# Patient Record
Sex: MALE | Race: WHITE | NOT HISPANIC OR LATINO | Employment: OTHER | URBAN - METROPOLITAN AREA
[De-identification: names, ages, dates, MRNs, and addresses within clinical notes are randomized per-mention and may not be internally consistent; named-entity substitution may affect disease eponyms.]

---

## 2017-01-16 ENCOUNTER — OFFICE VISIT (OUTPATIENT)
Dept: OPHTHALMOLOGY | Facility: CLINIC | Age: 77
End: 2017-01-16
Payer: COMMERCIAL

## 2017-01-16 DIAGNOSIS — H43.812 POSTERIOR VITREOUS DETACHMENT, LEFT: ICD-10-CM

## 2017-01-16 DIAGNOSIS — H35.363 MACULAR DRUSEN, BILATERAL: ICD-10-CM

## 2017-01-16 DIAGNOSIS — H26.9 CATARACT: ICD-10-CM

## 2017-01-16 DIAGNOSIS — H52.4 PRESBYOPIA: ICD-10-CM

## 2017-01-16 DIAGNOSIS — H40.053 BORDERLINE GLAUCOMA WITH OCULAR HYPERTENSION, BILATERAL: Primary | ICD-10-CM

## 2017-01-16 PROCEDURE — 92014 COMPRE OPH EXAM EST PT 1/>: CPT | Performed by: OPHTHALMOLOGY

## 2017-01-16 PROCEDURE — 92015 DETERMINE REFRACTIVE STATE: CPT | Performed by: OPHTHALMOLOGY

## 2017-01-16 ASSESSMENT — VISUAL ACUITY
CORRECTION_TYPE: GLASSES
OS_CC: J1
METHOD: SNELLEN - LINEAR
OS_CC+: -2
OS_CC: 20/25
OD_CC: J1
OD_CC: 20/20

## 2017-01-16 ASSESSMENT — EXTERNAL EXAM - LEFT EYE: OS_EXAM: 3+ BROW PTOSIS, MILD-MOD BROW

## 2017-01-16 ASSESSMENT — REFRACTION_WEARINGRX
SPECS_TYPE: PAL
OS_ADD: +2.50
OS_CYLINDER: +2.50
OS_AXIS: 172
OD_SPHERE: +0.75
OD_ADD: +2.50
OS_SPHERE: -1.00
OD_AXIS: 169
OD_CYLINDER: +1.25

## 2017-01-16 ASSESSMENT — CUP TO DISC RATIO
OD_RATIO: 0.5
OS_RATIO: 0.5

## 2017-01-16 ASSESSMENT — REFRACTION_MANIFEST
OS_ADD: +2.50
OS_SPHERE: -1.25
OD_AXIS: 007
OS_AXIS: 180
OS_CYLINDER: +3.00
OD_CYLINDER: +1.50
OD_SPHERE: +0.75
OD_ADD: +2.50

## 2017-01-16 ASSESSMENT — TONOMETRY
IOP_METHOD: APPLANATION
OS_IOP_MMHG: 19
OD_IOP_MMHG: 18

## 2017-01-16 ASSESSMENT — EXTERNAL EXAM - RIGHT EYE: OD_EXAM: 3+ BROW PTOSIS, MILD-MOD BROW

## 2017-01-16 ASSESSMENT — SLIT LAMP EXAM - LIDS: COMMENTS: 1+ DERMATOCHALASIS - UPPER LID, 2+ MEIBOMIAN GLAND DYSFUNCTION

## 2017-01-16 ASSESSMENT — CONF VISUAL FIELD: OD_NORMAL: 1

## 2017-01-16 NOTE — PROGRESS NOTES
" Current Eye Medications:  Latanoprost every evening.   Last drops 10pm     Subjective:  COMPLETE EYE EXAM  No visual complaints. Routine Exam.     Objective:  See Ophthalmology Exam.       Assessment: Stable intraocular pressure and discs in patient with treated ocular hypertension.      ICD-10-CM    1. 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 Cataract, mild-mod, ou H26.9    3. Macular drusen, bilateral H35.363    4. Posterior vitreous detachment, left H43.812    5. Presbyopia H52.4 REFRACTIVE STATUS         Plan: Continue Latanoprost at bedtime both eyes   Glasses Rx given -optional   Take a multiple vitamin or \"eye vitamin\" daily.  Protect your eyes outdoors from ultraviolet rays with sunglasses and/or brimmed hat.  Have spinach (cooked or raw), colorful fruits, walnuts, hazelnuts, almonds in your diet.  Monitor the vision in each eye weekly - call if any sudden persistent changes.   Possible posterior vitreous detachment (sudden onset large floater and/or flashing lights) discussed. Right eye   Return visit 6 months for intraocular pressure check, Galvan Visual Field and glaucoma OCT     Avtar Mccullough M.D.             "

## 2017-01-16 NOTE — MR AVS SNAPSHOT
"              After Visit Summary   1/16/2017    Zack Demarco    MRN: [id number]           Patient Information     Date Of Birth          1940        Visit Information        Provider Department      1/16/2017 8:00 AM Avtar Mccullough MD [facility]        Today's Diagnoses     Presbyopia    -  1     Hypermetropia, right         Myopia, left         Astigmatism, bilateral         Borderline glaucoma with ocular hypertension, bilateral - treated         Posterior vitreous detachment, left         Macular drusen, bilateral         Cataract, mild-mod, ou           Care Instructions    Continue Latanoprost at bedtime both eyes   Glasses Rx given -optional   Take a multiple vitamin or \"eye vitamin\" daily.  Protect your eyes outdoors from ultraviolet rays with sunglasses and/or brimmed hat.  Have spinach (cooked or raw), colorful fruits, walnuts, hazelnuts, almonds in your diet.  Monitor the vision in each eye weekly - call if any sudden persistent changes.   Possible posterior vitreous detachment (sudden onset large floater and/or flashing lights) discussed. Right eye   Return visit 6 months for intraocular pressure check, Galvan Visual Field and glaucoma OCT     Avtar Mccullough M.D.          Follow-ups after your visit        Who to contact     If you have questions or need follow up information about today's clinic visit or your schedule please contact [facility] directly at [phone].  Normal or non-critical lab and imaging results will be communicated to you by MyChart, letter or phone within 4 business days after the clinic has received the results. If you do not hear from us within 7 days, please contact the clinic through MyChart or phone. If you have a critical or abnormal lab result, we will notify you by phone as soon as possible.  Submit refill requests through [employer] or call your pharmacy and they will forward the refill request to us. Please allow 3 business days for " your refill to be completed.          Additional Information About Your Visit        [employer] Information     "[employer] gives you secure access to your electronic health record. If you see a primary care provider, you can also send messages to your care team and make appointments. If you have questions, please call your primary care clinic.  If you do not have a primary care provider, please call [phone] and they will assist you.        Care EveryWhere ID     This is your Care EveryWhere ID. This could be used by other organizations to access your [facility] medical records  [id number]         Blood Pressure from Last 3 Encounters:   12/30/16 138/70   09/19/16 115/46   06/15/16 144/60    Weight from Last 3 Encounters:   12/30/16 100.699 kg (222 lb)   09/12/16 95.255 kg (210 lb)   06/15/16 96.616 kg (213 lb)              Today, you had the following     No orders found for display       Primary Care Provider Office Phone # Fax #    Anastacio Love [phone]       [facility] [address] [facility] [address]        Thank you!     Thank you for choosing [facility]  for your care. Our goal is always to provide you with excellent care. Hearing back from our patients is one way we can continue to improve our services. Please take a few minutes to complete the written survey that you may receive in the mail after your visit with us. Thank you!             Your Updated Medication List - Protect others around you: Learn how to safely use, store and throw away your medicines at www.disposemymeds.org.          This list is accurate as of: 1/16/17  8:40 AM.  Always use your most recent med list.                   Brand Name Dispense Instructions for use    aspirin 325 MG EC tablet      1/2 tab daily       hydrochlorothiazide 25 MG tablet    HYDRODIURIL    90 tablet    Take 1 tablet (25 mg) by mouth daily       latanoprost 0.005 % ophthalmic solution    XALATAN    3 Bottle     Place 1 drop into both eyes At Bedtime       levothyroxine 50 MCG tablet    SYNTHROID/LEVOTHROID    110 tablet    Take 1 tablet (50 mcg) by mouth daily 100mcg (2 tabs) on Wednesdays and Sundays       metoprolol 25 MG tablet    LOPRESSOR    180 tablet    Take 1 tablet (25 mg) by mouth 2 times daily       multivitamin Tabs tablet      Take 1 tablet by mouth daily       simvastatin 20 MG tablet    ZOCOR    90 tablet    Take 1 tablet (20 mg) by mouth At Bedtime       VITAMIN D3 PO      Take by mouth daily

## 2017-01-16 NOTE — PATIENT INSTRUCTIONS
"Continue Latanoprost at bedtime both eyes   Glasses Rx given -optional   Take a multiple vitamin or \"eye vitamin\" daily.  Protect your eyes outdoors from ultraviolet rays with sunglasses and/or brimmed hat.  Have spinach (cooked or raw), colorful fruits, walnuts, hazelnuts, almonds in your diet.  Monitor the vision in each eye weekly - call if any sudden persistent changes.   Possible posterior vitreous detachment (sudden onset large floater and/or flashing lights) discussed. Right eye   Return visit 6 months for intraocular pressure check, Galvan Visual Field and glaucoma OCT     Avtar Mccullough M.D.    "

## 2017-01-18 PROBLEM — H35.363 MACULAR DRUSEN, BILATERAL: Status: ACTIVE | Noted: 2017-01-18

## 2017-01-31 ENCOUNTER — OFFICE VISIT (OUTPATIENT)
Dept: FAMILY MEDICINE | Facility: CLINIC | Age: 77
End: 2017-01-31
Payer: COMMERCIAL

## 2017-01-31 VITALS
SYSTOLIC BLOOD PRESSURE: 142 MMHG | OXYGEN SATURATION: 95 % | HEART RATE: 63 BPM | HEIGHT: 70 IN | WEIGHT: 224 LBS | DIASTOLIC BLOOD PRESSURE: 70 MMHG | BODY MASS INDEX: 32.07 KG/M2 | TEMPERATURE: 97.7 F

## 2017-01-31 DIAGNOSIS — I10 HYPERTENSION GOAL BP (BLOOD PRESSURE) < 150/90: Primary | ICD-10-CM

## 2017-01-31 PROCEDURE — 99213 OFFICE O/P EST LOW 20 MIN: CPT | Performed by: FAMILY MEDICINE

## 2017-01-31 NOTE — PROGRESS NOTES
HTN (goal <150/90) - not checking at home. Controlled in clinic.   Metoprolol 25 mg bid (Toprol XL was costly) - dose limited by bradycardia   HCTZ 25 mg qd without side effects.    Plan for today: continue same meds.    Last Basic Metabolic Panel:  NA      141   6/9/2016   POTASSIUM      4.5   6/9/2016  CHLORIDE      106   6/9/2016  ELVIRA      9.3   6/9/2016  CO2       30   6/9/2016  BUN       28   6/9/2016  CR     0.98   6/9/2016  GLC       99   6/9/2016  CBC RESULTS:   Recent Labs   Lab Test  08/12/15   0804   WBC  6.8   RBC  4.90   HGB  15.6   HCT  46.5   MCV  95   MCH  31.8   MCHC  33.5   RDW  12.4   PLT  198       Dyslipidemia - no h/o MI, CAD, CVA, PAD. On Zocor 20 mg qd. No side effects. Fair control but could be better.   Plan for today[facility] I gave him paper rx for Crestor 20 mg qd.      Recent Labs   Lab Test  06/09/16   0742  01/18/16   0708  01/22/15   0816  01/14/14   0747   CHOL  191  183  154  154   HDL  33*  33*  43  33*   LDL  127*  121*  92  103   TRIG  155*  143  93  87   CHOLHDLRATIO   --    --   3.6  4.7     Hypothyroidism - fairly controlled on Synthroid 100 mcg on Wed and Sun and 50 micrograms other days.   Plan for today: Continue same dose.    TSH   Date Value Ref Range Status   06/09/2016 4.67* 0.40 - 4.00 mU/L Final     T4 FREE   Date Value Ref Range Status   06/09/2016 0.88 0.76 - 1.46 ng/dL Final     Obesity - diet and exercise discussed.     Wt Readings from Last 5 Encounters:   12/30/16 222 lb (100.699 kg)   09/12/16 210 lb (95.255 kg)   06/15/16 213 lb (96.616 kg)   01/22/16 217 lb (98.431 kg)   08/12/15 217 lb (98.431 kg)     Sore tongue - he has seen ENT and dentist. The problem is resolved.     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 Asymptomatic. Advised wearing compression stockings.    Previous surgeries - left rotator cuff surgery. Had anterior tibialis repair (for foot drop) on 8/18/15 - good results.    Hearing loss - he wants  "to hold off on hearing aides referral.     Preventive:    Immunization History   Administered Date(s) Administered     Influenza (High Dose) 3 valent vaccine 10/15/2015, 10/20/2016     Influenza (IIV3) 10/25/2012, 10/01/2013, 11/24/2014     Pneumococcal (PCV 13) 01/22/2016     Pneumococcal 23 valent 11/30/2006     TD (ADULT, 7+) 08/25/2010     TDAP (BOOSTRIX AGES 10-64) 01/21/2013     Zoster vaccine, live 07/15/2008     Colonoscopy: 9/19/16 - advised to redo in 5 years.     Prostate CA screen: discussed controversy. Prostate mildly enlarged on 7/15/14.     PSA   Date Value Ref Range Status   06/09/2016 2.76 0 - 4 ug/L Final       AAA: 7/18/14 negative    Advanced Directive: referred previously    Vit D Geriatrics Society Guidelines: Older adults should consume 4000 IU of Vit D daily from all sources. This will achieve serum level of 30. No need to test unless obese, malabsorption etc. You get only 100 units per glass of milk. 2000 units advised.    ROS:    Const: No fevers, weight changes or night sweats recently.  ENT: No runny nose, sore throat or ear pain.  Resp: No cough or shortness of breath.  CV: No chest pain, dizziness or cardiac palpitations.  GI: No nausea, vomiting, diarrhea or constipation. Denies blood in stools or black stools.  : No dysuria, frequency or hematuria.    SH:    Marital status: , lives by himself in [place].  Kids: 2  Employment: Works at a machine shop.  Exercise: Walks a lot at work. Has been running 4 miles per day 5 times per week.  Tobacco: Quit smoking around 1980. Has 14 pack year history of smoking.  Etoh: rarely  Recreational drugs: denies  Caffeine: 2 per day    Exam:    /70 mmHg  Pulse 63  Temp(Src) 97.7  F (36.5  C) (Oral)  Ht 5' 9.5\" (1.765 m)  Wt 224 lb (101.606 kg)  BMI 32.62 kg/m2  SpO2 95%    Gen: Healthy appearing male in no acute distress  Neck: No enlarged lymph nodes, thyromegally or other masses.  Lungs: Good air movement and otherwise " clear.  CV: Heart RRR with no murmurs. No JVD, carotid bruits or leg edema.      Assessment and Plan - Decision Making    1. Hypertension goal BP (blood pressure) < 150/90    Fairly controlled. Continue same treatment.      Written instructions given as follows:    Patient Instructions   Let me see you back in 6 months for a physical with pre-visit labs.

## 2017-01-31 NOTE — NURSING NOTE
"Chief Complaint   Patient presents with     Hypertension     Lipids       Initial /72 mmHg  Pulse 63  Temp(Src) 97.7  F (36.5  C) (Oral)  Ht 5' 9.5\" (1.765 m)  Wt 224 lb (101.606 kg)  BMI 32.62 kg/m2  SpO2 95% Estimated body mass index is 32.62 kg/(m^2) as calculated from the following:    Height as of this encounter: 5' 9.5\" (1.765 m).    Weight as of this encounter: 224 lb (101.606 kg)..  BP completed using cuff size: large    Vibha Smyth LPN    "

## 2017-01-31 NOTE — MR AVS SNAPSHOT
After Visit Summary   1/31/2017    Zack Demarco    MRN: [id number]           Patient Information     Date Of Birth          1940        Visit Information        Provider Department      1/31/2017 12:50 PM Anastacio Love MD [facility]        Care Instructions    Let me see you back in 6 months for a physical with pre-visit labs.        Follow-ups after your visit        Your next 10 appointments already scheduled     Jul 13, 2017  8:15 AM   Return Visit with Avtar Mccullough MD   [facility] ([address] [address] [phone]              Who to contact     If you have questions or need follow up information about today's clinic visit or your schedule please contact [facility] directly at [phone].  Normal or non-critical lab and imaging results will be communicated to you by MyChart, letter or phone within 4 business days after the clinic has received the results. If you do not hear from us within 7 days, please contact the clinic through MyChart or phone. If you have a critical or abnormal lab result, we will notify you by phone as soon as possible.  Submit refill requests through [employer] or call your pharmacy and they will forward the refill request to us. Please allow 3 business days for your refill to be completed.          Additional Information About Your Visit        MyChart Information     [employer] gives you secure access to your electronic health record. If you see a primary care provider, you can also send messages to your care team and make appointments. If you have questions, please call your primary care clinic.  If you do not have a primary care provider, please call [phone] and they will assist you.        Care EveryWhere ID     This is your Care EveryWhere ID. This could be used by other organizations to access your [facility] medical records  [id number]        Your Vitals Were  "    Pulse Temperature Height BMI (Body Mass Index) Pulse Oximetry       63 97.7  F (36.5  C) (Oral) 5' 9.5\" (1.765 m) 32.62 kg/m2 95%        Blood Pressure from Last 3 Encounters:   01/31/17 142/70   12/30/16 138/70   09/19/16 115/46    Weight from Last 3 Encounters:   01/31/17 224 lb (101.606 kg)   12/30/16 222 lb (100.699 kg)   09/12/16 210 lb (95.255 kg)              Today, you had the following     No orders found for display       Primary Care Provider Office Phone # Fax #    Anastacio Love [phone]       [facility] [address] [facility] [address]        Thank you!     Thank you for choosing [facility]  for your care. Our goal is always to provide you with excellent care. Hearing back from our patients is one way we can continue to improve our services. Please take a few minutes to complete the written survey that you may receive in the mail after your visit with us. Thank you!             Your Updated Medication List - Protect others around you: Learn how to safely use, store and throw away your medicines at www.disposemymeds.org.          This list is accurate as of: 1/31/17  1:11 PM.  Always use your most recent med list.                   Brand Name Dispense Instructions for use    aspirin 325 MG EC tablet      1/2 tab daily       hydrochlorothiazide 25 MG tablet    HYDRODIURIL    90 tablet    Take 1 tablet (25 mg) by mouth daily       latanoprost 0.005 % ophthalmic solution    XALATAN    3 Bottle    Place 1 drop into both eyes At Bedtime       levothyroxine 50 MCG tablet    SYNTHROID/LEVOTHROID    110 tablet    Take 1 tablet (50 mcg) by mouth daily 100mcg (2 tabs) on Wednesdays and Sundays       metoprolol 25 MG tablet    LOPRESSOR    180 tablet    Take 1 tablet (25 mg) by mouth 2 times daily       multivitamin Tabs tablet      Take 1 tablet by mouth daily       simvastatin 20 MG tablet    ZOCOR    90 tablet    Take 1 tablet (20 mg) by mouth At Bedtime "       VITAMIN D3 PO      Take by mouth daily

## 2017-04-20 DIAGNOSIS — I10 HYPERTENSION GOAL BP (BLOOD PRESSURE) < 150/90: ICD-10-CM

## 2017-04-20 RX ORDER — HYDROCHLOROTHIAZIDE 25 MG/1
25 TABLET ORAL DAILY
Qty: 90 TABLET | Refills: 0 | Status: SHIPPED | OUTPATIENT
Start: 2017-04-20 | End: 2017-07-12

## 2017-04-20 RX ORDER — METOPROLOL TARTRATE 25 MG/1
25 TABLET, FILM COATED ORAL 2 TIMES DAILY
Qty: 180 TABLET | Refills: 0 | Status: SHIPPED | OUTPATIENT
Start: 2017-04-20 | End: 2017-07-12

## 2017-07-13 ENCOUNTER — OFFICE VISIT (OUTPATIENT)
Dept: OPHTHALMOLOGY | Facility: CLINIC | Age: 77
End: 2017-07-13
Payer: COMMERCIAL

## 2017-07-13 DIAGNOSIS — H40.053 BORDERLINE GLAUCOMA WITH OCULAR HYPERTENSION, BILATERAL: Primary | ICD-10-CM

## 2017-07-13 PROCEDURE — 92083 EXTENDED VISUAL FIELD XM: CPT | Performed by: OPHTHALMOLOGY

## 2017-07-13 PROCEDURE — 92133 CPTRZD OPH DX IMG PST SGM ON: CPT | Performed by: OPHTHALMOLOGY

## 2017-07-13 PROCEDURE — 92014 COMPRE OPH EXAM EST PT 1/>: CPT | Performed by: OPHTHALMOLOGY

## 2017-07-13 ASSESSMENT — TONOMETRY
OD_IOP_MMHG: 19
IOP_METHOD: APPLANATION
OS_IOP_MMHG: 19

## 2017-07-13 ASSESSMENT — VISUAL ACUITY
OD_CC: 20/30
METHOD: SNELLEN - LINEAR
OS_CC: 20/40
OS_CC+: +2
CORRECTION_TYPE: GLASSES

## 2017-07-13 ASSESSMENT — REFRACTION_WEARINGRX
OS_ADD: +2.50
OD_AXIS: 169
OD_SPHERE: +0.75
OS_CYLINDER: +2.50
OD_ADD: +2.50
OS_SPHERE: -1.00
OS_AXIS: 172
SPECS_TYPE: PAL
OD_CYLINDER: +1.25

## 2017-07-13 NOTE — PATIENT INSTRUCTIONS
Continue same medication.  Return visit 6 months for complete exam.  Consider cataract surgery then.  Avtar Mccullough M.D.

## 2017-07-13 NOTE — MR AVS SNAPSHOT
After Visit Summary   7/13/2017    Zack Demarco    MRN: [id number]           Patient Information     Date Of Birth          1940        Visit Information        Provider Department      7/13/2017 8:15 AM Avtar Mccullough MD [facility]        Care Instructions    Continue same medication.  Return visit 6 months for complete exam .  Avtar Mccullough M.D.            Follow-ups after your visit        Your next 10 appointments already scheduled     Jul 20, 2017  7:45 AM CDT   [facility] with [facility]   [facility]    [address] [facility] [address] [phone]           Patient must bring picture ID.  Patient should be prepared to give a urine specimen  Please do not eat 10-12 hours before your appointment if you are coming in fasting for labs on lipids, cholesterol, or glucose (sugar).  Pregnant women should follow their Care Team instructions. Water with medications is okay. Do not drink coffee or other fluids.   If you have concerns about taking  your medications, please ask at office or if scheduling via [employer], send a message by clicking on Secure Messaging, Message Your Care Team.            Jul 27, 2017  9:50 AM CDT   Office Visit with Anastacio Love MD   [facility]    [address] [facility] [address] [phone]           Bring a current list of meds and any records pertaining to this visit.  For Physicals, please bring immunization records and any forms needing to be filled out.  Please arrive 10 minutes early to complete paperwork.              Who to contact     If you have questions or need follow up information about today's clinic visit or your schedule please contact [facility] directly at [phone].  Normal or non-critical lab and imaging results will be communicated to you by MyChart, letter or phone within 4 business days after the clinic has  received the results. If you do not hear from us within 7 days, please contact the clinic through [employer] or phone. If you have a critical or abnormal lab result, we will notify you by phone as soon as possible.  Submit refill requests through [employer] or call your pharmacy and they will forward the refill request to us. Please allow 3 business days for your refill to be completed.          Additional Information About Your Visit        [employer] Information     [employer] gives you secure access to your electronic health record. If you see a primary care provider, you can also send messages to your care team and make appointments. If you have questions, please call your primary care clinic.  If you do not have a primary care provider, please call [phone] and they will assist you.        Care EveryWhere ID     This is your Care EveryWhere ID. This could be used by other organizations to access your [facility] medical records  [id number]         Blood Pressure from Last 3 Encounters:   01/31/17 142/70   12/30/16 138/70   09/19/16 115/46    Weight from Last 3 Encounters:   01/31/17 101.6 kg (224 lb)   12/30/16 100.7 kg (222 lb)   09/12/16 95.3 kg (210 lb)              Today, you had the following     No orders found for display       Primary Care Provider Office Phone # Fax #    Anastacio Love [phone]       [facility] [address] [facility] [address]        Equal Access to Services     HALEY BURR : Hadii aad ku hadasho Soomaali, waaxda luqadaha, qaybta kaalmada adeegyada, marilynn reynolds. So [facility] [phone].    ATENCIÓN: Si habla español, tiene a warner disposición servicios gratuitos de asistencia lingüística. Llame al [phone].    We comply with applicable federal civil rights laws and [place] laws. We do not discriminate on the basis of race, color, national origin, age, disability sex, sexual orientation or gender identity.            Thank  you!     Thank you for choosing [facility]  for your care. Our goal is always to provide you with excellent care. Hearing back from our patients is one way we can continue to improve our services. Please take a few minutes to complete the written survey that you may receive in the mail after your visit with us. Thank you!             Your Updated Medication List - Protect others around you: Learn how to safely use, store and throw away your medicines at www.disposemymeds.org.          This list is accurate as of: 7/13/17  9:03 AM.  Always use your most recent med list.                   Brand Name Dispense Instructions for use Diagnosis    aspirin 325 MG EC tablet      1/2 tab daily        hydrochlorothiazide 25 MG tablet    HYDRODIURIL    90 tablet    Take 1 tablet (25 mg) by mouth daily    Hypertension goal BP (blood pressure) < 150/90       latanoprost 0.005 % ophthalmic solution    XALATAN    3 Bottle    Place 1 drop into both eyes At Bedtime    Borderline glaucoma with ocular hypertension, unspecified laterality       levothyroxine 50 MCG tablet    SYNTHROID/LEVOTHROID    110 tablet    Take 1 tablet (50 mcg) by mouth daily 100mcg (2 tabs) on Wednesdays and Sundays    Hypothyroidism, unspecified hypothyroidism type       metoprolol 25 MG tablet    LOPRESSOR    180 tablet    Take 1 tablet (25 mg) by mouth 2 times daily    Hypertension goal BP (blood pressure) < 150/90       multivitamin Tabs tablet      Take 1 tablet by mouth daily        simvastatin 20 MG tablet    ZOCOR    90 tablet    Take 1 tablet (20 mg) by mouth At Bedtime    Hyperlipidemia LDL goal <130       VITAMIN D3 PO      Take by mouth daily

## 2017-07-13 NOTE — PROGRESS NOTES
Current Eye Medications:  Latanoprost qhs both eyes.     Subjective: 6 month recheck for IOP, OCT, and VF. Vision is unchanged BE, still poor LE(can't see golf ball). Zero eye pain or discomfort Both eyes.     Objective:  See Ophthalmology Exam.       Assessment:  Stable intraocular pressure, glaucoma OCT, and Galvan Visual Field both eyes in patient who is a treated glaucoma suspect.  Cataracts approaching visual significance.      Plan: Continue same medication.  Return visit 6 months for complete exam.  Consider cataract surgery then.  Avtar Mccullough M.D.

## 2017-07-15 ENCOUNTER — DOCUMENTATION ONLY (OUTPATIENT)
Dept: LAB | Facility: CLINIC | Age: 77
End: 2017-07-15

## 2017-07-15 DIAGNOSIS — I10 HYPERTENSION GOAL BP (BLOOD PRESSURE) < 150/90: ICD-10-CM

## 2017-07-15 DIAGNOSIS — E03.9 HYPOTHYROIDISM, UNSPECIFIED TYPE: ICD-10-CM

## 2017-07-15 DIAGNOSIS — E78.5 HYPERLIPIDEMIA LDL GOAL <130: Primary | ICD-10-CM

## 2017-07-15 ASSESSMENT — SLIT LAMP EXAM - LIDS: COMMENTS: 1+ DERMATOCHALASIS - UPPER LID, 2+ MEIBOMIAN GLAND DYSFUNCTION

## 2017-07-15 ASSESSMENT — EXTERNAL EXAM - LEFT EYE: OS_EXAM: 3+ BROW PTOSIS, MILD-MOD BROW

## 2017-07-15 ASSESSMENT — PACHYMETRY
OD_CT(UM): 552
OS_CT(UM): 548

## 2017-07-15 ASSESSMENT — EXTERNAL EXAM - RIGHT EYE: OD_EXAM: 3+ BROW PTOSIS, MILD-MOD BROW

## 2017-07-15 NOTE — PROGRESS NOTES
..Please place or confirm orders for upcoming lab appointment on 07/19/17    Thanks  Kinsey Valadez

## 2017-07-17 NOTE — PROGRESS NOTES
Please review lab orders sign and close encounter. Radha Lees MA/INOCENCIO    Med check-BP appt 7/27/17

## 2017-07-20 ENCOUNTER — TRANSFERRED RECORDS (OUTPATIENT)
Dept: HEALTH INFORMATION MANAGEMENT | Facility: CLINIC | Age: 77
End: 2017-07-20

## 2017-07-20 DIAGNOSIS — E03.9 HYPOTHYROIDISM, UNSPECIFIED TYPE: ICD-10-CM

## 2017-07-20 DIAGNOSIS — E78.5 HYPERLIPIDEMIA LDL GOAL <130: ICD-10-CM

## 2017-07-20 DIAGNOSIS — I10 HYPERTENSION GOAL BP (BLOOD PRESSURE) < 150/90: ICD-10-CM

## 2017-07-20 LAB
ANION GAP SERPL CALCULATED.3IONS-SCNC: 5 MMOL/L (ref 3–14)
BUN SERPL-MCNC: 29 MG/DL (ref 7–30)
CALCIUM SERPL-MCNC: 9.1 MG/DL (ref 8.5–10.1)
CHLORIDE SERPL-SCNC: 108 MMOL/L (ref 94–109)
CHOLEST SERPL-MCNC: 148 MG/DL
CO2 SERPL-SCNC: 28 MMOL/L (ref 20–32)
CREAT SERPL-MCNC: 1.1 MG/DL (ref 0.66–1.25)
GFR SERPL CREATININE-BSD FRML MDRD: 65 ML/MIN/1.7M2
GLUCOSE SERPL-MCNC: 110 MG/DL (ref 70–99)
HDLC SERPL-MCNC: 32 MG/DL
LDLC SERPL CALC-MCNC: 76 MG/DL
NONHDLC SERPL-MCNC: 116 MG/DL
POTASSIUM SERPL-SCNC: 4.2 MMOL/L (ref 3.4–5.3)
SODIUM SERPL-SCNC: 141 MMOL/L (ref 133–144)
T4 FREE SERPL-MCNC: 0.84 NG/DL (ref 0.76–1.46)
TRIGL SERPL-MCNC: 201 MG/DL
TSH SERPL DL<=0.005 MIU/L-ACNC: 7.72 MU/L (ref 0.4–4)

## 2017-07-20 PROCEDURE — 80061 LIPID PANEL: CPT | Performed by: FAMILY MEDICINE

## 2017-07-20 PROCEDURE — 36415 COLL VENOUS BLD VENIPUNCTURE: CPT | Performed by: FAMILY MEDICINE

## 2017-07-20 PROCEDURE — 80048 BASIC METABOLIC PNL TOTAL CA: CPT | Performed by: FAMILY MEDICINE

## 2017-07-20 PROCEDURE — 84439 ASSAY OF FREE THYROXINE: CPT | Performed by: FAMILY MEDICINE

## 2017-07-20 PROCEDURE — 84443 ASSAY THYROID STIM HORMONE: CPT | Performed by: FAMILY MEDICINE

## 2017-07-26 NOTE — PROGRESS NOTES
HPI:    Zack is a 76 year old male here for follow-up:    HTN (goal <150/90) - checking at home sometimes. He brought results today - 140'4s to 150 systolic. Heart rate 40's and 50's. No symptoms.   Metoprolol 25 mg bid (Toprol XL was costly) - dose limited by bradycardia - we will stop that due to bradycardia.   Switch HCTZ to Losartan/HCTZ 50/12.5 qd.      Last Basic Metabolic Panel:  Lab Results   Component Value Date     07/20/2017      Lab Results   Component Value Date    POTASSIUM 4.2 07/20/2017     Lab Results   Component Value Date    CHLORIDE 108 07/20/2017     Lab Results   Component Value Date    ELVIRA 9.1 07/20/2017     Lab Results   Component Value Date    CO2 28 07/20/2017     Lab Results   Component Value Date    BUN 29 07/20/2017     Lab Results   Component Value Date    CR 1.10 07/20/2017     Lab Results   Component Value Date     07/20/2017       CBC RESULTS:   Recent Labs   Lab Test  08/12/15   0804   WBC  6.8   RBC  4.90   HGB  15.6   HCT  46.5   MCV  95   MCH  31.8   MCHC  33.5   RDW  12.4   PLT  198       Dyslipidemia - No history of CAD, CVA, PAD or diabetes. Per ATP4, moderate intensity statin recommended.  Treatment:   Crestor stopped due to cost   Simvastatin 20 mg qd - no side effects.  The 10-year ASCVD risk score (Philpot LEONARDO Jr, et al., 2013) is: 35.9%    Values used to calculate the score:      Age: 76 years      Sex: Male      Is Non- : No      Diabetic: No      Tobacco smoker: No      Systolic Blood Pressure: 146 mmHg      Is BP treated: Yes      HDL Cholesterol: 32 mg/dL      Total Cholesterol: 148 mg/dL      Recent Labs   Lab Test  07/20/17   0725  06/09/16   0742   01/22/15   0816  01/14/14   0747   CHOL  148  191   < >  154  154   HDL  32*  33*   < >  43  33*   LDL  76  127*   < >  92  103   TRIG  201*  155*   < >  93  87   CHOLHDLRATIO   --    --    --   3.6  4.7    < > = values in this interval not displayed.     Hypothyroidism - No thyroid  symptoms.  Treatment:   Synthroid 100 mcg on Wed and Sun and 50 micrograms other days.   We will switch this to 75 mcg qd and recheck at a later date.      TSH   Date Value Ref Range Status   07/20/2017 7.72 (H) 0.40 - 4.00 mU/L Final     T4 Free   Date Value Ref Range Status   07/20/2017 0.84 0.76 - 1.46 ng/dL Final     Obesity - diet and exercise discussed.     Wt Readings from Last 5 Encounters:   07/27/17 223 lb (101.2 kg)   01/31/17 224 lb (101.6 kg)   12/30/16 222 lb (100.7 kg)   09/12/16 210 lb (95.3 kg)   06/15/16 213 lb (96.6 kg)     Sore tongue - he has seen ENT and dentist. The problem is resolved.     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 Asymptomatic. Advised wearing compression stockings.    Previous surgeries - left rotator cuff surgery. Had anterior tibialis repair (for foot drop) on 8/18/15 - good results.    Hearing loss - he wants to hold off on hearing aides referral.     Preventive:    Immunization History   Administered Date(s) Administered     Influenza (High Dose) 3 valent vaccine 10/15/2015, 10/20/2016     Influenza (IIV3) 10/25/2012, 10/01/2013, 11/24/2014     Pneumococcal (PCV 13) 01/22/2016     Pneumococcal 23 valent 11/30/2006     TD (ADULT, 7+) 08/25/2010     TDAP Vaccine (Boostrix) 01/21/2013     Zoster vaccine, live 07/15/2008     Colonoscopy: 9/19/16 - advised to redo in 5 years.     Prostate CA screen: discussed controversy. Prostate mildly enlarged on 7/15/14.     PSA   Date Value Ref Range Status   06/09/2016 2.76 0 - 4 ug/L Final       AAA: 7/18/14 negative    Advanced Directive: referred previously    Vit D Geriatrics Society Guidelines: Older adults should consume 4000 IU of Vit D daily from all sources. This will achieve serum level of 30. No need to test unless obese, malabsorption etc. You get only 100 units per glass of milk. 2000 units advised.    ROS:    Const: No fevers, weight changes or night sweats recently.  ENT: No runny nose, sore throat or ear pain.  Resp:  No cough or shortness of breath.  CV: No chest pain, dizziness or cardiac palpitations.  GI: No nausea, vomiting, diarrhea or constipation. Denies blood in stools or black stools.  : No dysuria, frequency or hematuria.    SH:    Marital status: , lives by himself in [place].  Kids: 2  Employment: Works at a machine shop.  Exercise: Walks a lot at work. Has been running 4 miles per day 5 times per week.  Tobacco: Quit smoking around 1980. Has 14 pack year history of smoking.  Etoh: rarely  Recreational drugs: denies  Caffeine: 2 per day    Exam:    /72  Pulse 63  Temp 98.4  F (36.9  C) (Oral)  Wt 223 lb (101.2 kg)  SpO2 97%  BMI 32.46 kg/m2    Gen: Healthy appearing male in no acute distress  Neck: No enlarged lymph nodes, thyromegally or other masses.  Lungs: Good air movement and otherwise clear.  CV: Heart RRR with no murmurs. No JVD, carotid bruits or leg edema. Varicose veins on thighs and legs.      Assessment and Plan - Decision Making    1. Hypertension goal BP (blood pressure) < 150/90  Per HPI  - losartan-hydrochlorothiazide (HYZAAR) 50-12.5 MG per tablet; Take 1 tablet by mouth daily  Dispense: 90 tablet; Refill: 3  - Creatinine; Future  - Potassium; Future    2. Hypothyroidism, unspecified type  Per HPI  - levothyroxine (SYNTHROID/LEVOTHROID) 75 MCG tablet; Take 1 tablet (75 mcg) by mouth daily  Dispense: 90 tablet; Refill: 3    3. Hyperlipidemia LDL goal <130  Per HPI  - simvastatin (ZOCOR) 20 MG tablet; Take 1 tablet (20 mg) by mouth At Bedtime  Dispense: 90 tablet; Refill: 3      Written instructions given as follows:    Patient Instructions   1. Let's change the Synthroid to 75 micrograms daily.    2. Stop the Metoprolol due to low heart rate.    3. Stop the HCTZ and change to Losartan/HCTZ 50/12.5 once daily.    4. Stop by our pharmacy in 3 weeks without an appointment to check the blood pressure. Have the pharmacy forward the result to me.     5. Also make a lab only appointment  in 3 weeks to check the potassium and kidney function.

## 2017-07-27 ENCOUNTER — OFFICE VISIT (OUTPATIENT)
Dept: FAMILY MEDICINE | Facility: CLINIC | Age: 77
End: 2017-07-27
Payer: COMMERCIAL

## 2017-07-27 VITALS
OXYGEN SATURATION: 97 % | HEART RATE: 63 BPM | SYSTOLIC BLOOD PRESSURE: 146 MMHG | TEMPERATURE: 98.4 F | WEIGHT: 223 LBS | DIASTOLIC BLOOD PRESSURE: 72 MMHG | BODY MASS INDEX: 32.46 KG/M2

## 2017-07-27 DIAGNOSIS — E03.9 HYPOTHYROIDISM, UNSPECIFIED TYPE: ICD-10-CM

## 2017-07-27 DIAGNOSIS — I10 HYPERTENSION GOAL BP (BLOOD PRESSURE) < 150/90: Primary | ICD-10-CM

## 2017-07-27 DIAGNOSIS — E78.5 HYPERLIPIDEMIA LDL GOAL <130: ICD-10-CM

## 2017-07-27 PROCEDURE — 99214 OFFICE O/P EST MOD 30 MIN: CPT | Performed by: FAMILY MEDICINE

## 2017-07-27 RX ORDER — LOSARTAN POTASSIUM AND HYDROCHLOROTHIAZIDE 12.5; 5 MG/1; MG/1
1 TABLET ORAL DAILY
Qty: 90 TABLET | Refills: 3 | Status: ON HOLD | OUTPATIENT
Start: 2017-07-27 | End: 2017-11-27

## 2017-07-27 RX ORDER — LEVOTHYROXINE SODIUM 75 UG/1
75 TABLET ORAL DAILY
Qty: 90 TABLET | Refills: 3 | Status: SHIPPED | OUTPATIENT
Start: 2017-07-27 | End: 2019-05-24 | Stop reason: DRUGHIGH

## 2017-07-27 RX ORDER — SIMVASTATIN 20 MG
20 TABLET ORAL AT BEDTIME
Qty: 90 TABLET | Refills: 3 | Status: SHIPPED | OUTPATIENT
Start: 2017-07-27 | End: 2017-11-09 | Stop reason: ALTCHOICE

## 2017-07-27 NOTE — PATIENT INSTRUCTIONS
1. Let's change the Synthroid to 75 micrograms daily.    2. Stop the Metoprolol due to low heart rate.    3. Stop the HCTZ and change to Losartan/HCTZ 50/12.5 once daily.    4. Stop by our pharmacy in 3 weeks without an appointment to check the blood pressure. Have the pharmacy forward the result to me.     5. Also make a lab only appointment in 3 weeks to check the potassium and kidney function.

## 2017-07-27 NOTE — MR AVS SNAPSHOT
After Visit Summary   7/27/2017    Zack Demarco    MRN: [id number]           Patient Information     Date Of Birth          1940        Visit Information        Provider Department      7/27/2017 9:50 AM Anastacio Love MD [facility]        Today's Diagnoses     Hypertension goal BP (blood pressure) < 150/90    -  1    Hypothyroidism, unspecified type        Hyperlipidemia LDL goal <130          Care Instructions    1. Let's change the Synthroid to 75 micrograms daily.    2. Stop the Metoprolol due to low heart rate.    3. Stop the HCTZ and change to Losartan/HCTZ 50/12.5 once daily.    4. Stop by our pharmacy in 3 weeks without an appointment to check the blood pressure. Have the pharmacy forward the result to me.     5. Also make a lab only appointment in 3 weeks to check the potassium and kidney function.          Follow-ups after your visit        Your next 10 appointments already scheduled     Jan 17, 2018  8:00 AM CST   New Visit with Avtar Mccullough MD   [facility] ([address] [id number] [phone]              Future tests that were ordered for you today     Open Future Orders        Priority Expected Expires Ordered    Creatinine Routine  12/27/2017 7/27/2017    Potassium Routine  12/27/2017 7/27/2017            Who to contact     If you have questions or need follow up information about today's clinic visit or your schedule please contact [facility] directly at [phone].  Normal or non-critical lab and imaging results will be communicated to you by MyChart, letter or phone within 4 business days after the clinic has received the results. If you do not hear from us within 7 days, please contact the clinic through MyChart or phone. If you have a critical or abnormal lab result, we will notify you by phone as soon as possible.  Submit refill requests through [employer] or call your pharmacy  and they will forward the refill request to us. Please allow 3 business days for your refill to be completed.          Additional Information About Your Visit        [employer] Information     [employer] gives you secure access to your electronic health record. If you see a primary care provider, you can also send messages to your care team and make appointments. If you have questions, please call your primary care clinic.  If you do not have a primary care provider, please call [phone] and they will assist you.        Care EveryWhere ID     This is your Care EveryWhere ID. This could be used by other organizations to access your [place] medical records  [id number]        Your Vitals Were     Pulse Temperature Pulse Oximetry BMI (Body Mass Index)          63 98.4  F (36.9  C) (Oral) 97% 32.46 kg/m2         Blood Pressure from Last 3 Encounters:   07/27/17 146/72   01/31/17 142/70   12/30/16 138/70    Weight from Last 3 Encounters:   07/27/17 223 lb (101.2 kg)   01/31/17 224 lb (101.6 kg)   12/30/16 222 lb (100.7 kg)                 Today's Medication Changes          These changes are accurate as of: 7/27/17 10:21 AM.  If you have any questions, ask your nurse or doctor.               Start taking these medicines.        Dose/Directions    losartan-hydrochlorothiazide 50-12.5 MG per tablet   Commonly known as:  HYZAAR   Used for:  Hypertension goal BP (blood pressure) < 150/90   Started by:  Anastacio Love MD        Dose:  1 tablet   Take 1 tablet by mouth daily   Quantity:  90 tablet   Refills:  3         These medicines have changed or have updated prescriptions.        Dose/Directions    levothyroxine 75 MCG tablet   Commonly known as:  SYNTHROID/LEVOTHROID   This may have changed:    - medication strength  - how much to take  - additional instructions   Used for:  Hypothyroidism, unspecified type   Changed by:  Anastacio Love MD        Dose:  75 mcg   Take 1 tablet (75 mcg) by mouth daily   Quantity:  90  tablet   Refills:  3         Stop taking these medicines if you haven't already. Please contact your care team if you have questions.     hydrochlorothiazide 25 MG tablet   Commonly known as:  HYDRODIURIL   Stopped by:  Anastacio Love MD           metoprolol 25 MG tablet   Commonly known as:  LOPRESSOR   Stopped by:  Anastacio Love MD                Where to get your medicines      These medications were sent to [id number] - [place], [place] - [address] [address] [facility]  [address] [address], [facility] [address]    Hours:  test fax successful 4/5/04 kr Phone:  [phone]     levothyroxine 75 MCG tablet    losartan-hydrochlorothiazide 50-12.5 MG per tablet    simvastatin 20 MG tablet                Primary Care Provider Office Phone # Fax #    Anastacio Love [phone]       [facility] [address] [facility] [address]        Equal Access to Services     SHEYLA BURR AH: Hadii stefany ku hadasho Soomaali, waaxda luqadaha, qaybta kaalmada adeegyada, waxay idiin hayjose rn trini dasilva . So [facility] [phone].    ATENCIÓN: Si habla español, tiene a warner disposición servicios gratuitos de asistencia lingüística. Llame al [phone].    We comply with applicable federal civil rights laws and [place] laws. We do not discriminate on the basis of race, color, national origin, age, disability sex, sexual orientation or gender identity.            Thank you!     Thank you for choosing [facility]  for your care. Our goal is always to provide you with excellent care. Hearing back from our patients is one way we can continue to improve our services. Please take a few minutes to complete the written survey that you may receive in the mail after your visit with us. Thank you!             Your Updated Medication List - Protect others around you: Learn how to safely use, store and throw away your medicines at www.disposemymeds.org.          This list is accurate as of:  7/27/17 10:21 AM.  Always use your most recent med list.                   Brand Name Dispense Instructions for use Diagnosis    aspirin 325 MG EC tablet      1/2 tab daily        latanoprost 0.005 % ophthalmic solution    XALATAN    3 Bottle    Place 1 drop into both eyes At Bedtime    Borderline glaucoma with ocular hypertension, unspecified laterality       levothyroxine 75 MCG tablet    SYNTHROID/LEVOTHROID    90 tablet    Take 1 tablet (75 mcg) by mouth daily    Hypothyroidism, unspecified type       losartan-hydrochlorothiazide 50-12.5 MG per tablet    HYZAAR    90 tablet    Take 1 tablet by mouth daily    Hypertension goal BP (blood pressure) < 150/90       multivitamin Tabs tablet      Take 1 tablet by mouth daily        simvastatin 20 MG tablet    ZOCOR    90 tablet    Take 1 tablet (20 mg) by mouth At Bedtime    Hyperlipidemia LDL goal <130       VITAMIN D3 PO      Take by mouth daily

## 2017-07-27 NOTE — NURSING NOTE
"Chief Complaint   Patient presents with     Hypertension       Initial /78 (Cuff Size: Adult Regular)  Pulse 63  Temp 98.4  F (36.9  C) (Oral)  Wt 223 lb (101.2 kg)  SpO2 97%  BMI 32.46 kg/m2 Estimated body mass index is 32.46 kg/(m^2) as calculated from the following:    Height as of 1/31/17: 5' 9.5\" (1.765 m).    Weight as of this encounter: 223 lb (101.2 kg).  Medication Reconciliation: complete    Vibha Smyth LPN    "

## 2017-08-18 ENCOUNTER — ALLIED HEALTH/NURSE VISIT (OUTPATIENT)
Dept: FAMILY MEDICINE | Facility: CLINIC | Age: 77
End: 2017-08-18
Payer: COMMERCIAL

## 2017-08-18 VITALS — DIASTOLIC BLOOD PRESSURE: 68 MMHG | HEART RATE: 56 BPM | SYSTOLIC BLOOD PRESSURE: 144 MMHG

## 2017-08-18 DIAGNOSIS — I10 HYPERTENSION GOAL BP (BLOOD PRESSURE) < 150/90: ICD-10-CM

## 2017-08-18 DIAGNOSIS — I10 HYPERTENSION GOAL BP (BLOOD PRESSURE) < 150/90: Primary | ICD-10-CM

## 2017-08-18 LAB
CREAT SERPL-MCNC: 0.96 MG/DL (ref 0.66–1.25)
GFR SERPL CREATININE-BSD FRML MDRD: 76 ML/MIN/1.7M2
POTASSIUM SERPL-SCNC: 4 MMOL/L (ref 3.4–5.3)

## 2017-08-18 PROCEDURE — 84132 ASSAY OF SERUM POTASSIUM: CPT | Performed by: FAMILY MEDICINE

## 2017-08-18 PROCEDURE — 99207 ZZC NO CHARGE NURSE ONLY: CPT | Performed by: FAMILY MEDICINE

## 2017-08-18 PROCEDURE — 82565 ASSAY OF CREATININE: CPT | Performed by: FAMILY MEDICINE

## 2017-08-18 PROCEDURE — 36415 COLL VENOUS BLD VENIPUNCTURE: CPT | Performed by: FAMILY MEDICINE

## 2017-08-18 NOTE — MR AVS SNAPSHOT
After Visit Summary   8/18/2017    Zack Demarco    MRN: [id number]           Patient Information     Date Of Birth          1940        Visit Information        Provider Department      8/18/2017 8:18 AM Anastacio Love MD [facility]        Today's Diagnoses     Hypertension goal BP (blood pressure) < 150/90    -  1       Follow-ups after your visit        Your next 10 appointments already scheduled     Aug 18, 2017  8:45 AM CDT   Lab visit with AN LAB   [facility] ([facility]    [address] [facility] [address] [phone]           Please do not eat 10-12 hours before your appointment if you are coming in fasting for labs on lipids, cholesterol, or glucose (sugar). Does not apply to pregnant women.  Water with medications is okay. Do not drink coffee or other fluids.  If you have concerns about taking your medications, please send a message by clicking on Secure Messaging, Message Your Care Team.            Jan 17, 2018  8:00 AM CST   New Visit with Avtar Mccullough MD   [facility]    [address] [place] [address] [phone]              Who to contact     If you have questions or need follow up information about today's clinic visit or your schedule please contact [facility] directly at [phone].  Normal or non-critical lab and imaging results will be communicated to you by MyChart, letter or phone within 4 business days after the clinic has received the results. If you do not hear from us within 7 days, please contact the clinic through MyChart or phone. If you have a critical or abnormal lab result, we will notify you by phone as soon as possible.  Submit refill requests through [employer] or call your pharmacy and they will forward the refill request to us. Please allow 3 business days for your refill to be completed.          Additional Information About Your  Visit        MyChart Information     [employer] gives you secure access to your electronic health record. If you see a primary care provider, you can also send messages to your care team and make appointments. If you have questions, please call your primary care clinic.  If you do not have a primary care provider, please call [phone] and they will assist you.        Care EveryWhere ID     This is your Care EveryWhere ID. This could be used by other organizations to access your [place] medical records  [id number]        Your Vitals Were     Pulse                   56            Blood Pressure from Last 3 Encounters:   08/18/17 144/68   07/27/17 146/72   01/31/17 142/70    Weight from Last 3 Encounters:   07/27/17 223 lb (101.2 kg)   01/31/17 224 lb (101.6 kg)   12/30/16 222 lb (100.7 kg)              Today, you had the following     No orders found for display       Primary Care Provider Office Phone # Fax #    Anastacio Love [phone] [address] [facility] [address]        Equal Access to Services     [facility]: Hadii aad ku hadasho Soomaali, waaxda luqadaha, qaybta kaalmada adeegyada, marilynn dasilva . So [facility] [phone].    ATENCIÓN: Si habla español, tiene a warner disposición servicios gratshivamos de asistencia lingüística. LlUniversity [facility]als St[facility] [phone].    We comply with applicable federal civil rights laws and [place] laws. We do not discriminate on the basis of race, color, national origin, age, disability sex, sexual orientation or gender identity.            Thank you!     Thank you for choosing [facility]  for your care. Our goal is always to provide you with excellent care. Hearing back from our patients is one way we can continue to improve our services. Please take a few minutes to complete the written survey that you may receive in the mail after your visit with us. Thank you!             Your Updated Medication List - Protect  others around you: Learn how to safely use, store and throw away your medicines at www.disposemymeds.org.          This list is accurate as of: 8/18/17  8:19 AM.  Always use your most recent med list.                   Brand Name Dispense Instructions for use Diagnosis    aspirin 325 MG EC tablet      1/2 tab daily        latanoprost 0.005 % ophthalmic solution    XALATAN    3 Bottle    Place 1 drop into both eyes At Bedtime    Borderline glaucoma with ocular hypertension, unspecified laterality       levothyroxine 75 MCG tablet    SYNTHROID/LEVOTHROID    90 tablet    Take 1 tablet (75 mcg) by mouth daily    Hypothyroidism, unspecified type       losartan-hydrochlorothiazide 50-12.5 MG per tablet    HYZAAR    90 tablet    Take 1 tablet by mouth daily    Hypertension goal BP (blood pressure) < 150/90       multivitamin Tabs tablet      Take 1 tablet by mouth daily        simvastatin 20 MG tablet    ZOCOR    90 tablet    Take 1 tablet (20 mg) by mouth At Bedtime    Hyperlipidemia LDL goal <130       VITAMIN D3 PO      Take by mouth daily

## 2017-08-18 NOTE — PROGRESS NOTES
Zack Demarco is enrolled/participating in the retail pharmacy Blood Pressure Goals Achievement Program (BPGAP).  Zack Demarco was evaluated at [facility] on August 18, 2017 at which time his blood pressure was:    BP Readings from Last 3 Encounters:   08/18/17 144/68   07/27/17 146/72   01/31/17 142/70     Reviewed lifestyle modifications for blood pressure control and reduction: including making healthy food choices, managing weight, getting regular exercise, smoking cessation, reducing alcohol consumption, monitoring blood pressure regularly.     Zack Demarco is not experiencing symptoms.    Follow-Up: BP is at goal of < 150/90 mmHg (patient 60+ years of age without diabetes).  Recommended follow-up at pharmacy in 6 months.     Recommendation to Provider: None at this time    Zack Demarco was evaluated for enrollment into the PGEN study today.    Patient eligible for enrollment:  No  Patient interested in enrollment:  No    Completed by: Nupur Matta, PharmD  [facility]

## 2017-11-06 ENCOUNTER — TELEPHONE (OUTPATIENT)
Dept: FAMILY MEDICINE | Facility: CLINIC | Age: 77
End: 2017-11-06

## 2017-11-06 DIAGNOSIS — R07.9 EXERTIONAL CHEST PAIN: Primary | ICD-10-CM

## 2017-11-06 RX ORDER — NITROGLYCERIN 0.4 MG/1
TABLET SUBLINGUAL
Qty: 25 TABLET | Refills: 3 | Status: SHIPPED | OUTPATIENT
Start: 2017-11-06 | End: 2019-02-20

## 2017-11-06 NOTE — TELEPHONE ENCOUNTER
This sounds typical of angina. Please tell him:     1. Set up stress test - [phone].     2. Let me see you tomorrow - help him with this.     3. Start Metoprolol 25 mg daily. Hold the day before and the day of the stress test.     4. Continue Aspirin and all other medications.     5. NTG prn chest pain.     6. If chest pain at rest call 911.     7. In the meantime, no activity other than essential walking only - no hunting, no exercise of any sort.    Anastacio Love M.D.

## 2017-11-06 NOTE — TELEPHONE ENCOUNTER
Patient states you told him you would order some cardiac tests if he ever got chest pains from exertion again and he had some while deer hunting.  Please call to schedule.    Thank you.

## 2017-11-06 NOTE — TELEPHONE ENCOUNTER
Pt notified of provider message as written.  Pt verbalized good understanding.  Information sent through [employer] also.  Queenie Garcia RN

## 2017-11-06 NOTE — TELEPHONE ENCOUNTER
Pt states he had discussed having some sob with exertion with Dr. Anastacio Love at last ov.  Pt states Dr. Anastacio Love advised to let him know if this continues and pt will need cardiac testing.  Pt states two weekends ago he was walking from car to gun show, about 2 blocks and developed chest pain.  Pt states then on Sat and Sun when he was walking to his deer stand he developed chest pains again.  They resolve when he stops moving.  Does not occur without exertion.  Pt not currently having sx.  Do you want to order tests?      Pt asking for response to be called to him but if unable to reach him then send through [employer].  Queenie Garcia RN

## 2017-11-07 ENCOUNTER — TELEPHONE (OUTPATIENT)
Dept: CARDIOLOGY | Facility: CLINIC | Age: 77
End: 2017-11-07

## 2017-11-07 ENCOUNTER — OFFICE VISIT (OUTPATIENT)
Dept: FAMILY MEDICINE | Facility: CLINIC | Age: 77
End: 2017-11-07
Payer: COMMERCIAL

## 2017-11-07 ENCOUNTER — RADIANT APPOINTMENT (OUTPATIENT)
Dept: GENERAL RADIOLOGY | Facility: CLINIC | Age: 77
End: 2017-11-07
Attending: FAMILY MEDICINE
Payer: COMMERCIAL

## 2017-11-07 VITALS
HEART RATE: 65 BPM | DIASTOLIC BLOOD PRESSURE: 68 MMHG | BODY MASS INDEX: 31.59 KG/M2 | SYSTOLIC BLOOD PRESSURE: 142 MMHG | TEMPERATURE: 97.1 F | OXYGEN SATURATION: 97 % | WEIGHT: 217 LBS

## 2017-11-07 DIAGNOSIS — I20.9 ANGINA PECTORIS (H): Primary | ICD-10-CM

## 2017-11-07 PROCEDURE — 99214 OFFICE O/P EST MOD 30 MIN: CPT | Performed by: FAMILY MEDICINE

## 2017-11-07 PROCEDURE — 93000 ELECTROCARDIOGRAM COMPLETE: CPT | Performed by: FAMILY MEDICINE

## 2017-11-07 PROCEDURE — 71020 XR CHEST 2 VW: CPT

## 2017-11-07 NOTE — PATIENT INSTRUCTIONS
1. Let's hold off on the Metoprolol.    2. Nitroglycerine as needed for chest pain.    3. If you have chest pain at rest, please call 911.    4. I will contact you with results of the stress test.

## 2017-11-07 NOTE — TELEPHONE ENCOUNTER
Called patient prior to stress echo scheduled for Nov 8 to discuss necessary preparation for test and assess for questions/concerns.  Reminded patient to remain NPO except water for at least 3 hours prior to test, take all medications as usual (pt no longer taking metoprolol), to avoid caffeine and nicotine for 12 hr (including chocolate, decaf, Excedrin) and to wear comfortable clothing and shoes. Also informed patient that procedure is on a recumbent bike rather than treadmill. All questions answered, patient verbalized understanding.

## 2017-11-07 NOTE — MR AVS SNAPSHOT
After Visit Summary   11/7/2017    Zack Demarco    MRN: [id number]           Patient Information     Date Of Birth          1940        Visit Information        Provider Department      11/7/2017 5:20 PM Anastacio Love MD [facility]        Today's Diagnoses     Angina pectoris (H)    -  1      Care Instructions    1. Let's hold off on the Metoprolol.    2. Nitroglycerine as needed for chest pain.    3. If you have chest pain at rest, please call 911.    4. I will contact you with results of the stress test.          Follow-ups after your visit        Your next 10 appointments already scheduled     Nov 08, 2017  2:30 PM CST   Ech Stress Test With Definity with MGECHR1, MG PC CARD, MG STRESS RM, MG ECHO TECH, MG CV TECH   [facility] ([facility])    [id number] [place] [address] [phone]           1.  Please bring or wear a comfortable two-piece outfit and walking shoes. 2.  Stop eating 3 hours before the test. You may drink water or juice. 3.  Stop all caffeine 12 hours before the test. This includes coffee, tea, soda pop, chocolate and certain medicines (such as Anacin and Excederin). Also avoid decaf coffee and tea, as these contain small amounts of caffeine. 4.  No alcohol, smoking or use of other tobacco products for 12 hours before the test. 5.  Refer to your provider instructions to see if you need to stop any medications (such as beta-blockers or nitrates) for this test. 6.  For patients with diabetes: -   If you take insulin, call your diabetes care team. Ask if you should take a   dose the morning of your test. -   If you take diabetes medicine by mouth, don't take it on the morning of your test. Bring it with you to take after the test.  (If you have questions, call your diabetes care team) 7.  When you arrive, please tell us if: -   You have diabetes. -   You have taken Viagra, Cialis or Levitra in the past 48  hours. 8.  For any questions that cannot be answered, please contact the ordering physician            Jan 17, 2018  8:00 AM CST   New Visit with Avtar Mccullough MD   [facility] [place] ([place] [id number] [facility]  [place] [id number] [phone]              Future tests that were ordered for you today     Open Future Orders        Priority Expected Expires Ordered    Echo Stress Test With Definity Routine  11/6/2018 11/6/2017            Who to contact     If you have questions or need follow up information about today's clinic visit or your schedule please contact [facility] directly at [phone].  Normal or non-critical lab and imaging results will be communicated to you by MyChart, letter or phone within 4 business days after the clinic has received the results. If you do not hear from us within 7 days, please contact the clinic through MadeiraMadeirahart or phone. If you have a critical or abnormal lab result, we will notify you by phone as soon as possible.  Submit refill requests through Fligoo or call your pharmacy and they will forward the refill request to us. Please allow 3 business days for your refill to be completed.          Additional Information About Your Visit        MyChart Information     Fligoo gives you secure access to your electronic health record. If you see a primary care provider, you can also send messages to your care team and make appointments. If you have questions, please call your primary care clinic.  If you do not have a primary care provider, please call [phone] and they will assist you.        Care EveryWhere ID     This is your Care EveryWhere ID. This could be used by other organizations to access your [facility] medical records  [id number]        Your Vitals Were     Pulse Temperature Pulse Oximetry BMI (Body Mass Index)          65 97.1  F (36.2  C) (Oral) 97% 31.59 kg/m2         Blood Pressure from Last 3 Encounters:    11/07/17 142/68   08/18/17 144/68   07/27/17 146/72    Weight from Last 3 Encounters:   11/07/17 217 lb (98.4 kg)   07/27/17 223 lb (101.2 kg)   01/31/17 224 lb (101.6 kg)              We Performed the Following     EKG 12-lead complete w/read - Clinics        Primary Care Provider Office Phone # Fax #    Anastacio Love [phone] [address] [facility] [address]        Equal Access to Services     [facility]: Hadii aad ku hadasho Soomaali, waaxda luqadaha, qaybta kaalmada adeegyada, marilynn dasilva . So [facility] [phone].    ATENCIÓN: Si habla español, tiene a warner disposición servicios gratuitos de asistencia lingüística. [facility] [phone].    We comply with applicable federal civil rights laws and [place] laws. We do not discriminate on the basis of race, color, national origin, age, disability, sex, sexual orientation, or gender identity.            Thank you!     Thank you for choosing [facility]  for your care. Our goal is always to provide you with excellent care. Hearing back from our patients is one way we can continue to improve our services. Please take a few minutes to complete the written survey that you may receive in the mail after your visit with us. Thank you!             Your Updated Medication List - Protect others around you: Learn how to safely use, store and throw away your medicines at www.disposemymeds.org.          This list is accurate as of: 11/7/17  5:27 PM.  Always use your most recent med list.                   Brand Name Dispense Instructions for use Diagnosis    aspirin 325 MG EC tablet      1/2 tab daily        latanoprost 0.005 % ophthalmic solution    XALATAN    3 Bottle    Place 1 drop into both eyes At Bedtime    Borderline glaucoma with ocular hypertension, unspecified laterality       levothyroxine 75 MCG tablet    SYNTHROID/LEVOTHROID    90 tablet    Take 1 tablet (75 mcg) by mouth daily     Hypothyroidism, unspecified type       losartan-hydrochlorothiazide 50-12.5 MG per tablet    HYZAAR    90 tablet    Take 1 tablet by mouth daily    Hypertension goal BP (blood pressure) < 150/90       multivitamin Tabs tablet      Take 1 tablet by mouth daily        nitroGLYcerin 0.4 MG sublingual tablet    NITROSTAT    25 tablet    For chest pain place 1 tablet under the tongue every 5 minutes for 3 doses. If symptoms persist 5 minutes after 1st dose call 911.    Exertional chest pain       simvastatin 20 MG tablet    ZOCOR    90 tablet    Take 1 tablet (20 mg) by mouth At Bedtime    Hyperlipidemia LDL goal <130       VITAMIN D3 PO      Take by mouth daily

## 2017-11-07 NOTE — NURSING NOTE
"Chief Complaint   Patient presents with     RECHECK       Initial /81 (Cuff Size: Adult Large)  Pulse 65  Temp 97.1  F (36.2  C) (Oral)  Wt 217 lb (98.4 kg)  SpO2 97%  BMI 31.59 kg/m2 Estimated body mass index is 31.59 kg/(m^2) as calculated from the following:    Height as of 1/31/17: 5' 9.5\" (1.765 m).    Weight as of this encounter: 217 lb (98.4 kg).  Medication Reconciliation: complete    Vibha Smyth LPN    "

## 2017-11-07 NOTE — PROGRESS NOTES
HPI:    Zack is a 76 year old male here to discuss chest pain:    Stable angina - present since mid Oct 2017. The pain is located mid chest area. Described as an ache, it occurred 3 times while he was dear hunting. The pain does not occur at rest and only occurs with some exertion and not others. It does not radiate anywhere. Severity is mild. There may be some shortness of breath with the pain but not associated with nausea, dizziness, palpitations. He denies anxiety/stress. Denies symptoms of GERD. Denies recreational drug use. Denies chest wall pain. Denies risk factors for PE like long car/plane ride, personal or FH of blood clots. Denies h/o asthma.    Cardiac risk factors:     Previously known CAD: denies   Chol:   Recent Labs   Lab Test  07/20/17   0725  06/09/16   0742   01/22/15   0816  01/14/14   0747   CHOL  148  191   < >  154  154   HDL  32*  33*   < >  43  33*   LDL  76  127*   < >  92  103   TRIG  201*  155*   < >  93  87   CHOLHDLRATIO   --    --    --   3.6  4.7    < > = values in this interval not displayed.      HTN: yes   FH: no history of premature heart disease   Smoker: no   Diabetes: no   Activity: active   BMI: Body mass index is 31.59 kg/(m^2).   Age: 77 year old    Evaluation and treatment:    EKG today sinus nathanael at 53, RBBB with left axis - bifascicular block - unchanged from previous    CXR negative today   This is suspicious for stable angina - he is set up for stress echo.   He is advised to continue ASA and instructed on use of NTG   He is instructed on when to call 911   Regarding beta blockers - see below    HTN (goal <150/90) - checking at home sometimes. He brought results today - 140'4s to 150 systolic. Heart rate 40's and 50's.   Evaluation and treatment:    Metoprolol 25 mg bid (Toprol XL was costly) - we previously stopped that due to bradycardia.    Losartan/HCTZ 50/12.5 qd.   Continue same tx.      Last Basic Metabolic Panel:  Lab Results   Component Value Date      07/20/2017      Lab Results   Component Value Date    POTASSIUM 4.2 07/20/2017     Lab Results   Component Value Date    CHLORIDE 108 07/20/2017     Lab Results   Component Value Date    ELVIRA 9.1 07/20/2017     Lab Results   Component Value Date    CO2 28 07/20/2017     Lab Results   Component Value Date    BUN 29 07/20/2017     Lab Results   Component Value Date    CR 1.10 07/20/2017     Lab Results   Component Value Date     07/20/2017       CBC RESULTS:   Recent Labs   Lab Test  08/12/15   0804   WBC  6.8   RBC  4.90   HGB  15.6   HCT  46.5   MCV  95   MCH  31.8   MCHC  33.5   RDW  12.4   PLT  198       Dyslipidemia - No history of CAD, CVA, PAD or diabetes. Per ATP4, moderate intensity statin recommended.  Treatment:   Crestor stopped due to cost   Simvastatin 20 mg qd - no side effects.  The 10-year ASCVD risk score (Jeff PATTERSON Jr, et al., 2013) is: 36.5%    Values used to calculate the score:      Age: 77 years      Sex: Male      Is Non- : No      Diabetic: No      Tobacco smoker: No      Systolic Blood Pressure: 142 mmHg      Is BP treated: Yes      HDL Cholesterol: 32 mg/dL      Total Cholesterol: 148 mg/dL      Recent Labs   Lab Test  07/20/17   0725  06/09/16   0742   01/22/15   0816  01/14/14   0747   CHOL  148  191   < >  154  154   HDL  32*  33*   < >  43  33*   LDL  76  127*   < >  92  103   TRIG  201*  155*   < >  93  87   CHOLHDLRATIO   --    --    --   3.6  4.7    < > = values in this interval not displayed.     Hypothyroidism - No thyroid symptoms.  Treatment:   Synthroid 100 mcg on Wed and Sun and 50 micrograms other days.   We will switch this to 75 mcg qd and recheck at a later date.      TSH   Date Value Ref Range Status   07/20/2017 7.72 (H) 0.40 - 4.00 mU/L Final     T4 Free   Date Value Ref Range Status   07/20/2017 0.84 0.76 - 1.46 ng/dL Final     Obesity - diet and exercise discussed.     Wt Readings from Last 5 Encounters:   11/07/17 217 lb (98.4 kg)   07/27/17  223 lb (101.2 kg)   01/31/17 224 lb (101.6 kg)   12/30/16 222 lb (100.7 kg)   09/12/16 210 lb (95.3 kg)     Sore tongue - he has seen ENT and dentist. The problem is resolved.     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 Asymptomatic. Advised wearing compression stockings.    Previous surgeries - left rotator cuff surgery. Had anterior tibialis repair (for foot drop) on 8/18/15 - good results.    Hearing loss - he wants to hold off on hearing aides referral.     Preventive:    Immunization History   Administered Date(s) Administered     Influenza (High Dose) 3 valent vaccine 10/15/2015, 10/20/2016, 09/25/2017     Influenza (IIV3) 10/25/2012, 10/01/2013, 11/24/2014     Pneumococcal (PCV 13) 01/22/2016     Pneumococcal 23 valent 11/30/2006     TD (ADULT, 7+) 08/25/2010     TDAP Vaccine (Boostrix) 01/21/2013     Zoster vaccine, live 07/15/2008     Colonoscopy: 9/19/16 - advised to redo in 5 years.     Prostate CA screen: discussed controversy. Prostate mildly enlarged on 7/15/14.     PSA   Date Value Ref Range Status   06/09/2016 2.76 0 - 4 ug/L Final       AAA: 7/18/14 negative    Advanced Directive: referred previously    Vit D Geriatrics Society Guidelines: Older adults should consume 4000 IU of Vit D daily from all sources. This will achieve serum level of 30. No need to test unless obese, malabsorption etc. You get only 100 units per glass of milk. 2000 units advised.    ROS:    Const: No fevers, weight changes or night sweats recently.  ENT: No runny nose, sore throat or ear pain.  Resp: No cough.  CV: No dizziness or cardiac palpitations.  GI: No nausea, vomiting, diarrhea or constipation. Denies blood in stools or black stools.  : No dysuria, frequency or hematuria.    SH:    Marital status: , lives by himself in [place].  Kids: 2  Employment: Works at a machine shop.  Exercise: Walks a lot at work. Had been running 4 miles per day 5 times per week but not lately.  Tobacco: Quit smoking around  1980. Has 14 pack year history of smoking.  Etoh: rarely  Recreational drugs: denies  Caffeine: 2 per day    Exam:    /68  Pulse 65  Temp 97.1  F (36.2  C) (Oral)  Wt 217 lb (98.4 kg)  SpO2 97%  BMI 31.59 kg/m2    Gen: Healthy appearing male in no acute distress. Here with daughter for part of today's visit.  Neck: No enlarged lymph nodes, thyromegally or other masses.  Lungs: Good air movement and otherwise clear.  CV: Heart RRR with no murmurs. No JVD, carotid bruits or leg edema. Varicose veins on thighs and legs.  MS: palpation on chest wall does not reproduce his pain.    Assessment and Plan - Decision Making    1. Angina pectoris (H)  Per HPI  - EKG 12-lead complete w/read - Clinics  - XR Chest 2 Views      Written instructions given as follows:    Patient Instructions   1. Let's hold off on the Metoprolol.    2. Nitroglycerine as needed for chest pain.    3. If you have chest pain at rest, please call 911.    4. I will contact you with results of the stress test.

## 2017-11-08 ENCOUNTER — RADIANT APPOINTMENT (OUTPATIENT)
Dept: CARDIOLOGY | Facility: CLINIC | Age: 77
End: 2017-11-08
Attending: FAMILY MEDICINE
Payer: COMMERCIAL

## 2017-11-08 VITALS — DIASTOLIC BLOOD PRESSURE: 67 MMHG | HEART RATE: 49 BPM | SYSTOLIC BLOOD PRESSURE: 148 MMHG

## 2017-11-08 DIAGNOSIS — R07.9 EXERTIONAL CHEST PAIN: ICD-10-CM

## 2017-11-08 PROCEDURE — 93016 CV STRESS TEST SUPVJ ONLY: CPT

## 2017-11-08 PROCEDURE — 93350 STRESS TTE ONLY: CPT | Mod: 26 | Performed by: INTERNAL MEDICINE

## 2017-11-08 PROCEDURE — 93321 DOPPLER ECHO F-UP/LMTD STD: CPT | Mod: 26 | Performed by: INTERNAL MEDICINE

## 2017-11-08 PROCEDURE — 93352 ADMIN ECG CONTRAST AGENT: CPT

## 2017-11-08 PROCEDURE — 93018 CV STRESS TEST I&R ONLY: CPT | Performed by: INTERNAL MEDICINE

## 2017-11-08 PROCEDURE — 93325 DOPPLER ECHO COLOR FLOW MAPG: CPT | Mod: 26 | Performed by: INTERNAL MEDICINE

## 2017-11-08 PROCEDURE — 93321 DOPPLER ECHO F-UP/LMTD STD: CPT | Mod: TC

## 2017-11-08 PROCEDURE — 93017 CV STRESS TEST TRACING ONLY: CPT

## 2017-11-08 PROCEDURE — 93350 STRESS TTE ONLY: CPT | Mod: TC

## 2017-11-08 PROCEDURE — 93325 DOPPLER ECHO COLOR FLOW MAPG: CPT | Mod: TC

## 2017-11-08 RX ADMIN — Medication 10 ML: at 14:39

## 2017-11-08 NOTE — PROGRESS NOTES
Patient presents today for stress echo ordered by MD. Prior to patient visit, chart prep by CMA done including confirmation of order, medications reviewed for contraindications, reviewed previous EKG's for trends & concerns and reviewed patient's medical history.    IV started in L AC with a 22G Jeclo Catheter.     Echo technician completed resting portion of echo.    Stress portion of echo completed utilizing bike and pictures taken at peak.  Blood pressure taken every 2 minutes and documented in Muse system.    Difinity medication used 5cc, wasted 5cc Definity NDC # 30184-528-77 (1.5ml Definity mixed with 8.5ml Saline )  Atropine medication given  NONE Atropine NDC# 39334-679-28     Patient offered complaints of: NONE    After completion of stress echo, recovery period with blood pressure monitoring occurs at 1, 3 and 5 minutes and documented in Muse.  IV removed and water provided to patient.    Patient education provided about cardiology interpretation and primary provider will be notified of results.    Dr. Sood provided supervision of the tests performed today.    Hoda Dasilva, CCT, Cardiac CMA

## 2017-11-09 ENCOUNTER — OFFICE VISIT (OUTPATIENT)
Dept: CARDIOLOGY | Facility: CLINIC | Age: 77
End: 2017-11-09
Payer: COMMERCIAL

## 2017-11-09 VITALS
BODY MASS INDEX: 31.82 KG/M2 | WEIGHT: 218.6 LBS | HEART RATE: 54 BPM | DIASTOLIC BLOOD PRESSURE: 66 MMHG | OXYGEN SATURATION: 95 % | SYSTOLIC BLOOD PRESSURE: 152 MMHG

## 2017-11-09 DIAGNOSIS — R94.39 POSITIVE CARDIAC STRESS TEST: ICD-10-CM

## 2017-11-09 DIAGNOSIS — E78.5 HYPERLIPIDEMIA LDL GOAL <100: Primary | ICD-10-CM

## 2017-11-09 PROCEDURE — 99214 OFFICE O/P EST MOD 30 MIN: CPT | Performed by: INTERNAL MEDICINE

## 2017-11-09 RX ORDER — ATORVASTATIN CALCIUM 10 MG/1
40 TABLET, FILM COATED ORAL DAILY
Qty: 60 TABLET | Refills: 11 | Status: SHIPPED | OUTPATIENT
Start: 2017-11-09 | End: 2017-11-09

## 2017-11-09 RX ORDER — LIDOCAINE 40 MG/G
CREAM TOPICAL
Status: CANCELLED | OUTPATIENT
Start: 2017-11-09

## 2017-11-09 RX ORDER — SODIUM CHLORIDE 9 MG/ML
INJECTION, SOLUTION INTRAVENOUS CONTINUOUS
Status: CANCELLED | OUTPATIENT
Start: 2017-11-09

## 2017-11-09 RX ORDER — ATORVASTATIN CALCIUM 40 MG/1
40 TABLET, FILM COATED ORAL DAILY
Qty: 60 TABLET | Refills: 11 | Status: SHIPPED | OUTPATIENT
Start: 2017-11-09 | End: 2020-11-19

## 2017-11-09 ASSESSMENT — PAIN SCALES - GENERAL: PAINLEVEL: NO PAIN (0)

## 2017-11-09 NOTE — NURSING NOTE
"Zack Demarco's goals for this visit include:   Chief Complaint   Patient presents with     Consult     Abnormal stress test       He requests these members of his care team be copied on today's visit information: PCP    PCP: Anastacio Love    Referring Provider:  No referring provider defined for this encounter.    Chief Complaint   Patient presents with     Consult     Abnormal stress test       Initial /66 (BP Location: Left arm, Patient Position: Chair, Cuff Size: Adult Large)  Pulse 54  Wt 99.2 kg (218 lb 9.6 oz)  SpO2 95%  BMI 31.82 kg/m2 Estimated body mass index is 31.82 kg/(m^2) as calculated from the following:    Height as of 1/31/17: 1.765 m (5' 9.5\").    Weight as of this encounter: 99.2 kg (218 lb 9.6 oz).  Medication Reconciliation: complete         Medication Refills: none        Tawny Minor CMA        "

## 2017-11-09 NOTE — MR AVS SNAPSHOT
After Visit Summary   11/9/2017    Zack Demarco    MRN: [id number]           Patient Information     Date Of Birth          1940        Visit Information        Provider Department      11/9/2017 9:00 AM Torsten Reyes MD [facility]        Today's Diagnoses     Hyperlipidemia LDL goal <100    -  1    Positive cardiac stress test          Care Instructions      The following is a summary of your office visit:    Medications started today:none    Medications stopped today:none    Medication dose change: Increase Lipitor to 40 mg daily    Nurse contact information: Lilly Bran RN  Cardiology Care Coordinator  [phone] Phone  [phone] Fax    Appointments made today: Coronary Angiogram Monday 11/13. Arrive at 7 AM to the [facility]. You are scheduled with Dr. Elilott.     Patient instructions: See Coronary Angiogram Packet      If you have had any blood work, imaging or other testing completed we will be in touch within 1-2 weeks regarding the results. If you have any questions, concerns or need to schedule a follow up, please contact us at [phone]. If you are needing refills please contact your pharmacy. For urgent after hour care please call the [facility] at [phone] or the [facility] at [phone] and ask to speak to the cardiologist on call.    It was a pleasure meeting with you today. Please let us know if there is anything else we can do for you so that we can be sure you are leaving completely satisfied with your care experience.     Your Cardiology Team at [facility]  RN Care Coordinator: Lilly Hector                    Follow-ups after your visit        Your next 10 appointments already scheduled     Nov 13, 2017  7:00 AM CST   Procedure 1.5 hr with U2A ROOM 17   [address] [facility] ([facility] [place])    [address] [place] [address]    [facility] [place] [address]               Nov 13, 2017  8:30 AM CST   Cath 90 Minute with UUHCVR3   [facility] [place],  Heart Cath Lab ([facility] [place])    [address]   [facility] [place] [address]   [phone]            Jan 17, 2018  8:00 AM CST   New Visit with Avtar Mcclulough MD   [facility] ([facility])    [address] [facility] [address] [phone]              Future tests that were ordered for you today     Open Future Orders        Priority Expected Expires Ordered    Outpatient Coronary Angiography Adult Order Routine  1/28/2018 11/9/2017            Who to contact     If you have questions or need follow up information about today's clinic visit or your schedule please contact [facility] directly at [phone].  Normal or non-critical lab and imaging results will be communicated to you by PMW Technologieshart, letter or phone within 4 business days after the clinic has received the results. If you do not hear from us within 7 days, please contact the clinic through MOgenet or phone. If you have a critical or abnormal lab result, we will notify you by phone as soon as possible.  Submit refill requests through ExecMobile or call your pharmacy and they will forward the refill request to us. Please allow 3 business days for your refill to be completed.          Additional Information About Your Visit        PMW Technologieshart Information     ExecMobile gives you secure access to your electronic health record. If you see a primary care provider, you can also send messages to your care team and make appointments. If you have questions, please call your primary care clinic.  If you do not have a primary care provider, please call [phone] and they will assist you.      ExecMobile is an electronic gateway that provides easy, online access to your medical records. With ExecMobile, you can request a clinic appointment, read your test results, renew a  prescription or communicate with your care team.     To access your existing account, please contact your [facility] or call [phone] for assistance.        Care EveryWhere ID     This is your Care EveryWhere ID. This could be used by other organizations to access your [facility] medical records  [id number]        Your Vitals Were     Pulse Pulse Oximetry BMI (Body Mass Index)             54 95% 31.82 kg/m2          Blood Pressure from Last 3 Encounters:   11/09/17 152/66   11/08/17 148/67   11/07/17 142/68    Weight from Last 3 Encounters:   11/09/17 99.2 kg (218 lb 9.6 oz)   11/07/17 98.4 kg (217 lb)   07/27/17 101.2 kg (223 lb)                 Today's Medication Changes          These changes are accurate as of: 11/9/17 10:13 AM.  If you have any questions, ask your nurse or doctor.               Start taking these medicines.        Dose/Directions    atorvastatin 40 MG tablet   Commonly known as:  LIPITOR   Used for:  Hyperlipidemia LDL goal <100   Started by:  Torsten Reyes MD        Dose:  40 mg   Take 1 tablet (40 mg) by mouth daily   Quantity:  60 tablet   Refills:  11         Stop taking these medicines if you haven't already. Please contact your care team if you have questions.     simvastatin 20 MG tablet   Commonly known as:  ZOCOR   Stopped by:  Torsten Reyes MD                Where to get your medicines      These medications were sent to [id number] - [place], [place] - [address] [address]  [address] [facility] [address]    Hours:  test fax successful 4/5/04  Phone:  [phone]     atorvastatin 40 MG tablet                Primary Care Provider Office Phone # Fax #    Anastacio Love -[phone] [address] [facility] [address]        Equal Access to Services     HALEY BURR : Vernell allisono Sorob, waaxda luqadaha, qaybta kaalmada adeegyada, marilynn reynolds. So [facility]  [phone].    ATENCIÓN: Si devon muhamamd, tiene a warner disposición servicios gratuitos de asistencia lingüística. Richard hodges [phone].    We comply with applicable federal civil rights laws and [place] laws. We do not discriminate on the basis of race, color, national origin, age, disability, sex, sexual orientation, or gender identity.            Thank you!     Thank you for choosing [facility]  for your care. Our goal is always to provide you with excellent care. Hearing back from our patients is one way we can continue to improve our services. Please take a few minutes to complete the written survey that you may receive in the mail after your visit with us. Thank you!             Your Updated Medication List - Protect others around you: Learn how to safely use, store and throw away your medicines at www.disposemymeds.org.          This list is accurate as of: 11/9/17 10:13 AM.  Always use your most recent med list.                   Brand Name Dispense Instructions for use Diagnosis    aspirin 325 MG EC tablet      1/2 tab daily        atorvastatin 40 MG tablet    LIPITOR    60 tablet    Take 1 tablet (40 mg) by mouth daily    Hyperlipidemia LDL goal <100       latanoprost 0.005 % ophthalmic solution    XALATAN    3 Bottle    Place 1 drop into both eyes At Bedtime    Borderline glaucoma with ocular hypertension, unspecified laterality       levothyroxine 75 MCG tablet    SYNTHROID/LEVOTHROID    90 tablet    Take 1 tablet (75 mcg) by mouth daily    Hypothyroidism, unspecified type       losartan-hydrochlorothiazide 50-12.5 MG per tablet    HYZAAR    90 tablet    Take 1 tablet by mouth daily    Hypertension goal BP (blood pressure) < 150/90       multivitamin Tabs tablet      Take 1 tablet by mouth daily        nitroGLYcerin 0.4 MG sublingual tablet    NITROSTAT    25 tablet    For chest pain place 1 tablet under the tongue every 5 minutes for 3 doses. If symptoms persist 5 minutes after 1st  dose call 911.    Exertional chest pain       VITAMIN D3 PO      Take by mouth daily

## 2017-11-09 NOTE — PROGRESS NOTES
2017           Anastacio Love MD   [facility]   [address] [place]  [address]       RE: Zack Demarco         MR #[id number]         :  1940       Dear Dr. Love:      It was a pleasure participating in the care of your patient, Mr. Zack Demarco.  As you know, he is a 77-year-old gentleman whom I see today for an abnormal stress test.      His past medical history is significant for the followin.  Hypertension.   2.  Hyperlipidemia   3.  Hypothyroidism.   4.  Borderline glaucoma.   5.  Right knee problems.   6.  Lipoma.   7.  Patellar tendinitis.      He denies having a significant history of cardiac disease.      In terms of his present symptom complex, about a year ago he was working out at the [facility] on the exercise bike and treadmill and noticed some chest tightness with shortness of breath.  He did not think of anything of it, and usually it got better with either rest or slowing down.  However, a week ago , he was walking 2 blocks to a gun show and felt some chest pain and tightness in the middle of his chest with shortness of breath.  He rested and it got better.  He was able to complete the gun show and walk around for a while without other symptoms.  He denies associated radiation, nausea, vomiting or diaphoresis.      Then, just this past weekend, he was deer hunting, walking out to his deer stand, and he had chest pain again with shortness of breath.  He stopped.  The pain went away.  He walked another 40 yards and the pain came back.      He says that if he had to mow his lawn, he likely would get the same type symptoms.  He has not had rest pain as of yet.  He does not feel it is accelerating or worsening, but something has certainly changed over the past couple of weeks.      Previously this summer, he was able to golf and walk 9 holes without symptoms.  Again, he has not wakened up from sleep with it, and he has not had rest pain as of yet.       He otherwise denies gross PND, orthopnea, edema, palpitations, syncope or near-syncope.      In terms of his cardiac risk factors, he has no history of diabetes.  He does have a history of hypertension.  He stopped smoking 40 years ago.  He smoked a pack a day for about 13 years.  He rarely drinks alcohol.  He denies family history of heart disease.  He does have hyperlipidemia.      He used to be a .  His daughter works in sales.      Current medications include:     1.  Simvastatin 20 mg a day.   2.  Losartan/hydrochlorothiazide 50/12.5 a day.   3.  Levothyroxine.   4.  Aspirin 325 mg a day.      On physical exam, his blood pressure is 150/60 here.  At home, it runs in the 120 range.  His pulse is 54.  His weight is 218 pounds.  His neck exam reveals no obvious jugular venous distention.  His lungs are clear to auscultation.  Respiratory effort is normal.  Cardiac exam reveals a regular rate and rhythm.  No obvious murmur or gallop is appreciated.  Belly is soft and nontender.  Extremities are without gross edema.      Labs on 08/18/2017 showed GFR was normal.  TSH was mildly elevated, but T4 was normal.  Triglycerides were mildly elevated at 201.      Echo stress echo on 11/08/2017 showed normal resting LV systolic function and ejection fraction 55% -60% without regional wall motion abnormalities.  However, his ejection fraction decreases and his LV size increases with stress exercise, and the anteroapical wall becomes akinetic.        IMPRESSION:      Zack is a 77-year-old gentleman without a prior documented history of cardiac disease who presents with chest discomfort and an abnormal stress test.  His clinical history is certainly suspicious for underlying coronary disease as a week ago Sunday while walking 2 blocks to a gun show, he began experiencing centrally located chest tightness with shortness of breath that got better with rest.  This happened again this past weekend while deer hunting in  "which he was walking out to his deer stand and felt chest discomfort and shortness of breath.  He stopped and the pain resolved, but recurred when he had to walk another 40 yards.  He has not had rest pain as of yet.  However, these types of symptoms are new within the past couple of weeks.      Additionally, from a more objective standpoint, he had a stress echo performed yesterday that revealed normal resting LV systolic function.  However, with stress exercise, his entire anterior territory becomes akinetic, his ejection fraction drops and his LV size increases.  Further more definitive and invasive evaluation would be indicated.        PLAN:     1.  Coronary angiogram.      He understands that there is a small risk of actually inducing a heart attack, stroke or deadly complication, and he still wishes to proceed.  If multivessel disease was discovered, then he would represent an appropriate surgical candidate.     2.  We will change from low-dose Simvastatin to Lipitor 40 mg a day.     3.  Continue aspirin. He already has a prescription for sublingual nitro.     4.  He will try to \"lay low\" until he has his angiogram performed and will seek immediate medical attention should he have any change in his overall clinical status prior to his coronary angiogram.      Further updates will follow.      Once again, it was a pleasure participating in the care of your patient, Mr. Zack Trivedi.  Please feel free to contact me at any time if there are any questions regarding his care in the future.         Sincerely,      TRUNG BENITO MD             D: 2017 10:00   T: 2017 11:43   MT: [id number]      Name:     ZACK TRIVEDI   MRN:      [id number]        Account:      [id number]   :      1940      Document: [id number]       cc: Anastacio Love MD     "

## 2017-11-09 NOTE — PATIENT INSTRUCTIONS
The following is a summary of your office visit:    Medications started today:none    Medications stopped today:none    Medication dose change: Increase Lipitor to 40 mg daily    Nurse contact information: Lilly Bran RN  Cardiology Care Coordinator  [phone] Phone  [phone] Fax    Appointments made today: Coronary Angiogram Monday 11/13. Arrive at 7 AM to the [facility]. You are scheduled with Dr. Elliott.     Patient instructions: See Coronary Angiogram Packet      If you have had any blood work, imaging or other testing completed we will be in touch within 1-2 weeks regarding the results. If you have any questions, concerns or need to schedule a follow up, please contact us at [phone]. If you are needing refills please contact your pharmacy. For urgent after hour care please call the [facility] at [phone] or the [facility] at [phone] and ask to speak to the cardiologist on call.    It was a pleasure meeting with you today. Please let us know if there is anything else we can do for you so that we can be sure you are leaving completely satisfied with your care experience.     Your Cardiology Team at [facility]  RN Care Coordinator: Lilly FRANCOIS: Tawny

## 2017-11-10 NOTE — PROGRESS NOTES
Procedure reminder sent to pt via [employer] re: cor angio scheduled for Monday, November 13th. Included instructions and callback number should pt have questions.

## 2017-11-13 ENCOUNTER — APPOINTMENT (OUTPATIENT)
Dept: CARDIOLOGY | Facility: CLINIC | Age: 77
DRG: 287 | End: 2017-11-13
Attending: HOSPITALIST
Payer: MEDICARE

## 2017-11-13 ENCOUNTER — HOSPITAL ENCOUNTER (INPATIENT)
Facility: CLINIC | Age: 77
LOS: 2 days | Discharge: HOME | DRG: 287 | End: 2017-11-15
Attending: INTERNAL MEDICINE | Admitting: THORACIC SURGERY (CARDIOTHORACIC VASCULAR SURGERY)
Payer: MEDICARE

## 2017-11-13 ENCOUNTER — APPOINTMENT (OUTPATIENT)
Dept: CT IMAGING | Facility: CLINIC | Age: 77
DRG: 287 | End: 2017-11-13
Attending: INTERNAL MEDICINE
Payer: MEDICARE

## 2017-11-13 ENCOUNTER — ANESTHESIA EVENT (OUTPATIENT)
Dept: SURGERY | Facility: CLINIC | Age: 77
DRG: 236 | End: 2017-11-13
Payer: MEDICARE

## 2017-11-13 ENCOUNTER — APPOINTMENT (OUTPATIENT)
Dept: CARDIOLOGY | Facility: CLINIC | Age: 77
DRG: 287 | End: 2017-11-13
Attending: INTERNAL MEDICINE
Payer: MEDICARE

## 2017-11-13 ENCOUNTER — APPOINTMENT (OUTPATIENT)
Dept: MEDSURG UNIT | Facility: CLINIC | Age: 77
DRG: 287 | End: 2017-11-13
Attending: INTERNAL MEDICINE
Payer: MEDICARE

## 2017-11-13 DIAGNOSIS — R94.39 POSITIVE CARDIAC STRESS TEST: ICD-10-CM

## 2017-11-13 DIAGNOSIS — I20.0 UNSTABLE ANGINA (H): Primary | ICD-10-CM

## 2017-11-13 LAB
ANION GAP SERPL CALCULATED.3IONS-SCNC: 6 MMOL/L (ref 3–14)
BUN SERPL-MCNC: 26 MG/DL (ref 7–30)
CALCIUM SERPL-MCNC: 8.8 MG/DL (ref 8.5–10.1)
CHLORIDE SERPL-SCNC: 108 MMOL/L (ref 94–109)
CO2 SERPL-SCNC: 28 MMOL/L (ref 20–32)
CREAT SERPL-MCNC: 0.91 MG/DL (ref 0.66–1.25)
ERYTHROCYTE [DISTWIDTH] IN BLOOD BY AUTOMATED COUNT: 12.8 % (ref 10–15)
ERYTHROCYTE [DISTWIDTH] IN BLOOD BY AUTOMATED COUNT: 12.9 % (ref 10–15)
GFR SERPL CREATININE-BSD FRML MDRD: 81 ML/MIN/1.7M2
GLUCOSE SERPL-MCNC: 112 MG/DL (ref 70–99)
HCT VFR BLD AUTO: 41.6 % (ref 40–53)
HCT VFR BLD AUTO: 42.7 % (ref 40–53)
HGB BLD-MCNC: 13.7 G/DL (ref 13.3–17.7)
HGB BLD-MCNC: 14 G/DL (ref 13.3–17.7)
LMWH PPP CHRO-ACNC: 0.26 IU/ML
MCH RBC QN AUTO: 31.1 PG (ref 26.5–33)
MCH RBC QN AUTO: 31.5 PG (ref 26.5–33)
MCHC RBC AUTO-ENTMCNC: 32.8 G/DL (ref 31.5–36.5)
MCHC RBC AUTO-ENTMCNC: 32.9 G/DL (ref 31.5–36.5)
MCV RBC AUTO: 95 FL (ref 78–100)
MCV RBC AUTO: 96 FL (ref 78–100)
PLATELET # BLD AUTO: 201 10E9/L (ref 150–450)
PLATELET # BLD AUTO: 212 10E9/L (ref 150–450)
POTASSIUM SERPL-SCNC: 4 MMOL/L (ref 3.4–5.3)
RADIOLOGIST FLAGS: NORMAL
RBC # BLD AUTO: 4.4 10E12/L (ref 4.4–5.9)
RBC # BLD AUTO: 4.45 10E12/L (ref 4.4–5.9)
SODIUM SERPL-SCNC: 142 MMOL/L (ref 133–144)
WBC # BLD AUTO: 6.9 10E9/L (ref 4–11)
WBC # BLD AUTO: 7.2 10E9/L (ref 4–11)

## 2017-11-13 PROCEDURE — 25000128 H RX IP 250 OP 636: Performed by: HOSPITALIST

## 2017-11-13 PROCEDURE — B2111ZZ FLUOROSCOPY OF MULTIPLE CORONARY ARTERIES USING LOW OSMOLAR CONTRAST: ICD-10-PCS | Performed by: INTERNAL MEDICINE

## 2017-11-13 PROCEDURE — 40000264 ECHO COMPLETE WITH LUMASON

## 2017-11-13 PROCEDURE — 80048 BASIC METABOLIC PNL TOTAL CA: CPT | Performed by: INTERNAL MEDICINE

## 2017-11-13 PROCEDURE — 93306 TTE W/DOPPLER COMPLETE: CPT | Mod: 26 | Performed by: INTERNAL MEDICINE

## 2017-11-13 PROCEDURE — 21400006 ZZH R&B CCU INTERMEDIATE UMMC

## 2017-11-13 PROCEDURE — 99222 1ST HOSP IP/OBS MODERATE 55: CPT | Mod: 25 | Performed by: INTERNAL MEDICINE

## 2017-11-13 PROCEDURE — 27210843 ZZH DEVICE COMPRESSION CR12

## 2017-11-13 PROCEDURE — C1894 INTRO/SHEATH, NON-LASER: HCPCS

## 2017-11-13 PROCEDURE — 25000125 ZZHC RX 250: Performed by: HOSPITALIST

## 2017-11-13 PROCEDURE — 25500064 ZZH RX 255 OP 636: Performed by: INTERNAL MEDICINE

## 2017-11-13 PROCEDURE — 93005 ELECTROCARDIOGRAM TRACING: CPT

## 2017-11-13 PROCEDURE — 99152 MOD SED SAME PHYS/QHP 5/>YRS: CPT

## 2017-11-13 PROCEDURE — 27210762 ZZH DEVICE SUTURELESS SECUREMENT EA CR2

## 2017-11-13 PROCEDURE — 86850 RBC ANTIBODY SCREEN: CPT | Performed by: PHYSICIAN ASSISTANT

## 2017-11-13 PROCEDURE — 86923 COMPATIBILITY TEST ELECTRIC: CPT | Performed by: PHYSICIAN ASSISTANT

## 2017-11-13 PROCEDURE — 85027 COMPLETE CBC AUTOMATED: CPT | Performed by: INTERNAL MEDICINE

## 2017-11-13 PROCEDURE — 36415 COLL VENOUS BLD VENIPUNCTURE: CPT | Performed by: INTERNAL MEDICINE

## 2017-11-13 PROCEDURE — 40000172 ZZH STATISTIC PROCEDURE PREP ONLY

## 2017-11-13 PROCEDURE — 27211089 ZZH KIT ACIST INJECTOR CR3

## 2017-11-13 PROCEDURE — 86901 BLOOD TYPING SEROLOGIC RH(D): CPT | Performed by: PHYSICIAN ASSISTANT

## 2017-11-13 PROCEDURE — 93454 CORONARY ARTERY ANGIO S&I: CPT

## 2017-11-13 PROCEDURE — 85520 HEPARIN ASSAY: CPT | Performed by: INTERNAL MEDICINE

## 2017-11-13 PROCEDURE — A9270 NON-COVERED ITEM OR SERVICE: HCPCS | Mod: GY | Performed by: HOSPITALIST

## 2017-11-13 PROCEDURE — 27210893 ZZH CATH CR5

## 2017-11-13 PROCEDURE — 27210946 ZZH KIT HC TOTES DISP CR8

## 2017-11-13 PROCEDURE — 36415 COLL VENOUS BLD VENIPUNCTURE: CPT | Performed by: PHYSICIAN ASSISTANT

## 2017-11-13 PROCEDURE — 25000132 ZZH RX MED GY IP 250 OP 250 PS 637: Mod: GY | Performed by: INTERNAL MEDICINE

## 2017-11-13 PROCEDURE — 25000132 ZZH RX MED GY IP 250 OP 250 PS 637: Mod: GY | Performed by: HOSPITALIST

## 2017-11-13 PROCEDURE — 25000128 H RX IP 250 OP 636: Performed by: INTERNAL MEDICINE

## 2017-11-13 PROCEDURE — 27210787 ZZH MANIFOLD CR2

## 2017-11-13 PROCEDURE — 93454 CORONARY ARTERY ANGIO S&I: CPT | Mod: 26 | Performed by: INTERNAL MEDICINE

## 2017-11-13 PROCEDURE — 93010 ELECTROCARDIOGRAM REPORT: CPT | Performed by: INTERNAL MEDICINE

## 2017-11-13 PROCEDURE — 71250 CT THORAX DX C-: CPT

## 2017-11-13 PROCEDURE — C1769 GUIDE WIRE: HCPCS

## 2017-11-13 PROCEDURE — 99153 MOD SED SAME PHYS/QHP EA: CPT

## 2017-11-13 PROCEDURE — 86900 BLOOD TYPING SEROLOGIC ABO: CPT | Performed by: PHYSICIAN ASSISTANT

## 2017-11-13 RX ORDER — ARGATROBAN 1 MG/ML
150 INJECTION, SOLUTION INTRAVENOUS
Status: DISCONTINUED | OUTPATIENT
Start: 2017-11-13 | End: 2017-11-13 | Stop reason: HOSPADM

## 2017-11-13 RX ORDER — NITROGLYCERIN 5 MG/ML
100-200 VIAL (ML) INTRAVENOUS
Status: DISCONTINUED | OUTPATIENT
Start: 2017-11-13 | End: 2017-11-13 | Stop reason: HOSPADM

## 2017-11-13 RX ORDER — DOBUTAMINE HYDROCHLORIDE 200 MG/100ML
2-20 INJECTION INTRAVENOUS CONTINUOUS PRN
Status: DISCONTINUED | OUTPATIENT
Start: 2017-11-13 | End: 2017-11-13 | Stop reason: HOSPADM

## 2017-11-13 RX ORDER — ATROPINE SULFATE 0.1 MG/ML
0.5 INJECTION INTRAVENOUS EVERY 5 MIN PRN
Status: ACTIVE | OUTPATIENT
Start: 2017-11-13 | End: 2017-11-13

## 2017-11-13 RX ORDER — EPINEPHRINE 1 MG/ML
0.3 INJECTION, SOLUTION, CONCENTRATE INTRAVENOUS
Status: DISCONTINUED | OUTPATIENT
Start: 2017-11-13 | End: 2017-11-13 | Stop reason: HOSPADM

## 2017-11-13 RX ORDER — PHENYLEPHRINE HCL IN 0.9% NACL 1 MG/10 ML
20-100 SYRINGE (ML) INTRAVENOUS
Status: DISCONTINUED | OUTPATIENT
Start: 2017-11-13 | End: 2017-11-13 | Stop reason: HOSPADM

## 2017-11-13 RX ORDER — PRASUGREL 10 MG/1
10-60 TABLET, FILM COATED ORAL
Status: DISCONTINUED | OUTPATIENT
Start: 2017-11-13 | End: 2017-11-13 | Stop reason: HOSPADM

## 2017-11-13 RX ORDER — CLOPIDOGREL BISULFATE 75 MG/1
300-600 TABLET ORAL
Status: DISCONTINUED | OUTPATIENT
Start: 2017-11-13 | End: 2017-11-13 | Stop reason: HOSPADM

## 2017-11-13 RX ORDER — EPTIFIBATIDE 2 MG/ML
180 INJECTION, SOLUTION INTRAVENOUS EVERY 10 MIN PRN
Status: DISCONTINUED | OUTPATIENT
Start: 2017-11-13 | End: 2017-11-13 | Stop reason: HOSPADM

## 2017-11-13 RX ORDER — PROTAMINE SULFATE 10 MG/ML
25-100 INJECTION, SOLUTION INTRAVENOUS EVERY 5 MIN PRN
Status: DISCONTINUED | OUTPATIENT
Start: 2017-11-13 | End: 2017-11-13 | Stop reason: HOSPADM

## 2017-11-13 RX ORDER — LOSARTAN POTASSIUM AND HYDROCHLOROTHIAZIDE 12.5; 5 MG/1; MG/1
1 TABLET ORAL DAILY
Status: DISCONTINUED | OUTPATIENT
Start: 2017-11-14 | End: 2017-11-15 | Stop reason: HOSPADM

## 2017-11-13 RX ORDER — ASPIRIN 325 MG
325 TABLET ORAL
Status: DISCONTINUED | OUTPATIENT
Start: 2017-11-13 | End: 2017-11-13 | Stop reason: HOSPADM

## 2017-11-13 RX ORDER — LIDOCAINE 40 MG/G
CREAM TOPICAL
Status: DISCONTINUED | OUTPATIENT
Start: 2017-11-13 | End: 2017-11-13 | Stop reason: HOSPADM

## 2017-11-13 RX ORDER — HEPARIN SODIUM 1000 [USP'U]/ML
1000-10000 INJECTION, SOLUTION INTRAVENOUS; SUBCUTANEOUS EVERY 5 MIN PRN
Status: DISCONTINUED | OUTPATIENT
Start: 2017-11-13 | End: 2017-11-13 | Stop reason: HOSPADM

## 2017-11-13 RX ORDER — CLOPIDOGREL BISULFATE 75 MG/1
75 TABLET ORAL
Status: DISCONTINUED | OUTPATIENT
Start: 2017-11-13 | End: 2017-11-13 | Stop reason: HOSPADM

## 2017-11-13 RX ORDER — ASPIRIN 81 MG/1
81 TABLET, CHEWABLE ORAL DAILY
Status: DISCONTINUED | OUTPATIENT
Start: 2017-11-13 | End: 2017-11-13

## 2017-11-13 RX ORDER — NIFEDIPINE 10 MG/1
10 CAPSULE ORAL
Status: DISCONTINUED | OUTPATIENT
Start: 2017-11-13 | End: 2017-11-13 | Stop reason: HOSPADM

## 2017-11-13 RX ORDER — ADENOSINE 3 MG/ML
12-12000 INJECTION, SOLUTION INTRAVENOUS
Status: DISCONTINUED | OUTPATIENT
Start: 2017-11-13 | End: 2017-11-13 | Stop reason: HOSPADM

## 2017-11-13 RX ORDER — DEXTROSE MONOHYDRATE 25 G/50ML
12.5-5 INJECTION, SOLUTION INTRAVENOUS EVERY 30 MIN PRN
Status: DISCONTINUED | OUTPATIENT
Start: 2017-11-13 | End: 2017-11-13 | Stop reason: HOSPADM

## 2017-11-13 RX ORDER — LEVOTHYROXINE SODIUM 75 UG/1
75 TABLET ORAL DAILY
Status: DISCONTINUED | OUTPATIENT
Start: 2017-11-14 | End: 2017-11-15 | Stop reason: HOSPADM

## 2017-11-13 RX ORDER — SODIUM NITROPRUSSIDE 25 MG/ML
100-200 INJECTION INTRAVENOUS
Status: DISCONTINUED | OUTPATIENT
Start: 2017-11-13 | End: 2017-11-13 | Stop reason: HOSPADM

## 2017-11-13 RX ORDER — NITROGLYCERIN 0.4 MG/1
0.4 TABLET SUBLINGUAL EVERY 5 MIN PRN
Status: DISCONTINUED | OUTPATIENT
Start: 2017-11-13 | End: 2017-11-15 | Stop reason: HOSPADM

## 2017-11-13 RX ORDER — FUROSEMIDE 10 MG/ML
20-100 INJECTION INTRAMUSCULAR; INTRAVENOUS
Status: DISCONTINUED | OUTPATIENT
Start: 2017-11-13 | End: 2017-11-13 | Stop reason: HOSPADM

## 2017-11-13 RX ORDER — LIDOCAINE 40 MG/G
CREAM TOPICAL
Status: DISCONTINUED | OUTPATIENT
Start: 2017-11-13 | End: 2017-11-15 | Stop reason: HOSPADM

## 2017-11-13 RX ORDER — NICARDIPINE HYDROCHLORIDE 2.5 MG/ML
100 INJECTION INTRAVENOUS
Status: DISCONTINUED | OUTPATIENT
Start: 2017-11-13 | End: 2017-11-13 | Stop reason: HOSPADM

## 2017-11-13 RX ORDER — VERAPAMIL HYDROCHLORIDE 2.5 MG/ML
1-5 INJECTION, SOLUTION INTRAVENOUS
Status: DISCONTINUED | OUTPATIENT
Start: 2017-11-13 | End: 2017-11-13 | Stop reason: HOSPADM

## 2017-11-13 RX ORDER — HYDRALAZINE HYDROCHLORIDE 20 MG/ML
10-20 INJECTION INTRAMUSCULAR; INTRAVENOUS
Status: DISCONTINUED | OUTPATIENT
Start: 2017-11-13 | End: 2017-11-13 | Stop reason: HOSPADM

## 2017-11-13 RX ORDER — NALOXONE HYDROCHLORIDE 0.4 MG/ML
.1-.4 INJECTION, SOLUTION INTRAMUSCULAR; INTRAVENOUS; SUBCUTANEOUS
Status: DISCONTINUED | OUTPATIENT
Start: 2017-11-13 | End: 2017-11-15 | Stop reason: HOSPADM

## 2017-11-13 RX ORDER — LATANOPROST 50 UG/ML
1 SOLUTION/ DROPS OPHTHALMIC AT BEDTIME
Status: DISCONTINUED | OUTPATIENT
Start: 2017-11-13 | End: 2017-11-15 | Stop reason: HOSPADM

## 2017-11-13 RX ORDER — ATROPINE SULFATE 0.1 MG/ML
.5-1 INJECTION INTRAVENOUS
Status: DISCONTINUED | OUTPATIENT
Start: 2017-11-13 | End: 2017-11-13 | Stop reason: HOSPADM

## 2017-11-13 RX ORDER — POTASSIUM CHLORIDE 7.45 MG/ML
10 INJECTION INTRAVENOUS
Status: DISCONTINUED | OUTPATIENT
Start: 2017-11-13 | End: 2017-11-13 | Stop reason: HOSPADM

## 2017-11-13 RX ORDER — SODIUM CHLORIDE 9 MG/ML
INJECTION, SOLUTION INTRAVENOUS CONTINUOUS
Status: DISCONTINUED | OUTPATIENT
Start: 2017-11-13 | End: 2017-11-14

## 2017-11-13 RX ORDER — ASPIRIN 81 MG/1
81-324 TABLET, CHEWABLE ORAL
Status: DISCONTINUED | OUTPATIENT
Start: 2017-11-13 | End: 2017-11-13 | Stop reason: HOSPADM

## 2017-11-13 RX ORDER — PROMETHAZINE HYDROCHLORIDE 25 MG/ML
6.25-25 INJECTION, SOLUTION INTRAMUSCULAR; INTRAVENOUS EVERY 4 HOURS PRN
Status: DISCONTINUED | OUTPATIENT
Start: 2017-11-13 | End: 2017-11-13 | Stop reason: HOSPADM

## 2017-11-13 RX ORDER — NALOXONE HYDROCHLORIDE 0.4 MG/ML
.2-.4 INJECTION, SOLUTION INTRAMUSCULAR; INTRAVENOUS; SUBCUTANEOUS
Status: ACTIVE | OUTPATIENT
Start: 2017-11-13 | End: 2017-11-13

## 2017-11-13 RX ORDER — ACETAMINOPHEN 325 MG/1
325-650 TABLET ORAL EVERY 4 HOURS PRN
Status: DISCONTINUED | OUTPATIENT
Start: 2017-11-13 | End: 2017-11-15 | Stop reason: HOSPADM

## 2017-11-13 RX ORDER — EPTIFIBATIDE 2 MG/ML
2 INJECTION, SOLUTION INTRAVENOUS CONTINUOUS PRN
Status: DISCONTINUED | OUTPATIENT
Start: 2017-11-13 | End: 2017-11-13 | Stop reason: HOSPADM

## 2017-11-13 RX ORDER — PROTAMINE SULFATE 10 MG/ML
1-5 INJECTION, SOLUTION INTRAVENOUS
Status: DISCONTINUED | OUTPATIENT
Start: 2017-11-13 | End: 2017-11-13 | Stop reason: HOSPADM

## 2017-11-13 RX ORDER — ENALAPRILAT 1.25 MG/ML
1.25-2.5 INJECTION INTRAVENOUS
Status: DISCONTINUED | OUTPATIENT
Start: 2017-11-13 | End: 2017-11-13 | Stop reason: HOSPADM

## 2017-11-13 RX ORDER — ASPIRIN 81 MG/1
162 TABLET, CHEWABLE ORAL ONCE
Status: DISCONTINUED | OUTPATIENT
Start: 2017-11-13 | End: 2017-11-15 | Stop reason: HOSPADM

## 2017-11-13 RX ORDER — ATORVASTATIN CALCIUM 40 MG/1
40 TABLET, FILM COATED ORAL
Status: DISCONTINUED | OUTPATIENT
Start: 2017-11-13 | End: 2017-11-15 | Stop reason: HOSPADM

## 2017-11-13 RX ORDER — DIPHENHYDRAMINE HYDROCHLORIDE 50 MG/ML
25-50 INJECTION INTRAMUSCULAR; INTRAVENOUS
Status: COMPLETED | OUTPATIENT
Start: 2017-11-13 | End: 2017-11-13

## 2017-11-13 RX ORDER — ARGATROBAN 1 MG/ML
350 INJECTION, SOLUTION INTRAVENOUS
Status: DISCONTINUED | OUTPATIENT
Start: 2017-11-13 | End: 2017-11-13 | Stop reason: HOSPADM

## 2017-11-13 RX ORDER — NITROGLYCERIN 0.4 MG/1
0.4 TABLET SUBLINGUAL EVERY 5 MIN PRN
Status: DISCONTINUED | OUTPATIENT
Start: 2017-11-13 | End: 2017-11-13 | Stop reason: HOSPADM

## 2017-11-13 RX ORDER — NITROGLYCERIN 5 MG/ML
100-500 VIAL (ML) INTRAVENOUS
Status: COMPLETED | OUTPATIENT
Start: 2017-11-13 | End: 2017-11-13

## 2017-11-13 RX ORDER — SODIUM CHLORIDE 9 MG/ML
INJECTION, SOLUTION INTRAVENOUS CONTINUOUS
Status: DISCONTINUED | OUTPATIENT
Start: 2017-11-13 | End: 2017-11-13 | Stop reason: HOSPADM

## 2017-11-13 RX ORDER — ASPIRIN 81 MG/1
81 TABLET ORAL DAILY
Status: DISCONTINUED | OUTPATIENT
Start: 2017-11-14 | End: 2017-11-15 | Stop reason: HOSPADM

## 2017-11-13 RX ORDER — ONDANSETRON 2 MG/ML
4 INJECTION INTRAMUSCULAR; INTRAVENOUS EVERY 4 HOURS PRN
Status: DISCONTINUED | OUTPATIENT
Start: 2017-11-13 | End: 2017-11-13 | Stop reason: HOSPADM

## 2017-11-13 RX ORDER — HEPARIN SODIUM 10000 [USP'U]/100ML
0-3500 INJECTION, SOLUTION INTRAVENOUS CONTINUOUS
Status: DISPENSED | OUTPATIENT
Start: 2017-11-13 | End: 2017-11-14

## 2017-11-13 RX ORDER — IOPAMIDOL 755 MG/ML
50 INJECTION, SOLUTION INTRAVASCULAR ONCE
Status: COMPLETED | OUTPATIENT
Start: 2017-11-13 | End: 2017-11-13

## 2017-11-13 RX ORDER — NITROGLYCERIN 20 MG/100ML
.07-2 INJECTION INTRAVENOUS CONTINUOUS PRN
Status: DISCONTINUED | OUTPATIENT
Start: 2017-11-13 | End: 2017-11-13 | Stop reason: HOSPADM

## 2017-11-13 RX ORDER — FENTANYL CITRATE 50 UG/ML
25-50 INJECTION, SOLUTION INTRAMUSCULAR; INTRAVENOUS
Status: DISCONTINUED | OUTPATIENT
Start: 2017-11-13 | End: 2017-11-13 | Stop reason: HOSPADM

## 2017-11-13 RX ORDER — METOPROLOL TARTRATE 1 MG/ML
5 INJECTION, SOLUTION INTRAVENOUS EVERY 5 MIN PRN
Status: DISCONTINUED | OUTPATIENT
Start: 2017-11-13 | End: 2017-11-13 | Stop reason: HOSPADM

## 2017-11-13 RX ORDER — NALOXONE HYDROCHLORIDE 0.4 MG/ML
0.4 INJECTION, SOLUTION INTRAMUSCULAR; INTRAVENOUS; SUBCUTANEOUS EVERY 5 MIN PRN
Status: DISCONTINUED | OUTPATIENT
Start: 2017-11-13 | End: 2017-11-13 | Stop reason: HOSPADM

## 2017-11-13 RX ORDER — LORAZEPAM 2 MG/ML
.5-2 INJECTION INTRAMUSCULAR EVERY 4 HOURS PRN
Status: DISCONTINUED | OUTPATIENT
Start: 2017-11-13 | End: 2017-11-13 | Stop reason: HOSPADM

## 2017-11-13 RX ORDER — DOPAMINE HYDROCHLORIDE 160 MG/100ML
2-20 INJECTION, SOLUTION INTRAVENOUS CONTINUOUS PRN
Status: DISCONTINUED | OUTPATIENT
Start: 2017-11-13 | End: 2017-11-13 | Stop reason: HOSPADM

## 2017-11-13 RX ORDER — MAGNESIUM HYDROXIDE 1200 MG/15ML
1000 LIQUID ORAL CONTINUOUS PRN
Status: DISCONTINUED | OUTPATIENT
Start: 2017-11-13 | End: 2017-11-13 | Stop reason: HOSPADM

## 2017-11-13 RX ORDER — FLUMAZENIL 0.1 MG/ML
0.2 INJECTION, SOLUTION INTRAVENOUS
Status: ACTIVE | OUTPATIENT
Start: 2017-11-13 | End: 2017-11-13

## 2017-11-13 RX ORDER — LIDOCAINE HYDROCHLORIDE 10 MG/ML
30 INJECTION, SOLUTION EPIDURAL; INFILTRATION; INTRACAUDAL; PERINEURAL
Status: DISCONTINUED | OUTPATIENT
Start: 2017-11-13 | End: 2017-11-13 | Stop reason: HOSPADM

## 2017-11-13 RX ORDER — FLUMAZENIL 0.1 MG/ML
0.2 INJECTION, SOLUTION INTRAVENOUS
Status: DISCONTINUED | OUTPATIENT
Start: 2017-11-13 | End: 2017-11-13 | Stop reason: HOSPADM

## 2017-11-13 RX ORDER — POTASSIUM CHLORIDE 29.8 MG/ML
20 INJECTION INTRAVENOUS
Status: DISCONTINUED | OUTPATIENT
Start: 2017-11-13 | End: 2017-11-13 | Stop reason: HOSPADM

## 2017-11-13 RX ORDER — METHYLPREDNISOLONE SODIUM SUCCINATE 125 MG/2ML
125 INJECTION, POWDER, LYOPHILIZED, FOR SOLUTION INTRAMUSCULAR; INTRAVENOUS
Status: DISCONTINUED | OUTPATIENT
Start: 2017-11-13 | End: 2017-11-13 | Stop reason: HOSPADM

## 2017-11-13 RX ADMIN — METOPROLOL TARTRATE 6.25 MG: 25 TABLET, FILM COATED ORAL at 19:38

## 2017-11-13 RX ADMIN — NITROGLYCERIN 200 MCG: 5 INJECTION, SOLUTION INTRAVENOUS at 08:51

## 2017-11-13 RX ADMIN — HEPARIN SODIUM 5000 UNITS: 1000 INJECTION, SOLUTION INTRAVENOUS; SUBCUTANEOUS at 08:44

## 2017-11-13 RX ADMIN — FENTANYL CITRATE 50 MCG: 50 INJECTION, SOLUTION INTRAMUSCULAR; INTRAVENOUS at 08:43

## 2017-11-13 RX ADMIN — ATROPINE SULFATE 1 MG: 0.1 INJECTION, SOLUTION ENDOTRACHEAL; INTRAMUSCULAR; INTRAVENOUS; SUBCUTANEOUS at 09:01

## 2017-11-13 RX ADMIN — HEPARIN SODIUM 1200 UNITS/HR: 10000 INJECTION, SOLUTION INTRAVENOUS at 14:02

## 2017-11-13 RX ADMIN — LATANOPROST 1 DROP: 50 SOLUTION/ DROPS OPHTHALMIC at 21:49

## 2017-11-13 RX ADMIN — SODIUM CHLORIDE 500 ML: 9 INJECTION, SOLUTION INTRAVENOUS at 09:02

## 2017-11-13 RX ADMIN — NICARDIPINE HYDROCHLORIDE 200 MCG: 2.5 INJECTION INTRAVENOUS at 08:51

## 2017-11-13 RX ADMIN — ATORVASTATIN CALCIUM 40 MG: 40 TABLET, FILM COATED ORAL at 19:38

## 2017-11-13 RX ADMIN — SULFUR HEXAFLUORIDE 5 ML: KIT at 13:03

## 2017-11-13 RX ADMIN — IOPAMIDOL 50 ML: 755 INJECTION, SOLUTION INTRAVASCULAR at 09:30

## 2017-11-13 RX ADMIN — DIPHENHYDRAMINE HYDROCHLORIDE 25 MG: 50 INJECTION, SOLUTION INTRAMUSCULAR; INTRAVENOUS at 08:43

## 2017-11-13 NOTE — ANESTHESIA PREPROCEDURE EVALUATION
Anesthesia Evaluation     . Pt has had prior anesthetic. Type: General and MAC           ROS/MED HX    ENT/Pulmonary:     (+)BRITTNEY risk factors hypertension, obese, tobacco use, Past use 1 PPD for 20 years, quit 1979 packs/day  , . .    Neurologic:  - neg neurologic ROS     Cardiovascular:     (+) Dyslipidemia, hypertension--CAD, --. Taking blood thinners : Instructions Given to patient: hold asa DOS. . . :. . Previous cardiac testing Echodate:11/13/17results:     Stress Testdate:11/8/2017 results:ECG reviewed date:11/13/17 results:Sinus nathanael, RBBB, Left axis deviation, LVH.Cath date: 11/13/17 results:2 vessel disease, RCA (mid RCA at 70%) and LAD proximal 70-80% and mid 80-90%).           METS/Exercise Tolerance:  4 - Raking leaves, gardening   Hematologic:  - neg hematologic  ROS       Musculoskeletal:   (+) , , other musculoskeletal- DJD of hips and lower back pain.       GI/Hepatic:  - neg GI/hepatic ROS       Renal/Genitourinary:  - ROS Renal section negative       Endo:     (+) thyroid problem hypothyroidism, Obesity, .      Psychiatric:  - neg psychiatric ROS       Infectious Disease:  - neg infectious disease ROS       Malignancy:      - no malignancy   Other:    (+) No chance of pregnancy C-spine cleared: N/A, no H/O Chronic Pain,no other significant disability                    Physical Exam  Normal systems: cardiovascular and pulmonary    Airway   Mallampati: II  TM distance: >3 FB  Neck ROM: full    Dental     Cardiovascular   Rhythm and rate: regular      Pulmonary    breath sounds clear to auscultation    Other findings:   Stress test 11/8/2017  Interpretation Summary     Grossly abnormal stress echocardiogram.  Normal resting images. Resting EF 55 to 60%. No resting regional wallmotion  abnormalities.     With stress there was LV dilatation,drop in EF and large LAD territory  akinesis.     The patient did not exhibit any symptoms during exercise.  Normal blood pressure response with stress.  Normal  heart rate response to exercise.  _____________________________________________________________________________  __     Stress  Definity (NDC #25845-837-40) given intravenously.  Patient was given 5ml mixture of 1.5ml Definity and 8.5ml saline.  5 ml wasted.  IV start location LAC .  This was a normal stress EKG with no evidence of stress-induced ischemia.  Maximum workload 100 bryant.  Target Heart Rate was achieved.  Exercise was stopped due to fatigue.  Normal blood pressure response with stress.  The patient did not exhibit any symptoms during exercise.  Normal heart rate response to exercise     CORONARY ANGIOGRAM 11/13/2017:     1. Both coronary arteries arise from their respective cusps.    2. Dominance: Right    3. The LM is without angiographic evidence of disease.     4. LAD: Type 3 (LAD supplies the entire apex).. The LAD gives rise to septal perforators, D1 and D2.  The pLAD has a 70-80% stenosis at the ostium.  The mid LAD has an 80-90% stenosis before and after the diagonal branch with 80-90% of the first diagonal.  Distally there is diffuse disease at less than 25%.    5. LCX gives rise to OM1/2 branches of medium caliber with diffuse luminal disease throughout without angiographic stenosis.    6. RCA gives rise to PL branches and supplies PDA. The proximal RCA has luminal disease, mid RCA has a 70% stenosis, distally there is less than 25% stenosis.  The ostial RPL and PDA each have disease of approximately 30-40% and 50% stenosis respectively.    Echocardiogram 11/13/2017  Interpretation Summary  No significant valvular abnormalities were noted. Global and regional left  ventricular function is normal with an EF of 60-65%.  Right ventricular function, chamber size, wall motion, and thickness are  normal.  _____________________________________________________________________________  __        Left Ventricle  Global and regional left ventricular function is normal with an EF of 60-65%.  Left  ventricular wall thickness is normal. Left ventricular size is normal.  Normal left ventricular filling for age. No regional wall motion abnormalities  are seen.     Right Ventricle  Right ventricular function, chamber size, wall motion, and thickness are  normal.     Atria  Both atria appear normal. The atrial septum is intact as assessed by color  Doppler .     Mitral Valve  Mild mitral annular calcification is present.        Aortic Valve  Trace aortic insufficiency is present.     Tricuspid Valve  The tricuspid valve is normal. Pulmonary artery systolic pressure cannot be  assessed.     Pulmonic Valve  The pulmonic valve is normal. Trace pulmonic insufficiency is present.     Vessels  The aorta root is normal. The pulmonary artery is normal. The inferior vena  cava is normal.     Pericardium  No pericardial effusion is present.     ______________________________________________________________________    Carotid US 11/14/2017  Impression:  1.  Right: The max velocity in the ICA measures 132/35, corresponding  to less than 50%% stenosis.  2.  Left: The max velocity in the ICA measures 103/24, corresponding  to less than 50% stenosis.  3.  Normal antegrade direction flow in both vertebral arteries.           11/13/2017 13:20  WBC: 6.9  Hemoglobin: 13.7  Hematocrit: 41.6  Platelet Count: 212  RBC Count: 4.40  MCV: 95  MCH: 31.1  MCHC: 32.9  RDW: 12.8      Results for FÉLIX TRIVEDI (MRN [id number]) as of 11/20/2017 16:52    11/14/2017 06:59  Sodium: 141  Potassium: 4.0  Chloride: 108  Carbon Dioxide: 28  Urea Nitrogen: 21  Creatinine: 0.93  GFR Estimate: 79  GFR Estimate If Black: >90  Calcium: 8.7  Anion Gap: 6  TSH: 6.20 (H)  Glucose: 107 (H)        11/14/2017 12:50  METANEPHRINES PLASMA FREE: Rpt    Normetanephrine 0.39  0.00 - 0.89 nmol/L Final 11/14/2017 12:50 PM 51   Metanephrine <0.10  0.00 - 0.49 nmol/L Final 11/14/2017 12:50 PM 51            11/20/2017 13:47  Color Urine: Yellow  Appearance Urine:  Clear  Glucose Urine: Negative  Bilirubin Urine: Negative  Ketones Urine: Negative  Specific Gravity Urine: 1.019  pH Urine: 5.0  Protein Albumin Urine: Negative  Urobilinogen mg/dL: 0.0  Nitrite Urine: Negative  Blood Urine: Negative  Leukocyte Esterase Urine: Negative  Source: Midstream Urine  WBC Urine: 0  RBC Urine: <1  Mucous Urine: Present (A)    11/20/2017 13:49  INR: 1.03  PTT: 29    ABO: A  RH(D): Pos  Antibody Screen: Neg  Test Valid Only At: [facility]  Specimen Expires: 11/23/2017           PAC Discussion and Assessment    ASA Classification: 3  Case is suitable for: Eugene  Anesthetic techniques and relevant risks discussed: GA  Invasive monitoring and risk discussed: Yes  Types:   Possibility and Risk of blood transfusion discussed: Yes  NPO instructions given:   Additional anesthetic preparation and risks discussed:   Needs early admission to pre-op area:   Other:     PAC Resident/NP Anesthesia Assessment:  Zack Dav scheduled for Coronary Artery Bypass Graft on 11/22/2017 by Dr. Toro. PAC referral by Dr. Toro for assessment and optimization of anesthesia. Previous anesthesia without complications.    1) Cardiac: Angio reveals 2V disease-Mid RCA 70% stenosis, Proximal LAD 70-80%, Mid LAD 80-90% .Normal resting EF but decreased with stress, with associated anteroapical wall akinesis. Currently not using nitro and is not experiencing angina.   2) Pulmonary: Former smoker, smoked 1 PPD for 20 years, quit 1979  3) GI: Umbilical hernia, asymptomatic at this time  4) Abdominal mass seen on CT, surgery was cancelled last week and endocrinology consulted to evaluate for secretory tumor. 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     Feasible airway     Pt also evaluated by Dr. Sainz. Please see recommendations below. Labs drawn today.  I spent 20 minutes face to face with pt, assessing, examining, and educating        Reviewed and Signed  by PAC Mid-Level Provider/Resident  Mid-Level Provider/Resident: HOLLY Dougherty  Date: 11/20/2017  Time: 1530    Attending Anesthesiologist Anesthesia Assessment:  I saw the patient and discussed with the GRACE as above.  Patient currently medically optimized for the proposed procedure.   Final anesthetic plan and recommendations to be made by the attending anesthesiologist on the day of surgery.     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 Endocrinology was consulted and Metanephrine levels were checked and were negative.       Reviewed and Signed by PAC Anesthesiologist  Anesthesiologist: BOOM  Date: 11/20/17  Time:   Pass/Fail: Pass  Disposition:     PAC Pharmacist Assessment:        Pharmacist:   Date:   Time:      Anesthesia Plan      History & Physical Review  History and physical reviewed and following examination; no interval change.    ASA Status:  3 .    NPO Status:  > 8 hours    Plan for General and ETT with Intravenous induction. Maintenance will be Balanced.      Additional equipment: 2nd IV, Arterial Line, Central Line, CVP, PA Catheter and VIKA      Postoperative Care  Postoperative pain management:  IV analgesics.      Consents  Anesthetic plan, risks, benefits and alternatives discussed with:  Patient.  Use of blood products discussed: Yes.   Use of blood products discussed with Patient.  Consented to blood products.  .        Procedure:  Procedure(s):  Median Sternotomy, Coronary Artery Bypass Graft x3, On Cardiopulmonary Bypass, Transesophageal Echocardiogram - Wound Class: I-Clean    Patient Active Problem List   Diagnosis     Generalized anxiety disorder     Lipoma of skin and subcutaneous tissue     Hernia umbilical     Patellar tendonitis     Cataract, mild-mod, ou     Arthritis of knee, right     Advanced directives, counseling/discussion     Hyperlipidemia LDL goal <130     Hypertension goal BP (blood  pressure) < 150/90     Borderline glaucoma with ocular hypertension, bilateral - treated     Posterior vitreous detachment, left     Hypothyroidism, unspecified type     Macular drusen, bilateral     Unstable angina (H)     Health Care Home     Retroperitoneal mass       Past Medical History:   Diagnosis Date     DJD (degenerative joint disease) of hip     right     Glaucoma      Hernia umbilical      Hypercholesterolemia      Hypertension      Hypothyroidism      Lipoma      Low back pain      Nonsenile cataract      Obesity      Unstable angina (H) 11/13/2017       Past Surgical History:   Procedure Laterality Date     BIOPSY  1980's    fatty tissue     [facility]       COLONOSCOPY  2011     COLONOSCOPY WITH CO2 INSUFFLATION N/A 9/19/2016    Procedure: COLONOSCOPY WITH CO2 INSUFFLATION;  Surgeon: Jeffery Flores MD;  Location: [facility] OR     ROTATOR CUFF REPAIR RT/LT       SOFT TISSUE SURGERY  Sept. 2014    [facility] Tendon transfer (Left Ankle)         No current facility-administered medications on file prior to encounter.   Current Outpatient Prescriptions on File Prior to Encounter:  metoprolol (LOPRESSOR) 25 MG tablet Take 0.25 tablets (6.25 mg) by mouth 2 times daily   atorvastatin (LIPITOR) 40 MG tablet Take 1 tablet (40 mg) by mouth daily   losartan-hydrochlorothiazide (HYZAAR) 50-12.5 MG per tablet Take 1 tablet by mouth daily   levothyroxine (SYNTHROID/LEVOTHROID) 75 MCG tablet Take 1 tablet (75 mcg) by mouth daily   Cholecalciferol (VITAMIN D3 PO) Take by mouth daily   multivitamin (OCUVITE) TABS Take 1 tablet by mouth daily   latanoprost (XALATAN) 0.005 % ophthalmic solution Place 1 drop into both eyes At Bedtime   aspirin 325 MG EC tablet 1/2 tab daily   nitroGLYcerin (NITROSTAT) 0.4 MG sublingual tablet For chest pain place 1 tablet under the tongue every 5 minutes for 3 doses. If symptoms persist 5 minutes after 1st dose call 911.       Allergies   Allergen Reactions     Nkda [No Known  Drug Allergies]        Recent Labs   Lab Test  11/13/17   1320   HGB  13.7     Recent Labs   Lab Test  11/22/17   1302   POTASSIUM  4.0     Recent Labs   Lab Test  11/13/17   1320   PLT  212     Recent Labs   Lab Test  11/20/17   1349   INR  1.03       No results found for this or any previous visit (from the past 4320 hour(s)).      Attending Anesthesiologist    Alfredo Lorenzana MD    *[phone]

## 2017-11-13 NOTE — PROGRESS NOTES
CVTS:     Aware of consult for CAB, Dr Vila will review Angio later today.       Taurus Ying PA-C  Cardiothoracic Surgery  Pager [phone]    10:42 AM   November 13, 2017

## 2017-11-13 NOTE — IP AVS SNAPSHOT
MRN:[id number]                      After Visit Summary   11/13/2017    Zack Demarco    MRN: [id number]           Thank you!     Thank you for choosing [facility] for your care. Our goal is always to provide you with excellent care. Hearing back from our patients is one way we can continue to improve our services. Please take a few minutes to complete the written survey that you may receive in the mail after you visit with us. Thank you!        Patient Information     Date Of Birth          1940        Designated Caregiver       Most Recent Value    Caregiver    Will someone help with your care after discharge? yes    Name of designated caregiver keila ferrera    Phone number of caregiver [phone]    Caregiver address Doesnt know      About your hospital stay     You were admitted on:  November 13, 2017 You last received care in the:  [address] [facility]    You were discharged on:  November 15, 2017        Reason for your hospital stay       You had chest pain for which you underwent coronary angiogram and were found to have narrowings and blockages that would be best served with surgery. Cardiothoracic surgery will be following up with you next week to arrange surgery date. Your concerns for the mass found on CT scan will be followed up and biopsied after you have your open heart surgery.                  Who to Call     For medical emergencies, please call 911.  For non-urgent questions about your medical care, please call your primary care provider or clinic, [phone]  For questions related to your surgery, please call your surgery clinic        Attending Provider     Provider Specialty    Torsten Reyes MD Cardiology    Avtar Ruffin MD Internal Medicine - Interventional Cardiology       Primary Care Provider Office Phone # Fax #    Anastacio Love [phone]      After Care Instructions     Activity       Your activity upon discharge: activity as  tolerated            Diet       Follow this diet upon discharge: Orders Placed This Encounter      Regular Diet Adult                  Follow-up Appointments     Adult [facility] Follow-up and recommended labs and tests       Follow up with primary care provider, AALIYAH GARZA, within 7 days for hospital follow- up.     Follow up with Cardiothoracic surgery next week, as arranged.    Appointments on [facility] and/or [facility] (with [facility] or [facility] provider or service). Call [phone] if you haven't heard regarding these appointments within 7 days of discharge.                  Your next 10 appointments already scheduled     Jan 17, 2018  8:00 AM CST   [facility]    [address] [facility] [address] [phone]              Further instructions from your care team       Going Home after Coronary Angiogram       Name: Zack Demarco  Medical Record Number:  [id number]  Today's Date: November 15, 2017        For 24 hours:         Have an adult stay with you for 24 hours.         Relax and take it easy.         Drink plenty of fluids.         You may eat your normal diet, unless your doctor tells you otherwise.         Do NOT make any important or legal decisions.         Do NOT drive or operate machines at home or at work.         Do NOT drink alcohol.      Do NOT smoke.     Medicines:         If you have begun Plavix (clopidogrel), Effient (prasugrel), or Brilinta (ticagrelor), do not stop taking it until you talk to your heart doctor (cardiologist).         If you are on metformin (Glucophage), do not restart it until you have blood tests (within 2 to 3 days after discharge). When your doctor tells you it is safe, you may restart the metformin.         If you have stopped any other medicines, check with your nurse or provider about when to restart them.    Care of wrist or arm site:         It is normal to have soreness at  the puncture site and mild tingling in your hand for up to 3 days.           Remove the Band-Aid after 24 hours. If there is minor oozing, apply another Band-aid and remove it after 12 hours.          Do NOT take a bath, or use a hot tub or pool for the next 48 hours. You may shower.          It is normal to have a small bruise.  There should not be a lump at the site.         Do not scrub the site.         Do not use lotion or powder near the puncture site for 3 days.         For 2 days, do not use your hand or arm to support your weight (such as rising from a chair) or bend your wrist (such as lifting a garage door).         For 2 days, do not lift more than 5 pounds or exercise your arm (tennis, golf or bowling).    If you start bleeding from the site in your arm: Sit down and press firmly on the site with your fingers for 10 minutes. Call your doctor as soon as you can.      Call 911 right away if you have bleeding that is heavy or does not stop.     Call your doctor if:         You have a large or growing hard lump around the site.         The site is red, swollen, hot or tender.         Blood or fluid is draining from the site.         You have chills or a fever greater than 101 F (38 C).         Your leg or arm turns bluish, feels numb or cool.         You have hives, a rash or unusual itching.     ADDITIONAL INSTRUCTIONS: You will be receiving a phone call from Cardiothoracic surgery team next week to further arrange your heart surgery. Please follow up with your PCP in one week. Feel free to contact us with any questions in the mean time. Please return to the ED if you have any reoccurrence of chest pain.    Kristen Leonardo, interventional Cardiology NP- [phone]    [facility] [place]:   [phone] (7 days a week)      Cardiology Fellow on call (24 hours per day) at [facility]:   [phone] (ask for Cardiology Fellow on call)      Number where we can reach you:   ____________    Pending Results     Date and Time Order Name Status Description    11/14/2017 1220 Metanephrines Plasma Free In process             Statement of Approval     Ordered          11/15/17 1116  I have reviewed and agree with all the recommendations and orders detailed in this document.  EFFECTIVE NOW     Approved and electronically signed by:  Teena Leonardo APRN CNP             Admission Information     Date & Time Provider Department Dept. Phone    11/13/2017 Avtar Ruffin MD [address] [facility] [phone]      Your Vitals Were     Blood Pressure Pulse Temperature Respirations Weight Pulse Oximetry    133/76 (BP Location: Right arm) 57 98  F (36.7  C) (Oral) 16 96.2 kg (212 lb) 96%    BMI (Body Mass Index)                   30.86 kg/m2           MyChart Information     [employer] gives you secure access to your electronic health record. If you see a primary care provider, you can also send messages to your care team and make appointments. If you have questions, please call your primary care clinic.  If you do not have a primary care provider, please call [phone] and they will assist you.        Care EveryWhere ID     This is your Care EveryWhere ID. This could be used by other organizations to access your [facility] medical records  [id number]        Equal Access to Services     HALEY BURR : Vernell allisono Sorob, waaxda luqadaha, qaybta kaalmada adeegyada, marilynn reynolds. So [facility] [phone].    ATENCIÓN: Si habla español, tiene a warner disposición servicios gratuitos de asistencia lingüística. Llame al [phone].    We comply with applicable federal civil rights laws and [place] laws. We do not discriminate on the basis of race, color, national origin, age, disability, sex, sexual orientation, or gender identity.               Review of your medicines      START taking        Dose / Directions    metoprolol 25 MG tablet   Commonly known as:   LOPRESSOR        Dose:  6.25 mg   Take 0.25 tablets (6.25 mg) by mouth 2 times daily   Quantity:  60 tablet   Refills:  0         CONTINUE these medicines which have NOT CHANGED        Dose / Directions    aspirin 325 MG EC tablet        1/2 tab daily   Refills:  0       atorvastatin 40 MG tablet   Commonly known as:  LIPITOR   Used for:  Hyperlipidemia LDL goal <100        Dose:  40 mg   Take 1 tablet (40 mg) by mouth daily   Quantity:  60 tablet   Refills:  11       latanoprost 0.005 % ophthalmic solution   Commonly known as:  XALATAN   Used for:  Borderline glaucoma with ocular hypertension, unspecified laterality        Dose:  1 drop   Place 1 drop into both eyes At Bedtime   Quantity:  3 Bottle   Refills:  4       levothyroxine 75 MCG tablet   Commonly known as:  SYNTHROID/LEVOTHROID   Used for:  Hypothyroidism, unspecified type        Dose:  75 mcg   Take 1 tablet (75 mcg) by mouth daily   Quantity:  90 tablet   Refills:  3       losartan-hydrochlorothiazide 50-12.5 MG per tablet   Commonly known as:  HYZAAR   Used for:  Hypertension goal BP (blood pressure) < 150/90        Dose:  1 tablet   Take 1 tablet by mouth daily   Quantity:  90 tablet   Refills:  3       multivitamin Tabs tablet        Dose:  1 tablet   Take 1 tablet by mouth daily   Refills:  0       nitroGLYcerin 0.4 MG sublingual tablet   Commonly known as:  NITROSTAT   Used for:  Exertional chest pain        For chest pain place 1 tablet under the tongue every 5 minutes for 3 doses. If symptoms persist 5 minutes after 1st dose call 911.   Quantity:  25 tablet   Refills:  3       VITAMIN D3 PO        Take by mouth daily   Refills:  0            Where to get your medicines      These medications were sent to [id number] - [place] - [address]  [address] [place] [address]    Hours:  test fax successful 4/5/04  Phone:  [phone]     metoprolol 25 MG tablet                Protect others around you: Learn  how to safely use, store and throw away your medicines at www.disposemymeds.org.             Medication List: This is a list of all your medications and when to take them. Check marks below indicate your daily home schedule. Keep this list as a reference.      Medications           Morning Afternoon Evening Bedtime As Needed    aspirin 325 MG EC tablet   1/2 tab daily   Last time this was given:  81 mg on 11/15/2017  7:59 AM   Next Dose Due:  Due tomorrow morning 11/16  8 AM            Due 11/16  8 AM                       atorvastatin 40 MG tablet   Commonly known as:  LIPITOR   Take 1 tablet (40 mg) by mouth daily   Last time this was given:  40 mg on 11/14/2017  7:48 PM   Next Dose Due:  Due  This evening  11/5   10 PM                        Due 11/15  10 PM           latanoprost 0.005 % ophthalmic solution   Commonly known as:  XALATAN   Place 1 drop into both eyes At Bedtime   Last time this was given:  1 drop on 11/14/2017 10:14 PM   Next Dose Due:  Due this evening 11/16  10 PM                        Due 11/ 15   10 PM           levothyroxine 75 MCG tablet   Commonly known as:  SYNTHROID/LEVOTHROID   Take 1 tablet (75 mcg) by mouth daily   Last time this was given:  75 mcg on 11/15/2017  7:58 AM   Next Dose Due:  Due tomorrow morning 11/16  8 AM            Due 11/16   8 AM                       losartan-hydrochlorothiazide 50-12.5 MG per tablet   Commonly known as:  HYZAAR   Take 1 tablet by mouth daily   Last time this was given:  1 tablet on 11/15/2017  7:59 AM   Next Dose Due:  Due tomorrow morning 11/16   8 AM            Due 11/16  8 AM                       metoprolol 25 MG tablet   Commonly known as:  LOPRESSOR   Take 0.25 tablets (6.25 mg) by mouth 2 times daily   Last time this was given:  6.25 mg on 11/15/2017  7:58 AM   Next Dose Due:  Due this evening 11/5  8 PM            8 AM        Due 11/15   8 PM                  multivitamin Tabs tablet   Take 1 tablet by mouth daily   Next Dose Due:  Due  tomorrow morning  11/6  8 AM            Due 11/6   8  AM                       nitroGLYcerin 0.4 MG sublingual tablet   Commonly known as:  NITROSTAT   For chest pain place 1 tablet under the tongue every 5 minutes for 3 doses. If symptoms persist 5 minutes after 1st dose call 911.                                   VITAMIN D3 PO   Take by mouth daily   Last time this was given:  1,000 Units on 11/15/2017  7:58 AM   Next Dose Due:  Due tomorrow  Morning 11/16  8 AM            Due 11/16   8 AM

## 2017-11-13 NOTE — PROGRESS NOTES
Pt arrived to 2A for coronary angiogram. VSS, pt denies pain. IV inserted, labs sent. Groin prepped, EKG done. Awaiting consent. Continue to monitor

## 2017-11-13 NOTE — H&P
History and Physical     Zack Demarco   1940   MRN: [id number]           Cardiothoracic Consult Note     Reason for Consult: Coronary artery disease  Procedure:  Coronary artery bypass    HPI:  History obtained from electronic records and per Patient.     Zack Demarco is a 77 year old male with a history of HTN, Hyperlipidemia, and hypothyroidism who was admitted today to CSI service after coronary angiogram showing multivessel coronary artery disease. He had no previous Hx of cardiac disease.     Patient says CP started in Oct 2017, happens with exertion but not at rest. He was seen by a PCP on 11/7 who provided patient with NTG and ordered Stress Test. Cardiologist Torsten Reyes reviewed stress test 11/9 as abnormal (LV EF 55-60% at rest without motion abnormalities, with stress LVEF decreased and had anteroapical wall akinesis).      Angio performed today via Right wrist showed multivessel disease and CVTS consult was requested for possible CAB for reperfusion of coronary arteries.      Home Meds:  Prescriptions Prior to Admission   Medication Sig Dispense Refill Last Dose     atorvastatin (LIPITOR) 40 MG tablet Take 1 tablet (40 mg) by mouth daily 60 tablet 11 11/12/2017 at Unknown time     losartan-hydrochlorothiazide (HYZAAR) 50-12.5 MG per tablet Take 1 tablet by mouth daily 90 tablet 3 11/13/2017 at 0600     levothyroxine (SYNTHROID/LEVOTHROID) 75 MCG tablet Take 1 tablet (75 mcg) by mouth daily 90 tablet 3 11/13/2017 at 0600     Cholecalciferol (VITAMIN D3 PO) Take by mouth daily   11/13/2017 at 0600     multivitamin (OCUVITE) TABS Take 1 tablet by mouth daily   11/12/2017 at Unknown time     latanoprost (XALATAN) 0.005 % ophthalmic solution Place 1 drop into both eyes At Bedtime 3 Bottle 4 11/12/2017 at Unknown time     aspirin 325 MG EC tablet 1/2 tab daily   11/13/2017 at 0600     nitroGLYcerin (NITROSTAT) 0.4 MG sublingual tablet For chest pain place 1 tablet under the tongue every 5 minutes for 3  doses. If symptoms persist 5 minutes after 1st dose call 911. (Patient not taking: Reported on 11/9/2017) 25 tablet 3 Not Taking       Allergies:  Allergies   Allergen Reactions     Nkda [No Known Drug Allergies]        PMH:  Past Medical History:   Diagnosis Date     DJD (degenerative joint disease) of hip     right     DJD (degenerative joint disease), multiple sites      Glaucoma      Hernia umbilical      Hypercholesterolemia      Hypertension      Hypothyroidism      Lipoma      Low back pain      Nonsenile cataract      Obesity      S/P coronary angiogram     nl. panic attack       PSH:  Past Surgical History:   Procedure Laterality Date     BIOPSY  1980's    fatty tissue     CL AFF SURGICAL PATHOLOGY       COLONOSCOPY  2011     COLONOSCOPY WITH CO2 INSUFFLATION N/A 9/19/2016    Procedure: COLONOSCOPY WITH CO2 INSUFFLATION;  Surgeon: Jeffery Flores MD;  Location: [facility] OR     ROTATOR CUFF REPAIR RT/LT       SOFT TISSUE SURGERY  Sept. 2014    [facility] Tendon transfer (Left Ankle)       FH:  Family History   Problem Relation Age of Onset     CANCER Mother      pancreatic     CANCER Father      lung     Respiratory Brother      COPD     Eye Disorder Brother      CANCER Sister      liver     CANCER Sister      Musculoskeletal Disorder Sister      back     Macular Degeneration Sister 60     Macular Degeneration Brother 60       SH:  Social History     Social History     Marital status:      Spouse name: N/A     Number of children: N/A     Years of education: N/A     Social History Main Topics     Smoking status: Former Smoker     Packs/day: 1.00     Types: Cigarettes     Start date: 7/28/1959     Quit date: 7/28/1979     Smokeless tobacco: Former User     Alcohol use 0.0 oz/week      Comment: rarely     Drug use: No     Sexual activity: No     Other Topics Concern     Parent/Sibling W/ Cabg, Mi Or Angioplasty Before 65f 55m? No     Social History Narrative       ROS: No fevers, chills, or night sweats. No  recent weight changes.  No new visual or hearing complaints. No sore throat or nasal congestion. No SOB, cough, or wheezing.  No CP, palpitations, syncopal episodes, or dependent edema.  No new muscle or joint pain.  No weakness, numbness, or tingling of extremities. No new headaches. No changes in memory, mood, or affect.  No new rashes or bruises.     Physical Exam:  Temp:  [97.8  F (36.6  C)-97.9  F (36.6  C)] 97.9  F (36.6  C)  Pulse:  [57] 57  Heart Rate:  [56] 56  Resp:  [14-15] 15  BP: (129-130)/(54-58) 130/58  SpO2:  [97 %] 97 %  Gen: NAD, resting comfortably in bed, conversational  Skin: no rashes or bruises, warm, dry  HEENT: normocephalic, atraumatic cranium, EOMI, sclerae anicteric. Oral mucosa pink and moist, no tonsillar edema or erythema, midline trachea, nonpalpable thyroid  Lungs: CTA in all fields, no wheezing or rhonchi  CV: RRR, S1S2 normal, no murmur. Radial pulses and DP pulses symmetric. No dependent edema.   Abd: positive normal pitched bowel sounds, overall soft and non distended, nontender, no hepatosplenomegaly, no masses/guarding/rebound tenderness.   Musculoskeletal: grossly intact, strength 5/5 upper and lower extremities  Neuro: AOx3, CN II-VII grossly intact, sensation/motor intact in upper and lower extremities  Mental: normal mood and affect, regular rate of speech    Labs:  BMP    Recent Labs  Lab 11/13/17  0735      POTASSIUM 4.0   CHLORIDE 108   ELVIRA 8.8   CO2 28   BUN 26   CR 0.91   *     CBC    Recent Labs  Lab 11/13/17  0735   WBC 7.2   RBC 4.45   HGB 14.0   HCT 42.7   MCV 96   MCH 31.5   MCHC 32.8   RDW 12.9        INR  No lab results found in last 7 days.   Hepatic Panel   Lab Results   Component Value Date    AST 30 01/18/2013     Lab Results   Component Value Date    ALT 41 01/18/2013     Lab Results   Component Value Date    ALBUMIN 4.0 01/18/2013       Imaging:    A.  CORONARY ANGIOGRAM:     1. Both coronary arteries arise from their respective  cusps.    2. Dominance: Right    3. The LM is without angiographic evidence of disease.     4. LAD: Type 3 [LAD supplies the entire apex].. The LAD gives rise to septal perforators, D1 and D2.  The pLAD has a 70-80% stenosis at the ostium.  The mid LAD has an 80-90% stenosis before and after the diagonal branch with 80-90% of the first diagonal.  Distally there is diffuse disease at less than 25%.    5. LCX gives rise to OM1/2 branches of medium caliber with diffuse luminal disease throughout without angiographic stenosis.    6. RCA gives rise to PL branches and supplies PDA. The proximal RCA has luminal disease, mid RCA has a 70% stenosis, distally there is less than 25% stenosis.  The ostial RPL and PDA each have disease of approximately 30-40% and 50% stenosis respectively.      Patient Active Problem List   Diagnosis     Generalized anxiety disorder     Lipoma of skin and subcutaneous tissue     Hernia umbilical     Patellar tendonitis     Cataract, mild-mod, ou     Arthritis of knee, right     Advanced directives, counseling/discussion     Hyperlipidemia LDL goal <130     Hypertension goal BP (blood pressure) < 150/90     Borderline glaucoma with ocular hypertension, bilateral - treated     Posterior vitreous detachment, left     Hypothyroidism, unspecified type     Macular drusen, bilateral       A/P: Zack Demarco is a 77 year old male with a history of HTN, Hyperlipidemia, and hypothyroidism who was admitted today to CSI service after coronary angiogram showing multivessel coronary artery disease with significant LAD disease, history of recent stable angina.     Could consider Robotic LIMA to LAD and have R sided stent placed later.   Discussed both open and robotic assisted procedures.   Cardiology has ordered Hep gtt for thrombus prophylaxis.     -Surgeon: Dr Vila is to review Angio later today.   -Surgical Plan:  TBD  -Date: to be determined  -Please refer to the problem list above for a list of medical  conditions.   -Primary care provider: nAastacio Love         Thank you for the opportunity to participate in the care of Zack Demarco.    Patient and plan discussed with attending.      Taurus Ying PA-C  Cardiothoracic Surgery  Pager [phone]    12:49 PM   November 13, 2017    CVTS Attending Staff Note;    Patient is seen and examined by me.  History reviewed and image studies reviewed by me.  Recommend CABG x 3-4  Benefits and risks discussed with the patient.  Preop CT of chest showed a retroperitoneal mass    Plan: endocrine and surgical oncology consult before CABG to rule out endocrine tumor.    Total time spent: 60 minutes

## 2017-11-13 NOTE — PROGRESS NOTES
D: CAD CABG work up    I: Monitored vitals and assessed pt status.   Changed: Removed TR band (Right)  Running: Heparin @ 1200  PRN:-    A: A0x4. VSS. Afebrile. Calm cooperative. CMS intact on right limb. Baseline numbness and tingling positional presumably due to pinched nerve in shoulder.    P: Continue to monitor Pt status and report changes to treatment team.

## 2017-11-13 NOTE — H&P
Cardiology History and Physical  Zack Demarco MRN: [id number]  Age: 77 year old, : 1940  Primary care provider: Anastacio Love           Assessment and Plan:     Mr. Zack Demarco is a 78yo male patient with a PMH notable for hypothyroid, dyslipidemia, HTN, who presented for routine coronary angiogram due to chest pain, found to have severe 2 vessel disease (RCA and LAD) and is undergoing workup for CABG.     #CAD   Mid RCA 70% stenosis  Proximal LAD 70-80%, Mid LAD 80-90%   Exertional CP since October. Normal resting EF but decreased with stress, with associated anteroapical wall akinesis. Angio revealing severe 2 vessel disease not amenable to stenting. Currently patient without chest pain, is hemodynamically stable. Admitted for CABG workup.   RF: HTN, dyslipidemia; former smoker   -CVTS has been consulted - appreciate recs  - heparin gtt  - Noncontrast CT Chest; abdominal ultrasound; vein mapping all ordered  - ASA: 81mg QD   - BB: will start very low dose beta blocker; has baseline low heart rate so will monitor closely and uptitrate as his HR allows              *had been on metoprolol as an outpatient but it was stopped in July due to bradycardia; though                  notably he was on much higher dose at 25mg BID  - ACEI/ARB: continue home losartan/hctz (50/12.5)mg QD  - Statin: atorvastatin 40mg QD  - PRN nitro; low threshold for gtt     #HTN - continue losartan/hctz  #Dyslipidemia - starting high intensity statin as above. Lipids last done 2017 , , HDL 32, LDL 76.   #Hypothyroidism -  Last TSH was elevated in July, but provider increased synthroid at that time. No symptoms of hypothroid at present. Continue PTA levothyroxine.       FEN:  - cardiac; NPO at [facility] for abdominal US  - replete lytes PRN  - no mIVF   PPX: heparin gtt  Code Status: Full Code; daughter has advanced directive with her - will make social work aware     Patient discussed with staff attending,   Laly.  Please feel free to page with questions.    Manisha Carpio MD   Internal Medicine PGY2  Cardiology Service  Pager: [phone]          Chief Complaint:     Chest pain, s/p coronary angiogram           History of Present Illness:     History is obtained from the patient.    Mr. Zack Demarco is a 78yo male patient with a PMH notable for hypothyroid, dyslipidemia, HTN, who presented for routine coronary angiogram due to chest pain, found to have severe 2 vessel disease and is undergoing workup for CABG.     Per EMR, patient had presented to his PCP on 11/7 complaining of chest pain since October. Centrally located, ache, never occurred at rest, occurring with exertion but no reliably. Questionable shortness of breath. Stress echo was done on 11/8 which showed normal resting LV function, EF of 55-60% without regional WMA. But with exercise, decrease in EF, increase in PAUL size with anteroapical wall akinesis. Was seen by Dr. Reyes in clinic who ordered coronary angiogram which patient underwent today.     Patient endorses history above. At present denies chest pain, SOB, dizziness, nausea. Prior to October, had never had chest pain before. No significant coronary artery disease in family, only one uncle who had an MI. Patient quit smoking several years ago (max 1PPD), occasional drinker. Takes all his medications as prescribed.     ROS otherwise negative          Past Medical History:     Past Medical History:   Diagnosis Date     DJD (degenerative joint disease) of hip     right     DJD (degenerative joint disease), multiple sites      Glaucoma      Hernia umbilical      Hypercholesterolemia      Hypertension      Hypothyroidism      Lipoma      Low back pain      Nonsenile cataract      Obesity      S/P coronary angiogram     nl. panic attack     Unstable angina (H) 11/13/2017              Past Surgical History:      Past Surgical History:   Procedure Laterality Date     BIOPSY  1980's    fatty tissue     [facility] SURGICAL  PATHOLOGY       COLONOSCOPY  2011     COLONOSCOPY WITH CO2 INSUFFLATION N/A 9/19/2016    Procedure: COLONOSCOPY WITH CO2 INSUFFLATION;  Surgeon: Jeffery Flores MD;  Location: [facility] OR     ROTATOR CUFF REPAIR RT/LT       SOFT TISSUE SURGERY  Sept. 2014    [facility] Tendon transfer (Left Ankle)              Social History:     Social History   Substance Use Topics     Smoking status: Former Smoker     Packs/day: 1.00     Types: Cigarettes     Start date: 7/28/1959     Quit date: 7/28/1979     Smokeless tobacco: Former User     Alcohol use 0.0 oz/week      Comment: rarely              Family History:     Family History   Problem Relation Age of Onset     CANCER Mother      pancreatic     CANCER Father      lung     Respiratory Brother      COPD     Eye Disorder Brother      CANCER Sister      liver     CANCER Sister      Musculoskeletal Disorder Sister      back     Macular Degeneration Sister 60     Macular Degeneration Brother 60     Family history reviewed           Allergies:     Allergies   Allergen Reactions     Nkda [No Known Drug Allergies]               Medications:       No current facility-administered medications on file prior to encounter.   Current Outpatient Prescriptions on File Prior to Encounter:  atorvastatin (LIPITOR) 40 MG tablet Take 1 tablet (40 mg) by mouth daily   losartan-hydrochlorothiazide (HYZAAR) 50-12.5 MG per tablet Take 1 tablet by mouth daily   levothyroxine (SYNTHROID/LEVOTHROID) 75 MCG tablet Take 1 tablet (75 mcg) by mouth daily   Cholecalciferol (VITAMIN D3 PO) Take by mouth daily   multivitamin (OCUVITE) TABS Take 1 tablet by mouth daily   latanoprost (XALATAN) 0.005 % ophthalmic solution Place 1 drop into both eyes At Bedtime   aspirin 325 MG EC tablet 1/2 tab daily   nitroGLYcerin (NITROSTAT) 0.4 MG sublingual tablet For chest pain place 1 tablet under the tongue every 5 minutes for 3 doses. If symptoms persist 5 minutes after 1st dose call 911. (Patient not taking: Reported on  2017)                       Physical Exam:     Temp:  [97.8  F (36.6  C)-97.9  F (36.6  C)] 97.9  F (36.6  C)  Pulse:  [57] 57  Heart Rate:  [56] 56  Resp:  [14-15] 15  BP: (129-130)/(54-58) 130/58  SpO2:  [97 %] 97 %    Gen: Resting comfortably in bed, NAD   HEENT:AT/ NC, PERRL b/l, EOM grossly intact, MMM   PULM/THORAX: Normal effort on RA. Clear to auscultation bilaterally, no rales/rhonchi/wheezes  CV:RRR, S1 and S2 appreciated, no extra heart sounds, murmurs or rub auscultated. Right carotid bruit +  ABD: soft, nontender, nondistended. Normoactive bowel sounds.   EXT: Warm, well perfused. No LE edema noted.  NEURO: AOx4. Speech fluent and articulate. No focal deficits appreciated.             Data:     Na 142    Cl 108    BUN 26  K 4.0        Co2 28   Cr 0.91    WBC 6.9 // Hgb 13.7 // Plt 212            Most Recent Imagin2017 Echocardiogram  Interpretation Summary  No significant valvular abnormalities were noted. Global and regional left  ventricular function is normal with an EF of 60-65%.  Right ventricular function, chamber size, wall motion, and thickness are  Normal.    2017 Coronary Angiogram                    ATTENDING NOTE:  Patient has been seen and evaluated by me on 2017. I have reviewed the H&P.  Please refer to it for additional details.  I have reviewed today's vital signs, medications, labs, and imaging results.  I have reviewed and edited, as necessary, the history, review of systems, physical examination, and assessment and plan.  I have discussed my assessment and plan with the resident.  Zack Demarco is a 77 year old male with risk factor profile (+) HTN, (-) DM, (+) hypercholesterolemia, (+) former tobacco use, (-) fam Hx premature CAD, referred to [facility] for elective coronary angiography after presenting to Dr. Mulugeta Reyes with exertional chest discomfort.  The patient had a stress test on 17 showing LVEF 55-60%, with stress induced anteroapical wall  akinesis.  The patient had elective coronary angiogram today showing normal LMCA and 2V CAD (70% mid RCA, 70% ostial LAD, 80% mid LAD / 80% D1).  The patient was referred for elective CABG by Dr. Marcell Vila.  He will be treated with ASA, beta blocker, and ARB.  IF recurrent chest pain, start IV UFH (low threshold to initiate).  Treat with statin.  Check NICS and abdominal US and vein mapping as well as chest CT noncontrast (+right carotid bruit noted on exam).       Avtar Ruffin MD     Cardiovascular Division

## 2017-11-13 NOTE — PROGRESS NOTES
Admission          11/13/2017  10:30  -----------------------------------------------------------  Diagnosis: CAD    Admitted from: Cath Lab  Via: stretcher  Accompanied by: family  Belongings: Placed in closet; valuables sent home with family  Admission Profile: complete  Teaching: orientation to unit, call don't fall, use of console, meal times, visiting hours,  when to call for the RN (angina/sob/dizzyness, etc.), and enforced importance of safety   Access: PIV  Telemetry:Placed on pt  Ht./Wt.: complete    Temp:  [97.8  F (36.6  C)-97.9  F (36.6  C)] 97.9  F (36.6  C)  Pulse:  [57] 57  Heart Rate:  [56] 56  Resp:  [14-15] 15  BP: (129-130)/(54-58) 130/58  SpO2:  [97 %] 97 %

## 2017-11-13 NOTE — PROCEDURES
PRELIMINARY CARDIAC CATH REPORT:     PROCEDURES PERFORMED:   Coronary Angiography    PHYSICIANS:  Attending Physician: Julian Elliott MD  Interventional Cardiology Fellow: Norberto Morrison MD  Cardiology Fellow: Sushil Caban MD    INDICATION:  Zack Demarco is a 77 year old male with risk factor profile (+) HTN, (-) DM, (+) hypercholesterolemia, (-) tobacco use, (-) fam Hx premature CAD, who presents for several weeks of unstable angina with a positive stress echocardiogram.    DESCRIPTION:  1. Consent obtained with discussion of risks.  All questions were answered.  2. Sterile prep and procedure.  3. Location with Sheaths:   RT Radial Arterial  6 Fr  10 cm [short]  4. Access: Local anesthetic with lidocaine.  A micropuncture 21 guage needle with ultrasound guidance was used to establish vascular access using a modified Seldinger technique.  5. Diagnostic Catheters:   5 Fr David  6. Guiding Catheters:  None  6. Estimated blood loss: < 5 ml    MEDICATIONS:  The procedure was performed under conscious sedation for 32 minutes.  The patient was assessed immediately before the first sedation medication was administered.  50 mcg of fentanyl, 100 mcg of neosynephrine, 25 mg of benadryl and 1 mg of atropine were given.  (stan and atropine given for bradycardia into 40-50s during case).    Heart rate, BP, respiration, oxygen saturation and patient responses were monitored throughout the procedure with the assistance of the RN under my supervision.    >> IA Nicardipine and IA NTG were administered to prophylax against radial artery spasm.     >> Antiplatelet Therapy:  mg    Procedures:    CORONARY ANGIOGRAM:   1. Both coronary arteries arise from their respective cusps.  2. Dominance: Right  3. The LM is without angiographic evidence of disease.   4. LAD: Type 3 [LAD supplies the entire apex].. The LAD gives rise to septal perforators, D1 and D2.  The pLAD has a 70-80% stenosis at the ostium.  The mid LAD has an 80-90%  stenosis before and after the diagonal branch with 80-90% of the first diagonal.  Distally there is diffuse disease at less than 25%.  5. LCX gives rise to OM1/2 branches of medium caliber with diffuse luminal disease throughout without angiographic stenosis.  6. RCA gives rise to PL branches and supplies PDA. The proximal RCA has luminal disease, mid RCA has a 70% stenosis, distally there is less than 25% stenosis.  The ostial RPL and PDA each have disease of approximately 30-40% and 50% stenosis respectively.    Sheath Removal:  The right radial artery sheath was manually removed in the cardiac catheterization laboratory.    Contrast: Isovue, 50 ml     Fluoroscopy Time: 7.3 min    COMPLICATIONS:  1. None    SUMMARY:   Three vessel CAD involving the RCA, proximal and mid LAD and diagonal branch.    PLAN:   - Admit to inpatient CSI  - CVTS consultation for CABG  - Risk factor modification  - Management of TR band per floor protocol    The attending interventional cardiologist was present and supervised all critical aspects the procedure.    Findings discussed with patient, family, surgery and admitting CSI team.    See CVIS report for final draft.    Sushil Caban MD  Cardiology Fellow    MD Julian Clifton MD  Cardiology Staff

## 2017-11-13 NOTE — IP AVS SNAPSHOT
[address] [address]    Phone:  [phone]                                       After Visit Summary   11/13/2017    Zack Demarco    MRN: [id number]           After Visit Summary Signature Page     I have received my discharge instructions, and my questions have been answered. I have discussed any challenges I see with this plan with the nurse or doctor.    ..........................................................................................................................................  Patient/Patient Representative Signature      ..........................................................................................................................................  Patient Representative Print Name and Relationship to Patient    ..................................................               ................................................  Date                                            Time    ..........................................................................................................................................  Reviewed by Signature/Title    ...................................................              ..............................................  Date                                                            Time

## 2017-11-13 NOTE — LETTER
Transition Communication Hand-off for Care Transitions to Next Level of Care Provider    Name: Zack Demarco  MRN #: [id number]  Primary Care Provider: AALIYAH GARZA     Primary Clinic: [address] [place], [place] [address] [address]     Reason for Hospitalization:  CAD, multiple vessel [I25.10]  Admit Date/Time: 11/13/2017  6:54 AM  Discharge Date: 11/15/17  Payor Source: Payor: MEDICA / Plan: MEDICA PRIME SOLUTION / Product Type: Indemnity   Readmission Assessment Measure (SRI) Risk Score/category: average    Reason for Communication Hand-off Referral: Multiple providers/specialties  Discharge Plan:   Concern for non-adherence with plan of care: no  Discharge Needs Assessment:  Follow-up specialty is recommended: Yes    Follow-up plan:  Future Appointments  Date Time Provider Department Center   1/17/2018 8:00 AM Avtar Mccullough MD [facility]                Key Recommendations:  Post hospitalization follow up.      CHANTAL GALLARDO RN CC

## 2017-11-14 ENCOUNTER — APPOINTMENT (OUTPATIENT)
Dept: ULTRASOUND IMAGING | Facility: CLINIC | Age: 77
DRG: 287 | End: 2017-11-14
Attending: INTERNAL MEDICINE
Payer: MEDICARE

## 2017-11-14 ENCOUNTER — APPOINTMENT (OUTPATIENT)
Dept: ULTRASOUND IMAGING | Facility: CLINIC | Age: 77
DRG: 287 | End: 2017-11-14
Attending: PHYSICIAN ASSISTANT
Payer: MEDICARE

## 2017-11-14 ENCOUNTER — APPOINTMENT (OUTPATIENT)
Dept: CT IMAGING | Facility: CLINIC | Age: 77
DRG: 287 | End: 2017-11-14
Attending: NURSE PRACTITIONER
Payer: MEDICARE

## 2017-11-14 ENCOUNTER — ANESTHESIA (OUTPATIENT)
Dept: SURGERY | Facility: CLINIC | Age: 77
DRG: 236 | End: 2017-11-14
Payer: MEDICARE

## 2017-11-14 LAB
ABO + RH BLD: NORMAL
ABO + RH BLD: NORMAL
ANION GAP SERPL CALCULATED.3IONS-SCNC: 6 MMOL/L (ref 3–14)
BLD GP AB SCN SERPL QL: NORMAL
BLD PROD TYP BPU: NORMAL
BLOOD BANK CMNT PATIENT-IMP: NORMAL
BUN SERPL-MCNC: 21 MG/DL (ref 7–30)
CALCIUM SERPL-MCNC: 8.7 MG/DL (ref 8.5–10.1)
CHLORIDE SERPL-SCNC: 108 MMOL/L (ref 94–109)
CO2 SERPL-SCNC: 28 MMOL/L (ref 20–32)
CREAT SERPL-MCNC: 0.93 MG/DL (ref 0.66–1.25)
GFR SERPL CREATININE-BSD FRML MDRD: 79 ML/MIN/1.7M2
GLUCOSE SERPL-MCNC: 107 MG/DL (ref 70–99)
INTERPRETATION ECG - MUSE: NORMAL
LMWH PPP CHRO-ACNC: 0.31 IU/ML
NUM BPU REQUESTED: 4
POTASSIUM SERPL-SCNC: 4 MMOL/L (ref 3.4–5.3)
SODIUM SERPL-SCNC: 141 MMOL/L (ref 133–144)
SPECIMEN EXP DATE BLD: NORMAL
TSH SERPL DL<=0.005 MIU/L-ACNC: 6.2 MU/L (ref 0.4–4)

## 2017-11-14 PROCEDURE — 76700 US EXAM ABDOM COMPLETE: CPT

## 2017-11-14 PROCEDURE — 83835 ASSAY OF METANEPHRINES: CPT | Performed by: PHYSICIAN ASSISTANT

## 2017-11-14 PROCEDURE — 93970 EXTREMITY STUDY: CPT

## 2017-11-14 PROCEDURE — 71260 CT THORAX DX C+: CPT

## 2017-11-14 PROCEDURE — 25000128 H RX IP 250 OP 636: Performed by: STUDENT IN AN ORGANIZED HEALTH CARE EDUCATION/TRAINING PROGRAM

## 2017-11-14 PROCEDURE — 99232 SBSQ HOSP IP/OBS MODERATE 35: CPT | Performed by: PHYSICIAN ASSISTANT

## 2017-11-14 PROCEDURE — 74177 CT ABD & PELVIS W/CONTRAST: CPT

## 2017-11-14 PROCEDURE — 99232 SBSQ HOSP IP/OBS MODERATE 35: CPT | Performed by: NURSE PRACTITIONER

## 2017-11-14 PROCEDURE — 25000128 H RX IP 250 OP 636: Performed by: PHYSICIAN ASSISTANT

## 2017-11-14 PROCEDURE — 25000132 ZZH RX MED GY IP 250 OP 250 PS 637: Mod: GY | Performed by: INTERNAL MEDICINE

## 2017-11-14 PROCEDURE — 21400006 ZZH R&B CCU INTERMEDIATE UMMC

## 2017-11-14 PROCEDURE — 84443 ASSAY THYROID STIM HORMONE: CPT | Performed by: INTERNAL MEDICINE

## 2017-11-14 PROCEDURE — A9270 NON-COVERED ITEM OR SERVICE: HCPCS | Mod: GY | Performed by: HOSPITALIST

## 2017-11-14 PROCEDURE — A9270 NON-COVERED ITEM OR SERVICE: HCPCS | Mod: GY | Performed by: INTERNAL MEDICINE

## 2017-11-14 PROCEDURE — 80048 BASIC METABOLIC PNL TOTAL CA: CPT | Performed by: INTERNAL MEDICINE

## 2017-11-14 PROCEDURE — 36415 COLL VENOUS BLD VENIPUNCTURE: CPT | Performed by: PHYSICIAN ASSISTANT

## 2017-11-14 PROCEDURE — 25000125 ZZHC RX 250: Performed by: STUDENT IN AN ORGANIZED HEALTH CARE EDUCATION/TRAINING PROGRAM

## 2017-11-14 PROCEDURE — 25000132 ZZH RX MED GY IP 250 OP 250 PS 637: Mod: GY | Performed by: HOSPITALIST

## 2017-11-14 PROCEDURE — 85520 HEPARIN ASSAY: CPT | Performed by: INTERNAL MEDICINE

## 2017-11-14 PROCEDURE — 36415 COLL VENOUS BLD VENIPUNCTURE: CPT | Performed by: INTERNAL MEDICINE

## 2017-11-14 PROCEDURE — 93880 EXTRACRANIAL BILAT STUDY: CPT

## 2017-11-14 RX ORDER — HEPARIN SODIUM 10000 [USP'U]/100ML
0-3500 INJECTION, SOLUTION INTRAVENOUS CONTINUOUS
Status: DISCONTINUED | OUTPATIENT
Start: 2017-11-14 | End: 2017-11-14

## 2017-11-14 RX ORDER — MUPIROCIN 20 MG/G
1 OINTMENT TOPICAL 2 TIMES DAILY
Status: DISCONTINUED | OUTPATIENT
Start: 2017-11-14 | End: 2017-11-14

## 2017-11-14 RX ORDER — CEFAZOLIN SODIUM 2 G/100ML
2 INJECTION, SOLUTION INTRAVENOUS
Status: DISCONTINUED | OUTPATIENT
Start: 2017-11-14 | End: 2017-11-14

## 2017-11-14 RX ORDER — LANOLIN ALCOHOL/MO/W.PET/CERES
3 CREAM (GRAM) TOPICAL
Status: DISCONTINUED | OUTPATIENT
Start: 2017-11-14 | End: 2017-11-15 | Stop reason: HOSPADM

## 2017-11-14 RX ORDER — IOPAMIDOL 755 MG/ML
130 INJECTION, SOLUTION INTRAVASCULAR ONCE
Status: COMPLETED | OUTPATIENT
Start: 2017-11-14 | End: 2017-11-14

## 2017-11-14 RX ORDER — CEFAZOLIN SODIUM 1 G/3ML
1 INJECTION, POWDER, FOR SOLUTION INTRAMUSCULAR; INTRAVENOUS SEE ADMIN INSTRUCTIONS
Status: DISCONTINUED | OUTPATIENT
Start: 2017-11-14 | End: 2017-11-14

## 2017-11-14 RX ADMIN — MELATONIN TAB 3 MG 3 MG: 3 TAB at 22:14

## 2017-11-14 RX ADMIN — LOSARTAN POTASSIUM AND HYDROCHLOROTHIAZIDE 1 TABLET: 50; 12.5 TABLET, FILM COATED ORAL at 10:15

## 2017-11-14 RX ADMIN — HEPARIN SODIUM 1200 UNITS/HR: 10000 INJECTION, SOLUTION INTRAVENOUS at 10:19

## 2017-11-14 RX ADMIN — VITAMIN D, TAB 1000IU (100/BT) 1000 UNITS: 25 TAB at 07:48

## 2017-11-14 RX ADMIN — ATORVASTATIN CALCIUM 40 MG: 40 TABLET, FILM COATED ORAL at 19:48

## 2017-11-14 RX ADMIN — SODIUM CHLORIDE, PRESERVATIVE FREE 82 ML: 5 INJECTION INTRAVENOUS at 14:27

## 2017-11-14 RX ADMIN — IOPAMIDOL 130 ML: 755 INJECTION, SOLUTION INTRAVENOUS at 14:26

## 2017-11-14 RX ADMIN — LATANOPROST 1 DROP: 50 SOLUTION/ DROPS OPHTHALMIC at 22:14

## 2017-11-14 RX ADMIN — LEVOTHYROXINE SODIUM 75 MCG: 75 TABLET ORAL at 07:48

## 2017-11-14 RX ADMIN — ASPIRIN 81 MG CHEWABLE TABLET 81 MG: 81 TABLET CHEWABLE at 07:47

## 2017-11-14 NOTE — PLAN OF CARE
D:  Pt admitted today s/p angio, needs 3v CAB.  CVTS consulted.  Chest CT for CAB workup showing new mass.   I/A:  VSS on room air.  SB/SR w/ BBB.  Pt up independently.  No complaints of increased SOB, lightheadedness or CP.  R radial site WNL/CMS +.  Pt to have abdominal, carotid and BLE vein mapping US done tonight/tomorrow AM.  CAB scheduled for tomorrow afternoon.  Heparin gtt infusing at 1200 units/hr, 10a to be checked at 2000.  Pt care order for heparin to be turned off at 0800 tomorrow morning (11/14).  Supportive daughters at pt bedside.   P:  NPO at midnight for CAB tomorrow.  Continue with plan of care and notify team with changes/concerns.       Hours of care [phone]

## 2017-11-14 NOTE — PROGRESS NOTES
Surgical Oncology Consultation    Zack Demarco MRN# [id number]   Age: 77 year old YOB: 1940     Date of Admission:  11/13/2017    Reason for consultation: new retroperitoneal mass on work up for CABG.             History of Present Illness:   Zack Demarco is a 77 year old male with history of hypertension, hyperlipidemia, and hypothyroidism. No previous cardiac disease. He presented for routine coronary angiogram due to chest pain and was found to have multiple vessel coronary artery disease with significant LAD disease. He was scheduled for a CABG this morning. Work for the CABG with a non-contrast chest CT revealed a 5 cm partially visualized retroperitoneal soft tissue mass to the left of the aorta at the level of the kidney. He hasn't had any previous abdominal imaging or abdominal surgery. He denies any fever, night sweats, weight loss, abdominal pain, flushing, dizziness, sweating, palpitations, or headaches.           Past Medical History:     Past Medical History:   Diagnosis Date     DJD (degenerative joint disease) of hip     right     DJD (degenerative joint disease), multiple sites      Glaucoma      Hernia umbilical      Hypercholesterolemia      Hypertension      Hypothyroidism      Lipoma      Low back pain      Nonsenile cataract      Obesity      S/P coronary angiogram     nl. panic attack     Unstable angina (H) 11/13/2017          Past Surgical History:     Past Surgical History:   Procedure Laterality Date     BIOPSY  1980's    fatty tissue     [facility] AFF SURGICAL PATHOLOGY       COLONOSCOPY  2011     COLONOSCOPY WITH CO2 INSUFFLATION N/A 9/19/2016    Procedure: COLONOSCOPY WITH CO2 INSUFFLATION;  Surgeon: Jeffery Flores MD;  Location: [facility] OR     ROTATOR CUFF REPAIR RT/LT       SOFT TISSUE SURGERY  Sept. 2014    [facility] Tendon transfer (Left Ankle)           Medications:     No current facility-administered medications on file prior to encounter.   Current Outpatient Prescriptions on  File Prior to Encounter:  atorvastatin (LIPITOR) 40 MG tablet Take 1 tablet (40 mg) by mouth daily   losartan-hydrochlorothiazide (HYZAAR) 50-12.5 MG per tablet Take 1 tablet by mouth daily   levothyroxine (SYNTHROID/LEVOTHROID) 75 MCG tablet Take 1 tablet (75 mcg) by mouth daily   Cholecalciferol (VITAMIN D3 PO) Take by mouth daily   multivitamin (OCUVITE) TABS Take 1 tablet by mouth daily   latanoprost (XALATAN) 0.005 % ophthalmic solution Place 1 drop into both eyes At Bedtime   aspirin 325 MG EC tablet 1/2 tab daily   nitroGLYcerin (NITROSTAT) 0.4 MG sublingual tablet For chest pain place 1 tablet under the tongue every 5 minutes for 3 doses. If symptoms persist 5 minutes after 1st dose call 911. (Patient not taking: Reported on 11/9/2017)         Allergies:      Allergies   Allergen Reactions     Nkda [No Known Drug Allergies]             Social History:     Social History     Social History     Marital status:      Spouse name: N/A     Number of children: N/A     Years of education: N/A     Occupational History     Not on file.     Social History Main Topics     Smoking status: Former Smoker     Packs/day: 1.00     Types: Cigarettes     Start date: 7/28/1959     Quit date: 7/28/1979     Smokeless tobacco: Former User     Alcohol use 0.0 oz/week      Comment: rarely     Drug use: No     Sexual activity: No     Other Topics Concern     Parent/Sibling W/ Cabg, Mi Or Angioplasty Before 65f 55m? No     Social History Narrative             Family History:     Family History   Problem Relation Age of Onset     CANCER Mother      pancreatic     CANCER Father      lung     Respiratory Brother      COPD     Eye Disorder Brother      CANCER Sister      liver     CANCER Sister      Musculoskeletal Disorder Sister      back     Macular Degeneration Sister 60     Macular Degeneration Brother 60             Review of Systems:     Ten point Review of Systems is negative other than noted in the HPI          Physical  Exam:     Gen:  This is a well developed, well nourished in no apparent distress.  Blood pressure 115/53, pulse 57, temperature 97.9  F (36.6  C), temperature source Oral, resp. rate 18, weight 96.2 kg (212 lb 1.6 oz), SpO2 96 %.  HEENT - Normocephalic, atraumatic, mucous membranes moist.  No scleral icterus.  Neck - supple without masses  Skin  Without rashes or jaundice.      Extremities - without cyanosis, clubbing, edema or deformities.   Lungs - breathing non labored  Skin - no rashes  Psych - affect appropriate  Neurologic - grossly intect.   Abdomen: Abdomen is soft, non-tender/non-distended.  No rebound/guarding/rigidity.          Data:     All laboratory and imaging data in the past 24 hours reviewed    Recent Labs   Lab Test  11/13/17   1320  11/13/17   0735  08/12/15   0804   WBC  6.9  7.2  6.8   HGB  13.7  14.0  15.6   PLT  212  201  198      Recent Labs   Lab Test  11/14/17   0659  11/13/17   0735  08/18/17   0845  07/20/17   0725   NA  141  142   --   141   POTASSIUM  4.0  4.0  4.0  4.2   CHLORIDE  108  108   --   108   CO2  28  28   --   28   BUN  21  26   --   29   CR  0.93  0.91  0.96  1.10   ANIONGAP  6  6   --   5   ELVIRA  8.7  8.8   --   9.1   GLC  107*  112*   --   110*     Recent Labs   Lab Test  01/18/13   0706   PROTTOTAL  7.6   ALBUMIN  4.0   BILITOTAL  0.5   ALKPHOS  50   AST  30   ALT  41       ASSESSMENT/PLAN:    Zack Demarco is a 77 year old male with history of hypertension, hyperlipidemia, and hypothyroidism admitted after coronary angiogram showed multivessel coronary artery disease with significant LAD disease. 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 High on the differential diagnosis of this retroperitoneal mass is lymphoma and sarcoma. Agree with getting a CT of the abdomen with contrast today as ordered. Less likely on the differential  diagnosis is paraganglioma but will order a serum metanephrine. Can also consider a 24 urine metanephrine to rule out paraganglioma. He will eventually need an IR guided biopsy. There are no plans for urgent surgical intervention. Agree to proceed with CABG as first priority before further workup or surgical resection. Discussed with primary team.     - serum metanephrines ordered  - CT abdomen with contrast ordered  - Agree to proceed with CABG pending results of serum metaneprines  - no plans for urgent surgical intervention from Surg Onc  - will continue to follow    Patient was seen with Dr. Carbajal.      Kerrie Miranda PA-C  Surgical Oncology

## 2017-11-14 NOTE — CONSULTS
Inpatient Endocrinology Consult   Patient: Zack Demarco   MRN: [id number]  Date of Service: 11/14/2017    Assessment:    Abdominal Mass - suspicious for lymphoma based on today's imaging however, sarcoma, potentially paraganglioma are considerations as well. Paragangliomas can be secretory and non-secretory. He is asymptomatic and does not have any signs of elevated catecholamines. Plasma metanephrine's tend to be more sensitive and thus a good test in this case. If positive, would add dopamine, and chromogranin A. If the metanephrine level is normal, no need for any special treatments during anesthesia induction for CABG. Also, if the metanephrines are negative, biopsy can be considered, if this is going to influence management.    Hypothyroidism - on 75 mcg daily, TSH slightly elevated. Given his CAD and plan for CABG, will maintain current dose for now. The patient was instructed on the correct administration of levothyroxine and the main meals, food products and medications which affect the levothyroxine absorption.  He agreed to try to take the levothyroxine on an empty stomach, with water, prior to breakfast.    Recommendations:   1) f/u plasma metanephrines (takes 2-4 days to return)  2) continue levothyroxine 75 mcg daily  3) will follow with you  4) if he discharges tomorrow, we will plan to follow-up on the labs and have him seen in clinic if labs are positive    Subjective:  Zack Demarco is a 77 year old male with history of hypertension, hyperlipidemia, and hypothyroidism admitted after coronary angiogram showed multivessel coronary artery disease with significant LAD disease. 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 We were asked to evaluate prior to anesthesia induction for CABG surgery. Plasma metanephrines are in process. Plan is to proceed with IR guided  biopsy, however cardiology would like patient to proceed with CABG first. CT chest/abd/pelvis have been ordered to further work-up the mass.     Always diagnosed with hypertension in his 50s.  Over the years, his blood pressure has been fairly well controlled.  The maximum systolic blood pressure he remembers having was 159. Currently, his blood pressure is well controlled with losartan/hydrochlorothiazide.  Overall, he feels ok. No fevers, chills, abdominal pain, bowel or bladder issues. He has been experiencing rare headaches, once a week or once a month.  Denies vision changes, palpitations. No weight loss or night sweats. Anxious and wants the CABG as soon as possible. Has a history of low thyroid hormone identified a few years back when he was having palpitations. He is on levothyroxine 75 mcg daily, and the dose was fairly recently increased by his primary care provider.  The patient reports taking the levothyroxine in the morning, together with all of his other medications, just prior to having breakfast.     There is no personal or family history of thyroid cancer, hypercalcemia, kidney cancer, hypertension or cardiovascular disease diagnosed at a young age. 2 of his sisters were diagnosed with liver cancer.  His mother had pancreatic cancer and his father had lung cancer.  His daughter was diagnosed with kidney stones. One of his sisters had a hip fracture.      Current Problem List:   Patient Active Problem List   Diagnosis     Generalized anxiety disorder     Lipoma of skin and subcutaneous tissue     Hernia umbilical     Patellar tendonitis     Cataract, mild-mod, ou     Arthritis of knee, right     Advanced directives, counseling/discussion     Hyperlipidemia LDL goal <130     Hypertension goal BP (blood pressure) < 150/90     Borderline glaucoma with ocular hypertension, bilateral - treated     Posterior vitreous detachment, left     Hypothyroidism, unspecified type     Macular drusen, bilateral      Unstable angina (H)       Past Medical and Past Surgical History:  Past Medical History:   Diagnosis Date     DJD (degenerative joint disease) of hip     right     DJD (degenerative joint disease), multiple sites      Glaucoma      Hernia umbilical      Hypercholesterolemia      Hypertension      Hypothyroidism      Lipoma      Low back pain      Nonsenile cataract      Obesity      S/P coronary angiogram     nl. panic attack     Unstable angina (H) 11/13/2017       Past Surgical History:   Procedure Laterality Date     BIOPSY  1980's    fatty tissue     [facility]       COLONOSCOPY  2011     COLONOSCOPY WITH CO2 INSUFFLATION N/A 9/19/2016    Procedure: COLONOSCOPY WITH CO2 INSUFFLATION;  Surgeon: Jeffery Flores MD;  Location: [facility] OR     ROTATOR CUFF REPAIR RT/LT       SOFT TISSUE SURGERY  Sept. 2014    [facility] Tendon transfer (Left Ankle)       Medications:   No current outpatient prescriptions on file.       Allergies:   Allergies   Allergen Reactions     Nkda [No Known Drug Allergies]        Social History:  Social History   Substance Use Topics     Smoking status: Former Smoker     Packs/day: 1.00     Types: Cigarettes     Start date: 7/28/1959     Quit date: 7/28/1979     Smokeless tobacco: Former User     Alcohol use 0.0 oz/week      Comment: rarely       Family History:  Family History   Problem Relation Age of Onset     CANCER Mother      pancreatic     CANCER Father      lung     Respiratory Brother      COPD     Eye Disorder Brother      CANCER Sister      liver     CANCER Sister      Musculoskeletal Disorder Sister      back     Macular Degeneration Sister 60     Macular Degeneration Brother 60       Physical Examination:  Blood pressure 115/53, pulse 57, temperature 97.9  F (36.6  C), temperature source Oral, resp. rate 18, weight 96.2 kg (212 lb 1.6 oz), SpO2 96 %.  GEN: Awake, alert, no distress. Obesity, mainly central.  Normal male hair,  HEENT: EOMI.  NECK: Thyroid non-palpable,  non-tender. No cervical or clavicular LAD.   CV: RRR with no murmur.   RESP: CTA bilaterally.   ABD: Soft, non-tender, and non-distended, with normal BS.   EXT: No peripheral edema.   SKIN: Normal skin temperature and turgor with no lesions.   NEURO: Normoactive reflexes. No tremor.     Labs:   We reviewed prior lab results documented in Epic and the abdominal CT images.  Component      Latest Ref Rng & Units 11/13/2017   Sodium      133 - 144 mmol/L 142   Potassium      3.4 - 5.3 mmol/L 4.0   Chloride      94 - 109 mmol/L 108   Carbon Dioxide      20 - 32 mmol/L 28   Anion Gap      3 - 14 mmol/L 6   Glucose      70 - 99 mg/dL 112 (H)   Urea Nitrogen      7 - 30 mg/dL 26   Creatinine      0.66 - 1.25 mg/dL 0.91   GFR Estimate      >60 mL/min/1.7m2 81   GFR Estimate If Black      >60 mL/min/1.7m2 >90   Calcium      8.5 - 10.1 mg/dL 8.8     Component      Latest Ref Rng & Units 11/13/2017   WBC      4.0 - 11.0 10e9/L 7.2   RBC Count      4.4 - 5.9 10e12/L 4.45   Hemoglobin      13.3 - 17.7 g/dL 14.0   Hematocrit      40.0 - 53.0 % 42.7   MCV      78 - 100 fl 96   MCH      26.5 - 33.0 pg 31.5   MCHC      31.5 - 36.5 g/dL 32.8   RDW      10.0 - 15.0 % 12.9   Platelet Count      150 - 450 10e9/L 201     Component      Latest Ref Rng & Units 11/14/2017   TSH      0.40 - 4.00 mU/L 6.20 (H)     Plasma metanephrine in process    Imaging:     CT Chest Without Contrast (11/13/17)  1. Partial visualization of soft tissue attenuating lesion in the  periaortic retroperitoneum with associated mesenteric fat stranding  and numerous small lymph nodes. Suspicious for malignancy. Patient  should be further evaluated with abdominal CT or MRI with contrast.  2. Upper lobe predominant tiny centrilobular groundglass nodules  consistent with respiratory bronchiolitis related to smoking.  3. Calcifications of the transverse and descending aortic arch,  without visualized involvement of the ascending arch.    US Abdomen (11/13/17)  1.   Lobulated, hypoechoic soft tissue lesion lateral to the aorta that  measures up to 6.2 cm in greatest dimension. This correlates to soft  tissue mass noted on recent chest CT.  2.  Remainder of the abdomen is unremarkable.   3.  When correlated with the most recent limited CT of this region  this soft tissue mass should be considered malignant until proven  otherwise. As previously recommended CT of the abdomen and pelvis is  recommended for follow-up and further staging. In this age group,  lymphoma is a strong possibility.       Ad Holliday MD (PGY-4)  Diabetes and Endocrinology Fellow  [facility]  Pager: [phone]     I have personally examined the patient, reviewed and edited the fellow's note and agree with the plan of care.    Doretha Lunsford MD.

## 2017-11-14 NOTE — PLAN OF CARE
Problem: Patient Care Overview  Goal: Plan of Care/Patient Progress Review  D: Pt admitted 11/13 s/p angio, needs 3v CAB. Retroperitoneal mass found on workup.   I/A/P: VSS on RA. SB/SR. Denies chest pain. 1x episode nathanael, HR 30s briefly, accompanied with nausea and diaphoresis when ambulating to wheelchair. SBA. CT chest/abdomen/pelvis performed. Heparin gtt stopped at 1500. Endocrine and surgical oncology consulted. Possible dc tomorrow if w/out chest pain and CABG scheduled outpatient. Will continue POC, CSI primary.

## 2017-11-14 NOTE — PROGRESS NOTES
Surgical Oncology consult note. Please see resident/PA note for details.    Zack Demarco is a 77 year old man scheduled for CABG when found to have a retroperitoneal soft tissue mass on non-contrast CT.  He denies any fever/chills, night sweats, unintentional weight loss, abdominal pain, flushing, dizziness, palpitations, etc.  CT reviewed - 5 cm soft tissue mass abutting aorta and left renal vessels (involvement difficulty to determine given non-contrast status of CT).  DDx includes lymphoma, soft tissue sarcoma, and much less likely, metastatic testicular cancer or paraganglioma (typically in a younger population, and patient is asymptomatic (no episodic symptoms, BP relatively controlled on small number of anti-hypertensives)).    ASSESSMENT & PLAN:  1. Agree with CT chest/abdomen/pelvis with contrast to further delineate relationship of mass to surrounding vasculature.    2. Can screen for paraganglioma with plasma metanephrine or 24-hr urine metanephrine to ensure safety for general anesthesia.  Priority is to get patient to his CABG.    3.  Will need an image-guided biopsy after CABG to determine etiology of the mass; can be performed in the postoperative period or as outpatient once patient has recovered from CABG. Reviewed that lymphoma is typically treated non-surgically and sarcomas are managed surgically, thus, the rationale for needle biopsy.    All of the above reviewed with patient and his family. All questions answered.    Laquita Carbajal MD MSc [facility] FACS    Division of Surgical Oncology  [facility]

## 2017-11-14 NOTE — PLAN OF CARE
Problem: Patient Care Overview  Goal: Plan of Care/Patient Progress Review  While being transported down for CT, patient became diaphoretic and nauseated. Telemetry showed HR dropped briefly to 30s. CSI notified.

## 2017-11-14 NOTE — PROGRESS NOTES
Interventional Cardiology Progress Note  Subjective:  No acute events overnight. Patient remains on Heparin, chest pain free. Denies associated complaints, such as dyspnea, palpitations,     Interval:   Patient came in with chest pain and found to have 2V disease, for which CV surgery consulted for potential CAB. However, incidental finding of mass on chest CT requires further evaluation. Per Dr. 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    Objective:  Most recent vital signs:  /53 (BP Location: Right arm)  Pulse 57  Temp 97.9  F (36.6  C) (Oral)  Resp 18  Wt 96.2 kg (212 lb 1.6 oz)  SpO2 96%  BMI 30.87 kg/m2  Temp:  [97.9  F (36.6  C)-98.2  F (36.8  C)] 97.9  F (36.6  C)  Heart Rate:  [53-69] 61  Resp:  [15-18] 18  BP: (111-147)/(53-65) 115/53  SpO2:  [93 %-96 %] 96 %  Wt Readings from Last 2 Encounters:   11/14/17 96.2 kg (212 lb 1.6 oz)   11/09/17 99.2 kg (218 lb 9.6 oz)       Intake/Output Summary (Last 24 hours) at 11/14/17 0851  Last data filed at 11/14/17 0659   Gross per 24 hour   Intake            323.4 ml   Output             1650 ml   Net          -1326.6 ml     Physical exam:  General: In bed, in NAD  HEENT: EOMI, PERRLA, no scleral icterus or injection  CARDIAC: RRR, no m/r/g appreciated. Peripheral pulses 2+, Carotid bruit bilaterally.  RESP: CTAB, no wheezes, rhonchi or crackles appreciated.  GI: NABS, NT/ND, no guarding or rebound  EXTREMITIES: NO LE edema, pulses 2+. Access site w/o bleeding, dressing c/d/i. No bruits appreciated.   SKIN: No acute lesions appreciated  NEURO: Alert and oriented X3, CN II-XII grossly intact, no focal neurological deficits noted      Labs (Past three days):  CBC  Recent Labs  Lab 11/13/17  1320 11/13/17  0735   WBC 6.9 7.2   RBC 4.40 4.45   HGB 13.7 14.0   HCT 41.6 42.7   MCV 95 96   MCH 31.1 31.5   MCHC 32.9 32.8   RDW 12.8 12.9    201     BMP  Recent Labs  Lab  11/14/17  0659 11/13/17  0735    142   POTASSIUM 4.0 4.0   CHLORIDE 108 108   CO2 28 28   ANIONGAP 6 6   * 112*   BUN 21 26   CR 0.93 0.91   GFRESTIMATED 79 81   GFRESTBLACK >90 >90   ELVIRA 8.7 8.8     Assessment & Plan:  Mr. Zack Demarco is a 76 yo male patient with a PMH notable for hypothyroid, dyslipidemia, HTN, and tobacco use who presented for routine coronary angiogram due to exertional chest pain, and was found to have severe 2 vessel disease (RCA and LAD) for which CV surgery consulted for CAB eval.     #CAD: Cor angio reveals 2V disease-Mid RCA 70% stenosis, Proximal LAD 70-80%, Mid LAD 80-90% .Normal resting EF but decreased with stress, with associated anteroapical wall akinesis. Currently patient without chest pain, hemodynamically stable on Heparin gtt. Admitted for CABG workup. Noncontrast CT Chest; abdominal ultrasound; carotid us, and vein mapping completed. However, incidental finding of mass on chest CT requires further evaluation. Per Dr. 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   -- Chest/Abd/Pelvis CT with con to further characterize mass on chest CT. BMP daily to assess kidney function after dye loads.  -- CVTS consulted and following with plans pending workup.  -- Endocrine consulted d/t possible paraganglioma and induction risks with anaesthesia.  -- Surg Onc consult, per Dr. Vila, to further evaluate possible malignancy prior to CAB.  -- Continue Heparin gtt, ASA- 81mg QD, Metop, small dose as baseline bradycardia, PTA losartan/hctz (50/12.5)mg QD, Atorvastatin 40mg QD and  PRN nitro with low threshold for gtt      #HTN - Continue PTA losartan/hctz, added BB.  #Dyslipidemia - Starting high intensity statin as above. Lipids last done 7/20/2017 , , HDL 32, LDL 76.   #Hypothyroidism -  Last TSH was elevated in July, but provider increased synthroid at that time. No symptoms of hypothroid at  present. Continue PTA levothyroxine.       - Cardiac diet  PPX: heparin gtt    HOLLY Sinclair, CNP  [facility]  Interventional Cardiology  [phone]

## 2017-11-14 NOTE — PLAN OF CARE
Problem: Patient Care Overview  Goal: Plan of Care/Patient Progress Review  Outcome: No Change     D: Pt admitted 11/13, s/p angiogram. Scheduled for 3V CABG 11/14. .     I/A: A&Ox4. Vital signs stable on RA. Monitor shows SR/SB with BBB rates 50's-60's. Denied any pain overnight. Heparin infusing via L PIV at 1200 units/hr. Xa: 0.21, therapeutic, no rate change necessary. Per MD, heparin to be turned off at 0800. R radial site C/D/I. CMS intact. Pre-op shower/scrub completed. Pre-op checklist completed. Left for abdominal US and vein mapping at 0330, returned at 0500. Plan for OR at 1100. NPO since 2100. Up independently in room.      P: Plan for CAB this morning.

## 2017-11-15 ENCOUNTER — CARE COORDINATION (OUTPATIENT)
Dept: CARE COORDINATION | Facility: CLINIC | Age: 77
End: 2017-11-15

## 2017-11-15 ENCOUNTER — TELEPHONE (OUTPATIENT)
Dept: CARDIOLOGY | Facility: CLINIC | Age: 77
End: 2017-11-15

## 2017-11-15 VITALS
SYSTOLIC BLOOD PRESSURE: 133 MMHG | HEART RATE: 57 BPM | OXYGEN SATURATION: 96 % | TEMPERATURE: 98 F | WEIGHT: 212 LBS | DIASTOLIC BLOOD PRESSURE: 76 MMHG | BODY MASS INDEX: 30.86 KG/M2 | RESPIRATION RATE: 16 BRPM

## 2017-11-15 LAB — LMWH PPP CHRO-ACNC: <0.1 IU/ML

## 2017-11-15 PROCEDURE — 36415 COLL VENOUS BLD VENIPUNCTURE: CPT | Performed by: INTERNAL MEDICINE

## 2017-11-15 PROCEDURE — 25000132 ZZH RX MED GY IP 250 OP 250 PS 637: Mod: GY | Performed by: NURSE PRACTITIONER

## 2017-11-15 PROCEDURE — 25000132 ZZH RX MED GY IP 250 OP 250 PS 637: Mod: GY | Performed by: HOSPITALIST

## 2017-11-15 PROCEDURE — 99238 HOSP IP/OBS DSCHRG MGMT 30/<: CPT | Performed by: NURSE PRACTITIONER

## 2017-11-15 PROCEDURE — 85520 HEPARIN ASSAY: CPT | Performed by: INTERNAL MEDICINE

## 2017-11-15 PROCEDURE — A9270 NON-COVERED ITEM OR SERVICE: HCPCS | Mod: GY | Performed by: HOSPITALIST

## 2017-11-15 RX ORDER — METOPROLOL TARTRATE 25 MG/1
6.25 TABLET, FILM COATED ORAL 2 TIMES DAILY
Qty: 60 TABLET | Refills: 0 | Status: ON HOLD | OUTPATIENT
Start: 2017-11-15 | End: 2017-11-27

## 2017-11-15 RX ADMIN — LOSARTAN POTASSIUM AND HYDROCHLOROTHIAZIDE 1 TABLET: 50; 12.5 TABLET, FILM COATED ORAL at 07:59

## 2017-11-15 RX ADMIN — ASPIRIN 81 MG CHEWABLE TABLET 81 MG: 81 TABLET CHEWABLE at 07:59

## 2017-11-15 RX ADMIN — LEVOTHYROXINE SODIUM 75 MCG: 75 TABLET ORAL at 07:58

## 2017-11-15 RX ADMIN — VITAMIN D, TAB 1000IU (100/BT) 1000 UNITS: 25 TAB at 07:58

## 2017-11-15 RX ADMIN — METOPROLOL TARTRATE 6.25 MG: 25 TABLET, FILM COATED ORAL at 07:58

## 2017-11-15 NOTE — TELEPHONE ENCOUNTER
Patient daughter Kayleigh called and is asking that her father be put on the surgery schedule so they will know a date for the surgery.  She is concerned if her father is sent home he will drop dead of a heart attack.  Patient is complicated due to a turmor that needs to be identified before patient can undergo anesthesia.  Instructed patient's daughter to get her father to sign a consent for this writer to discuss his medical information with her and this writer would speak to the surgeons about a date for bypass asap.

## 2017-11-15 NOTE — PROGRESS NOTES
Endocrine Consult Follow Up   Patient: Zack Demarco   MRN: [id number]  Date of Service: 11/15/2017    Assessment:  Abdominal Mass - suspicious for lymphoma based on today's imaging however, sarcoma, potentially paraganglioma are considerations as well. Paragangliomas can be secretory and non-secretory. He is asymptomatic and does not have any signs of elevated catecholamines. Plasma metanephrine's tend to be more sensitive and thus a good test in this case. If positive, would add dopamine, and chromogranin A. If the metanephrine level is normal, no need for any special treatments during anesthesia induction for CABG. Also, if the metanephrines are negative, biopsy can be considered, if this is going to influence management.     Hypothyroidism - on 75 mcg daily, TSH slightly elevated. Given his CAD and plan for CABG, will maintain current dose for now. The patient was instructed on the correct administration of levothyroxine and the main meals, food products and medications which affect the levothyroxine absorption.  He agreed to try to take the levothyroxine on an empty stomach, with water, prior to breakfast.     Recommendations:   1) f/u plasma metanephrines (takes 2-4 days to return)  2) continue levothyroxine 75 mcg daily  3) we will sign off (will follow plasma metanaphrines labs when complete)     Subjective:  No complaints this morning. Good appetite. Planning to d/c today.     Physical Examination:  Blood pressure 133/76, pulse 57, temperature 98  F (36.7  C), temperature source Oral, resp. rate 16, weight 96.2 kg (212 lb), SpO2 96 %.  GEN: Awake, alert, no distress.  HEENT: EOMI.  NECK: Thyroid not palpable, non-tender. No cervical or clavicular LAD.   CV: RRR with no murmur.   RESP: CTA bilaterally.   ABD: Soft, non-tender, and non-distended, with normal BS.   EXT: No peripheral edema.   SKIN: Normal skin temperature and turgor with no lesions.   NEURO: Normoactive reflexes. No tremor.     Labs:   Await  plasma metanephrines    Ad Holliday MD (PGY-4)  Diabetes and Endocrinology Fellow  [facility]  Pager: [phone]     I have personally reviewed and edited the fellow's note and agree with the plan of care. Pt not seen today by me.     Doretha Lunsford MD.

## 2017-11-15 NOTE — TELEPHONE ENCOUNTER
Spoke to PAs and if patient is discharged home today he will see Dr Toro in clinic on Monday 11/20.  Patient's labs to determine if surgery is safe due to a 5 cm partially visualized retroperitoneal soft tissue mass are not resulted yet. It needs to be determined if mass is a paraganglioma before CABG can be performed.  If it is not, CABG can be done and follow up for the mass can be completed after surgery.   PAs will speak to patient and family.  No need to call daughter Kayleigh back at this time.

## 2017-11-15 NOTE — PLAN OF CARE
"Problem: Cardiac: ACS (Acute Coronary Syndrome) (Adult)  Goal: Signs and Symptoms of Listed Potential Problems Will be Absent, Minimized or Managed (Cardiac: ACS)  Signs and symptoms of listed potential problems will be absent, minimized or managed by discharge/transition of care (reference Cardiac: ACS (Acute Coronary Syndrome) (Adult) CPG).   Outcome: No Change  D:Regular diet.  Ate 100% of meal.   Up ad chuck independently in room.   Denies chest pain.   Lungs sound clear bilaterally.   Denies shortness of breath.   Has no edema.   Right radial site dry and intact with no hematoma or bruising.   Instructed not to push off using hands when getting out of bed or up out of chair.   Denies numbness or tingling of upper or lower extremities.  Rhythm is SB  hR 50-55,  Bp 137/76    O2 saqt 96% on room air.  Daughters here and has questions about \"when open heart surgery will be and who to call for secheduling/   Kristen morgan on CSI was up to see patient and his daughters.  Questions and concerns regarding surgery were addressed.   Information of CVTS coordinator per Kristen will be added to discharge orderes.    A:Is free of pain.  Is tolerating diet and activity well.   Rhythm is unchanged.   Afebrile and VSS.  No signs or symptoms of post procedure complication.  CMS and radial pulse on the right are intact.  No signs of bleeding.   Condition is adequate for discharge to home with daughters.    P:Discharge to home with daughters.      "

## 2017-11-15 NOTE — PROGRESS NOTES
Care Coordinator- Discharge Planning     Admission Date/Time:  11/13/2017  Attending MD:  Avtar Ruffin MD   Data  Chart reviewed, discussed with interdisciplinary team.   Patient was admitted for:   1. Unstable angina (H)    2. Positive cardiac stress test    Assessment  Concerns with insurance coverage for discharge needs: None.  Current Living Situation: Patient lives alone. Pt said that he is independent with his own care. Pt's daughters said that they live close by and are available to assist pt as needed.   Support System: Supportive and Involved daughters.   Services Involved: none currently   Transportation: Family or Friend will provide     Coordination of Care and Referrals: No home care needs per pt and pt's daughters.   Plan  /Anticipated Discharge Date:  11/15/17  Anticipated Discharge Plan:  Discharge to home with clinic f/u.     CTS Handoff completed:  YES  CHANTAL GALLARDO RN BSN  Care Coordinator  [phone]

## 2017-11-15 NOTE — PROGRESS NOTES
DISCHARGE   Discharged to: Home  Via: Automobile  Accompanied by: Family, daughter  Discharge Instructions: diet, activity, medications, follow up appointments, when to call the MD, and what to watchout for (i.e. s/s of infection, increasing SOB, palpitations, chest pain,)  Prescriptions: To be filled by  [facility])  pharmacy per pt's request; medication list reviewed & sent with pt  Follow Up Appointments: arranged; information given,  Patient will contact CVTS  Surgery coordinator on Monday regarding open heart surgery.  Belongings: All sent with pt  IV: out  Telemetry: off  Pt exhibits understanding of above discharge instructions; all questions answered.  Discharge Paperwork: faxed

## 2017-11-15 NOTE — DISCHARGE INSTRUCTIONS
Going Home after Coronary Angiogram       Name: Zack Demarco  Medical Record Number:  [id number]  Today's Date: November 15, 2017        For 24 hours:         Have an adult stay with you for 24 hours.         Relax and take it easy.         Drink plenty of fluids.         You may eat your normal diet, unless your doctor tells you otherwise.         Do NOT make any important or legal decisions.         Do NOT drive or operate machines at home or at work.         Do NOT drink alcohol.      Do NOT smoke.     Medicines:         If you have begun Plavix (clopidogrel), Effient (prasugrel), or Brilinta (ticagrelor), do not stop taking it until you talk to your heart doctor (cardiologist).         If you are on metformin (Glucophage), do not restart it until you have blood tests (within 2 to 3 days after discharge). When your doctor tells you it is safe, you may restart the metformin.         If you have stopped any other medicines, check with your nurse or provider about when to restart them.    Care of wrist or arm site:         It is normal to have soreness at the puncture site and mild tingling in your hand for up to 3 days.           Remove the Band-Aid after 24 hours. If there is minor oozing, apply another Band-aid and remove it after 12 hours.          Do NOT take a bath, or use a hot tub or pool for the next 48 hours. You may shower.          It is normal to have a small bruise.  There should not be a lump at the site.         Do not scrub the site.         Do not use lotion or powder near the puncture site for 3 days.         For 2 days, do not use your hand or arm to support your weight (such as rising from a chair) or bend your wrist (such as lifting a garage door).         For 2 days, do not lift more than 5 pounds or exercise your arm (tennis, golf or bowling).    If you start bleeding from the site in your arm: Sit down and press firmly on the site with your fingers for 10 minutes. Call your doctor as soon as  you can.      Call 911 right away if you have bleeding that is heavy or does not stop.     Call your doctor if:         You have a large or growing hard lump around the site.         The site is red, swollen, hot or tender.         Blood or fluid is draining from the site.         You have chills or a fever greater than 101 F (38 C).         Your leg or arm turns bluish, feels numb or cool.         You have hives, a rash or unusual itching.     ADDITIONAL INSTRUCTIONS: You will be receiving a phone call from Cardiothoracic surgery team next week to further arrange your heart surgery. Please follow up with your PCP in one week. Feel free to contact us with any questions in the mean time. Please return to the ED if you have any reoccurrence of chest pain.    Kristen Leonardo, interventional Cardiology NP- [phone]    [facility] [place]:   [phone] (7 days a week)      Cardiology Fellow on call (24 hours per day) at [facility]:   [phone] (ask for Cardiology Fellow on call)      Number where we can reach you:  ____________

## 2017-11-15 NOTE — PLAN OF CARE
Problem: Patient Care Overview  Goal: Plan of Care/Patient Progress Review  Outcome: Improving    D: Pt admitted 11/13, s/p angio. Requires 3V CABG, found to have retroperitoneal mass during workup. PMH: HTN, dyslipidemia, hypothyroidism.     I/A: No acute events overnight. A&Ox4. Vital signs stable on RA. Monitor shows sinus bradycardia with BBB, rates 50's-60's. Denied any chest pain overnight. Pt requesting PRN melatonin at bedtime. Has R PIV, SL'd. Up independently in room. Voiding in the urinal with adequate UOP. Slept well between cares.     P: Continue to monitor. Notify [facility]  with changes/concerns.

## 2017-11-16 ENCOUNTER — CARE COORDINATION (OUTPATIENT)
Dept: CARE COORDINATION | Facility: CLINIC | Age: 77
End: 2017-11-16

## 2017-11-16 DIAGNOSIS — Z76.89 HEALTH CARE HOME: ICD-10-CM

## 2017-11-16 ASSESSMENT — ENCOUNTER SYMPTOMS
HEMOPTYSIS: 0
ORTHOPNEA: 0
BACK PAIN: 0
EXERCISE INTOLERANCE: 1
JOINT SWELLING: 0
LIGHT-HEADEDNESS: 0
FEVER: 0
MYALGIAS: 1
POLYPHAGIA: 0
DYSURIA: 0
FLANK PAIN: 0
CHILLS: 0
WEIGHT LOSS: 0
POLYDIPSIA: 0
WEIGHT GAIN: 0
ALTERED TEMPERATURE REGULATION: 0
DIFFICULTY URINATING: 0
WHEEZING: 0
STIFFNESS: 1
COUGH: 0
NECK PAIN: 1
SLEEP DISTURBANCES DUE TO BREATHING: 0
COUGH DISTURBING SLEEP: 0
SHORTNESS OF BREATH: 1
SPUTUM PRODUCTION: 0
MUSCLE CRAMPS: 1
NIGHT SWEATS: 0
HEMATURIA: 0
POSTURAL DYSPNEA: 0
ARTHRALGIAS: 1
SYNCOPE: 0
PALPITATIONS: 0
DYSPNEA ON EXERTION: 1
FATIGUE: 1
INCREASED ENERGY: 0
DECREASED APPETITE: 0
MUSCLE WEAKNESS: 0
HALLUCINATIONS: 0
LEG PAIN: 1
SNORES LOUDLY: 0
HYPERTENSION: 1

## 2017-11-16 NOTE — PROGRESS NOTES
Clinic Care Coordination Contact  OUTREACH    Referral Information:  Referral Source: CTS  Reason for Contact:   Transition Communication Hand-off for Care Transitions to Next Level of Care Provider     Name: Zack Demarco  MRN #: [id number]  Primary Care Provider: AALIYAH GARZA      Primary Clinic: [address] [facility] [address]      Reason for Hospitalization:  CAD, multiple vessel [I25.10]  Admit Date/Time: 11/13/2017  6:54 AM  Discharge Date: 11/15/17  Payor Source: Payor: MEDICA / Plan: MEDICA PRIME SOLUTION / Product Type: Indemnity   Readmission Assessment Measure (SRI) Risk Score/category: average    Reason for Communication Hand-off Referral: Multiple providers/specialties  Discharge Plan:   Concern for non-adherence with plan of care: no  Discharge Needs Assessment:  Follow-up specialty is recommended: Yes      Clinical Concerns:  Current Medical Concerns: Patient is s/p angio. Will need CABG. Will discuss this next week with Dr Marshall. Arm angio site w/o pain or swelling. Picked up metoprolol and started this yesterday.Lives alone but has daughter that lives just down the street.          Psychosocial:  Current living arrangement:: I live alone        Goals:   Goal 1 Statement: I will ask if pre op clearance needed while at CV visit 11/21  Goal 1 Supportive Steps: encouraged  Goal 1 Progression Percent: 0%  Goal 1 Progression Date: 11/16/17     Frequency of Care Coordination: 1-2 weeks    Future Appointments      Provider Department Center   11/20/2017 2:30 PM (Arrive by 2:15 PM) Rolando Toro MD [facility]   1/17/2018 8:00 AM Avtar Mccullough MD [facility]     Patient aware of Dr Marshall appointment       Plan: Writer will outreach patient 11/21 for further surgical planning/needs.    Sriram Renteria RN  Clinic Care Coordinator  [facility]  [phone]

## 2017-11-16 NOTE — LETTER
[facility]  Complex Care Plan  About Me  Patient Name:  Zack Demarco    YOB: 1940  Age:   77 year old   [place] MRN: [id number] Telephone Information:     Home Phone [phone]   Mobile [phone]       Address:    [address] [place] [address] Email address:  [email or web address]      Emergency Contact(s)  Name Relationship Lgl Grd Work Phone Home Phone Mobile Phone   1. SHAILESH MCFADDEN Daughter   [phone]    2. OLLIE TRUJILLO Daughter   [phone]            Primary language:  English     needed?     [place] [employer]:  [phone] op. 1  Other communication barriers:    Preferred Method of Communication:  Letter, Phone  Current living arrangement: I live alone  Mobility Status/ Medical Equipment:    Other information to know about me:    Health Maintenance  Health Maintenance Reviewed:      My Access Plan  Medical Emergency 911   Primary Clinic Line [facility] [phone]   24 Hour Appointment Line [phone] or  [phone]) (toll-free)   24 Hour Nurse Line [phone] (toll-free)   Preferred Urgent Care     Preferred Hospital [facility]  [phone]   Preferred Pharmacy [phone] - [facility] [address] [address]     Behavioral Health Crisis Line The National Suicide Prevention Lifeline at 1-999.841.9645 or 911     My Care Team Members  Patient Care Team       Relationship Specialty Notifications Start End    Anastacio Love MD PCP - General Family Practice  1/8/14     Phone: [phone] Fax: [phone] [address] [place] [address]    Anastacio Love MD MD Family Practice  1/8/14     Phone: [phone] Fax: [phone] [address] [place] [address]    Rolando Toro MD MD Transplant  11/16/17     Comment:  Cardiovascular surgeon    Phone: [phone] Fax: [phone]         [address] [place] [address]  [facility] [address]    Sriram Renteria, RN Clinic Care Coordinator   11/16/17     Comment:  [facility]    Phone: [phone] Fax: [phone]             My Care Plans  Self Management and Treatment Plan  Goals and (Comments)  Goal #1: I will ask if pre op clearance needed while at CV visit 11/21      0% of goal reached    Goal #2:          of goal reached    Goal #3:          of goal reached    Goal #4:         of goal reached     Goal #5:          of goal reached  -  Goal #6:          of goal reached    Goal #7:          of goal reached    Goal #8:           of goal reached        Goal #9:          of goal reached    Goal #10:        of goal reached    Action Plans on File:    Advance Care Plans/Directives Type:        My Medical and Care Information  Problem List   Patient Active Problem List   Diagnosis     Generalized anxiety disorder     Lipoma of skin and subcutaneous tissue     Hernia umbilical     Patellar tendonitis     Cataract, mild-mod, ou     Arthritis of knee, right     Advanced directives, counseling/discussion     Hyperlipidemia LDL goal <130     Hypertension goal BP (blood pressure) < 150/90     Borderline glaucoma with ocular hypertension, bilateral - treated     Posterior vitreous detachment, left     Hypothyroidism, unspecified type     Macular drusen, bilateral     Unstable angina (H)     Health Care Home      Current Medications and Allergies:  See printed Medication Report.    Care Coordination Start Date: 11/16/17   Frequency of Care Coordination: 1-2 weeks   Form Last Updated: 11/16/2017

## 2017-11-16 NOTE — PROGRESS NOTES
Patient was contacted by an RN for post DC follow up so no duplicate post DC follow up call will be made                Aylin Rocha RN        Spoke to PAs and if patient is discharged home today he will see Dr Toro in clinic on Monday 11/20.  Patient's labs to determine if surgery is safe due to a 5 cm partially visualized retroperitoneal soft tissue mass are not resulted yet. It needs to be determined if mass is a paraganglioma before CABG can be performed.  If it is not, CABG can be done and follow up for the mass can be completed after surgery.   PAs will speak to patient and family.  No need to call daughter Kayleigh back at this time.       Electronically signed by Aylin Rocha RN at 11/15/2017  1:04 PM

## 2017-11-17 ENCOUNTER — CARE COORDINATION (OUTPATIENT)
Dept: CARDIOLOGY | Facility: CLINIC | Age: 77
End: 2017-11-17

## 2017-11-17 DIAGNOSIS — R07.9 CHEST PAIN: ICD-10-CM

## 2017-11-17 DIAGNOSIS — Z01.810 PRE-OPERATIVE CARDIOVASCULAR EXAMINATION: Primary | ICD-10-CM

## 2017-11-17 DIAGNOSIS — I25.119 ATHEROSCLEROSIS OF NATIVE CORONARY ARTERY OF NATIVE HEART WITH ANGINA PECTORIS (H): ICD-10-CM

## 2017-11-17 LAB
ANNOTATION COMMENT IMP: NORMAL
BLD PROD TYP BPU: NORMAL
BLD UNIT ID BPU: 0
BLOOD PRODUCT CODE: NORMAL
BPU ID: NORMAL
METANEPHS SERPL-SCNC: <0.1 NMOL/L (ref 0–0.49)
NORMETANEPHRINE SERPL-SCNC: 0.39 NMOL/L (ref 0–0.89)
TRANSFUSION STATUS PATIENT QL: NORMAL

## 2017-11-17 NOTE — TELEPHONE ENCOUNTER
APPT INFO    Date /Time: 11/20/17   Reason for Appt: Consult CAB Surgery   Ref Provider/Clinic: Dr Love, [facility] [place]   Are there internal records? If yes, list: Yes  See Above  [facility]   Patient Contact (Y/N) & Call Details: No referred   Action: Records reviewed; Records are in EPIC     OUTSIDE RECORDS CHECKLIST     CLINIC NAME COMMENTS REC (x) IMG (x)

## 2017-11-17 NOTE — PROGRESS NOTES
Called patient to verify he was aware of his appointment on Monday with Dr Toro.  Also scheduled patient for PAC and Labs.  All other pre op procedures are done.  Also verified surgery on Wednesday 11/22 at 1:40 pm.  Patient agreed to PAC and labs and will call with questions or concerns.  Left contact information with the patient.  Will discuss pre op and post op instructions during clinic visit.

## 2017-11-20 ENCOUNTER — PRE VISIT (OUTPATIENT)
Dept: CARDIOLOGY | Facility: CLINIC | Age: 77
End: 2017-11-20

## 2017-11-20 ENCOUNTER — OFFICE VISIT (OUTPATIENT)
Dept: SURGERY | Facility: CLINIC | Age: 77
End: 2017-11-20

## 2017-11-20 ENCOUNTER — ALLIED HEALTH/NURSE VISIT (OUTPATIENT)
Dept: SURGERY | Facility: CLINIC | Age: 77
End: 2017-11-20

## 2017-11-20 ENCOUNTER — OFFICE VISIT (OUTPATIENT)
Dept: CARDIOLOGY | Facility: CLINIC | Age: 77
DRG: 236 | End: 2017-11-20
Attending: SURGERY
Payer: MEDICARE

## 2017-11-20 VITALS
TEMPERATURE: 98.1 F | OXYGEN SATURATION: 99 % | WEIGHT: 217.37 LBS | HEIGHT: 70 IN | DIASTOLIC BLOOD PRESSURE: 73 MMHG | BODY MASS INDEX: 31.12 KG/M2 | SYSTOLIC BLOOD PRESSURE: 128 MMHG | RESPIRATION RATE: 18 BRPM | HEART RATE: 65 BPM

## 2017-11-20 VITALS
DIASTOLIC BLOOD PRESSURE: 73 MMHG | WEIGHT: 217.4 LBS | BODY MASS INDEX: 31.12 KG/M2 | HEIGHT: 70 IN | SYSTOLIC BLOOD PRESSURE: 128 MMHG | OXYGEN SATURATION: 95 % | HEART RATE: 65 BPM

## 2017-11-20 DIAGNOSIS — Z01.818 PREOP GENERAL PHYSICAL EXAM: Primary | ICD-10-CM

## 2017-11-20 DIAGNOSIS — Z01.810 PRE-OPERATIVE CARDIOVASCULAR EXAMINATION: ICD-10-CM

## 2017-11-20 DIAGNOSIS — I25.119 ATHEROSCLEROSIS OF NATIVE CORONARY ARTERY OF NATIVE HEART WITH ANGINA PECTORIS (H): ICD-10-CM

## 2017-11-20 DIAGNOSIS — R07.9 CHEST PAIN: ICD-10-CM

## 2017-11-20 DIAGNOSIS — R19.00 RETROPERITONEAL MASS: Primary | ICD-10-CM

## 2017-11-20 DIAGNOSIS — I25.10 CORONARY ARTERY DISEASE INVOLVING NATIVE HEART, ANGINA PRESENCE UNSPECIFIED, UNSPECIFIED VESSEL OR LESION TYPE: Primary | ICD-10-CM

## 2017-11-20 LAB
ALBUMIN UR-MCNC: NEGATIVE MG/DL
APPEARANCE UR: CLEAR
APTT PPP: 29 SEC (ref 22–37)
BILIRUB UR QL STRIP: NEGATIVE
COLOR UR AUTO: YELLOW
GLUCOSE UR STRIP-MCNC: NEGATIVE MG/DL
HGB UR QL STRIP: NEGATIVE
INR PPP: 1.03 (ref 0.86–1.14)
KETONES UR STRIP-MCNC: NEGATIVE MG/DL
LEUKOCYTE ESTERASE UR QL STRIP: NEGATIVE
MUCOUS THREADS #/AREA URNS LPF: PRESENT /LPF
NITRATE UR QL: NEGATIVE
PH UR STRIP: 5 PH (ref 5–7)
RBC #/AREA URNS AUTO: <1 /HPF (ref 0–2)
SOURCE: ABNORMAL
SP GR UR STRIP: 1.02 (ref 1–1.03)
UROBILINOGEN UR STRIP-MCNC: 0 MG/DL (ref 0–2)
WBC #/AREA URNS AUTO: 0 /HPF (ref 0–2)

## 2017-11-20 ASSESSMENT — LIFESTYLE VARIABLES: TOBACCO_USE: 1

## 2017-11-20 ASSESSMENT — PAIN SCALES - GENERAL: PAINLEVEL: NO PAIN (0)

## 2017-11-20 NOTE — PATIENT INSTRUCTIONS
Preparing for Your Surgery      Name:  Zack Demarco   MRN:  [id number]   :  1940   Today's Date:  2017     Arriving for surgery:  Surgery date:  17  Surgery time:  1:40 pm  Arrival time:  11:40 am  Please come to:       [facility] [address]  [address] [place]  [address]    -   parking is available in front of the hospital from 5:15 am to 8:00 pm    -  Stop at the Information Desk in the lobby    -   Inform the information person that you are here for surgery. An escort to 3c will be provided. If you would not like an escort, please proceed to 3C on the 3rd floor. [phone]     What can I eat or drink?  -  You may have solid food or milk products until 8 hours prior to your surgery. No food/milk after midnight.  -  You may have water or 7up/Sprite until 2 hours prior to your surgery-until 11:40 am    Which medicines can I take? Last dose of Aspirin before surgery is . No vitamins, supplements or anti-inflammatories before surgery.    -  Do NOT take these medications the day of surgery: Losartan-Hydrochlorathiazide, Aspirin    -  It is OKAY to take these medications: Metoprolol, Levothyroxine, Atorvastatin, Nitroglycerin if needed.      How do I prepare myself?  -  Take two showers: one the night before surgery; and one the morning of surgery.         Use Scrubcare or Hibiclens to wash from neck down.  You may use your own shampoo and conditioner. No other hair products like gel/spray  -No shaving for 24 hours before surgery.   -  Do NOT use lotion, powder, deodorant, or antiperspirant the day of your surgery.  -  Do NOT wear  jewelry.  -  Begin using Incentive Spirometer 1 week prior to surgery.  Use 4 times per day, up to 5-10 breaths each time.  Bring Incentive Spirometer to hospital.  -Do not bring your own medications to the hospital..  -  Bring your ID and insurance card.    Questions or Concerns:  If you have questions or concerns,  please call the  Preoperative Assessment Center, Monday-Friday 7AM-7PM:  [phone]      Using an Incentive Spirometer    An incentive spirometer is a device that helps you do deep breathing exercises. These exercises expand your lungs, aid in circulation, and help prevent pneumonia. Deep breathing exercises also help you breathe better and improve the function of your lungs by:    Keeping your lungs clear    Strengthening your breathing muscles    Helping prevent respiratory complications or problems  The incentive spirometer gives you a way to take an active part in recover. A nurse or therapist will teach you breathing exercises. To do these exercises, you will breathe in through your mouth and not your nose. The incentive spirometer only works correctly if you breathe in through your mouth.  Steps to clear lungs  Step 1. Exhale normally. Then, inhale normally.    Relax and breathe out.  Step 2. Place your lips tightly around the mouthpiece.    Make sure the device is upright and not tilted.  Step 3. Inhale as much air as you can through the mouthpiece (don't breath through your nose).    Inhale slowly and deeply.    Hold your breath long enough to keep the balls or disk raised for at least 3 to 5 seconds, or as instructed by your healthcare provider.    Some spirometers have an indicator to let you know that you are breathing in too fast. If the indicator goes off, breathe in more slowly.  Step 4. Repeat the exercise regularly.    Do this exercise every hour while you're awake, or as instructed by your healthcare provider.    If you were taught deep breathing and coughing exercises, do them regularly as instructed by your healthcare provider.   Follow-up care  Make a follow up appointment, or as directed. Also, follow up with your healthcare provider as advised or if your symptoms do not improve or continue to get worse.  When to call your healthcare provider  Call your healthcare provider right away if you  have any of the following:    Fever 100.4  (38 C) or higher, or as directed by your healthcare provider    Brownish, bloody, or smelly sputum  Call 911  Call 911 if any of these occur:     Shortness of breath that doesn't get better after taking your medicine    Cool, moist, pale or blue skin    Trouble breathing or swallowing, wheezing    Fainting or loss of consciousness    Feeling of fizziness or weakness or a sudden drop in blood pressure    Feeling of doom or lightheaded    Chest pain or rapid heart rate  Date Last Reviewed: 1/1/2017 2000-2017 [employer]. [address] [place] [address]. All rights reserved. This information is not intended as a substitute for professional medical care. Always follow your healthcare professional's instructions.    AFTER YOUR SURGERY  Breathing exercises   Breathing exercises help you recover faster. Take deep breaths and let the air out slowly. This will:     Help you wake up after surgery.    Help prevent complications like pneumonia.  Preventing complications will help you go home sooner.   We may give you a breathing device (incentive spirometer) to encourage you to breathe deeply.   Nausea and vomiting   You may feel sick to your stomach after surgery; if so, let your nurse know.    Pain control:  After surgery, you may have pain. Our goal is to help you manage your pain. Pain medicine will help you feel comfortable enough to do activities that will help you heal.  These activities may include breathing exercises, walking and physical therapy.   To help your health care team treat your pain we will ask: 1) If you have pain  2) where it is located 3) describe your pain in your words  Methods of pain control include medications given by mouth, vein or by nerve block for some surgeries.  We may give you a pain control pump that will:  1) Deliver the medicine through a tube placed in your vein  2) Control the amount of medicine you receive  3) Allow  you to push a button to deliver a dose of pain medicine  Sequential Compression Device (SCD) or Pneumo Boots:  You may need to wear SCD S on your legs or feet. These are wraps connected to a machine that pumps in air and releases it. The repeated pumping helps prevent blood clots from forming.

## 2017-11-20 NOTE — NURSING NOTE
Chief Complaint   Patient presents with     New Patient     Consult for coronary artery bypass     Vitals were taken and medications were reconciled.  DIANE Menezes  2:27 PM

## 2017-11-20 NOTE — H&P
Pre-Operative H & P     CC:  Preoperative exam to assess for increased cardiopulmonary risk while undergoing surgery and anesthesia.    Date of Encounter: 11/20/2017  Primary Care Physician:  Anastacio Love  Zack Demarco is a 77 year old male who presents for pre-operative H & P in preparation for Coronary Artery Bypass Graft  with Dr. Toro on 11/22/2017 at [facility].     History is obtained from the patient.   Pt had a grossly abnormal stress test, surgery was cancelled last week and endocrinology consulted to evaluate for secretory tumor. 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      Past Medical History  Past Medical History:   Diagnosis Date     DJD (degenerative joint disease) of hip     right     Glaucoma      Hernia umbilical      Hypercholesterolemia      Hypertension      Hypothyroidism      Lipoma      Low back pain      Nonsenile cataract      Obesity      Unstable angina (H) 11/13/2017       Past Surgical History  Past Surgical History:   Procedure Laterality Date     BIOPSY  1980's    fatty tissue     [facility] AFF SURGICAL PATHOLOGY       COLONOSCOPY  2011     COLONOSCOPY WITH CO2 INSUFFLATION N/A 9/19/2016    Procedure: COLONOSCOPY WITH CO2 INSUFFLATION;  Surgeon: Jeffery Flores MD;  Location: [facility] OR     ROTATOR CUFF REPAIR RT/LT       SOFT TISSUE SURGERY  Sept. 2014    [facility] Tendon transfer (Left Ankle)       Hx of Blood transfusions/reactions: none    Hx of abnormal bleeding or anti-platelet use: asa, hold DOS    Menstrual history: No LMP for male patient.:     Steroid use in the last year: none    Personal or FH with difficulty with Anesthesia:  None          Prior to Admission Medications  Current Outpatient Prescriptions   Medication Sig Dispense Refill     metoprolol (LOPRESSOR) 25 MG tablet Take 0.25 tablets (6.25 mg) by mouth 2 times daily 60 tablet 0     atorvastatin (LIPITOR) 40  MG tablet Take 1 tablet (40 mg) by mouth daily 60 tablet 11     nitroGLYcerin (NITROSTAT) 0.4 MG sublingual tablet For chest pain place 1 tablet under the tongue every 5 minutes for 3 doses. If symptoms persist 5 minutes after 1st dose call 911. 25 tablet 3     losartan-hydrochlorothiazide (HYZAAR) 50-12.5 MG per tablet Take 1 tablet by mouth daily 90 tablet 3     levothyroxine (SYNTHROID/LEVOTHROID) 75 MCG tablet Take 1 tablet (75 mcg) by mouth daily 90 tablet 3     Cholecalciferol (VITAMIN D3 PO) Take by mouth daily       multivitamin (OCUVITE) TABS Take 1 tablet by mouth daily       latanoprost (XALATAN) 0.005 % ophthalmic solution Place 1 drop into both eyes At Bedtime 3 Bottle 4     aspirin 325 MG EC tablet 1/2 tab daily         Allergies  Allergies   Allergen Reactions     Nkda [No Known Drug Allergies]        Social History  Social History     Social History     Marital status:      Spouse name: N/A     Number of children: N/A     Years of education: N/A     Occupational History     Not on file.     Social History Main Topics     Smoking status: Former Smoker     Packs/day: 1.00     Years: 20.00     Types: Cigarettes     Start date: 7/28/1959     Quit date: 7/28/1979     Smokeless tobacco: Former User     Alcohol use 0.0 oz/week      Comment: rarely     Drug use: No     Sexual activity: No     Other Topics Concern     Parent/Sibling W/ Cabg, Mi Or Angioplasty Before 65f 55m? No     Social History Narrative       Family History  Family History   Problem Relation Age of Onset     CANCER Mother      pancreatic     CANCER Father      lung     Respiratory Brother      COPD     Eye Disorder Brother      CANCER Sister      liver     CANCER Sister      Musculoskeletal Disorder Sister      back     Macular Degeneration Sister 60     Macular Degeneration Brother 60         Anesthesia Evaluation     . Pt has had prior anesthetic. Type: General and MAC           ROS/MED HX    ENT/Pulmonary:     (+)BRITTNEY risk  "factors hypertension, obese, tobacco use, Past use 1 PPD for 20 years, quit 1979 packs/day  , . .    Neurologic:  - neg neurologic ROS     Cardiovascular:     (+) Dyslipidemia, hypertension--CAD, --. Taking blood thinners : Instructions Given to patient: hold asa DOS. . . :. . Previous cardiac testing     METS/Exercise Tolerance:  4 - Raking leaves, gardening   Hematologic:  - neg hematologic  ROS       Musculoskeletal:   (+) , , other musculoskeletal- DJD of hips and lower back pain.       GI/Hepatic:  - neg GI/hepatic ROS       Renal/Genitourinary:  - ROS Renal section negative       Endo:     (+) thyroid problem hypothyroidism, Obesity, .      Psychiatric:  - neg psychiatric ROS       Infectious Disease:  - neg infectious disease ROS       Malignancy:      - no malignancy   Other:    (+) No chance of pregnancy C-spine cleared: N/A, no H/O Chronic Pain,no other significant disability            Physical Exam  Normal systems: cardiovascular and pulmonary    Airway   Mallampati: II  TM distance: >3 FB  Neck ROM: full    Dental   Normal      Cardiovascular   Rhythm and rate: regular      Pulmonary    breath sounds clear to auscultation          The complete review of systems is negative other than noted in the HPI or here.   Temp: 98.1  F (36.7  C) Temp src: Oral BP: 128/73 Pulse: 65   Resp: 18 SpO2: 99 %         217 lbs 5.98 oz  5' 10\"   Body mass index is 31.19 kg/(m^2).           Physical Exam  Constitutional: Awake, alert, cooperative, no apparent distress, and appears stated age.  Eyes: Pupils equal, round and reactive to light, extra ocular muscles intact, sclera clear, conjunctiva normal.  HENT: Normocephalic, oral pharynx with moist mucus membranes, good dentition. No goiter appreciated.   Respiratory: Clear to auscultation bilaterally, no crackles or wheezing.  Cardiovascular: Regular rate and rhythm, normal S1 and S2, and no murmur noted.  Carotids +2, no bruits. No edema. Palpable pulses to radial  DP and " PT arteries.   GI: Normal bowel sounds, soft, non-distended, non-tender, no masses palpated, no hepatosplenomegaly.    Lymph/Hematologic: No cervical lymphadenopathy and no supraclavicular lymphadenopathy.  Genitourinary:  Deferred  Skin: Warm and dry.  No rashes at anticipated surgical site.   Musculoskeletal: Full ROM of neck. There is no redness, warmth, or swelling of the joints. Gross motor strength is normal.    Neurologic: Awake, alert, oriented to name, place and time. Cranial nerves II-XII are grossly intact. Gait is normal.   Neuropsychiatric: Calm, cooperative. Normal affect.     Labs: (personally reviewed)  11/13/2017 13:20  WBC: 6.9  Hemoglobin: 13.7  Hematocrit: 41.6  Platelet Count: 212  RBC Count: 4.40  MCV: 95  MCH: 31.1  MCHC: 32.9  RDW: 12.8      11/14/2017 06:59  Sodium: 141  Potassium: 4.0  Chloride: 108  Carbon Dioxide: 28  Urea Nitrogen: 21  Creatinine: 0.93  GFR Estimate: 79  GFR Estimate If Black: >90  Calcium: 8.7  Anion Gap: 6  TSH: 6.20 (H)  Glucose: 107 (H)        11/14/2017 12:50  METANEPHRINES PLASMA FREE: Rpt    Normetanephrine 0.39  0.00 - 0.89 nmol/L Final 11/14/2017 12:50 PM 51   Metanephrine <0.10  0.00 - 0.49 nmol/L Final 11/14/2017 12:50 PM 51        11/20/2017 13:47  Color Urine: Yellow  Appearance Urine: Clear  Glucose Urine: Negative  Bilirubin Urine: Negative  Ketones Urine: Negative  Specific Gravity Urine: 1.019  pH Urine: 5.0  Protein Albumin Urine: Negative  Urobilinogen mg/dL: 0.0  Nitrite Urine: Negative  Blood Urine: Negative  Leukocyte Esterase Urine: Negative  Source: Midstream Urine  WBC Urine: 0  RBC Urine: <1  Mucous Urine: Present (A)    11/20/2017 13:49  INR: 1.03  PTT: 29    ABO: A  RH(D): Pos  Antibody Screen: Neg  Test Valid Only At: [facility].  Specimen Expires: 11/23/2017      EKG: Personally reviewed but formal cardiology read pending: sinus nathanael at 57        Stress test 11/8/2017  Interpretation Summary     Grossly abnormal stress  echocardiogram.  Normal resting images. Resting EF 55 to 60%. No resting regional wallmotion  abnormalities.     With stress there was LV dilatation,drop in EF and large LAD territory  akinesis.     The patient did not exhibit any symptoms during exercise.  Normal blood pressure response with stress.  Normal heart rate response to exercise.  _____________________________________________________________________________  __     Stress  Definity (NDC #26537-784-52) given intravenously.  Patient was given 5ml mixture of 1.5ml Definity and 8.5ml saline.  5 ml wasted.  IV start location LAC .  This was a normal stress EKG with no evidence of stress-induced ischemia.  Maximum workload 100 bryant.  Target Heart Rate was achieved.  Exercise was stopped due to fatigue.  Normal blood pressure response with stress.  The patient did not exhibit any symptoms during exercise.  Normal heart rate response to exercise     CORONARY ANGIOGRAM 11/13/2017:     1. Both coronary arteries arise from their respective cusps.    2. Dominance: Right    3. The LM is without angiographic evidence of disease.     4. LAD: Type 3 [LAD supplies the entire apex].. The LAD gives rise to septal perforators, D1 and D2.  The pLAD has a 70-80% stenosis at the ostium.  The mid LAD has an 80-90% stenosis before and after the diagonal branch with 80-90% of the first diagonal.  Distally there is diffuse disease at less than 25%.    5. LCX gives rise to OM1/2 branches of medium caliber with diffuse luminal disease throughout without angiographic stenosis.    6. RCA gives rise to PL branches and supplies PDA. The proximal RCA has luminal disease, mid RCA has a 70% stenosis, distally there is less than 25% stenosis.  The ostial RPL and PDA each have disease of approximately 30-40% and 50% stenosis respectively.    Echocardiogram 11/13/2017  Interpretation Summary  No significant valvular abnormalities were noted. Global and regional left  ventricular function is  normal with an EF of 60-65%.  Right ventricular function, chamber size, wall motion, and thickness are  normal.  _____________________________________________________________________________  __        Left Ventricle  Global and regional left ventricular function is normal with an EF of 60-65%.  Left ventricular wall thickness is normal. Left ventricular size is normal.  Normal left ventricular filling for age. No regional wall motion abnormalities  are seen.     Right Ventricle  Right ventricular function, chamber size, wall motion, and thickness are  normal.     Atria  Both atria appear normal. The atrial septum is intact as assessed by color  Doppler .     Mitral Valve  Mild mitral annular calcification is present.        Aortic Valve  Trace aortic insufficiency is present.     Tricuspid Valve  The tricuspid valve is normal. Pulmonary artery systolic pressure cannot be  assessed.     Pulmonic Valve  The pulmonic valve is normal. Trace pulmonic insufficiency is present.     Vessels  The aorta root is normal. The pulmonary artery is normal. The inferior vena  cava is normal.     Pericardium  No pericardial effusion is present.     ______________________________________________________________________    Carotid US 11/14/2017  Impression:  1.  Right: The max velocity in the ICA measures 132/35, corresponding  to less than 50%% stenosis.  2.  Left: The max velocity in the ICA measures 103/24, corresponding  to less than 50% stenosis.  3.  Normal antegrade direction flow in both vertebral arteries.       ASSESSMENT and PLAN  Zack Demarco is a 77 year old male scheduled to undergo Coronary Artery Bypass Graft. He has the following specific operative considerations:   - RCRI : High risk surgery (>5% cardiac complication risk).     - Anesthesia considerations:  Refer to PAC assessment in anesthesia records  - VTE risk: low risk  - BRITTNEY # of risks 2/8 = low risk  - Post-op delirium risk: high risk due to age  - Risk of  PONV score = 2.  If > 2, anti-emetic intervention recommended.      Previous anesthesia without complications.  1) Cardiac: Angio reveals 2V disease-Mid RCA 70% stenosis, Proximal LAD 70-80%, Mid LAD 80-90% .Normal resting EF but decreased with stress, with associated anteroapical wall akinesis. Currently not using nitro and is not experiencing angina.   2) Pulmonary: Former smoker, smoked 1 PPD for 20 years, quit 1979  3) GI: Umbilical hernia, asymptomatic at this time  4) Abdominal mass seen on CT, surgery was cancelled last week and endocrinology consulted to evaluate for secretory tumor. 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     Feasible airway    Pt optimized for surgery. AVS with information on surgery time/arrival time, meds and NPO status given by nursing staff      Patient was discussed with Dr Sainz.    HOLLY Bowden CNS  Preoperative Assessment Center  [facility]  Clinic and [facility] Center  Phone: [phone]  Fax: [phone]

## 2017-11-20 NOTE — MR AVS SNAPSHOT
After Visit Summary   11/20/2017    Zack Demarco    MRN: [id number]           Patient Information     Date Of Birth          1940        Visit Information        Provider Department      11/20/2017 2:30 PM Rolando Toro MD [facility]        Today's Diagnoses     Coronary artery disease involving native heart, angina presence unspecified, unspecified vessel or lesion type    -  1       Follow-ups after your visit        Your next 10 appointments already scheduled     Nov 20, 2017  4:30 PM CST   (Arrive by 4:15 PM)   PAC Anesthesia Consult with  Pac Anesthesiologist   [facility] Preoperative [facility] ([facility])    [address] [place] [id number]   [phone]            Nov 20, 2017  5:00 PM CST   (Arrive by 4:45 PM)   PAC RN ASSESSMENT with  Pac Rn   [facility] Preoperative [facility] ([facility])    [address] [place] [id number]   [phone]            Nov 20, 2017  5:30 PM CST   LAB with  LAB   [facility] Lab ([facility])    [address] [place] [id number] [phone]           Please do not eat 10-12 hours before your appointment if you are coming in fasting for labs on lipids, cholesterol, or glucose (sugar). This does not apply to pregnant women. Water, hot tea and black coffee (with nothing added) are okay. Do not drink other fluids, diet soda or chew gum.            Nov 22, 2017   Procedure with Rolando Toro MD   [facility] [place] Same Day Surgery (--)    [address] [facility] [address]   [phone]            Jan 17, 2018  8:00 AM CST   [facility] Avtar [facility]ccullough [facility]   [facility]    [address]  [place] [address] [phone]              Future tests that were ordered for you today     Open Future Orders        Priority Expected Expires  "Ordered    IR Consultation for IR Exam Routine  11/20/2018 11/20/2017            Who to contact     If you have questions or need follow up information about today's clinic visit or your schedule please contact [facility] directly at [phone].  Normal or non-critical lab and imaging results will be communicated to you by Balayahart, letter or phone within 4 business days after the clinic has received the results. If you do not hear from us within 7 days, please contact the clinic through GottaParkt or phone. If you have a critical or abnormal lab result, we will notify you by phone as soon as possible.  Submit refill requests through [employer] or call your pharmacy and they will forward the refill request to us. Please allow 3 business days for your refill to be completed.          Additional Information About Your Visit        [employer] Information     [employer] gives you secure access to your electronic health record. If you see a primary care provider, you can also send messages to your care team and make appointments. If you have questions, please call your primary care clinic.  If you do not have a primary care provider, please call [phone] and they will assist you.        Care EveryWhere ID     This is your Care EveryWhere ID. This could be used by other organizations to access your [facility] records  [id number]        Your Vitals Were     Pulse Height Pulse Oximetry BMI (Body Mass Index)          65 1.778 m (5' 10\") 95% 31.19 kg/m2         Blood Pressure from Last 3 Encounters:   11/20/17 128/73   11/20/17 128/73   11/15/17 133/76    Weight from Last 3 Encounters:   11/20/17 98.6 kg (217 lb 6 oz)   11/20/17 98.6 kg (217 lb 6.4 oz)   11/15/17 96.2 kg (212 lb)              Today, you had the following     No orders found for display       Primary Care Provider Office Phone # Fax #    Anastacio Love [phone] [address] [facility] [address]        Equal Access to " Services     [facility]: Hadii aad ku hadwilliamradha Sophierob, wanathanielda luqadaha, qaybta kaandrewyaw gross, marilynn dasilva . So [facility] [phone].    ATENCIÓN: Si devon muhammad, tiene a warner disposición servicios gratuitos de asistencia lingüística. Llame al [phone].    We comply with applicable federal civil rights laws and [place] laws. We do not discriminate on the basis of race, color, national origin, age, disability, sex, sexual orientation, or gender identity.            Thank you!     Thank you for choosing [facility]  for your care. Our goal is always to provide you with excellent care. Hearing back from our patients is one way we can continue to improve our services. Please take a few minutes to complete the written survey that you may receive in the mail after your visit with us. Thank you!             Your Updated Medication List - Protect others around you: Learn how to safely use, store and throw away your medicines at www.disposemymeds.org.          This list is accurate as of: 11/20/17  4:02 PM.  Always use your most recent med list.                   Brand Name Dispense Instructions for use Diagnosis    aspirin 325 MG EC tablet      1/2 tab daily        atorvastatin 40 MG tablet    LIPITOR    60 tablet    Take 1 tablet (40 mg) by mouth daily    Hyperlipidemia LDL goal <100       latanoprost 0.005 % ophthalmic solution    XALATAN    3 Bottle    Place 1 drop into both eyes At Bedtime    Borderline glaucoma with ocular hypertension, unspecified laterality       levothyroxine 75 MCG tablet    SYNTHROID/LEVOTHROID    90 tablet    Take 1 tablet (75 mcg) by mouth daily    Hypothyroidism, unspecified type       losartan-hydrochlorothiazide 50-12.5 MG per tablet    HYZAAR    90 tablet    Take 1 tablet by mouth daily    Hypertension goal BP (blood pressure) < 150/90       metoprolol 25 MG tablet    LOPRESSOR    60 tablet    Take 0.25 tablets (6.25 mg) by mouth 2 times daily     Unstable angina (H)       multivitamin Tabs tablet      Take 1 tablet by mouth daily        nitroGLYcerin 0.4 MG sublingual tablet    NITROSTAT    25 tablet    For chest pain place 1 tablet under the tongue every 5 minutes for 3 doses. If symptoms persist 5 minutes after 1st dose call 911.    Exertional chest pain       VITAMIN D3 PO      Take by mouth daily

## 2017-11-20 NOTE — NURSING NOTE
Patient seen today for consultation for 3 vessel CA bypass.     Surgery procedure explained to patient and daughter Kayleigh and all questions and concerns were answered and addressed.     Risks and benefits of surgery were discussed with patient (and all present) including risk of death, stroke, bleeding, cardiac ischemia, wound infection, renal failure, arrhythmias and possible pacemaker implantation. Patient accepts these risks and is willing to proceed with surgery.     Reviewed pre surgery tests and procedures will be completed by this afternoon.      Pre surgery preparation folder with instructions for surgery preparation and recovery was reviewed with the patient.      Reviewed after hospital care and follow up.  Patient will decide after surgery if he would like a TCU or stay with his daughter on discharge.    Patient and dtr verbalized understanding of all instructions and will call with any questions or concerns.

## 2017-11-20 NOTE — OR NURSING
MRAPT Score   Home      At first, pt said he was not sure if he needed to go to rehab or not. Then he said his daughters can work from home. He declined a visit from the  in PAC. Pt states he plans to go home after surgery.

## 2017-11-20 NOTE — LETTER
11/20/2017      RE: Zack Demarco  [address] [facility] [address]       Dear Colleague,    Thank you for the opportunity to participate in the care of your patient, Zack Demarco, at the [facility] at [facility]. Please see a copy of my visit note below.        HPI:  History obtained from electronic records and per Patient.      Zack Demarco is a 77 year old male with a history of HTN, Hyperlipidemia, and hypothyroidism who was admitted today to CSI service after coronary angiogram showing multivessel coronary artery disease. He had no previous Hx of cardiac disease.      Patient says CP started in Oct 2017, happens with exertion but not at rest. He was seen by a PCP on 11/7 who provided patient with NTG and ordered Stress Test. Cardiologist Torsten Reyes reviewed stress test 11/9 as abnormal (LV EF 55-60% at rest without motion abnormalities, with stress LVEF decreased and had anteroapical wall akinesis).       Angio performed  via Right wrist showed multivessel disease and CVTS consult was requested for possible CAB for reperfusion of coronary arteries.       Home Meds:   Prescriptions Prior to Admission            Prescriptions Prior to Admission   Medication Sig Dispense Refill Last Dose     atorvastatin (LIPITOR) 40 MG tablet Take 1 tablet (40 mg) by mouth daily 60 tablet 11 11/12/2017 at Unknown time     losartan-hydrochlorothiazide (HYZAAR) 50-12.5 MG per tablet Take 1 tablet by mouth daily 90 tablet 3 11/13/2017 at 0600     levothyroxine (SYNTHROID/LEVOTHROID) 75 MCG tablet Take 1 tablet (75 mcg) by mouth daily 90 tablet 3 11/13/2017 at 0600     Cholecalciferol (VITAMIN D3 PO) Take by mouth daily     11/13/2017 at 0600     multivitamin (OCUVITE) TABS Take 1 tablet by mouth daily     11/12/2017 at Unknown time     latanoprost (XALATAN) 0.005 % ophthalmic solution Place 1 drop into both eyes At Bedtime 3 Bottle 4 11/12/2017 at Unknown time     aspirin 325 MG  EC tablet 1/2 tab daily     11/13/2017 at 0600     nitroGLYcerin (NITROSTAT) 0.4 MG sublingual tablet For chest pain place 1 tablet under the tongue every 5 minutes for 3 doses. If symptoms persist 5 minutes after 1st dose call 911. (Patient not taking: Reported on 11/9/2017) 25 tablet 3 Not Taking            Allergies:       Allergies   Allergen Reactions     Nkda [No Known Drug Allergies]           PMH:   Past Medical History         Past Medical History:   Diagnosis Date     DJD (degenerative joint disease) of hip       right     DJD (degenerative joint disease), multiple sites       Glaucoma       Hernia umbilical       Hypercholesterolemia       Hypertension       Hypothyroidism       Lipoma       Low back pain       Nonsenile cataract       Obesity       S/P coronary angiogram       nl. panic attack            PSH:   Past Surgical History          Past Surgical History:   Procedure Laterality Date     BIOPSY   1980's     fatty tissue     CL AFF SURGICAL PATHOLOGY         COLONOSCOPY   2011     COLONOSCOPY WITH CO2 INSUFFLATION N/A 9/19/2016     Procedure: COLONOSCOPY WITH CO2 INSUFFLATION;  Surgeon: Jeffery Flores MD;  Location: [facility] OR     ROTATOR CUFF REPAIR RT/LT         SOFT TISSUE SURGERY   Sept. 2014     [facility] Tendon transfer (Left Ankle)            FH:   Family History           Family History   Problem Relation Age of Onset     CANCER Mother         pancreatic     CANCER Father         lung     Respiratory Brother         COPD     Eye Disorder Brother       CANCER Sister         liver     CANCER Sister       Musculoskeletal Disorder Sister         back     Macular Degeneration Sister 60     Macular Degeneration Brother 60            SH:  Social History            Social History     Marital status:        Spouse name: N/A     Number of children: N/A     Years of education: N/A              Social History Main Topics     Smoking status: Former Smoker       Packs/day: 1.00       Types:  Cigarettes       Start date: 7/28/1959       Quit date: 7/28/1979     Smokeless tobacco: Former User     Alcohol use 0.0 oz/week          Comment: rarely     Drug use: No     Sexual activity: No           Other Topics Concern     Parent/Sibling W/ Cabg, Mi Or Angioplasty Before 65f 55m? No      Social History Narrative         ROS: No fevers, chills, or night sweats. No recent weight changes.  No new visual or hearing complaints. No sore throat or nasal congestion. No SOB, cough, or wheezing.  No CP, palpitations, syncopal episodes, or dependent edema.  No new muscle or joint pain.  No weakness, numbness, or tingling of extremities. No new headaches. No changes in memory, mood, or affect.  No new rashes or bruises.      Physical Exam:  Temp:  [97.8  F (36.6  C)-97.9  F (36.6  C)] 97.9  F (36.6  C)  Pulse:  [57] 57  Heart Rate:  [56] 56  Resp:  [14-15] 15  BP: (129-130)/(54-58) 130/58  SpO2:  [97 %] 97 %  Gen: NAD, resting comfortably in bed, conversational  Skin: no rashes or bruises, warm, dry  HEENT: normocephalic, atraumatic cranium, EOMI, sclerae anicteric. Oral mucosa pink and moist, no tonsillar edema or erythema, midline trachea, nonpalpable thyroid  Lungs: CTA in all fields, no wheezing or rhonchi  CV: RRR, S1S2 normal, no murmur. Radial pulses and DP pulses symmetric. No dependent edema.   Abd: positive normal pitched bowel sounds, overall soft and non distended, nontender, no hepatosplenomegaly, no masses/guarding/rebound tenderness.   Musculoskeletal: grossly intact, strength 5/5 upper and lower extremities  Neuro: AOx3, CN II-VII grossly intact, sensation/motor intact in upper and lower extremities  Mental: normal mood and affect, regular rate of speech     Labs:  BMP     Recent Labs  Lab 11/13/17  0735      POTASSIUM 4.0   CHLORIDE 108   ELVIRA 8.8   CO2 28   BUN 26   CR 0.91   *      CBC     Recent Labs  Lab 11/13/17  0735   WBC 7.2   RBC 4.45   HGB 14.0   HCT 42.7   MCV 96   MCH 31.5   MCHC  32.8   RDW 12.9         INR  No lab results found in last 7 days.   Hepatic Panel         Lab Results   Component Value Date     AST 30 01/18/2013            Lab Results   Component Value Date     ALT 41 01/18/2013            Lab Results   Component Value Date     ALBUMIN 4.0 01/18/2013        Echo:  Interpretation Summary  No significant valvular abnormalities were noted. Global and regional left  ventricular function is normal with an EF of 60-65%.  Right ventricular function, chamber size, wall motion, and thickness are  normal.  _____________________________________________________________________________  __        Left Ventricle  Global and regional left ventricular function is normal with an EF of 60-65%.  Left ventricular wall thickness is normal. Left ventricular size is normal.  Normal left ventricular filling for age. No regional wall motion abnormalities  are seen.     Right Ventricle  Right ventricular function, chamber size, wall motion, and thickness are  normal.     Atria  Both atria appear normal. The atrial septum is intact as assessed by color  Doppler .     Mitral Valve  Mild mitral annular calcification is present.        Aortic Valve  Trace aortic insufficiency is present.     Tricuspid Valve  The tricuspid valve is normal. Pulmonary artery systolic pressure cannot be  assessed.     Pulmonic Valve  The pulmonic valve is normal. Trace pulmonic insufficiency is present.     Vessels  The aorta root is normal. The pulmonary artery is normal. The inferior vena  cava is normal.     Pericardium  No pericardial effusion is present.     _____________________________________________________________________________  __  MMode/2D Measurements & Calculations  IVSd: 0.78 cm  LVIDd: 5.3 cm  LVIDs: 3.7 cm  LVPWd: 0.75 cm  FS: 30.7 %  EDV(Teich): 134.2 ml  ESV(Teich): 56.6 ml  LV mass(C)d: 141.0 grams  LV mass(C)dI: 65.8 grams/m2     Ao root diam: 3.6 cm  asc Aorta Diam: 3.3 cm  LA Volume (BP): 68.6  ml  LA Volume Index (BP): 32.1 ml/m2  RWT: 0.28        Doppler Measurements & Calculations  MV E max boogie: 64.0 cm/sec  MV A max boogie: 67.9 cm/sec  MV E/A: 0.94  MV dec slope: 429.0 cm/sec2  PA acc time: 0.11 sec     E/E' av.9  Lateral E/e': 6.3  Medial E/e': 9.5        Imaging:     A.  CORONARY ANGIOGRAM:     1. Both coronary arteries arise from their respective cusps.    2. Dominance: Right    3. The LM is without angiographic evidence of disease.     4. LAD: Type 3 [LAD supplies the entire apex].. The LAD gives rise to septal perforators, D1 and D2.  The pLAD has a 70-80% stenosis at the ostium.  The mid LAD has an 80-90% stenosis before and after the diagonal branch with 80-90% of the first diagonal.  Distally there is diffuse disease at less than 25%.    5. LCX gives rise to OM1/2 branches of medium caliber with diffuse luminal disease throughout without angiographic stenosis.    6. RCA gives rise to PL branches and supplies PDA. The proximal RCA has luminal disease, mid RCA has a 70% stenosis, distally there is less than 25% stenosis.  The ostial RPL and PDA each have disease of approximately 30-40% and 50% stenosis respectively.            Patient Active Problem List   Diagnosis     Generalized anxiety disorder     Lipoma of skin and subcutaneous tissue     Hernia umbilical     Patellar tendonitis     Cataract, mild-mod, ou     Arthritis of knee, right     Advanced directives, counseling/discussion     Hyperlipidemia LDL goal <130     Hypertension goal BP (blood pressure) < 150/90     Borderline glaucoma with ocular hypertension, bilateral - treated     Posterior vitreous detachment, left     Hypothyroidism, unspecified type     Macular drusen, bilateral         A/P: Zack Demarco is a 77 year old male with a history of HTN, Hyperlipidemia, and hypothyroidism who was admitted today to CSI service after coronary angiogram showing multivessel coronary artery disease with significant LAD disease, history of  recent stable angina. 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 He has since been seen by Surgical oncology as well as by Endocrinology. They have recommended that we proceed with CABG and consider evaluation of the mass later when he recovers when CABG. I discussed the risks and benefits of CABG with the patient and the family including the risks of death, bleeding, stroke, infection, renal failure. They understand and are willing to proceed with surgery this week.    Rolando Toro MD

## 2017-11-20 NOTE — MR AVS SNAPSHOT
After Visit Summary   2017    Zack Demarco    MRN: [id number]           Patient Information     Date Of Birth          1940        Visit Information        Provider Department      2017 3:00 PM [facility]        Care Instructions    Preparing for Your Surgery      Name:  Zack Demarco   MRN:  [id number]   :  1940   Today's Date:  2017     Arriving for surgery:  Surgery date:  17  Surgery time:  1:40 pm  Arrival time:  11:40 am  Please come to:       [facility] [address]  [address] [place]  [address]    -   parking is available in front of the hospital from 5:15 am to 8:00 pm    -  Stop at the Information Desk in the lobby    -   Inform the information person that you are here for surgery. An escort to 3c will be provided. If you would not like an escort, please proceed to 3C on the 3rd floor. [phone]     What can I eat or drink?  -  You may have solid food or milk products until 8 hours prior to your surgery. No food/milk after midnight.  -  You may have water or 7up/Sprite until 2 hours prior to your surgery-until 11:40 am    Which medicines can I take? Last dose of Aspirin before surgery is . No vitamins, supplements or anti-inflammatories before surgery.    -  Do NOT take these medications the day of surgery: Losartan-Hydrochlorathiazide, Aspirin    -  It is OKAY to take these medications: Metoprolol, Levothyroxine, Atorvastatin, Nitroglycerin if needed.      How do I prepare myself?  -  Take two showers: one the night before surgery; and one the morning of surgery.         Use Scrubcare or Hibiclens to wash from neck down.  You may use your own shampoo and conditioner. No other hair products like gel/spray  -No shaving for 24 hours before surgery.   -  Do NOT use lotion, powder, deodorant, or antiperspirant the day of your surgery.  -  Do NOT wear  jewelry.  -   Begin using Incentive Spirometer 1 week prior to surgery.  Use 4 times per day, up to 5-10 breaths each time.  Bring Incentive Spirometer to hospital.  -Do not bring your own medications to the hospital..  -  Bring your ID and insurance card.    Questions or Concerns:  If you have questions or concerns, please call the  Preoperative Assessment Center, Monday-Friday 7AM-7PM:  [phone]      Using an Incentive Spirometer    An incentive spirometer is a device that helps you do deep breathing exercises. These exercises expand your lungs, aid in circulation, and help prevent pneumonia. Deep breathing exercises also help you breathe better and improve the function of your lungs by:    Keeping your lungs clear    Strengthening your breathing muscles    Helping prevent respiratory complications or problems  The incentive spirometer gives you a way to take an active part in recover. A nurse or therapist will teach you breathing exercises. To do these exercises, you will breathe in through your mouth and not your nose. The incentive spirometer only works correctly if you breathe in through your mouth.  Steps to clear lungs  Step 1. Exhale normally. Then, inhale normally.    Relax and breathe out.  Step 2. Place your lips tightly around the mouthpiece.    Make sure the device is upright and not tilted.  Step 3. Inhale as much air as you can through the mouthpiece (don't breath through your nose).    Inhale slowly and deeply.    Hold your breath long enough to keep the balls or disk raised for at least 3 to 5 seconds, or as instructed by your healthcare provider.    Some spirometers have an indicator to let you know that you are breathing in too fast. If the indicator goes off, breathe in more slowly.  Step 4. Repeat the exercise regularly.    Do this exercise every hour while you're awake, or as instructed by your healthcare provider.    If you were taught deep breathing and coughing exercises, do them regularly as  instructed by your healthcare provider.   Follow-up care  Make a follow up appointment, or as directed. Also, follow up with your healthcare provider as advised or if your symptoms do not improve or continue to get worse.  When to call your healthcare provider  Call your healthcare provider right away if you have any of the following:    Fever 100.4  (38 C) or higher, or as directed by your healthcare provider    Brownish, bloody, or smelly sputum  Call 911  Call 911 if any of these occur:     Shortness of breath that doesn't get better after taking your medicine    Cool, moist, pale or blue skin    Trouble breathing or swallowing, wheezing    Fainting or loss of consciousness    Feeling of fizziness or weakness or a sudden drop in blood pressure    Feeling of doom or lightheaded    Chest pain or rapid heart rate  Date Last Reviewed: 1/1/2017 2000-2017 [employer]. [address] [place] [address]. All rights reserved. This information is not intended as a substitute for professional medical care. Always follow your healthcare professional's instructions.    AFTER YOUR SURGERY  Breathing exercises   Breathing exercises help you recover faster. Take deep breaths and let the air out slowly. This will:     Help you wake up after surgery.    Help prevent complications like pneumonia.  Preventing complications will help you go home sooner.   We may give you a breathing device (incentive spirometer) to encourage you to breathe deeply.   Nausea and vomiting   You may feel sick to your stomach after surgery; if so, let your nurse know.    Pain control:  After surgery, you may have pain. Our goal is to help you manage your pain. Pain medicine will help you feel comfortable enough to do activities that will help you heal.  These activities may include breathing exercises, walking and physical therapy.   To help your health care team treat your pain we will ask: 1) If you have pain  2) where it is  located 3) describe your pain in your words  Methods of pain control include medications given by mouth, vein or by nerve block for some surgeries.  We may give you a pain control pump that will:  1) Deliver the medicine through a tube placed in your vein  2) Control the amount of medicine you receive  3) Allow you to push a button to deliver a dose of pain medicine  Sequential Compression Device (SCD) or Pneumo Boots:  You may need to wear SCD S on your legs or feet. These are wraps connected to a machine that pumps in air and releases it. The repeated pumping helps prevent blood clots from forming.                       Follow-ups after your visit        Your next 10 appointments already scheduled     Nov 20, 2017  4:30 PM CST   (Arrive by 4:15 PM)   PAC Anesthesia Consult with Madhavi Pac Anesthesiologist   [facility] ([facility])    [address]  [address] [facility] [address] [phone]            Nov 20, 2017  5:00 PM CST   (Arrive by 4:45 PM)   PAC RN ASSESSMENT with Madhavi Pac Rn   [facility] ([facility])    [address] [place] [address]  [address] [facility] [address] [phone]            Nov 22, 2017   Procedure with Rolando Toro MD   [facility] [place], Same Day Surgery (--)    [address] [facility] [address]   [phone]            Jan 17, 2018  8:00 AM CST   New Visit with Avtar Mccullough MD   [facility]    [address]  [place] [address] [phone]              Future tests that were ordered for you today     Open Future Orders        Priority Expected Expires Ordered    IR Consultation for IR Exam Routine  11/20/2018 11/20/2017            Who to contact     Please call your clinic at [phone] to:    Ask questions about your health    Make or cancel appointments    Discuss your medicines    Learn about your test  results    Speak to your doctor   If you have compliments or concerns about an experience at your clinic, or if you wish to file a complaint, please contact [facility] Patient Relations at [phone] or email us at [email or web address]         Additional Information About Your Visit        [employer] Information     [employer] gives you secure access to your electronic health record. If you see a primary care provider, you can also send messages to your care team and make appointments. If you have questions, please call your primary care clinic.  If you do not have a primary care provider, please call [phone] and they will assist you.      [employer] is an electronic gateway that provides easy, online access to your medical records. With [employer], you can request a clinic appointment, read your test results, renew a prescription or communicate with your care team.     To access your existing account, please contact your [facility] Clinic or call [phone] for assistance.        Care EveryWhere ID     This is your Care EveryWhere ID. This could be used by other organizations to access your [facility] medical records  [id number]         Blood Pressure from Last 3 Encounters:   11/20/17 128/73   11/15/17 133/76   11/09/17 152/66    Weight from Last 3 Encounters:   11/20/17 98.6 kg (217 lb 6.4 oz)   11/15/17 96.2 kg (212 lb)   11/09/17 99.2 kg (218 lb 9.6 oz)              Today, you had the following     No orders found for display       Primary Care Provider Office Phone # Fax #    Anastacio Love [phone] [address] [facility] [address]        Equal Access to Services     SHEYLA BURR : Hadii stefany Farr, jozef tinoco, marilynn caballero. So [facility] [phone].    ATENCIÓN: Si habla español, tiene a warner disposición servicios gratuitos de asistencia lingüística.  Richard hodges [phone].    We comply with applicable federal civil rights laws and [place] laws. We do not discriminate on the basis of race, color, national origin, age, disability, sex, sexual orientation, or gender identity.            Thank you!     Thank you for choosing [facility]  for your care. Our goal is always to provide you with excellent care. Hearing back from our patients is one way we can continue to improve our services. Please take a few minutes to complete the written survey that you may receive in the mail after your visit with us. Thank you!             Your Updated Medication List - Protect others around you: Learn how to safely use, store and throw away your medicines at www.disposemymeds.org.          This list is accurate as of: 11/20/17  3:30 PM.  Always use your most recent med list.                   Brand Name Dispense Instructions for use Diagnosis    aspirin 325 MG EC tablet      1/2 tab daily        atorvastatin 40 MG tablet    LIPITOR    60 tablet    Take 1 tablet (40 mg) by mouth daily    Hyperlipidemia LDL goal <100       latanoprost 0.005 % ophthalmic solution    XALATAN    3 Bottle    Place 1 drop into both eyes At Bedtime    Borderline glaucoma with ocular hypertension, unspecified laterality       levothyroxine 75 MCG tablet    SYNTHROID/LEVOTHROID    90 tablet    Take 1 tablet (75 mcg) by mouth daily    Hypothyroidism, unspecified type       losartan-hydrochlorothiazide 50-12.5 MG per tablet    HYZAAR    90 tablet    Take 1 tablet by mouth daily    Hypertension goal BP (blood pressure) < 150/90       metoprolol 25 MG tablet    LOPRESSOR    60 tablet    Take 0.25 tablets (6.25 mg) by mouth 2 times daily    Unstable angina (H)       multivitamin Tabs tablet      Take 1 tablet by mouth daily        nitroGLYcerin 0.4 MG sublingual tablet    NITROSTAT    25 tablet    For chest pain place 1 tablet under the tongue every 5 minutes for 3 doses. If  symptoms persist 5 minutes after 1st dose call 911.    Exertional chest pain       VITAMIN D3 PO      Take by mouth daily

## 2017-11-20 NOTE — PATIENT INSTRUCTIONS
You were seen today in the [facility]                     Cardiothoracic Surgery Clinic    Your surgeon was Dr Rolando Toro recommends:    1. Follow up with PAC this afternoon as scheduled.    2. Surgery to be at 11:40 am on 12/22.      Please feel free to call me with any questions or concerns.    Aylin Rocha, RN Care Coordinator  Cardiothoracic Surgery Division  [facility]   [phone]

## 2017-11-20 NOTE — PROGRESS NOTES
HPI:  History obtained from electronic records and per Patient.      Zack Demarco is a 77 year old male with a history of HTN, Hyperlipidemia, and hypothyroidism who was admitted today to [facility] service after coronary angiogram showing multivessel coronary artery disease. He had no previous Hx of cardiac disease.      Patient says CP started in Oct 2017, happens with exertion but not at rest. He was seen by a PCP on 11/7 who provided patient with NTG and ordered Stress Test. Cardiologist Torsten Reyes reviewed stress test 11/9 as abnormal (LV EF 55-60% at rest without motion abnormalities, with stress LVEF decreased and had anteroapical wall akinesis).       Angio performed  via Right wrist showed multivessel disease and CVTS consult was requested for possible CAB for reperfusion of coronary arteries.       Home Meds:   Prescriptions Prior to Admission            Prescriptions Prior to Admission   Medication Sig Dispense Refill Last Dose     atorvastatin (LIPITOR) 40 MG tablet Take 1 tablet (40 mg) by mouth daily 60 tablet 11 11/12/2017 at Unknown time     losartan-hydrochlorothiazide (HYZAAR) 50-12.5 MG per tablet Take 1 tablet by mouth daily 90 tablet 3 11/13/2017 at 0600     levothyroxine (SYNTHROID/LEVOTHROID) 75 MCG tablet Take 1 tablet (75 mcg) by mouth daily 90 tablet 3 11/13/2017 at 0600     Cholecalciferol (VITAMIN D3 PO) Take by mouth daily     11/13/2017 at 0600     multivitamin (OCUVITE) TABS Take 1 tablet by mouth daily     11/12/2017 at Unknown time     latanoprost (XALATAN) 0.005 % ophthalmic solution Place 1 drop into both eyes At Bedtime 3 Bottle 4 11/12/2017 at Unknown time     aspirin 325 MG EC tablet 1/2 tab daily     11/13/2017 at 0600     nitroGLYcerin (NITROSTAT) 0.4 MG sublingual tablet For chest pain place 1 tablet under the tongue every 5 minutes for 3 doses. If symptoms persist 5 minutes after 1st dose call 911. (Patient not taking: Reported on 11/9/2017) 25 tablet 3 Not Taking             Allergies:       Allergies   Allergen Reactions     Nkda [No Known Drug Allergies]           PMH:   Past Medical History    Past Medical History:   Diagnosis Date     DJD (degenerative joint disease) of hip       right     DJD (degenerative joint disease), multiple sites       Glaucoma       Hernia umbilical       Hypercholesterolemia       Hypertension       Hypothyroidism       Lipoma       Low back pain       Nonsenile cataract       Obesity       S/P coronary angiogram       nl. panic attack            PSH:   Past Surgical History          Past Surgical History:   Procedure Laterality Date     BIOPSY   1980's     fatty tissue     [facility] PATHOLOG[facility]         COLONOSCOPY   2011     COLONOSCOPY WITH CO2 INSUFFLATION N/A 9/19/2016     Procedure: COLONOSCOPY WITH CO2 INSUFFLATION;  Surgeon: Jeffery Flores MD;  Location: [facility] OR     ROTATOR CUFF REPAIR RT/LT         SOFT TISSUE SURGERY   Sept. 2014     [facility] Tendon transfer (Left Ankle)            FH:   Family History           Family History   Problem Relation Age of Onset     CANCER Mother         pancreatic     CANCER Father         lung     Respiratory Brother         COPD     Eye Disorder Brother       CANCER Sister         liver     CANCER Sister       Musculoskeletal Disorder Sister         back     Macular Degeneration Sister 60     Macular Degeneration Brother 60            SH:  Social History            Social History     Marital status:        Spouse name: N/A     Number of children: N/A     Years of education: N/A              Social History Main Topics     Smoking status: Former Smoker       Packs/day: 1.00       Types: Cigarettes       Start date: 7/28/1959       Quit date: 7/28/1979     Smokeless tobacco: Former User     Alcohol use 0.0 oz/week          Comment: rarely     Drug use: No     Sexual activity: No           Other Topics Concern     Parent/Sibling W/ Cabg, Mi Or Angioplasty Before 65f 55m? No      Social History  Narrative         ROS: No fevers, chills, or night sweats. No recent weight changes.  No new visual or hearing complaints. No sore throat or nasal congestion. No SOB, cough, or wheezing.  No CP, palpitations, syncopal episodes, or dependent edema.  No new muscle or joint pain.  No weakness, numbness, or tingling of extremities. No new headaches. No changes in memory, mood, or affect.  No new rashes or bruises.      Physical Exam:  Temp:  [97.8  F (36.6  C)-97.9  F (36.6  C)] 97.9  F (36.6  C)  Pulse:  [57] 57  Heart Rate:  [56] 56  Resp:  [14-15] 15  BP: (129-130)/(54-58) 130/58  SpO2:  [97 %] 97 %  Gen: NAD, resting comfortably in bed, conversational  Skin: no rashes or bruises, warm, dry  HEENT: normocephalic, atraumatic cranium, EOMI, sclerae anicteric. Oral mucosa pink and moist, no tonsillar edema or erythema, midline trachea, nonpalpable thyroid  Lungs: CTA in all fields, no wheezing or rhonchi  CV: RRR, S1S2 normal, no murmur. Radial pulses and DP pulses symmetric. No dependent edema.   Abd: positive normal pitched bowel sounds, overall soft and non distended, nontender, no hepatosplenomegaly, no masses/guarding/rebound tenderness.   Musculoskeletal: grossly intact, strength 5/5 upper and lower extremities  Neuro: AOx3, CN II-VII grossly intact, sensation/motor intact in upper and lower extremities  Mental: normal mood and affect, regular rate of speech     Labs:  BMP     Recent Labs  Lab 11/13/17  0735      POTASSIUM 4.0   CHLORIDE 108   ELVIRA 8.8   CO2 28   BUN 26   CR 0.91   *      CBC     Recent Labs  Lab 11/13/17  0735   WBC 7.2   RBC 4.45   HGB 14.0   HCT 42.7   MCV 96   MCH 31.5   MCHC 32.8   RDW 12.9         INR  No lab results found in last 7 days.   Hepatic Panel         Lab Results   Component Value Date     AST 30 01/18/2013            Lab Results   Component Value Date     ALT 41 01/18/2013            Lab Results   Component Value Date     ALBUMIN 4.0 01/18/2013         Echo:  Interpretation Summary  No significant valvular abnormalities were noted. Global and regional left  ventricular function is normal with an EF of 60-65%.  Right ventricular function, chamber size, wall motion, and thickness are  normal.  _____________________________________________________________________________  __        Left Ventricle  Global and regional left ventricular function is normal with an EF of 60-65%.  Left ventricular wall thickness is normal. Left ventricular size is normal.  Normal left ventricular filling for age. No regional wall motion abnormalities  are seen.     Right Ventricle  Right ventricular function, chamber size, wall motion, and thickness are  normal.     Atria  Both atria appear normal. The atrial septum is intact as assessed by color  Doppler .     Mitral Valve  Mild mitral annular calcification is present.        Aortic Valve  Trace aortic insufficiency is present.     Tricuspid Valve  The tricuspid valve is normal. Pulmonary artery systolic pressure cannot be  assessed.     Pulmonic Valve  The pulmonic valve is normal. Trace pulmonic insufficiency is present.     Vessels  The aorta root is normal. The pulmonary artery is normal. The inferior vena  cava is normal.     Pericardium  No pericardial effusion is present.     _____________________________________________________________________________  __  MMode/2D Measurements & Calculations  IVSd: 0.78 cm  LVIDd: 5.3 cm  LVIDs: 3.7 cm  LVPWd: 0.75 cm  FS: 30.7 %  EDV(Teich): 134.2 ml  ESV(Teich): 56.6 ml  LV mass(C)d: 141.0 grams  LV mass(C)dI: 65.8 grams/m2     Ao root diam: 3.6 cm  asc Aorta Diam: 3.3 cm  LA Volume (BP): 68.6 ml  LA Volume Index (BP): 32.1 ml/m2  RWT: 0.28        Doppler Measurements & Calculations  MV E max boogie: 64.0 cm/sec  MV A max boogie: 67.9 cm/sec  MV E/A: 0.94  MV dec slope: 429.0 cm/sec2  PA acc time: 0.11 sec     E/E' av.9  Lateral E/e': 6.3  Medial E/e': 9.5        Imaging:     A.  CORONARY  ANGIOGRAM:     1. Both coronary arteries arise from their respective cusps.    2. Dominance: Right    3. The LM is without angiographic evidence of disease.     4. LAD: Type 3 [LAD supplies the entire apex].. The LAD gives rise to septal perforators, D1 and D2.  The pLAD has a 70-80% stenosis at the ostium.  The mid LAD has an 80-90% stenosis before and after the diagonal branch with 80-90% of the first diagonal.  Distally there is diffuse disease at less than 25%.    5. LCX gives rise to OM1/2 branches of medium caliber with diffuse luminal disease throughout without angiographic stenosis.    6. RCA gives rise to PL branches and supplies PDA. The proximal RCA has luminal disease, mid RCA has a 70% stenosis, distally there is less than 25% stenosis.  The ostial RPL and PDA each have disease of approximately 30-40% and 50% stenosis respectively.            Patient Active Problem List   Diagnosis     Generalized anxiety disorder     Lipoma of skin and subcutaneous tissue     Hernia umbilical     Patellar tendonitis     Cataract, mild-mod, ou     Arthritis of knee, right     Advanced directives, counseling/discussion     Hyperlipidemia LDL goal <130     Hypertension goal BP (blood pressure) < 150/90     Borderline glaucoma with ocular hypertension, bilateral - treated     Posterior vitreous detachment, left     Hypothyroidism, unspecified type     Macular drusen, bilateral         A/P: Zack Demarco is a 77 year old male with a history of HTN, Hyperlipidemia, and hypothyroidism who was admitted today to [facility] service after coronary angiogram showing multivessel coronary artery disease with significant LAD disease, history of recent stable angina. 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 He has since been seen by Surgical oncology as well as by Endocrinology. They have recommended that we proceed with CABG and consider  evaluation of the mass later when he recovers when CABG. I discussed the risks and benefits of CABG with the patient and the family including the risks of death, bleeding, stroke, infection, renal failure. They understand and are willing to proceed with surgery this week.

## 2017-11-21 DIAGNOSIS — I25.110 CORONARY ARTERY DISEASE INVOLVING NATIVE CORONARY ARTERY OF NATIVE HEART WITH UNSTABLE ANGINA PECTORIS (H): Primary | ICD-10-CM

## 2017-11-22 ENCOUNTER — HOSPITAL ENCOUNTER (INPATIENT)
Facility: CLINIC | Age: 77
LOS: 5 days | Discharge: HOME | DRG: 236 | End: 2017-11-27
Attending: SURGERY | Admitting: ANESTHESIOLOGY
Payer: MEDICARE

## 2017-11-22 ENCOUNTER — APPOINTMENT (OUTPATIENT)
Dept: GENERAL RADIOLOGY | Facility: CLINIC | Age: 77
DRG: 236 | End: 2017-11-22
Attending: THORACIC SURGERY (CARDIOTHORACIC VASCULAR SURGERY)
Payer: MEDICARE

## 2017-11-22 DIAGNOSIS — G89.18 ACUTE POST-OPERATIVE PAIN: ICD-10-CM

## 2017-11-22 DIAGNOSIS — Z95.1 S/P CABG (CORONARY ARTERY BYPASS GRAFT): Primary | ICD-10-CM

## 2017-11-22 DIAGNOSIS — I25.110 CORONARY ARTERY DISEASE INVOLVING NATIVE CORONARY ARTERY OF NATIVE HEART WITH UNSTABLE ANGINA PECTORIS (H): ICD-10-CM

## 2017-11-22 PROBLEM — I25.10 CORONARY ARTERY DISEASE: Status: ACTIVE | Noted: 2017-11-22

## 2017-11-22 LAB
ABO + RH BLD: NORMAL
ABO + RH BLD: NORMAL
ANION GAP SERPL CALCULATED.3IONS-SCNC: 10 MMOL/L (ref 3–14)
APTT PPP: 29 SEC (ref 22–37)
APTT PPP: 48 SEC (ref 22–37)
BASE DEFICIT BLDA-SCNC: 1.1 MMOL/L
BASE DEFICIT BLDA-SCNC: 10.5 MMOL/L
BASE DEFICIT BLDA-SCNC: 2.6 MMOL/L
BASE DEFICIT BLDA-SCNC: 3.4 MMOL/L
BASE DEFICIT BLDV-SCNC: 3.1 MMOL/L
BASE EXCESS BLDA CALC-SCNC: 0.1 MMOL/L
BASE EXCESS BLDA CALC-SCNC: 0.8 MMOL/L
BASE EXCESS BLDA CALC-SCNC: 1.7 MMOL/L
BASE EXCESS BLDV CALC-SCNC: 0.7 MMOL/L
BLD GP AB SCN SERPL QL: NORMAL
BLD PROD TYP BPU: NORMAL
BLD UNIT ID BPU: 0
BLOOD BANK CMNT PATIENT-IMP: NORMAL
BLOOD PRODUCT CODE: NORMAL
BPU ID: NORMAL
BUN SERPL-MCNC: 23 MG/DL (ref 7–30)
CA-I BLD-MCNC: 4.1 MG/DL (ref 4.4–5.2)
CA-I BLD-MCNC: 4.2 MG/DL (ref 4.4–5.2)
CA-I BLD-MCNC: 4.3 MG/DL (ref 4.4–5.2)
CA-I BLD-MCNC: 4.3 MG/DL (ref 4.4–5.2)
CA-I BLD-MCNC: 4.6 MG/DL (ref 4.4–5.2)
CA-I BLD-MCNC: 4.8 MG/DL (ref 4.4–5.2)
CA-I BLD-MCNC: 5 MG/DL (ref 4.4–5.2)
CALCIUM SERPL-MCNC: 8.3 MG/DL (ref 8.5–10.1)
CHLORIDE SERPL-SCNC: 111 MMOL/L (ref 94–109)
CO2 SERPL-SCNC: 22 MMOL/L (ref 20–32)
CREAT SERPL-MCNC: 1.1 MG/DL (ref 0.66–1.25)
ERYTHROCYTE [DISTWIDTH] IN BLOOD BY AUTOMATED COUNT: 12.6 % (ref 10–15)
FIBRINOGEN PPP-MCNC: 120 MG/DL (ref 200–420)
GFR SERPL CREATININE-BSD FRML MDRD: 65 ML/MIN/1.7M2
GLUCOSE BLD-MCNC: 129 MG/DL (ref 70–99)
GLUCOSE BLD-MCNC: 130 MG/DL (ref 70–99)
GLUCOSE BLD-MCNC: 161 MG/DL (ref 70–99)
GLUCOSE BLD-MCNC: 167 MG/DL (ref 70–99)
GLUCOSE BLD-MCNC: 174 MG/DL (ref 70–99)
GLUCOSE BLD-MCNC: 93 MG/DL (ref 70–99)
GLUCOSE BLDC GLUCOMTR-MCNC: 151 MG/DL (ref 70–99)
GLUCOSE BLDC GLUCOMTR-MCNC: 157 MG/DL (ref 70–99)
GLUCOSE BLDC GLUCOMTR-MCNC: 166 MG/DL (ref 70–99)
GLUCOSE SERPL-MCNC: 165 MG/DL (ref 70–99)
HCO3 BLD-SCNC: 16 MMOL/L (ref 21–28)
HCO3 BLD-SCNC: 22 MMOL/L (ref 21–28)
HCO3 BLD-SCNC: 24 MMOL/L (ref 21–28)
HCO3 BLD-SCNC: 25 MMOL/L (ref 21–28)
HCO3 BLD-SCNC: 26 MMOL/L (ref 21–28)
HCO3 BLD-SCNC: 27 MMOL/L (ref 21–28)
HCO3 BLD-SCNC: 28 MMOL/L (ref 21–28)
HCO3 BLDV-SCNC: 24 MMOL/L (ref 21–28)
HCO3 BLDV-SCNC: 28 MMOL/L (ref 21–28)
HCT VFR BLD AUTO: 37.6 % (ref 40–53)
HGB BLD-MCNC: 10.7 G/DL (ref 13.3–17.7)
HGB BLD-MCNC: 10.7 G/DL (ref 13.3–17.7)
HGB BLD-MCNC: 10.8 G/DL (ref 13.3–17.7)
HGB BLD-MCNC: 11.1 G/DL (ref 13.3–17.7)
HGB BLD-MCNC: 11.2 G/DL (ref 13.3–17.7)
HGB BLD-MCNC: 12.4 G/DL (ref 13.3–17.7)
HGB BLD-MCNC: 12.8 G/DL (ref 13.3–17.7)
INR PPP: 1.25 (ref 0.86–1.14)
INR PPP: 2.71 (ref 0.86–1.14)
LACTATE BLD-SCNC: 0.6 MMOL/L (ref 0.7–2)
LACTATE BLD-SCNC: 1.1 MMOL/L (ref 0.7–2)
LACTATE BLD-SCNC: 1.1 MMOL/L (ref 0.7–2)
LACTATE BLD-SCNC: 1.3 MMOL/L (ref 0.7–2)
LACTATE BLD-SCNC: 1.7 MMOL/L (ref 0.7–2)
LACTATE BLD-SCNC: 2.4 MMOL/L (ref 0.7–2)
LACTATE BLD-SCNC: 3.7 MMOL/L (ref 0.7–2)
LACTATE BLD-SCNC: 6.2 MMOL/L (ref 0.7–2)
MAGNESIUM SERPL-MCNC: 2.6 MG/DL (ref 1.6–2.3)
MCH RBC QN AUTO: 31.2 PG (ref 26.5–33)
MCHC RBC AUTO-ENTMCNC: 33 G/DL (ref 31.5–36.5)
MCV RBC AUTO: 95 FL (ref 78–100)
MRSA DNA SPEC QL NAA+PROBE: NEGATIVE
NUM BPU REQUESTED: 2
NUM BPU REQUESTED: 2
O2/TOTAL GAS SETTING VFR VENT: 100 %
O2/TOTAL GAS SETTING VFR VENT: 100 %
O2/TOTAL GAS SETTING VFR VENT: 50 %
O2/TOTAL GAS SETTING VFR VENT: 70 %
O2/TOTAL GAS SETTING VFR VENT: 80 %
OXYHGB MFR BLD: 94 % (ref 92–100)
OXYHGB MFR BLD: 96 % (ref 92–100)
OXYHGB MFR BLDV: 66 %
PCO2 BLD: 37 MM HG (ref 35–45)
PCO2 BLD: 37 MM HG (ref 35–45)
PCO2 BLD: 41 MM HG (ref 35–45)
PCO2 BLD: 50 MM HG (ref 35–45)
PCO2 BLD: 51 MM HG (ref 35–45)
PCO2 BLD: 51 MM HG (ref 35–45)
PCO2 BLD: 52 MM HG (ref 35–45)
PCO2 BLDV: 52 MM HG (ref 40–50)
PCO2 BLDV: 56 MM HG (ref 40–50)
PH BLD: 7.24 PH (ref 7.35–7.45)
PH BLD: 7.3 PH (ref 7.35–7.45)
PH BLD: 7.3 PH (ref 7.35–7.45)
PH BLD: 7.33 PH (ref 7.35–7.45)
PH BLD: 7.34 PH (ref 7.35–7.45)
PH BLD: 7.35 PH (ref 7.35–7.45)
PH BLD: 7.43 PH (ref 7.35–7.45)
PH BLDV: 7.28 PH (ref 7.32–7.43)
PH BLDV: 7.31 PH (ref 7.32–7.43)
PHOSPHATE SERPL-MCNC: 3.2 MG/DL (ref 2.5–4.5)
PLATELET # BLD AUTO: 182 10E9/L (ref 150–450)
PLATELET # BLD AUTO: 83 10E9/L (ref 150–450)
PO2 BLD: 114 MM HG (ref 80–105)
PO2 BLD: 212 MM HG (ref 80–105)
PO2 BLD: 266 MM HG (ref 80–105)
PO2 BLD: 293 MM HG (ref 80–105)
PO2 BLD: 323 MM HG (ref 80–105)
PO2 BLD: 461 MM HG (ref 80–105)
PO2 BLD: 91 MM HG (ref 80–105)
PO2 BLDV: 39 MM HG (ref 25–47)
PO2 BLDV: 50 MM HG (ref 25–47)
POTASSIUM BLD-SCNC: 3.6 MMOL/L (ref 3.4–5.3)
POTASSIUM BLD-SCNC: 4.1 MMOL/L (ref 3.4–5.3)
POTASSIUM BLD-SCNC: 4.2 MMOL/L (ref 3.4–5.3)
POTASSIUM BLD-SCNC: 4.3 MMOL/L (ref 3.4–5.3)
POTASSIUM BLD-SCNC: 4.7 MMOL/L (ref 3.4–5.3)
POTASSIUM BLD-SCNC: 5 MMOL/L (ref 3.4–5.3)
POTASSIUM SERPL-SCNC: 4 MMOL/L (ref 3.4–5.3)
POTASSIUM SERPL-SCNC: 4.4 MMOL/L (ref 3.4–5.3)
RBC # BLD AUTO: 3.98 10E12/L (ref 4.4–5.9)
SODIUM BLD-SCNC: 141 MMOL/L (ref 133–144)
SODIUM BLD-SCNC: 142 MMOL/L (ref 133–144)
SODIUM SERPL-SCNC: 143 MMOL/L (ref 133–144)
SPECIMEN EXP DATE BLD: NORMAL
SPECIMEN SOURCE: NORMAL
TRANSFUSION STATUS PATIENT QL: NORMAL
WBC # BLD AUTO: 21.8 10E9/L (ref 4–11)

## 2017-11-22 PROCEDURE — 85384 FIBRINOGEN ACTIVITY: CPT | Performed by: SURGERY

## 2017-11-22 PROCEDURE — 25000125 ZZHC RX 250: Performed by: ANESTHESIOLOGY

## 2017-11-22 PROCEDURE — 25000125 ZZHC RX 250: Performed by: NURSE ANESTHETIST, CERTIFIED REGISTERED

## 2017-11-22 PROCEDURE — 83605 ASSAY OF LACTIC ACID: CPT | Performed by: THORACIC SURGERY (CARDIOTHORACIC VASCULAR SURGERY)

## 2017-11-22 PROCEDURE — 25000125 ZZHC RX 250: Performed by: THORACIC SURGERY (CARDIOTHORACIC VASCULAR SURGERY)

## 2017-11-22 PROCEDURE — 82330 ASSAY OF CALCIUM: CPT

## 2017-11-22 PROCEDURE — 82947 ASSAY GLUCOSE BLOOD QUANT: CPT

## 2017-11-22 PROCEDURE — 85049 AUTOMATED PLATELET COUNT: CPT | Performed by: SURGERY

## 2017-11-22 PROCEDURE — P9059 PLASMA, FRZ BETWEEN 8-24HOUR: HCPCS | Performed by: SURGERY

## 2017-11-22 PROCEDURE — 85384 FIBRINOGEN ACTIVITY: CPT | Performed by: THORACIC SURGERY (CARDIOTHORACIC VASCULAR SURGERY)

## 2017-11-22 PROCEDURE — 021109W BYPASS CORONARY ARTERY, TWO ARTERIES FROM AORTA WITH AUTOLOGOUS VENOUS TISSUE, OPEN APPROACH: ICD-10-PCS | Performed by: SURGERY

## 2017-11-22 PROCEDURE — 83605 ASSAY OF LACTIC ACID: CPT | Performed by: ANESTHESIOLOGY

## 2017-11-22 PROCEDURE — 40000141 ZZH STATISTIC PERIPHERAL IV START W/O US GUIDANCE

## 2017-11-22 PROCEDURE — 85730 THROMBOPLASTIN TIME PARTIAL: CPT | Performed by: SURGERY

## 2017-11-22 PROCEDURE — 5A1221Z PERFORMANCE OF CARDIAC OUTPUT, CONTINUOUS: ICD-10-PCS | Performed by: SURGERY

## 2017-11-22 PROCEDURE — 25000128 H RX IP 250 OP 636: Performed by: NURSE ANESTHETIST, CERTIFIED REGISTERED

## 2017-11-22 PROCEDURE — 85730 THROMBOPLASTIN TIME PARTIAL: CPT | Performed by: THORACIC SURGERY (CARDIOTHORACIC VASCULAR SURGERY)

## 2017-11-22 PROCEDURE — 84100 ASSAY OF PHOSPHORUS: CPT | Performed by: THORACIC SURGERY (CARDIOTHORACIC VASCULAR SURGERY)

## 2017-11-22 PROCEDURE — 80076 HEPATIC FUNCTION PANEL: CPT | Performed by: THORACIC SURGERY (CARDIOTHORACIC VASCULAR SURGERY)

## 2017-11-22 PROCEDURE — 94002 VENT MGMT INPAT INIT DAY: CPT

## 2017-11-22 PROCEDURE — 83735 ASSAY OF MAGNESIUM: CPT | Performed by: THORACIC SURGERY (CARDIOTHORACIC VASCULAR SURGERY)

## 2017-11-22 PROCEDURE — A9270 NON-COVERED ITEM OR SERVICE: HCPCS | Mod: GY | Performed by: SURGERY

## 2017-11-22 PROCEDURE — 25000128 H RX IP 250 OP 636

## 2017-11-22 PROCEDURE — 85610 PROTHROMBIN TIME: CPT | Performed by: SURGERY

## 2017-11-22 PROCEDURE — 82947 ASSAY GLUCOSE BLOOD QUANT: CPT | Performed by: ANESTHESIOLOGY

## 2017-11-22 PROCEDURE — 84132 ASSAY OF SERUM POTASSIUM: CPT

## 2017-11-22 PROCEDURE — 27210447 ZZH PACK CELL SAVER CSP: Performed by: SURGERY

## 2017-11-22 PROCEDURE — 87640 STAPH A DNA AMP PROBE: CPT | Performed by: SURGERY

## 2017-11-22 PROCEDURE — 82805 BLOOD GASES W/O2 SATURATION: CPT | Performed by: THORACIC SURGERY (CARDIOTHORACIC VASCULAR SURGERY)

## 2017-11-22 PROCEDURE — 93005 ELECTROCARDIOGRAM TRACING: CPT

## 2017-11-22 PROCEDURE — 82330 ASSAY OF CALCIUM: CPT | Performed by: ANESTHESIOLOGY

## 2017-11-22 PROCEDURE — 27210460 ZZH PUMP APP ADULT PERFUSION: Performed by: SURGERY

## 2017-11-22 PROCEDURE — 25000125 ZZHC RX 250: Performed by: SURGERY

## 2017-11-22 PROCEDURE — 27210995 ZZH RX 272: Performed by: SURGERY

## 2017-11-22 PROCEDURE — 36415 COLL VENOUS BLD VENIPUNCTURE: CPT | Performed by: ANESTHESIOLOGY

## 2017-11-22 PROCEDURE — 80048 BASIC METABOLIC PNL TOTAL CA: CPT | Performed by: THORACIC SURGERY (CARDIOTHORACIC VASCULAR SURGERY)

## 2017-11-22 PROCEDURE — 84295 ASSAY OF SERUM SODIUM: CPT | Performed by: ANESTHESIOLOGY

## 2017-11-22 PROCEDURE — A9270 NON-COVERED ITEM OR SERVICE: HCPCS | Mod: GY | Performed by: THORACIC SURGERY (CARDIOTHORACIC VASCULAR SURGERY)

## 2017-11-22 PROCEDURE — 40000986 XR CHEST PORT 1 VW

## 2017-11-22 PROCEDURE — 25000128 H RX IP 250 OP 636: Performed by: SURGERY

## 2017-11-22 PROCEDURE — P9041 ALBUMIN (HUMAN),5%, 50ML: HCPCS

## 2017-11-22 PROCEDURE — 00000146 ZZHCL STATISTIC GLUCOSE BY METER IP

## 2017-11-22 PROCEDURE — 20000004 ZZH R&B ICU UMMC

## 2017-11-22 PROCEDURE — 83605 ASSAY OF LACTIC ACID: CPT

## 2017-11-22 PROCEDURE — 06BP4ZZ EXCISION OF RIGHT SAPHENOUS VEIN, PERCUTANEOUS ENDOSCOPIC APPROACH: ICD-10-PCS | Performed by: SURGERY

## 2017-11-22 PROCEDURE — 40000275 ZZH STATISTIC RCP TIME EA 10 MIN

## 2017-11-22 PROCEDURE — 25000128 H RX IP 250 OP 636: Performed by: ANESTHESIOLOGY

## 2017-11-22 PROCEDURE — 25000132 ZZH RX MED GY IP 250 OP 250 PS 637: Mod: GY | Performed by: THORACIC SURGERY (CARDIOTHORACIC VASCULAR SURGERY)

## 2017-11-22 PROCEDURE — 82803 BLOOD GASES ANY COMBINATION: CPT

## 2017-11-22 PROCEDURE — 82803 BLOOD GASES ANY COMBINATION: CPT | Performed by: ANESTHESIOLOGY

## 2017-11-22 PROCEDURE — 41000019 ZZH PERA-PERFUSION EACH ADDTL 15 MIN: Performed by: SURGERY

## 2017-11-22 PROCEDURE — 27211024 ZZHC OR SUPPLY OTHER OPNP: Performed by: SURGERY

## 2017-11-22 PROCEDURE — C9399 UNCLASSIFIED DRUGS OR BIOLOG: HCPCS | Performed by: NURSE ANESTHETIST, CERTIFIED REGISTERED

## 2017-11-22 PROCEDURE — 40000170 ZZH STATISTIC PRE-PROCEDURE ASSESSMENT II: Performed by: SURGERY

## 2017-11-22 PROCEDURE — 37000008 ZZH ANESTHESIA TECHNICAL FEE, 1ST 30 MIN: Performed by: SURGERY

## 2017-11-22 PROCEDURE — 27210794 ZZH OR GENERAL SUPPLY STERILE: Performed by: SURGERY

## 2017-11-22 PROCEDURE — 02100Z9 BYPASS CORONARY ARTERY, ONE ARTERY FROM LEFT INTERNAL MAMMARY, OPEN APPROACH: ICD-10-PCS | Performed by: SURGERY

## 2017-11-22 PROCEDURE — 36000076 ZZH SURGERY LEVEL 6 EA 15 ADDTL MIN - UMMC: Performed by: SURGERY

## 2017-11-22 PROCEDURE — 37000009 ZZH ANESTHESIA TECHNICAL FEE, EACH ADDTL 15 MIN: Performed by: SURGERY

## 2017-11-22 PROCEDURE — 25000125 ZZHC RX 250

## 2017-11-22 PROCEDURE — 25000565 ZZH ISOFLURANE, EA 15 MIN: Performed by: SURGERY

## 2017-11-22 PROCEDURE — 40000014 ZZH STATISTIC ARTERIAL MONITORING DAILY

## 2017-11-22 PROCEDURE — 82330 ASSAY OF CALCIUM: CPT | Performed by: THORACIC SURGERY (CARDIOTHORACIC VASCULAR SURGERY)

## 2017-11-22 PROCEDURE — 93010 ELECTROCARDIOGRAM REPORT: CPT | Performed by: INTERNAL MEDICINE

## 2017-11-22 PROCEDURE — 25800025 ZZH RX 258: Performed by: THORACIC SURGERY (CARDIOTHORACIC VASCULAR SURGERY)

## 2017-11-22 PROCEDURE — 25000132 ZZH RX MED GY IP 250 OP 250 PS 637: Mod: GY | Performed by: SURGERY

## 2017-11-22 PROCEDURE — 84132 ASSAY OF SERUM POTASSIUM: CPT | Performed by: ANESTHESIOLOGY

## 2017-11-22 PROCEDURE — 41000018 ZZH PER-PERFUSION 1ST 30 MIN: Performed by: SURGERY

## 2017-11-22 PROCEDURE — 87641 MR-STAPH DNA AMP PROBE: CPT | Performed by: SURGERY

## 2017-11-22 PROCEDURE — 40000196 ZZH STATISTIC RAPCV CVP MONITORING

## 2017-11-22 PROCEDURE — 84295 ASSAY OF SERUM SODIUM: CPT

## 2017-11-22 PROCEDURE — 36000074 ZZH SURGERY LEVEL 6 1ST 30 MIN - UMMC: Performed by: SURGERY

## 2017-11-22 PROCEDURE — 85610 PROTHROMBIN TIME: CPT | Performed by: THORACIC SURGERY (CARDIOTHORACIC VASCULAR SURGERY)

## 2017-11-22 PROCEDURE — 85027 COMPLETE CBC AUTOMATED: CPT | Performed by: THORACIC SURGERY (CARDIOTHORACIC VASCULAR SURGERY)

## 2017-11-22 RX ORDER — NALOXONE HYDROCHLORIDE 0.4 MG/ML
.1-.4 INJECTION, SOLUTION INTRAMUSCULAR; INTRAVENOUS; SUBCUTANEOUS
Status: DISCONTINUED | OUTPATIENT
Start: 2017-11-22 | End: 2017-11-22

## 2017-11-22 RX ORDER — PROPOFOL 10 MG/ML
INJECTION, EMULSION INTRAVENOUS PRN
Status: DISCONTINUED | OUTPATIENT
Start: 2017-11-22 | End: 2017-11-22

## 2017-11-22 RX ORDER — POTASSIUM CL/LIDO/0.9 % NACL 10MEQ/0.1L
10 INTRAVENOUS SOLUTION, PIGGYBACK (ML) INTRAVENOUS
Status: DISCONTINUED | OUTPATIENT
Start: 2017-11-22 | End: 2017-11-27 | Stop reason: HOSPADM

## 2017-11-22 RX ORDER — ALBUTEROL SULFATE 0.83 MG/ML
2.5 SOLUTION RESPIRATORY (INHALATION) EVERY 4 HOURS
Status: DISCONTINUED | OUTPATIENT
Start: 2017-11-22 | End: 2017-11-22

## 2017-11-22 RX ORDER — ALBUMIN, HUMAN INJ 5% 5 %
500-1000 SOLUTION INTRAVENOUS
Status: COMPLETED | OUTPATIENT
Start: 2017-11-22 | End: 2017-11-22

## 2017-11-22 RX ORDER — CEFAZOLIN SODIUM 2 G/100ML
2 INJECTION, SOLUTION INTRAVENOUS
Status: COMPLETED | OUTPATIENT
Start: 2017-11-22 | End: 2017-11-22

## 2017-11-22 RX ORDER — POTASSIUM CHLORIDE 7.45 MG/ML
10 INJECTION INTRAVENOUS
Status: DISCONTINUED | OUTPATIENT
Start: 2017-11-22 | End: 2017-11-27 | Stop reason: HOSPADM

## 2017-11-22 RX ORDER — MEPERIDINE HYDROCHLORIDE 50 MG/ML
12.5-25 INJECTION INTRAMUSCULAR; INTRAVENOUS; SUBCUTANEOUS
Status: DISCONTINUED | OUTPATIENT
Start: 2017-11-22 | End: 2017-11-24 | Stop reason: CLARIF

## 2017-11-22 RX ORDER — POTASSIUM CHLORIDE 1.5 G/1.58G
20-40 POWDER, FOR SOLUTION ORAL
Status: DISCONTINUED | OUTPATIENT
Start: 2017-11-22 | End: 2017-11-27 | Stop reason: HOSPADM

## 2017-11-22 RX ORDER — NALOXONE HYDROCHLORIDE 0.4 MG/ML
.1-.4 INJECTION, SOLUTION INTRAMUSCULAR; INTRAVENOUS; SUBCUTANEOUS
Status: DISCONTINUED | OUTPATIENT
Start: 2017-11-22 | End: 2017-11-27 | Stop reason: HOSPADM

## 2017-11-22 RX ORDER — PROPOFOL 10 MG/ML
5-75 INJECTION, EMULSION INTRAVENOUS CONTINUOUS
Status: DISCONTINUED | OUTPATIENT
Start: 2017-11-22 | End: 2017-11-24 | Stop reason: CLARIF

## 2017-11-22 RX ORDER — POTASSIUM CHLORIDE 29.8 MG/ML
20 INJECTION INTRAVENOUS
Status: DISCONTINUED | OUTPATIENT
Start: 2017-11-22 | End: 2017-11-25 | Stop reason: RX

## 2017-11-22 RX ORDER — SODIUM CHLORIDE 9 MG/ML
INJECTION, SOLUTION INTRAVENOUS CONTINUOUS PRN
Status: DISCONTINUED | OUTPATIENT
Start: 2017-11-22 | End: 2017-11-22

## 2017-11-22 RX ORDER — ALBUTEROL SULFATE 0.83 MG/ML
2.5 SOLUTION RESPIRATORY (INHALATION)
Status: DISCONTINUED | OUTPATIENT
Start: 2017-11-23 | End: 2017-11-22

## 2017-11-22 RX ORDER — ASPIRIN 81 MG/1
81 TABLET, CHEWABLE ORAL DAILY
Status: DISCONTINUED | OUTPATIENT
Start: 2017-11-23 | End: 2017-11-27

## 2017-11-22 RX ORDER — POTASSIUM CHLORIDE 750 MG/1
20-40 TABLET, EXTENDED RELEASE ORAL
Status: DISCONTINUED | OUTPATIENT
Start: 2017-11-22 | End: 2017-11-27 | Stop reason: HOSPADM

## 2017-11-22 RX ORDER — CEFAZOLIN SODIUM 1 G/3ML
1 INJECTION, POWDER, FOR SOLUTION INTRAMUSCULAR; INTRAVENOUS SEE ADMIN INSTRUCTIONS
Status: DISCONTINUED | OUTPATIENT
Start: 2017-11-22 | End: 2017-11-22 | Stop reason: HOSPADM

## 2017-11-22 RX ORDER — HYDROMORPHONE HYDROCHLORIDE 1 MG/ML
0.2 INJECTION, SOLUTION INTRAMUSCULAR; INTRAVENOUS; SUBCUTANEOUS
Status: DISCONTINUED | OUTPATIENT
Start: 2017-11-22 | End: 2017-11-27 | Stop reason: HOSPADM

## 2017-11-22 RX ORDER — ALBUMIN, HUMAN INJ 5% 5 %
SOLUTION INTRAVENOUS
Status: COMPLETED
Start: 2017-11-22 | End: 2017-11-22

## 2017-11-22 RX ORDER — NITROGLYCERIN 10 MG/100ML
INJECTION INTRAVENOUS PRN
Status: DISCONTINUED | OUTPATIENT
Start: 2017-11-22 | End: 2017-11-22

## 2017-11-22 RX ORDER — FENTANYL CITRATE 50 UG/ML
INJECTION, SOLUTION INTRAMUSCULAR; INTRAVENOUS PRN
Status: DISCONTINUED | OUTPATIENT
Start: 2017-11-22 | End: 2017-11-22

## 2017-11-22 RX ORDER — CEFAZOLIN SODIUM 2 G/100ML
2 INJECTION, SOLUTION INTRAVENOUS EVERY 8 HOURS
Status: COMPLETED | OUTPATIENT
Start: 2017-11-23 | End: 2017-11-23

## 2017-11-22 RX ORDER — SODIUM CHLORIDE, SODIUM LACTATE, POTASSIUM CHLORIDE, CALCIUM CHLORIDE 600; 310; 30; 20 MG/100ML; MG/100ML; MG/100ML; MG/100ML
INJECTION, SOLUTION INTRAVENOUS CONTINUOUS
Status: DISCONTINUED | OUTPATIENT
Start: 2017-11-22 | End: 2017-11-22 | Stop reason: HOSPADM

## 2017-11-22 RX ORDER — EPHEDRINE SULFATE 50 MG/ML
INJECTION, SOLUTION INTRAMUSCULAR; INTRAVENOUS; SUBCUTANEOUS PRN
Status: DISCONTINUED | OUTPATIENT
Start: 2017-11-22 | End: 2017-11-22

## 2017-11-22 RX ORDER — MUPIROCIN 20 MG/G
0.5 OINTMENT TOPICAL 2 TIMES DAILY
Status: DISCONTINUED | OUTPATIENT
Start: 2017-11-22 | End: 2017-11-23

## 2017-11-22 RX ORDER — NICOTINE POLACRILEX 4 MG
15-30 LOZENGE BUCCAL
Status: DISCONTINUED | OUTPATIENT
Start: 2017-11-22 | End: 2017-11-27 | Stop reason: HOSPADM

## 2017-11-22 RX ORDER — PROPOFOL 10 MG/ML
INJECTION, EMULSION INTRAVENOUS CONTINUOUS PRN
Status: DISCONTINUED | OUTPATIENT
Start: 2017-11-22 | End: 2017-11-22

## 2017-11-22 RX ORDER — ACETAMINOPHEN 325 MG/1
650 TABLET ORAL EVERY 4 HOURS PRN
Status: DISCONTINUED | OUTPATIENT
Start: 2017-11-22 | End: 2017-11-27 | Stop reason: HOSPADM

## 2017-11-22 RX ORDER — DEXTROSE MONOHYDRATE, SODIUM CHLORIDE, AND POTASSIUM CHLORIDE 50; 1.49; 4.5 G/1000ML; G/1000ML; G/1000ML
INJECTION, SOLUTION INTRAVENOUS CONTINUOUS
Status: DISCONTINUED | OUTPATIENT
Start: 2017-11-22 | End: 2017-11-25

## 2017-11-22 RX ORDER — ALBUTEROL SULFATE 0.83 MG/ML
2.5 SOLUTION RESPIRATORY (INHALATION) EVERY 6 HOURS
Status: DISCONTINUED | OUTPATIENT
Start: 2017-11-22 | End: 2017-11-23

## 2017-11-22 RX ORDER — NITROGLYCERIN 20 MG/100ML
INJECTION INTRAVENOUS CONTINUOUS PRN
Status: DISCONTINUED | OUTPATIENT
Start: 2017-11-22 | End: 2017-11-22

## 2017-11-22 RX ORDER — LIDOCAINE 40 MG/G
CREAM TOPICAL
Status: DISCONTINUED | OUTPATIENT
Start: 2017-11-22 | End: 2017-11-27 | Stop reason: HOSPADM

## 2017-11-22 RX ORDER — DEXTROSE MONOHYDRATE 25 G/50ML
25-50 INJECTION, SOLUTION INTRAVENOUS
Status: DISCONTINUED | OUTPATIENT
Start: 2017-11-22 | End: 2017-11-27 | Stop reason: HOSPADM

## 2017-11-22 RX ORDER — HEPARIN SODIUM 1000 [USP'U]/ML
INJECTION, SOLUTION INTRAVENOUS; SUBCUTANEOUS PRN
Status: DISCONTINUED | OUTPATIENT
Start: 2017-11-22 | End: 2017-11-22

## 2017-11-22 RX ORDER — ACETYLCYSTEINE 200 MG/ML
1 SOLUTION ORAL; RESPIRATORY (INHALATION) EVERY 6 HOURS
Status: DISCONTINUED | OUTPATIENT
Start: 2017-11-22 | End: 2017-11-23

## 2017-11-22 RX ORDER — ONDANSETRON 2 MG/ML
4 INJECTION INTRAMUSCULAR; INTRAVENOUS EVERY 6 HOURS PRN
Status: DISCONTINUED | OUTPATIENT
Start: 2017-11-22 | End: 2017-11-27 | Stop reason: HOSPADM

## 2017-11-22 RX ORDER — PROTAMINE SULFATE 10 MG/ML
INJECTION, SOLUTION INTRAVENOUS PRN
Status: DISCONTINUED | OUTPATIENT
Start: 2017-11-22 | End: 2017-11-22

## 2017-11-22 RX ORDER — HYDRALAZINE HYDROCHLORIDE 20 MG/ML
10 INJECTION INTRAMUSCULAR; INTRAVENOUS EVERY 30 MIN PRN
Status: DISCONTINUED | OUTPATIENT
Start: 2017-11-22 | End: 2017-11-27 | Stop reason: HOSPADM

## 2017-11-22 RX ORDER — MAGNESIUM SULFATE HEPTAHYDRATE 40 MG/ML
4 INJECTION, SOLUTION INTRAVENOUS EVERY 4 HOURS PRN
Status: DISCONTINUED | OUTPATIENT
Start: 2017-11-22 | End: 2017-11-27 | Stop reason: HOSPADM

## 2017-11-22 RX ORDER — ONDANSETRON 4 MG/1
4 TABLET, ORALLY DISINTEGRATING ORAL EVERY 6 HOURS PRN
Status: DISCONTINUED | OUTPATIENT
Start: 2017-11-22 | End: 2017-11-27 | Stop reason: HOSPADM

## 2017-11-22 RX ORDER — AMOXICILLIN 250 MG
1-2 CAPSULE ORAL 2 TIMES DAILY
Status: DISCONTINUED | OUTPATIENT
Start: 2017-11-22 | End: 2017-11-27 | Stop reason: HOSPADM

## 2017-11-22 RX ORDER — PAPAVERINE HYDROCHLORIDE 30 MG/ML
INJECTION INTRAMUSCULAR; INTRAVENOUS PRN
Status: DISCONTINUED | OUTPATIENT
Start: 2017-11-22 | End: 2017-11-22 | Stop reason: HOSPADM

## 2017-11-22 RX ADMIN — PROPOFOL 40 MCG/KG/MIN: 10 INJECTION, EMULSION INTRAVENOUS at 20:46

## 2017-11-22 RX ADMIN — ROCURONIUM BROMIDE 50 MG: 10 INJECTION INTRAVENOUS at 16:18

## 2017-11-22 RX ADMIN — SODIUM CHLORIDE, POTASSIUM CHLORIDE, SODIUM LACTATE AND CALCIUM CHLORIDE 1000 ML: 600; 310; 30; 20 INJECTION, SOLUTION INTRAVENOUS at 23:29

## 2017-11-22 RX ADMIN — HYDROMORPHONE HYDROCHLORIDE 1 MG: 1 INJECTION, SOLUTION INTRAMUSCULAR; INTRAVENOUS; SUBCUTANEOUS at 15:10

## 2017-11-22 RX ADMIN — SODIUM CHLORIDE 1 G: 9 INJECTION, SOLUTION INTRAVENOUS at 14:52

## 2017-11-22 RX ADMIN — PHENYLEPHRINE HYDROCHLORIDE 100 MCG: 10 INJECTION, SOLUTION INTRAMUSCULAR; INTRAVENOUS; SUBCUTANEOUS at 14:29

## 2017-11-22 RX ADMIN — NITROGLYCERIN 100 MCG: 10 INJECTION INTRAVENOUS at 14:55

## 2017-11-22 RX ADMIN — MIDAZOLAM HYDROCHLORIDE 3 MG: 1 INJECTION, SOLUTION INTRAMUSCULAR; INTRAVENOUS at 13:47

## 2017-11-22 RX ADMIN — FENTANYL CITRATE 100 MCG: 50 INJECTION, SOLUTION INTRAMUSCULAR; INTRAVENOUS at 13:56

## 2017-11-22 RX ADMIN — Medication 5 MG: at 14:28

## 2017-11-22 RX ADMIN — NOREPINEPHRINE BITARTRATE 0.03 MCG/KG/MIN: 1 INJECTION INTRAVENOUS at 18:42

## 2017-11-22 RX ADMIN — HEPARIN SODIUM 40000 UNITS: 1000 INJECTION, SOLUTION INTRAVENOUS; SUBCUTANEOUS at 17:03

## 2017-11-22 RX ADMIN — ACETAMINOPHEN 650 MG: 325 SOLUTION ORAL at 23:09

## 2017-11-22 RX ADMIN — Medication 5 MG: at 14:37

## 2017-11-22 RX ADMIN — NITROGLYCERIN 100 MCG: 10 INJECTION INTRAVENOUS at 19:51

## 2017-11-22 RX ADMIN — CEFAZOLIN SODIUM 1 G: 2 INJECTION, SOLUTION INTRAVENOUS at 18:18

## 2017-11-22 RX ADMIN — MIDAZOLAM HYDROCHLORIDE 1 MG: 1 INJECTION, SOLUTION INTRAMUSCULAR; INTRAVENOUS at 19:16

## 2017-11-22 RX ADMIN — CEFAZOLIN 2 G: 1 INJECTION, POWDER, FOR SOLUTION INTRAMUSCULAR; INTRAVENOUS at 14:19

## 2017-11-22 RX ADMIN — FENTANYL CITRATE 100 MCG: 50 INJECTION, SOLUTION INTRAMUSCULAR; INTRAVENOUS at 16:46

## 2017-11-22 RX ADMIN — NITROGLYCERIN 100 MCG: 10 INJECTION INTRAVENOUS at 19:20

## 2017-11-22 RX ADMIN — FENTANYL CITRATE 100 MCG: 50 INJECTION, SOLUTION INTRAMUSCULAR; INTRAVENOUS at 14:51

## 2017-11-22 RX ADMIN — ROCURONIUM BROMIDE 50 MG: 10 INJECTION INTRAVENOUS at 14:30

## 2017-11-22 RX ADMIN — MUPIROCIN CALCIUM 1 G: 20 OINTMENT TOPICAL at 13:22

## 2017-11-22 RX ADMIN — FENTANYL CITRATE 500 MCG: 50 INJECTION, SOLUTION INTRAMUSCULAR; INTRAVENOUS at 19:19

## 2017-11-22 RX ADMIN — NITROGLYCERIN 100 MCG: 10 INJECTION INTRAVENOUS at 14:50

## 2017-11-22 RX ADMIN — MIDAZOLAM HYDROCHLORIDE 1 MG: 1 INJECTION, SOLUTION INTRAMUSCULAR; INTRAVENOUS at 13:28

## 2017-11-22 RX ADMIN — ALBUMIN HUMAN 1000 ML: 0.05 INJECTION, SOLUTION INTRAVENOUS at 21:25

## 2017-11-22 RX ADMIN — FENTANYL CITRATE 200 MCG: 50 INJECTION, SOLUTION INTRAMUSCULAR; INTRAVENOUS at 14:43

## 2017-11-22 RX ADMIN — SODIUM CHLORIDE: 9 INJECTION, SOLUTION INTRAVENOUS at 14:19

## 2017-11-22 RX ADMIN — EPINEPHRINE 0.02 MCG/KG/MIN: 1 INJECTION INTRAMUSCULAR; INTRAVENOUS; SUBCUTANEOUS at 18:42

## 2017-11-22 RX ADMIN — CEFAZOLIN SODIUM 1 G: 2 INJECTION, SOLUTION INTRAVENOUS at 16:18

## 2017-11-22 RX ADMIN — PROTAMINE SULFATE 250 MG: 10 INJECTION, SOLUTION INTRAVENOUS at 19:19

## 2017-11-22 RX ADMIN — Medication 100 MEQ: at 23:30

## 2017-11-22 RX ADMIN — ROCURONIUM BROMIDE 100 MG: 10 INJECTION INTRAVENOUS at 13:47

## 2017-11-22 RX ADMIN — SUGAMMADEX 200 MG: 100 INJECTION, SOLUTION INTRAVENOUS at 21:06

## 2017-11-22 RX ADMIN — FENTANYL CITRATE 50 MCG: 50 INJECTION, SOLUTION INTRAMUSCULAR; INTRAVENOUS at 17:04

## 2017-11-22 RX ADMIN — ROCURONIUM BROMIDE 10 MG: 10 INJECTION INTRAVENOUS at 19:55

## 2017-11-22 RX ADMIN — SODIUM CHLORIDE: 9 INJECTION, SOLUTION INTRAVENOUS at 13:28

## 2017-11-22 RX ADMIN — MUPIROCIN 0.5 G: 20 OINTMENT TOPICAL at 22:39

## 2017-11-22 RX ADMIN — PHENYLEPHRINE HYDROCHLORIDE 100 MCG: 10 INJECTION, SOLUTION INTRAMUSCULAR; INTRAVENOUS; SUBCUTANEOUS at 14:24

## 2017-11-22 RX ADMIN — Medication 5 MG: at 16:21

## 2017-11-22 RX ADMIN — SENNOSIDES AND DOCUSATE SODIUM 2 TABLET: 8.6; 5 TABLET ORAL at 23:09

## 2017-11-22 RX ADMIN — NITROGLYCERIN 0.2 MCG/KG/MIN: 20 INJECTION INTRAVENOUS at 14:55

## 2017-11-22 RX ADMIN — ALBUMIN HUMAN 1000 ML: 50 SOLUTION INTRAVENOUS at 21:25

## 2017-11-22 RX ADMIN — POTASSIUM CHLORIDE, DEXTROSE MONOHYDRATE AND SODIUM CHLORIDE: 150; 5; 450 INJECTION, SOLUTION INTRAVENOUS at 21:47

## 2017-11-22 RX ADMIN — PHENYLEPHRINE HYDROCHLORIDE 100 MCG: 10 INJECTION, SOLUTION INTRAMUSCULAR; INTRAVENOUS; SUBCUTANEOUS at 17:16

## 2017-11-22 RX ADMIN — Medication 5 MG: at 16:22

## 2017-11-22 RX ADMIN — CEFAZOLIN SODIUM 2 G: 2 INJECTION, SOLUTION INTRAVENOUS at 14:19

## 2017-11-22 RX ADMIN — SODIUM CHLORIDE: 9 INJECTION, SOLUTION INTRAVENOUS at 14:15

## 2017-11-22 RX ADMIN — CEFAZOLIN SODIUM 1 G: 2 INJECTION, SOLUTION INTRAVENOUS at 20:20

## 2017-11-22 RX ADMIN — ROCURONIUM BROMIDE 50 MG: 10 INJECTION INTRAVENOUS at 18:17

## 2017-11-22 RX ADMIN — PROPOFOL 60 MG: 10 INJECTION, EMULSION INTRAVENOUS at 13:47

## 2017-11-22 RX ADMIN — NITROGLYCERIN 100 MCG: 10 INJECTION INTRAVENOUS at 14:48

## 2017-11-22 RX ADMIN — SODIUM CHLORIDE 125 MG/HR: 9 INJECTION, SOLUTION INTRAVENOUS at 15:10

## 2017-11-22 RX ADMIN — SODIUM BICARBONATE 100 MEQ: 84 INJECTION INTRAVENOUS at 23:30

## 2017-11-22 RX ADMIN — FENTANYL CITRATE 200 MCG: 50 INJECTION, SOLUTION INTRAMUSCULAR; INTRAVENOUS at 13:47

## 2017-11-22 RX ADMIN — HUMAN INSULIN 1 UNITS/HR: 100 INJECTION, SOLUTION SUBCUTANEOUS at 18:39

## 2017-11-22 RX ADMIN — NITROGLYCERIN 100 MCG: 10 INJECTION INTRAVENOUS at 19:57

## 2017-11-22 NOTE — IP AVS SNAPSHOT
MRN:[id number]                      After Visit Summary   11/22/2017    Zack Demarco    MRN: [id number]           Thank you!     Thank you for choosing [facility] for your care. Our goal is always to provide you with excellent care. Hearing back from our patients is one way we can continue to improve our services. Please take a few minutes to complete the written survey that you may receive in the mail after you visit with us. Thank you!        Patient Information     Date Of Birth          1940        Designated Caregiver       Most Recent Value    Caregiver    Will someone help with your care after discharge? yes    Name of designated caregiver Kayleigh Au    Phone number of caregiver     Caregiver address unsure      About your hospital stay     You were admitted on:  November 22, 2017 You last received care in the:  [address] [facility]    You were discharged on:  November 27, 2017        Reason for your hospital stay       You had a coronary artery bypass grafting with Dr. Toro                  Who to Call     For medical emergencies, please call 911.  For non-urgent questions about your medical care, please call your primary care provider or clinic, [phone]  For questions related to your surgery, please call your surgery clinic        Attending Provider     Provider Specialty    Rolando Toro MD Transplant       Primary Care Provider Office Phone # Fax #    Anastacio Love [phone]       When to contact your care team       Call your primary doctor or surgery team if you have any of the following: temperature greater than 101 F,  increased shortness of breath, increased drainage, increased swelling or increased pain. Please call if you have any weight gain more than 3 pounds overnight or 5 pounds in a week.                  After Care Instructions     Activity       Activity as tolerated with sternal precautions. No lifting more than 10 pounds for  first 6 weeks, then no lifting more than 20 pounds for an additional 6 weeks. Avoid lifting arms above shoulders. After these 12 weeks, activity as tolerated with pain. Avoid strenuous activities such as bowling, vacuuming, raking, shoveling, golf or tennis for 12 weeks after your surgery.    No driving for 4 weeks. If you continue to take narcotics after 4 weeks, no driving until you are off narcotics.            Diet       Follow this diet upon discharge: Orders Placed This Encounter      Low Saturated Fat Na <2400 mg            Monitor and record       blood pressure daily  pulse daily  weight every day  apnea episodes            Wound care and dressings       You have dissolvable sutures under your skin that do not need to be removed.  Pat wound dressing dry after showers.  Skin glue will eventually fall off in 1-2 weeks.     Keep wound clean and dry, showers are okay after discharge, but don't let spray hit directly on incision. No baths or swimming for 1 month.  Clean wounds twice a day for 2 weeks with microklenz spray if available. Cover chest tube sites with gauze until they stop draining, then leave open.                  Follow-up Appointments     Adult [facility] Follow-up and recommended labs and tests       Follow up with your primary care provider, Anastacio Love, within 3-5 days of discharge to evaluate after surgery and for hospital follow- up. The following labs/tests are recommended: CBC, BMP.    Follow up with cardiothoracic surgery within 1-2 weeks of discharge. This follow-up appointment will be listed on your after visit summary paperwork when you leave the hospital. If you have questions, call [phone].  Follow up with your primary cardiologist in 4-6 weeks from surgery to evaluate medication change, to evaluate treatment change, to evaluate after surgery and for hospital follow- up. The following labs/tests are recommended: echo.    Appointments on [place] and/or [facility] (with  [facility] or [facility] provider or service). Call [phone] if you haven't heard regarding these appointments within 7 days of discharge.                  Your next 10 appointments already scheduled     Dec 08, 2017  2:00 PM CST   (Arrive by 1:45 PM)   Return Visit with SONJA ESCOBAR   [facility] ([facility])    [address]  3rd Floor  [facility] [id number] [phone]            Dec 26, 2017  3:00 PM CST   (Arrive by 2:45 PM)   Return Visit with Laquita Carbajal MD   [facility] ([facility])    [address]  2nd Floor  [place] [facility] [id number] [phone]            Jan 17, 2018  8:00 AM CST   New Visit with Avtar Mccullough MD   [facility]    [address] [place] [id number] [phone]              Additional Services     CARDIAC REHAB REFERRAL       Your provider has referred you to:     [address]   [facility] [address]  Phone [phone]  Fax [phone]            Cardiac Rehab Referral       Your provider has referred you to: at cardiac rehab facility [facility]      [address] [place] [address]                  Further instructions from your care team       [facility]      AFTER YOU GO HOME FROM YOUR HEART SURGERY    Avoid lifting anything greater than ten pounds for 6 weeks after surgery and then less than 20 pounds for an additional 6 weeks.    No driving for 4 weeks after surgery or while on pain medication. If you had a minimally invasive procedure, you may drive after three weeks if not taking pain medication.      Avoid strenuous activities such as bowling, vacuuming, raking, shoveling, golf or tennis for 12 weeks after your surgery. It is okay to resume sex if you feel comfortable in doing so. You may have to try different positions with your partner.     Splint your chest incision  by hugging a pillow or bringing your arms across your chest when coughing or sneezing. Avoid pushing off with your arms when getting up for the first month if you have had your sternum opened.    Shower or wash your incisions daily with soap and water (or as instructed), pat dry. Keep wound clean and dry, showers are okay after discharge, but don't let spray hit directly on incision. No baths or swimming for 1 month.  Clean wounds twice a day for 2 weeks with microklenz spray if available. Cover chest tube sites with gauze until they stop draining, then leave open. It is not abnormal for chest tube sites to drain yellowish/clear fluid for up to 2-3 weeks after surgery.   Watch for signs of infection: increased redness, tenderness, warmth or any drainage that appears infected (pus like) or is persistent.  Also a temperature > 100.5 F or chills. Call your surgeon or primary care provider's office immediately. Remove any skin glue left on incisions after 10 days. This will not affect your incision and can speed up healing.    Exercise is very important in your recovery. Please follow the guidelines set up for you in your cardiac rehab classes at the hospital. If outpatient cardiac rehab was ordered for you, we highly recommend you participate. If you have problems arranging your cardiac rehab, please call [phone].     Avoid sitting for prolonged periods of time, try to walk every hour during the day. If you have a leg incision, elevate your leg often when you are not walking.    Check your weight when you get home from the hospital and continue to check it daily through your recovery for at least a month. If you notice a weight gain of 2-3 pounds in a week, notify your primary care physician, cardiologist or surgeon.    Bowel activity may be slow after surgery. If necessary, you may take an over the counter laxative such as Milk of Magnesia or Miralax. You may have stool softeners prescribed (docusate sodium,  Senokot). We recommend using stool softeners while using narcotics for pain (oxycodone/percocet, hydrocodone/vicodin).      DENTAL VISITS AFTER SURGERY  If you have had your heart valve repaired or replaced, we do not recommend having any dental work done for 6 months and you will need to take an antibiotic prior to dental visits from now on.  Please notify your dentist before any procedure for the proper treatment needed. The antibiotic is taken by mouth one hour prior to visit. This includes routine cleanings.    DO NOT SMOKE.  IF YOU NEED HELP QUITTING, PLEASE TALK WITH YOUR CARDIOLOGIST OR PRIMARY DOCTOR.    If you are on a blood thinner, follow the instructions you were given in the hospital and DO NOT SKIP this medication. Try and take it the same time everyday. Your primary care physician or coumadin clinic will manage the dosing.     REGARDING PRESCRIPTION REFILLS.  If you need a refill on your pain medication contact us.  All other medications will be adjusted, discontinued and re-filled by your primary care physician and/or your cardiologist as they were prior to your surgery. We have given you enough for one to three month with possibly one refill.    POST-OPERATIVE CLINIC VISITS  You have a follow up visit with [facility] Surgery Advance Care Practitioners on Friday 12/8/2017 at 2:00pm at the [facility].  You will then return to the care of your primary physician and your cardiologist.   It is important to call for an appointment to see your cardiologist in 4-6 weeks after surgery and your primary care physician in 2-4 weeks after surgery. If there is a need to return to see CT Surgery please call our  at [phone].    SURGICAL QUESTIONS  Please call Aylin Rocha with any surgical questions, her phone number is listed below.  She can assist you with your needs and contact other surgery care team members as indicated.    For general questions or concerns, please call the  "Cardiothoracic Surgery Department at [phone] 8-4:30 M-F.   On weekends or after hours, please call [phone] and ask the  to   page the Cardiothoracic Surgery fellow on call.      Thank you,    Your Cardiothoracic Surgery Team  Aylin Rocha RN Care Coordinator-  [phone]   Doretha Lopez PA-C                        Pending Results     Date and Time Order Name Status Description    11/22/2017 1224 Platelets prepare order unit In process             Statement of Approval     Ordered          11/27/17 1400  I have reviewed and agree with all the recommendations and orders detailed in this document.  EFFECTIVE NOW     Approved and electronically signed by:  Kong Lopez PA-C             Admission Information     Date & Time Provider Department Dept. Phone    11/22/2017 Rolando Toro MD [address] [facility] [phone]      Your Vitals Were     Blood Pressure Pulse Temperature Respirations Height Weight    133/64 85 98.1  F (36.7  C) (Oral) 18 1.778 m (5' 10\") 96.3 kg (212 lb 6.4 oz)    Pulse Oximetry BMI (Body Mass Index)                93% 30.48 kg/m2          MyChart Information     [facility] gives you secure access to your electronic health record. If you see a primary care provider, you can also send messages to your care team and make appointments. If you have questions, please call your primary care clinic.  If you do not have a primary care provider, please call [phone] and they will assist you.        Care EveryWhere ID     This is your Care EveryWhere ID. This could be used by other organizations to access your [facility] medical records  [id number]        Equal Access to Services     HALEY BURR : Hadjozef Mahmood, marilynn caballero. So [facility] [phone].    ATENCIÓN: Si habla español, tiene a warner disposición servicios gratuitos de asistencia " lingüísticlissethTina Ramos al [phone].    We comply with applicable federal civil rights laws and [place] laws. We do not discriminate on the basis of race, color, national origin, age, disability, sex, sexual orientation, or gender identity.               Review of your medicines      START taking        Dose / Directions    acetaminophen 325 MG tablet   Commonly known as:  TYLENOL   Used for:  S/P CABG (coronary artery bypass graft), Acute post-operative pain        Dose:  650 mg   Take 2 tablets (650 mg) by mouth every 4 hours as needed for mild pain   Quantity:  100 tablet   Refills:  0       benzocaine-menthol 15-3.6 MG lozenge   Commonly known as:  CEPACOL   Used for:  S/P CABG (coronary artery bypass graft)        Dose:  1 lozenge   Place 1 lozenge inside cheek every hour as needed for sore throat   Quantity:  84 lozenge   Refills:  0       oxyCODONE IR 5 MG tablet   Commonly known as:  ROXICODONE   Used for:  S/P CABG (coronary artery bypass graft), Acute post-operative pain        Dose:  5-10 mg   Take 1-2 tablets (5-10 mg) by mouth every 4 hours as needed for moderate to severe pain   Quantity:  80 tablet   Refills:  0       pantoprazole 40 MG EC tablet   Commonly known as:  PROTONIX   Used for:  S/P CABG (coronary artery bypass graft)        Dose:  40 mg   Start taking on:  11/28/2017   Take 1 tablet (40 mg) by mouth every morning   Quantity:  30 tablet   Refills:  0       senna-docusate 8.6-50 MG per tablet   Commonly known as:  SENOKOT-S;PERICOLACE   Used for:  S/P CABG (coronary artery bypass graft)        Dose:  1-2 tablet   Take 1-2 tablets by mouth 2 times daily   Quantity:  100 tablet   Refills:  0         CONTINUE these medicines which may have CHANGED, or have new prescriptions. If we are uncertain of the size of tablets/capsules you have at home, strength may be listed as something that might have changed.        Dose / Directions    metoprolol 25 MG tablet   Commonly known as:  LOPRESSOR   This  may have changed:  how much to take   Used for:  S/P CABG (coronary artery bypass graft), Acute post-operative pain        Dose:  12.5 mg   Take 0.5 tablets (12.5 mg) by mouth 2 times daily   Quantity:  60 tablet   Refills:  0         CONTINUE these medicines which have NOT CHANGED        Dose / Directions    aspirin 325 MG EC tablet        1/2 tab daily   Refills:  0       atorvastatin 40 MG tablet   Commonly known as:  LIPITOR   Used for:  Hyperlipidemia LDL goal <100        Dose:  40 mg   Take 1 tablet (40 mg) by mouth daily   Quantity:  60 tablet   Refills:  11       latanoprost 0.005 % ophthalmic solution   Commonly known as:  XALATAN   Used for:  Borderline glaucoma with ocular hypertension, unspecified laterality        Dose:  1 drop   Place 1 drop into both eyes At Bedtime   Quantity:  3 Bottle   Refills:  4       levothyroxine 75 MCG tablet   Commonly known as:  SYNTHROID/LEVOTHROID   Used for:  Hypothyroidism, unspecified type        Dose:  75 mcg   Take 1 tablet (75 mcg) by mouth daily   Quantity:  90 tablet   Refills:  3       multivitamin Tabs tablet        Dose:  1 tablet   Take 1 tablet by mouth daily   Refills:  0       nitroGLYcerin 0.4 MG sublingual tablet   Commonly known as:  NITROSTAT   Used for:  Exertional chest pain        For chest pain place 1 tablet under the tongue every 5 minutes for 3 doses. If symptoms persist 5 minutes after 1st dose call 911.   Quantity:  25 tablet   Refills:  3       VITAMIN D3 PO        Take by mouth daily   Refills:  0         STOP taking     losartan-hydrochlorothiazide 50-12.5 MG per tablet   Commonly known as:  HYZAAR                Where to get your medicines      These medications were sent to [facility] [place], [place] - [address]  [address] [address]     Phone:  [phone]     acetaminophen 325 MG tablet    benzocaine-menthol 15-3.6 MG lozenge    metoprolol 25 MG tablet    pantoprazole 40 MG EC tablet     senna-docusate 8.6-50 MG per tablet         Some of these will need a paper prescription and others can be bought over the counter. Ask your nurse if you have questions.     Bring a paper prescription for each of these medications     oxyCODONE IR 5 MG tablet                Protect others around you: Learn how to safely use, store and throw away your medicines at www.disposemymeds.org.             Medication List: This is a list of all your medications and when to take them. Check marks below indicate your daily home schedule. Keep this list as a reference.      Medications           Morning Afternoon Evening Bedtime As Needed    acetaminophen 325 MG tablet   Commonly known as:  TYLENOL   Take 2 tablets (650 mg) by mouth every 4 hours as needed for mild pain   Last time this was given:  650 mg on 11/27/2017  1:08 PM                                aspirin 325 MG EC tablet   1/2 tab daily                                atorvastatin 40 MG tablet   Commonly known as:  LIPITOR   Take 1 tablet (40 mg) by mouth daily   Last time this was given:  40 mg on 11/27/2017  7:53 AM                                benzocaine-menthol 15-3.6 MG lozenge   Commonly known as:  CEPACOL   Place 1 lozenge inside cheek every hour as needed for sore throat   Last time this was given:  1 lozenge on 11/27/2017  1:08 PM                                latanoprost 0.005 % ophthalmic solution   Commonly known as:  XALATAN   Place 1 drop into both eyes At Bedtime                                levothyroxine 75 MCG tablet   Commonly known as:  SYNTHROID/LEVOTHROID   Take 1 tablet (75 mcg) by mouth daily   Last time this was given:  75 mcg on 11/27/2017  7:53 AM                                metoprolol 25 MG tablet   Commonly known as:  LOPRESSOR   Take 0.5 tablets (12.5 mg) by mouth 2 times daily   Last time this was given:  6.25 mg on 11/27/2017  7:53 AM                                multivitamin Tabs tablet   Take 1 tablet by mouth daily                                 nitroGLYcerin 0.4 MG sublingual tablet   Commonly known as:  NITROSTAT   For chest pain place 1 tablet under the tongue every 5 minutes for 3 doses. If symptoms persist 5 minutes after 1st dose call 911.                                oxyCODONE IR 5 MG tablet   Commonly known as:  ROXICODONE   Take 1-2 tablets (5-10 mg) by mouth every 4 hours as needed for moderate to severe pain   Last time this was given:  10 mg on 11/23/2017  9:33 PM                                pantoprazole 40 MG EC tablet   Commonly known as:  PROTONIX   Take 1 tablet (40 mg) by mouth every morning   Start taking on:  11/28/2017   Last time this was given:  40 mg on 11/27/2017  7:53 AM                                senna-docusate 8.6-50 MG per tablet   Commonly known as:  SENOKOT-S;PERICOLACE   Take 1-2 tablets by mouth 2 times daily   Last time this was given:  2 tablets on 11/26/2017  8:28 PM                                VITAMIN D3 PO   Take by mouth daily

## 2017-11-22 NOTE — DISCHARGE SUMMARY
[address] [address]  p: [phone]    Discharge Summary: Cardiology Service    Zack Demarco MRN# [id number]   YOB: 1940 Age: 77 year old       Admission Date: 11/13/2017  Discharge Date: 11/15/2017      Discharge Diagnoses:    #CAD  #HTN  #Dyslipidemia  #Hypothyroidism    Pertinent Procedures:  1. Coronary angiogram    Consults:  CVTS    Imaging with results:    Coronary Angiogram   CORONARY ANGIOGRAM:   1. Both coronary arteries arise from their respective cusps.  2. Dominance: Right  3. The LM is without angiographic evidence of disease.   4. LAD: Type 3 [LAD supplies the entire apex].. The LAD gives rise to septal perforators, D1 and D2.  The pLAD has a 70-80% stenosis at the ostium.  The mid LAD has an 80-90% stenosis before and after the diagonal branch with 80-90% of the first diagonal.  Distally there is diffuse disease at less than 25%.  5. LCX gives rise to OM1/2 branches of medium caliber with diffuse luminal disease throughout without angiographic stenosis.  6. RCA gives rise to PL branches and supplies PDA. The proximal RCA has luminal disease, mid RCA has a 70% stenosis, distally there is less than 25% stenosis.  The ostial RPL and PDA each have disease of approximately 30-40% and 50% stenosis respectively.     Sheath Removal:  The right radial artery sheath was manually removed in the cardiac catheterization laboratory.     Contrast: Isovue, 50 ml      Fluoroscopy Time: 7.3 min     COMPLICATIONS:  1. None     SUMMARY:   Three vessel CAD involving the RCA, proximal and mid LAD and diagonal branch.     PLAN:   - Admit to inpatient CSI  - CVTS consultation for CABG  - Risk factor modification  - Management of TR band per floor protocol    Brief HPI:  Assessment & Plan:  Mr. Zack Demarco is a 78 yo male patient with a PMH notable for hypothyroid, dyslipidemia, HTN, and tobacco use who presented for routine coronary  angiogram due to exertional chest pain, and was found to have severe 2 vessel disease (RCA and LAD) for which CV surgery consulted for CAB eval.      Hospital Course by Diagnosis:    #CAD: Cor angio reveals 2V disease-Mid RCA 70% stenosis, Proximal LAD 70-80%, Mid LAD 80-90% .Normal resting EF but decreased with stress, with associated anteroapical wall akinesis. Currently patient without chest pain, hemodynamically stable on Heparin gtt. Admitted for CABG workup. Noncontrast CT Chest; abdominal ultrasound; carotid us, and vein mapping completed. However, incidental finding of mass on chest CT requires further evaluation. Per Dr. 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 Additional consults per CV surgery completed. Patient and family informed and in agreement with below plan. Patient and family given contact information and advised to return to the ED if chest pain returns prior to CV surgery evaluation next week. Chest/Abd/Pelvis CT performed  to characterize mass on chest CT.   -- CVTS consulted and following , with plans to undergo CAB next week  -- Endocrine consulted d/t possible paraganglioma and induction risks with anaesthesia, though they find no additional risks at this time.  -- Surg Onc consulted, per Dr. Vila, to further evaluate possible malignancy prior to CAB, who would like to biopsy chest tumor after CAB completed.  -- Stop Heparin gtt, ASA- 81mg QD, Metop, small dose as baseline bradycardia, PTA losartan/hctz (50/12.5)mg QD, Atorvastatin 40mg QD and  PRN nitro with low threshold for gtt       #HTN - Continue PTA losartan/hctz, added BB.  #Dyslipidemia - Starting high intensity statin as above. Lipids last done 7/20/2017 , , HDL 32, LDL 76.   #Hypothyroidism -  Last TSH was elevated in July, but provider increased synthroid at that time. No symptoms of hypothroid at present. Continue PTA levothyroxine.        Condition on discharge     General: Alert, interactive, NAD  Eyes: sclera anicteric, EOMI  Neck: carotid 2+ bilaterally  Cardiovascular: regular rate and rhythm, normal S1 and S2, no murmurs, gallops, or rubs  Resp: clear to auscultation bilaterally, no rales, wheezes, or rhonchi  GI: Soft, nontender, nondistended. +BS.  No HSM or masses, no rebound or guarding.  Extremities: No edema, no cyanosis or clubbing, dorsalis pedis and posterior tibialis pulses 2+ bilaterally  Skin: Warm and dry, no jaundice or rash  Neuro: CN 2-12 intact, moves all extremities equally  Psych: Alert & oriented x 3      Medication Changes:  Begin BB    Discharge medications:   Discharge Medication List as of 11/15/2017 12:34 PM      START taking these medications    Details   metoprolol (LOPRESSOR) 25 MG tablet Take 0.25 tablets (6.25 mg) by mouth 2 times daily, Disp-60 tablet, R-0, E-Prescribe         CONTINUE these medications which have NOT CHANGED    Details   atorvastatin (LIPITOR) 40 MG tablet Take 1 tablet (40 mg) by mouth daily, Disp-60 tablet, R-11, E-Prescribe      losartan-hydrochlorothiazide (HYZAAR) 50-12.5 MG per tablet Take 1 tablet by mouth daily, Disp-90 tablet, R-3, E-Prescribe      levothyroxine (SYNTHROID/LEVOTHROID) 75 MCG tablet Take 1 tablet (75 mcg) by mouth daily, Disp-90 tablet, R-3, E-Prescribe      Cholecalciferol (VITAMIN D3 PO) Take by mouth daily, Historical      multivitamin (OCUVITE) TABS Take 1 tablet by mouth daily, Historical      latanoprost (XALATAN) 0.005 % ophthalmic solution Place 1 drop into both eyes At Bedtime, Disp-3 Bottle, R-4, Local Print      aspirin 325 MG EC tablet 1/2 tab daily, Historical      nitroGLYcerin (NITROSTAT) 0.4 MG sublingual tablet For chest pain place 1 tablet under the tongue every 5 minutes for 3 doses. If symptoms persist 5 minutes after 1st dose call 911., Disp-25 tablet, R-3, E-Prescribe             Labs or imaging requiring follow-up after discharge:  Per CVTS and  PCP      Follow-up:  CVTS next week  Cardiology as needed  ED if chest pain occurs  PCP within a week    Code status:  Full    HOLLY Sinclair, CNP  [facility]  Interventional Cardiology-CSI Service  Pager [phone]       Patient Care Team:  Anastacio Love MD as PCP - General (Family Practice)  Anastacio Love MD as MD (Family Practice)  Rolando Toro MD as MD (Transplant)  Sriram Renteria RN as Clinic Care Coordinator

## 2017-11-22 NOTE — ANESTHESIA PROCEDURE NOTES
VIKA Probe Insertion Note  Probe Inserted by:  ISABEL ZAVALA in the presence of a teaching physician  JOHN CARTAGENA   Insertion method: VIKA probe easily inserted   Bite block used:   Yes      Probe type:  Adult 2D   Insertion complications: No complications.      Billing for VIKA report is being done by Anesthesiology

## 2017-11-22 NOTE — IP AVS SNAPSHOT
[address] [address]    Phone:  [phone]                                       After Visit Summary   11/22/2017    Zack Demarco    MRN: [id number]           After Visit Summary Signature Page     I have received my discharge instructions, and my questions have been answered. I have discussed any challenges I see with this plan with the nurse or doctor.    ..........................................................................................................................................  Patient/Patient Representative Signature      ..........................................................................................................................................  Patient Representative Print Name and Relationship to Patient    ..................................................               ................................................  Date                                            Time    ..........................................................................................................................................  Reviewed by Signature/Title    ...................................................              ..............................................  Date                                                            Time

## 2017-11-22 NOTE — ANESTHESIA PROCEDURE NOTES
Arterial Line Procedure Note  Staff:     Anesthesiologist:  JOHN CARTAGENA    Resident/CRNA:  SHIVA BABIN    Arterial line performed by resident/CRNA in presence of a teaching physician    Location: In OR Before Induction  Procedure Start/Stop Times:     patient identified, IV checked, site marked, risks and benefits discussed, informed consent, monitors and equipment checked, pre-op evaluation and at physician/surgeon's request      Correct Patient: Yes      Correct Position: Yes      Correct Site: Yes      Correct Procedure: Yes      Correct Laterality:  Yes    Site Marked:  Yes  Line Placement:     Procedure:  Arterial Line    Insertion Site:  Radial    Insertion laterality:  Right    Skin Prep: Chloraprep      Patient Prep: mask, sterile gloves, hat and hand hygiene      Local skin infiltration:  1% lidocaine    amount (mL):  2    Ultrasound Guided?: Yes      Artery evaluated via ultrasound confirming patency.   Using realtime imaging, the artery was punctured and the needle was observed entering the artery.      A permanent image is entered into patient's chart.      Catheter size:  20 gauge, Quick cath    Dressing:  Tegaderm    Complications:  None obvious    Arterial waveform: Yes      IBP within 10% of NIBP: Yes

## 2017-11-22 NOTE — OR NURSING
Pt took metoprolol at home this morning. Writer was instructed by Dr. Bay to hold ordered metoprolol since pt already took dose this morning.

## 2017-11-22 NOTE — ANESTHESIA PROCEDURE NOTES
Perioperative VIKA Report  Anesthesia Information  VIKA probe placed and report generated by:   SHIVA BABIN in the presence of a teaching physician :  JOHN CARTAGENA    I personally reviewed the images and the fellow/resident interpretation.  I agree with the fellow/resident interpretation as amended by myself. JOHN CARTAGENA  Images for this study have been archived.     Echocardiogram Comments: Preoperative VIKA shows normal LV structure and function (EF 60%). There are no rWMAs. The aortic mitral and tricuspid valves are competent and without stenosis. The RV function is normal. There is diffuse atherosclerotic disease of the aortic arch and descending aorta (some plaques are >3mm, but none appear mobile).     Preanesthesia Checklist:  Patient identified, IV assessed, risks and benefits discussed, monitors and equipment assessed, procedure being performed at surgeon's request and anesthesia consent obtained.  General Procedure Information  Modalities:  2D, CW Doppler and PW Doppler    CABG  Echocardiographic and Doppler Measurements  Right Ventricle:  Cavity size normal.   Global function normal.   Ejection Fraction 60%.    Left Ventricle:  Cavity size normal.   Global Function normal.         Valves  Aortic Valve: Annulus normal.  Stenosis not present.  Regurgitation absent.  Leaflets normal.  Leaflet motions normal.    Mitral Valve: Annulus normal.  Stenosis not present.  Regurgitation +1.  Leaflet motions normal.    Tricuspid Valve: Annulus normal.  Regurgitation +1.  Leaflets normal.  Leaflet motions normal.    Pulmonic Valve: Annulus normal.  Regurgitation +1.      Aorta: Ascending Aorta: Size normal.    Aortic Arch: Size normal.   Plaque thickness greater than 3 mm.     Descending Aorta: Size normal.   Plaque thickness less than 3 mm.         Right Atrium:  Size normal.    Left Atrium: Size normal.  Left atrial appendage normal.     Atrial Septum: Intra-atrial septal morphology normal.      Ventricular Septum: Intra-ventricular septum morphology normal.       Other Findings:   Pericardium:  normal.  Pleural Effusion:  none. Pulmonary Arteries:  normal.  .  .  .  Post Intervention Findings  Procedure(s) performed:  CABG. Global function:  Improved.   Regional wall motion:  Improved   Surgeon(s) notified of all postintervention findings:  Yes  .  .  .   .  .  .  .  .  .  .        Echocardiogram Comments  Preoperative VIKA shows normal LV structure and function (EF 60%). There are no rWMAs. The aortic mitral and tricuspid valves are competent and without stenosis. The RV function is normal. There is diffuse atherosclerotic disease of the aortic arch and descending aorta (some plaques are >3mm, but none appear mobile).

## 2017-11-22 NOTE — ANESTHESIA PROCEDURE NOTES
Central Line Procedure Note  Staff:     Anesthesiologist:  JOHN CARTAGENA    Resident/CRNA:  ELMER JONES    Central line placed by Resident/CRNA in the presence of a teaching physician    Location: In OR after induction  Procedure Start/Stop Times:     patient identified, IV checked, site marked, risks and benefits discussed, informed consent, monitors and equipment checked, pre-op evaluation and at physician/surgeon's request      Correct Patient: Yes      Correct Position: Yes      Correct Site: Yes      Correct Procedure: Yes      Correct Laterality:  Yes    Site Marked:  Yes  Line Placement:     Procedure:  Central Line    Insertion laterality:  Right    Insertion site:  Internal Jugular    Position:  Trendelenburg      Maximal Sterile Barriers: All elements of maximal sterile barrier technique followed      (Maximal sterile barriers include:   Sterile gown, Sterile Gloves, Mask, Cap, Whole body draped, hand hygiene and acceptable skin prep).Skin Prep: Chloraprep         Injection Technique:  Ultrasound guided    Sterile Ultrasound Technique:  Sterile probe cover and Sterile gel    Vein evaluated via U/S for patency/adequacy of catheter insertion and is adequate.  Using realtime U/S imaging the vein was punctured, and needle was observed entering vein on U/S      Permanent Image entered into patient's record      Local skin infiltration:  None    Catheter size:  9 Fr, 2 lumen 11.5 cm (MAC)    Catheter length at skin (cm):  15    Cath secured with: suture      Dressing:  Tegaderm and Biopatch    Complications:  None obvious    Blood aspirated all lumens: Yes      All Lumens Flushed: Yes      Verification method:  Placement to be verified post-op

## 2017-11-23 ENCOUNTER — APPOINTMENT (OUTPATIENT)
Dept: GENERAL RADIOLOGY | Facility: CLINIC | Age: 77
DRG: 236 | End: 2017-11-23
Attending: THORACIC SURGERY (CARDIOTHORACIC VASCULAR SURGERY)
Payer: MEDICARE

## 2017-11-23 LAB
ABO + RH BLD: NORMAL
ABO + RH BLD: NORMAL
ALBUMIN SERPL-MCNC: 3.3 G/DL (ref 3.4–5)
ALP SERPL-CCNC: 46 U/L (ref 40–150)
ALT SERPL W P-5'-P-CCNC: 23 U/L (ref 0–70)
ANION GAP SERPL CALCULATED.3IONS-SCNC: 15 MMOL/L (ref 3–14)
ANION GAP SERPL CALCULATED.3IONS-SCNC: 6 MMOL/L (ref 3–14)
ANION GAP SERPL CALCULATED.3IONS-SCNC: 6 MMOL/L (ref 3–14)
APTT PPP: 51 SEC (ref 22–37)
AST SERPL W P-5'-P-CCNC: 28 U/L (ref 0–45)
BASE DEFICIT BLDA-SCNC: 2.4 MMOL/L
BASE DEFICIT BLDA-SCNC: 7.2 MMOL/L
BASE DEFICIT BLDV-SCNC: 6.9 MMOL/L
BASE EXCESS BLDA CALC-SCNC: 1.8 MMOL/L
BASE EXCESS BLDA CALC-SCNC: 5 MMOL/L
BASE EXCESS BLDA CALC-SCNC: 5.4 MMOL/L
BASE EXCESS BLDA CALC-SCNC: 6 MMOL/L
BASE EXCESS BLDA CALC-SCNC: 7 MMOL/L
BASE EXCESS BLDA CALC-SCNC: 7.1 MMOL/L
BASE EXCESS BLDA CALC-SCNC: 7.4 MMOL/L
BASE EXCESS BLDV CALC-SCNC: 1.3 MMOL/L
BILIRUB DIRECT SERPL-MCNC: 0.1 MG/DL (ref 0–0.2)
BILIRUB SERPL-MCNC: 0.6 MG/DL (ref 0.2–1.3)
BLD GP AB SCN SERPL QL: NORMAL
BLOOD BANK CMNT PATIENT-IMP: NORMAL
BUN SERPL-MCNC: 17 MG/DL (ref 7–30)
BUN SERPL-MCNC: 18 MG/DL (ref 7–30)
BUN SERPL-MCNC: 20 MG/DL (ref 7–30)
CA-I BLD-MCNC: 4.4 MG/DL (ref 4.4–5.2)
CA-I BLD-MCNC: 4.4 MG/DL (ref 4.4–5.2)
CA-I BLD-MCNC: 4.6 MG/DL (ref 4.4–5.2)
CA-I BLD-MCNC: 4.7 MG/DL (ref 4.4–5.2)
CALCIUM SERPL-MCNC: 7.3 MG/DL (ref 8.5–10.1)
CALCIUM SERPL-MCNC: 7.8 MG/DL (ref 8.5–10.1)
CALCIUM SERPL-MCNC: 8.3 MG/DL (ref 8.5–10.1)
CHLORIDE SERPL-SCNC: 110 MMOL/L (ref 94–109)
CHLORIDE SERPL-SCNC: 110 MMOL/L (ref 94–109)
CHLORIDE SERPL-SCNC: 113 MMOL/L (ref 94–109)
CO2 SERPL-SCNC: 26 MMOL/L (ref 20–32)
CO2 SERPL-SCNC: 29 MMOL/L (ref 20–32)
CO2 SERPL-SCNC: 30 MMOL/L (ref 20–32)
CREAT SERPL-MCNC: 0.96 MG/DL (ref 0.66–1.25)
CREAT SERPL-MCNC: 0.99 MG/DL (ref 0.66–1.25)
CREAT SERPL-MCNC: 1.09 MG/DL (ref 0.66–1.25)
ERYTHROCYTE [DISTWIDTH] IN BLOOD BY AUTOMATED COUNT: 12.7 % (ref 10–15)
ERYTHROCYTE [DISTWIDTH] IN BLOOD BY AUTOMATED COUNT: 12.8 % (ref 10–15)
ERYTHROCYTE [DISTWIDTH] IN BLOOD BY AUTOMATED COUNT: 12.9 % (ref 10–15)
ERYTHROCYTE [DISTWIDTH] IN BLOOD BY AUTOMATED COUNT: 13.1 % (ref 10–15)
FIBRINOGEN PPP-MCNC: 230 MG/DL (ref 200–420)
FIBRINOGEN PPP-MCNC: 301 MG/DL (ref 200–420)
GFR SERPL CREATININE-BSD FRML MDRD: 66 ML/MIN/1.7M2
GFR SERPL CREATININE-BSD FRML MDRD: 73 ML/MIN/1.7M2
GFR SERPL CREATININE-BSD FRML MDRD: 76 ML/MIN/1.7M2
GLUCOSE BLDC GLUCOMTR-MCNC: 111 MG/DL (ref 70–99)
GLUCOSE BLDC GLUCOMTR-MCNC: 120 MG/DL (ref 70–99)
GLUCOSE BLDC GLUCOMTR-MCNC: 127 MG/DL (ref 70–99)
GLUCOSE BLDC GLUCOMTR-MCNC: 139 MG/DL (ref 70–99)
GLUCOSE BLDC GLUCOMTR-MCNC: 142 MG/DL (ref 70–99)
GLUCOSE BLDC GLUCOMTR-MCNC: 144 MG/DL (ref 70–99)
GLUCOSE BLDC GLUCOMTR-MCNC: 144 MG/DL (ref 70–99)
GLUCOSE BLDC GLUCOMTR-MCNC: 164 MG/DL (ref 70–99)
GLUCOSE BLDC GLUCOMTR-MCNC: 167 MG/DL (ref 70–99)
GLUCOSE BLDC GLUCOMTR-MCNC: 189 MG/DL (ref 70–99)
GLUCOSE BLDC GLUCOMTR-MCNC: 190 MG/DL (ref 70–99)
GLUCOSE BLDC GLUCOMTR-MCNC: 199 MG/DL (ref 70–99)
GLUCOSE BLDC GLUCOMTR-MCNC: 199 MG/DL (ref 70–99)
GLUCOSE BLDC GLUCOMTR-MCNC: 203 MG/DL (ref 70–99)
GLUCOSE BLDC GLUCOMTR-MCNC: 99 MG/DL (ref 70–99)
GLUCOSE SERPL-MCNC: 131 MG/DL (ref 70–99)
GLUCOSE SERPL-MCNC: 206 MG/DL (ref 70–99)
GLUCOSE SERPL-MCNC: 98 MG/DL (ref 70–99)
HBA1C MFR BLD: 5.8 % (ref 4.3–6)
HCO3 BLD-SCNC: 19 MMOL/L (ref 21–28)
HCO3 BLD-SCNC: 23 MMOL/L (ref 21–28)
HCO3 BLD-SCNC: 26 MMOL/L (ref 21–28)
HCO3 BLD-SCNC: 29 MMOL/L (ref 21–28)
HCO3 BLD-SCNC: 30 MMOL/L (ref 21–28)
HCO3 BLD-SCNC: 32 MMOL/L (ref 21–28)
HCO3 BLD-SCNC: 32 MMOL/L (ref 21–28)
HCO3 BLDV-SCNC: 20 MMOL/L (ref 21–28)
HCO3 BLDV-SCNC: 27 MMOL/L (ref 21–28)
HCT VFR BLD AUTO: 28 % (ref 40–53)
HCT VFR BLD AUTO: 28.4 % (ref 40–53)
HCT VFR BLD AUTO: 28.5 % (ref 40–53)
HCT VFR BLD AUTO: 30.7 % (ref 40–53)
HGB BLD-MCNC: 8.9 G/DL (ref 13.3–17.7)
HGB BLD-MCNC: 9.1 G/DL (ref 13.3–17.7)
HGB BLD-MCNC: 9.2 G/DL (ref 13.3–17.7)
HGB BLD-MCNC: 9.8 G/DL (ref 13.3–17.7)
INR PPP: 1.43 (ref 0.86–1.14)
LACTATE BLD-SCNC: 1 MMOL/L (ref 0.7–2)
LACTATE BLD-SCNC: 1.2 MMOL/L (ref 0.7–2)
LACTATE BLD-SCNC: 10.7 MMOL/L (ref 0.7–2)
LACTATE BLD-SCNC: 10.9 MMOL/L (ref 0.7–2)
LACTATE BLD-SCNC: 12 MMOL/L (ref 0.7–2)
LACTATE BLD-SCNC: 12.8 MMOL/L (ref 0.7–2)
LACTATE BLD-SCNC: 3.2 MMOL/L (ref 0.7–2)
LACTATE BLD-SCNC: 5.9 MMOL/L (ref 0.7–2)
MAGNESIUM SERPL-MCNC: 1.8 MG/DL (ref 1.6–2.3)
MAGNESIUM SERPL-MCNC: 2.3 MG/DL (ref 1.6–2.3)
MCH RBC QN AUTO: 30.6 PG (ref 26.5–33)
MCH RBC QN AUTO: 30.8 PG (ref 26.5–33)
MCH RBC QN AUTO: 30.9 PG (ref 26.5–33)
MCH RBC QN AUTO: 31 PG (ref 26.5–33)
MCHC RBC AUTO-ENTMCNC: 31.8 G/DL (ref 31.5–36.5)
MCHC RBC AUTO-ENTMCNC: 31.9 G/DL (ref 31.5–36.5)
MCHC RBC AUTO-ENTMCNC: 32 G/DL (ref 31.5–36.5)
MCHC RBC AUTO-ENTMCNC: 32.3 G/DL (ref 31.5–36.5)
MCV RBC AUTO: 96 FL (ref 78–100)
MCV RBC AUTO: 96 FL (ref 78–100)
MCV RBC AUTO: 97 FL (ref 78–100)
MCV RBC AUTO: 98 FL (ref 78–100)
O2/TOTAL GAS SETTING VFR VENT: 40 %
O2/TOTAL GAS SETTING VFR VENT: 50 %
O2/TOTAL GAS SETTING VFR VENT: ABNORMAL %
O2/TOTAL GAS SETTING VFR VENT: ABNORMAL %
OXYHGB MFR BLD: 94 % (ref 92–100)
OXYHGB MFR BLD: 95 % (ref 92–100)
OXYHGB MFR BLD: 96 % (ref 92–100)
OXYHGB MFR BLD: 97 % (ref 92–100)
OXYHGB MFR BLDV: 80 %
OXYHGB MFR BLDV: 81 %
PCO2 BLD: 33 MM HG (ref 35–45)
PCO2 BLD: 34 MM HG (ref 35–45)
PCO2 BLD: 41 MM HG (ref 35–45)
PCO2 BLD: 42 MM HG (ref 35–45)
PCO2 BLD: 43 MM HG (ref 35–45)
PCO2 BLD: 44 MM HG (ref 35–45)
PCO2 BLD: 45 MM HG (ref 35–45)
PCO2 BLD: 46 MM HG (ref 35–45)
PCO2 BLD: 48 MM HG (ref 35–45)
PCO2 BLDV: 45 MM HG (ref 40–50)
PCO2 BLDV: 47 MM HG (ref 40–50)
PH BLD: 7.27 PH (ref 7.35–7.45)
PH BLD: 7.34 PH (ref 7.35–7.45)
PH BLD: 7.42 PH (ref 7.35–7.45)
PH BLD: 7.44 PH (ref 7.35–7.45)
PH BLD: 7.46 PH (ref 7.35–7.45)
PH BLD: 7.53 PH (ref 7.35–7.45)
PH BLD: 7.56 PH (ref 7.35–7.45)
PH BLDV: 7.25 PH (ref 7.32–7.43)
PH BLDV: 7.38 PH (ref 7.32–7.43)
PHOSPHATE SERPL-MCNC: 0.4 MG/DL (ref 2.5–4.5)
PHOSPHATE SERPL-MCNC: 2.7 MG/DL (ref 2.5–4.5)
PHOSPHATE SERPL-MCNC: 3.6 MG/DL (ref 2.5–4.5)
PLATELET # BLD AUTO: 114 10E9/L (ref 150–450)
PLATELET # BLD AUTO: 137 10E9/L (ref 150–450)
PLATELET # BLD AUTO: 152 10E9/L (ref 150–450)
PLATELET # BLD AUTO: 159 10E9/L (ref 150–450)
PO2 BLD: 110 MM HG (ref 80–105)
PO2 BLD: 113 MM HG (ref 80–105)
PO2 BLD: 125 MM HG (ref 80–105)
PO2 BLD: 127 MM HG (ref 80–105)
PO2 BLD: 141 MM HG (ref 80–105)
PO2 BLD: 142 MM HG (ref 80–105)
PO2 BLD: 157 MM HG (ref 80–105)
PO2 BLD: 76 MM HG (ref 80–105)
PO2 BLD: 84 MM HG (ref 80–105)
PO2 BLDV: 45 MM HG (ref 25–47)
PO2 BLDV: 53 MM HG (ref 25–47)
POTASSIUM BLD-SCNC: 2.8 MMOL/L (ref 3.4–5.3)
POTASSIUM BLD-SCNC: 2.9 MMOL/L (ref 3.4–5.3)
POTASSIUM BLD-SCNC: 3.5 MMOL/L (ref 3.4–5.3)
POTASSIUM SERPL-SCNC: 3.5 MMOL/L (ref 3.4–5.3)
POTASSIUM SERPL-SCNC: 3.8 MMOL/L (ref 3.4–5.3)
POTASSIUM SERPL-SCNC: 4.1 MMOL/L (ref 3.4–5.3)
PROT SERPL-MCNC: 6.1 G/DL (ref 6.8–8.8)
RBC # BLD AUTO: 2.87 10E12/L (ref 4.4–5.9)
RBC # BLD AUTO: 2.97 10E12/L (ref 4.4–5.9)
RBC # BLD AUTO: 2.98 10E12/L (ref 4.4–5.9)
RBC # BLD AUTO: 3.18 10E12/L (ref 4.4–5.9)
SODIUM SERPL-SCNC: 146 MMOL/L (ref 133–144)
SODIUM SERPL-SCNC: 148 MMOL/L (ref 133–144)
SODIUM SERPL-SCNC: 151 MMOL/L (ref 133–144)
SPECIMEN EXP DATE BLD: NORMAL
WBC # BLD AUTO: 11 10E9/L (ref 4–11)
WBC # BLD AUTO: 14.9 10E9/L (ref 4–11)
WBC # BLD AUTO: 15.4 10E9/L (ref 4–11)
WBC # BLD AUTO: 19.8 10E9/L (ref 4–11)

## 2017-11-23 PROCEDURE — 20000004 ZZH R&B ICU UMMC

## 2017-11-23 PROCEDURE — 86900 BLOOD TYPING SEROLOGIC ABO: CPT | Performed by: SURGERY

## 2017-11-23 PROCEDURE — 84100 ASSAY OF PHOSPHORUS: CPT | Performed by: SURGERY

## 2017-11-23 PROCEDURE — 82330 ASSAY OF CALCIUM: CPT | Performed by: SURGERY

## 2017-11-23 PROCEDURE — 80048 BASIC METABOLIC PNL TOTAL CA: CPT | Performed by: STUDENT IN AN ORGANIZED HEALTH CARE EDUCATION/TRAINING PROGRAM

## 2017-11-23 PROCEDURE — 25800025 ZZH RX 258: Performed by: THORACIC SURGERY (CARDIOTHORACIC VASCULAR SURGERY)

## 2017-11-23 PROCEDURE — 25000128 H RX IP 250 OP 636: Performed by: SURGERY

## 2017-11-23 PROCEDURE — 40000275 ZZH STATISTIC RCP TIME EA 10 MIN

## 2017-11-23 PROCEDURE — 83605 ASSAY OF LACTIC ACID: CPT | Performed by: THORACIC SURGERY (CARDIOTHORACIC VASCULAR SURGERY)

## 2017-11-23 PROCEDURE — 85610 PROTHROMBIN TIME: CPT | Performed by: SURGERY

## 2017-11-23 PROCEDURE — 84100 ASSAY OF PHOSPHORUS: CPT | Performed by: THORACIC SURGERY (CARDIOTHORACIC VASCULAR SURGERY)

## 2017-11-23 PROCEDURE — 93010 ELECTROCARDIOGRAM REPORT: CPT | Performed by: INTERNAL MEDICINE

## 2017-11-23 PROCEDURE — 71010 XR CHEST PORT 1 VW: CPT

## 2017-11-23 PROCEDURE — A9270 NON-COVERED ITEM OR SERVICE: HCPCS | Mod: GY | Performed by: SURGERY

## 2017-11-23 PROCEDURE — 25000128 H RX IP 250 OP 636: Performed by: THORACIC SURGERY (CARDIOTHORACIC VASCULAR SURGERY)

## 2017-11-23 PROCEDURE — 80048 BASIC METABOLIC PNL TOTAL CA: CPT | Performed by: THORACIC SURGERY (CARDIOTHORACIC VASCULAR SURGERY)

## 2017-11-23 PROCEDURE — 25000125 ZZHC RX 250: Performed by: SURGERY

## 2017-11-23 PROCEDURE — 40000014 ZZH STATISTIC ARTERIAL MONITORING DAILY

## 2017-11-23 PROCEDURE — 25000132 ZZH RX MED GY IP 250 OP 250 PS 637: Mod: GY | Performed by: THORACIC SURGERY (CARDIOTHORACIC VASCULAR SURGERY)

## 2017-11-23 PROCEDURE — 99291 CRITICAL CARE FIRST HOUR: CPT | Mod: GC | Performed by: ANESTHESIOLOGY

## 2017-11-23 PROCEDURE — 93005 ELECTROCARDIOGRAM TRACING: CPT

## 2017-11-23 PROCEDURE — 85027 COMPLETE CBC AUTOMATED: CPT | Performed by: STUDENT IN AN ORGANIZED HEALTH CARE EDUCATION/TRAINING PROGRAM

## 2017-11-23 PROCEDURE — 25000125 ZZHC RX 250

## 2017-11-23 PROCEDURE — 84132 ASSAY OF SERUM POTASSIUM: CPT | Performed by: SURGERY

## 2017-11-23 PROCEDURE — 25000125 ZZHC RX 250: Performed by: THORACIC SURGERY (CARDIOTHORACIC VASCULAR SURGERY)

## 2017-11-23 PROCEDURE — 86850 RBC ANTIBODY SCREEN: CPT | Performed by: SURGERY

## 2017-11-23 PROCEDURE — A9270 NON-COVERED ITEM OR SERVICE: HCPCS | Mod: GY | Performed by: STUDENT IN AN ORGANIZED HEALTH CARE EDUCATION/TRAINING PROGRAM

## 2017-11-23 PROCEDURE — 85730 THROMBOPLASTIN TIME PARTIAL: CPT | Performed by: SURGERY

## 2017-11-23 PROCEDURE — 00000146 ZZHCL STATISTIC GLUCOSE BY METER IP

## 2017-11-23 PROCEDURE — 82805 BLOOD GASES W/O2 SATURATION: CPT | Performed by: SURGERY

## 2017-11-23 PROCEDURE — 83735 ASSAY OF MAGNESIUM: CPT | Performed by: THORACIC SURGERY (CARDIOTHORACIC VASCULAR SURGERY)

## 2017-11-23 PROCEDURE — 83605 ASSAY OF LACTIC ACID: CPT | Performed by: SURGERY

## 2017-11-23 PROCEDURE — 82330 ASSAY OF CALCIUM: CPT | Performed by: THORACIC SURGERY (CARDIOTHORACIC VASCULAR SURGERY)

## 2017-11-23 PROCEDURE — 25000128 H RX IP 250 OP 636

## 2017-11-23 PROCEDURE — 83036 HEMOGLOBIN GLYCOSYLATED A1C: CPT | Performed by: THORACIC SURGERY (CARDIOTHORACIC VASCULAR SURGERY)

## 2017-11-23 PROCEDURE — 83735 ASSAY OF MAGNESIUM: CPT | Performed by: SURGERY

## 2017-11-23 PROCEDURE — 84100 ASSAY OF PHOSPHORUS: CPT | Performed by: STUDENT IN AN ORGANIZED HEALTH CARE EDUCATION/TRAINING PROGRAM

## 2017-11-23 PROCEDURE — A9270 NON-COVERED ITEM OR SERVICE: HCPCS | Mod: GY | Performed by: THORACIC SURGERY (CARDIOTHORACIC VASCULAR SURGERY)

## 2017-11-23 PROCEDURE — 25000132 ZZH RX MED GY IP 250 OP 250 PS 637: Mod: GY | Performed by: STUDENT IN AN ORGANIZED HEALTH CARE EDUCATION/TRAINING PROGRAM

## 2017-11-23 PROCEDURE — 40000196 ZZH STATISTIC RAPCV CVP MONITORING

## 2017-11-23 PROCEDURE — 85027 COMPLETE CBC AUTOMATED: CPT | Performed by: SURGERY

## 2017-11-23 PROCEDURE — 80048 BASIC METABOLIC PNL TOTAL CA: CPT | Performed by: SURGERY

## 2017-11-23 PROCEDURE — 94003 VENT MGMT INPAT SUBQ DAY: CPT

## 2017-11-23 PROCEDURE — 85027 COMPLETE CBC AUTOMATED: CPT | Performed by: THORACIC SURGERY (CARDIOTHORACIC VASCULAR SURGERY)

## 2017-11-23 PROCEDURE — 25000132 ZZH RX MED GY IP 250 OP 250 PS 637: Mod: GY | Performed by: SURGERY

## 2017-11-23 PROCEDURE — 86901 BLOOD TYPING SEROLOGIC RH(D): CPT | Performed by: SURGERY

## 2017-11-23 PROCEDURE — 94640 AIRWAY INHALATION TREATMENT: CPT | Mod: 76

## 2017-11-23 PROCEDURE — 85384 FIBRINOGEN ACTIVITY: CPT | Performed by: SURGERY

## 2017-11-23 PROCEDURE — 82805 BLOOD GASES W/O2 SATURATION: CPT | Performed by: THORACIC SURGERY (CARDIOTHORACIC VASCULAR SURGERY)

## 2017-11-23 PROCEDURE — P9041 ALBUMIN (HUMAN),5%, 50ML: HCPCS

## 2017-11-23 RX ORDER — OXYCODONE HCL 5 MG/5 ML
5-10 SOLUTION, ORAL ORAL EVERY 4 HOURS PRN
Status: DISPENSED | OUTPATIENT
Start: 2017-11-23 | End: 2017-11-23

## 2017-11-23 RX ORDER — ACETYLCYSTEINE 200 MG/ML
1 SOLUTION ORAL; RESPIRATORY (INHALATION) 3 TIMES DAILY
Status: DISCONTINUED | OUTPATIENT
Start: 2017-11-23 | End: 2017-11-24

## 2017-11-23 RX ORDER — IPRATROPIUM BROMIDE AND ALBUTEROL SULFATE 2.5; .5 MG/3ML; MG/3ML
3 SOLUTION RESPIRATORY (INHALATION) 3 TIMES DAILY
Status: DISCONTINUED | OUTPATIENT
Start: 2017-11-23 | End: 2017-11-24

## 2017-11-23 RX ORDER — ALBUMIN, HUMAN INJ 5% 5 %
25 SOLUTION INTRAVENOUS ONCE
Status: COMPLETED | OUTPATIENT
Start: 2017-11-23 | End: 2017-11-23

## 2017-11-23 RX ORDER — ALBUMIN, HUMAN INJ 5% 5 %
SOLUTION INTRAVENOUS
Status: COMPLETED
Start: 2017-11-23 | End: 2017-11-23

## 2017-11-23 RX ORDER — ALBUTEROL SULFATE 0.83 MG/ML
2.5 SOLUTION RESPIRATORY (INHALATION) EVERY 4 HOURS PRN
Status: DISCONTINUED | OUTPATIENT
Start: 2017-11-23 | End: 2017-11-27 | Stop reason: HOSPADM

## 2017-11-23 RX ORDER — HEPARIN SODIUM 5000 [USP'U]/.5ML
5000 INJECTION, SOLUTION INTRAVENOUS; SUBCUTANEOUS EVERY 8 HOURS
Status: DISCONTINUED | OUTPATIENT
Start: 2017-11-24 | End: 2017-11-27 | Stop reason: HOSPADM

## 2017-11-23 RX ORDER — ATORVASTATIN CALCIUM 40 MG/1
40 TABLET, FILM COATED ORAL DAILY
Status: DISCONTINUED | OUTPATIENT
Start: 2017-11-23 | End: 2017-11-27 | Stop reason: HOSPADM

## 2017-11-23 RX ORDER — ALBUTEROL SULFATE 0.83 MG/ML
2.5 SOLUTION RESPIRATORY (INHALATION)
Status: DISCONTINUED | OUTPATIENT
Start: 2017-11-23 | End: 2017-11-23

## 2017-11-23 RX ORDER — ACETYLCYSTEINE 200 MG/ML
1 SOLUTION ORAL; RESPIRATORY (INHALATION)
Status: DISCONTINUED | OUTPATIENT
Start: 2017-11-23 | End: 2017-11-23

## 2017-11-23 RX ORDER — OXYCODONE HYDROCHLORIDE 5 MG/1
5-10 TABLET ORAL EVERY 4 HOURS PRN
Status: DISCONTINUED | OUTPATIENT
Start: 2017-11-23 | End: 2017-11-27 | Stop reason: HOSPADM

## 2017-11-23 RX ORDER — LEVOTHYROXINE SODIUM 75 UG/1
75 TABLET ORAL DAILY
Status: DISCONTINUED | OUTPATIENT
Start: 2017-11-23 | End: 2017-11-27 | Stop reason: HOSPADM

## 2017-11-23 RX ADMIN — Medication 100 MEQ: at 02:24

## 2017-11-23 RX ADMIN — HYDROMORPHONE HYDROCHLORIDE 0.2 MG: 1 INJECTION, SOLUTION INTRAMUSCULAR; INTRAVENOUS; SUBCUTANEOUS at 01:36

## 2017-11-23 RX ADMIN — CALCIUM GLUCONATE 1 G: 94 INJECTION, SOLUTION INTRAVENOUS at 02:42

## 2017-11-23 RX ADMIN — POTASSIUM CHLORIDE 40 MEQ: 1.5 POWDER, FOR SOLUTION ORAL at 02:59

## 2017-11-23 RX ADMIN — ALBUMIN HUMAN 25 G: 50 SOLUTION INTRAVENOUS at 01:20

## 2017-11-23 RX ADMIN — POTASSIUM CHLORIDE 20 MEQ: 29.8 INJECTION, SOLUTION INTRAVENOUS at 05:14

## 2017-11-23 RX ADMIN — ALBUMIN HUMAN 25 G: 0.05 INJECTION, SOLUTION INTRAVENOUS at 02:27

## 2017-11-23 RX ADMIN — Medication 100 MEQ: at 00:51

## 2017-11-23 RX ADMIN — POTASSIUM CHLORIDE 40 MEQ: 1.5 POWDER, FOR SOLUTION ORAL at 00:51

## 2017-11-23 RX ADMIN — POTASSIUM CHLORIDE, DEXTROSE MONOHYDRATE AND SODIUM CHLORIDE: 150; 5; 450 INJECTION, SOLUTION INTRAVENOUS at 10:42

## 2017-11-23 RX ADMIN — SENNOSIDES AND DOCUSATE SODIUM 2 TABLET: 8.6; 5 TABLET ORAL at 20:11

## 2017-11-23 RX ADMIN — POTASSIUM CHLORIDE, DEXTROSE MONOHYDRATE AND SODIUM CHLORIDE: 150; 5; 450 INJECTION, SOLUTION INTRAVENOUS at 22:15

## 2017-11-23 RX ADMIN — OXYCODONE HYDROCHLORIDE 10 MG: 5 TABLET ORAL at 17:42

## 2017-11-23 RX ADMIN — ACETAMINOPHEN 650 MG: 325 SOLUTION ORAL at 06:21

## 2017-11-23 RX ADMIN — POTASSIUM CHLORIDE 40 MEQ: 1.5 POWDER, FOR SOLUTION ORAL at 02:07

## 2017-11-23 RX ADMIN — PANTOPRAZOLE SODIUM 40 MG: 40 INJECTION, POWDER, FOR SOLUTION INTRAVENOUS at 07:39

## 2017-11-23 RX ADMIN — IPRATROPIUM BROMIDE 0.5 MG: 0.5 SOLUTION RESPIRATORY (INHALATION) at 14:30

## 2017-11-23 RX ADMIN — CEFAZOLIN SODIUM 2 G: 2 INJECTION, SOLUTION INTRAVENOUS at 04:08

## 2017-11-23 RX ADMIN — SODIUM BICARBONATE 100 MEQ: 84 INJECTION INTRAVENOUS at 02:24

## 2017-11-23 RX ADMIN — SODIUM BICARBONATE 100 MEQ: 84 INJECTION INTRAVENOUS at 01:27

## 2017-11-23 RX ADMIN — OXYCODONE HYDROCHLORIDE 10 MG: 5 TABLET ORAL at 21:33

## 2017-11-23 RX ADMIN — HYDROMORPHONE HYDROCHLORIDE 0.2 MG: 1 INJECTION, SOLUTION INTRAMUSCULAR; INTRAVENOUS; SUBCUTANEOUS at 10:46

## 2017-11-23 RX ADMIN — CEFAZOLIN SODIUM 2 G: 2 INJECTION, SOLUTION INTRAVENOUS at 11:14

## 2017-11-23 RX ADMIN — POTASSIUM CHLORIDE 20 MEQ: 29.8 INJECTION, SOLUTION INTRAVENOUS at 02:48

## 2017-11-23 RX ADMIN — ATORVASTATIN CALCIUM 40 MG: 40 TABLET, FILM COATED ORAL at 20:10

## 2017-11-23 RX ADMIN — ALBUTEROL SULFATE 2.5 MG: 2.5 SOLUTION RESPIRATORY (INHALATION) at 14:29

## 2017-11-23 RX ADMIN — MUPIROCIN 0.5 G: 20 OINTMENT TOPICAL at 07:39

## 2017-11-23 RX ADMIN — PROPOFOL 40 MCG/KG/MIN: 10 INJECTION, EMULSION INTRAVENOUS at 04:32

## 2017-11-23 RX ADMIN — POTASSIUM CHLORIDE 40 MEQ: 1.5 POWDER, FOR SOLUTION ORAL at 04:51

## 2017-11-23 RX ADMIN — VASOPRESSIN 2.4 UNITS/HR: 20 INJECTION, SOLUTION INTRAMUSCULAR; SUBCUTANEOUS at 01:37

## 2017-11-23 RX ADMIN — CEFAZOLIN SODIUM 2 G: 2 INJECTION, SOLUTION INTRAVENOUS at 20:07

## 2017-11-23 RX ADMIN — POTASSIUM CHLORIDE 10 MEQ: 10 INJECTION, SOLUTION INTRAVENOUS at 02:07

## 2017-11-23 RX ADMIN — LEVOTHYROXINE SODIUM 75 MCG: 75 TABLET ORAL at 20:10

## 2017-11-23 RX ADMIN — ALBUMIN HUMAN 25 G: 50 SOLUTION INTRAVENOUS at 02:27

## 2017-11-23 RX ADMIN — POTASSIUM CHLORIDE 20 MEQ: 750 TABLET, EXTENDED RELEASE ORAL at 22:20

## 2017-11-23 RX ADMIN — ASPIRIN 81 MG CHEWABLE TABLET 81 MG: 81 TABLET CHEWABLE at 07:39

## 2017-11-23 RX ADMIN — ACETYLCYSTEINE 1 ML: 200 SOLUTION ORAL; RESPIRATORY (INHALATION) at 02:04

## 2017-11-23 RX ADMIN — ALBUMIN HUMAN 25 G: 0.05 INJECTION, SOLUTION INTRAVENOUS at 01:20

## 2017-11-23 RX ADMIN — POTASSIUM PHOSPHATE, MONOBASIC AND POTASSIUM PHOSPHATE, DIBASIC 10 MMOL: 224; 236 INJECTION, SOLUTION INTRAVENOUS at 23:43

## 2017-11-23 RX ADMIN — ACETAMINOPHEN 650 MG: 325 TABLET, FILM COATED ORAL at 20:23

## 2017-11-23 RX ADMIN — ACETYLCYSTEINE 1 ML: 200 INHALANT RESPIRATORY (INHALATION) at 20:38

## 2017-11-23 RX ADMIN — SENNOSIDES AND DOCUSATE SODIUM 2 TABLET: 8.6; 5 TABLET ORAL at 07:39

## 2017-11-23 RX ADMIN — HUMAN INSULIN 15 UNITS/HR: 100 INJECTION, SOLUTION SUBCUTANEOUS at 02:14

## 2017-11-23 RX ADMIN — SODIUM BICARBONATE 100 MEQ: 84 INJECTION INTRAVENOUS at 00:51

## 2017-11-23 RX ADMIN — HUMAN INSULIN 3 UNITS/HR: 100 INJECTION, SOLUTION SUBCUTANEOUS at 09:38

## 2017-11-23 RX ADMIN — IPRATROPIUM BROMIDE AND ALBUTEROL SULFATE 3 ML: .5; 3 SOLUTION RESPIRATORY (INHALATION) at 20:38

## 2017-11-23 RX ADMIN — ALBUTEROL SULFATE 2.5 MG: 2.5 SOLUTION RESPIRATORY (INHALATION) at 02:04

## 2017-11-23 RX ADMIN — HUMAN INSULIN 25 UNITS/HR: 100 INJECTION, SOLUTION SUBCUTANEOUS at 05:45

## 2017-11-23 RX ADMIN — HUMAN INSULIN 20 UNITS/HR: 100 INJECTION, SOLUTION SUBCUTANEOUS at 04:19

## 2017-11-23 RX ADMIN — OXYCODONE HYDROCHLORIDE 10 MG: 5 SOLUTION ORAL at 12:27

## 2017-11-23 RX ADMIN — Medication 2 G: at 22:19

## 2017-11-23 RX ADMIN — PHENYLEPHRINE HYDROCHLORIDE 2 MCG/KG/MIN: 10 INJECTION, SOLUTION INTRAMUSCULAR; INTRAVENOUS; SUBCUTANEOUS at 02:46

## 2017-11-23 ASSESSMENT — PAIN DESCRIPTION - DESCRIPTORS
DESCRIPTORS: SORE

## 2017-11-23 NOTE — PROGRESS NOTES
Pt extubated to nasal cannula without incident.  Vital signs WNL, Breath sounds clear bilaterally.  Pt has good cough with no production.  Will continue to follow.

## 2017-11-23 NOTE — PROGRESS NOTES
Pt arrived from OR intubated with an 8.0 ETT secured 23 @ lip and was placed on initial ventilator settings of cmv 18 vt 550 50% +5    Breath sounds clear

## 2017-11-23 NOTE — H&P
CV ICU H&PNote    POD: 1 Day Post-Op  LOS: 1    Admission History: Zack Demarco is a 77 year old male admitted to the ICU for s/p 3v CABG.  He has known 2 v disease involving the RCA and LAD and has had chest pain since October. Post-op he had lactic acidosis to 12 due to hypervolemia and was given 2 amps of bicarb, 1 L LR and 2L albumin.    Review Of Systems: unable to obtain    Past Medical History:   Diagnosis Date     Coronary artery disease 11/22/2017     DJD (degenerative joint disease) of hip     right     Glaucoma      Hernia umbilical      Hypercholesterolemia      Hypertension      Hypothyroidism      Lipoma      Low back pain      Nonsenile cataract      Obesity      Unstable angina (H) 11/13/2017     Past Surgical History:   Procedure Laterality Date     BIOPSY  1980's    fatty tissue     CL AFF SURGICAL PATHOLOGY       COLONOSCOPY  2011     COLONOSCOPY WITH CO2 INSUFFLATION N/A 9/19/2016    Procedure: COLONOSCOPY WITH CO2 INSUFFLATION;  Surgeon: Jeffery Flores MD;  Location: [facility] OR     ROTATOR CUFF REPAIR RT/LT       SOFT TISSUE SURGERY  Sept. 2014    [facility] Tendon transfer (Left Ankle)     Family History   Problem Relation Age of Onset     CANCER Mother      pancreatic     CANCER Father      lung     Respiratory Brother      COPD     Eye Disorder Brother      CANCER Sister      liver     CANCER Sister      Musculoskeletal Disorder Sister      back     Macular Degeneration Sister 60     Macular Degeneration Brother 60     Social History     Social History     Marital status:      Spouse name: N/A     Number of children: N/A     Years of education: N/A     Occupational History     Not on file.     Social History Main Topics     Smoking status: Former Smoker     Packs/day: 1.00     Years: 20.00     Types: Cigarettes     Start date: 7/28/1959     Quit date: 7/28/1979     Smokeless tobacco: Former User     Alcohol use 0.0 oz/week      Comment: rarely     Drug use: No     Sexual activity: No      Other Topics Concern     Parent/Sibling W/ Cabg, Mi Or Angioplasty Before 65f 55m? No     Social History Narrative         Temp: 99.3  F (37.4  C) Temp  Min: 97  F (36.1  C)  Max: 100  F (37.8  C)  Resp: 19 Resp  Min: 16  Max: 20  SpO2: 99 % SpO2  Min: 99 %  Max: 100 %  Pulse: 61 Pulse  Min: 61  Max: 61  Heart Rate: 77 Heart Rate  Min: 71  Max: 112  BP: 149/75 Systolic (24hrs), Av , Min:149 , Max:149   Diastolic (24hrs), Av, Min:75, Max:75    Ventilation Mode: CMV/AC  FiO2 (%): 40 %  Rate Set (breaths/minute): 18 breaths/min  Tidal Volume Set (mL): 550 mL  PEEP (cm H2O): 5 cmH2O  Oxygen Concentration (%): 50 %  Resp: 19  Date 17 0700 - 17 0659   Shift 2275-8266 2824-3956 5198-5257 24 Hour Total   I  N  T  A  K  E   I.V. 1148.47   1148.47    NG/   100    Shift Total  (mL/kg) 1248.47  (12.96)   1248.47  (12.96)   O  U  T  P  U  T   Urine 460   460    Chest Tube  (mL/kg) 100  (1.04)   100  (1.04)    Shift Total  (mL/kg) 560  (5.82)   560  (5.82)   Weight (kg) 96.3 96.3 96.3 96.3         Past Medical History:   Diagnosis Date     Coronary artery disease 2017     DJD (degenerative joint disease) of hip     right     Glaucoma      Hernia umbilical      Hypercholesterolemia      Hypertension      Hypothyroidism      Lipoma      Low back pain      Nonsenile cataract      Obesity      Unstable angina (H) 2017       Past Surgical History:   Procedure Laterality Date     BIOPSY      fatty tissue     CL AFF SURGICAL PATHOLOGY       COLONOSCOPY       COLONOSCOPY WITH CO2 INSUFFLATION N/A 2016    Procedure: COLONOSCOPY WITH CO2 INSUFFLATION;  Surgeon: Jeffery Flores MD;  Location: [facility] OR     ROTATOR CUFF REPAIR RT/LT       SOFT TISSUE SURGERY  2014    E[facility] Tendon transfer (Left Ankle)       Physical Exam    General: Intubated, lightly sedated, in no distress   Neck: Supple, no tracheal deviation  Cardiovascular: RRR  Pumonary: Non labored breathing on MV.   CTAB   Abdomen: Soft, non distended, non tender.   Ext: W/o edema, wwp  Neuro: Non focal     Lab Results   Component Value Date    WBC 15.4 (H) 11/23/2017    HGB 9.2 (L) 11/23/2017    HCT 28.5 (L) 11/23/2017     11/23/2017     (H) 11/23/2017    POTASSIUM 3.5 11/23/2017    CHLORIDE 110 (H) 11/23/2017    CO2 26 11/23/2017    BUN 20 11/23/2017    CR 1.09 11/23/2017     (H) 11/23/2017    AST 28 11/22/2017    ALT 23 11/22/2017    ALKPHOS 46 11/22/2017    BILITOTAL 0.6 11/22/2017    INR 1.43 (H) 11/23/2017         vasopressin (PITRESSIN) infusion ADULT (40 mL) Stopped (11/23/17 0310)     phenylephrine IV infusion ADULT 0.5 mcg/kg/min (11/23/17 1000)     IV fluid REPLACEMENT ONLY       insulin (regular) 4 Units/hr (11/23/17 1000)     Reason beta blocker order not selected       dextrose 5% and 0.45% NaCl + KCl 20 mEq/L 100 mL/hr at 11/23/17 1000     fentaNYL Stopped (11/22/17 2129)     EPINEPHrine IV infusion ADULT Stopped (11/23/17 0924)     propofol (DIPRIVAN) infusion Stopped (11/23/17 1007)     norepinephrine Stopped (11/23/17 0256)         Assessment and Plan: Zack Demarco is a 77 year old male admitted to the ICU for    Neuro:     Sedated on propofol gtt    Tylenol, oxy, fentanyl for pain    Wean sedation to extubate  Resp:     Intubated and mechanically ventilated.    Wean to extubate today  CV:     Hemodynamically stable    Remain on Epi and Lucas- wean as tolerated  GI/FEN:     NPO    Bedside swallow today, consider clears  Renal:     Monitor electrolytes, Cr, and urine output     Lytes goals K>4 and Mag>2    Strict I/O's  Endo:    Continue insulin gtt  ID:     Perioperative antibiotics    Monitor WBC daily  Heme:     Hgb 9.2, stable  Prophylaxis: SCDs  Lines:   PIV x2     CVC  1 Day Post-Op    Joseph 1 Day Post-Op     PA catheter, 1 day postop      Dispo: Continue ICU care  Code Status: Full Code    Jose Roblero   Surgery PGY3  [phone]

## 2017-11-23 NOTE — PLAN OF CARE
Problem: Patient Care Overview  Goal: Plan of Care/Patient Progress Review  Outcome: No Change  Assessment:   Cardiac: SR 80-90s RBBB and occasional PVC/PAC/PJCs.  TPM VVI @ 60, CVP 10-16. Ficks charted done off VBG off CVP port. Pericardial rub present. Epinephrine and Phenylephrine titrated to keep MAP > 65. Levophed and Vasopressin gtt weaned off once Phenylephrine gtt started. Tmax: 100, prn Tylenol given.  Resp: CMV 40%/550/18/5.  Neuro: Moves all extremities and follows commands when sedation held. Propofol providing adequate sedation.  : Joseph patent, -175 cc/hr.  GI: Hypoactive bowel sounds. OG to LIS.  Skin: Midline incision c/d/i.  Mediastinal CT x 2 and  Pleural CT x 1 to -20 cc, no air leak present. CT Y to 1 atrium, 40-50 cc/hr dark red.   Endocrine: Insulin gtt infusing.  Pain: prn dilaudid providing pain relief.     Interventions: See previous note in regards to issues overnight. Total of 10 amps of Bicarb, 2 L albumen, and 1 LR given.     Plan: Will continue to monitor/assess and update MD as needed. Will attempt PS/extubation once phosphorus replaced.

## 2017-11-23 NOTE — PLAN OF CARE
Problem: Patient Care Overview  Goal: Plan of Care/Patient Progress Review  PT/4e: PT consult received. Pt s/p CABG x3, remains intubated this AM. Pt's RN states plan to pressure support prior to extubation. Not appropriate for skilled PT evaluation at this time. Will reassess appropriateness for skilled PT evaluation tomorrow.

## 2017-11-23 NOTE — PROGRESS NOTES
CVTS Daily Note  11/23/2017  Attending: Rolando Toro MD    S:   lactic acidosis to 12 due to hypervolemia and was given 2 amps of bicarb, 1 L LR and 2L albumin. Lactic acidosis normalized. Extubated this AM without issue  O:   Vitals:    11/23/17 1530 11/23/17 1600 11/23/17 1615 11/23/17 1630   BP:       Pulse:       Resp:  14     Temp:  98.5  F (36.9  C)     TempSrc:  Axillary     SpO2: 98% 98% 99% 97%   Weight:       Height:         Vitals:    11/22/17 1202   Weight: 96.3 kg (212 lb 4.9 oz)         Intake/Output Summary (Last 24 hours) at 11/23/17 1652  Last data filed at 11/23/17 1600   Gross per 24 hour   Intake          8901.28 ml   Output             3315 ml   Net          5586.28 ml       MAPs:   Gen: AAO x 3, pleasant, NAD  CV: RRR, S1S2 normal, no murmurs, rubs, or gallops. no JVD  Pulm: CTA, no rhonchi or wheezes  Abd: soft, non-tender, no guarding  Ext: mild peripheral edema, + pitting  Incision: clean, dry, intact, no erythema  Chest Tube sites: dressings clean and dry, serosanguinous, no air leak      Labs:  BMP  Recent Labs  Lab 11/23/17  1131 11/23/17  0557 11/23/17  0358 11/23/17  0251  11/22/17  2108 11/22/17  1927   *  --  151*  --   --  143 141   POTASSIUM 4.1 3.5 3.5 2.8*  < > 4.4 4.1   CHLORIDE 113*  --  110*  --   --  111*  --    CO2 30  --  26  --   --  22  --    BUN 18  --  20  --   --  23  --    CR 0.99  --  1.09  --   --  1.10  --    GLC 98  --  206*  --   --  165* 174*   MAG  --   --  2.3  --   --  2.6*  --    PHOS 3.6  --  0.4*  --   --  3.2  --    < > = values in this interval not displayed.  CBC  Recent Labs  Lab 11/23/17  1131 11/23/17  0358 11/23/17  0038 11/22/17  2108   WBC 11.0 15.4* 19.8* 21.8*   HGB 9.1* 9.2* 9.8* 12.4*   * 159 152 182     INR/PTT  Recent Labs  Lab 11/23/17  0150 11/22/17  2108 11/22/17  1930 11/20/17  1349   INR 1.43* 1.25* 2.71* 1.03   PTT 51* 29 48* 29     ABG  Recent Labs  Lab 11/23/17  1131 11/23/17  0951 11/23/17  0759 11/23/17  0557   PH 7.44  7.46* 7.56* 7.53*   PCO2 48* 46* 33* 34*   PO2 84 127* 142* 141*   HCO3 32* 32* 30* 29*     LFT  Recent Labs  Lab 11/22/17 2108   AST 28   ALT 23   ALKPHOS 46   BILITOTAL 0.6   ALBUMIN 3.3*       GLUCOSE:     Recent Labs  Lab 11/23/17  1136 11/23/17  1131 11/23/17  0949 11/23/17  0905 11/23/17  0802 11/23/17  0702 11/23/17  0557  11/23/17  0358  11/22/17  2108 11/22/17  1927 11/22/17  1841 11/22/17  1813   GLC  --  98  --   --   --   --   --   --  206*  --  165* 174* 167* 161*   BGM 99  --  142* 127* 144* 139* 144*  < >  --   < >  --   --   --   --    < > = values in this interval not displayed.    A/P:   Zack Demarco is a 77 year old male who is status post 3 v CABG on 11/22 with Dr. Toro    Neuro:   - Tylenol, oxy fentanyl    CV:   - On epi and Lucas, wean as tolerated  - Hold BB today given pressors  - Restart home statin  - ASA    Pulm:   - Pulmonary toilet IS    ID:   - Afebrile, monitor WBC  - Periop abx    GI / FEN:  - Clear liquids today     Renal / :   - Monitor I+O  - Cr 1.1, stable     Heme:   - No issues, monitor Hgb     Endo:   - Home synthroid  - Insulin gtt    PPX:   - Protonix    Anticoagulation:   - subq Heparin to start tomorrow    Dispo:   - CVICU      Discussed with CVTS Fellow   Staff surgeons have been informed of changes through both  verbal and written communication.        Jose Roblero   Surgery PGY3  [phone]

## 2017-11-23 NOTE — ANESTHESIA CARE TRANSFER NOTE
Patient: Zack Demarco    Procedure(s):  Median Sternotomy, Coronary Artery Bypass Graft x3, Using Left Internal Mammary and  Endoscopic  [place] of Right Greater Saphenous Vein, On Cardiopulmonary Bypass, Transesophageal Echocardiogram - Wound Class: I-Clean    Diagnosis: Coronary Artery Disease   Diagnosis Additional Information: No value filed.    Anesthesia Type:   General, ETT     Note:  Airway :ETT  Patient transferred to:ICU  Comments: To UU4E, transported with monitors and drips, RN, and CVTS at bedside, VSS, airway patent with ambu- RT at bedside.ICU Handoff: Call for PAUSE to initiate/utilize ICU HANDOFF, Identified Patient, Identified Responsible Provider, Reviewed the Pertinent Medical History, Discussed Surgical Course, Reviewed Intra-OP Anesthesia Management and Issues during Anesthesia, Set Expectations for Post Procedure Period and Allowed Opportunity for Questions and Acknowledgement of Understanding      Vitals: (Last set prior to Anesthesia Care Transfer)    CRNA VITALS  11/22/2017 2024 - 11/22/2017 2114 11/22/2017             Pulse: 80    ART BP: 117/67    SpO2: 100 %    EKG: Sinus rhythm                Electronically Signed By: HOLLY Narvaez CRNA  November 22, 2017  9:14 PM

## 2017-11-23 NOTE — PROGRESS NOTES
CLINICAL NUTRITION SERVICES - BRIEF NOTE    Received provider consult for nutrition education with comments post op cardiovascular surgery (automatic consult on post-op order set).  Nutrition education to be completed as able/appropriate (as pt s/p CABG and/or initial VAD).    RD will follow per LOS protocol or if re-consulted.     The above brief was completed by Caro Hurst RD, LD

## 2017-11-23 NOTE — PLAN OF CARE
Problem: Restraint Interventions  Intervention: Implement Fall Risk Prevention Measures    Sedation weaned with goal of doing pressure support trial. Pt wakes up and pulling lines. Pt reoriented. Bilateral soft wrist restraints applied.

## 2017-11-23 NOTE — PROVIDER NOTIFICATION
2100 Patient arrived from OR    2120 Dr. Mena notified lactate 3.7 and ABG results. Plan to give 1 L of albumen.     2230 Dr. Mena notified lactate 6.2 and ABG results. Plan to give 1 L bolus of LR and 2 amps of Bicarb.     2340 Dr. Mena notified ongoing low Maps and increasing amount of epinephrine gtt. CT 40 cc/hr and UO > 65 cc/hr.  Plan to add levophed gtt and give 2 amps of bicarb.    0045 Dr. Mena notified lactate 10.7, ABG results, increasing tachycardia with ectopy, and continued low Maps. CT and UO unchanged. Dr. Lindsey called. Plan to give 4 amps of bicarb, obtain 12 lead, add vasopressin gtt, and give 500 of albumen.    0215 Dr. Mena notified lactate 12.8 and continued low Maps. Dr. Toro called. Plan to start phenylephrine gtt, give 2 amps of Bicarb, 500 cc of albumen, and 1 g Ca gluconate.     0400 Dr. Mena notified lactate 10.9, only on phenylephrine and epinephrine gtt. Plan to recheck ABG and lactate at 0600.    0500 Dr. Mena notified critical phos of 0.4. Plan to give 40 mmol of potassium phosphate.     Total of 10 amps of Bicarb, 2 L albumen, and 1 LR given.

## 2017-11-23 NOTE — PLAN OF CARE
Problem: Patient Care Overview  Goal: Plan of Care/Patient Progress Review  OT:  Pt intubated upon initial attempt, had just transferred to chair upon second attempt.  Will attempt to see pt tomorrow for evaluation as appropriate.

## 2017-11-23 NOTE — ANESTHESIA POSTPROCEDURE EVALUATION
Patient: Zack Demarco    Procedure(s):  Median Sternotomy, Coronary Artery Bypass Graft x3, Using Left Internal Mammary and  Endoscopic  [place] of Right Greater Saphenous Vein, On Cardiopulmonary Bypass, Transesophageal Echocardiogram - Wound Class: I-Clean    Diagnosis:Coronary Artery Disease   Diagnosis Additional Information: No value filed.    Anesthesia Type:  General, ETT    Note:  Anesthesia Post Evaluation    Patient location during evaluation: ICU  Patient participation: Unable to participate in evaluation secondary to underlying medical condition  Level of consciousness: obtunded/minimal responses  Pain management: unable to assess  Airway patency: patent  Cardiovascular status: acceptable  Respiratory status: acceptable and ETT  Hydration status: acceptable  PONV: unable to assess     Anesthetic complications: None          Last vitals:  Vitals:    11/22/17 1202 11/22/17 2114   BP: 149/75    Pulse: 61    Resp: 16    Temp: 36.7  C (98.1  F)    SpO2: 100% 100%         Electronically Signed By: Alfredo Banegas MD  November 22, 2017  9:21 PM

## 2017-11-24 ENCOUNTER — APPOINTMENT (OUTPATIENT)
Dept: GENERAL RADIOLOGY | Facility: CLINIC | Age: 77
DRG: 236 | End: 2017-11-24
Attending: SURGERY
Payer: MEDICARE

## 2017-11-24 ENCOUNTER — APPOINTMENT (OUTPATIENT)
Dept: OCCUPATIONAL THERAPY | Facility: CLINIC | Age: 77
DRG: 236 | End: 2017-11-24
Attending: THORACIC SURGERY (CARDIOTHORACIC VASCULAR SURGERY)
Payer: MEDICARE

## 2017-11-24 ENCOUNTER — APPOINTMENT (OUTPATIENT)
Dept: PHYSICAL THERAPY | Facility: CLINIC | Age: 77
DRG: 236 | End: 2017-11-24
Attending: THORACIC SURGERY (CARDIOTHORACIC VASCULAR SURGERY)
Payer: MEDICARE

## 2017-11-24 LAB
ALBUMIN SERPL-MCNC: 3 G/DL (ref 3.4–5)
ALP SERPL-CCNC: 34 U/L (ref 40–150)
ALT SERPL W P-5'-P-CCNC: 14 U/L (ref 0–70)
ANION GAP SERPL CALCULATED.3IONS-SCNC: 8 MMOL/L (ref 3–14)
AST SERPL W P-5'-P-CCNC: 23 U/L (ref 0–45)
BASE EXCESS BLDA CALC-SCNC: 4.6 MMOL/L
BILIRUB DIRECT SERPL-MCNC: 0.1 MG/DL (ref 0–0.2)
BILIRUB SERPL-MCNC: 0.6 MG/DL (ref 0.2–1.3)
BUN SERPL-MCNC: 16 MG/DL (ref 7–30)
CALCIUM SERPL-MCNC: 8 MG/DL (ref 8.5–10.1)
CHLORIDE SERPL-SCNC: 109 MMOL/L (ref 94–109)
CO2 SERPL-SCNC: 28 MMOL/L (ref 20–32)
CREAT SERPL-MCNC: 0.97 MG/DL (ref 0.66–1.25)
ERYTHROCYTE [DISTWIDTH] IN BLOOD BY AUTOMATED COUNT: 13.3 % (ref 10–15)
GFR SERPL CREATININE-BSD FRML MDRD: 75 ML/MIN/1.7M2
GLUCOSE BLDC GLUCOMTR-MCNC: 111 MG/DL (ref 70–99)
GLUCOSE BLDC GLUCOMTR-MCNC: 114 MG/DL (ref 70–99)
GLUCOSE BLDC GLUCOMTR-MCNC: 122 MG/DL (ref 70–99)
GLUCOSE BLDC GLUCOMTR-MCNC: 232 MG/DL (ref 70–99)
GLUCOSE SERPL-MCNC: 121 MG/DL (ref 70–99)
HCO3 BLD-SCNC: 30 MMOL/L (ref 21–28)
HCT VFR BLD AUTO: 26.9 % (ref 40–53)
HGB BLD-MCNC: 8.6 G/DL (ref 13.3–17.7)
INR PPP: 1.43 (ref 0.86–1.14)
INTERPRETATION ECG - MUSE: NORMAL
LACTATE BLD-SCNC: 1.1 MMOL/L (ref 0.7–2)
MAGNESIUM SERPL-MCNC: 2.5 MG/DL (ref 1.6–2.3)
MCH RBC QN AUTO: 31.3 PG (ref 26.5–33)
MCHC RBC AUTO-ENTMCNC: 32 G/DL (ref 31.5–36.5)
MCV RBC AUTO: 98 FL (ref 78–100)
O2/TOTAL GAS SETTING VFR VENT: ABNORMAL %
OXYHGB MFR BLD: 94 % (ref 92–100)
PCO2 BLD: 46 MM HG (ref 35–45)
PH BLD: 7.42 PH (ref 7.35–7.45)
PHOSPHATE SERPL-MCNC: 3.3 MG/DL (ref 2.5–4.5)
PLATELET # BLD AUTO: 125 10E9/L (ref 150–450)
PO2 BLD: 75 MM HG (ref 80–105)
POTASSIUM SERPL-SCNC: 4.5 MMOL/L (ref 3.4–5.3)
PROT SERPL-MCNC: 5.2 G/DL (ref 6.8–8.8)
RADIOLOGIST FLAGS: ABNORMAL
RBC # BLD AUTO: 2.75 10E12/L (ref 4.4–5.9)
SODIUM SERPL-SCNC: 144 MMOL/L (ref 133–144)
WBC # BLD AUTO: 15.1 10E9/L (ref 4–11)

## 2017-11-24 PROCEDURE — 97165 OT EVAL LOW COMPLEX 30 MIN: CPT | Mod: GO

## 2017-11-24 PROCEDURE — 00000146 ZZHCL STATISTIC GLUCOSE BY METER IP

## 2017-11-24 PROCEDURE — 25000132 ZZH RX MED GY IP 250 OP 250 PS 637: Mod: GY | Performed by: STUDENT IN AN ORGANIZED HEALTH CARE EDUCATION/TRAINING PROGRAM

## 2017-11-24 PROCEDURE — 97530 THERAPEUTIC ACTIVITIES: CPT | Mod: GP | Performed by: PHYSICAL THERAPIST

## 2017-11-24 PROCEDURE — 40000133 ZZH STATISTIC OT WARD VISIT

## 2017-11-24 PROCEDURE — 97535 SELF CARE MNGMENT TRAINING: CPT | Mod: GO

## 2017-11-24 PROCEDURE — 25000128 H RX IP 250 OP 636: Performed by: STUDENT IN AN ORGANIZED HEALTH CARE EDUCATION/TRAINING PROGRAM

## 2017-11-24 PROCEDURE — 82805 BLOOD GASES W/O2 SATURATION: CPT | Performed by: THORACIC SURGERY (CARDIOTHORACIC VASCULAR SURGERY)

## 2017-11-24 PROCEDURE — 94640 AIRWAY INHALATION TREATMENT: CPT

## 2017-11-24 PROCEDURE — 25000128 H RX IP 250 OP 636: Performed by: ANESTHESIOLOGY

## 2017-11-24 PROCEDURE — 40000196 ZZH STATISTIC RAPCV CVP MONITORING

## 2017-11-24 PROCEDURE — 40000014 ZZH STATISTIC ARTERIAL MONITORING DAILY

## 2017-11-24 PROCEDURE — 85610 PROTHROMBIN TIME: CPT | Performed by: SURGERY

## 2017-11-24 PROCEDURE — 97110 THERAPEUTIC EXERCISES: CPT | Mod: GP | Performed by: PHYSICAL THERAPIST

## 2017-11-24 PROCEDURE — 84100 ASSAY OF PHOSPHORUS: CPT | Performed by: SURGERY

## 2017-11-24 PROCEDURE — 40000275 ZZH STATISTIC RCP TIME EA 10 MIN

## 2017-11-24 PROCEDURE — 85027 COMPLETE CBC AUTOMATED: CPT | Performed by: SURGERY

## 2017-11-24 PROCEDURE — 80048 BASIC METABOLIC PNL TOTAL CA: CPT | Performed by: SURGERY

## 2017-11-24 PROCEDURE — 97161 PT EVAL LOW COMPLEX 20 MIN: CPT | Mod: GP | Performed by: PHYSICAL THERAPIST

## 2017-11-24 PROCEDURE — 25000125 ZZHC RX 250: Performed by: THORACIC SURGERY (CARDIOTHORACIC VASCULAR SURGERY)

## 2017-11-24 PROCEDURE — 83605 ASSAY OF LACTIC ACID: CPT | Performed by: THORACIC SURGERY (CARDIOTHORACIC VASCULAR SURGERY)

## 2017-11-24 PROCEDURE — 25000132 ZZH RX MED GY IP 250 OP 250 PS 637: Mod: GY | Performed by: THORACIC SURGERY (CARDIOTHORACIC VASCULAR SURGERY)

## 2017-11-24 PROCEDURE — 40000193 ZZH STATISTIC PT WARD VISIT: Performed by: PHYSICAL THERAPIST

## 2017-11-24 PROCEDURE — 80076 HEPATIC FUNCTION PANEL: CPT | Performed by: SURGERY

## 2017-11-24 PROCEDURE — A9270 NON-COVERED ITEM OR SERVICE: HCPCS | Mod: GY | Performed by: STUDENT IN AN ORGANIZED HEALTH CARE EDUCATION/TRAINING PROGRAM

## 2017-11-24 PROCEDURE — 25000128 H RX IP 250 OP 636: Performed by: SURGERY

## 2017-11-24 PROCEDURE — 94640 AIRWAY INHALATION TREATMENT: CPT | Mod: 76

## 2017-11-24 PROCEDURE — 71010 XR CHEST PORT 1 VW: CPT

## 2017-11-24 PROCEDURE — 21400006 ZZH R&B CCU INTERMEDIATE UMMC

## 2017-11-24 PROCEDURE — A9270 NON-COVERED ITEM OR SERVICE: HCPCS | Mod: GY | Performed by: THORACIC SURGERY (CARDIOTHORACIC VASCULAR SURGERY)

## 2017-11-24 PROCEDURE — 25000125 ZZHC RX 250

## 2017-11-24 PROCEDURE — 83735 ASSAY OF MAGNESIUM: CPT | Performed by: SURGERY

## 2017-11-24 RX ORDER — ACETYLCYSTEINE 200 MG/ML
1 SOLUTION ORAL; RESPIRATORY (INHALATION) EVERY 4 HOURS PRN
Status: DISCONTINUED | OUTPATIENT
Start: 2017-11-24 | End: 2017-11-27 | Stop reason: HOSPADM

## 2017-11-24 RX ORDER — IPRATROPIUM BROMIDE AND ALBUTEROL SULFATE 2.5; .5 MG/3ML; MG/3ML
3 SOLUTION RESPIRATORY (INHALATION) EVERY 4 HOURS PRN
Status: DISCONTINUED | OUTPATIENT
Start: 2017-11-24 | End: 2017-11-27 | Stop reason: HOSPADM

## 2017-11-24 RX ORDER — FUROSEMIDE 10 MG/ML
20 INJECTION INTRAMUSCULAR; INTRAVENOUS ONCE
Status: COMPLETED | OUTPATIENT
Start: 2017-11-24 | End: 2017-11-24

## 2017-11-24 RX ORDER — PANTOPRAZOLE SODIUM 40 MG/1
40 TABLET, DELAYED RELEASE ORAL EVERY MORNING
Status: DISCONTINUED | OUTPATIENT
Start: 2017-11-25 | End: 2017-11-27 | Stop reason: HOSPADM

## 2017-11-24 RX ADMIN — ACETAMINOPHEN 650 MG: 325 TABLET, FILM COATED ORAL at 19:47

## 2017-11-24 RX ADMIN — FUROSEMIDE 20 MG: 10 INJECTION, SOLUTION INTRAVENOUS at 14:13

## 2017-11-24 RX ADMIN — ATORVASTATIN CALCIUM 40 MG: 40 TABLET, FILM COATED ORAL at 07:53

## 2017-11-24 RX ADMIN — SENNOSIDES AND DOCUSATE SODIUM 1 TABLET: 8.6; 5 TABLET ORAL at 07:53

## 2017-11-24 RX ADMIN — LEVOTHYROXINE SODIUM 75 MCG: 75 TABLET ORAL at 07:53

## 2017-11-24 RX ADMIN — HEPARIN SODIUM 5000 UNITS: 5000 INJECTION, SOLUTION INTRAVENOUS; SUBCUTANEOUS at 07:53

## 2017-11-24 RX ADMIN — ACETYLCYSTEINE 1 ML: 200 INHALANT RESPIRATORY (INHALATION) at 13:10

## 2017-11-24 RX ADMIN — ACETYLCYSTEINE 1 ML: 200 INHALANT RESPIRATORY (INHALATION) at 08:43

## 2017-11-24 RX ADMIN — ACETAMINOPHEN 650 MG: 325 TABLET, FILM COATED ORAL at 06:47

## 2017-11-24 RX ADMIN — HEPARIN SODIUM 5000 UNITS: 5000 INJECTION, SOLUTION INTRAVENOUS; SUBCUTANEOUS at 15:41

## 2017-11-24 RX ADMIN — IPRATROPIUM BROMIDE AND ALBUTEROL SULFATE 3 ML: .5; 3 SOLUTION RESPIRATORY (INHALATION) at 13:10

## 2017-11-24 RX ADMIN — SENNOSIDES AND DOCUSATE SODIUM 2 TABLET: 8.6; 5 TABLET ORAL at 19:47

## 2017-11-24 RX ADMIN — ASPIRIN 81 MG CHEWABLE TABLET 81 MG: 81 TABLET CHEWABLE at 07:53

## 2017-11-24 RX ADMIN — IPRATROPIUM BROMIDE AND ALBUTEROL SULFATE 3 ML: .5; 3 SOLUTION RESPIRATORY (INHALATION) at 08:43

## 2017-11-24 RX ADMIN — HEPARIN SODIUM 5000 UNITS: 5000 INJECTION, SOLUTION INTRAVENOUS; SUBCUTANEOUS at 23:05

## 2017-11-24 RX ADMIN — PANTOPRAZOLE SODIUM 40 MG: 40 INJECTION, POWDER, FOR SOLUTION INTRAVENOUS at 07:53

## 2017-11-24 RX ADMIN — PHENYLEPHRINE HYDROCHLORIDE 0.5 MCG/KG/MIN: 10 INJECTION, SOLUTION INTRAMUSCULAR; INTRAVENOUS; SUBCUTANEOUS at 06:48

## 2017-11-24 ASSESSMENT — ACTIVITIES OF DAILY LIVING (ADL): PREVIOUS_RESPONSIBILITIES: MEAL PREP;HOUSEKEEPING;LAUNDRY;SHOPPING;YARDWORK;MEDICATION MANAGEMENT;FINANCES;DRIVING;WORK

## 2017-11-24 ASSESSMENT — PAIN DESCRIPTION - DESCRIPTORS
DESCRIPTORS: ACHING;DISCOMFORT
DESCRIPTORS: TENDER

## 2017-11-24 NOTE — PLAN OF CARE
Problem: Patient Care Overview  Goal: Plan of Care/Patient Progress Review  OT/4E - Discharge Planner OT   Patient plan for discharge: to daughter's home  Current status: Pt reports transferring to bedside recliner with Ax2 just prior to therapy session. Educated on post surgical sternal precautions in relation to ADL/IADL. Facilitated seated ADLs with Min A for oral cares and facial grooming. HR 68 bpm, /52 (MAP 70), spO2 92%.  Barriers to return to prior living situation: pt lives alone, post surgical sternal precautions, strength, activity tolerance  Recommendations for discharge: anticipate with continued inpatient therapy, pt will be able to discharge to daughter's home with assist and OP CR follow up  Rationale for recommendations: pt reports daughter and son-in-law able to assist PRN; OP CR to safely increase activity tolerance following CABG       Entered by: Shu Salazar 11/24/2017 8:54 AM

## 2017-11-24 NOTE — PLAN OF CARE
Problem: Patient Care Overview  Goal: Individualization & Mutuality  Outcome: Improving  POD 2 CABG x3. Remains on 0.5m/k/m Lucas. Phos replaced. Pain well controled with oxycodone and APAP as needed. Temp back to WDL. Sinus rhythm. 3Lpm O2 NC. Lungs clear/dim. 60-70mL/hr UOP. BS+. CT with ~25mL/hr.     Toan Odom RN 11/24/17 5:35 AM    Hours of cares [phone]

## 2017-11-24 NOTE — PROGRESS NOTES
CT Surgery ICU Progress Note    POD: 2 Days Post-Op  LOS: 2    Admission History: Zack Demarco is a 77 year old male admitted to the ICU for s/p 3v CABG.  He has known 2 v disease involving the RCA and LAD and has had chest pain since October. Post-op he had lactic acidosis to 12 due to hypervolemia and was given 2 amps of bicarb, 1 L LR and 2L albumin.    Review Of Systems: unable to obtain    Past Medical History:   Diagnosis Date     Coronary artery disease 11/22/2017     DJD (degenerative joint disease) of hip     right     Glaucoma      Hernia umbilical      Hypercholesterolemia      Hypertension      Hypothyroidism      Lipoma      Low back pain      Nonsenile cataract      Obesity      Unstable angina (H) 11/13/2017     Past Surgical History:   Procedure Laterality Date     BIOPSY  1980's    fatty tissue     BYPASS GRAFT ARTERY CORONARY N/A 11/22/2017    Procedure: BYPASS GRAFT ARTERY CORONARY;  Median Sternotomy, Coronary Artery Bypass Graft x3, Using Left Internal Mammary and  Endoscopic  [place] of Right Greater Saphenous Vein, On Cardiopulmonary Bypass, Transesophageal Echocardiogram;  Surgeon: Rolando Toro MD;  Location: [facility] OR     CL AFF SURGICAL PATHOLOGY       COLONOSCOPY  2011     COLONOSCOPY WITH CO2 INSUFFLATION N/A 9/19/2016    Procedure: COLONOSCOPY WITH CO2 INSUFFLATION;  Surgeon: Jeffery Flores MD;  Location: [facility] OR     ROTATOR CUFF REPAIR RT/LT       SOFT TISSUE SURGERY  Sept. 2014    E[facility] Tendon transfer (Left Ankle)     Family History   Problem Relation Age of Onset     CANCER Mother      pancreatic     CANCER Father      lung     Respiratory Brother      COPD     Eye Disorder Brother      CANCER Sister      liver     CANCER Sister      Musculoskeletal Disorder Sister      back     Macular Degeneration Sister 60     Macular Degeneration Brother 60     Social History     Social History     Marital status:      Spouse name: N/A     Number of children: N/A     Years of education:  N/A     Occupational History     Not on file.     Social History Main Topics     Smoking status: Former Smoker     Packs/day: 1.00     Years: 20.00     Types: Cigarettes     Start date: 7/28/1959     Quit date: 7/28/1979     Smokeless tobacco: Former User     Alcohol use 0.0 oz/week      Comment: rarely     Drug use: No     Sexual activity: No     Other Topics Concern     Parent/Sibling W/ Cabg, Mi Or Angioplasty Before 65f 55m? No     Social History Narrative         Temp: 99.1  F (37.3  C) Temp  Min: 98.5  F (36.9  C)  Max: 101  F (38.3  C)  Resp: 16 Resp  Min: 14  Max: 18  SpO2: 98 % SpO2  Min: 89 %  Max: 100 %    No Data Recorded  Heart Rate: 65 Heart Rate  Min: 64  Max: 108    No data recorded.  No data recorded.    Ventilation Mode: CPAP/PS  FiO2 (%): 40 %  Rate Set (breaths/minute): 18 breaths/min  Tidal Volume Set (mL): 550 mL  PEEP (cm H2O): 5 cmH2O  Pressure Support (cm H2O): 7 cmH2O  Oxygen Concentration (%): 40 %  Resp: 16        Past Medical History:   Diagnosis Date     Coronary artery disease 11/22/2017     DJD (degenerative joint disease) of hip     right     Glaucoma      Hernia umbilical      Hypercholesterolemia      Hypertension      Hypothyroidism      Lipoma      Low back pain      Nonsenile cataract      Obesity      Unstable angina (H) 11/13/2017       Past Surgical History:   Procedure Laterality Date     BIOPSY  1980's    fatty tissue     BYPASS GRAFT ARTERY CORONARY N/A 11/22/2017    Procedure: BYPASS GRAFT ARTERY CORONARY;  Median Sternotomy, Coronary Artery Bypass Graft x3, Using Left Internal Mammary and  Endoscopic  [place] of Right Greater Saphenous Vein, On Cardiopulmonary Bypass, Transesophageal Echocardiogram;  Surgeon: Rolando Toro MD;  Location: [facility] OR     CL AFF SURGICAL PATHOLOGY       COLONOSCOPY  2011     COLONOSCOPY WITH CO2 INSUFFLATION N/A 9/19/2016    Procedure: COLONOSCOPY WITH CO2 INSUFFLATION;  Surgeon: Jeffery Flores MD;  Location:  OR     ROTATOR CUFF  REPAIR RT/LT       SOFT TISSUE SURGERY  Sept. 2014    EHL Tendon transfer (Left Ankle)       Physical Exam    General: Sitting in a chair. On room air.   Neck: Supple, no tracheal deviation  Cardiovascular: RRR  Pumonary: Non labored breathing on MV.  CTAB   Abdomen: Soft, non distended, non tender.   Ext: W/o edema, wwp  Neuro: Non focal     Lab Results   Component Value Date    WBC 15.1 (H) 11/24/2017    HGB 8.6 (L) 11/24/2017    HCT 26.9 (L) 11/24/2017     (L) 11/24/2017     11/24/2017    POTASSIUM 4.5 11/24/2017    CHLORIDE 109 11/24/2017    CO2 28 11/24/2017    BUN 16 11/24/2017    CR 0.97 11/24/2017     (H) 11/24/2017    AST 23 11/24/2017    ALT 14 11/24/2017    ALKPHOS 34 (L) 11/24/2017    BILITOTAL 0.6 11/24/2017    INR 1.43 (H) 11/24/2017         IV fluid REPLACEMENT ONLY       insulin (regular) Stopped (11/23/17 1138)     Reason beta blocker order not selected       dextrose 5% and 0.45% NaCl + KCl 20 mEq/L 10 mL/hr at 11/24/17 0000         Assessment and Plan: Zack Demarco is a 77 year old male admitted to the ICU for    Neuro:     Tylenol, oxy, fentanyl for pain    Alert, able to move all extremities.   Resp:     Currently on 2 L NC.     Pulmonary toilet.  CV:     Patient was on .5 of phenylephrine this AM but has since been weaned off. MAPs continue to be greater than 65. Will start diuresis when off pressors  GI/FEN:     Advance diet as tolerated.  Renal:     Monitor electrolytes, Cr, and urine output     Lytes goals K>4 and Mag>2    Strict I/O's  Endo:    SQ insulin with meals.     Home levothyroxine  ID:     Perioperative antibiotics    Monitor WBC daily  Heme:     Hgb8.6 from 9.2  Prophylaxis: SCDs  Lines:   PIV x2 . Patient will be delined today.     CVC  2 Days Post-Op    Joseph 2 Days Post-Op     PA catheter, 1 day postop      Dispo: If patient is stable then transfer to the floor when off pressors.   Code Status: Full Code    John Lindsey MD  Cardiothoracic Surgery  Fellow  Pager: [phone]

## 2017-11-24 NOTE — PLAN OF CARE
Problem: Patient Care Overview  Goal: Plan of Care/Patient Progress Review  PT 4E: PT evaluation completed, treatment initiated. Pt educated on sternal precautions, activity modifications and use of logroll/cardiac pillow with transfers. Supine>sitting with mod A, sit>stand with CGA. EOB>chair with CGA. O2 decreased to 88% on 3L O2 via NC. Pt endorses L sided chest pain with deep breathing with activity, painful to palpation over L lateral rib cage.     Discharge Planner PT   Patient plan for discharge: unknown  Current status: Ax1 for mobility   Barriers to return to prior living situation: lives alone-pt planning on discharging to pt's daughters house, will need to complete 1 flight of stairs  Recommendations for discharge: anticipate home with assist from family, OP CR  Rationale for recommendations: pt previously independent with mobility, anticipate improved independence with mobility and activity tolerance over the next few days prior to discharge. Pt would benefit from OP phase 2 CR to progress exercise program and functional endurance.        Entered by: Annamarie Craven 11/24/2017 5:36 PM

## 2017-11-24 NOTE — PROGRESS NOTES
CV ICU Progress Note    POD: 2 Days Post-Op  LOS: 2    Admission History: Zack Demarco is a 77 year old male admitted to the ICU for s/p 3v CABG.  He has known 2 v disease involving the RCA and LAD and has had chest pain since October. Post-op he had lactic acidosis to 12 due to hypervolemia and was given 2 amps of bicarb, 1 L LR and 2L albumin.    Review Of Systems: unable to obtain    Past Medical History:   Diagnosis Date     Coronary artery disease 11/22/2017     DJD (degenerative joint disease) of hip     right     Glaucoma      Hernia umbilical      Hypercholesterolemia      Hypertension      Hypothyroidism      Lipoma      Low back pain      Nonsenile cataract      Obesity      Unstable angina (H) 11/13/2017     Past Surgical History:   Procedure Laterality Date     BIOPSY  1980's    fatty tissue     BYPASS GRAFT ARTERY CORONARY N/A 11/22/2017    Procedure: BYPASS GRAFT ARTERY CORONARY;  Median Sternotomy, Coronary Artery Bypass Graft x3, Using Left Internal Mammary and  Endoscopic  [place] of Right Greater Saphenous Vein, On Cardiopulmonary Bypass, Transesophageal Echocardiogram;  Surgeon: Rolando Toro MD;  Location: [facility] OR     CL AFF SURGICAL PATHOLOGY       COLONOSCOPY  2011     COLONOSCOPY WITH CO2 INSUFFLATION N/A 9/19/2016    Procedure: COLONOSCOPY WITH CO2 INSUFFLATION;  Surgeon: Jeffery Flores MD;  Location: [facility] OR     ROTATOR CUFF REPAIR RT/LT       SOFT TISSUE SURGERY  Sept. 2014    EHL Tendon transfer (Left Ankle)     Family History   Problem Relation Age of Onset     CANCER Mother      pancreatic     CANCER Father      lung     Respiratory Brother      COPD     Eye Disorder Brother      CANCER Sister      liver     CANCER Sister      Musculoskeletal Disorder Sister      back     Macular Degeneration Sister 60     Macular Degeneration Brother 60     Social History     Social History     Marital status:      Spouse name: N/A     Number of children: N/A     Years of education: N/A      Occupational History     Not on file.     Social History Main Topics     Smoking status: Former Smoker     Packs/day: 1.00     Years: 20.00     Types: Cigarettes     Start date: 7/28/1959     Quit date: 7/28/1979     Smokeless tobacco: Former User     Alcohol use 0.0 oz/week      Comment: rarely     Drug use: No     Sexual activity: No     Other Topics Concern     Parent/Sibling W/ Cabg, Mi Or Angioplasty Before 65f 55m? No     Social History Narrative         Temp: 99.1  F (37.3  C) Temp  Min: 98.5  F (36.9  C)  Max: 101  F (38.3  C)  Resp: 16 Resp  Min: 14  Max: 18  SpO2: 98 % SpO2  Min: 89 %  Max: 100 %    No Data Recorded  Heart Rate: 65 Heart Rate  Min: 64  Max: 108    No data recorded.  No data recorded.    Ventilation Mode: CPAP/PS  FiO2 (%): 40 %  Rate Set (breaths/minute): 18 breaths/min  Tidal Volume Set (mL): 550 mL  PEEP (cm H2O): 5 cmH2O  Pressure Support (cm H2O): 7 cmH2O  Oxygen Concentration (%): 40 %  Resp: 16        Past Medical History:   Diagnosis Date     Coronary artery disease 11/22/2017     DJD (degenerative joint disease) of hip     right     Glaucoma      Hernia umbilical      Hypercholesterolemia      Hypertension      Hypothyroidism      Lipoma      Low back pain      Nonsenile cataract      Obesity      Unstable angina (H) 11/13/2017       Past Surgical History:   Procedure Laterality Date     BIOPSY  1980's    fatty tissue     BYPASS GRAFT ARTERY CORONARY N/A 11/22/2017    Procedure: BYPASS GRAFT ARTERY CORONARY;  Median Sternotomy, Coronary Artery Bypass Graft x3, Using Left Internal Mammary and  Endoscopic  [place] of Right Greater Saphenous Vein, On Cardiopulmonary Bypass, Transesophageal Echocardiogram;  Surgeon: Rolando Toro MD;  Location: [facility] OR      AFF SURGICAL PATHOLOGY       COLONOSCOPY  2011     COLONOSCOPY WITH CO2 INSUFFLATION N/A 9/19/2016    Procedure: COLONOSCOPY WITH CO2 INSUFFLATION;  Surgeon: Jeffery Flores MD;  Location: [facility]     ROTATOR CUFF REPAIR  RT/LT       SOFT TISSUE SURGERY  Sept. 2014    EHL Tendon transfer (Left Ankle)       Physical Exam    General: Sitting in a chair. On room air.   Neck: Supple, no tracheal deviation  Cardiovascular: RRR  Pumonary: Non labored breathing on MV.  CTAB   Abdomen: Soft, non distended, non tender.   Ext: W/o edema, wwp  Neuro: Non focal     Lab Results   Component Value Date    WBC 15.1 (H) 11/24/2017    HGB 8.6 (L) 11/24/2017    HCT 26.9 (L) 11/24/2017     (L) 11/24/2017     11/24/2017    POTASSIUM 4.5 11/24/2017    CHLORIDE 109 11/24/2017    CO2 28 11/24/2017    BUN 16 11/24/2017    CR 0.97 11/24/2017     (H) 11/24/2017    AST 23 11/24/2017    ALT 14 11/24/2017    ALKPHOS 34 (L) 11/24/2017    BILITOTAL 0.6 11/24/2017    INR 1.43 (H) 11/24/2017         IV fluid REPLACEMENT ONLY       insulin (regular) Stopped (11/23/17 1138)     Reason beta blocker order not selected       dextrose 5% and 0.45% NaCl + KCl 20 mEq/L 10 mL/hr at 11/24/17 0000         Assessment and Plan: Zack Demarco is a 77 year old male admitted to the ICU for    Neuro:     Tylenol, oxy, fentanyl for pain    Alert, able to move all extremities.   Resp:     Currently on 2 L NC.     Pulmonary toilet.  CV:     Patient was on .5 of phenylephrine this AM but has since been weaned off. MAPs continue to be greater than 65.   GI/FEN:     Advance diet as tolerated.  Renal:     Monitor electrolytes, Cr, and urine output     Lytes goals K>4 and Mag>2    Strict I/O's  Endo:    SQ insulin with meals.     Home levothyroxine  ID:     Perioperative antibiotics    Monitor WBC daily  Heme:     Hgb8.6 from 9.2  Prophylaxis: SCDs  Lines:   PIV x2 . Patient will be delined today.     CVC  2 Days Post-Op    Joseph 2 Days Post-Op     PA catheter, 1 day postop      Dispo: If patient is stable then transfer to the floor.   Code Status: Full Code      Harley Gleason PGY-4  [phone]

## 2017-11-24 NOTE — PLAN OF CARE
Problem: Cardiac Surgery (Adult)  Goal: Signs and Symptoms of Listed Potential Problems Will be Absent, Minimized or Managed (Cardiac Surgery)  Signs and symptoms of listed potential problems will be absent, minimized or managed by discharge/transition of care (reference Cardiac Surgery (Adult) CPG).   Outcome: No Change  Assessment:   Cardiac: SR TPM capped, CVP 18. Phenylephrine titrated to keep MAP > 65. Tmax 101, prn tylenol given  Resp: Tolerating 2 L NC. IS and coughing/deep breathing encouraged.  Neuro: Alert and orientated.   : Joseph patent.  GI: Tolerating clear liquids.  Skin: Mediastinal CT x2  Pleural CT to x 1 -20 cc, no air leak present.   Endocrine: Blood sugar WNL.  Pain: prn oxycodone given.      Plan: Will continue to monitor/assess and update MD as needed

## 2017-11-24 NOTE — PROGRESS NOTES
11/24/17 0830   Quick Adds   Type of Visit Initial Occupational Therapy Evaluation   Living Environment   Lives With alone   Living Arrangements house   Home Accessibility stairs within home;tub/shower is not walk in   Number of Stairs to Enter Home 0   Number of Stairs Within Home 12   Stair Railings at Home inside, present on right side   Living Environment Comment Pt reports living alone in a house. Pt plans to discharge to his daughter's house for a while during recovery. Will have to do 1 flight of stairs at daughter's house.   Self-Care   Dominant Hand right   Usual Activity Tolerance excellent   Current Activity Tolerance fair   Regular Exercise yes   Activity/Exercise Type biking   Exercise Amount/Frequency 3-5 times/wk   Equipment Currently Used at Home shower chair   Activity/Exercise/Self-Care Comment Pt reports cycling 5x/week ~6 months ago.   Functional Level Prior   Ambulation 0-->independent   Transferring 0-->independent   Toileting 0-->independent   Bathing 0-->independent   Dressing 0-->independent   Eating 0-->independent   Communication 0-->understands/communicates without difficulty   Swallowing 0-->swallows foods/liquids without difficulty   Cognition 0 - no cognition issues reported   Fall history within last six months no   Which of the above functional risks had a recent onset or change? ambulation;transferring;toileting;bathing;dressing   Prior Functional Level Comment Pt was IND with ADL at baseline.   General Information   Onset of Illness/Injury or Date of Surgery - Date 11/22/17   Referring Physician Alberto Mclain MD   Patient/Family Goals Statement Return to PLOF   Precautions/Limitations sternal precautions;fall precautions   General Observations Pt sitting in bedside recliner upon arrival, agreeable to therapy.   General Info Comments Activity up with assist   Cognitive Status Examination   Orientation orientation to person, place and time   Level of Consciousness alert   Able to  Follow Commands WNL/WFL   Cognitive Comment Pt denies confusion at time of eval.   Visual Perception   Visual Perception Wears glasses   Sensory Examination   Sensory Quick Adds No deficits were identified   Pain Assessment   Patient Currently in Pain Yes, see Vital Sign flowsheet   Integumentary/Edema   Integumentary/Edema no deficits were identifed   Strength   Strength Comments Generalized weakness post op   Transfer Skill: Sit to Stand   Level of [place]: Sit/Stand other (see comments)   Physical Assist/Nonphysical Assist: Sit/Stand (Pt declined - just transferred to chair with Ax2 prior)   Instrumental Activities of Daily Living (IADL)   Previous Responsibilities meal prep;housekeeping;laundry;shopping;yardwork;medication management;finances;driving;work   IADL Comments Pt is retired. Performs all IADLs IND.   Activities of Daily Living Analysis   Impairments Contributing to Impaired Activities of Daily Living balance impaired;pain;post surgical precautions;strength decreased   General Therapy Interventions   Planned Therapy Interventions ADL retraining;IADL retraining;bed mobility training;transfer training;home program guidelines;progressive activity/exercise;risk factor education;strengthening   Clinical Impression   Criteria for Skilled Therapeutic Interventions Met yes, treatment indicated   OT Diagnosis OT order for post op cardiovascular surgery   Influenced by the following impairments psot surgical sternal precautions, activity tolerance, strength, pain   Assessment of Occupational Performance 1-3 Performance Deficits   Identified Performance Deficits bathing, home mgmt, toileting   Clinical Decision Making (Complexity) Low complexity   Therapy Frequency daily   Predicted Duration of Therapy Intervention (days/wks) 1 week   Anticipated Equipment Needs at Discharge (TBD with further therapy)   Anticipated Discharge Disposition Home with Assist;Transitional Care Facility   Risks and Benefits of  "Treatment have been explained. Yes   Patient, Family & other staff in agreement with plan of care Yes   [facility] TM \"6 Clicks\"   2016, Trustees of [facility], under license to [employer].  All rights reserved.   6 Clicks Short Forms Daily Activity Inpatient Short Form   [facility]  \"6 Clicks\" Daily Activity Inpatient Short Form   1. Putting on and taking off regular lower body clothing? 2 - A Lot   2. Bathing (including washing, rinsing, drying)? 2 - A Lot   3. Toileting, which includes using toilet, bedpan or urinal? 2 - A Lot   4. Putting on and taking off regular upper body clothing? 2 - A Lot   5. Taking care of personal grooming such as brushing teeth? 3 - A Little   6. Eating meals? 4 - None   Daily Activity Raw Score (Score out of 24.Lower scores equate to lower levels of function) 15   Total Evaluation Time   Total Evaluation Time (Minutes) 8     "

## 2017-11-24 NOTE — PROGRESS NOTES
11/24/17 1538   Quick Adds   Type of Visit Initial PT Evaluation   Living Environment   Lives With alone   Living Arrangements house   Home Accessibility stairs within home;tub/shower is not walk in   Number of Stairs to Enter Home 0   Number of Stairs Within Home 12   Stair Railings at Home inside, present on right side   Living Environment Comment Pt reports living alone in a house. Pt plans to discharge to his daughter's house for a while during recovery. Will have to do 1 flight of stairs at daughter's house   Self-Care   Dominant Hand right   Usual Activity Tolerance excellent   Current Activity Tolerance fair   Regular Exercise yes   Activity/Exercise Type biking   Exercise Amount/Frequency 3-5 times/wk   Equipment Currently Used at Home shower chair   Activity/Exercise/Self-Care Comment Pt reports cycling 5x/week ~6 months ago. Pt has been exercising ledd 2/2 R knee pain (pt reports anticipating TKA soon). Also reports increase in CP with activity over the last 3 weeks.    Functional Level Prior   Ambulation 0-->independent   Transferring 0-->independent   Toileting 0-->independent   Bathing 0-->independent   Dressing 0-->independent   Eating 0-->independent   Communication 0-->understands/communicates without difficulty   Swallowing 0-->swallows foods/liquids without difficulty   Cognition 0 - no cognition issues reported   Fall history within last six months no   Which of the above functional risks had a recent onset or change? ambulation;transferring;bathing;dressing   General Information   Onset of Illness/Injury or Date of Surgery - Date 11/22/17   Referring Physician Alberto Mclain MD   Patient/Family Goals Statement none stated   Pertinent History of Current Problem (include personal factors and/or comorbidities that impact the POC) Pt is a 77 year old male admitted to the ICU for s/p 3v CABG.  He has known 2 v disease involving the RCA and LAD and has had chest pain since October.    Precautions/Limitations oxygen therapy device and L/min;sternal precautions  (3L O2 via NC)   General Observations Pt supine in bed upon entry, agreeable to PT session, RN ok'd session. Pt's daughter present. Pt on 3L O2 via NC, has multiple PIVs, 2 CTs.    General Info Comments Activity: Ambulate with assist   Cognitive Status Examination   Orientation orientation to person, place and time   Level of Consciousness alert   Follows Commands and Answers Questions 100% of the time   Personal Safety and Judgment intact   Memory intact   Pain Assessment   Patient Currently in Pain Yes, see Vital Sign flowsheet  (L sided chest pain, increased pain with breathing deeply)   Integumentary/Edema   Integumentary/Edema Comments RLE wrapped with ace bandage 2/2 saphenous vein graft   Posture    Posture Protracted shoulders   Range of Motion (ROM)   ROM Comment BLE ROM WFL-decreased ankle DF on LLE 2/2 history of foot from/tendon rupture and surgery.    Strength   Strength Comments >3/5 strength in BLEs    Bed Mobility   Bed Mobility Comments Supine>sitting EOB with mod A, HOB at 45 degrees, pt holding cardiac pillow.    Transfer Skills   Transfer Comments Sit>stand with CGA, WBOS, holding cardiac pillow.    Gait   Gait Comments Pt able to take 4 steps to turn to chair with SBA   Sensory Examination   Sensory Perception no deficits were identified   General Therapy Interventions   Planned Therapy Interventions balance training;bed mobility training;progressive activity/exercise;home program guidelines;strengthening   Clinical Impression   Criteria for Skilled Therapeutic Intervention yes, treatment indicated   PT Diagnosis impaired funcitonal mobility   Influenced by the following impairments decreased activity tolerance, sternal precautions   Functional limitations due to impairments difficulty with bed mobility, transfers, ambulation, stairs   Clinical Presentation Stable/Uncomplicated   Clinical Presentation Rationale  "previously independent with mobility, PMH, medical status   Clinical Decision Making (Complexity) Low complexity   Therapy Frequency` daily   Predicted Duration of Therapy Intervention (days/wks) 1 week   Anticipated Discharge Disposition Home with Outpatient Therapy   Risk & Benefits of therapy have been explained Yes   Patient, Family & other staff in agreement with plan of care Yes   [facility] TM \"6 Clicks\"   2016, Trustees of [facility], under license to [employer].  All rights reserved.   6 Clicks Short Forms Daily Activity Inpatient Short Form;Basic Mobility Inpatient Short Form   [facility]  \"6 Clicks\" V.2 Basic Mobility Inpatient Short Form   1. Turning from your back to your side while in a flat bed without using bedrails? 3 - A Little   2. Moving from lying on your back to sitting on the side of a flat bed without using bedrails? 2 - A Lot   3. Moving to and from a bed to a chair (including a wheelchair)? 3 - A Little   4. Standing up from a chair using your arms (e.g., wheelchair, or bedside chair)? 3 - A Little   5. To walk in hospital room? 3 - A Little   6. Climbing 3-5 steps with a railing? 3 - A Little   Basic Mobility Raw Score (Score out of 24.Lower scores equate to lower levels of function) 17   Total Evaluation Time   Total Evaluation Time (Minutes) 8     "

## 2017-11-25 ENCOUNTER — APPOINTMENT (OUTPATIENT)
Dept: PHYSICAL THERAPY | Facility: CLINIC | Age: 77
DRG: 236 | End: 2017-11-25
Attending: SURGERY
Payer: MEDICARE

## 2017-11-25 LAB
ANION GAP SERPL CALCULATED.3IONS-SCNC: 6 MMOL/L (ref 3–14)
BUN SERPL-MCNC: 19 MG/DL (ref 7–30)
CALCIUM SERPL-MCNC: 8.4 MG/DL (ref 8.5–10.1)
CHLORIDE SERPL-SCNC: 108 MMOL/L (ref 94–109)
CO2 SERPL-SCNC: 29 MMOL/L (ref 20–32)
CREAT SERPL-MCNC: 0.95 MG/DL (ref 0.66–1.25)
ERYTHROCYTE [DISTWIDTH] IN BLOOD BY AUTOMATED COUNT: 12.9 % (ref 10–15)
GFR SERPL CREATININE-BSD FRML MDRD: 77 ML/MIN/1.7M2
GLUCOSE BLDC GLUCOMTR-MCNC: 103 MG/DL (ref 70–99)
GLUCOSE BLDC GLUCOMTR-MCNC: 105 MG/DL (ref 70–99)
GLUCOSE BLDC GLUCOMTR-MCNC: 143 MG/DL (ref 70–99)
GLUCOSE BLDC GLUCOMTR-MCNC: 82 MG/DL (ref 70–99)
GLUCOSE SERPL-MCNC: 161 MG/DL (ref 70–99)
HCT VFR BLD AUTO: 28.7 % (ref 40–53)
HGB BLD-MCNC: 9.3 G/DL (ref 13.3–17.7)
INTERPRETATION ECG - MUSE: NORMAL
MCH RBC QN AUTO: 31.7 PG (ref 26.5–33)
MCHC RBC AUTO-ENTMCNC: 32.4 G/DL (ref 31.5–36.5)
MCV RBC AUTO: 98 FL (ref 78–100)
PLATELET # BLD AUTO: 124 10E9/L (ref 150–450)
POTASSIUM SERPL-SCNC: 3.9 MMOL/L (ref 3.4–5.3)
RBC # BLD AUTO: 2.93 10E12/L (ref 4.4–5.9)
SODIUM SERPL-SCNC: 143 MMOL/L (ref 133–144)
WBC # BLD AUTO: 11 10E9/L (ref 4–11)

## 2017-11-25 PROCEDURE — 25000128 H RX IP 250 OP 636: Performed by: STUDENT IN AN ORGANIZED HEALTH CARE EDUCATION/TRAINING PROGRAM

## 2017-11-25 PROCEDURE — A9270 NON-COVERED ITEM OR SERVICE: HCPCS | Mod: GY | Performed by: STUDENT IN AN ORGANIZED HEALTH CARE EDUCATION/TRAINING PROGRAM

## 2017-11-25 PROCEDURE — 25000132 ZZH RX MED GY IP 250 OP 250 PS 637: Mod: GY | Performed by: THORACIC SURGERY (CARDIOTHORACIC VASCULAR SURGERY)

## 2017-11-25 PROCEDURE — 97530 THERAPEUTIC ACTIVITIES: CPT | Mod: GP

## 2017-11-25 PROCEDURE — 25000132 ZZH RX MED GY IP 250 OP 250 PS 637: Mod: GY | Performed by: STUDENT IN AN ORGANIZED HEALTH CARE EDUCATION/TRAINING PROGRAM

## 2017-11-25 PROCEDURE — 36415 COLL VENOUS BLD VENIPUNCTURE: CPT | Performed by: STUDENT IN AN ORGANIZED HEALTH CARE EDUCATION/TRAINING PROGRAM

## 2017-11-25 PROCEDURE — 25000131 ZZH RX MED GY IP 250 OP 636 PS 637: Mod: GY | Performed by: THORACIC SURGERY (CARDIOTHORACIC VASCULAR SURGERY)

## 2017-11-25 PROCEDURE — 25000132 ZZH RX MED GY IP 250 OP 250 PS 637: Mod: GY | Performed by: SURGERY

## 2017-11-25 PROCEDURE — 80048 BASIC METABOLIC PNL TOTAL CA: CPT | Performed by: STUDENT IN AN ORGANIZED HEALTH CARE EDUCATION/TRAINING PROGRAM

## 2017-11-25 PROCEDURE — 85027 COMPLETE CBC AUTOMATED: CPT | Performed by: STUDENT IN AN ORGANIZED HEALTH CARE EDUCATION/TRAINING PROGRAM

## 2017-11-25 PROCEDURE — 40000193 ZZH STATISTIC PT WARD VISIT

## 2017-11-25 PROCEDURE — 97116 GAIT TRAINING THERAPY: CPT | Mod: GP

## 2017-11-25 PROCEDURE — A9270 NON-COVERED ITEM OR SERVICE: HCPCS | Mod: GY | Performed by: SURGERY

## 2017-11-25 PROCEDURE — 21400006 ZZH R&B CCU INTERMEDIATE UMMC

## 2017-11-25 PROCEDURE — A9270 NON-COVERED ITEM OR SERVICE: HCPCS | Mod: GY | Performed by: THORACIC SURGERY (CARDIOTHORACIC VASCULAR SURGERY)

## 2017-11-25 PROCEDURE — 00000146 ZZHCL STATISTIC GLUCOSE BY METER IP

## 2017-11-25 RX ORDER — FUROSEMIDE 10 MG/ML
20 INJECTION INTRAMUSCULAR; INTRAVENOUS ONCE
Status: COMPLETED | OUTPATIENT
Start: 2017-11-25 | End: 2017-11-25

## 2017-11-25 RX ADMIN — HEPARIN SODIUM 5000 UNITS: 5000 INJECTION, SOLUTION INTRAVENOUS; SUBCUTANEOUS at 16:20

## 2017-11-25 RX ADMIN — INSULIN ASPART 3 UNITS: 100 INJECTION, SOLUTION INTRAVENOUS; SUBCUTANEOUS at 17:59

## 2017-11-25 RX ADMIN — FUROSEMIDE 20 MG: 10 INJECTION, SOLUTION INTRAVENOUS at 12:16

## 2017-11-25 RX ADMIN — HEPARIN SODIUM 5000 UNITS: 5000 INJECTION, SOLUTION INTRAVENOUS; SUBCUTANEOUS at 09:16

## 2017-11-25 RX ADMIN — ACETAMINOPHEN 650 MG: 325 TABLET, FILM COATED ORAL at 06:48

## 2017-11-25 RX ADMIN — SENNOSIDES AND DOCUSATE SODIUM 1 TABLET: 8.6; 5 TABLET ORAL at 09:19

## 2017-11-25 RX ADMIN — ACETAMINOPHEN 650 MG: 325 TABLET, FILM COATED ORAL at 23:17

## 2017-11-25 RX ADMIN — PANTOPRAZOLE SODIUM 40 MG: 40 TABLET, DELAYED RELEASE ORAL at 09:23

## 2017-11-25 RX ADMIN — ATORVASTATIN CALCIUM 40 MG: 40 TABLET, FILM COATED ORAL at 09:16

## 2017-11-25 RX ADMIN — METOPROLOL TARTRATE 6.25 MG: 25 TABLET, FILM COATED ORAL at 20:26

## 2017-11-25 RX ADMIN — ACETAMINOPHEN 650 MG: 325 TABLET, FILM COATED ORAL at 12:15

## 2017-11-25 RX ADMIN — ASPIRIN 81 MG CHEWABLE TABLET 81 MG: 81 TABLET CHEWABLE at 09:16

## 2017-11-25 RX ADMIN — ACETAMINOPHEN 650 MG: 325 TABLET, FILM COATED ORAL at 20:26

## 2017-11-25 RX ADMIN — HEPARIN SODIUM 5000 UNITS: 5000 INJECTION, SOLUTION INTRAVENOUS; SUBCUTANEOUS at 23:17

## 2017-11-25 RX ADMIN — METOPROLOL TARTRATE 6.25 MG: 25 TABLET, FILM COATED ORAL at 09:32

## 2017-11-25 RX ADMIN — LEVOTHYROXINE SODIUM 75 MCG: 75 TABLET ORAL at 09:16

## 2017-11-25 RX ADMIN — POTASSIUM CHLORIDE 20 MEQ: 750 TABLET, EXTENDED RELEASE ORAL at 16:20

## 2017-11-25 RX ADMIN — INSULIN ASPART 2 UNITS: 100 INJECTION, SOLUTION INTRAVENOUS; SUBCUTANEOUS at 13:02

## 2017-11-25 ASSESSMENT — PAIN DESCRIPTION - DESCRIPTORS: DESCRIPTORS: DISCOMFORT

## 2017-11-25 NOTE — OP NOTE
PREOPERATIVE DIAGNOSIS:  Coronary artery disease.      POSTOPERATIVE DIAGNOSIS:  Coronary artery disease.      PROCEDURES:     1.  Coronary bypass grafting x3 (left internal mammary to left anterior descending artery, saphenous vein graft to posterior descending artery, saphenous vein graft to first diagonal artery).   2.  Endoscopic vein harvest.      SURGEON:  Rolando Toro MD      ASSISTANT:  Surinder Chamberlain MD; Tiffanie Funes PA-C; Kong Lopez PA-C      INDICATIONS FOR PROCEDURE:  Zack Demarco is a 77-year-old gentleman with severe 3-vessel coronary artery disease.  Decision was made to proceed with coronary bypass grafting.  I discussed the surgery with the patient and family including risk of death, stroke, bleeding, wound infection, renal failure and arrhythmias.      OPERATIVE FINDINGS:  The patient's sternum was of adequate quality.  The left internal mammary artery was adequate with good flow.  There was a trifurcation of his greater saphenous vein in the right lower extremity in the right thigh but they were adequate for bypass grafting.  The aorta was grossly free of plaques.  Vessels bypassed with a left anterior descending artery was a 2 mm proximal stenosis.  The first diagonal artery was 1.7 mm with proximal stenosis and the posterior descending artery, which was 2 mm vessel with proximal stenosis.      DESCRIPTION OF PROCEDURE:  The patient was brought to the operating room in stable condition with general anesthesia.  Patient's abdomen and lower extremities were prepped in the usual manner.  A long segment of saphenous vein was harvested from the right lower extremity from the right greater saphenous vein using endoscopic vein harvest techniques.  Simultaneously, a median sternotomy was performed.  The left internal mammary artery was harvested from its bed and painted with topical papaverine.  Preparation of cardiopulmonary bypass included ACT-guided heparinization, admission of [place].  Aorta was  cannulated with a 22-Romansh arterial cannula via 3-0 Tevdek pursestring pledgeted suture x2.  Dual stage venous cannula was inserted in the right atrium.  Full cardiopulmonary bypass initiated.  Antegrade and retrograde cardioplegic cannula were placed.  Full cardiopulmonary bypass was initiated.  Aortic pressure was temporarily reduced and the aorta was cross-clamped.  One liter of cold blood cardioplegia administered antegrade and retrograde routes.  Subsequent dose of cardioplegia given no greater than 20 minute intervals via the retrograde route as well as via the completed vein grafts.  Reverse segment of saphenous vein was anastomosed end-to-side to an arteriotomy in the mid portion of the posterior descending artery using 7-0 Prolene. A second reveresed saphenous vein was anastomosed end-to-side to an arteriotomy in the midportion of first diagonal artery using 7-0 Prolene.  Distal end of the left internal mammary artery was anastomosed end-to-side to an arteriotomy in the mid portion of the left anterior descending using 7-0 Prolene.  Proximally, the 2 vein grafts anastomosed with two 4 mm aortotomies with 6-0 Prolene.  Warm dose of retrograde hotshot was given and high suction on the aortic root vent, the cross-clamp was released.  Organized rhythm resumed without need for any defibrillations.  Rewarming and reperfusion allowed.  Patient gradually weaned off cardiopulmonary bypass.  Following termination of cardiopulmonary bypass, LV function within normal limits.  Protamine was given and all cannulas removed.  Two right ventricular pacing wires were placed.  Two anterior mediastinal and one left pleural chest tube placed.  Sternum was closed with surgical steel wires.  Fascia, subcutaneous tissue, skin of chest was closed in layers.  Patient transferred to ICU in stable critical condition.         MARVIN MCCARTHY MD             D: 11/24/2017 14:59   T: 11/24/2017 20:20   MT: TD      Name:     FÉLIX TRIVEDI    MRN:      [id number]        Account:        [id number]   :      1940           Procedure Date: 2017      Document: [id number]       cc: [facility] Surgery Billing

## 2017-11-25 NOTE — PROGRESS NOTES
CVTS Daily Note  11/25/2017  Attending: Rolando Toro MD    S:   No overnight events.  Pt seen at bedside resting comfortably.    Pain controlled.  [place] diet    O:   Vitals:    11/24/17 2300 11/25/17 0700 11/25/17 0838 11/25/17 1243   BP: 115/59  131/59 125/64   BP Location: Left arm  Left arm Left arm   Pulse:       Resp: 16 16 16   Temp: 99  F (37.2  C)  98.1  F (36.7  C) 98.2  F (36.8  C)   TempSrc: Oral  Oral Oral   SpO2: 92%  95% 93%   Weight:  99.4 kg (219 lb 3.2 oz)     Height:         Vitals:    11/24/17 0600 11/24/17 1847 11/25/17 0700   Weight: 98.3 kg (216 lb 12.8 oz) 101.2 kg (223 lb 3.2 oz) 99.4 kg (219 lb 3.2 oz)     + 3 kg since admit      Intake/Output Summary (Last 24 hours) at 11/25/17 1508  Last data filed at 11/25/17 1500   Gross per 24 hour   Intake             1260 ml   Output             3640 ml   Net            -2380 ml       MAPs:   Gen: AAO x 3, pleasant, NAD  CV: RRR, S1S2 normal, no murmurs, rubs, or gallops. no JVD  Pulm: CTA, no rhonchi or wheezes  Abd: soft, non-tender, no guarding  Ext: mild peripheral edema, + pitting  Incision: clean, dry, intact, no erythema  Chest Tube sites: dressings clean and dry, serosanguinous, no air leak, output 630 cc     Labs:  BMP  Recent Labs  Lab 11/25/17  1231 11/24/17  0411 11/23/17  2127 11/23/17  1131  11/23/17  0358  11/22/17  2108    144 146* 148*  --  151*  --  143   POTASSIUM 3.9 4.5 3.8 4.1  < > 3.5  < > 4.4   CHLORIDE 108 109 110* 113*  --  110*  --  111*   CO2 29 28 29 30  --  26  --  22   BUN 19 16 17 18  --  20  --  23   CR 0.95 0.97 0.96 0.99  --  1.09  --  1.10   * 121* 131* 98  --  206*  --  165*   MAG  --  2.5* 1.8  --   --  2.3  --  2.6*   PHOS  --  3.3 2.7 3.6  --  0.4*  --  3.2   < > = values in this interval not displayed.  CBC  Recent Labs  Lab 11/25/17  1231 11/24/17  0411 11/23/17  2127 11/23/17  1131   WBC 11.0 15.1* 14.9* 11.0   HGB 9.3* 8.6* 8.9* 9.1*   * 125* 114* 137*     INR/PTT  Recent Labs  Lab  11/24/17  0411 11/23/17  0150 11/22/17  2108 11/22/17  1930 11/20/17  1349   INR 1.43* 1.43* 1.25* 2.71* 1.03   PTT  --  51* 29 48* 29     ABG  Recent Labs  Lab 11/24/17  0411 11/23/17 2127 11/23/17  1131 11/23/17  0951   PH 7.42 7.44 7.44 7.46*   PCO2 46* 45 48* 46*   PO2 75* 76* 84 127*   HCO3 30* 30* 32* 32*     LFT  Recent Labs  Lab 11/24/17  0411 11/22/17  2108   AST 23 28   ALT 14 23   ALKPHOS 34* 46   BILITOTAL 0.6 0.6   ALBUMIN 3.0* 3.3*       GLUCOSE:     Recent Labs  Lab 11/25/17  1231 11/25/17  1211 11/25/17  0844 11/24/17  2201 11/24/17  1532 11/24/17  1159 11/24/17  0756 11/24/17  0411 11/23/17 2127  11/23/17  1131  11/23/17  0358  11/22/17  2108   *  --   --   --   --   --   --  121* 131*  --  98  --  206*  --  165*   BGM  --  143* 103* 111* 114* 122* 232*  --   --   < >  --   < >  --   < >  --    < > = values in this interval not displayed.        A/P:     Zack Demarco is a 77 year old male who is status post 3 v CABG on 11/22 with Dr. Toro    Neuro:   - Tylenol, oxy fentanyl     CV:   - Home betablocker  - ASA, statin       Pulm:   - Pulmonary toilet IS     ID:   - Afebrile, monitor WBC  - Periop abx     GI / FEN:  - Cardiac diet      Renal / :   - Monitor I+O  - Cr 1, stable   - Lasix 20 mg IV x1 today     Heme:   - No issues, monitor Hgb      Endo:   - Home synthroid  - SSI      PPX:   - Protonix     Anticoagulation:   - subq Heparin     Dispo:   - CVICU       Discussed with CVTS Fellow   Staff surgeons have been informed of changes through both  verbal and written communication.        Jose Roblero   Surgery PGY3  [phone]

## 2017-11-25 NOTE — PLAN OF CARE
Problem: Cardiac Surgery (Adult)  Goal: Signs and Symptoms of Listed Potential Problems Will be Absent, Minimized or Managed (Cardiac Surgery)  Signs and symptoms of listed potential problems will be absent, minimized or managed by discharge/transition of care (reference Cardiac Surgery (Adult) CPG).   Outcome: Improving  D: Admitted 11/22 CABG x3  (POD 2 -->3)    No acute events overnight:  1) No BM yet.    I: Monitored vitals and assessed pt status.   Changed: CT + sternal dressings.  PRN:Tylenol 650mg (x2)    A: A0x4. VSS, on 1L NC. NSR+BBB. Afebrile. Incisional pain controlled with PRN medications. No BM yet. 1350cc of UO.  CT, -20, no air leak, with 250cc of output this shift. Blood sugar 111 @ HS, not further checks needed overnight. Patient slept between cares, making needs known.    Temp:  [98.6  F (37  C)-99.7  F (37.6  C)] 99  F (37.2  C)  Heart Rate:  [65-84] 82  Resp:  [14-18] 16  BP: (115-131)/(50-59) 115/59  MAP:  [62 mmHg-79 mmHg] 67 mmHg  Arterial Line BP: (103-121)/(41-57) 104/44  SpO2:  [90 %-97 %] 92 %      P: Continue to monitor Pt status and report changes to treatment team.

## 2017-11-25 NOTE — PLAN OF CARE
Problem: Patient Care Overview  Goal: Plan of Care/Patient Progress Review  Transfer from  via wheelchair    Reason for transfer:  Pt no longer requiring ICU care; care to continue on 6C prior discharge.  Belongings: 3 bags of belongings in bedside closet.  Tasks completed: weight, vital signs, hooked up to hardwire tele, chest tube container hooked up to -20 cm H20 sxn  Orientation to unit/education: Call lights, fall prevention, plan of care reviewed with patient; pt verbalized understanding  Chart:  Sent with pt    Plan: Continue with plan of care for s/p CABG x3 from 11/22. Contact CVTS for any concerns and/or questions.    Angi Perera, JACK  Cardiology

## 2017-11-25 NOTE — PLAN OF CARE
Pt transferred to , no longer requiring ICU care.  Transferred via w/c. Accompanied by ICU RN with pt on monitor. Pt stable, on 3L O2 via NC. HR R BBB/ SR 70s-90s. Pt c/o L lung pain, chest xray obtained, pneumothorax confirmed on xray, CVTS MD aware. No requiring additional O2, denies SOB at rest, denies need for pain medication. 3 CT draining 30-50 mL/hr of serosanguinous output. Joseph removed. Lasix given, responded immediately with large voids in urinal. Pt family accompanied pt with transfer. All belongings labeled/sent with pt.

## 2017-11-25 NOTE — PLAN OF CARE
Problem: Patient Care Overview  Goal: Plan of Care/Patient Progress Review  PT 6C  Discharge Planner PT   Patient plan for discharge: home to daughters [place] with 24 hr A   Current status: Ambulates with CGAx1 and 2 LOB requiring mod Ax1 to correct.  May be indicated for SEC/FWW trial.  Ascends and descnds 6 steps with single railing and CGAx1.  STS with CGAx1.  Able to recall precautions.  Supine<>sit with mod Ax1 to sit up.    Barriers to return to prior living situation: Fatigue, level of assist and deconditioning.   Recommendations for discharge: anticipate home to [facility] with 24hr A  Rationale for recommendations: Pt is near baseline but requires assistance due to sternal precaution and mobility deficits.        Entered by: Toi Colbert 11/25/2017 5:44 PM

## 2017-11-26 ENCOUNTER — APPOINTMENT (OUTPATIENT)
Dept: OCCUPATIONAL THERAPY | Facility: CLINIC | Age: 77
DRG: 236 | End: 2017-11-26
Attending: SURGERY
Payer: MEDICARE

## 2017-11-26 ENCOUNTER — APPOINTMENT (OUTPATIENT)
Dept: GENERAL RADIOLOGY | Facility: CLINIC | Age: 77
DRG: 236 | End: 2017-11-26
Attending: SURGERY
Payer: MEDICARE

## 2017-11-26 LAB
ANION GAP SERPL CALCULATED.3IONS-SCNC: 8 MMOL/L (ref 3–14)
BUN SERPL-MCNC: 24 MG/DL (ref 7–30)
CALCIUM SERPL-MCNC: 8.9 MG/DL (ref 8.5–10.1)
CHLORIDE SERPL-SCNC: 108 MMOL/L (ref 94–109)
CO2 SERPL-SCNC: 26 MMOL/L (ref 20–32)
CREAT SERPL-MCNC: 0.98 MG/DL (ref 0.66–1.25)
ERYTHROCYTE [DISTWIDTH] IN BLOOD BY AUTOMATED COUNT: 13 % (ref 10–15)
GFR SERPL CREATININE-BSD FRML MDRD: 74 ML/MIN/1.7M2
GLUCOSE BLDC GLUCOMTR-MCNC: 101 MG/DL (ref 70–99)
GLUCOSE BLDC GLUCOMTR-MCNC: 102 MG/DL (ref 70–99)
GLUCOSE BLDC GLUCOMTR-MCNC: 105 MG/DL (ref 70–99)
GLUCOSE BLDC GLUCOMTR-MCNC: 106 MG/DL (ref 70–99)
GLUCOSE SERPL-MCNC: 104 MG/DL (ref 70–99)
HCT VFR BLD AUTO: 30.5 % (ref 40–53)
HGB BLD-MCNC: 9.6 G/DL (ref 13.3–17.7)
MAGNESIUM SERPL-MCNC: 2.3 MG/DL (ref 1.6–2.3)
MCH RBC QN AUTO: 30.9 PG (ref 26.5–33)
MCHC RBC AUTO-ENTMCNC: 31.5 G/DL (ref 31.5–36.5)
MCV RBC AUTO: 98 FL (ref 78–100)
PLATELET # BLD AUTO: 142 10E9/L (ref 150–450)
POTASSIUM SERPL-SCNC: 4 MMOL/L (ref 3.4–5.3)
RBC # BLD AUTO: 3.11 10E12/L (ref 4.4–5.9)
SODIUM SERPL-SCNC: 141 MMOL/L (ref 133–144)
WBC # BLD AUTO: 9.2 10E9/L (ref 4–11)

## 2017-11-26 PROCEDURE — 36415 COLL VENOUS BLD VENIPUNCTURE: CPT | Performed by: STUDENT IN AN ORGANIZED HEALTH CARE EDUCATION/TRAINING PROGRAM

## 2017-11-26 PROCEDURE — 25000132 ZZH RX MED GY IP 250 OP 250 PS 637: Mod: GY | Performed by: STUDENT IN AN ORGANIZED HEALTH CARE EDUCATION/TRAINING PROGRAM

## 2017-11-26 PROCEDURE — A9270 NON-COVERED ITEM OR SERVICE: HCPCS | Mod: GY | Performed by: STUDENT IN AN ORGANIZED HEALTH CARE EDUCATION/TRAINING PROGRAM

## 2017-11-26 PROCEDURE — 71020 XR CHEST 2 VW: CPT

## 2017-11-26 PROCEDURE — 25000132 ZZH RX MED GY IP 250 OP 250 PS 637: Mod: GY | Performed by: THORACIC SURGERY (CARDIOTHORACIC VASCULAR SURGERY)

## 2017-11-26 PROCEDURE — 25000132 ZZH RX MED GY IP 250 OP 250 PS 637: Mod: GY | Performed by: SURGERY

## 2017-11-26 PROCEDURE — 80048 BASIC METABOLIC PNL TOTAL CA: CPT | Performed by: STUDENT IN AN ORGANIZED HEALTH CARE EDUCATION/TRAINING PROGRAM

## 2017-11-26 PROCEDURE — 83735 ASSAY OF MAGNESIUM: CPT | Performed by: STUDENT IN AN ORGANIZED HEALTH CARE EDUCATION/TRAINING PROGRAM

## 2017-11-26 PROCEDURE — 97535 SELF CARE MNGMENT TRAINING: CPT | Mod: GO

## 2017-11-26 PROCEDURE — 85027 COMPLETE CBC AUTOMATED: CPT | Performed by: STUDENT IN AN ORGANIZED HEALTH CARE EDUCATION/TRAINING PROGRAM

## 2017-11-26 PROCEDURE — 40000133 ZZH STATISTIC OT WARD VISIT

## 2017-11-26 PROCEDURE — 97530 THERAPEUTIC ACTIVITIES: CPT | Mod: GO

## 2017-11-26 PROCEDURE — 00000146 ZZHCL STATISTIC GLUCOSE BY METER IP

## 2017-11-26 PROCEDURE — 97110 THERAPEUTIC EXERCISES: CPT | Mod: GO

## 2017-11-26 PROCEDURE — A9270 NON-COVERED ITEM OR SERVICE: HCPCS | Mod: GY | Performed by: SURGERY

## 2017-11-26 PROCEDURE — 21400006 ZZH R&B CCU INTERMEDIATE UMMC

## 2017-11-26 PROCEDURE — A9270 NON-COVERED ITEM OR SERVICE: HCPCS | Mod: GY | Performed by: THORACIC SURGERY (CARDIOTHORACIC VASCULAR SURGERY)

## 2017-11-26 PROCEDURE — 25000128 H RX IP 250 OP 636: Performed by: STUDENT IN AN ORGANIZED HEALTH CARE EDUCATION/TRAINING PROGRAM

## 2017-11-26 RX ORDER — BISACODYL 10 MG
10 SUPPOSITORY, RECTAL RECTAL DAILY PRN
Status: DISCONTINUED | OUTPATIENT
Start: 2017-11-26 | End: 2017-11-27 | Stop reason: HOSPADM

## 2017-11-26 RX ORDER — FUROSEMIDE 10 MG/ML
20 INJECTION INTRAMUSCULAR; INTRAVENOUS ONCE
Status: COMPLETED | OUTPATIENT
Start: 2017-11-26 | End: 2017-11-26

## 2017-11-26 RX ADMIN — ASPIRIN 81 MG CHEWABLE TABLET 81 MG: 81 TABLET CHEWABLE at 08:51

## 2017-11-26 RX ADMIN — SENNOSIDES AND DOCUSATE SODIUM 1 TABLET: 8.6; 5 TABLET ORAL at 08:51

## 2017-11-26 RX ADMIN — INSULIN ASPART 3 UNITS: 100 INJECTION, SOLUTION INTRAVENOUS; SUBCUTANEOUS at 18:46

## 2017-11-26 RX ADMIN — HEPARIN SODIUM 5000 UNITS: 5000 INJECTION, SOLUTION INTRAVENOUS; SUBCUTANEOUS at 08:52

## 2017-11-26 RX ADMIN — ACETAMINOPHEN 650 MG: 325 TABLET, FILM COATED ORAL at 17:59

## 2017-11-26 RX ADMIN — METOPROLOL TARTRATE 6.25 MG: 25 TABLET, FILM COATED ORAL at 20:28

## 2017-11-26 RX ADMIN — INSULIN ASPART 3 UNITS: 100 INJECTION, SOLUTION INTRAVENOUS; SUBCUTANEOUS at 10:18

## 2017-11-26 RX ADMIN — ATORVASTATIN CALCIUM 40 MG: 40 TABLET, FILM COATED ORAL at 08:51

## 2017-11-26 RX ADMIN — METOPROLOL TARTRATE 6.25 MG: 25 TABLET, FILM COATED ORAL at 08:52

## 2017-11-26 RX ADMIN — LEVOTHYROXINE SODIUM 75 MCG: 75 TABLET ORAL at 08:51

## 2017-11-26 RX ADMIN — ACETAMINOPHEN 650 MG: 325 TABLET, FILM COATED ORAL at 06:34

## 2017-11-26 RX ADMIN — PANTOPRAZOLE SODIUM 40 MG: 40 TABLET, DELAYED RELEASE ORAL at 08:51

## 2017-11-26 RX ADMIN — HEPARIN SODIUM 5000 UNITS: 5000 INJECTION, SOLUTION INTRAVENOUS; SUBCUTANEOUS at 17:53

## 2017-11-26 RX ADMIN — FUROSEMIDE 20 MG: 10 INJECTION, SOLUTION INTRAVENOUS at 08:50

## 2017-11-26 RX ADMIN — SENNOSIDES AND DOCUSATE SODIUM 2 TABLET: 8.6; 5 TABLET ORAL at 20:28

## 2017-11-26 ASSESSMENT — PAIN DESCRIPTION - DESCRIPTORS
DESCRIPTORS: ACHING
DESCRIPTORS: ACHING;DISCOMFORT

## 2017-11-26 NOTE — PLAN OF CARE
Problem: Patient Care Overview  Goal: Plan of Care/Patient Progress Review      D/I/A: Patient able to be weaned off oxygen for daytime hours, explained to pt possible reasons for needing 1L of oxygen via NC overnight (potential sleep apnea, post-op, etc). Pain adequately relieved with tylenol. Chest tubes x3 removed at 1300 today, CXR completed afterwards. Blood sugars controlled adequately via carb coverage; pt not requiring additional insulin based on sliding scale. K+ 4, replaced per protocol. Weight down ~5 lbs from yesterday, however, remains about 2 lbs volume up since admission; lasix 20 mg IV given in am with adequate urine output.     P: Continue post-op plan of care. Contact CVTS for any questions and/or concerns.    Angi Perera RN  Cardiology

## 2017-11-26 NOTE — PLAN OF CARE
Problem: Patient Care Overview  Goal: Plan of Care/Patient Progress Review    D/I/A: Pt is POD #3, s/p CABG x3 from 11/22. Pt was weaned off oxygen today with sats 94% on RA, denies shortness of breath and has only slight MEDEL. Pt does report left sided pain with breathing remains, however, much less than yesterday. Pt's weight down 4 lbs from yesterday, however, he still remains volume up from admission weight; lasix 20 mg IV given with adequate urine output. Chest tubes x3 remain connected to one atrium, 100 mL output over 12 hours. CVTS removed pt's pacer wires today. K+ 3.9, replaced per protocol. Rhythm: SR with BBB, occasional PAC's. Pt had BM x1, first since surgery. Bandage removed from right leg, right lateral knee incision clean/dry/intact and open to air. Pain adequately controlled with tylenol.     P: Discharge possible, per CVTS, as early as Monday or Tuesday pending chest tube output. Contact CVTS for questions and/or concerns.    Angi Perera, RN  Cardiology

## 2017-11-26 NOTE — PLAN OF CARE
Problem: Patient Care Overview  Goal: Plan of Care/Patient Progress Review  Discharge Planner OT   Patient plan for discharge: DC to daughter's house in [place] with assist PRN and OP CR   Current status: CGA for bed mobility after education on log roll technique.  SBA for toilet transfer.  Facilitated functional mobility ~220' X2 with CGA/SBA and no AD.  Engages in 12 minutes of aerobic exercise on NuStep.  VSS on RA, tolerates well.    Barriers to return to prior living situation: Post surgical precautions, endurance, fatigue, strength  Recommendations for discharge: DC to daughter's house in [place] with assist PRN and OP CR   Rationale for recommendations: To improve strength and cardiovascular endurance necessary to return to previous occupations at [employer].       Entered by: Marek Clarke 11/26/2017 1:10 PM

## 2017-11-26 NOTE — PROGRESS NOTES
CVTS Daily Note  11/26/2017  Attending: Rolando Toro MD    S:   No overnight events.    O:   Vitals:    11/26/17 0600 11/26/17 0745 11/26/17 0800 11/26/17 1130   BP:  120/65  96/63   BP Location:  Left arm  Right arm   Pulse:       Resp:  16  16   Temp:  98  F (36.7  C)  97.8  F (36.6  C)   TempSrc:  Oral  Oral   SpO2:  95% 94% 96%   Weight: 97.3 kg (214 lb 8 oz)      Height:         Vitals:    11/24/17 1847 11/25/17 0700 11/26/17 0600   Weight: 101.2 kg (223 lb 3.2 oz) 99.4 kg (219 lb 3.2 oz) 97.3 kg (214 lb 8 oz)     + 1 kg since admit      Intake/Output Summary (Last 24 hours) at 11/26/17 1516  Last data filed at 11/26/17 1321   Gross per 24 hour   Intake             1080 ml   Output             1035 ml   Net               45 ml       Gen: AAO x 3, pleasant, NAD  CV: RRR, S1S2 normal, no murmurs, rubs, or gallops. no JVD  Pulm: CTA, no rhonchi or wheezes  Abd: soft, non-tender, no guarding  Ext: mild peripheral edema, + pitting  Incision: clean, dry, intact, no erythema  Chest Tube sites: dressings clean and dry,      Labs:  BMP  Recent Labs  Lab 11/26/17  0743 11/25/17  1231 11/24/17  0411 11/23/17  2127 11/23/17  1131  11/23/17  0358    143 144 146* 148*  --  151*   POTASSIUM 4.0 3.9 4.5 3.8 4.1  < > 3.5   CHLORIDE 108 108 109 110* 113*  --  110*   CO2 26 29 28 29 30  --  26   BUN 24 19 16 17 18  --  20   CR 0.98 0.95 0.97 0.96 0.99  --  1.09   * 161* 121* 131* 98  --  206*   MAG 2.3  --  2.5* 1.8  --   --  2.3   PHOS  --   --  3.3 2.7 3.6  --  0.4*   < > = values in this interval not displayed.  CBC  Recent Labs  Lab 11/26/17  0743 11/25/17  1231 11/24/17 0411 11/23/17 2127   WBC 9.2 11.0 15.1* 14.9*   HGB 9.6* 9.3* 8.6* 8.9*   * 124* 125* 114*     INR/PTT  Recent Labs  Lab 11/24/17  0411 11/23/17  0150 11/22/17 2108 11/22/17  1930 11/20/17  1349   INR 1.43* 1.43* 1.25* 2.71* 1.03   PTT  --  51* 29 48* 29     ABG  Recent Labs  Lab 11/24/17  0411 11/23/17 2127 11/23/17  1131  11/23/17  0951   PH 7.42 7.44 7.44 7.46*   PCO2 46* 45 48* 46*   PO2 75* 76* 84 127*   HCO3 30* 30* 32* 32*     LFT  Recent Labs  Lab 11/24/17  0411 11/22/17  2108   AST 23 28   ALT 14 23   ALKPHOS 34* 46   BILITOTAL 0.6 0.6   ALBUMIN 3.0* 3.3*       GLUCOSE:     Recent Labs  Lab 11/26/17  1318 11/26/17  0804 11/26/17  0743 11/25/17  2132 11/25/17  1634 11/25/17  1231 11/25/17  1211 11/25/17  0844  11/24/17  0411 11/23/17  2127  11/23/17  1131  11/23/17  0358   GLC  --   --  104*  --   --  161*  --   --   --  121* 131*  --  98  --  206*   * 102*  --  105* 82  --  143* 103*  < >  --   --   < >  --   < >  --    < > = values in this interval not displayed.      A/P:  Zack Demarco is a 77 year old male who is status post 3 v CABG on 11/22 with Dr. Toro     Neuro:   - Tylenol, oxy fentanyl      CV:   - Home betablocker  - ASA, statin         Pulm:   - Pulmonary toilet IS  - Chest tubes removed today      ID:   - Afebrile, monitor WBC  - Periop abx      GI / FEN:  - Cardiac diet      Renal / :   - Monitor I+O  - Cr 1, stable   - Lasix 20 mg IV x1 today      Heme:   - No issues, monitor Hgb       Endo:   - Home synthroid  - SSI       PPX:   - Protonix      Anticoagulation:   - subq Heparin      Dispo:   - CVICU    Discussed with CVTS Fellow   Staff surgeons have been informed of changes through both  verbal and written communication.        Jose Roblero   Surgery PGY3  [phone]

## 2017-11-26 NOTE — PLAN OF CARE
Problem: Patient Care Overview  Goal: Plan of Care/Patient Progress Review  Outcome: Improving  D: Admitted 11/22 CABG x3  (POD 3 -->4)     No acute events overnight:  1) BRITTNEY noted most likely. Patient desaturating at one point to 83% on RA. 1L NC applied. Informing primary of findings.   2) Weight down 5lbs.     I: Monitored vitals and assessed pt status.   PRN:Tylenol 650mg (x2)     A: A0x4. VSS, on  RA/ 1L NC. NSR+BBB. Afebrile. Incisional pain controlled with PRN medications. 550cc of UO.  CT, -20, no air leak, with 70cc of output this shift. Blood sugar 105 @ HS, not further checks needed overnight. Patient slept between cares, making needs known.    Temp:  [97.9  F (36.6  C)-98.6  F (37  C)] 98.6  F (37  C)  Pulse:  [84] 84  Heart Rate:  [82-86] 84  Resp:  [16] 16  BP: (101-131)/(59-66) 101/61  SpO2:  [83 %-95 %] 94 %      P: Continue to monitor Pt status and report changes to treatment team.

## 2017-11-27 ENCOUNTER — APPOINTMENT (OUTPATIENT)
Dept: OCCUPATIONAL THERAPY | Facility: CLINIC | Age: 77
DRG: 236 | End: 2017-11-27
Attending: SURGERY
Payer: MEDICARE

## 2017-11-27 ENCOUNTER — APPOINTMENT (OUTPATIENT)
Dept: GENERAL RADIOLOGY | Facility: CLINIC | Age: 77
DRG: 236 | End: 2017-11-27
Attending: PHYSICIAN ASSISTANT
Payer: MEDICARE

## 2017-11-27 ENCOUNTER — APPOINTMENT (OUTPATIENT)
Dept: PHYSICAL THERAPY | Facility: CLINIC | Age: 77
DRG: 236 | End: 2017-11-27
Attending: SURGERY
Payer: MEDICARE

## 2017-11-27 VITALS
DIASTOLIC BLOOD PRESSURE: 64 MMHG | TEMPERATURE: 98.1 F | HEART RATE: 85 BPM | OXYGEN SATURATION: 93 % | HEIGHT: 70 IN | SYSTOLIC BLOOD PRESSURE: 133 MMHG | WEIGHT: 212.4 LBS | RESPIRATION RATE: 18 BRPM | BODY MASS INDEX: 30.41 KG/M2

## 2017-11-27 LAB
ANION GAP SERPL CALCULATED.3IONS-SCNC: 10 MMOL/L (ref 3–14)
BUN SERPL-MCNC: 26 MG/DL (ref 7–30)
CALCIUM SERPL-MCNC: 8.7 MG/DL (ref 8.5–10.1)
CHLORIDE SERPL-SCNC: 107 MMOL/L (ref 94–109)
CO2 SERPL-SCNC: 24 MMOL/L (ref 20–32)
CREAT SERPL-MCNC: 0.91 MG/DL (ref 0.66–1.25)
ERYTHROCYTE [DISTWIDTH] IN BLOOD BY AUTOMATED COUNT: 12.8 % (ref 10–15)
GFR SERPL CREATININE-BSD FRML MDRD: 81 ML/MIN/1.7M2
GLUCOSE BLDC GLUCOMTR-MCNC: 101 MG/DL (ref 70–99)
GLUCOSE SERPL-MCNC: 112 MG/DL (ref 70–99)
HCT VFR BLD AUTO: 30.4 % (ref 40–53)
HGB BLD-MCNC: 9.6 G/DL (ref 13.3–17.7)
MAGNESIUM SERPL-MCNC: 2.2 MG/DL (ref 1.6–2.3)
MCH RBC QN AUTO: 30.9 PG (ref 26.5–33)
MCHC RBC AUTO-ENTMCNC: 31.6 G/DL (ref 31.5–36.5)
MCV RBC AUTO: 98 FL (ref 78–100)
PLATELET # BLD AUTO: 192 10E9/L (ref 150–450)
POTASSIUM SERPL-SCNC: 3.5 MMOL/L (ref 3.4–5.3)
RBC # BLD AUTO: 3.11 10E12/L (ref 4.4–5.9)
SODIUM SERPL-SCNC: 142 MMOL/L (ref 133–144)
WBC # BLD AUTO: 7.4 10E9/L (ref 4–11)

## 2017-11-27 PROCEDURE — 97530 THERAPEUTIC ACTIVITIES: CPT | Mod: GP | Performed by: REHABILITATION PRACTITIONER

## 2017-11-27 PROCEDURE — 97110 THERAPEUTIC EXERCISES: CPT | Mod: GO

## 2017-11-27 PROCEDURE — 25000132 ZZH RX MED GY IP 250 OP 250 PS 637: Mod: GY | Performed by: STUDENT IN AN ORGANIZED HEALTH CARE EDUCATION/TRAINING PROGRAM

## 2017-11-27 PROCEDURE — 80048 BASIC METABOLIC PNL TOTAL CA: CPT | Performed by: STUDENT IN AN ORGANIZED HEALTH CARE EDUCATION/TRAINING PROGRAM

## 2017-11-27 PROCEDURE — 25000132 ZZH RX MED GY IP 250 OP 250 PS 637: Mod: GY | Performed by: SURGERY

## 2017-11-27 PROCEDURE — A9270 NON-COVERED ITEM OR SERVICE: HCPCS | Mod: GY | Performed by: SURGERY

## 2017-11-27 PROCEDURE — 40000133 ZZH STATISTIC OT WARD VISIT

## 2017-11-27 PROCEDURE — 97535 SELF CARE MNGMENT TRAINING: CPT | Mod: GO

## 2017-11-27 PROCEDURE — 36415 COLL VENOUS BLD VENIPUNCTURE: CPT | Performed by: STUDENT IN AN ORGANIZED HEALTH CARE EDUCATION/TRAINING PROGRAM

## 2017-11-27 PROCEDURE — 40000193 ZZH STATISTIC PT WARD VISIT: Performed by: REHABILITATION PRACTITIONER

## 2017-11-27 PROCEDURE — 85027 COMPLETE CBC AUTOMATED: CPT | Performed by: STUDENT IN AN ORGANIZED HEALTH CARE EDUCATION/TRAINING PROGRAM

## 2017-11-27 PROCEDURE — 97530 THERAPEUTIC ACTIVITIES: CPT | Mod: GO

## 2017-11-27 PROCEDURE — A9270 NON-COVERED ITEM OR SERVICE: HCPCS | Mod: GY | Performed by: THORACIC SURGERY (CARDIOTHORACIC VASCULAR SURGERY)

## 2017-11-27 PROCEDURE — 83735 ASSAY OF MAGNESIUM: CPT | Performed by: STUDENT IN AN ORGANIZED HEALTH CARE EDUCATION/TRAINING PROGRAM

## 2017-11-27 PROCEDURE — 97116 GAIT TRAINING THERAPY: CPT | Mod: GP | Performed by: REHABILITATION PRACTITIONER

## 2017-11-27 PROCEDURE — A9270 NON-COVERED ITEM OR SERVICE: HCPCS | Mod: GY | Performed by: STUDENT IN AN ORGANIZED HEALTH CARE EDUCATION/TRAINING PROGRAM

## 2017-11-27 PROCEDURE — 25000132 ZZH RX MED GY IP 250 OP 250 PS 637: Mod: GY | Performed by: THORACIC SURGERY (CARDIOTHORACIC VASCULAR SURGERY)

## 2017-11-27 PROCEDURE — 71010 XR CHEST PORT 1 VW: CPT

## 2017-11-27 PROCEDURE — 00000146 ZZHCL STATISTIC GLUCOSE BY METER IP

## 2017-11-27 PROCEDURE — 25000128 H RX IP 250 OP 636: Performed by: STUDENT IN AN ORGANIZED HEALTH CARE EDUCATION/TRAINING PROGRAM

## 2017-11-27 RX ORDER — PANTOPRAZOLE SODIUM 40 MG/1
40 TABLET, DELAYED RELEASE ORAL EVERY MORNING
Qty: 30 TABLET | Refills: 0 | Status: SHIPPED | OUTPATIENT
Start: 2017-11-28 | End: 2018-01-04

## 2017-11-27 RX ORDER — ACETAMINOPHEN 325 MG/1
650 TABLET ORAL EVERY 4 HOURS PRN
Qty: 100 TABLET | Refills: 0 | Status: SHIPPED | OUTPATIENT
Start: 2017-11-27 | End: 2018-01-04

## 2017-11-27 RX ORDER — ASPIRIN 325 MG
325 TABLET ORAL DAILY
Status: DISCONTINUED | OUTPATIENT
Start: 2017-11-28 | End: 2017-11-27 | Stop reason: HOSPADM

## 2017-11-27 RX ORDER — OXYCODONE HYDROCHLORIDE 5 MG/1
5-10 TABLET ORAL EVERY 4 HOURS PRN
Qty: 80 TABLET | Refills: 0 | Status: SHIPPED | OUTPATIENT
Start: 2017-11-27 | End: 2017-12-06

## 2017-11-27 RX ORDER — AMOXICILLIN 250 MG
1-2 CAPSULE ORAL 2 TIMES DAILY
Qty: 100 TABLET | Refills: 0 | Status: SHIPPED | OUTPATIENT
Start: 2017-11-27 | End: 2017-12-06

## 2017-11-27 RX ORDER — METOPROLOL TARTRATE 25 MG/1
12.5 TABLET, FILM COATED ORAL 2 TIMES DAILY
Qty: 60 TABLET | Refills: 0 | Status: SHIPPED | OUTPATIENT
Start: 2017-11-27 | End: 2018-01-04

## 2017-11-27 RX ADMIN — HEPARIN SODIUM 5000 UNITS: 5000 INJECTION, SOLUTION INTRAVENOUS; SUBCUTANEOUS at 00:13

## 2017-11-27 RX ADMIN — ASPIRIN 81 MG CHEWABLE TABLET 81 MG: 81 TABLET CHEWABLE at 07:53

## 2017-11-27 RX ADMIN — HEPARIN SODIUM 5000 UNITS: 5000 INJECTION, SOLUTION INTRAVENOUS; SUBCUTANEOUS at 07:53

## 2017-11-27 RX ADMIN — POTASSIUM CHLORIDE 20 MEQ: 750 TABLET, EXTENDED RELEASE ORAL at 09:42

## 2017-11-27 RX ADMIN — METOPROLOL TARTRATE 6.25 MG: 25 TABLET, FILM COATED ORAL at 07:53

## 2017-11-27 RX ADMIN — LEVOTHYROXINE SODIUM 75 MCG: 75 TABLET ORAL at 07:53

## 2017-11-27 RX ADMIN — ACETAMINOPHEN 650 MG: 325 TABLET, FILM COATED ORAL at 00:13

## 2017-11-27 RX ADMIN — BENZOCAINE AND MENTHOL 1 LOZENGE: 15; 3.6 LOZENGE ORAL at 13:08

## 2017-11-27 RX ADMIN — BENZOCAINE AND MENTHOL 1 LOZENGE: 15; 3.6 LOZENGE ORAL at 06:32

## 2017-11-27 RX ADMIN — ATORVASTATIN CALCIUM 40 MG: 40 TABLET, FILM COATED ORAL at 07:53

## 2017-11-27 RX ADMIN — PANTOPRAZOLE SODIUM 40 MG: 40 TABLET, DELAYED RELEASE ORAL at 07:53

## 2017-11-27 RX ADMIN — BENZOCAINE AND MENTHOL 1 LOZENGE: 15; 3.6 LOZENGE ORAL at 00:13

## 2017-11-27 RX ADMIN — ACETAMINOPHEN 650 MG: 325 TABLET, FILM COATED ORAL at 13:08

## 2017-11-27 RX ADMIN — ACETAMINOPHEN 650 MG: 325 TABLET, FILM COATED ORAL at 06:32

## 2017-11-27 ASSESSMENT — PAIN DESCRIPTION - DESCRIPTORS: DESCRIPTORS: ACHING;DISCOMFORT

## 2017-11-27 NOTE — DISCHARGE INSTRUCTIONS
[facility]      AFTER YOU GO HOME FROM YOUR HEART SURGERY    Avoid lifting anything greater than ten pounds for 6 weeks after surgery and then less than 20 pounds for an additional 6 weeks.    No driving for 4 weeks after surgery or while on pain medication. If you had a minimally invasive procedure, you may drive after three weeks if not taking pain medication.      Avoid strenuous activities such as bowling, vacuuming, raking, shoveling, golf or tennis for 12 weeks after your surgery. It is okay to resume sex if you feel comfortable in doing so. You may have to try different positions with your partner.     Splint your chest incision by hugging a pillow or bringing your arms across your chest when coughing or sneezing. Avoid pushing off with your arms when getting up for the first month if you have had your sternum opened.    Shower or wash your incisions daily with soap and water (or as instructed), pat dry. Keep wound clean and dry, showers are okay after discharge, but don't let spray hit directly on incision. No baths or swimming for 1 month.  Clean wounds twice a day for 2 weeks with microklenz spray if available. Cover chest tube sites with gauze until they stop draining, then leave open. It is not abnormal for chest tube sites to drain yellowish/clear fluid for up to 2-3 weeks after surgery.   Watch for signs of infection: increased redness, tenderness, warmth or any drainage that appears infected (pus like) or is persistent.  Also a temperature > 100.5 F or chills. Call your surgeon or primary care provider's office immediately. Remove any skin glue left on incisions after 10 days. This will not affect your incision and can speed up healing.    Exercise is very important in your recovery. Please follow the guidelines set up for you in your cardiac rehab classes at the hospital. If outpatient cardiac rehab was ordered for you, we highly recommend you participate. If you have  problems arranging your cardiac rehab, please call [phone].     Avoid sitting for prolonged periods of time, try to walk every hour during the day. If you have a leg incision, elevate your leg often when you are not walking.    Check your weight when you get home from the hospital and continue to check it daily through your recovery for at least a month. If you notice a weight gain of 2-3 pounds in a week, notify your primary care physician, cardiologist or surgeon.    Bowel activity may be slow after surgery. If necessary, you may take an over the counter laxative such as Milk of Magnesia or Miralax. You may have stool softeners prescribed (docusate sodium, Senokot). We recommend using stool softeners while using narcotics for pain (oxycodone/percocet, hydrocodone/vicodin).      DENTAL VISITS AFTER SURGERY  If you have had your heart valve repaired or replaced, we do not recommend having any dental work done for 6 months and you will need to take an antibiotic prior to dental visits from now on.  Please notify your dentist before any procedure for the proper treatment needed. The antibiotic is taken by mouth one hour prior to visit. This includes routine cleanings.    DO NOT SMOKE.  IF YOU NEED HELP QUITTING, PLEASE TALK WITH YOUR CARDIOLOGIST OR PRIMARY DOCTOR.    If you are on a blood thinner, follow the instructions you were given in the hospital and DO NOT SKIP this medication. Try and take it the same time everyday. Your primary care physician or coumadin clinic will manage the dosing.     REGARDING PRESCRIPTION REFILLS.  If you need a refill on your pain medication contact us.  All other medications will be adjusted, discontinued and re-filled by your primary care physician and/or your cardiologist as they were prior to your surgery. We have given you enough for one to three month with possibly one refill.    POST-OPERATIVE CLINIC VISITS  You have a follow up visit with CVTS Surgery Advance Care  Practitioners on Friday 12/8/2017 at 2:00pm at the [facility].  You will then return to the care of your primary physician and your cardiologist.   It is important to call for an appointment to see your cardiologist in 4-6 weeks after surgery and your primary care physician in 2-4 weeks after surgery. If there is a need to return to see CT Surgery please call our  at [phone].    SURGICAL QUESTIONS  Please call Aylin Rocha with any surgical questions, her phone number is listed below.  She can assist you with your needs and contact other surgery care team members as indicated.    For general questions or concerns, please call the Cardiothoracic Surgery Department at [phone] 8-4:30 M-F.   On weekends or after hours, please call [phone] and ask the  to   page the Cardiothoracic Surgery fellow on call.      Thank you,    Your Cardiothoracic Surgery Team  Aylin Rocha RN Care Coordinator-  [phone]   Doretha Lopez PA-C

## 2017-11-27 NOTE — PLAN OF CARE
Problem: Patient Care Overview  Goal: Individualization & Mutuality  Occupational Therapy Discharge Summary    Reason for therapy discharge:    Discharged to home with outpatient therapy.    Progress towards therapy goal(s). See goals on Care Plan in [facility] electronic health record for goal details.  Goals partially met.  Barriers to achieving goals:   discharge from facility.    Therapy recommendation(s):    Continued therapy is recommended.  Rationale/Recommendations:  OP CR to improve strength and cardiovascular endurance necessary to return to previous occupations at [employer].

## 2017-11-27 NOTE — PLAN OF CARE
Problem: Patient Care Overview  Goal: Plan of Care/Patient Progress Review  OT/6C:  Attempted to see pt this AM, but pt visiting with friends.  Requesting that TH return later this date.  Will check back as schedule permits.     Discharge Planner OT   Patient plan for discharge: DC to daughter's home in [place] with assist PRN and OP CR   Current status: IND with donning pants.  Edu pt on compensatory technique for UB dressing - performs with min A.  Facilitated functional mobility ~300' X2 and engages in 13 min on AE on NuStep, VSS throughout on RA.  Barriers to return to prior living situation: Post-surgical precautions, endurance, strength  Recommendations for discharge: DC to daughter's home in [place] with assist available PRN and OP CR  Rationale for recommendations: To improve strength and cardiovascular endurance necessary to return to previous occupations at [employer].       Entered by: Marek Clarke 11/27/2017 3:02 PM

## 2017-11-27 NOTE — PLAN OF CARE
Problem: Patient Care Overview  Goal: Plan of Care/Patient Progress Review  PT - per plan established by the Physical Therapist, according to functional mobility the  discharge recommendation is to Drt home. Pt is progressing well, pt may D/c to Drt home. Pt is SBA to IDN for all bed mob and sit to stand following all precautions. Pt amb up to 400'x 1 no AD no LOB or instability. Pt demo up and down 3 steps x 4 once with only HHA due to no rail at home on outside step. Pt DRT ED on assist tech. Pt ED and car transfer both pt and DRt feel comfortable on tech.   Discharge Planner PT   Patient plan for discharge: DRt home. Out pt CR   Current status: see above.   Barriers to return to prior living situation: weakness fatigue.   Recommendations for discharge: Drt home OP CR  Rationale for recommendations: pt is progressing well, good support system at home.     At this time pt has met 4/4 goals  Pt to D/c to Drt home with assist as needed.   OP CR to follow.   No PT equipment needs at D/c        Entered by: Norberto Aguirre 11/27/2017 9:14 AM

## 2017-11-27 NOTE — CONSULTS
INTERVENTIONAL RADIOLOGY CONSULT NOTE    Date of Admission: 11/22/2017  Reason for Admission: Triple bypass graft  Date of Consult: 11/27/2017   Requesting Physician: Rolando Toro MD    Assessment: Zack Demarco is a 77 year old male who was incidentally found to have a retroperitoneal lesion on pre-operative CT scan prior to coronary artery bypass. He is post CABG x 3 and now his team would like to work-up lesion.    Recommendations: Schedule for outpatient CT-guided left retroperitoneal biopsy with sedation. He will need to hold aspirin for 5 days. Come to appointment for biopsy NPO x 6 hours and with .    Patient plan of care discussed with IR Attending, Dr. Johnson Ramírez.    Chapo Mars PA-C  Interventional Radiology  [phone]  IR On-Call Pager: [phone]  __________________________________________________________________    CHIEF COMPLAINT:  Chest pain    HISTORY OF PRESENT ILLNESS:  Zack Demarco is a 77 year old male with a past medical history of coronary artery disease, hypertension, new retroperitoneal mass who presented with new chest pain. Coronary angiogram showed severe 2 vessel disease including proximal LAD. Patient underwent pre-operative CT scan which incidentally found a large retroperitoneal mass. Surgery was delayed while work-up for active tumor was ruled out. Lab tests were negative and patient underwent elective coronary artery bypass grafting x 3.    PAST MEDICAL HISTORY:   Reviewed and edited as appropriate.  Past Medical History:   Diagnosis Date     Coronary artery disease 11/22/2017     DJD (degenerative joint disease) of hip     right     Glaucoma      Hernia umbilical      Hypercholesterolemia      Hypertension      Hypothyroidism      Lipoma      Low back pain      Nonsenile cataract      Obesity      Unstable angina (H) 11/13/2017       PAST SURGICAL HISTORY:    Reviewed and edited as appropriate.  Past Surgical History:   Procedure Laterality Date     BIOPSY  1980's     fatty tissue     BYPASS GRAFT ARTERY CORONARY N/A 11/22/2017    Procedure: BYPASS GRAFT ARTERY CORONARY;  Median Sternotomy, Coronary Artery Bypass Graft x3, Using Left Internal Mammary and  Endoscopic  Cross Anchor of Right Greater Saphenous Vein, On Cardiopulmonary Bypass, Transesophageal Echocardiogram;  Surgeon: Rolando Toro MD;  Location: [facility] OR     CL AFF SURGICAL PATHOLOGY       COLONOSCOPY  2011     COLONOSCOPY WITH CO2 INSUFFLATION N/A 9/19/2016    Procedure: COLONOSCOPY WITH CO2 INSUFFLATION;  Surgeon: Jeffery Flores MD;  Location: [facility] OR     ROTATOR CUFF REPAIR RT/LT       SOFT TISSUE SURGERY  Sept. 2014    [facility] Tendon transfer (Left Ankle)       FAMILY HISTORY:   Reviewed and edited as appropriate.  Family History   Problem Relation Age of Onset     CANCER Mother      pancreatic     CANCER Father      lung     Respiratory Brother      COPD     Eye Disorder Brother      CANCER Sister      liver     CANCER Sister      Musculoskeletal Disorder Sister      back     Macular Degeneration Sister 60     Macular Degeneration Brother 60       SOCIAL HISTORY:   Reviewed and edited as appropriate.  Social History   Substance Use Topics     Smoking status: Former Smoker     Packs/day: 1.00     Years: 20.00     Types: Cigarettes     Start date: 7/28/1959     Quit date: 7/28/1979     Smokeless tobacco: Former User     Alcohol use 0.0 oz/week      Comment: rarely       ALLERGIES:  Nkda [no known drug allergies]    MEDICATIONS:  Reviewed and edited as appropriate.  Prescription Medications as of 11/27/2017             oxyCODONE IR (ROXICODONE) 5 MG tablet Take 1-2 tablets (5-10 mg) by mouth every 4 hours as needed for moderate to severe pain    acetaminophen (TYLENOL) 325 MG tablet Take 2 tablets (650 mg) by mouth every 4 hours as needed for mild pain    metoprolol (LOPRESSOR) 25 MG tablet Take 0.5 tablets (12.5 mg) by mouth 2 times daily    senna-docusate (SENOKOT-S;PERICOLACE) 8.6-50 MG per tablet Take 1-2 tablets by  "mouth 2 times daily    benzocaine-menthol (CEPACOL) 15-3.6 MG lozenge Place 1 lozenge inside cheek every hour as needed for sore throat    pantoprazole (PROTONIX) 40 MG EC tablet Starting on 11/28/2017. Take 1 tablet (40 mg) by mouth every morning    atorvastatin (LIPITOR) 40 MG tablet Take 1 tablet (40 mg) by mouth daily    nitroGLYcerin (NITROSTAT) 0.4 MG sublingual tablet For chest pain place 1 tablet under the tongue every 5 minutes for 3 doses. If symptoms persist 5 minutes after 1st dose call 911.    levothyroxine (SYNTHROID/LEVOTHROID) 75 MCG tablet Take 1 tablet (75 mcg) by mouth daily    Cholecalciferol (VITAMIN D3 PO) Take by mouth daily    multivitamin (OCUVITE) TABS Take 1 tablet by mouth daily    latanoprost (XALATAN) 0.005 % ophthalmic solution Place 1 drop into both eyes At Bedtime    aspirin 325 MG EC tablet 1/2 tab daily          PHYSICAL EXAM:   /64  Pulse 85  Temp 98.1  F (36.7  C) (Oral)  Resp 18  Ht 1.778 m (5' 10\")  Wt 96.3 kg (212 lb 6.4 oz)  SpO2 93%  BMI 30.48 kg/m2  General: Sitting up in bed in street clothes ready to go    LABS:  BMP RESULTS:  Lab Results   Component Value Date     11/27/2017    POTASSIUM 3.5 11/27/2017    CHLORIDE 107 11/27/2017    CO2 24 11/27/2017    ANIONGAP 10 11/27/2017     (H) 11/27/2017    BUN 26 11/27/2017    CR 0.91 11/27/2017    GFRESTIMATED 81 11/27/2017    GFRESTBLACK >90 11/27/2017    ELVIRA 8.7 11/27/2017        CBC RESULTS:  Lab Results   Component Value Date    WBC 7.4 11/27/2017    RBC 3.11 (L) 11/27/2017    HGB 9.6 (L) 11/27/2017    HCT 30.4 (L) 11/27/2017    MCV 98 11/27/2017    MCH 30.9 11/27/2017    MCHC 31.6 11/27/2017    RDW 12.8 11/27/2017     11/27/2017       INR/PTT:  Lab Results   Component Value Date    INR 1.43 (H) 11/24/2017    PTT 51 (H) 11/23/2017       DIAGNOSTIC STUDIES:   I personally reviewed the abdominal CT image(s) showing left paraaortic lesion with Dr. Segovia and Dr. Ramírez.    "

## 2017-11-27 NOTE — PROGRESS NOTES
CLINICAL NUTRITION SERVICES    Reason for Assessment:  Heart-healthy nutrition education, pt sp CABG earlier this admission    Diet History:  Pt reports no history of receiving heart-healthy nutrition education in the past. Pt is aware of many of the heart-healthy diet recommendations. States he eats lean meats. Does report liking ice cream.  Agreeable to nutrition education review with his family.    Nutrition Diagnosis:  Food- and nutrition-related knowledge deficit r/t no previous knowledge of heart-healthy diet AEB pt report of no previous formal heart-healthy nutrition education.    Nutrition Prescription/Recs:  Continue heart-healthy diet.      Interventions:  Nutrition Education (pt and family):    1.  Provided verbal instruction on a heart-healthy diet.  2.  Provided handouts: How to Read Nutrition Labels. Nutrition care manual handout on heart-healthy eating nutrition therapy.      Goals:    1.  Pt will verbalize at least four foods high in saturated or trans-fats and five foods high in sodium.    2.  Pt will list at least two interventions to make current meal plan more heart-healthy.     Follow-up:   Patient to ask any further nutrition-related questions before discharge.  In addition, pt may request outpatient RD appointment.    Katy Rico, MS, RD, LD, [facility]   6C Pgr:  [phone]

## 2017-11-27 NOTE — DISCHARGE SUMMARY
[facility], [place]   Cardiothoracic Surgery Hospital Discharge Summary       Zack Demarco MRN# [id number]   YOB: 1940 Age: 77 year old     Date of Admission:  11/22/2017  Date of Discharge::  11/27/2017  Admitting Physician:  Rolando Toro MD  Discharge Physician:  Rolando Toro MD  Primary Care Physician:        Anastacio Love          Admission Diagnoses:   Coronary artery disease  Hypertension  Retroperitoneal mass  Hypothyroidism  Dyslipidemia          Discharge Diagnosis:   Coronary artery disease - s/p CABG x 3  Hypertension  Retroperitoneal mass  Hypothyroidism  Dyslipidemia         Procedures:   11/22/2017: Dr. Rolando Toro  1.  Coronary bypass grafting x3 (left internal mammary to left anterior descending artery, saphenous vein graft to posterior descending artery, saphenous vein graft to first diagonal artery).   2.  Endoscopic vein harvest.         Non-operative procedures:   None performed          Consultations:   PHARMACY IP CONSULT  NUTRITION SERVICES ADULT IP CONSULT  CARDIAC REHAB IP CONSULT  PHYSICAL THERAPY ADULT IP CONSULT  OCCUPATIONAL THERAPY ADULT IP CONSULT  RESPIRATORY CARE IP CONSULT          Brief History of Illness:   Zack Demarco is a 77 year old male with a past medical history of coronary artery disease, hypertension, new retroperitoneal mass who presented with new chest pain. Coronary angiogram showed severe 2 vessel disease including proximal LAD. Patient underwent pre-operative CT scan which incidentally found a large retroperitoneal mass. Surgery was delayed while work-up for active tumor was ruled out. Lab tests were negative and patient underwent elective coronary artery bypass grafting x 3.           Hospital Course:   Zack Demarco is a 77 year old male who on 11/22/2017 underwent the above-named procedures.  Patient tolerated the operation well and was admitted to the CVICU post-operatively.  Patient was extubated on POD # 1.  Pressors  were weaned off as able. Patient's ICU stay was unremarkable. Patient was transferred to the surgical floor on POD # 2. Chest tubes were removed when drainage decreased and follow-up CXR was negative for any significant pneumothorax. TPW were removed when appropriate.    Patient continued to improve post-operatively. Patient was started on ASA, atorvastatin, metoprolol 12.5 mg BID. Patient remained hemodynamically stable. Patient can be restarted on home losartan as an outpatient once BP allows. Patient was diuresed with 20 mg IV lasix prn to his dry weight and will not be discharged on any lasix given normal EF and adequate UOP.     Patient does have a new retroperitoneal mass that was incidentally found during his last hospital stay. Biopsy was delayed until after his CABG. Patient will be scheduled for IR biopsy of the mass as an outpatient to make appropriate diagnosis. Follow-up with surgical oncology was arranged before discharge.     Patient was noted to have episodes of hypoxemia at night. Patient does not have a history of sleep apnea but was observed by nursing to be snoring when oxygen sats dropped into 80s at night. Patient euvolemic and likely has obstructive sleep apnea. Will have patient follow-up with PCP to have this chronic condition worked-up as an outpatient.     Post-operative pain control included IV dilaudid, PO oxycodone and tylenol and will be discharged on oxycodone and tylenol.     Prior to discharge, patient's pain was controlled well, he was able to perform most ADLs and ambulate without difficulty, and had full return of bowel and bladder function.  On November 27, 2017, he was Discharged to home in stable condition.      Day of Discharge Exam   Vitals:  Temp:  [97.3  F (36.3  C)-98.1  F (36.7  C)] 97.9  F (36.6  C)  Pulse:  [85] 85  Heart Rate:  [78-93] 80  Resp:  [16-18] 16  BP: ()/(60-63) 118/60  SpO2:  [93 %-99 %] 93 %  Wt: 212 lbs 6.4 oz, 96 kg   Physical Exam:   Gen: NAD,  conversational  CV: RRR, S1S2 normal, no murmurs, rubs, or gallops  Pulm: Lungs CTA, no rhonchi or wheezes  Abd: +BS, Soft, nondistended, NTTP  Ext: No lower extremity edema  Neuro: CN II-XII intact, no focal deficits  Incision: Clean, dry, intact, no erythema  Chest Tube sites: Dressings clean and dry    Labs:   BMP  Recent Labs  Lab 11/27/17  0714 11/26/17  0743 11/25/17  1231 11/24/17  0411 11/23/17  2127 11/23/17  1131  11/23/17  0358    141 143 144 146* 148*  --  151*   POTASSIUM 3.5 4.0 3.9 4.5 3.8 4.1  < > 3.5   CHLORIDE 107 108 108 109 110* 113*  --  110*   CO2 24 26 29 28 29 30  --  26   BUN 26 24 19 16 17 18  --  20   CR 0.91 0.98 0.95 0.97 0.96 0.99  --  1.09   * 104* 161* 121* 131* 98  --  206*   MAG 2.2 2.3  --  2.5* 1.8  --   --  2.3   PHOS  --   --   --  3.3 2.7 3.6  --  0.4*   < > = values in this interval not displayed.  CBC  Recent Labs  Lab 11/27/17  0714 11/26/17  0743 11/25/17  1231 11/24/17 0411   WBC 7.4 9.2 11.0 15.1*   RBC 3.11* 3.11* 2.93* 2.75*   HGB 9.6* 9.6* 9.3* 8.6*   HCT 30.4* 30.5* 28.7* 26.9*   MCV 98 98 98 98   MCH 30.9 30.9 31.7 31.3   MCHC 31.6 31.5 32.4 32.0   RDW 12.8 13.0 12.9 13.3    142* 124* 125*     INR  Recent Labs  Lab 11/24/17  0411 11/23/17  0150 11/22/17  2108 11/22/17  1930   INR 1.43* 1.43* 1.25* 2.71*      Hepatic Panel   Recent Labs  Lab 11/24/17  0411 11/22/17  2108   AST 23 28   ALT 14 23   ALKPHOS 34* 46   BILITOTAL 0.6 0.6   ALBUMIN 3.0* 3.3*            Imaging Studies:   CXR 11/27:  1. Small unchanged bilateral pleural effusions.  2. Stable left basilar opacities, likely representative of atelectasis  versus infection    CTA C/A/P 11/14:  1. Large retroperitoneal left para-aortic hypodense mass with numerous  prominent adjacent lymph nodes. The mass encases the scratches left  renal artery and retroaortic left renal vein vein which remain patent  however with mild narrowing of the renal vein; finding is highly  worrisome for malignancy.  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  2. A few scattered pulmonary nodules, for example 3 mm posterior right  lower lobe pulmonary nodule, recommend attention on follow-up to  ensure stability/resolution.  3. Small amount of fluid and fat stranding in the central mesentery  with adjacent prominent lymph nodes, likely reactive.       Final Pathology/Culture Result:   No pathology submitted      Drains/tubes Present at Discharge   None         Medications Prior to Admission:     Prescriptions Prior to Admission   Medication Sig Dispense Refill Last Dose     atorvastatin (LIPITOR) 40 MG tablet Take 1 tablet (40 mg) by mouth daily 60 tablet 11 11/21/2017 at 2100     levothyroxine (SYNTHROID/LEVOTHROID) 75 MCG tablet Take 1 tablet (75 mcg) by mouth daily 90 tablet 3 11/22/2017 at 0500     Cholecalciferol (VITAMIN D3 PO) Take by mouth daily   11/21/2017 at 0700     multivitamin (OCUVITE) TABS Take 1 tablet by mouth daily   11/21/2017 at 0700     latanoprost (XALATAN) 0.005 % ophthalmic solution Place 1 drop into both eyes At Bedtime 3 Bottle 4 11/21/2017 at 2100     aspirin 325 MG EC tablet 1/2 tab daily   11/21/2017 at 0700     [DISCONTINUED] metoprolol (LOPRESSOR) 25 MG tablet Take 0.25 tablets (6.25 mg) by mouth 2 times daily 60 tablet 0 11/22/2017 at 0700     nitroGLYcerin (NITROSTAT) 0.4 MG sublingual tablet For chest pain place 1 tablet under the tongue every 5 minutes for 3 doses. If symptoms persist 5 minutes after 1st dose call 911. 25 tablet 3 More than a month at Unknown time     [DISCONTINUED] losartan-hydrochlorothiazide (HYZAAR) 50-12.5 MG per tablet Take 1 tablet by mouth daily 90 tablet 3 11/21/2017 at 0700          Discharge Medications:        Review of your medicines      START taking       Dose / Directions    acetaminophen 325 MG tablet   Commonly known as:  TYLENOL   Used for:  S/P CABG (coronary artery bypass graft), Acute post-operative pain        Dose:  650 mg   Take 2 tablets (650 mg) by mouth every 4 hours as  needed for mild pain   Quantity:  100 tablet   Refills:  0       benzocaine-menthol 15-3.6 MG lozenge   Commonly known as:  CEPACOL   Used for:  S/P CABG (coronary artery bypass graft)        Dose:  1 lozenge   Place 1 lozenge inside cheek every hour as needed for sore throat   Quantity:  84 lozenge   Refills:  0       oxyCODONE IR 5 MG tablet   Commonly known as:  ROXICODONE   Used for:  S/P CABG (coronary artery bypass graft), Acute post-operative pain        Dose:  5-10 mg   Take 1-2 tablets (5-10 mg) by mouth every 4 hours as needed for moderate to severe pain   Quantity:  80 tablet   Refills:  0       pantoprazole 40 MG EC tablet   Commonly known as:  PROTONIX   Used for:  S/P CABG (coronary artery bypass graft)        Dose:  40 mg   Start taking on:  11/28/2017   Take 1 tablet (40 mg) by mouth every morning   Quantity:  30 tablet   Refills:  0       senna-docusate 8.6-50 MG per tablet   Commonly known as:  SENOKOT-S;PERICOLACE   Used for:  S/P CABG (coronary artery bypass graft)        Dose:  1-2 tablet   Take 1-2 tablets by mouth 2 times daily   Quantity:  100 tablet   Refills:  0         CONTINUE these medicines which may have CHANGED, or have new prescriptions. If we are uncertain of the size of tablets/capsules you have at home, strength may be listed as something that might have changed.       Dose / Directions    metoprolol 25 MG tablet   Commonly known as:  LOPRESSOR   This may have changed:  how much to take   Used for:  S/P CABG (coronary artery bypass graft), Acute post-operative pain        Dose:  12.5 mg   Take 0.5 tablets (12.5 mg) by mouth 2 times daily   Quantity:  60 tablet   Refills:  0         CONTINUE these medicines which have NOT CHANGED       Dose / Directions    aspirin 325 MG EC tablet        1/2 tab daily   Refills:  0       atorvastatin 40 MG tablet   Commonly known as:  LIPITOR   Used for:  Hyperlipidemia LDL goal <100        Dose:  40 mg   Take 1 tablet (40 mg) by mouth daily    Quantity:  60 tablet   Refills:  11       latanoprost 0.005 % ophthalmic solution   Commonly known as:  XALATAN   Used for:  Borderline glaucoma with ocular hypertension, unspecified laterality        Dose:  1 drop   Place 1 drop into both eyes At Bedtime   Quantity:  3 Bottle   Refills:  4       levothyroxine 75 MCG tablet   Commonly known as:  SYNTHROID/LEVOTHROID   Used for:  Hypothyroidism, unspecified type        Dose:  75 mcg   Take 1 tablet (75 mcg) by mouth daily   Quantity:  90 tablet   Refills:  3       multivitamin Tabs tablet        Dose:  1 tablet   Take 1 tablet by mouth daily   Refills:  0       nitroGLYcerin 0.4 MG sublingual tablet   Commonly known as:  NITROSTAT   Used for:  Exertional chest pain        For chest pain place 1 tablet under the tongue every 5 minutes for 3 doses. If symptoms persist 5 minutes after 1st dose call 911.   Quantity:  25 tablet   Refills:  3       VITAMIN D3 PO        Take by mouth daily   Refills:  0         STOP taking          losartan-hydrochlorothiazide 50-12.5 MG per tablet   Commonly known as:  HYZAAR                Where to get your medicines      These medications were sent to [facility] - [place], [place] - [address] [address]     Phone:  [phone]      acetaminophen 325 MG tablet     benzocaine-menthol 15-3.6 MG lozenge     metoprolol 25 MG tablet     pantoprazole 40 MG EC tablet     senna-docusate 8.6-50 MG per tablet         Some of these will need a paper prescription and others can be bought over the counter. Ask your nurse if you have questions.     Bring a paper prescription for each of these medications      oxyCODONE IR 5 MG tablet                  Discharge Instructions and Follow-Up:   Avoid lifting anything greater than ten pounds for 6 weeks after surgery and then less than 20 pounds for an additional 6 weeks.    No driving for 4 weeks after surgery or while on pain medication. If you  had a minimally invasive procedure, you may drive after three weeks if not taking pain medication.      Avoid strenuous activities such as bowling, vacuuming, raking, shoveling, golf or tennis for 12 weeks after your surgery. It is okay to resume sex if you feel comfortable in doing so. You may have to try different positions with your partner.     Splint your chest incision by hugging a pillow or bringing your arms across your chest when coughing or sneezing. Avoid pushing off with your arms when getting up for the first month if you have had your sternum opened.     Shower or wash your incisions daily with soap and water (or as instructed), pat dry. Watch for signs of infection: increased redness, tenderness, warmth or any drainage that appears infected (pus like) or is persistent.  Also a temperature > 100.5 F or chills. Call your surgeon or primary care provider's office immediately. Remove any skin glue left on incisions after 10 days. This will not affect your incision and can speed up healing.    Exercise is very important in your recovery. Please follow the guidelines set up for you in your cardiac rehab classes at the hospital. If outpatient cardiac rehab was ordered for you, we highly recommend you participate. If you have problems arranging your cardiac rehab, please call [phone].     Avoid sitting for prolonged periods of time, try to walk every hour during the day. If you have a leg incision, elevate your leg often when you are not walking.    Check your weight when you get home from the hospital and continue to check it daily through your recovery for at least a month. If you notice a weight gain of 2-3 pounds in a week, notify your primary care physician, cardiologist or surgeon.    Bowel activity may be slow after surgery. If necessary, you may take an over the counter laxative such as Milk of Magnesia or Miralax. You may have stool softeners prescribed (docusate sodium, Senokot). We recommend  using stool softeners while using narcotics for pain (oxycodone/percocet, hydrocodone/vicodin).      DENTAL VISITS AFTER SURGERY  If you have had your heart valve repaired or replaced, we do not recommend having any dental work done for 6 months and you will need to take an antibiotic prior to dental visits from now on.  Please notify your dentist before any procedure for the proper treatment needed. The antibiotic is taken by mouth one hour prior to visit. This includes routine cleanings.    DO NOT SMOKE.  IF YOU NEED HELP QUITTING, PLEASE TALK WITH YOUR CARDIOLOGIST OR PRIMARY DOCTOR.    If you are on a blood thinner, follow the instructions you were given in the hospital and DO NOT SKIP this medication. Try and take it the same time everyday. Your primary care physician or coumadin clinic will manage the dosing.     REGARDING PRESCRIPTION REFILLS.  If you need a refill on your pain medication contact us.  All other medications will be adjusted, discontinued and re-filled by your primary care physician and/or your cardiologist as they were prior to your surgery. We have given you enough for one to three month with possibly one refill.    POST-OPERATIVE CLINIC VISITS  You have a follow up visit with [facility] Surgery Advance Care Practitioners in 2 weeks at the [facility].  You will then return to the care of your primary physician and your cardiologist.   It is important to call for an appointment to see your cardiologist in 4-6 weeks after surgery and your primary care physician in 2-4 weeks after surgery. If there is a need to return to see CT Surgery please call our  at [phone].     SURGICAL QUESTIONS  Please call Aylin Rocha with any surgical questions, her phone number is listed below.  She can assist you with your needs and contact other surgery care team members as indicated.    For general questions or concerns, please call the Cardiothoracic Surgery Department at  [phone] 8-4:30 M-F.   On weekends or after hours, please call [phone] and ask the  to   page the Cardiothoracic Surgery fellow on call.        Home Health Care:   Not needed           Discharge Disposition:   Discharged to home      Condition at discharge: Stable    Kong Lopez PA-C            CC:Anastacio Dean

## 2017-11-28 ENCOUNTER — TELEPHONE (OUTPATIENT)
Dept: FAMILY MEDICINE | Facility: CLINIC | Age: 77
End: 2017-11-28

## 2017-11-28 ENCOUNTER — CARE COORDINATION (OUTPATIENT)
Dept: CARE COORDINATION | Facility: CLINIC | Age: 77
End: 2017-11-28

## 2017-11-28 NOTE — PROGRESS NOTES
D: Admit 11/22 for CABG x3.   I/A: VSS; on RA during daytime. Potassium replaced per protocol. SR BBB. Old CT dressings changed; draining moderate amounts; patient education given on incision care at home. Low sat fat Na. Tylenol given x1. Up with SBA. Voiding good amounts.     DISCHARGE   Discharged to: Home  Via: Automobile  Accompanied by: Family  Discharge Instructions: diet, activity, medications, follow up appointments, when to call the MD, and what to watchout for (i.e. s/s of infection, increasing SOB, palpitations, chest pain,)  Prescriptions: To be filled by discharge pharmacy per pt's request; medication list reviewed & sent with pt  Follow Up Appointments: arranged; information given  Belongings: All sent with pt  IV: out  Telemetry: off  Pt exhibits understanding of above discharge instructions; all questions answered.  Discharge Paperwork to cardiac rehabilitation facility faxed.   Patient discharged and left floor at 1450.   Faustina Hughes RN

## 2017-11-29 ENCOUNTER — CARE COORDINATION (OUTPATIENT)
Dept: CARE COORDINATION | Facility: CLINIC | Age: 77
End: 2017-11-29

## 2017-11-29 NOTE — PROGRESS NOTES
Patient was called three times and no answer so post 24 hr DC follow up calls will be closed out, message was left with contact number for department seen by or following up with

## 2017-11-29 NOTE — PROGRESS NOTES
Clinic Care Coordination Contact  [facility]/Voicemail    Referral Source: [facility]TS         Procedures:   11/22/2017: Dr. Rolando Toro  1.  Coronary bypass grafting x3 (left internal mammary to left anterior descending artery, saphenous vein graft to posterior descending artery, saphenous vein graft to first diagonal artery).   2.  Endoscopic vein harvest.   Discharge 11/27  Clinical Data: Care Coordinator Outreach  Outreach attempted x 1.  Left message on voicemail with call back information and requested return call.  Plan:  Care Coordinator will try to reach patient again in 1-2 business days.    Sriram Renteria RN  Clinic Care Coordinator  [facility]  [phone]

## 2017-11-30 ENCOUNTER — CARE COORDINATION (OUTPATIENT)
Dept: CARE COORDINATION | Facility: CLINIC | Age: 77
End: 2017-11-30

## 2017-11-30 NOTE — PROGRESS NOTES
Clinic Care Coordination Contact  [facility]/Voicemail    Referral Source: [facility]  Clinical Data: Care Coordinator Outreach  Outreach attempted x 1.  Left message on voicemail with call back information and requested return call.  Plan: Care Coordinator will mail out care coordination introduction letter with care coordinator contact information and explanation of care coordination services. Care Coordinator will await reply to phone message or letter. If no reply will outreach in 3-4 weeks.    Future Appointments      Provider Department Center   12/6/2017 8:30 AM Anastacio Love MD [facility] CLIN   12/8/2017 2:00 PM (Arrive by 1:45 PM) [facility]   12/12/2017 11:00 AM U2A ROOM 6 [facility]   12/12/2017 12:30 PM [facility]2 [facility] O   12/26/2017 3:00 PM (Arrive by 2:45 PM) Laquita Carbajal MD [facility]   1/17/2018 8:00 AM [facility]       Sriram Renteria RN  Clinic Care Coordinator  [facility]  [phone]

## 2017-12-01 ENCOUNTER — CARE COORDINATION (OUTPATIENT)
Dept: CARDIOLOGY | Facility: CLINIC | Age: 77
End: 2017-12-01

## 2017-12-01 NOTE — PROGRESS NOTES
Tell me how you have been doing since you were discharged from the hospital? Doing great, up walking around the house and out on the deck.  Patient is staying at his [facility] home.     ACTIVITY  How is your activity tolerance? Gets a little shortness of breath when climbing the stares, there are 16 steps in his [facility] home.     POST OP MONITORING  How is your pain on a 0-10 scale, how are you managing your pain? No pain in chest or incision, mostly back pain and left side pain, patient is using tylenol only and the pain subsides when he lies down.    Are you still doing sternal precautions? Yes    Do you hear any clicking when you are moving or taking a deep breath?  No    Are you weighing yourself daily?  Was until the scale broke this am.  Patient weight is ~ 214.5, his base line is 212.5, patient denies shortness of breath and swelling.           SIGNS AND SYMPTOMS OF INFECTION  1. INCREASE IN PAIN no  2. FEVER no  3. DRAINAGE no    If Yes, color:                 5. REDNESS no    6. SWELLING no    ASSISTANCE  Do you have someone at home to assist you with your daily activities?  Patient's daughter.       MEDICATIONS  Is someone helping you to set up your medications?  Pt is independent.   Do you have any questions about your medications?  No.      Are you on a blood thinner?  No   Who is managing your INRs?  n/a     FOLLOW UP  Are you scheduled for cardiac rehab? Begins on 12/12 at [facility]      You are scheduled to see our surgery advanced practice provider for post operative follow up on 12/08   You are scheduled to see your cardiologist on.  Patient to call and schedule an appointment.   You are scheduled to see your primary care physician  on 12/06.       CONTACT INFORMATION  Please feel free to call us with any other questions or symptoms that are concerning for you at [phone], if it is after 4:30 in the afternoon, or a weekend please call [phone] and ask for the on call specialist.  We want to do  everything we can to help prevent you needing to return to the ED, so please do not hesitate to call us.

## 2017-12-03 ASSESSMENT — ENCOUNTER SYMPTOMS
COUGH DISTURBING SLEEP: 0
INCREASED ENERGY: 0
ALTERED TEMPERATURE REGULATION: 0
EXERCISE INTOLERANCE: 1
FEVER: 0
WHEEZING: 0
SPUTUM PRODUCTION: 0
LEG PAIN: 0
HYPOTENSION: 0
FATIGUE: 1
ORTHOPNEA: 0
POSTURAL DYSPNEA: 0
DYSPNEA ON EXERTION: 0
DECREASED APPETITE: 0
CHILLS: 0
SNORES LOUDLY: 0
HYPERTENSION: 0
COUGH: 0
SYNCOPE: 0
PALPITATIONS: 0
POLYDIPSIA: 0
HALLUCINATIONS: 0
WEIGHT GAIN: 0
POLYPHAGIA: 0
SHORTNESS OF BREATH: 0
WEIGHT LOSS: 0
HEMOPTYSIS: 0
NIGHT SWEATS: 0

## 2017-12-05 ENCOUNTER — MEDICAL CORRESPONDENCE (OUTPATIENT)
Dept: HEALTH INFORMATION MANAGEMENT | Facility: CLINIC | Age: 77
End: 2017-12-05

## 2017-12-06 ENCOUNTER — OFFICE VISIT (OUTPATIENT)
Dept: FAMILY MEDICINE | Facility: CLINIC | Age: 77
End: 2017-12-06
Payer: COMMERCIAL

## 2017-12-06 VITALS
SYSTOLIC BLOOD PRESSURE: 136 MMHG | DIASTOLIC BLOOD PRESSURE: 60 MMHG | TEMPERATURE: 97.8 F | WEIGHT: 210 LBS | HEART RATE: 80 BPM | BODY MASS INDEX: 30.13 KG/M2 | OXYGEN SATURATION: 97 %

## 2017-12-06 DIAGNOSIS — I10 HYPERTENSION GOAL BP (BLOOD PRESSURE) < 150/90: ICD-10-CM

## 2017-12-06 DIAGNOSIS — I25.810 CORONARY ARTERY DISEASE INVOLVING AUTOLOGOUS VEIN CORONARY BYPASS GRAFT WITHOUT ANGINA PECTORIS: Primary | ICD-10-CM

## 2017-12-06 LAB
ANION GAP SERPL CALCULATED.3IONS-SCNC: 8 MMOL/L (ref 3–14)
BASOPHILS # BLD AUTO: 0 10E9/L (ref 0–0.2)
BASOPHILS NFR BLD AUTO: 0.3 %
BUN SERPL-MCNC: 23 MG/DL (ref 7–30)
CALCIUM SERPL-MCNC: 9.5 MG/DL (ref 8.5–10.1)
CHLORIDE SERPL-SCNC: 104 MMOL/L (ref 94–109)
CO2 SERPL-SCNC: 28 MMOL/L (ref 20–32)
CREAT SERPL-MCNC: 1 MG/DL (ref 0.66–1.25)
DIFFERENTIAL METHOD BLD: ABNORMAL
EOSINOPHIL # BLD AUTO: 0.2 10E9/L (ref 0–0.7)
EOSINOPHIL NFR BLD AUTO: 2.7 %
ERYTHROCYTE [DISTWIDTH] IN BLOOD BY AUTOMATED COUNT: 13 % (ref 10–15)
GFR SERPL CREATININE-BSD FRML MDRD: 73 ML/MIN/1.7M2
GLUCOSE SERPL-MCNC: 107 MG/DL (ref 70–99)
HCT VFR BLD AUTO: 36 % (ref 40–53)
HGB BLD-MCNC: 11.4 G/DL (ref 13.3–17.7)
LYMPHOCYTES # BLD AUTO: 1.5 10E9/L (ref 0.8–5.3)
LYMPHOCYTES NFR BLD AUTO: 16.2 %
MCH RBC QN AUTO: 31.1 PG (ref 26.5–33)
MCHC RBC AUTO-ENTMCNC: 31.7 G/DL (ref 31.5–36.5)
MCV RBC AUTO: 98 FL (ref 78–100)
MONOCYTES # BLD AUTO: 0.5 10E9/L (ref 0–1.3)
MONOCYTES NFR BLD AUTO: 5.1 %
NEUTROPHILS # BLD AUTO: 6.8 10E9/L (ref 1.6–8.3)
NEUTROPHILS NFR BLD AUTO: 75.7 %
PLATELET # BLD AUTO: 534 10E9/L (ref 150–450)
POTASSIUM SERPL-SCNC: 4.8 MMOL/L (ref 3.4–5.3)
RBC # BLD AUTO: 3.67 10E12/L (ref 4.4–5.9)
SODIUM SERPL-SCNC: 140 MMOL/L (ref 133–144)
WBC # BLD AUTO: 9 10E9/L (ref 4–11)

## 2017-12-06 PROCEDURE — 99214 OFFICE O/P EST MOD 30 MIN: CPT | Performed by: FAMILY MEDICINE

## 2017-12-06 PROCEDURE — 36415 COLL VENOUS BLD VENIPUNCTURE: CPT | Performed by: FAMILY MEDICINE

## 2017-12-06 PROCEDURE — 85025 COMPLETE CBC W/AUTO DIFF WBC: CPT | Performed by: FAMILY MEDICINE

## 2017-12-06 PROCEDURE — 80048 BASIC METABOLIC PNL TOTAL CA: CPT | Performed by: FAMILY MEDICINE

## 2017-12-06 NOTE — NURSING NOTE
"Chief Complaint   Patient presents with     Hospital F/U       Initial /65 (Cuff Size: Adult Large)  Pulse 80  Temp 97.8  F (36.6  C) (Oral)  Wt 210 lb (95.3 kg)  SpO2 97%  BMI 30.13 kg/m2 Estimated body mass index is 30.13 kg/(m^2) as calculated from the following:    Height as of 11/22/17: 5' 10\" (1.778 m).    Weight as of this encounter: 210 lb (95.3 kg).  Medication Reconciliation: complete    Vibha Smyth LPN    "

## 2017-12-06 NOTE — MR AVS SNAPSHOT
After Visit Summary   12/6/2017    Zack Demarco    MRN: [id number]           Patient Information     Date Of Birth          1940        Visit Information        Provider Department      12/6/2017 8:30 AM Anastacio Love MD [facility]        Today's Diagnoses     Coronary artery disease involving autologous vein coronary bypass graft without angina pectoris    -  1    Hypertension goal BP (blood pressure) < 150/90          Care Instructions    1. Keep your upcoming appointment with cardiology and your biopsy.    2. I will contact you via My Chart about your test results.    3. If all is well, see you back in 6 months - sooner if needed.          Follow-ups after your visit        Your next 10 appointments already scheduled     Dec 08, 2017  2:00 PM CST   (Arrive by 1:45 PM)   Return Visit with [facility] ([facility])    [address]  [address] [facility] [address] [phone]            Dec 12, 2017 11:00 AM CST   Procedure 4.5 hour with U2A ROOM 6   [address] [facility] ([facility])    [address] [address]               Dec 12, 2017 12:30 PM CST   CT ABDOMEN RETROPERITONEAL BIOPSY with UUCT2   [facility] [place], [place] ([facility])    [address] [facility] [address] [phone]           Plan for an adult to drive you home and stay with you until morning.  Tell your doctor in advance:   If you have any allergies.   If you are breastfeeding or there s any chance you are pregnant.  Please bring any scans or X-rays taken at other hospitals, if they may be helpful. Also bring a list of your medicines, including vitamins, minerals and over-the-counter drugs. It is safest to leave valuables at home.  If you take blood thinners, you may need to stop taking them a few days before treatment. Talk to your doctor  before stopping these medicines. You will need a blood test the morning of your exam.   Stop taking Coumadin (warfarin) 5 days before treatment. Restart the day after treatment.   If you take aspirin, you may need to stop taking it 3 days before treatment.   If you take Plavix, Ticlid, Pletal or Persantine, you may need to stop taking them 5 days before your scan.  If you have diabetes:   If you take insulin, call your diabetes care team. Ask if you should adjust your insulin before this test.   If your kidney function is normal, continue taking your metformin (Avandamet, Glucophage, Glucovance, Metaglip) on the day of your exam.   If your kidney function is abnormal, wait 48 hours before restarting this medicine.  If you have any questions, please call the imaging department where you will have your exam.  The day before your exam: Drink extra fluids at least six 8-ounce glasses (unless your doctor tells you to restrict fluids). The day of your exam: No eating or drinking for 4 hours before your test. You may take medicine with small sips of water.            Dec 26, 2017  3:00 PM CST   (Arrive by 2:45 PM)   Return Visit with Laquita Carbajal MD   [facility] ([facility])    [address]  [address] [facility] [address] [phone]            Jan 17, 2018  8:00 AM CST   New Visit with Avtar Mccullough MD   [facility] ([facility])    [address] [place] [id number] [phone]              Who to contact     If you have questions or need follow up information about today's clinic visit or your schedule please contact [facility] directly at [phone].  Normal or non-critical lab and imaging results will be communicated to you by MyChart, letter or phone within 4 business days after the clinic has received the results. If you do not hear from us within 7 days, please contact the clinic through FIZZA  or phone. If you have a critical or abnormal lab result, we will notify you by phone as soon as possible.  Submit refill requests through [employer] or call your pharmacy and they will forward the refill request to us. Please allow 3 business days for your refill to be completed.          Additional Information About Your Visit        [employer] Information     [employer] gives you secure access to your electronic health record. If you see a primary care provider, you can also send messages to your care team and make appointments. If you have questions, please call your primary care clinic.  If you do not have a primary care provider, please call [phone] and they will assist you.        Care EveryWhere ID     This is your Care EveryWhere ID. This could be used by other organizations to access your [facility] medical records  [id number]        Your Vitals Were     Pulse Temperature Pulse Oximetry BMI (Body Mass Index)          80 97.8  F (36.6  C) (Oral) 97% 30.13 kg/m2         Blood Pressure from Last 3 Encounters:   12/06/17 136/60   11/27/17 133/64   11/20/17 128/73    Weight from Last 3 Encounters:   12/06/17 210 lb (95.3 kg)   11/27/17 212 lb 6.4 oz (96.3 kg)   11/20/17 217 lb 6 oz (98.6 kg)              We Performed the Following     Basic metabolic panel     CBC with platelets differential        Primary Care Provider Office Phone # Fax #    Anastacio Love [phone] [address] [facility] [address]        Equal Access to Services     HALEY BURR : Hadii aad ku hadasho Soomaali, waaxda luqadaha, qaybta kaalmada adeegyada, marilynn dasilva . So [facility] [phone].    ATENCIÓN: Si habla español, tiene a warner disposición servicios gratuitos de asistencia lingüística. Llame al [phone].    We comply with applicable federal civil rights laws and [place] laws. We do not discriminate on the basis of race, color, national origin, age, disability, sex, sexual  orientation, or gender identity.            Thank you!     Thank you for choosing [facility]  for your care. Our goal is always to provide you with excellent care. Hearing back from our patients is one way we can continue to improve our services. Please take a few minutes to complete the written survey that you may receive in the mail after your visit with us. Thank you!             Your Updated Medication List - Protect others around you: Learn how to safely use, store and throw away your medicines at www.disposemymeds.org.          This list is accurate as of: 12/6/17  9:12 AM.  Always use your most recent med list.                   Brand Name Dispense Instructions for use Diagnosis    acetaminophen 325 MG tablet    TYLENOL    100 tablet    Take 2 tablets (650 mg) by mouth every 4 hours as needed for mild pain    S/P CABG (coronary artery bypass graft), Acute post-operative pain       aspirin 325 MG EC tablet      1/2 tab daily        atorvastatin 40 MG tablet    LIPITOR    60 tablet    Take 1 tablet (40 mg) by mouth daily    Hyperlipidemia LDL goal <100       latanoprost 0.005 % ophthalmic solution    XALATAN    3 Bottle    Place 1 drop into both eyes At Bedtime    Borderline glaucoma with ocular hypertension, unspecified laterality       levothyroxine 75 MCG tablet    SYNTHROID/LEVOTHROID    90 tablet    Take 1 tablet (75 mcg) by mouth daily    Hypothyroidism, unspecified type       metoprolol 25 MG tablet    LOPRESSOR    60 tablet    Take 0.5 tablets (12.5 mg) by mouth 2 times daily    S/P CABG (coronary artery bypass graft), Acute post-operative pain       multivitamin Tabs tablet      Take 1 tablet by mouth daily        nitroGLYcerin 0.4 MG sublingual tablet    NITROSTAT    25 tablet    For chest pain place 1 tablet under the tongue every 5 minutes for 3 doses. If symptoms persist 5 minutes after 1st dose call 911.    Exertional chest pain       pantoprazole 40 MG EC tablet    PROTONIX    30  tablet    Take 1 tablet (40 mg) by mouth every morning    S/P CABG (coronary artery bypass graft)       VITAMIN D3 PO      Take by mouth daily

## 2017-12-06 NOTE — PATIENT INSTRUCTIONS
1. Keep your upcoming appointment with cardiology and your biopsy.    2. I will contact you via My Chart about your test results.    3. If all is well, see you back in 6 months - sooner if needed.

## 2017-12-06 NOTE — PROGRESS NOTES
HPI:    Zack is a 77 year old male here to discuss chest pain:    CAD/HTN - chest pain started around Oct 2017. Currently he has chest and rib soreness related to his surgery. Otherwise he denies angina, shortness of breath. 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 Not checking BP lately.  Evaluation and treatment:    Was Hospitalized 11/22 to 11/27/17 - received CABG x 3.   Metoprolol 12.5 mg bid - dose limited by heart rate.   ASA qd   NTG prn but not using lately.   Losartan/HCTZ 50/12.5 qd stopped in the Hospital because it is not needed.   He has not started cardiac rehab yet. I asked him to exercise at home in the meantime.   Check BMP and CBC today.   Continue same tx.   He has upcoming appointment with cardiology.       Last Basic Metabolic Panel:  Lab Results   Component Value Date     07/20/2017      Lab Results   Component Value Date    POTASSIUM 4.2 07/20/2017     Lab Results   Component Value Date    CHLORIDE 108 07/20/2017     Lab Results   Component Value Date    ELVIRA 9.1 07/20/2017     Lab Results   Component Value Date    CO2 28 07/20/2017     Lab Results   Component Value Date    BUN 29 07/20/2017     Lab Results   Component Value Date    CR 1.10 07/20/2017     Lab Results   Component Value Date     07/20/2017       CBC RESULTS:   Recent Labs   Lab Test  08/12/15   0804   WBC  6.8   RBC  4.90   HGB  15.6   HCT  46.5   MCV  95   MCH  31.8   MCHC  33.5   RDW  12.4   PLT  198       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 No symptoms.  Evaluation and treatment:    He has upcoming bx scheduled.    Dyslipidemia - has h/o CAD.  Evaluation and treatment:    Per ATP4, moderate to high intensity statin recommended.:   Crestor stopped due to cost   Simvastatin changed to Lipitor 40 mg qd in the Hospital - no side effects.   Continue same treatment.  The 10-year ASCVD risk score (Tatum LEONARDO Jr, et al., 2013) is: 34.3%    Values used to calculate the score:       Age: 77 years      Sex: Male      Is Non- : No      Diabetic: No      Tobacco smoker: No      Systolic Blood Pressure: 136 mmHg      Is BP treated: Yes      HDL Cholesterol: 32 mg/dL      Total Cholesterol: 148 mg/dL      Recent Labs   Lab Test  07/20/17   0725  06/09/16   0742   01/22/15   0816  01/14/14   0747   CHOL  148  191   < >  154  154   HDL  32*  33*   < >  43  33*   LDL  76  127*   < >  92  103   TRIG  201*  155*   < >  93  87   CHOLHDLRATIO   --    --    --   3.6  4.7    < > = values in this interval not displayed.     Hypothyroidism - No thyroid symptoms.  Evaluation and treatment:    Synthroid 75 mcg qd and recheck at a later date.      TSH   Date Value Ref Range Status   11/14/2017 6.20 (H) 0.40 - 4.00 mU/L Final     T4 Free   Date Value Ref Range Status   07/20/2017 0.84 0.76 - 1.46 ng/dL Final     Obesity - diet and exercise discussed.     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 Asymptomatic. Advised wearing compression stockings.    Previous surgeries - left rotator cuff surgery. Had anterior tibialis repair (for foot drop) on 8/18/15 - good results. CABG as above.    Hearing loss - he wants to hold off on hearing aides referral.     Preventive:    Immunization History   Administered Date(s) Administered     Influenza (High Dose) 3 valent vaccine 10/15/2015, 10/20/2016, 09/25/2017     Influenza (IIV3) PF 10/25/2012, 10/01/2013, 11/24/2014     Pneumococcal (PCV 13) 01/22/2016     Pneumococcal 23 valent 11/30/2006     TD (ADULT, 7+) 08/25/2010     TDAP Vaccine (Boostrix) 01/21/2013     Zoster vaccine, live 07/15/2008     Colonoscopy: 9/19/16 - advised to redo in 5 years.     Prostate CA screen: discussed controversy. Prostate mildly enlarged on 7/15/14.     PSA   Date Value  Ref Range Status   06/09/2016 2.76 0 - 4 ug/L Final       AAA: 7/18/14 negative    Advanced Directive: referred previously    Vit D Geriatrics Society Guidelines: Older adults should consume 4000 IU of Vit D daily from all sources. This will achieve serum level of 30. No need to test unless obese, malabsorption etc. You get only 100 units per glass of milk. 2000 units advised.    ROS:    Const: No fevers, weight changes or night sweats recently.  ENT: No runny nose, sore throat or ear pain.  Resp: No cough.  CV: No dizziness or cardiac palpitations.  GI: No nausea, vomiting, diarrhea or constipation. Denies blood in stools or black stools.  : No dysuria, frequency or hematuria.    SH:    Marital status: , lives by himself in [place].  Kids: 2  Employment: Works at a machine shop.  Exercise: Walks a lot at work. Had been running 4 miles per day 5 times per week but not lately.  Tobacco: Quit smoking around 1980. Has 14 pack year history of smoking.  Etoh: rarely  Recreational drugs: denies  Caffeine: 2 per day    Exam:    /60  Pulse 80  Temp 97.8  F (36.6  C) (Oral)  Wt 210 lb (95.3 kg)  SpO2 97%  BMI 30.13 kg/m2    Gen: Healthy appearing male in no acute distress. Here with daughter for part of today's visit.  Neck: No enlarged lymph nodes, thyromegally or other masses.  Lungs: Good air movement and otherwise clear.  CV: Heart RRR with no murmurs. No JVD, carotid bruits or leg edema. Varicose veins on thighs and legs.    Assessment and Plan - Decision Making    1. Coronary artery disease involving autologous vein coronary bypass graft without angina pectoris  Per HPI  - CBC with platelets differential  - Basic metabolic panel    2. Hypertension goal BP (blood pressure) < 150/90  Per HPI  - CBC with platelets differential  - Basic metabolic panel      Written instructions given as follows:    Patient Instructions   1. Keep your upcoming appointment with cardiology and your biopsy.    2. I will  contact you via My Chart about your test results.    3. If all is well, see you back in 6 months - sooner if needed.

## 2017-12-07 NOTE — PROGRESS NOTES
Josh Dixon,    The glucose was 107 which is in the prediabetes range but this is not worrisome since it was a non-fasting sample.    Your hemoglobin is improving compared to previous. It will normalize with time.    All other labs were fine.    Regards,    Anastacio Love M.D.

## 2017-12-08 ENCOUNTER — OFFICE VISIT (OUTPATIENT)
Dept: CARDIOLOGY | Facility: CLINIC | Age: 77
End: 2017-12-08
Attending: SURGERY
Payer: MEDICARE

## 2017-12-08 ENCOUNTER — TELEPHONE (OUTPATIENT)
Dept: INTERVENTIONAL RADIOLOGY/VASCULAR | Facility: CLINIC | Age: 77
End: 2017-12-08

## 2017-12-08 VITALS
OXYGEN SATURATION: 98 % | HEIGHT: 70 IN | BODY MASS INDEX: 30.18 KG/M2 | WEIGHT: 210.8 LBS | DIASTOLIC BLOOD PRESSURE: 74 MMHG | HEART RATE: 77 BPM | SYSTOLIC BLOOD PRESSURE: 134 MMHG

## 2017-12-08 DIAGNOSIS — Z95.1 S/P CABG (CORONARY ARTERY BYPASS GRAFT): Primary | ICD-10-CM

## 2017-12-08 PROCEDURE — 99212 OFFICE O/P EST SF 10 MIN: CPT | Mod: ZF

## 2017-12-08 ASSESSMENT — PAIN SCALES - GENERAL: PAINLEVEL: NO PAIN (0)

## 2017-12-08 NOTE — NURSING NOTE
Chief Complaint   Patient presents with     Follow Up For     S/P CAB x 3     Vitals were taken and medications were reconciled.     Blanca Delgadillo,DIANE  2:00 PM

## 2017-12-08 NOTE — PROGRESS NOTES
CARDIOTHORACIC SURGERY FOLLOW-UP VISIT     Zack Demarco   1940   [id number]      Reason for visit: Post-Op CABG x 3 with Dr. Rolando Toro on 11/22/2017    HPI: Zack Demarco is a 77 year old year old male seen in clinic for a routine follow-up appointment after surgery. Patient has past medical history of HTN, Hypothyroid and retroperitoneal mass. Hospital course was unremarkable. Discharged POD #5.   Possible BRITTNEY.      Patient has been doing well since discharge and now returns to clinic for postop visit.    Patient endorses left sided rib pain, some improvement since discharge, tylenol helps a bit. Doesn't limit activities.      Patient reports incision is healing well.  Patient denies any fever, chills, chest pain, palpitations, edema, SOB, lightheadedness and nausea.    Normal bowel movements.  Voiding without problems.    Has not been attending cardiac rehabilitation and that is going to start 12/13.    Patient is not on Coumadin.  Weight stable.  Lost a little weight since surgery (4 lbs).       PAST MEDICAL HISTORY:  Past Medical History:   Diagnosis Date     Coronary artery disease 11/22/2017     DJD (degenerative joint disease) of hip     right     Glaucoma      Hernia umbilical      Hypercholesterolemia      Hypertension      Hypothyroidism      Lipoma      Low back pain      Nonsenile cataract      Obesity      Unstable angina (H) 11/13/2017       PAST SURGICAL HISTORY:  Past Surgical History:   Procedure Laterality Date     BIOPSY  1980's    fatty tissue     BYPASS GRAFT ARTERY CORONARY N/A 11/22/2017    Procedure: BYPASS GRAFT ARTERY CORONARY;  Median Sternotomy, Coronary Artery Bypass Graft x3, Using Left Internal Mammary and  Endoscopic  [place] of Right Greater Saphenous Vein, On Cardiopulmonary Bypass, Transesophageal Echocardiogram;  Surgeon: Rolando Toro MD;  Location:  OR     [facility] SURGICAL PATHOLOGY       COLONOSCOPY  2011     COLONOSCOPY WITH CO2 INSUFFLATION N/A 9/19/2016    Procedure:  COLONOSCOPY WITH CO2 INSUFFLATION;  Surgeon: Jeffery Flores MD;  Location: [facility] OR     ROTATOR CUFF REPAIR RT/LT       SOFT TISSUE SURGERY  Sept. 2014    [facility] Tendon transfer (Left Ankle)       CURRENT MEDICATIONS:   Current Outpatient Prescriptions   Medication     acetaminophen (TYLENOL) 325 MG tablet     metoprolol (LOPRESSOR) 25 MG tablet     pantoprazole (PROTONIX) 40 MG EC tablet     atorvastatin (LIPITOR) 40 MG tablet     nitroGLYcerin (NITROSTAT) 0.4 MG sublingual tablet     levothyroxine (SYNTHROID/LEVOTHROID) 75 MCG tablet     Cholecalciferol (VITAMIN D3 PO)     multivitamin (OCUVITE) TABS     latanoprost (XALATAN) 0.005 % ophthalmic solution     aspirin 325 MG EC tablet     No current facility-administered medications for this visit.        ALLERGIES:      Allergies   Allergen Reactions     Nkda [No Known Drug Allergies]          ROS:  Gen: denies frequent headaches, double/blurry vision, persistent insomnia, fatigue, unexplained weight loss/gain   CV: denies chest pain, SOB, palpitations, peripheral edema  Pulm: denies SOB, asthma or wheezing  GI/: denies liver or kidney problems, blood in stool or BRBPR, voiding without problems  Endo: denies thyroid problems or Diabetes  Heme/Onc: denies bleeding  MS: no weakness, tremors or gait instability  Neuro: denies depression, memory problems    LABS:  Last Basic Metabolic Panel:  Lab Results   Component Value Date     12/06/2017      Lab Results   Component Value Date    POTASSIUM 4.8 12/06/2017     Lab Results   Component Value Date    CHLORIDE 104 12/06/2017     Lab Results   Component Value Date    ELVIRA 9.5 12/06/2017     Lab Results   Component Value Date    CO2 28 12/06/2017     Lab Results   Component Value Date    BUN 23 12/06/2017     Lab Results   Component Value Date    CR 1.00 12/06/2017     Lab Results   Component Value Date     12/06/2017       Last CBC:   Lab Results   Component Value Date    WBC 9.0 12/06/2017     Lab Results  "  Component Value Date    RBC 3.67 12/06/2017     Lab Results   Component Value Date    HGB 11.4 12/06/2017     Lab Results   Component Value Date    HCT 36.0 12/06/2017     No components found for: MCT  Lab Results   Component Value Date    MCV 98 12/06/2017     Lab Results   Component Value Date    MCH 31.1 12/06/2017     Lab Results   Component Value Date    MCHC 31.7 12/06/2017     Lab Results   Component Value Date    RDW 13.0 12/06/2017     Lab Results   Component Value Date     12/06/2017       INR:  Lab Results   Component Value Date    INR 1.43 11/24/2017    INR 1.43 11/23/2017    INR 1.25 11/22/2017    INR 2.71 11/22/2017    INR 1.03 11/20/2017         IMAGING:  None    PHYSICAL EXAM:   /74 (BP Location: Left arm, Patient Position: Chair, Cuff Size: Adult Regular)  Pulse 77  Ht 1.778 m (5' 10\")  Wt 95.6 kg (210 lb 12.8 oz)  SpO2 98%  BMI 30.25 kg/m2  General: alert and oriented x 3, pleasant, no acute distress, normal mood and affect  CV: S1 S2, no murmurs, rubs or gallops, regular rate and rhythm, no peripheral edema  Pulm: bilateral breath sounds, clear to auscultation, easy work of breathing  GI: (+) bowel sounds, soft non-tender and non-distended  Incision: incisions mostly clean dry and intact without erythema, swelling or drainage  Neuro: nonfocal    PROCEDURES: None       ASSESSMENT/PLAN:  Zack Demarco is a 77 year old year old male status post CABG who returns to clinic for postop visit.     1. Surgically doing very well.  Incisions are healing well with no signs of infection.   2. Hemodynamics are stable. No medication changes were needed today.  3. Follow up with your cardiologist as scheduled. Dr Reyes in 2-3 weeks.    4. Continue Cardiac Rehab until completed.   5. Continue sternal precautions for 12 weeks from surgery date.   6. IR to do biopsy of retroperitoneal mass 12/12/17.    7. Dr Carbajal on 12/26 for follow up after biopsy.           The total time spent with the patient was " 25 minutes, > 50% of which was spent in counseling.    CC  AALIYAH GARZA

## 2017-12-08 NOTE — LETTER
12/8/2017      RE: Zack Demarco  [address] [facility] [address]       Dear Colleague,    Thank you for the opportunity to participate in the care of your patient, Zack Demarco, at the [facility] at [facility]. Please see a copy of my visit note below.    CARDIOTHORACIC SURGERY FOLLOW-UP VISIT     Zack Demarco   1940   [id number]      Reason for visit: Post-Op CABG x 3 with Dr. Rolando Toro on 11/22/2017    HPI: Zack Demarco is a 77 year old year old male seen in clinic for a routine follow-up appointment after surgery. Patient has past medical history of HTN, Hypothyroid and retroperitoneal mass. Hospital course was unremarkable. Discharged POD #5.   Possible BRITTNEY.      Patient has been doing well since discharge and now returns to clinic for postop visit.    Patient endorses left sided rib pain, some improvement since discharge, tylenol helps a bit. Doesn't limit activities.      Patient reports incision is healing well.  Patient denies any fever, chills, chest pain, palpitations, edema, SOB, lightheadedness and nausea.    Normal bowel movements.  Voiding without problems.    Has not been attending cardiac rehabilitation and that is going to start 12/13.    Patient is not on Coumadin.  Weight stable.  Lost a little weight since surgery (4 lbs).       PAST MEDICAL HISTORY:  Past Medical History:   Diagnosis Date     Coronary artery disease 11/22/2017     DJD (degenerative joint disease) of hip     right     Glaucoma      Hernia umbilical      Hypercholesterolemia      Hypertension      Hypothyroidism      Lipoma      Low back pain      Nonsenile cataract      Obesity      Unstable angina (H) 11/13/2017       PAST SURGICAL HISTORY:  Past Surgical History:   Procedure Laterality Date     BIOPSY  1980's    fatty tissue     BYPASS GRAFT ARTERY CORONARY N/A 11/22/2017    Procedure: BYPASS GRAFT ARTERY CORONARY;  Median Sternotomy, Coronary Artery Bypass Graft x3,  Using Left Internal Mammary and  Endoscopic  [place] of Right Greater Saphenous Vein, On Cardiopulmonary Bypass, Transesophageal Echocardiogram;  Surgeon: Rolando Toro MD;  Location: [facility] OR     [facility]FF SURGICAL PATHOLOGY       COLONOSCOPY  2011     COLONOSCOPY WITH CO2 INSUFFLATION N/A 9/19/2016    Procedure: COLONOSCOPY WITH CO2 INSUFFLATION;  Surgeon: Jeffery Flores MD;  Location: [facility] OR     ROTATOR CUFF REPAIR RT/LT       SOFT TISSUE SURGERY  Sept. 2014    [facility] Tendon transfer (Left Ankle)       CURRENT MEDICATIONS:   Current Outpatient Prescriptions   Medication     acetaminophen (TYLENOL) 325 MG tablet     metoprolol (LOPRESSOR) 25 MG tablet     pantoprazole (PROTONIX) 40 MG EC tablet     atorvastatin (LIPITOR) 40 MG tablet     nitroGLYcerin (NITROSTAT) 0.4 MG sublingual tablet     levothyroxine (SYNTHROID/LEVOTHROID) 75 MCG tablet     Cholecalciferol (VITAMIN D3 PO)     multivitamin (OCUVITE) TABS     latanoprost (XALATAN) 0.005 % ophthalmic solution     aspirin 325 MG EC tablet     No current facility-administered medications for this visit.        ALLERGIES:      Allergies   Allergen Reactions     Nkda [No Known Drug Allergies]          ROS:  Gen: denies frequent headaches, double/blurry vision, persistent insomnia, fatigue, unexplained weight loss/gain   CV: denies chest pain, SOB, palpitations, peripheral edema  Pulm: denies SOB, asthma or wheezing  GI/: denies liver or kidney problems, blood in stool or BRBPR, voiding without problems  Endo: denies thyroid problems or Diabetes  Heme/Onc: denies bleeding  MS: no weakness, tremors or gait instability  Neuro: denies depression, memory problems    LABS:  Last Basic Metabolic Panel:  Lab Results   Component Value Date     12/06/2017      Lab Results   Component Value Date    POTASSIUM 4.8 12/06/2017     Lab Results   Component Value Date    CHLORIDE 104 12/06/2017     Lab Results   Component Value Date    ELVIRA 9.5 12/06/2017     Lab Results  "  Component Value Date    CO2 28 12/06/2017     Lab Results   Component Value Date    BUN 23 12/06/2017     Lab Results   Component Value Date    CR 1.00 12/06/2017     Lab Results   Component Value Date     12/06/2017       Last CBC:   Lab Results   Component Value Date    WBC 9.0 12/06/2017     Lab Results   Component Value Date    RBC 3.67 12/06/2017     Lab Results   Component Value Date    HGB 11.4 12/06/2017     Lab Results   Component Value Date    HCT 36.0 12/06/2017     No components found for: MCT  Lab Results   Component Value Date    MCV 98 12/06/2017     Lab Results   Component Value Date    MCH 31.1 12/06/2017     Lab Results   Component Value Date    MCHC 31.7 12/06/2017     Lab Results   Component Value Date    RDW 13.0 12/06/2017     Lab Results   Component Value Date     12/06/2017       INR:  Lab Results   Component Value Date    INR 1.43 11/24/2017    INR 1.43 11/23/2017    INR 1.25 11/22/2017    INR 2.71 11/22/2017    INR 1.03 11/20/2017       IMAGING:  None    PHYSICAL EXAM:   /74 (BP Location: Left arm, Patient Position: Chair, Cuff Size: Adult Regular)  Pulse 77  Ht 1.778 m (5' 10\")  Wt 95.6 kg (210 lb 12.8 oz)  SpO2 98%  BMI 30.25 kg/m2  General: alert and oriented x 3, pleasant, no acute distress, normal mood and affect  CV: S1 S2, no murmurs, rubs or gallops, regular rate and rhythm, no peripheral edema  Pulm: bilateral breath sounds, clear to auscultation, easy work of breathing  GI: (+) bowel sounds, soft non-tender and non-distended  Incision: incisions mostly clean dry and intact without erythema, swelling or drainage  Neuro: nonfocal    PROCEDURES: None       ASSESSMENT/PLAN:  Zack Demarco is a 77 year old year old male status post CABG who returns to clinic for postop visit.     1. Surgically doing very well.  Incisions are healing well with no signs of infection.   2. Hemodynamics are stable. No medication changes were needed today.  3. Follow up with your " cardiologist as scheduled. Dr Reyes in 2-3 weeks.    4. Continue Cardiac Rehab until completed.   5. Continue sternal precautions for 12 weeks from surgery date.   6. IR to do biopsy of retroperitoneal mass 12/12/17.    7. Dr Carbajal on 12/26 for follow up after biopsy.           The total time spent with the patient was 25 minutes, > 50% of which was spent in counseling.    CC  AALIYAH GARZA

## 2017-12-08 NOTE — MR AVS SNAPSHOT
After Visit Summary   12/8/2017    Zack Demarco    MRN: [id number]           Patient Information     Date Of Birth          1940        Visit Information        Provider Department      12/8/2017 2:00 PM  [facility]        Today's Diagnoses     S/P CABG (coronary artery bypass graft)    -  1       Follow-ups after your visit        Follow-up notes from your care team     Return if symptoms worsen or fail to improve.      Your next 10 appointments already scheduled     Dec 12, 2017 11:00 AM CST   Procedure 4.5 hour with U2A ROOM 6   [address] [facility] ([facility])    [address] [facility] [address]               Dec 12, 2017 12:30 PM CST   CT ABDOMEN RETROPERITONEAL BIOPSY with UUCT2   [facility] [place] ([facility])    [address] [facility] [id number] [phone]           Plan for an adult to drive you home and stay with you until morning.  Tell your doctor in advance:   If you have any allergies.   If you are breastfeeding or there s any chance you are pregnant.  Please bring any scans or X-rays taken at other hospitals, if they may be helpful. Also bring a list of your medicines, including vitamins, minerals and over-the-counter drugs. It is safest to leave valuables at home.  If you take blood thinners, you may need to stop taking them a few days before treatment. Talk to your doctor before stopping these medicines. You will need a blood test the morning of your exam.   Stop taking Coumadin (warfarin) 5 days before treatment. Restart the day after treatment.   If you take aspirin, you may need to stop taking it 3 days before treatment.   If you take Plavix, Ticlid, Pletal or Persantine, you may need to stop taking them 5 days before your scan.  If you have diabetes:   If you take insulin, call your diabetes care team. Ask if you should adjust your insulin before  this test.   If your kidney function is normal, continue taking your metformin (Avandamet, Glucophage, Glucovance, Metaglip) on the day of your exam.   If your kidney function is abnormal, wait 48 hours before restarting this medicine.  If you have any questions, please call the imaging department where you will have your exam.  The day before your exam: Drink extra fluids at least six 8-ounce glasses (unless your doctor tells you to restrict fluids). The day of your exam: No eating or drinking for 4 hours before your test. You may take medicine with small sips of water.            Dec 26, 2017  3:00 PM CST   (Arrive by 2:45 PM)   Return Visit with Laquita Carbajal MD   [facility] ([facility])    [address] [facility] [address] [phone]            Jan 17, 2018  8:00 AM CST   New Visit with Avtar Mccullough MD   [facility] ([facility])    [address] [place] [address] [phone]              Who to contact     If you have questions or need follow up information about today's clinic visit or your schedule please contact [facility] directly at [phone].  Normal or non-critical lab and imaging results will be communicated to you by MyChart, letter or phone within 4 business days after the clinic has received the results. If you do not hear from us within 7 days, please contact the clinic through MyChart or phone. If you have a critical or abnormal lab result, we will notify you by phone as soon as possible.  Submit refill requests through Qiro or call your pharmacy and they will forward the refill request to us. Please allow 3 business days for your refill to be completed.          Additional Information About Your Visit        Inherited Healthhart Information     [employer]iro gives you secure access to your electronic health record. If you see a primary care provider, you can also send messages to your  "care team and make appointments. If you have questions, please call your primary care clinic.  If you do not have a primary care provider, please call [phone] and they will assist you.        Care EveryWhere ID     This is your Care EveryWhere ID. This could be used by other organizations to access your [facility] medical records  [id number]        Your Vitals Were     Pulse Height Pulse Oximetry BMI (Body Mass Index)          77 1.778 m (5' 10\") 98% 30.25 kg/m2         Blood Pressure from Last 3 Encounters:   12/08/17 134/74   12/06/17 136/60   11/27/17 133/64    Weight from Last 3 Encounters:   12/08/17 95.6 kg (210 lb 12.8 oz)   12/06/17 95.3 kg (210 lb)   11/27/17 96.3 kg (212 lb 6.4 oz)              Today, you had the following     No orders found for display       Primary Care Provider Office Phone # Fax #    Anastacio Love [phone] [address] [facility] [address]        Equal Access to Services     [facility]: Hadii stefany ku hadasho Sorob, waaxda luqadaha, qaybta kaalmada ginny, marilynn dasilva . So [facility] [phone].    ATENCIÓN: Si habla español, tiene a warner disposición servicios gratuitos de asistencia lingüística. [facility] [phone].    We comply with applicable federal civil rights laws and [place] laws. We do not discriminate on the basis of race, color, national origin, age, disability, sex, sexual orientation, or gender identity.            Thank you!     Thank you for choosing [facility]  for your care. Our goal is always to provide you with excellent care. Hearing back from our patients is one way we can continue to improve our services. Please take a few minutes to complete the written survey that you may receive in the mail after your visit with us. Thank you!             Your Updated Medication List - Protect others around you: Learn how to safely use, store and throw away your medicines at www.disposemymeds.org. "          This list is accurate as of: 12/8/17  2:28 PM.  Always use your most recent med list.                   Brand Name Dispense Instructions for use Diagnosis    acetaminophen 325 MG tablet    TYLENOL    100 tablet    Take 2 tablets (650 mg) by mouth every 4 hours as needed for mild pain    S/P CABG (coronary artery bypass graft), Acute post-operative pain       aspirin 325 MG EC tablet      1/2 tab daily        atorvastatin 40 MG tablet    LIPITOR    60 tablet    Take 1 tablet (40 mg) by mouth daily    Hyperlipidemia LDL goal <100       latanoprost 0.005 % ophthalmic solution    XALATAN    3 Bottle    Place 1 drop into both eyes At Bedtime    Borderline glaucoma with ocular hypertension, unspecified laterality       levothyroxine 75 MCG tablet    SYNTHROID/LEVOTHROID    90 tablet    Take 1 tablet (75 mcg) by mouth daily    Hypothyroidism, unspecified type       metoprolol 25 MG tablet    LOPRESSOR    60 tablet    Take 0.5 tablets (12.5 mg) by mouth 2 times daily    S/P CABG (coronary artery bypass graft), Acute post-operative pain       multivitamin Tabs tablet      Take 1 tablet by mouth daily        nitroGLYcerin 0.4 MG sublingual tablet    NITROSTAT    25 tablet    For chest pain place 1 tablet under the tongue every 5 minutes for 3 doses. If symptoms persist 5 minutes after 1st dose call 911.    Exertional chest pain       pantoprazole 40 MG EC tablet    PROTONIX    30 tablet    Take 1 tablet (40 mg) by mouth every morning    S/P CABG (coronary artery bypass graft)       VITAMIN D3 PO      Take by mouth daily

## 2017-12-12 ENCOUNTER — APPOINTMENT (OUTPATIENT)
Dept: MEDSURG UNIT | Facility: CLINIC | Age: 77
End: 2017-12-12
Attending: SURGERY
Payer: MEDICARE

## 2017-12-12 ENCOUNTER — APPOINTMENT (OUTPATIENT)
Dept: CT IMAGING | Facility: CLINIC | Age: 77
End: 2017-12-12
Attending: PHYSICIAN ASSISTANT
Payer: MEDICARE

## 2017-12-12 ENCOUNTER — HOSPITAL ENCOUNTER (OUTPATIENT)
Facility: CLINIC | Age: 77
Discharge: HOME | End: 2017-12-12
Attending: SURGERY | Admitting: SURGERY
Payer: MEDICARE

## 2017-12-12 VITALS
RESPIRATION RATE: 16 BRPM | SYSTOLIC BLOOD PRESSURE: 123 MMHG | TEMPERATURE: 98.3 F | OXYGEN SATURATION: 95 % | DIASTOLIC BLOOD PRESSURE: 48 MMHG | HEART RATE: 81 BPM

## 2017-12-12 LAB — INR BLD: 1.2 (ref 0.86–1.14)

## 2017-12-12 PROCEDURE — 99153 MOD SED SAME PHYS/QHP EA: CPT

## 2017-12-12 PROCEDURE — 88305 TISSUE EXAM BY PATHOLOGIST: CPT | Performed by: SURGERY

## 2017-12-12 PROCEDURE — 40001004 ZZHCL STATISTIC FLOW INT 9-15 ABY TC 88188: Performed by: SURGERY

## 2017-12-12 PROCEDURE — 49180 BIOPSY ABDOMINAL MASS: CPT

## 2017-12-12 PROCEDURE — 88342 IMHCHEM/IMCYTCHM 1ST ANTB: CPT | Performed by: SURGERY

## 2017-12-12 PROCEDURE — 27210903 ZZH KIT CR5

## 2017-12-12 PROCEDURE — 88341 IMHCHEM/IMCYTCHM EA ADD ANTB: CPT | Performed by: SURGERY

## 2017-12-12 PROCEDURE — 88184 FLOWCYTOMETRY/ TC 1 MARKER: CPT | Performed by: SURGERY

## 2017-12-12 PROCEDURE — 25000125 ZZHC RX 250: Performed by: STUDENT IN AN ORGANIZED HEALTH CARE EDUCATION/TRAINING PROGRAM

## 2017-12-12 PROCEDURE — 85610 PROTHROMBIN TIME: CPT | Mod: QW

## 2017-12-12 PROCEDURE — 27210909 ZZH NEEDLE CR5

## 2017-12-12 PROCEDURE — 88275 CYTOGENETICS 100-300: CPT | Performed by: PATHOLOGY

## 2017-12-12 PROCEDURE — 88185 FLOWCYTOMETRY/TC ADD-ON: CPT | Performed by: SURGERY

## 2017-12-12 PROCEDURE — 25000128 H RX IP 250 OP 636: Performed by: STUDENT IN AN ORGANIZED HEALTH CARE EDUCATION/TRAINING PROGRAM

## 2017-12-12 PROCEDURE — 25000128 H RX IP 250 OP 636: Performed by: PHYSICIAN ASSISTANT

## 2017-12-12 PROCEDURE — 00000160 ZZHCL STATISTIC H-SPECIAL HANDLING: Performed by: SURGERY

## 2017-12-12 PROCEDURE — 99152 MOD SED SAME PHYS/QHP 5/>YRS: CPT

## 2017-12-12 PROCEDURE — 40000166 ZZH STATISTIC PP CARE STAGE 1

## 2017-12-12 PROCEDURE — 88333 PATH CONSLTJ SURG CYTO XM 1: CPT | Performed by: SURGERY

## 2017-12-12 PROCEDURE — 88271 CYTOGENETICS DNA PROBE: CPT | Performed by: PATHOLOGY

## 2017-12-12 RX ORDER — FLUMAZENIL 0.1 MG/ML
0.2 INJECTION, SOLUTION INTRAVENOUS
Status: DISCONTINUED | OUTPATIENT
Start: 2017-12-12 | End: 2017-12-12 | Stop reason: HOSPADM

## 2017-12-12 RX ORDER — LIDOCAINE 40 MG/G
CREAM TOPICAL
Status: DISCONTINUED | OUTPATIENT
Start: 2017-12-12 | End: 2017-12-12 | Stop reason: HOSPADM

## 2017-12-12 RX ORDER — NALOXONE HYDROCHLORIDE 0.4 MG/ML
.1-.4 INJECTION, SOLUTION INTRAMUSCULAR; INTRAVENOUS; SUBCUTANEOUS
Status: DISCONTINUED | OUTPATIENT
Start: 2017-12-12 | End: 2017-12-12 | Stop reason: HOSPADM

## 2017-12-12 RX ORDER — FENTANYL CITRATE 50 UG/ML
25-50 INJECTION, SOLUTION INTRAMUSCULAR; INTRAVENOUS EVERY 5 MIN PRN
Status: DISCONTINUED | OUTPATIENT
Start: 2017-12-12 | End: 2017-12-12 | Stop reason: HOSPADM

## 2017-12-12 RX ORDER — SODIUM CHLORIDE 9 MG/ML
INJECTION, SOLUTION INTRAVENOUS CONTINUOUS
Status: DISCONTINUED | OUTPATIENT
Start: 2017-12-12 | End: 2017-12-12 | Stop reason: HOSPADM

## 2017-12-12 RX ADMIN — FENTANYL CITRATE 25 MCG: 50 INJECTION INTRAMUSCULAR; INTRAVENOUS at 14:30

## 2017-12-12 RX ADMIN — MIDAZOLAM 0.5 MG: 1 INJECTION INTRAMUSCULAR; INTRAVENOUS at 14:30

## 2017-12-12 RX ADMIN — MIDAZOLAM 1 MG: 1 INJECTION INTRAMUSCULAR; INTRAVENOUS at 14:20

## 2017-12-12 RX ADMIN — SODIUM CHLORIDE: 9 INJECTION, SOLUTION INTRAVENOUS at 11:43

## 2017-12-12 RX ADMIN — FENTANYL CITRATE 50 MCG: 50 INJECTION INTRAMUSCULAR; INTRAVENOUS at 14:20

## 2017-12-12 RX ADMIN — LIDOCAINE HYDROCHLORIDE 10 ML: 10 INJECTION, SOLUTION EPIDURAL; INFILTRATION; INTRACAUDAL; PERINEURAL at 14:20

## 2017-12-12 NOTE — BRIEF OP NOTE
Interventional Radiology Brief Post Procedure Note    Procedure: CT guided left retroperitoneal mass biopsy    Proceduralist: Es Loaiza MD    Assistant: None    Time Out: Prior to the start of the procedure and with procedural staff participation, I verbally confirmed the patient s identity using two indicators, relevant allergies, that the procedure was appropriate and matched the consent or emergent situation, and that the correct equipment/implants were available. Immediately prior to starting the procedure I conducted the Time Out with the procedural staff and re-confirmed the patient s name, procedure, and site/side. (The Joint Commission universal protocol was followed.)  Yes        Sedation: IR Nurse Monitored Care   Post Procedure Summary:  Prior to the start of the procedure and with procedural staff participation, I verbally confirmed the patient s identity using two indicators, relevant allergies, that the procedure was appropriate and matched the consent or emergent situation, and that the correct equipment/implants were available. Immediately prior to starting the procedure I conducted the Time Out with the procedural staff and re-confirmed the patient s name, procedure, and site/side. (The Joint Commission universal protocol was followed.)  Yes       Sedatives: Fentanyl and Midazolam (Versed)    Vital signs, airway and pulse oximetry were monitored and remained stable throughout the procedure and sedation was maintained until the procedure was complete.  The patient was monitored by staff until sedation discharge criteria were met.    Patient tolerance: Patient tolerated the procedure well with no immediate complications.    Time of sedation in minutes: 45 Minutes minutes from beginning to end of physician one to one monitoring.          Findings:   6 x 18 G cores, path confirmed adequacy    Estimated Blood Loss: Minimal    Fluoroscopy Time: n/a    SPECIMENS: Core needle biopsy specimens sent  for pathological analysis    Complications: 1. None     Condition: Stable    Plan:   Bedrest x 1 hour, then d/c    Comments: See dictated procedure note for full details.    Es Loaiza MD

## 2017-12-12 NOTE — PROGRESS NOTES
Interventional Radiology Intra-procedural Nursing Note    Patient Name: Zack Demarco  Medical Record Number: [id number]  Today's Date: December 12, 2017    Procedure: image guided abdominal retroperitoneal mass biopsy; left    Attending MD in room during timeout: Dr Loaiza  Proceduralists: Dr Loaiza, Dr Burns    Sedation start time: 1410  Sedation end time: 1455  Sedation medications given: versed 1.5 mg, fentanyl 75 mcg IV    Procedure Start Time: 1415  Procedure Puncture time: 1420  Procedure End Time:    Report given to: Elizabeth SANTIAGO 2A  : not needed    D: Patient arrived to IR CT 2 at 1330. Verified Patient's ID and informed consent using two identifiers.   A: Patient was positioned side lying with right side down and was monitored per IR protocol. Patient tolerated the procedure without apparent incident. The dressing over the left posterior puncture site is clean, dry and intact. Five core biopsy specimens were taken to the lab by the Pathology Fellow.  P: Patient returned to 2A for post procedure cares.     Jessica Ornelas RN  [phone]

## 2017-12-12 NOTE — DISCHARGE INSTRUCTIONS
[facility]    Interventional Radiology  Patient Instructions Following CT Guided Retroperitoneal Biopsy    AFTER YOU GO HOME  ? If you were given sedation DO NOT drive or operate machinery at home or at work for at least 24 hours  ? DO relax and take it easy for 48 hours, no strenuous activity for 24 hours  ? DO drink plenty of fluids  ? DO resume your regular diet, unless otherwise instructed by your Primary Physician  ? Keep the dressing dry and in place for 24 hours.  ? DO NOT SMOKE FOR AT LEAST 24 HOURS, if you have been given any medications that were to help you relax or sedate you during your procedure  ? DO NOT drink alcoholic beverages the day of your procedure  ? DO NOT do any strenuous exercise or lifting (> 10 lbs) for at least 7 days following your procedure  ? DO NOT take a bath or shower for at least 24 hours following your procedure  ? Remove dressing after shower the next day. Replace with Band aid for 2 days.  Never leave a wet dressing in place.  ? DO NOT make any important or legal decisions for 24 hours following your procedure  ? There should be minimum drainage from the biopsy site    CALL THE PHYSICIAN IF:  ? You start bleeding from the procedure site.  If you do start to bleed from that site, lie down flat and hold pressure on the site for a minimum of 10 minutes.  Your physician will tell you if you need to return to the hospital  ? You develop nausea or vomiting  ? You have excessive swelling, redness, or tenderness at the site  ? You have drainage that looks like it is infected.  ? You experience severe pain  ? You develop hives or a rash or unexplained itching  ? You develop a temperature of 101 degrees F or greater          [facility] INTERVENTIONAL RADIOLOGY DEPARTMENT  Procedure Physician:          Dr. Loaiza                              Date of procedure: December 12, 2017  Telephone Numbers: [phone] Monday-Friday 8:00 am to 4:30 pm  [phone] After 4:30 pm  Monday-Friday, Weekends & Holidays.   Ask for the Interventional Radiologist on call.  Someone is on call 24 hrs/day  [facility] toll free number: [phone] Monday-Friday 8:00 am to 4:30 pm  [facility] Emergency Dept: [phone]

## 2017-12-12 NOTE — PROGRESS NOTES
Interventional Radiology Pre-Procedure Sedation Assessment   Time of Assessment: 12:01 PM    Expected Level: Moderate Sedation    Indication: Sedation is required for the following type of Procedure: Biopsy    Sedation and procedural consent: Risks, benefits and alternatives were discussed with Patient    PO Intake: Appropriately NPO for procedure    ASA Class: Class 3 - SEVERE SYSTEMIC DISEASE, DEFINITE FUNCTIONAL LIMITATIONS.    Mallampati: Grade 1:  Soft palate, uvula, tonsillar pillars, and posterior pharyngeal wall visible    Lungs: Lungs Clear with good breath sounds bilaterally    Heart: Normal heart sounds and rate    Focused history and physical completed prior to procedure. I have reviewed the lab findings, diagnostic data, medications, and the plan for sedation. I have determined this patient to be an appropriate candidate for the planned sedation and procedure and have reassessed the patient IMMEDIATELY PRIOR to sedation and procedure.    Janice Burns MD

## 2017-12-12 NOTE — IP AVS SNAPSHOT
[address] [address]                                       After Visit Summary   12/12/2017    Zack Demarco    MRN: [id number]           After Visit Summary Signature Page     I have received my discharge instructions, and my questions have been answered. I have discussed any challenges I see with this plan with the nurse or doctor.    ..........................................................................................................................................  Patient/Patient Representative Signature      ..........................................................................................................................................  Patient Representative Print Name and Relationship to Patient    ..................................................               ................................................  Date                                            Time    ..........................................................................................................................................  Reviewed by Signature/Title    ...................................................              ..............................................  Date                                                            Time

## 2017-12-12 NOTE — PROGRESS NOTES
1503:  Received from IR post abdominal retroperitoneal biopsy. Left lower back dressing clean, flat., dry, and intact. Denies pain. Requesting orals. 1605:  Tolerated orals. Reviewed discharge instructions. Copy given to patient. 1615: Up to ambulate and to use restroom. Walked with a steady gait. 1630: Discharged to home.  Care transferred to patient's daughter.  Escorted patient to vehicle.

## 2017-12-12 NOTE — IP AVS SNAPSHOT
MRN:[id number]                      After Visit Summary   12/12/2017    Zack Demarco    MRN: [id number]           Visit Information        Department      12/12/2017 10:55 AM [address] [facility]          Review of your medicines      UNREVIEWED medicines. Ask your doctor about these medicines        Dose / Directions    acetaminophen 325 MG tablet   Commonly known as:  TYLENOL   Used for:  S/P CABG (coronary artery bypass graft), Acute post-operative pain        Dose:  650 mg   Take 2 tablets (650 mg) by mouth every 4 hours as needed for mild pain   Quantity:  100 tablet   Refills:  0       aspirin 325 MG EC tablet        1/2 tab daily   Refills:  0       atorvastatin 40 MG tablet   Commonly known as:  LIPITOR   Used for:  Hyperlipidemia LDL goal <100        Dose:  40 mg   Take 1 tablet (40 mg) by mouth daily   Quantity:  60 tablet   Refills:  11       latanoprost 0.005 % ophthalmic solution   Commonly known as:  XALATAN   Used for:  Borderline glaucoma with ocular hypertension, unspecified laterality        Dose:  1 drop   Place 1 drop into both eyes At Bedtime   Quantity:  3 Bottle   Refills:  4       levothyroxine 75 MCG tablet   Commonly known as:  SYNTHROID/LEVOTHROID   Used for:  Hypothyroidism, unspecified type        Dose:  75 mcg   Take 1 tablet (75 mcg) by mouth daily   Quantity:  90 tablet   Refills:  3       metoprolol 25 MG tablet   Commonly known as:  LOPRESSOR   Used for:  S/P CABG (coronary artery bypass graft), Acute post-operative pain        Dose:  12.5 mg   Take 0.5 tablets (12.5 mg) by mouth 2 times daily   Quantity:  60 tablet   Refills:  0       multivitamin Tabs tablet        Dose:  1 tablet   Take 1 tablet by mouth daily   Refills:  0       nitroGLYcerin 0.4 MG sublingual tablet   Commonly known as:  NITROSTAT   Used for:  Exertional chest pain        For chest pain place 1 tablet under the tongue every 5 minutes for 3 doses. If symptoms persist 5 minutes after 1st dose  call 911.   Quantity:  25 tablet   Refills:  3       pantoprazole 40 MG EC tablet   Commonly known as:  PROTONIX   Used for:  S/P CABG (coronary artery bypass graft)        Dose:  40 mg   Take 1 tablet (40 mg) by mouth every morning   Quantity:  30 tablet   Refills:  0       VITAMIN D3 PO        Take by mouth daily   Refills:  0                Protect others around you: Learn how to safely use, store and throw away your medicines at www.disposemymeds.org.         Follow-ups after your visit        Your next 10 appointments already scheduled     Dec 26, 2017  3:00 PM CST   (Arrive by 2:45 PM)   Return Visit with Laquita Carbajal MD   [facility] ([facility])    [address]  2nd Floor  [facility] [id number] [phone]            Jan 04, 2018 10:00 AM CST   Return Visit with Torsten Reyes MD   [facility] ([facility])    [address] [place] [id number] [phone]            Jan 17, 2018  8:00 AM CST   New Visit with Avtar Mccullough MD   [facility]    [address] [place] [id number] [phone]               Care Instructions        Further instructions from your care team       [facility]    Interventional Radiology  Patient Instructions Following CT Guided Retroperitoneal Biopsy    AFTER YOU GO HOME  ? If you were given sedation DO NOT drive or operate machinery at home or at work for at least 24 hours  ? DO relax and take it easy for 48 hours, no strenuous activity for 24 hours  ? DO drink plenty of fluids  ? DO resume your regular diet, unless otherwise instructed by your Primary Physician  ? Keep the dressing dry and in place for 24 hours.  ? DO NOT SMOKE FOR AT LEAST 24 HOURS, if you have been given any medications that were to help you relax or sedate you during your procedure  ? DO NOT drink alcoholic beverages the day of  your procedure  ? DO NOT do any strenuous exercise or lifting (> 10 lbs) for at least 7 days following your procedure  ? DO NOT take a bath or shower for at least 24 hours following your procedure  ? Remove dressing after shower the next day. Replace with Band aid for 2 days.  Never leave a wet dressing in place.  ? DO NOT make any important or legal decisions for 24 hours following your procedure  ? There should be minimum drainage from the biopsy site    CALL THE PHYSICIAN IF:  ? You start bleeding from the procedure site.  If you do start to bleed from that site, lie down flat and hold pressure on the site for a minimum of 10 minutes.  Your physician will tell you if you need to return to the hospital  ? You develop nausea or vomiting  ? You have excessive swelling, redness, or tenderness at the site  ? You have drainage that looks like it is infected.  ? You experience severe pain  ? You develop hives or a rash or unexplained itching  ? You develop a temperature of 101 degrees F or greater          [facility] INTERVENTIONAL RADIOLOGY DEPARTMENT  Procedure Physician:          Dr. Loaiza                              Date of procedure: December 12, 2017  Telephone Numbers: [phone] Monday-Friday 8:00 am to 4:30 pm  [phone] After 4:30 pm Monday-Friday, Weekends & Holidays.   Ask for the Interventional Radiologist on call.  Someone is on call 24 hrs/day  [facility] toll free number: [phone] Monday-Friday 8:00 am to 4:30 pm  [facility] Emergency Dept: [phone]           Additional Information About Your Visit        [facility] [employer] Information     [facility] [employer] gives you secure access to your electronic health record. If you see a primary care provider, you can also send messages to your care team and make appointments. If you have questions, please call your primary care clinic.  If you do not have a primary care provider, please call [phone] and they will assist you.        Care EveryWhere ID     This is your Care  EveryWhere ID. This could be used by other organizations to access your [facility] medical records  [id number]        Your Vitals Were     Blood Pressure Pulse Temperature Respirations Pulse Oximetry       94/42 (BP Location: Left arm) 81 98.3  F (36.8  C) (Oral) 16 95%        Primary Care Provider Office Phone # Fax #    Anastacio Love [phone]      Equal Access to Services     HALEY BURR : Hadii aad ku hadasho Soomaali, waaxda luqadaha, qaybta kaalmada adeegyada, marilynn schulz karln trini nereidajermaine dasilva . So [facility] [phone].    ATENCIÓN: Si habla español, tiene a warner disposición servicios gratuitos de asistencia lingüística. Llame al [phone].    We comply with applicable federal civil rights laws and [place] laws. We do not discriminate on the basis of race, color, national origin, age, disability, sex, sexual orientation, or gender identity.            Thank you!     Thank you for choosing [facility] for your care. Our goal is always to provide you with excellent care. Hearing back from our patients is one way we can continue to improve our services. Please take a few minutes to complete the written survey that you may receive in the mail after you visit with us. Thank you!             Medication List: This is a list of all your medications and when to take them. Check marks below indicate your daily home schedule. Keep this list as a reference.      Medications           Morning Afternoon Evening Bedtime As Needed    acetaminophen 325 MG tablet   Commonly known as:  TYLENOL   Take 2 tablets (650 mg) by mouth every 4 hours as needed for mild pain                                aspirin 325 MG EC tablet   1/2 tab daily                                atorvastatin 40 MG tablet   Commonly known as:  LIPITOR   Take 1 tablet (40 mg) by mouth daily                                latanoprost 0.005 % ophthalmic solution   Commonly known as:  XALATAN   Place 1 drop into both eyes At Bedtime                                 levothyroxine 75 MCG tablet   Commonly known as:  SYNTHROID/LEVOTHROID   Take 1 tablet (75 mcg) by mouth daily                                metoprolol 25 MG tablet   Commonly known as:  LOPRESSOR   Take 0.5 tablets (12.5 mg) by mouth 2 times daily                                multivitamin Tabs tablet   Take 1 tablet by mouth daily                                nitroGLYcerin 0.4 MG sublingual tablet   Commonly known as:  NITROSTAT   For chest pain place 1 tablet under the tongue every 5 minutes for 3 doses. If symptoms persist 5 minutes after 1st dose call 911.                                pantoprazole 40 MG EC tablet   Commonly known as:  PROTONIX   Take 1 tablet (40 mg) by mouth every morning                                VITAMIN D3 PO   Take by mouth daily

## 2017-12-13 ENCOUNTER — TRANSFERRED RECORDS (OUTPATIENT)
Dept: HEALTH INFORMATION MANAGEMENT | Facility: CLINIC | Age: 77
End: 2017-12-13

## 2017-12-14 LAB — COPATH REPORT: NORMAL

## 2017-12-15 ENCOUNTER — MYC MEDICAL ADVICE (OUTPATIENT)
Dept: FAMILY MEDICINE | Facility: CLINIC | Age: 77
End: 2017-12-15

## 2017-12-15 DIAGNOSIS — C85.90 LYMPHOMA, UNSPECIFIED BODY REGION, UNSPECIFIED LYMPHOMA TYPE (H): Primary | ICD-10-CM

## 2017-12-19 ENCOUNTER — TELEPHONE (OUTPATIENT)
Dept: ONCOLOGY | Facility: CLINIC | Age: 77
End: 2017-12-19

## 2017-12-19 LAB — COPATH REPORT: NORMAL

## 2017-12-19 NOTE — TELEPHONE ENCOUNTER
Attempted to call patient with preliminary needle biopsy results indicating possible follicular lymphoma.  There was no answer at any of his phone numbers; a generic message was left for him to call back.    I spoke with pathology today - the left retroperitoneal mass does not represent a carcinoma or sarcoma.  As such, surgical resection is not indicated.  I will cancel Zack Demarco's follow up appointment with me on 12/26 and refer him to Dr Gal Bain of Medical Oncology instead.    Laquita Carbajal MD MSc [facility] FACS    Division of Surgical Oncology  [facility]

## 2017-12-20 LAB — COPATH REPORT: NORMAL

## 2017-12-20 NOTE — TELEPHONE ENCOUNTER
Spoke with patient regarding biopsy results of lymphoma.  Reviewed the plan indicated in the note below.  He wished to go ahead with a consultation with Dr Gal Bain.     All questions were answered. He was appreciative of the call.    Laquita Carbajal MD MSc [facility] FACS    Division of Surgical Oncology  [facility]

## 2017-12-27 ENCOUNTER — ONCOLOGY VISIT (OUTPATIENT)
Dept: ONCOLOGY | Facility: CLINIC | Age: 77
End: 2017-12-27
Attending: INTERNAL MEDICINE
Payer: MEDICARE

## 2017-12-27 VITALS
OXYGEN SATURATION: 96 % | RESPIRATION RATE: 16 BRPM | HEIGHT: 70 IN | BODY MASS INDEX: 30.08 KG/M2 | SYSTOLIC BLOOD PRESSURE: 137 MMHG | DIASTOLIC BLOOD PRESSURE: 71 MMHG | WEIGHT: 210.1 LBS | HEART RATE: 67 BPM | TEMPERATURE: 97.7 F

## 2017-12-27 DIAGNOSIS — C82.99 FOLLICULAR LYMPHOMA OF EXTRANODAL AND SOLID ORGAN SITES (H): ICD-10-CM

## 2017-12-27 DIAGNOSIS — C85.90 LYMPHOMA, UNSPECIFIED BODY REGION, UNSPECIFIED LYMPHOMA TYPE (H): Primary | ICD-10-CM

## 2017-12-27 LAB
ALBUMIN SERPL-MCNC: 3.9 G/DL (ref 3.4–5)
ALP SERPL-CCNC: 101 U/L (ref 40–150)
ALT SERPL W P-5'-P-CCNC: 25 U/L (ref 0–70)
ANION GAP SERPL CALCULATED.3IONS-SCNC: 7 MMOL/L (ref 3–14)
AST SERPL W P-5'-P-CCNC: 19 U/L (ref 0–45)
B2 MICROGLOB SERPL-MCNC: 2.1 MG/L
BILIRUB SERPL-MCNC: 0.4 MG/DL (ref 0.2–1.3)
BUN SERPL-MCNC: 18 MG/DL (ref 7–30)
CALCIUM SERPL-MCNC: 9.3 MG/DL (ref 8.5–10.1)
CHLORIDE SERPL-SCNC: 104 MMOL/L (ref 94–109)
CO2 SERPL-SCNC: 28 MMOL/L (ref 20–32)
CREAT SERPL-MCNC: 0.87 MG/DL (ref 0.66–1.25)
GFR SERPL CREATININE-BSD FRML MDRD: 85 ML/MIN/1.7M2
GLUCOSE SERPL-MCNC: 101 MG/DL (ref 70–99)
HBV CORE AB SERPL QL IA: NONREACTIVE
HBV SURFACE AB SERPL IA-ACNC: 0.22 M[IU]/ML
HCV AB SERPL QL IA: NONREACTIVE
LDH SERPL L TO P-CCNC: 160 U/L (ref 85–227)
POTASSIUM SERPL-SCNC: 4.1 MMOL/L (ref 3.4–5.3)
PROT SERPL-MCNC: 8.2 G/DL (ref 6.8–8.8)
SODIUM SERPL-SCNC: 139 MMOL/L (ref 133–144)
URATE SERPL-MCNC: 5 MG/DL (ref 3.5–7.2)

## 2017-12-27 PROCEDURE — 86706 HEP B SURFACE ANTIBODY: CPT | Performed by: INTERNAL MEDICINE

## 2017-12-27 PROCEDURE — 86803 HEPATITIS C AB TEST: CPT | Performed by: INTERNAL MEDICINE

## 2017-12-27 PROCEDURE — 36415 COLL VENOUS BLD VENIPUNCTURE: CPT

## 2017-12-27 PROCEDURE — 80053 COMPREHEN METABOLIC PANEL: CPT | Performed by: INTERNAL MEDICINE

## 2017-12-27 PROCEDURE — 88185 FLOWCYTOMETRY/TC ADD-ON: CPT | Performed by: INTERNAL MEDICINE

## 2017-12-27 PROCEDURE — 84165 PROTEIN E-PHORESIS SERUM: CPT | Performed by: INTERNAL MEDICINE

## 2017-12-27 PROCEDURE — 82232 ASSAY OF BETA-2 PROTEIN: CPT | Performed by: INTERNAL MEDICINE

## 2017-12-27 PROCEDURE — 83615 LACTATE (LD) (LDH) ENZYME: CPT | Performed by: INTERNAL MEDICINE

## 2017-12-27 PROCEDURE — 00000402 ZZHCL STATISTIC TOTAL PROTEIN: Performed by: INTERNAL MEDICINE

## 2017-12-27 PROCEDURE — 99213 OFFICE O/P EST LOW 20 MIN: CPT | Mod: 25

## 2017-12-27 PROCEDURE — 88184 FLOWCYTOMETRY/ TC 1 MARKER: CPT | Performed by: INTERNAL MEDICINE

## 2017-12-27 PROCEDURE — 86704 HEP B CORE ANTIBODY TOTAL: CPT | Performed by: INTERNAL MEDICINE

## 2017-12-27 PROCEDURE — 99205 OFFICE O/P NEW HI 60 MIN: CPT | Mod: GC | Performed by: INTERNAL MEDICINE

## 2017-12-27 PROCEDURE — 40001004 ZZHCL STATISTIC FLOW INT 9-15 ABY TC 88188: Performed by: INTERNAL MEDICINE

## 2017-12-27 PROCEDURE — 84550 ASSAY OF BLOOD/URIC ACID: CPT | Performed by: INTERNAL MEDICINE

## 2017-12-27 ASSESSMENT — PAIN SCALES - GENERAL: PAINLEVEL: NO PAIN (0)

## 2017-12-27 NOTE — NURSING NOTE
Chief Complaint   Patient presents with     Labs Only     labs drawn via venipuncture     There were no vitals taken for this visit.    Labs collected and sent from right antecubital venipuncture. Pt tolerated well.    Jacquelyn Paul RN

## 2017-12-27 NOTE — PROGRESS NOTES
[facility]  Date: December 27, 2017  Resident: Cortez Harrison MD  Attending: Dr. Gal Bain    SUBJECTIVE:  Zack Demarco is a 77 year old male who presents as a new patient to referred by Dr. Carbajal for new diagnosis of follicular lymphoma.    Patient was incidentally diagnosed during his evaluation for cardiac bypass surgery back in 11/2017. In October, he had chest pain while walking into a store and again a few days later while deer hunting. He called his primary and had a cardiac evaluation that eventually demonstrated CAD necessitating CABG.  During his CABG evaluation, he had a CT chest on 11/14/17 that showed an incidental retroperitoneal mass and surrounding lymphadenopathy suspicious for malignancy. This was followed by a CT chest/abdomen/pelvis on the same day, which again showed a large 2.3 x 7.2 x 6.1 cm retroperitoneal soft tissue mass with adjacent lymphadenopathy that mildly narrows the left renal vein. Nodularity within the mesentery and an enlarged hilar lymph node were also found. CT guided biopsy of a retroperitoneal node on 12/12/2017 disclosed the diagnosis of lymphoma.    He underwent an uncomplicated 3-vessel CABG on 11/22/17. He has been healing well from surgery. He is doing cardiac rehab 2 days a week, and the other days of the week does bike and treadmill work at his local [facility]. No chest pain or dyspnea.    Of note, patient is considering some dental implants, possibly getting teeth pulled and getting dentures. He is wondering if treatment of lymphoma will impact when he can get his dental work done.     He denies having exposure to tuberculosis and does not recall getting tested. His travel abroad includes [place] and [place]. He does not have a history of hepatitis. No IV drug use. He has never had the shingles, however he has had the vaccination in the past. He reports rare cold sores, last one about 6 months ago.    Review Of Systems  Some mild,  "intermittent urinary symptoms. He gets up maybe once a night to urinate and sometimes has a weaker stream. He has had stable symptoms for \"years\" and has a mildly enlarged prostate on CT imaging. No cardiorespiratory symptoms with limited activity since the CABG. No abdominal or new back pain, GI symptoms or bleeding. No fevers, night sweats or weight loss.    12 point ROS otherwise negative unless noted above    Past Medical History  Past Medical History:   Diagnosis Date     Coronary artery disease 11/22/2017     DJD (degenerative joint disease) of hip     right     Glaucoma      Hernia umbilical      Hypercholesterolemia      Hypertension      Hypothyroidism      Lipoma      Low back pain      Lymphoma, unspecified body region, unspecified lymphoma type (H) 12/15/2017     Nonsenile cataract      Obesity      Unstable angina (H) 11/13/2017       Past Surgical History  Past Surgical History:   Procedure Laterality Date     BIOPSY  1980's    fatty tissue     BYPASS GRAFT ARTERY CORONARY N/A 11/22/2017    Procedure: BYPASS GRAFT ARTERY CORONARY;  Median Sternotomy, Coronary Artery Bypass Graft x3, Using Left Internal Mammary and  Endoscopic  [place] of Right Greater Saphenous Vein, On Cardiopulmonary Bypass, Transesophageal Echocardiogram;  Surgeon: Rolando Toro MD;  Location: [facility] OR     [facility] SURGICAL PATHOLOGY       COLONOSCOPY  2011     COLONOSCOPY WITH CO2 INSUFFLATION N/A 9/19/2016    Procedure: COLONOSCOPY WITH CO2 INSUFFLATION;  Surgeon: Jeffery Flores MD;  Location: [facility] OR     ROTATOR CUFF REPAIR RT/LT      left     SOFT TISSUE SURGERY  Sept. 2014    [facility] Tendon transfer (Left Ankle)        Medications  Current Outpatient Prescriptions   Medication Sig Dispense Refill     metoprolol (LOPRESSOR) 25 MG tablet Take 0.5 tablets (12.5 mg) by mouth 2 times daily 60 tablet 0     atorvastatin (LIPITOR) 40 MG tablet Take 1 tablet (40 mg) by mouth daily 60 tablet 11     levothyroxine (SYNTHROID/LEVOTHROID) 75 " "MCG tablet Take 1 tablet (75 mcg) by mouth daily 90 tablet 3     Cholecalciferol (VITAMIN D3 PO) Take by mouth daily       multivitamin (OCUVITE) TABS Take 1 tablet by mouth daily       latanoprost (XALATAN) 0.005 % ophthalmic solution Place 1 drop into both eyes At Bedtime 3 Bottle 4     aspirin 325 MG EC tablet 1/2 tab daily       acetaminophen (TYLENOL) 325 MG tablet Take 2 tablets (650 mg) by mouth every 4 hours as needed for mild pain (Patient not taking: Reported on 12/27/2017) 100 tablet 0     pantoprazole (PROTONIX) 40 MG EC tablet Take 1 tablet (40 mg) by mouth every morning (Patient not taking: Reported on 12/27/2017) 30 tablet 0     nitroGLYcerin (NITROSTAT) 0.4 MG sublingual tablet For chest pain place 1 tablet under the tongue every 5 minutes for 3 doses. If symptoms persist 5 minutes after 1st dose call 911. (Patient not taking: Reported on 12/27/2017) 25 tablet 3       Allergies  Allergies   Allergen Reactions     Nkda [No Known Drug Allergies]        Family History  Family History   Problem Relation Age of Onset     CANCER Mother      pancreatic     CANCER Father      lung     Respiratory Brother      COPD     Eye Disorder Brother      CANCER Sister      liver     CANCER Sister      Musculoskeletal Disorder Sister      back     Macular Degeneration Sister 60     Macular Degeneration Brother 60       Social History  Social History   Substance Use Topics     Smoking status: Former Smoker     Packs/day: 1.00     Years: 20.00     Types: Cigarettes     Start date: 7/28/1959     Quit date: 7/28/1979     Smokeless tobacco: Former User     Alcohol use 0.0 oz/week      Comment: rarely     Lives in [place], [place]. Retired . Lives alone. Has 2 daughters, one that lives in [place], the other in [place]    OBJECTIVE:  /71 (BP Location: Left arm, Patient Position: Sitting, Cuff Size: Adult Regular)  Pulse 67  Temp 97.7  F (36.5  C) (Oral)  Resp 16  Ht 1.776 m (5' 9.92\")  Wt 95.3 kg (210 lb 1.6 " oz)  SpO2 96%  BMI 30.21 kg/m2  Body mass index is 30.21 kg/(m^2).   Gen: alert, sitting in chair in no distress  HEENT:  Normocephalic, atraumatic, PERRL, EOMI, no scleral icterus, no oropharyngeal masses, mucosa moist without lesions.   Neck: supple, no cervical or supraclavicular LAD. Thyroid not appreciably abnormal.  Axillae: no nodes.  CV: RRR, +s1/s2, no murmurs; surgical incision healing well  Resp: CTAB  Abd: +BS, soft, NT/ND, no hepatosplenomegaly or mass, small umbilical hernia that reduces, no inguinal/femoral nodes.  Ext: warm, no edema, no epitrochlear nodes.  Skin: warm and dry, no rashes on exposed skin, no nail changes  Neuro: alert and oriented, no gross focal deficits    LABORATORY:    Labs drawn today - pending.    Pathology: RETROPERITONEUM, CT GUIDED BIOPSY: Follicular lymphoma,     COMMENT: Flow cytometry ([id number]) showed CD10 positive lambda monotypic B cells in the background of polytypic B cells and cytogenetic FISH studies([id number]) showed an IGH-BCL2 gene fusion.  These findings in conjunction with the morphologic findings are diagnostic of follicular lymphoma.  The tissue is inadequate for grading.     ASSESSMENT/PLAN: Newly diagnosed follicular lymphoma, staging to be completed. He has involvement both below and above (enlarged hilar node) the diaphragm, so with what we know, he has at least stage III disease. The lymph node biopsy was unable to determine lymphoma grade.      Clinically he is asymptomatic, no B symptoms. His mild anemia is improving and is most likely secondary to his recent bypass surgery. His other blood counts are stable. The major focus of lymphoma in his retroperitoneum appears to be mildly narrowing his left renal vein, however the vein remains patent.     We discussed the nature of low grade lymphomas, usually disseminated at diagnosis, but often without symptoms. We described the nodes both above and below the diaphragm that make him at least stage IIIA.  There are many alternatives to management and the lymphoma tends to be very responsive, but not necessarily curable. Thus, treatment is only used when it is necessary.    At this time, he does not require immediate treatment of his lymphoma. That being said, we would like to further characterize his disease activity with PET CT imaging in about 2 weeks. The timing of the PET CT will be approximately 2 months after his last CT scan, so, in addition to allowing more time for surgical changes to subside, will also help determine any change of the lymphoma over that interval. Will defer final decision regarding bone marrow biopsy to later and pursue only if it would affect management. Staging labs ordered for post clinic visit today.    If he were to need treatment, his performance status is excellent and he has recovered well from his cardiac bypass surgery.    - Labs today: LDH, beta-2 microglobulin, SPEP, peripheral blood flow cytometry, uric acid, hepatitis panel, and CMP  - PET CT scan in ~2 weeks      Return to clinic after PET/CT scan    This patient was seen and staffed with my attending, Dr. Bain.    Cortez Harrison MD  PGY3  Pager: [phone]    Oncology Attending    Patient seen and examined with the Medicine Resident, Dr. Harrison. Agree with his findings, assessment and plan. We spent over an hour with Mr. Demarco, the majority of time in counseling.    Gal Bain MD  Professor of Medicine  Oncology  [facility]  Office: [phone]  Clinic Fax: [phone]    CC:  MD Rolando Pearson MD Yohannes Gebre, MD Ronald Sih, MD

## 2017-12-27 NOTE — MR AVS SNAPSHOT
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)           Follow-ups after your visit        Your next 10 appointments already scheduled     Jan 04, 2018 10:00 AM CST   [facility]    [address] [place] [address]   [phone]            Jan 12, 2018  9:45 AM CST   (Arrive by 9:30 AM)   PE NPET ONCOLOGY (EYES TO THIGHS) with UUPET1   [facility] [place] PET CT ([facility])    [address] [address] [phone]           Tell your doctor:   If there is any chance you may be pregnant or if you are breastfeeding.   If you have problems lying in small spaces (claustrophobia). If you do, your doctor may give you medicine to help you relax. If you have diabetes:   Have your exam early in the morning. Your blood glucose will go up as the day goes by.   Your glucose level must be 180 or less at the start of the exam. Please take any medicines you need to ensure this blood glucose level. 24 hours before your scan: Don t do any heavy exercise. (No jogging, aerobics or other workouts.) Exercise will make your pictures less accurate. 6 hours before your scan:   Stop all food and liquids (except water).   Do not chew gum or suck on mints.   If you need to take medicine with food, you may take it with a few crackers.  Please call your Imaging Department at your exam site with any questions.            Jan 17, 2018  8:00 AM CST   New Visit with Avtar Mccullough MD    [facility] ([facility]    [address]  [place] [address]   [phone]            Jan 18, 2018  8:30 AM CST   (Arrive by 8:15 AM)   Return Visit with Gal Bain MD   [facility] ([facility])    [address]  2nd Floor  [place] [facility] [address] [phone]              Future tests that were ordered for you today     Open Future Orders        Priority Expected Expires Ordered    NPET Oncology (Eyes to Thighs) Routine 1/17/2018 3/27/2018 12/27/2017            Who to contact     If you have questions or need follow up information about today's clinic visit or your schedule please contact [facility] directly at [phone].  Normal or non-critical lab and imaging results will be communicated to you by MyChart, letter or phone within 4 business days after the clinic has received the results. If you do not hear from us within 7 days, please contact the clinic through [employer] or phone. If you have a critical or abnormal lab result, we will notify you by phone as soon as possible.  Submit refill requests through [employer] or call your pharmacy and they will forward the refill request to us. Please allow 3 business days for your refill to be completed.          Additional Information About Your Visit        [employer] Information     [employer] gives you secure access to your electronic health record. If you see a primary care provider, you can also send messages to your care team and make appointments. If you have questions, please call your primary care clinic.  If you do not have a primary care provider, please call [phone] and they will assist you.        Care EveryWhere ID     This is your Care EveryWhere ID. This could be used by other organizations to access your [place] medical records  [id number]        Your Vitals Were     Pulse Temperature Respirations Height Pulse Oximetry BMI (Body  "Mass Index)    67 97.7  F (36.5  C) (Oral) 16 1.776 m (5' 9.92\") 96% 30.21 kg/m2       Blood Pressure from Last 3 Encounters:   12/27/17 137/71   12/12/17 123/48   12/08/17 134/74    Weight from Last 3 Encounters:   12/27/17 95.3 kg (210 lb 1.6 oz)   12/08/17 95.6 kg (210 lb 12.8 oz)   12/06/17 95.3 kg (210 lb)              We Performed the Following     Beta 2 microglobulin     Comprehensive metabolic panel     Hepatitis B core antibody     Hepatitis B Surface Antibody     Hepatitis C antibody     Lactate Dehydrogenase     Leukemia Lymphoma Evaluation (Flow Cytometry)     Protein electrophoresis     Uric acid        Primary Care Provider Office Phone # Fax #    Anastacio Love [phone] [address] [facility] [address]        Equal Access to Services     [facility]: Hadii stefany ham hadasho Soomaali, waaxda luqadaha, qaybta kaalmada adeegyada, marilynn schulz hayniecy dasilva . So [facility] [phone].    ATENCIÓN: Si habla español, tiene a warner disposición servicios gratuitos de asistencia lingüística. Llame al [phone].    We comply with applicable federal civil rights laws and [place] laws. We do not discriminate on the basis of race, color, national origin, age, disability, sex, sexual orientation, or gender identity.            Thank you!     Thank you for choosing [facility]  for your care. Our goal is always to provide you with excellent care. Hearing back from our patients is one way we can continue to improve our services. Please take a few minutes to complete the written survey that you may receive in the mail after your visit with us. Thank you!             Your Updated Medication List - Protect others around you: Learn how to safely use, store and throw away your medicines at www.disposemymeds.org.          This list is accurate as of: 12/27/17 10:46 AM.  Always use your most recent med list.                   Brand Name Dispense Instructions " for use Diagnosis    acetaminophen 325 MG tablet    TYLENOL    100 tablet    Take 2 tablets (650 mg) by mouth every 4 hours as needed for mild pain    S/P CABG (coronary artery bypass graft), Acute post-operative pain       aspirin 325 MG EC tablet      1/2 tab daily        atorvastatin 40 MG tablet    LIPITOR    60 tablet    Take 1 tablet (40 mg) by mouth daily    Hyperlipidemia LDL goal <100       latanoprost 0.005 % ophthalmic solution    XALATAN    3 Bottle    Place 1 drop into both eyes At Bedtime    Borderline glaucoma with ocular hypertension, unspecified laterality       levothyroxine 75 MCG tablet    SYNTHROID/LEVOTHROID    90 tablet    Take 1 tablet (75 mcg) by mouth daily    Hypothyroidism, unspecified type       metoprolol 25 MG tablet    LOPRESSOR    60 tablet    Take 0.5 tablets (12.5 mg) by mouth 2 times daily    S/P CABG (coronary artery bypass graft), Acute post-operative pain       multivitamin Tabs tablet      Take 1 tablet by mouth daily        nitroGLYcerin 0.4 MG sublingual tablet    NITROSTAT    25 tablet    For chest pain place 1 tablet under the tongue every 5 minutes for 3 doses. If symptoms persist 5 minutes after 1st dose call 911.    Exertional chest pain       pantoprazole 40 MG EC tablet    PROTONIX    30 tablet    Take 1 tablet (40 mg) by mouth every morning    S/P CABG (coronary artery bypass graft)       VITAMIN D3 PO      Take by mouth daily

## 2017-12-27 NOTE — LETTER
12/27/2017      RE: Zack Demarco  [address] [place] [address]       [facility]  Date: December 27, 2017  Resident: Cortez Harrison MD  Attending: Dr. Gal Bain    SUBJECTIVE:  Zack Demarco is a 77 year old male who presents as a new patient to referred by Dr. Carbajal for new diagnosis of follicular lymphoma.    Patient was incidentally diagnosed during his evaluation for cardiac bypass surgery back in 11/2017. In October, he had chest pain while walking into a store and again a few days later while deer hunting. He called his primary and had a cardiac evaluation that eventually demonstrated CAD necessitating CABG.  During his CABG evaluation, he had a CT chest on 11/14/17 that showed an incidental retroperitoneal mass and surrounding lymphadenopathy suspicious for malignancy. This was followed by a CT chest/abdomen/pelvis on the same day, which again showed a large 2.3 x 7.2 x 6.1 cm retroperitoneal soft tissue mass with adjacent lymphadenopathy that mildly narrows the left renal vein. Nodularity within the mesentery and an enlarged hilar lymph node were also found. CT guided biopsy of a retroperitoneal node on 12/12/2017 disclosed the diagnosis of lymphoma.    He underwent an uncomplicated 3-vessel CABG on 11/22/17. He has been healing well from surgery. He is doing cardiac rehab 2 days a week, and the other days of the week does bike and treadmill work at his local [facility]. No chest pain or dyspnea.    Of note, patient is considering some dental implants, possibly getting teeth pulled and getting dentures. He is wondering if treatment of lymphoma will impact when he can get his dental work done.     He denies having exposure to tuberculosis and does not recall getting tested. His travel abroad includes [place] and [place]. He does not have a history of hepatitis. No IV drug use. He has never had the shingles, however he has had the vaccination in the past. He  "reports rare cold sores, last one about 6 months ago.    Review Of Systems  Some mild, intermittent urinary symptoms. He gets up maybe once a night to urinate and sometimes has a weaker stream. He has had stable symptoms for \"years\" and has a mildly enlarged prostate on CT imaging. No cardiorespiratory symptoms with limited activity since the CABG. No abdominal or new back pain, GI symptoms or bleeding. No fevers, night sweats or weight loss.    12 point ROS otherwise negative unless noted above    Past Medical History  Past Medical History:   Diagnosis Date     Coronary artery disease 11/22/2017     DJD (degenerative joint disease) of hip     right     Glaucoma      Hernia umbilical      Hypercholesterolemia      Hypertension      Hypothyroidism      Lipoma      Low back pain      Lymphoma, unspecified body region, unspecified lymphoma type (H) 12/15/2017     Nonsenile cataract      Obesity      Unstable angina (H) 11/13/2017       Past Surgical History  Past Surgical History:   Procedure Laterality Date     BIOPSY  1980's    fatty tissue     BYPASS GRAFT ARTERY CORONARY N/A 11/22/2017    Procedure: BYPASS GRAFT ARTERY CORONARY;  Median Sternotomy, Coronary Artery Bypass Graft x3, Using Left Internal Mammary and  Endoscopic  [place] of Right Greater Saphenous Vein, On Cardiopulmonary Bypass, Transesophageal Echocardiogram;  Surgeon: Rolando Toro MD;  Location: [facility] OR     [facility] SURGICAL PATHOLOGY       COLONOSCOPY  2011     COLONOSCOPY WITH CO2 INSUFFLATION N/A 9/19/2016    Procedure: COLONOSCOPY WITH CO2 INSUFFLATION;  Surgeon: Jeffery Flores MD;  Location: [facility] OR     ROTATOR CUFF REPAIR RT/LT      left     SOFT TISSUE SURGERY  Sept. 2014    [facility] Tendon transfer (Left Ankle)        Medications  Current Outpatient Prescriptions   Medication Sig Dispense Refill     metoprolol (LOPRESSOR) 25 MG tablet Take 0.5 tablets (12.5 mg) by mouth 2 times daily 60 tablet 0     atorvastatin (LIPITOR) 40 MG tablet Take 1 " tablet (40 mg) by mouth daily 60 tablet 11     levothyroxine (SYNTHROID/LEVOTHROID) 75 MCG tablet Take 1 tablet (75 mcg) by mouth daily 90 tablet 3     Cholecalciferol (VITAMIN D3 PO) Take by mouth daily       multivitamin (OCUVITE) TABS Take 1 tablet by mouth daily       latanoprost (XALATAN) 0.005 % ophthalmic solution Place 1 drop into both eyes At Bedtime 3 Bottle 4     aspirin 325 MG EC tablet 1/2 tab daily       acetaminophen (TYLENOL) 325 MG tablet Take 2 tablets (650 mg) by mouth every 4 hours as needed for mild pain (Patient not taking: Reported on 12/27/2017) 100 tablet 0     pantoprazole (PROTONIX) 40 MG EC tablet Take 1 tablet (40 mg) by mouth every morning (Patient not taking: Reported on 12/27/2017) 30 tablet 0     nitroGLYcerin (NITROSTAT) 0.4 MG sublingual tablet For chest pain place 1 tablet under the tongue every 5 minutes for 3 doses. If symptoms persist 5 minutes after 1st dose call 911. (Patient not taking: Reported on 12/27/2017) 25 tablet 3       Allergies  Allergies   Allergen Reactions     Nkda [No Known Drug Allergies]        Family History  Family History   Problem Relation Age of Onset     CANCER Mother      pancreatic     CANCER Father      lung     Respiratory Brother      COPD     Eye Disorder Brother      CANCER Sister      liver     CANCER Sister      Musculoskeletal Disorder Sister      back     Macular Degeneration Sister 60     Macular Degeneration Brother 60       Social History  Social History   Substance Use Topics     Smoking status: Former Smoker     Packs/day: 1.00     Years: 20.00     Types: Cigarettes     Start date: 7/28/1959     Quit date: 7/28/1979     Smokeless tobacco: Former User     Alcohol use 0.0 oz/week      Comment: rarely     Lives in [place], [place]. Retired . Lives alone. Has 2 daughters, one that lives in [place], the other in [place]    OBJECTIVE:  /71 (BP Location: Left arm, Patient Position: Sitting, Cuff Size: Adult Regular)  Pulse 67   "Temp 97.7  F (36.5  C) (Oral)  Resp 16  Ht 1.776 m (5' 9.92\")  Wt 95.3 kg (210 lb 1.6 oz)  SpO2 96%  BMI 30.21 kg/m2  Body mass index is 30.21 kg/(m^2).   Gen: alert, sitting in chair in no distress  HEENT:  Normocephalic, atraumatic, PERRL, EOMI, no scleral icterus, no oropharyngeal masses, mucosa moist without lesions.   Neck: supple, no cervical or supraclavicular LAD. Thyroid not appreciably abnormal.  Axillae: no nodes.  CV: RRR, +s1/s2, no murmurs; surgical incision healing well  Resp: CTAB  Abd: +BS, soft, NT/ND, no hepatosplenomegaly or mass, small umbilical hernia that reduces, no inguinal/femoral nodes.  Ext: warm, no edema, no epitrochlear nodes.  Skin: warm and dry, no rashes on exposed skin, no nail changes  Neuro: alert and oriented, no gross focal deficits    LABORATORY:    Labs drawn today - pending.    Pathology: RETROPERITONEUM, CT GUIDED BIOPSY: Follicular lymphoma,     COMMENT: Flow cytometry ([id number]) showed CD10 positive lambda monotypic B cells in the background of polytypic B cells and cytogenetic FISH studies([id number]) showed an IGH-BCL2 gene fusion.  These findings in conjunction with the morphologic findings are diagnostic of follicular lymphoma.  The tissue is inadequate for grading.     ASSESSMENT/PLAN: Newly diagnosed follicular lymphoma, staging to be completed. He has involvement both below and above (enlarged hilar node) the diaphragm, so with what we know, he has at least stage III disease. The lymph node biopsy was unable to determine lymphoma grade.      Clinically he is asymptomatic, no B symptoms. His mild anemia is improving and is most likely secondary to his recent bypass surgery. His other blood counts are stable. The major focus of lymphoma in his retroperitoneum appears to be mildly narrowing his left renal vein, however the vein remains patent.     We discussed the nature of low grade lymphomas, usually disseminated at diagnosis, but often without symptoms. We " described the nodes both above and below the diaphragm that make him at least stage IIIA. There are many alternatives to management and the lymphoma tends to be very responsive, but not necessarily curable. Thus, treatment is only used when it is necessary.    At this time, he does not require immediate treatment of his lymphoma. That being said, we would like to further characterize his disease activity with PET CT imaging in about 2 weeks. The timing of the PET CT will be approximately 2 months after his last CT scan, so, in addition to allowing more time for surgical changes to subside, will also help determine any change of the lymphoma over that interval. Will defer final decision regarding bone marrow biopsy to later and pursue only if it would affect management. Staging labs ordered for post clinic visit today.    If he were to need treatment, his performance status is excellent and he has recovered well from his cardiac bypass surgery.    - Labs today: LDH, beta-2 microglobulin, SPEP, peripheral blood flow cytometry, uric acid, hepatitis panel, and CMP  - PET CT scan in ~2 weeks      Return to clinic after PET/CT scan    This patient was seen and staffed with my attending, Dr. Bain.    Cortez Harrison MD  PGY3  Pager: [phone]    Oncology Attending    Patient seen and examined with the Medicine Resident, Dr. Harrison. Agree with his findings, assessment and plan. We spent over an hour with Mr. Demarco, the majority of time in counseling.    Gal Bain MD  Professor of Medicine  Oncology  [facility]  Office: [phone]  Clinic Fax: [phone]    CC:  MD Rolando Pearson MD Yohannes Gebre, MD Ronald Sih, MD Bruce A. Peterson, MD

## 2017-12-28 LAB
ALBUMIN SERPL ELPH-MCNC: 4.3 G/DL (ref 3.7–5.1)
ALPHA1 GLOB SERPL ELPH-MCNC: 0.4 G/DL (ref 0.2–0.4)
ALPHA2 GLOB SERPL ELPH-MCNC: 0.8 G/DL (ref 0.5–0.9)
B-GLOBULIN SERPL ELPH-MCNC: 1.1 G/DL (ref 0.6–1)
COPATH REPORT: NORMAL
GAMMA GLOB SERPL ELPH-MCNC: 1.1 G/DL (ref 0.7–1.6)
M PROTEIN SERPL ELPH-MCNC: 0 G/DL
PROT PATTERN SERPL ELPH-IMP: ABNORMAL

## 2018-01-04 ENCOUNTER — OFFICE VISIT (OUTPATIENT)
Dept: CARDIOLOGY | Facility: CLINIC | Age: 78
End: 2018-01-04
Payer: COMMERCIAL

## 2018-01-04 VITALS
OXYGEN SATURATION: 98 % | DIASTOLIC BLOOD PRESSURE: 72 MMHG | WEIGHT: 211.5 LBS | TEMPERATURE: 97.9 F | HEART RATE: 64 BPM | SYSTOLIC BLOOD PRESSURE: 133 MMHG | BODY MASS INDEX: 30.42 KG/M2

## 2018-01-04 DIAGNOSIS — Z95.1 S/P CABG (CORONARY ARTERY BYPASS GRAFT): ICD-10-CM

## 2018-01-04 DIAGNOSIS — G89.18 ACUTE POST-OPERATIVE PAIN: ICD-10-CM

## 2018-01-04 PROCEDURE — 99214 OFFICE O/P EST MOD 30 MIN: CPT | Performed by: INTERNAL MEDICINE

## 2018-01-04 RX ORDER — METOPROLOL TARTRATE 25 MG/1
12.5 TABLET, FILM COATED ORAL 2 TIMES DAILY
Qty: 60 TABLET | Refills: 0 | Status: SHIPPED | OUTPATIENT
Start: 2018-01-04 | End: 2018-03-26

## 2018-01-04 NOTE — PROGRESS NOTES
2018            Anastacio Love MD    [facility]   [address] [place], [place]  [address]       RE:  Zack Demarco   MRN:  [id number]   :  1940      Dear Dr. Love:      It was a pleasure participating in the care of your patient, . Zack Demarco.  As you know, he is a 77-year-old gentleman who I see today for coronary artery disease.      His past medical history is significant for the followin.  Hypertension.   2.  Hyperlipidemia.   3.  Recently diagnosed retroperitoneal mass representing with follicular lymphoma.   4.  Hypothyroidism.   5.  Borderline glaucoma.   6.  Right knee problems.   7.  Lipoma.   8.  Patellar tendinitis.      His cardiac history is significant for multivessel coronary disease and normal LV systolic function.  I last saw him 2017 for an abnormal stress test.  We ordered a coronary angiogram which revealed the following:      Cor angio 2017:     Left main normal.   LAD 70-80% ostial stenosis.   LAD 80-90% stenosis in the mid-portion.   First diagonal 80-90% stenosis.   Circumflex mild diffuse disease.   RCA 70% stenosis in the mid-portion.      Three-vessel coronary bypass surgery was performed (LIMA to LAD, vein graft to PDA, vein graft to D1).  He presents today for continuing care.      Since his revascularization, he has felt much better.  He is progressing through cardiac rehab without incident.  He also goes to the gym daily and exercise on the exercise bike and treadmill for 30 minutes apiece.  He feels well.  He has not had recurrent angina and has essentially no other complaints.  He denies new chest pain, shortness of breath, PND, orthopnea, edema, palpitations, syncope or near-syncope.      CURRENT MEDICATIONS:     1.  Metoprolol 12.5 twice daily.   2.  Atorvastatin 40 mg a day.   3.  Levothyroxine.   4.  Aspirin 325 mg a day.      PHYSICAL EXAMINATION:     VITAL SIGNS:  His blood pressure is 133/72 with a pulse of 64.  His  weight is 211 pounds.   NECK:  Reveals no obvious jugular venous distention.   LUNGS:  Clear to auscultation.  Respiratory effort is normal.   CARDIAC:  Reveals a regular rate and rhythm, no obvious murmur or gallop appreciated.   ABDOMEN:  Belly soft, nontender.   EXTREMITIES:  Without gross edema.      Labs 2017, potassium 4.1, GFR normal.      Echocardiogram 2017, ejection fraction 60-65%.  No gross valvular pathology identified.        IMPRESSION:      Zack is a 77-year-old gentleman whose cardiac history is significant for known multivessel coronary disease and normal left ventricular systolic function.  He underwent 3-vessel coronary bypass surgery (left internal mammary artery to left anterior descending, vein graft to posterior descending artery, vein graft to first diagonal on 2017.      Since revascularization, he has not had any recurrent centrally located chest tightness with shortness of breath.  He has been progressing through cardiac rehabilitation nicely, without gross recurrent symptoms or complications.      His left ventricular systolic function is normal without gross valvular pathology as of echocardiogram 2017 and he is tolerating his medications quite well.  He has had excellent progress from a cardiac standpoint.        PLAN:     1.  Complete cardiac rehab.     2.  Continue present medications at present doses.     3.  He will continue his followup and treatment for his recently diagnosed follicular lymphoma.  He can see me in 6-9 months or earlier if needed.      Once again, it was a pleasure participating in the care of your patient, Mr. Zack Trivedi.  Please feel free to contact me anytime if you have any questions regarding his care in the future.         Sincerely,      TRUNG BENITO MD             D: 2018 10:27   T: 2018 17:41   MT: TS      Name:     ZACK TRIVEDI   MRN:      [id number]        Account:      [id number]   :      1940       Document: [id number]       cc: Anastacio Love MD

## 2018-01-04 NOTE — NURSING NOTE
"Zack Demarco's goals for this visit include: CAD  He requests these members of his care team be copied on today's visit information: yes    PCP: Anastacio Love    Referring Provider:  Anastacio Love MD  [address] [place] [address]    Chief Complaint   Patient presents with     Heart Problem       Initial /74 (BP Location: Left arm, Patient Position: Chair, Cuff Size: Adult Regular)  Pulse 64  Temp 97.9  F (36.6  C) (Oral)  Wt 95.9 kg (211 lb 8 oz)  SpO2 98%  BMI 30.42 kg/m2 Estimated body mass index is 30.42 kg/(m^2) as calculated from the following:    Height as of 12/27/17: 1.776 m (5' 9.92\").    Weight as of this encounter: 95.9 kg (211 lb 8 oz).  Medication Reconciliation: complete    Do you need any medication refills at today's visit? Yes Metoprolol     "

## 2018-01-04 NOTE — MR AVS SNAPSHOT
After Visit Summary   1/4/2018    Zack Demarco    MRN: [id number]           Patient Information     Date Of Birth          1940        Visit Information        Provider Department      1/4/2018 10:00 AM Torsten Reeys MD [facility]        Today's Diagnoses     S/P CABG (coronary artery bypass graft)        Acute post-operative pain           Follow-ups after your visit        Your next 10 appointments already scheduled     Jan 12, 2018  9:45 AM CST   (Arrive by 9:30 AM)   PE NPET ONCOLOGY (EYES TO THIGHS) with UUPET1   [facility] [place] PET CT ([facility])    [address] [address] [phone]           Tell your doctor:   If there is any chance you may be pregnant or if you are breastfeeding.   If you have problems lying in small spaces (claustrophobia). If you do, your doctor may give you medicine to help you relax. If you have diabetes:   Have your exam early in the morning. Your blood glucose will go up as the day goes by.   Your glucose level must be 180 or less at the start of the exam. Please take any medicines you need to ensure this blood glucose level. 24 hours before your scan: Don t do any heavy exercise. (No jogging, aerobics or other workouts.) Exercise will make your pictures less accurate. 6 hours before your scan:   Stop all food and liquids (except water).   Do not chew gum or suck on mints.   If you need to take medicine with food, you may take it with a few crackers.  Please call your Imaging Department at your exam site with any questions.            Jan 17, 2018  8:00 AM CST   New Visit with Avtar Mccullough [facility]   [facility]    [address] [address]   [phone]            Jan 18, 2018  8:30 AM CST   (Arrive by 8:15 AM)   Return Visit with Gal Bain MD   [facility] ([facility])    [address]  [address]  [place] [address] [phone]              Who to contact     If you have questions or need follow up information about today's clinic visit or your schedule please contact [facility] directly at [phone].  Normal or non-critical lab and imaging results will be communicated to you by MyChart, letter or phone within 4 business days after the clinic has received the results. If you do not hear from us within 7 days, please contact the clinic through [employer] or phone. If you have a critical or abnormal lab result, we will notify you by phone as soon as possible.  Submit refill requests through [employer] or call your pharmacy and they will forward the refill request to us. Please allow 3 business days for your refill to be completed.          Additional Information About Your Visit        MyChart Information     [employer] gives you secure access to your electronic health record. If you see a primary care provider, you can also send messages to your care team and make appointments. If you have questions, please call your primary care clinic.  If you do not have a primary care provider, please call [phone] and they will assist you.      [employer] is an electronic gateway that provides easy, online access to your medical records. With [employer], you can request a clinic appointment, read your test results, renew a prescription or communicate with your care team.     To access your existing account, please contact your [facility] or call [phone] for assistance.        Care EveryWhere ID     This is your Care EveryWhere ID. This could be used by other organizations to access your [facility] medical records  [id number]        Your Vitals Were     Pulse Temperature Pulse Oximetry BMI (Body Mass Index)          64 97.9  F (36.6  C) (Oral) 98% 30.42 kg/m2         Blood Pressure from Last 3 Encounters:   01/04/18  133/72   12/27/17 137/71   12/12/17 123/48    Weight from Last 3 Encounters:   01/04/18 95.9 kg (211 lb 8 oz)   12/27/17 95.3 kg (210 lb 1.6 oz)   12/08/17 95.6 kg (210 lb 12.8 oz)              Today, you had the following     No orders found for display         Where to get your medicines      These medications were sent to [id number] - [place], [place] - [address]  [address], [facility] [address]    Hours:  test fax successful 4/5/04 kr Phone:  [phone]     metoprolol 25 MG tablet          Primary Care Provider Office Phone # Fax #    Anastacio Love [phone] [address] [address] [address]        Equal Access to Services     [facility]: Hadii stefany ham hadasho Soomaali, waaxda luqadaha, qaybta kaalmada adeegyada, marilynn schulz hayniecy dasilva . So [facility] [phone].    ATENCIÓN: Si habla español, tiene a warner disposición servicios gratuitos de asistencia lingüística. Llame al [phone].    We comply with applicable federal civil rights laws and [place] laws. We do not discriminate on the basis of race, color, national origin, age, disability, sex, sexual orientation, or gender identity.            Thank you!     Thank you for choosing [facility]  for your care. Our goal is always to provide you with excellent care. Hearing back from our patients is one way we can continue to improve our services. Please take a few minutes to complete the written survey that you may receive in the mail after your visit with us. Thank you!             Your Updated Medication List - Protect others around you: Learn how to safely use, store and throw away your medicines at www.disposemymeds.org.          This list is accurate as of: 1/4/18 10:28 AM.  Always use your most recent med list.                   Brand Name Dispense Instructions for use Diagnosis    aspirin 325 MG EC tablet      1/2 tab daily        atorvastatin 40 MG tablet     LIPITOR    60 tablet    Take 1 tablet (40 mg) by mouth daily    Hyperlipidemia LDL goal <100       latanoprost 0.005 % ophthalmic solution    XALATAN    3 Bottle    Place 1 drop into both eyes At Bedtime    Borderline glaucoma with ocular hypertension, unspecified laterality       levothyroxine 75 MCG tablet    SYNTHROID/LEVOTHROID    90 tablet    Take 1 tablet (75 mcg) by mouth daily    Hypothyroidism, unspecified type       metoprolol 25 MG tablet    LOPRESSOR    60 tablet    Take 0.5 tablets (12.5 mg) by mouth 2 times daily    S/P CABG (coronary artery bypass graft), Acute post-operative pain       multivitamin Tabs tablet      Take 1 tablet by mouth daily        nitroGLYcerin 0.4 MG sublingual tablet    NITROSTAT    25 tablet    For chest pain place 1 tablet under the tongue every 5 minutes for 3 doses. If symptoms persist 5 minutes after 1st dose call 911.    Exertional chest pain       VITAMIN D3 PO      Take by mouth daily

## 2018-01-12 ENCOUNTER — HOSPITAL ENCOUNTER (OUTPATIENT)
Dept: PET IMAGING | Facility: CLINIC | Age: 78
Discharge: HOME | End: 2018-01-12
Attending: INTERNAL MEDICINE | Admitting: INTERNAL MEDICINE
Payer: MEDICARE

## 2018-01-12 DIAGNOSIS — C85.90 LYMPHOMA, UNSPECIFIED BODY REGION, UNSPECIFIED LYMPHOMA TYPE (H): ICD-10-CM

## 2018-01-12 DIAGNOSIS — C82.99 FOLLICULAR LYMPHOMA OF EXTRANODAL AND SOLID ORGAN SITES (H): ICD-10-CM

## 2018-01-12 LAB — GLUCOSE BLDC GLUCOMTR-MCNC: 106 MG/DL (ref 70–99)

## 2018-01-12 PROCEDURE — A9552 F18 FDG: HCPCS | Performed by: INTERNAL MEDICINE

## 2018-01-12 PROCEDURE — 34300033 ZZH RX 343: Performed by: INTERNAL MEDICINE

## 2018-01-12 PROCEDURE — 82962 GLUCOSE BLOOD TEST: CPT

## 2018-01-12 PROCEDURE — 25000128 H RX IP 250 OP 636: Performed by: INTERNAL MEDICINE

## 2018-01-12 PROCEDURE — 78816 PET IMAGE W/CT FULL BODY: CPT | Mod: PI

## 2018-01-12 RX ORDER — IOPAMIDOL 755 MG/ML
1-135 INJECTION, SOLUTION INTRAVASCULAR ONCE
Status: COMPLETED | OUTPATIENT
Start: 2018-01-12 | End: 2018-01-12

## 2018-01-12 RX ADMIN — IOPAMIDOL 130 ML: 755 INJECTION, SOLUTION INTRAVENOUS at 10:25

## 2018-01-12 RX ADMIN — FLUDEOXYGLUCOSE F-18 13.42 MCI.: 500 INJECTION, SOLUTION INTRAVENOUS at 09:42

## 2018-01-17 ENCOUNTER — OFFICE VISIT (OUTPATIENT)
Dept: OPHTHALMOLOGY | Facility: CLINIC | Age: 78
End: 2018-01-17
Payer: COMMERCIAL

## 2018-01-17 DIAGNOSIS — H35.363 MACULAR DRUSEN, BILATERAL: ICD-10-CM

## 2018-01-17 DIAGNOSIS — H52.4 PRESBYOPIA: ICD-10-CM

## 2018-01-17 DIAGNOSIS — H40.053 BORDERLINE GLAUCOMA WITH OCULAR HYPERTENSION, BILATERAL: ICD-10-CM

## 2018-01-17 DIAGNOSIS — H25.813 COMBINED FORMS OF AGE-RELATED CATARACT OF BOTH EYES: Primary | ICD-10-CM

## 2018-01-17 DIAGNOSIS — H43.812 POSTERIOR VITREOUS DETACHMENT, LEFT: ICD-10-CM

## 2018-01-17 PROCEDURE — 92014 COMPRE OPH EXAM EST PT 1/>: CPT | Performed by: OPHTHALMOLOGY

## 2018-01-17 PROCEDURE — 92015 DETERMINE REFRACTIVE STATE: CPT | Performed by: OPHTHALMOLOGY

## 2018-01-17 ASSESSMENT — REFRACTION_WEARINGRX
OS_CYLINDER: +2.25
OS_ADD: +2.50
OD_SPHERE: +0.75
OS_SPHERE: -1.00
OS_AXIS: 170
OD_CYLINDER: +1.00
OD_ADD: +2.50
SPECS_TYPE: PAL
OD_AXIS: 172

## 2018-01-17 ASSESSMENT — CUP TO DISC RATIO
OS_RATIO: 0.5
OD_RATIO: 0.5

## 2018-01-17 ASSESSMENT — CONF VISUAL FIELD
OD_NORMAL: 1
OS_NORMAL: 1

## 2018-01-17 ASSESSMENT — SLIT LAMP EXAM - LIDS: COMMENTS: 1+ DERMATOCHALASIS - UPPER LID, 2+ MEIBOMIAN GLAND DYSFUNCTION

## 2018-01-17 ASSESSMENT — EXTERNAL EXAM - LEFT EYE: OS_EXAM: 3+ BROW PTOSIS, MILD-MOD BROW

## 2018-01-17 ASSESSMENT — REFRACTION_MANIFEST
OS_ADD: +2.50
OD_ADD: +2.50
OS_AXIS: 180
OS_SPHERE: -1.00
OD_CYLINDER: +1.25
OS_CYLINDER: +2.75
OD_SPHERE: +0.75
OD_AXIS: 007

## 2018-01-17 ASSESSMENT — VISUAL ACUITY
OS_CC: 20/25-1
CORRECTION_TYPE: GLASSES
OD_CC: J2+
OS_CC: J2+
OD_CC: 20/25-1
METHOD: SNELLEN - LINEAR

## 2018-01-17 ASSESSMENT — TONOMETRY
OS_IOP_MMHG: 17
IOP_METHOD: APPLANATION
OD_IOP_MMHG: 17

## 2018-01-17 ASSESSMENT — EXTERNAL EXAM - RIGHT EYE: OD_EXAM: 3+ BROW PTOSIS, MILD-MOD BROW

## 2018-01-17 NOTE — PROGRESS NOTES
" Current Eye Medications:  Latanoprost ou qhs , last drops at 10 pm     Subjective:  Comprehensive eye exam.   Pt states his vision may be getting a little worse.     Objective:  See Ophthalmology Exam.       Assessment:  Cataracts probably visually significant.  Stable intraocular pressure and discs.      ICD-10-CM    1. Combined forms of age-related cataract, mod, both eyes H25.813 EYE EXAM (SIMPLE-NONBILLABLE)   2. 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 Macular drusen, bilateral H35.363    4. Posterior vitreous detachment, left H43.812    5. Presbyopia H52.4 REFRACTIVE STATUS        Plan:  Continue Latanoprost at bedtime both eyes.   Glasses Rx given - optional.  Take a multiple vitamin or \"eye vitamin\" daily. (AREDS 2)  Protect your eyes outdoors from ultraviolet rays with sunglasses and/or brimmed hat.  Have spinach (cooked or raw), colorful fruits, walnuts, hazelnuts, almonds in your diet.  Monitor the vision in each eye weekly - call if any sudden persistent changes.   Return visit 6 months for intraocular pressure check.  Offered cataract surgery left eye at anytime. Call Katy LORD @ [phone] to schedule.     Avtar Mccullough M.D.         "

## 2018-01-17 NOTE — PATIENT INSTRUCTIONS
"Continue Latanoprost at bedtime both eyes.   Glasses Rx given - optional.  Take a multiple vitamin or \"eye vitamin\" daily. (AREDS 2)  Protect your eyes outdoors from ultraviolet rays with sunglasses and/or brimmed hat.  Have spinach (cooked or raw), colorful fruits, walnuts, hazelnuts, almonds in your diet.  Monitor the vision in each eye weekly - call if any sudden persistent changes.   Return visit 6 months for intraocular pressure check.  Offered cataract surgery left eye at anytime. Call Katy LORD @ [phone] to schedule.     Avtar Mccullough M.D.      "

## 2018-01-17 NOTE — MR AVS SNAPSHOT
"              After Visit Summary   1/17/2018    Zack Demarco    MRN: [id number]           Patient Information     Date Of Birth          1940        Visit Information        Provider Department      1/17/2018 8:00 AM Avtar Mccullough MD [facility]        Today's Diagnoses     Borderline glaucoma with ocular hypertension, bilateral - treated    -  1    Macular drusen, bilateral        Posterior vitreous detachment, left        Combined forms of age-related cataract mild to mod both eyes        Presbyopia          Care Instructions    Continue Latanoprost at bedtime both eyes.   Glasses Rx given - optional.  Take a multiple vitamin or \"eye vitamin\" daily. (AREDS 2)  Protect your eyes outdoors from ultraviolet rays with sunglasses and/or brimmed hat.  Have spinach (cooked or raw), colorful fruits, walnuts, hazelnuts, almonds in your diet.  Monitor the vision in each eye weekly - call if any sudden persistent changes.   Return visit 6 months for intraocular pressure check.  Offered cataract surgery left eye at anytime. Call Katy LORD @ [phone] to schedule.     Avtar Mccullough M.D.              Follow-ups after your visit        Your next 10 appointments already scheduled     Jan 18, 2018  8:30 AM CST   (Arrive by 8:15 AM)   Return Visit with Gal Bain MD   [facility] ([facility])    [address] [place] [address]  [address] [place] [address] [phone]              Who to contact     If you have questions or need follow up information about today's clinic visit or your schedule please contact [facility] directly at [phone].  Normal or non-critical lab and imaging results will be communicated to you by MyChart, letter or phone within 4 business days after the clinic has received the results. If you do not hear from us within 7 days, please contact the clinic through MyChart or phone. If you have a critical or abnormal lab " result, we will notify you by phone as soon as possible.  Submit refill requests through [employer] or call your pharmacy and they will forward the refill request to us. Please allow 3 business days for your refill to be completed.          Additional Information About Your Visit        [employer] Information     [employer] gives you secure access to your electronic health record. If you see a primary care provider, you can also send messages to your care team and make appointments. If you have questions, please call your primary care clinic.  If you do not have a primary care provider, please call [phone] and they will assist you.        Care EveryWhere ID     This is your Care EveryWhere ID. This could be used by other organizations to access your [place] medical records  [id number]         Blood Pressure from Last 3 Encounters:   01/04/18 133/72   12/27/17 137/71   12/12/17 123/48    Weight from Last 3 Encounters:   01/04/18 95.9 kg (211 lb 8 oz)   12/27/17 95.3 kg (210 lb 1.6 oz)   12/08/17 95.6 kg (210 lb 12.8 oz)              We Performed the Following     EYE EXAM (SIMPLE-NONBILLABLE)     REFRACTIVE STATUS        Primary Care Provider Office Phone # Fax #    Anastacio Love [phone] [address] [facility] [address] [facility] [facility] [address]        Equal Access to Services     [facility] : Hadii aad ku hadasho Soomaali, waaxda luqadaha, qaybta kaalmada adeegyada, waxtammy reynolds. So [facility] [phone].    ATENCIÓN: Si habla español, tiene a warner disposición servicios gratuitos de asistencia lingüística. Llame al [phone].    We comply with applicable federal civil rights laws and [place] laws. We do not discriminate on the basis of race, color, national origin, age, disability, sex, sexual orientation, or gender identity.            Thank you!     Thank you for choosing [facility]  for your care. Our goal is always to provide you with excellent care. Hearing  back from our patients is one way we can continue to improve our services. Please take a few minutes to complete the written survey that you may receive in the mail after your visit with us. Thank you!             Your Updated Medication List - Protect others around you: Learn how to safely use, store and throw away your medicines at www.disposemymeds.org.          This list is accurate as of: 1/17/18  8:32 AM.  Always use your most recent med list.                   Brand Name Dispense Instructions for use Diagnosis    aspirin 325 MG EC tablet      1/2 tab daily        atorvastatin 40 MG tablet    LIPITOR    60 tablet    Take 1 tablet (40 mg) by mouth daily    Hyperlipidemia LDL goal <100       latanoprost 0.005 % ophthalmic solution    XALATAN    3 Bottle    Place 1 drop into both eyes At Bedtime    Borderline glaucoma with ocular hypertension, unspecified laterality       levothyroxine 75 MCG tablet    SYNTHROID/LEVOTHROID    90 tablet    Take 1 tablet (75 mcg) by mouth daily    Hypothyroidism, unspecified type       metoprolol tartrate 25 MG tablet    LOPRESSOR    60 tablet    Take 0.5 tablets (12.5 mg) by mouth 2 times daily    S/P CABG (coronary artery bypass graft), Acute post-operative pain       multivitamin Tabs tablet      Take 1 tablet by mouth daily        nitroGLYcerin 0.4 MG sublingual tablet    NITROSTAT    25 tablet    For chest pain place 1 tablet under the tongue every 5 minutes for 3 doses. If symptoms persist 5 minutes after 1st dose call 911.    Exertional chest pain       VITAMIN D3 PO      Take by mouth daily

## 2018-01-17 NOTE — LETTER
"    1/17/2018         RE: Zack Demarco  [address] [place] [address]        Dear Colleague,    Thank you for referring your patient, Zack Demarco, to the [facility]. Please see a copy of my visit note below.     Current Eye Medications:  Latanoprost ou qhs , last drops at 10 pm     Subjective:  Comprehensive eye exam.   Pt states his vision may be getting a little worse.     Objective:  See Ophthalmology Exam.       Assessment:  Cataracts probably visually significant.  Stable intraocular pressure and discs.      ICD-10-CM    1. Combined forms of age-related cataract, mod, both eyes H25.813 EYE EXAM (SIMPLE-NONBILLABLE)   2. 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 Macular drusen, bilateral H35.363    4. Posterior vitreous detachment, left H43.812    5. Presbyopia H52.4 REFRACTIVE STATUS        Plan:  Continue Latanoprost at bedtime both eyes.   Glasses Rx given - optional.  Take a multiple vitamin or \"eye vitamin\" daily. (AREDS 2)  Protect your eyes outdoors from ultraviolet rays with sunglasses and/or brimmed hat.  Have spinach (cooked or raw), colorful fruits, walnuts, hazelnuts, almonds in your diet.  Monitor the vision in each eye weekly - call if any sudden persistent changes.   Return visit 6 months for intraocular pressure check.  Offered cataract surgery left eye at anytime. Call Katy LORD @ [phone] to schedule.     Avtar Mccullough M.D.           Again, thank you for allowing me to participate in the care of your patient.        Sincerely,        Avtar Mccullough MD    "

## 2018-01-18 ENCOUNTER — CARE COORDINATION (OUTPATIENT)
Dept: ONCOLOGY | Facility: CLINIC | Age: 78
End: 2018-01-18

## 2018-01-18 ENCOUNTER — ONCOLOGY VISIT (OUTPATIENT)
Dept: ONCOLOGY | Facility: CLINIC | Age: 78
End: 2018-01-18
Attending: INTERNAL MEDICINE
Payer: COMMERCIAL

## 2018-01-18 VITALS
SYSTOLIC BLOOD PRESSURE: 138 MMHG | HEART RATE: 61 BPM | HEIGHT: 70 IN | OXYGEN SATURATION: 96 % | BODY MASS INDEX: 30.79 KG/M2 | TEMPERATURE: 96.8 F | RESPIRATION RATE: 16 BRPM | WEIGHT: 215.1 LBS | DIASTOLIC BLOOD PRESSURE: 70 MMHG

## 2018-01-18 DIAGNOSIS — C85.90 LYMPHOMA, UNSPECIFIED BODY REGION, UNSPECIFIED LYMPHOMA TYPE (H): Primary | ICD-10-CM

## 2018-01-18 PROCEDURE — G0463 HOSPITAL OUTPT CLINIC VISIT: HCPCS | Mod: ZF

## 2018-01-18 PROCEDURE — 99215 OFFICE O/P EST HI 40 MIN: CPT | Mod: ZP | Performed by: INTERNAL MEDICINE

## 2018-01-18 ASSESSMENT — PAIN SCALES - GENERAL: PAINLEVEL: NO PAIN (0)

## 2018-01-18 NOTE — NURSING NOTE
"Oncology Rooming Note    January 18, 2018 8:02 AM   Zack Demarco is a 77 year old male who presents for:    Chief Complaint   Patient presents with     Oncology Clinic Visit     Return-Follicular Lymphoma     Initial Vitals: /70 (BP Location: Right arm, Patient Position: Sitting, Cuff Size: Adult Large)  Pulse 61  Temp 96.8  F (36  C) (Oral)  Resp 16  Ht 1.776 m (5' 9.92\")  Wt 97.6 kg (215 lb 1.6 oz)  SpO2 96%  BMI 30.93 kg/m2 Estimated body mass index is 30.93 kg/(m^2) as calculated from the following:    Height as of this encounter: 1.776 m (5' 9.92\").    Weight as of this encounter: 97.6 kg (215 lb 1.6 oz). Body surface area is 2.19 meters squared.  No Pain (0) Comment: Data Unavailable   No LMP for male patient.  Allergies reviewed: Yes  Medications reviewed: Yes    Medications: Medication refills not needed today.  Pharmacy name entered into [employer]: [id number] - [place] - [address] [address] [facility]    Clinical concerns:     6 minutes for nursing intake (face to face time)     Abby Graff LPN            "

## 2018-01-18 NOTE — PROGRESS NOTES
Met with Mr. Demarco and his two daughters this morning to discuss chemotherapy and resources available at the [facility].  Provided patient with  My Cancer Guidebook  and print outs from [facility] on Rituxan.  Reviewed administration, side effects and ongoing symptom support management by APPs in clinic. Provided phone numbers for triage and after hours care. Highlighted steps to expect when getting chemotherapy (check in, labs, pre meds, infusion).  Discusses that chemo may be delayed by a day or two due to blood counts, infusion schedule or patient s need to modify. Provided patient with Authorization to Discuss paperwork and had him complete it while in clinic. Encouraged patient to set up [employer] to assist in managing appts and asking future questions of health care team. Reviewed with him that MyChart is not to be used for symptom management and reminded him that the triage number is the best place to call if he has any new symptoms. Patient and daughters verbalized understanding and were thankful for the all the information.

## 2018-01-18 NOTE — Clinical Note
Pt in room waiting to schedule. Raissa will be there for a short period of time. Would like US today, if possible, and then scheduled to begin Rituximab next week and weekly x4. Return to see me will be arranged about 6 weeks after start of rituximab.

## 2018-01-18 NOTE — LETTER
1/18/2018      RE: Zack Demarco  [address] [facility] [address]       ONCOLOGY SUMMARY:  Zack Demarco is a 77-year-old man who we first saw in consultation on 12/27/2017 for a new diagnosis of follicular lymphoma.       In brief, Mr. Demarco was diagnosed incidental to an evaluation for cardiac bypass surgery in 11/2017.  The chest CT scan on 11/14 showed an unexpected retroperitoneal mass with surrounding lymphadenopathy suspicious for malignancy.  A further CT scan done on the same day showed a 2.3 x 7.2 x 6.1 retroperitoneal soft tissue mass with adjacent lymphadenopathy that mildly narrowed the left renal vein.  There also was nodularity within the mesentery and an enlarged hilar lymph node.  CT-guided biopsy of the retroperitoneal mass on 12/12/2017 established the diagnosis. Bone marrow biopsy was not obtained.     Relative to cardiac issues, he underwent an uncomplicated 3-vessel coronary artery bypass graft on 11/22/2017.  He recovered well after the surgery and, subsequently, has been symptom-free.      No significant exposure history, including no history of hepatitis.      INTERVAL HISTORY:  The patient has been further evaluated and staged since the visit on 12/27.  He returns with his wife to review the results and discuss a plan of management.      Mr. Demarco continues to feel well.  No fevers, night sweats or weight loss.  He has no new adenopathy that he reports and no new symptoms.      REVIEW OF SYSTEMS:  A 10-point review of systems is otherwise negative.      MEDICATIONS:     1.  Metoprolol 12.5 mg b.i.d.   2.  Atorvastatin 40 mg daily.   3.  Nitroglycerin p.r.n.   4.  Levothyroxine 75 mcg.   5.  Vitamin D.   6.  Multivitamins.   7.  Aspirin 162 mg daily.    8.  Latanoprost ophthalmic solution.       ALLERGIES:  None reported.      PHYSICAL EXAMINATION:   VITAL SIGNS:  Weight 97.6 kg (stable), blood pressure 138/70, pulse 61 and regular, respirations 16, temperature 96.8.  O2 saturation 96%  on room air.      Relevant findings from 12/27/2017:    No peripheral adenopathy or abdominal findings.   Chest - clear.    Heart - regular rhythm.  No murmurs.     Well-healed surgical incision.      LABORATORY DATA:     From 12/06/2017:     Hemoglobin 11.4 grams percent with 9,000 WBCs (76% neutrophils, 16% lymphocytes) and 534,000 platelets.      From 12/27/2017:     Electrolytes, calcium, creatinine (0.87) - normal.     Uric acid 5.0.   Liver enzymes, including serum LDH - normal.     Beta-2 microglobulin 2.1 (normal <2.3).   Hepatitis B serologies - immune.  Hepatitis C - nonreactive.     Serum protein electrophoresis - albumin 4.3, gamma fraction 1.1, no monoclonal peak.      PATHOLOGY:  RETROPERITONEUM, CT GUIDED BIOPSY: Follicular lymphoma    COMMENT: Flow cytometry ([id number]) showed CD10 positive lambda monotypic B cells in the background of polytypic B cells and cytogenetic FISH studies ([id number]) showed an IGH-BCL2 gene fusion.  These findings in conjunction with the morphologic findings are diagnostic of follicular lymphoma.  The tissue is inadequate for grading.      FISH: ABNORMAL - IGH-BCL2 gene fusion (8.5%) (3.5% typical pattern; 5% consistent with doubling of stemline)     INTERPRETATION: Of the interphase cells examined, 8.5% had a signal pattern indicative of an IGH-BCL2 gene fusion as typically results from a t(14;18) (3.5% had a typical fusion pattern and 5% had a signal pattern consistent with doubling of the stemline).     The IGH-BCL2 fusion is the most common recurring abnormality in follicular lymphoma, and therefore these findings would be consistent with such a diagnosis.      PET/CT SCAN -     Neck/Chest: No pathologically enlarged nodes. Post surgical changes of median sternotomy with FDG uptake along the sternotomy tract. Multilevel posterior rib fractures on the left with associated FDG uptake.     Abdomen: There are multiple conglomerate retroperitoneal and mesenteric FDG avid  lymphadenopathy. The largest retroperitoneal mass is at the level of left renal artery and measures 7.2 x 5.4 cm with max SUV of 11. There is compression of the left renal vein left gonadal vein with resultant varicocele. Other examples of lymphomatous lesions include 4.3 x 2.2 cm lesion in the right lower quadrant with max SUV of 17 and 2.9 x 1.4 cm upper midline abdominal lesion with Max SUV of 3.7.     There are no suspicious hepatic lesions. There is no splenomegaly or evidence for splenic or pancreatic mass lesion. Bilateral fat-containing inguinal hernias.     There are no suspicious lytic or blastic osseous lesions.     IMPRESSION: In this patient with biopsy-proven follicular lymphoma;  1. Multiple mesenteric and retroperitoneal conglomerate lymphadenopathy of follicular lymphoma.  2. 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     ASSESSMENT AND PLAN:  Follicular lymphoma, with disease apparently limited to the abdomen and compression of the left renal and gonadal veins by retroperitoneal mass.  The patient's staging did not include bone marrow biopsy because it would not affect initial management. There is no significant change in the larger retroperitoneal mass on the scans obtained about 2 months apart.  However, the compression of the left renal and gonadal vein is of concern.     I had an extended discussion with Mr. Demarco and his wife about the results of the evaluation.  Generally, it is not necessary to initiate therapy immediately in patients with follicular lymphoma. These lymphomas tend to be very responsive to an array of options, but treatment is not considered curative. In his instance, despite the fact that there is no major change in the size of the retroperitoneal mass compressing the left renal vein over the course of the past 2 months, slightly increased compression  is difficult to ascertain and could be asymptomatic.  Also, there could be significant consequences if the vein did become occluded with respect to renal function/clot, etc.  Therefore, I recommended that we initiate therapy in the near future.      Although therapy with rituximab plus chemotherapy would likely more quickly result in tumor regression, it is also reasonable to start with rituximab as a single agent.  Its effect can be relatively rapid and substantial.  In addition, other than the possibility of a reaction during the initial infusion, toxicity is generally minimal.  Therefore, I favor this initially over preceding with chemotherapy plus rituximab.  A decision regarding adding chemotherapy could be made in the near future if there is insufficient improvement.  Although rituximab can reactivate hepatitis, Mr. Demarco is immune to hepatitis B and nonreactive to hepatitis C.  Thus, reactivation should not be a concern.       During the initial phases of treatment with rituximab, tumor breakdown may occur.  Thus, allopurinol for the first 2 weeks is necessary, as is monitoring the metabolic and hematologic effects on a weekly basis.  This was discussed with the patient and his wife.  Questions were answered.  He is in agreement to start therapy and we anticipate administering the first dose of rituximab on Friday, 01/26.      Also, we would like to be able to assess the impact of rituximab without relying on CT scans exclusively.  Utilizing ultrasound, if it demonstrated the mass satisfactorily, would allow us more frequent assessment.  Prior to the treatment, Mr. Demarco will undergo ultrasound of the retroperitoneal mass to see how well it can be delineated.  If reasonably good at delineating the mass, this would be helpful in initial monitoring.        Mr. Demarco will return to see me approximately 2 weeks after the 4th weekly dose of rituximab has administered.  We will decide on additional imaging in the  interim.        I spent approximately 45 minutes with the patient and his wife, the majority of time in counseling.      Gal Bain MD  Professor of Medicine  Oncology  [facility]  Office: [phone]  Clinic Fax: [phone]       cc:    MD Rolando Pearson MD Yohannes Gebre, MD Ronald Sih, MD Bruce A. Peterson, MD

## 2018-01-18 NOTE — PROGRESS NOTES
ONCOLOGY SUMMARY:  Zack Demarco is a 77-year-old man who we first saw in consultation on 12/27/2017 for a new diagnosis of follicular lymphoma.       In brief, Mr. Demarco was diagnosed incidental to an evaluation for cardiac bypass surgery in 11/2017.  The chest CT scan on 11/14 showed an unexpected retroperitoneal mass with surrounding lymphadenopathy suspicious for malignancy.  A further CT scan done on the same day showed a 2.3 x 7.2 x 6.1 retroperitoneal soft tissue mass with adjacent lymphadenopathy that mildly narrowed the left renal vein.  There also was nodularity within the mesentery and an enlarged hilar lymph node.  CT-guided biopsy of the retroperitoneal mass on 12/12/2017 established the diagnosis. Bone marrow biopsy was not obtained.     Relative to cardiac issues, he underwent an uncomplicated 3-vessel coronary artery bypass graft on 11/22/2017.  He recovered well after the surgery and, subsequently, has been symptom-free.      No significant exposure history, including no history of hepatitis.      INTERVAL HISTORY:  The patient has been further evaluated and staged since the visit on 12/27.  He returns with his wife to review the results and discuss a plan of management.      Mr. Demarco continues to feel well.  No fevers, night sweats or weight loss.  He has no new adenopathy that he reports and no new symptoms.      REVIEW OF SYSTEMS:  A 10-point review of systems is otherwise negative.      MEDICATIONS:     1.  Metoprolol 12.5 mg b.i.d.   2.  Atorvastatin 40 mg daily.   3.  Nitroglycerin p.r.n.   4.  Levothyroxine 75 mcg.   5.  Vitamin D.   6.  Multivitamins.   7.  Aspirin 162 mg daily.    8.  Latanoprost ophthalmic solution.       ALLERGIES:  None reported.      PHYSICAL EXAMINATION:   VITAL SIGNS:  Weight 97.6 kg (stable), blood pressure 138/70, pulse 61 and regular, respirations 16, temperature 96.8.  O2 saturation 96% on room air.      Relevant findings from 12/27/2017:    No peripheral adenopathy  or abdominal findings.   Chest - clear.    Heart - regular rhythm.  No murmurs.     Well-healed surgical incision.      LABORATORY DATA:     From 12/06/2017:     Hemoglobin 11.4 grams percent with 9,000 WBCs (76% neutrophils, 16% lymphocytes) and 534,000 platelets.      From 12/27/2017:     Electrolytes, calcium, creatinine (0.87) - normal.     Uric acid 5.0.   Liver enzymes, including serum LDH - normal.     Beta-2 microglobulin 2.1 (normal <2.3).   Hepatitis B serologies - immune.  Hepatitis C - nonreactive.     Serum protein electrophoresis - albumin 4.3, gamma fraction 1.1, no monoclonal peak.      PATHOLOGY:  RETROPERITONEUM, CT GUIDED BIOPSY: Follicular lymphoma    COMMENT: Flow cytometry ([id number]) showed CD10 positive lambda monotypic B cells in the background of polytypic B cells and cytogenetic FISH studies ([id number]) showed an IGH-BCL2 gene fusion.  These findings in conjunction with the morphologic findings are diagnostic of follicular lymphoma.  The tissue is inadequate for grading.      FISH: ABNORMAL - IGH-BCL2 gene fusion (8.5%) (3.5% typical pattern; 5% consistent with doubling of stemline)     INTERPRETATION: Of the interphase cells examined, 8.5% had a signal pattern indicative of an IGH-BCL2 gene fusion as typically results from a t(14;18) (3.5% had a typical fusion pattern and 5% had a signal pattern consistent with doubling of the stemline).     The IGH-BCL2 fusion is the most common recurring abnormality in follicular lymphoma, and therefore these findings would be consistent with such a diagnosis.      PET/CT SCAN -     Neck/Chest: No pathologically enlarged nodes. Post surgical changes of median sternotomy with FDG uptake along the sternotomy tract. Multilevel posterior rib fractures on the left with associated FDG uptake.     Abdomen: There are multiple conglomerate retroperitoneal and mesenteric FDG avid lymphadenopathy. The largest retroperitoneal mass is at the level of left renal  artery and measures 7.2 x 5.4 cm with max SUV of 11. There is compression of the left renal vein left gonadal vein with resultant varicocele. Other examples of lymphomatous lesions include 4.3 x 2.2 cm lesion in the right lower quadrant with max SUV of 17 and 2.9 x 1.4 cm upper midline abdominal lesion with Max SUV of 3.7.     There are no suspicious hepatic lesions. There is no splenomegaly or evidence for splenic or pancreatic mass lesion. Bilateral fat-containing inguinal hernias.     There are no suspicious lytic or blastic osseous lesions.     IMPRESSION: In this patient with biopsy-proven follicular lymphoma;  1. Multiple mesenteric and retroperitoneal conglomerate lymphadenopathy of follicular lymphoma.  2. 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     ASSESSMENT AND PLAN:  Follicular lymphoma, with disease apparently limited to the abdomen and compression of the left renal and gonadal veins by retroperitoneal mass.  The patient's staging did not include bone marrow biopsy because it would not affect initial management. There is no significant change in the larger retroperitoneal mass on the scans obtained about 2 months apart.  However, the compression of the left renal and gonadal vein is of concern.     I had an extended discussion with Mr. Demarco and his wife about the results of the evaluation.  Generally, it is not necessary to initiate therapy immediately in patients with follicular lymphoma. These lymphomas tend to be very responsive to an array of options, but treatment is not considered curative. In his instance, despite the fact that there is no major change in the size of the retroperitoneal mass compressing the left renal vein over the course of the past 2 months, slightly increased compression is difficult to ascertain and could be asymptomatic.  Also, there could be  significant consequences if the vein did become occluded with respect to renal function/clot, etc.  Therefore, I recommended that we initiate therapy in the near future.      Although therapy with rituximab plus chemotherapy would likely more quickly result in tumor regression, it is also reasonable to start with rituximab as a single agent.  Its effect can be relatively rapid and substantial.  In addition, other than the possibility of a reaction during the initial infusion, toxicity is generally minimal.  Therefore, I favor this initially over preceding with chemotherapy plus rituximab.  A decision regarding adding chemotherapy could be made in the near future if there is insufficient improvement.  Although rituximab can reactivate hepatitis, Mr. Demarco is immune to hepatitis B and nonreactive to hepatitis C.  Thus, reactivation should not be a concern.       During the initial phases of treatment with rituximab, tumor breakdown may occur.  Thus, allopurinol for the first 2 weeks is necessary, as is monitoring the metabolic and hematologic effects on a weekly basis.  This was discussed with the patient and his wife.  Questions were answered.  He is in agreement to start therapy and we anticipate administering the first dose of rituximab on Friday, 01/26.      Also, we would like to be able to assess the impact of rituximab without relying on CT scans exclusively.  Utilizing ultrasound, if it demonstrated the mass satisfactorily, would allow us more frequent assessment.  Prior to the treatment, Mr. Demarco will undergo ultrasound of the retroperitoneal mass to see how well it can be delineated.  If reasonably good at delineating the mass, this would be helpful in initial monitoring.        Mr. Demarco will return to see me approximately 2 weeks after the 4th weekly dose of rituximab has administered.  We will decide on additional imaging in the interim.        I spent approximately 45 minutes with the patient and his  wife, the majority of time in counseling.      Gal Bain MD  Professor of Medicine  Oncology  [facility]  Office: [phone]  Clinic Fax: [phone]       cc:    MD Rolando Pearson MD Yohannes Gebre, MD Ronald Sih, MD

## 2018-01-18 NOTE — PATIENT INSTRUCTIONS
January 2018 Sunday Monday Tuesday Wednesday Thursday Friday Saturday        1     2     3     4     RETURN   10:00 AM   (30 min.)   Torsten Reyes MD   [facility] 5     6       7     8     9     10     11     12     PE EYE/TH ONCOLOGY    9:30 AM   (45 min.)   [id number]   [facility] [place] PET CT 13       14     15     16     17     NEW    8:00 AM   (15 min.)   Avtar Mccullough MD   [facility] [place] 18     [facility] RETURN    8:15 AM   (30 min.)   Gal Bain MD   [facility] 19     20       21     22     US ABDOMEN LIMITED    9:45 AM   (45 min.)   [id number]   [facility]  23     24     25     26     [facility] DRAW    7:30 AM   (15 min.)   [facility] D[facility]   [facility] Draw     [facility] ONC INFUSION 360    8:00 AM   (360 min.)   [facility] ONCOLOGY INFUSION   [facility] 27       28     29     30     31 February 2018 Sunday Monday Tuesday Wednesday Thursday Friday Saturday                       1     2     [facility] DRAW    8:00 AM   (15 min.)   [facility] D[facility]   [facility] Draw     [facility] ONC INFUSION 360    8:30 AM   (360 min.)   [facility] ONCOLOGY INFUSION   [facility] 3       4     5     6     7     8     9     [facility] DRAW    6:30 AM   (15 min.)   [facility] DRAW   [facility] Draw     [facility] ONC INFUSION 360    7:00 AM   (360 min.)   [facility] ONCOLOGY INFUSION   [facility] 10       11     12     13     14     15     16     [facility] DRAW    7:30 AM   (15 min.)   [facility] DRAW   [facility] Draw     [facility] ONC INFUSION 360    8:00 AM   (360 min.)   [facility] ONCOLOGY INFUSION   [facility] 17       18     19     20     21     22     23     24       25     26     27     28 March 2018 Sunday Monday Tuesday Wednesday Thursday Friday Saturday                       1      2     3       4     5     6     7     [facility] RETURN    7:45 AM   (30 min.)   Gal Bain MD   [facility] 8     9     10       11     12     13     14     15     16     17       18     19     20     21     22     23     24       25     26     27     28     29     30     31

## 2018-01-22 ENCOUNTER — RADIANT APPOINTMENT (OUTPATIENT)
Dept: ULTRASOUND IMAGING | Facility: CLINIC | Age: 78
End: 2018-01-22
Attending: INTERNAL MEDICINE
Payer: COMMERCIAL

## 2018-01-22 DIAGNOSIS — C85.90 LYMPHOMA, UNSPECIFIED BODY REGION, UNSPECIFIED LYMPHOMA TYPE (H): ICD-10-CM

## 2018-01-23 RX ORDER — EPINEPHRINE 0.3 MG/.3ML
0.3 INJECTION SUBCUTANEOUS EVERY 5 MIN PRN
Status: CANCELLED | OUTPATIENT
Start: 2018-02-16

## 2018-01-23 RX ORDER — EPINEPHRINE 1 MG/ML
0.3 INJECTION, SOLUTION, CONCENTRATE INTRAVENOUS EVERY 5 MIN PRN
Status: CANCELLED | OUTPATIENT
Start: 2018-02-16

## 2018-01-23 RX ORDER — ALBUTEROL SULFATE 0.83 MG/ML
2.5 SOLUTION RESPIRATORY (INHALATION)
Status: CANCELLED | OUTPATIENT
Start: 2018-01-26

## 2018-01-23 RX ORDER — ACETAMINOPHEN 325 MG/1
650 TABLET ORAL ONCE
Status: CANCELLED
Start: 2018-02-09

## 2018-01-23 RX ORDER — LORAZEPAM 2 MG/ML
0.5 INJECTION INTRAMUSCULAR EVERY 4 HOURS PRN
Status: CANCELLED
Start: 2018-02-09

## 2018-01-23 RX ORDER — LORAZEPAM 2 MG/ML
0.5 INJECTION INTRAMUSCULAR EVERY 4 HOURS PRN
Status: CANCELLED
Start: 2018-01-26

## 2018-01-23 RX ORDER — ALBUTEROL SULFATE 0.83 MG/ML
2.5 SOLUTION RESPIRATORY (INHALATION)
Status: CANCELLED | OUTPATIENT
Start: 2018-02-09

## 2018-01-23 RX ORDER — DIPHENHYDRAMINE HYDROCHLORIDE 50 MG/ML
50 INJECTION INTRAMUSCULAR; INTRAVENOUS
Status: CANCELLED
Start: 2018-01-26

## 2018-01-23 RX ORDER — ALBUTEROL SULFATE 90 UG/1
1-2 AEROSOL, METERED RESPIRATORY (INHALATION)
Status: CANCELLED
Start: 2018-02-16

## 2018-01-23 RX ORDER — ACETAMINOPHEN 325 MG/1
650 TABLET ORAL ONCE
Status: CANCELLED
Start: 2018-02-02

## 2018-01-23 RX ORDER — LORAZEPAM 2 MG/ML
0.5 INJECTION INTRAMUSCULAR EVERY 4 HOURS PRN
Status: CANCELLED
Start: 2018-02-16

## 2018-01-23 RX ORDER — DIPHENHYDRAMINE HCL 25 MG
50 CAPSULE ORAL ONCE
Status: CANCELLED
Start: 2018-01-26

## 2018-01-23 RX ORDER — EPINEPHRINE 1 MG/ML
0.3 INJECTION, SOLUTION, CONCENTRATE INTRAVENOUS EVERY 5 MIN PRN
Status: CANCELLED | OUTPATIENT
Start: 2018-01-26

## 2018-01-23 RX ORDER — METHYLPREDNISOLONE SODIUM SUCCINATE 125 MG/2ML
125 INJECTION, POWDER, LYOPHILIZED, FOR SOLUTION INTRAMUSCULAR; INTRAVENOUS
Status: CANCELLED
Start: 2018-02-16

## 2018-01-23 RX ORDER — EPINEPHRINE 0.3 MG/.3ML
0.3 INJECTION SUBCUTANEOUS EVERY 5 MIN PRN
Status: CANCELLED | OUTPATIENT
Start: 2018-02-09

## 2018-01-23 RX ORDER — METHYLPREDNISOLONE SODIUM SUCCINATE 125 MG/2ML
125 INJECTION, POWDER, LYOPHILIZED, FOR SOLUTION INTRAMUSCULAR; INTRAVENOUS
Status: CANCELLED
Start: 2018-01-26

## 2018-01-23 RX ORDER — SODIUM CHLORIDE 9 MG/ML
1000 INJECTION, SOLUTION INTRAVENOUS CONTINUOUS PRN
Status: CANCELLED
Start: 2018-02-16

## 2018-01-23 RX ORDER — EPINEPHRINE 1 MG/ML
0.3 INJECTION, SOLUTION, CONCENTRATE INTRAVENOUS EVERY 5 MIN PRN
Status: CANCELLED | OUTPATIENT
Start: 2018-02-02

## 2018-01-23 RX ORDER — ALBUTEROL SULFATE 90 UG/1
1-2 AEROSOL, METERED RESPIRATORY (INHALATION)
Status: CANCELLED
Start: 2018-02-02

## 2018-01-23 RX ORDER — SODIUM CHLORIDE 9 MG/ML
1000 INJECTION, SOLUTION INTRAVENOUS CONTINUOUS PRN
Status: CANCELLED
Start: 2018-02-09

## 2018-01-23 RX ORDER — MEPERIDINE HYDROCHLORIDE 25 MG/ML
25 INJECTION INTRAMUSCULAR; INTRAVENOUS; SUBCUTANEOUS EVERY 30 MIN PRN
Status: CANCELLED | OUTPATIENT
Start: 2018-01-26

## 2018-01-23 RX ORDER — METHYLPREDNISOLONE SODIUM SUCCINATE 125 MG/2ML
125 INJECTION, POWDER, LYOPHILIZED, FOR SOLUTION INTRAMUSCULAR; INTRAVENOUS
Status: CANCELLED
Start: 2018-02-09

## 2018-01-23 RX ORDER — MEPERIDINE HYDROCHLORIDE 25 MG/ML
25 INJECTION INTRAMUSCULAR; INTRAVENOUS; SUBCUTANEOUS
Status: CANCELLED
Start: 2018-02-09

## 2018-01-23 RX ORDER — DIPHENHYDRAMINE HCL 25 MG
50 CAPSULE ORAL ONCE
Status: CANCELLED
Start: 2018-02-16

## 2018-01-23 RX ORDER — ACETAMINOPHEN 325 MG/1
650 TABLET ORAL ONCE
Status: CANCELLED
Start: 2018-02-16

## 2018-01-23 RX ORDER — ALBUTEROL SULFATE 90 UG/1
1-2 AEROSOL, METERED RESPIRATORY (INHALATION)
Status: CANCELLED
Start: 2018-02-09

## 2018-01-23 RX ORDER — DIPHENHYDRAMINE HCL 25 MG
50 CAPSULE ORAL ONCE
Status: CANCELLED
Start: 2018-02-02

## 2018-01-23 RX ORDER — DIPHENHYDRAMINE HYDROCHLORIDE 50 MG/ML
50 INJECTION INTRAMUSCULAR; INTRAVENOUS
Status: CANCELLED
Start: 2018-02-02

## 2018-01-23 RX ORDER — MEPERIDINE HYDROCHLORIDE 25 MG/ML
25 INJECTION INTRAMUSCULAR; INTRAVENOUS; SUBCUTANEOUS EVERY 30 MIN PRN
Status: CANCELLED | OUTPATIENT
Start: 2018-02-16

## 2018-01-23 RX ORDER — MEPERIDINE HYDROCHLORIDE 25 MG/ML
25 INJECTION INTRAMUSCULAR; INTRAVENOUS; SUBCUTANEOUS EVERY 30 MIN PRN
Status: CANCELLED | OUTPATIENT
Start: 2018-02-09

## 2018-01-23 RX ORDER — MEPERIDINE HYDROCHLORIDE 25 MG/ML
25 INJECTION INTRAMUSCULAR; INTRAVENOUS; SUBCUTANEOUS EVERY 30 MIN PRN
Status: CANCELLED | OUTPATIENT
Start: 2018-02-02

## 2018-01-23 RX ORDER — DIPHENHYDRAMINE HCL 25 MG
50 CAPSULE ORAL ONCE
Status: CANCELLED
Start: 2018-02-09

## 2018-01-23 RX ORDER — ACETAMINOPHEN 325 MG/1
650 TABLET ORAL ONCE
Status: CANCELLED
Start: 2018-01-26

## 2018-01-23 RX ORDER — ALBUTEROL SULFATE 0.83 MG/ML
2.5 SOLUTION RESPIRATORY (INHALATION)
Status: CANCELLED | OUTPATIENT
Start: 2018-02-02

## 2018-01-23 RX ORDER — DIPHENHYDRAMINE HYDROCHLORIDE 50 MG/ML
50 INJECTION INTRAMUSCULAR; INTRAVENOUS
Status: CANCELLED
Start: 2018-02-09

## 2018-01-23 RX ORDER — DIPHENHYDRAMINE HYDROCHLORIDE 50 MG/ML
50 INJECTION INTRAMUSCULAR; INTRAVENOUS
Status: CANCELLED
Start: 2018-02-16

## 2018-01-23 RX ORDER — MEPERIDINE HYDROCHLORIDE 25 MG/ML
25 INJECTION INTRAMUSCULAR; INTRAVENOUS; SUBCUTANEOUS
Status: CANCELLED
Start: 2018-02-16

## 2018-01-23 RX ORDER — SODIUM CHLORIDE 9 MG/ML
1000 INJECTION, SOLUTION INTRAVENOUS CONTINUOUS PRN
Status: CANCELLED
Start: 2018-02-02

## 2018-01-23 RX ORDER — SODIUM CHLORIDE 9 MG/ML
1000 INJECTION, SOLUTION INTRAVENOUS CONTINUOUS PRN
Status: CANCELLED
Start: 2018-01-26

## 2018-01-23 RX ORDER — EPINEPHRINE 0.3 MG/.3ML
0.3 INJECTION SUBCUTANEOUS EVERY 5 MIN PRN
Status: CANCELLED | OUTPATIENT
Start: 2018-02-02

## 2018-01-23 RX ORDER — ALBUTEROL SULFATE 90 UG/1
1-2 AEROSOL, METERED RESPIRATORY (INHALATION)
Status: CANCELLED
Start: 2018-01-26

## 2018-01-23 RX ORDER — MEPERIDINE HYDROCHLORIDE 25 MG/ML
25 INJECTION INTRAMUSCULAR; INTRAVENOUS; SUBCUTANEOUS
Status: CANCELLED
Start: 2018-01-26

## 2018-01-23 RX ORDER — EPINEPHRINE 1 MG/ML
0.3 INJECTION, SOLUTION, CONCENTRATE INTRAVENOUS EVERY 5 MIN PRN
Status: CANCELLED | OUTPATIENT
Start: 2018-02-09

## 2018-01-23 RX ORDER — EPINEPHRINE 0.3 MG/.3ML
0.3 INJECTION SUBCUTANEOUS EVERY 5 MIN PRN
Status: CANCELLED | OUTPATIENT
Start: 2018-01-26

## 2018-01-23 RX ORDER — LORAZEPAM 2 MG/ML
0.5 INJECTION INTRAMUSCULAR EVERY 4 HOURS PRN
Status: CANCELLED
Start: 2018-02-02

## 2018-01-23 RX ORDER — MEPERIDINE HYDROCHLORIDE 25 MG/ML
25 INJECTION INTRAMUSCULAR; INTRAVENOUS; SUBCUTANEOUS
Status: CANCELLED
Start: 2018-02-02

## 2018-01-23 RX ORDER — METHYLPREDNISOLONE SODIUM SUCCINATE 125 MG/2ML
125 INJECTION, POWDER, LYOPHILIZED, FOR SOLUTION INTRAMUSCULAR; INTRAVENOUS
Status: CANCELLED
Start: 2018-02-02

## 2018-01-23 RX ORDER — ALBUTEROL SULFATE 0.83 MG/ML
2.5 SOLUTION RESPIRATORY (INHALATION)
Status: CANCELLED | OUTPATIENT
Start: 2018-02-16

## 2018-01-24 DIAGNOSIS — C85.90 LYMPHOMA, UNSPECIFIED BODY REGION, UNSPECIFIED LYMPHOMA TYPE (H): Primary | ICD-10-CM

## 2018-01-24 DIAGNOSIS — C82.99 FOLLICULAR LYMPHOMA OF EXTRANODAL AND SOLID ORGAN SITES (H): Primary | ICD-10-CM

## 2018-01-26 ENCOUNTER — APPOINTMENT (OUTPATIENT)
Dept: LAB | Facility: CLINIC | Age: 78
End: 2018-01-26
Attending: INTERNAL MEDICINE
Payer: MEDICARE

## 2018-01-26 ENCOUNTER — INFUSION THERAPY VISIT (OUTPATIENT)
Dept: ONCOLOGY | Facility: CLINIC | Age: 78
End: 2018-01-26
Attending: INTERNAL MEDICINE
Payer: MEDICARE

## 2018-01-26 VITALS
OXYGEN SATURATION: 97 % | HEART RATE: 64 BPM | SYSTOLIC BLOOD PRESSURE: 145 MMHG | BODY MASS INDEX: 30.86 KG/M2 | DIASTOLIC BLOOD PRESSURE: 75 MMHG | TEMPERATURE: 98.3 F | RESPIRATION RATE: 16 BRPM | WEIGHT: 214.6 LBS

## 2018-01-26 DIAGNOSIS — C85.90 LYMPHOMA, UNSPECIFIED BODY REGION, UNSPECIFIED LYMPHOMA TYPE (H): ICD-10-CM

## 2018-01-26 DIAGNOSIS — C82.99 FOLLICULAR LYMPHOMA OF EXTRANODAL AND SOLID ORGAN SITES (H): Primary | ICD-10-CM

## 2018-01-26 LAB
ALBUMIN SERPL-MCNC: 3.6 G/DL (ref 3.4–5)
ALP SERPL-CCNC: 85 U/L (ref 40–150)
ALT SERPL W P-5'-P-CCNC: 25 U/L (ref 0–70)
ANION GAP SERPL CALCULATED.3IONS-SCNC: 8 MMOL/L (ref 3–14)
AST SERPL W P-5'-P-CCNC: 17 U/L (ref 0–45)
BASOPHILS # BLD AUTO: 0 10E9/L (ref 0–0.2)
BASOPHILS NFR BLD AUTO: 0.4 %
BILIRUB SERPL-MCNC: 0.2 MG/DL (ref 0.2–1.3)
BUN SERPL-MCNC: 32 MG/DL (ref 7–30)
CALCIUM SERPL-MCNC: 8.8 MG/DL (ref 8.5–10.1)
CHLORIDE SERPL-SCNC: 109 MMOL/L (ref 94–109)
CO2 SERPL-SCNC: 24 MMOL/L (ref 20–32)
CREAT SERPL-MCNC: 0.82 MG/DL (ref 0.66–1.25)
DIFFERENTIAL METHOD BLD: NORMAL
EOSINOPHIL # BLD AUTO: 0.3 10E9/L (ref 0–0.7)
EOSINOPHIL NFR BLD AUTO: 3.6 %
ERYTHROCYTE [DISTWIDTH] IN BLOOD BY AUTOMATED COUNT: 13.5 % (ref 10–15)
GFR SERPL CREATININE-BSD FRML MDRD: >90 ML/MIN/1.7M2
GLUCOSE SERPL-MCNC: 95 MG/DL (ref 70–99)
HCT VFR BLD AUTO: 42.7 % (ref 40–53)
HGB BLD-MCNC: 13.5 G/DL (ref 13.3–17.7)
IMM GRANULOCYTES # BLD: 0 10E9/L (ref 0–0.4)
IMM GRANULOCYTES NFR BLD: 0.3 %
LYMPHOCYTES # BLD AUTO: 1.5 10E9/L (ref 0.8–5.3)
LYMPHOCYTES NFR BLD AUTO: 21.2 %
MCH RBC QN AUTO: 29.3 PG (ref 26.5–33)
MCHC RBC AUTO-ENTMCNC: 31.6 G/DL (ref 31.5–36.5)
MCV RBC AUTO: 93 FL (ref 78–100)
MONOCYTES # BLD AUTO: 0.5 10E9/L (ref 0–1.3)
MONOCYTES NFR BLD AUTO: 6.6 %
NEUTROPHILS # BLD AUTO: 4.9 10E9/L (ref 1.6–8.3)
NEUTROPHILS NFR BLD AUTO: 67.9 %
NRBC # BLD AUTO: 0 10*3/UL
NRBC BLD AUTO-RTO: 0 /100
PLATELET # BLD AUTO: 233 10E9/L (ref 150–450)
POTASSIUM SERPL-SCNC: 4.1 MMOL/L (ref 3.4–5.3)
PROT SERPL-MCNC: 7.4 G/DL (ref 6.8–8.8)
RBC # BLD AUTO: 4.6 10E12/L (ref 4.4–5.9)
SODIUM SERPL-SCNC: 141 MMOL/L (ref 133–144)
URATE SERPL-MCNC: 6.2 MG/DL (ref 3.5–7.2)
WBC # BLD AUTO: 7.2 10E9/L (ref 4–11)

## 2018-01-26 PROCEDURE — 80053 COMPREHEN METABOLIC PANEL: CPT | Performed by: INTERNAL MEDICINE

## 2018-01-26 PROCEDURE — 96413 CHEMO IV INFUSION 1 HR: CPT

## 2018-01-26 PROCEDURE — 96415 CHEMO IV INFUSION ADDL HR: CPT

## 2018-01-26 PROCEDURE — 96375 TX/PRO/DX INJ NEW DRUG ADDON: CPT

## 2018-01-26 PROCEDURE — 85025 COMPLETE CBC W/AUTO DIFF WBC: CPT | Performed by: INTERNAL MEDICINE

## 2018-01-26 PROCEDURE — 25000128 H RX IP 250 OP 636: Mod: ZF | Performed by: INTERNAL MEDICINE

## 2018-01-26 PROCEDURE — 25000132 ZZH RX MED GY IP 250 OP 250 PS 637: Mod: ZF | Performed by: INTERNAL MEDICINE

## 2018-01-26 PROCEDURE — 84550 ASSAY OF BLOOD/URIC ACID: CPT | Performed by: INTERNAL MEDICINE

## 2018-01-26 PROCEDURE — A9270 NON-COVERED ITEM OR SERVICE: HCPCS | Mod: ZF | Performed by: INTERNAL MEDICINE

## 2018-01-26 RX ORDER — ALLOPURINOL 300 MG/1
300 TABLET ORAL DAILY
Qty: 14 TABLET | Refills: 0 | Status: SHIPPED | OUTPATIENT
Start: 2018-01-26 | End: 2018-02-09

## 2018-01-26 RX ORDER — ACETAMINOPHEN 325 MG/1
650 TABLET ORAL ONCE
Status: COMPLETED | OUTPATIENT
Start: 2018-01-26 | End: 2018-01-26

## 2018-01-26 RX ORDER — DIPHENHYDRAMINE HYDROCHLORIDE 50 MG/ML
50 INJECTION INTRAMUSCULAR; INTRAVENOUS ONCE
Status: COMPLETED | OUTPATIENT
Start: 2018-01-26 | End: 2018-01-26

## 2018-01-26 RX ADMIN — DIPHENHYDRAMINE HYDROCHLORIDE 50 MG: 50 INJECTION INTRAMUSCULAR; INTRAVENOUS at 08:53

## 2018-01-26 RX ADMIN — RITUXIMAB 800 MG: 10 INJECTION, SOLUTION INTRAVENOUS at 09:18

## 2018-01-26 RX ADMIN — SODIUM CHLORIDE 500 ML: 9 INJECTION, SOLUTION INTRAVENOUS at 08:55

## 2018-01-26 RX ADMIN — ACETAMINOPHEN 650 MG: 325 TABLET, FILM COATED ORAL at 08:33

## 2018-01-26 ASSESSMENT — PAIN SCALES - GENERAL: PAINLEVEL: NO PAIN (0)

## 2018-01-26 NOTE — MR AVS SNAPSHOT
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)    -  1    Lymphoma, unspecified body region, unspecified lymphoma type (H)          Care Instructions          January 2018 Sunday Monday Tuesday Wednesday Thursday Friday Saturday        1     2     3     4     RETURN   10:00 AM   (30 min.)   Torsten Reyes MD   [facility] 5     6       7     8     9     10     11     12     PE EYE/TH ONCOLOGY    9:30 AM   (45 min.)   UUPET1   [facility] PET CT 13       14     15     16     17     NEW    8:00 AM   (15 min.)   Avtar Mccullough MD   [facility] 18     UMP RETURN    8:15 AM   (30 min.)   Gal Bain MD   [facility] 19     20       21     22     US ABDOMEN LIMITED    9:45 AM   (45 min.)   UCUS2   [facility]  23     24     25     26     [facility] DRAW    7:30 AM   (15 min.)   [facility] DRAW   [facility] Draw     UMP ONC INFUSION 360    8:00 AM   (360 min.)    ONCOLOGY INFUSION   [facility] 27       28     29     30     31 February 2018 Sunday Monday Tuesday Wednesday Thursday Friday Saturday                       1     2     [facility] LAB DRAW    8:00 AM   (15 min.)    MASONIC LAB DRAW   [facility] Draw     UMP ONC INFUSION 360    8:30 AM   (360 min.)    ONCOLOGY INFUSION   [facility] 3       4     5     6     7     8     9     UMP [facility] LAB DRAW    6:30 AM   (15 min.)    MASONIC LAB DRAW   [facility] Draw     UMP ONC INFUSION 360    7:00 AM   (360 min.)    ONCOLOGY INFUSION   [facility] 10       11     12     13     14     15     16     LAB    7:30 AM   (15 min.)    LAB   [facility]     UMP ONC INFUSION 360    8:00 AM   (360 min.)    ONCOLOGY INFUSION   [facility] 17       18     19     20     21     US ABDOMEN LIMITED    9:00 AM   (60 min.)   US3   [facility]  22     23     24       25     26     27     28                                 Lab Results:  Recent Results (from the past 12 hour(s))   CBC with platelets differential    Collection Time: 01/26/18  7:21 AM   Result Value Ref Range    WBC 7.2 4.0 - 11.0 10e9/L    RBC Count 4.60 4.4 - 5.9 10e12/L    Hemoglobin 13.5 13.3 - 17.7 g/dL    Hematocrit 42.7 40.0 - 53.0 %    MCV 93 78 - 100 fl    MCH 29.3 26.5 - 33.0 pg    MCHC 31.6 31.5 - 36.5 g/dL    RDW 13.5 10.0 - 15.0 %    Platelet Count 233 150 - 450 10e9/L    Diff Method Automated Method     % Neutrophils 67.9 %    % Lymphocytes 21.2 %    % Monocytes 6.6 %    % Eosinophils 3.6 %    % Basophils 0.4 %    % Immature Granulocytes 0.3 %    Nucleated RBCs 0 0 /100    Absolute Neutrophil 4.9 1.6 - 8.3 10e9/L    Absolute Lymphocytes 1.5 0.8 - 5.3 10e9/L    Absolute Monocytes 0.5 0.0 - 1.3 10e9/L    Absolute Eosinophils 0.3 0.0 - 0.7 10e9/L    Absolute Basophils 0.0 0.0 - 0.2 10e9/L    Abs Immature Granulocytes 0.0 0 - 0.4 10e9/L    Absolute Nucleated RBC 0.0    Comprehensive metabolic panel    Collection Time: 01/26/18  7:21 AM   Result Value Ref Range    Sodium 141 133 - 144 mmol/L    Potassium 4.1 3.4 - 5.3 mmol/L    Chloride 109 94 - 109 mmol/L    Carbon Dioxide 24 20 - 32 mmol/L    Anion Gap 8 3 - 14 mmol/L    Glucose 95 70 - 99 mg/dL    Urea Nitrogen 32 (H) 7 - 30 mg/dL    Creatinine 0.82 0.66 - 1.25 mg/dL    GFR Estimate >90 >60 mL/min/1.7m2    GFR Estimate If Black >90 >60 mL/min/1.7m2    Calcium 8.8 8.5 - 10.1 mg/dL    Bilirubin Total 0.2 0.2 - 1.3 mg/dL    Albumin 3.6 3.4 - 5.0 g/dL    Protein Total 7.4 6.8 - 8.8 g/dL    Alkaline  Phosphatase 85 40 - 150 U/L    ALT 25 0 - 70 U/L    AST 17 0 - 45 U/L   Uric acid    Collection Time: 01/26/18  7:21 AM   Result Value Ref Range    Uric Acid 6.2 3.5 - 7.2 mg/dL               Follow-ups after your visit        Your next 10 appointments already scheduled     Feb 02, 2018  8:00 AM CST   [facility] Lab Draw with UC [facility] LAB DRAW   [facility] Lab Draw ([facility])    [address]  [address]  [facility] [place] [facility] [address]   [phone]            Feb 02, 2018  8:30 AM CST   Infusion 360 with UC ONCOLOGY INFUSION, UC 20 ATC   [facility] ([facility])    [address]  [address]  [place] [address]   [phone]            Feb 09, 2018  6:30 AM CST   [facility] Lab Draw with UC MASONIC LAB DRAW   [facility] Draw ([facility])    [address] [place] [address]  [address]  [facility] [address]   [phone]            Feb 09, 2018  7:00 AM CST   Infusion 360 with UC ONCOLOGY INFUSION, UC 10 ATC   [facility] ([facility])    [address]  [address]  [place] [facility] [place] [address]   [phone]            Feb 16, 2018  7:30 AM CST   LAB with UC LAB   [facility] ([facility])    [address] [place] [address]  1st Floor  [facility] [address]   [phone]           Please do not eat 10-12 hours before your appointment if you are coming in fasting for labs on lipids, cholesterol, or glucose (sugar). This does not apply to pregnant women. Water, hot tea and black coffee (with nothing added) are okay. Do not drink other fluids, diet soda or chew gum.            Feb 16, 2018  8:00 AM CST   Infusion 360 with UC ONCOLOGY INFUSION, UC 14 ATC   [facility] ([facility])    [address] [place] [address]  [address]  [facility] [address]   [phone]            Feb 21, 2018  9:00 AM CST   US  ABDOMEN LIMITED with UCUS3   [facility] US ([facility])    [address]  1st [address]  [facility] [address] [phone]           Please bring a list of your medicines (including vitamins, minerals and over-the-counter drugs). Also, tell your doctor about any allergies you may have. Wear comfortable clothes and leave your valuables at home.  Adults: No eating or drinking for 8 hours before the exam. You may take medicine with a small sip of water.  Children: - Children 6+ years: No food or drink for 6 hours before exam. - Children 1-5 years: No food or drink for 4 hours before exam. - Infants, breast-fed: may have breast milk up to 2 hours before exam. - Infants, formula: may have bottle until 4 hours before exam.  Please call the Imaging Department at your exam site with any questions.              Who to contact     If you have questions or need follow up information about today's clinic visit or your schedule please contact [facility] directly at [phone].  Normal or non-critical lab and imaging results will be communicated to you by SCHADhart, letter or phone within 4 business days after the clinic has received the results. If you do not hear from us within 7 days, please contact the clinic through [employer] or phone. If you have a critical or abnormal lab result, we will notify you by phone as soon as possible.  Submit refill requests through [employer] or call your pharmacy and they will forward the refill request to us. Please allow 3 business days for your refill to be completed.          Additional Information About Your Visit        [employer] Information     [employer] gives you secure access to your electronic health record. If you see a primary care provider, you can also send messages to your care team and make appointments. If you have questions, please call your primary care clinic.  If you do not have a primary care provider, please call  [phone] and they will assist you.        Care EveryWhere ID     This is your Care EveryWhere ID. This could be used by other organizations to access your [place] medical records  [id number]        Your Vitals Were     Pulse Temperature Respirations Pulse Oximetry BMI (Body Mass Index)       64 98.3  F (36.8  C) (Oral) 16 97% 30.86 kg/m2        Blood Pressure from Last 3 Encounters:   01/26/18 145/75   01/18/18 138/70   01/04/18 133/72    Weight from Last 3 Encounters:   01/26/18 97.3 kg (214 lb 9.6 oz)   01/18/18 97.6 kg (215 lb 1.6 oz)   01/04/18 95.9 kg (211 lb 8 oz)              We Performed the Following     CBC with platelets differential     Comprehensive metabolic panel     Uric acid          Today's Medication Changes          These changes are accurate as of 1/26/18  1:12 PM.  If you have any questions, ask your nurse or doctor.               Start taking these medicines.        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  Ask your nurse if you have questions.     Bring a paper prescription for each of these medications     allopurinol 300 MG tablet                Primary Care Provider Office Phone # Fax #    Anastacio Love [phone] [address] [facility] [address]        Equal Access to Services     [facility] Center[facility] : Hadjoaquim Farr, waaxda luqadaha, qaybta kaalmarilynn jones idialesha reynolds. So [facility] [phone].    ATENCIÓN: Si habla español, tiene a warner disposición servicios gratuitos de asistencia lingüística. Llame al [phone].    We comply with applicable federal civil rights  laws and [place] laws. We do not discriminate on the basis of race, color, national origin, age, disability, sex, sexual orientation, or gender identity.            Thank you!     Thank you for choosing [facility]  for your care. Our goal is always to provide you with excellent care. Hearing back from our patients is one way we can continue to improve our services. Please take a few minutes to complete the written survey that you may receive in the mail after your visit with us. Thank you!             Your Updated Medication List - Protect others around you: Learn how to safely use, store and throw away your medicines at www.disposemymeds.org.          This list is accurate as of 1/26/18  1:12 PM.  Always use your most recent med list.                   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)       aspirin 325 MG EC tablet      1/2 tab daily        atorvastatin 40 MG tablet    LIPITOR    60 tablet    Take 1 tablet (40 mg) by mouth daily    Hyperlipidemia LDL goal <100       latanoprost 0.005 % ophthalmic solution    XALATAN    3 Bottle    Place 1 drop into both eyes At Bedtime    Borderline glaucoma with ocular hypertension, unspecified laterality       levothyroxine 75 MCG tablet    SYNTHROID/LEVOTHROID    90 tablet    Take 1 tablet (75 mcg) by mouth daily    Hypothyroidism, unspecified type       metoprolol tartrate 25 MG tablet    LOPRESSOR    60 tablet    Take 0.5 tablets (12.5 mg) by mouth 2 times daily    S/P CABG (coronary artery bypass graft), Acute post-operative pain       multivitamin Tabs tablet      Take 1 tablet by mouth daily        nitroGLYcerin 0.4 MG sublingual tablet    NITROSTAT    25 tablet    For chest pain place 1 tablet under the tongue every 5 minutes for 3 doses. If symptoms persist 5  minutes after 1st dose call 911.    Exertional chest pain       VITAMIN D3 PO      Take by mouth daily

## 2018-01-26 NOTE — NURSING NOTE
Chief Complaint   Patient presents with     Blood Draw     Labs drawn from PIV placed by RN. Line saline locked. Vs taken and pt checked in for appt     Labs drawn from PIV placed by RN. Line flushed with saline. Vitals taken. Pt checked in for appointment(s).    Atiya Cantrell RN

## 2018-01-26 NOTE — PATIENT INSTRUCTIONS
January 2018 Sunday Monday Tuesday Wednesday Thursday Friday Saturday        1     2     3     4     RETURN   10:00 AM   (30 min.)   Torsten Reyes MD   [facility] 5     6       7     8     9     10     11     12     PE EYE/TH ONCOLOGY    9:30 AM   (45 min.)   UUPET1   [facility] PET CT 13       14     15     16     17     NEW    8:00 AM   (15 min.)   Avtar Mccullough MD   [facility] [place] 18     UMP RETURN    8:15 AM   (30 min.)   Gal Bain MD   [facility] 19     20       21     22     US ABDOMEN LIMITED    9:45 AM   (45 min.)   [facility]   [facility] 23     24     25     26     UMP [facility] LAB DRAW    7:30 AM   (15 min.)    [facility]     UMP ONC INFUSION 360    8:00 AM   (360 min.)    ONCOLOGY INFUSION   [facility] 27       28     29 30 31 February 2018 Sunday Monday Tuesday Wednesday Thursday Friday Saturday                       1     2     UMP [facility] LAB DRAW    8:00 AM   (15 min.)    MASONIC LAB DRAW   [facility] Lab Draw     UMP ONC INFUSION 360    8:30 AM   (360 min.)    ONCOLOGY INFUSION   [facility] 3       4     5     6     7     8     9     UMP [facility] LAB DRAW    6:30 AM   (15 min.)    MASONIC LAB DRAW   [facility] Mason[facility] Lab Draw     UMP ONC INFUSION 360    7:00 AM   (360 min.)    ONCOLOGY INFUSION   [facility] 10       11     12     13     14     15     16     LAB    7:30 AM   (15 min.)    LAB   [facility] Lab     UMP ONC INFUSION 360    8:00 AM   (360 min.)    ONCOLOGY INFUSION   [facility] 17       18     19     20     21     US ABDOMEN LIMITED    9:00 AM   (60 min.)   [facility]   [facility] 22     23     24       25     26     27     28                                 Lab Results:  Recent Results (from the past 12 hour(s))   CBC  with platelets differential    Collection Time: 01/26/18  7:21 AM   Result Value Ref Range    WBC 7.2 4.0 - 11.0 10e9/L    RBC Count 4.60 4.4 - 5.9 10e12/L    Hemoglobin 13.5 13.3 - 17.7 g/dL    Hematocrit 42.7 40.0 - 53.0 %    MCV 93 78 - 100 fl    MCH 29.3 26.5 - 33.0 pg    MCHC 31.6 31.5 - 36.5 g/dL    RDW 13.5 10.0 - 15.0 %    Platelet Count 233 150 - 450 10e9/L    Diff Method Automated Method     % Neutrophils 67.9 %    % Lymphocytes 21.2 %    % Monocytes 6.6 %    % Eosinophils 3.6 %    % Basophils 0.4 %    % Immature Granulocytes 0.3 %    Nucleated RBCs 0 0 /100    Absolute Neutrophil 4.9 1.6 - 8.3 10e9/L    Absolute Lymphocytes 1.5 0.8 - 5.3 10e9/L    Absolute Monocytes 0.5 0.0 - 1.3 10e9/L    Absolute Eosinophils 0.3 0.0 - 0.7 10e9/L    Absolute Basophils 0.0 0.0 - 0.2 10e9/L    Abs Immature Granulocytes 0.0 0 - 0.4 10e9/L    Absolute Nucleated RBC 0.0    Comprehensive metabolic panel    Collection Time: 01/26/18  7:21 AM   Result Value Ref Range    Sodium 141 133 - 144 mmol/L    Potassium 4.1 3.4 - 5.3 mmol/L    Chloride 109 94 - 109 mmol/L    Carbon Dioxide 24 20 - 32 mmol/L    Anion Gap 8 3 - 14 mmol/L    Glucose 95 70 - 99 mg/dL    Urea Nitrogen 32 (H) 7 - 30 mg/dL    Creatinine 0.82 0.66 - 1.25 mg/dL    GFR Estimate >90 >60 mL/min/1.7m2    GFR Estimate If Black >90 >60 mL/min/1.7m2    Calcium 8.8 8.5 - 10.1 mg/dL    Bilirubin Total 0.2 0.2 - 1.3 mg/dL    Albumin 3.6 3.4 - 5.0 g/dL    Protein Total 7.4 6.8 - 8.8 g/dL    Alkaline Phosphatase 85 40 - 150 U/L    ALT 25 0 - 70 U/L    AST 17 0 - 45 U/L   Uric acid    Collection Time: 01/26/18  7:21 AM   Result Value Ref Range    Uric Acid 6.2 3.5 - 7.2 mg/dL

## 2018-01-26 NOTE — PROGRESS NOTES
Infusion Nursing Note:  Zack Demarco presents today for C1 D1 Rituxan.    Patient seen by provider today: No   present during visit today: Not Applicable.    Note: N/A.    Intravenous Access:  Peripheral IV placed.    Treatment Conditions:  Lab Results   Component Value Date    HGB 13.5 01/26/2018     Lab Results   Component Value Date    WBC 7.2 01/26/2018      Lab Results   Component Value Date    ANEU 4.9 01/26/2018     Lab Results   Component Value Date     01/26/2018      Lab Results   Component Value Date     01/26/2018                   Lab Results   Component Value Date    POTASSIUM 4.1 01/26/2018           Lab Results   Component Value Date    MAG 2.2 11/27/2017            Lab Results   Component Value Date    CR 0.82 01/26/2018                   Lab Results   Component Value Date    ELVIRA 8.8 01/26/2018                Lab Results   Component Value Date    BILITOTAL 0.2 01/26/2018           Lab Results   Component Value Date    ALBUMIN 3.6 01/26/2018                    Lab Results   Component Value Date    ALT 25 01/26/2018           Lab Results   Component Value Date    AST 17 01/26/2018           Post Infusion Assessment:  Rituxan was started at 50cc/h and titrated per protocol to a max rate of 400 cc/h. Pt tolerated this infusion without any adverse reactions.   Patient tolerated infusion without incident.  Blood return noted pre and post infusion.  Site patent and intact, free from redness, edema or discomfort.  No evidence of extravasations.  Access discontinued per protocol.    Discharge Plan: Pt was instructed to allow extra time on 2/2 apt, given the Super Bowl activities.   Prescription refills given for Allopurinol.  Discharge instructions reviewed with: Patient.  Patient and/or family verbalized understanding of discharge instructions and all questions answered.  Copy of AVS reviewed with patient and/or family.  Patient will return 2/2/2018 for next appointment.  Patient  discharged in stable condition accompanied by: daughter.  Departure Mode: Ambulatory.    Simran Reyez RN

## 2018-01-30 ENCOUNTER — CARE COORDINATION (OUTPATIENT)
Dept: ONCOLOGY | Facility: CLINIC | Age: 78
End: 2018-01-30

## 2018-01-30 NOTE — PROGRESS NOTES
Reason for Outgoing call:    Called patient this morning to follow up with them after he received his first dose of Rituxan last Friday.  Per the infusion note, it looks like patient tolerated the infusion of Rituxan just fine.     Patients Questions/Concerns:    NA.     Nursing Action/Patient Instruction:     Patient left a detailed message and the triage number for patient to call if he had any questions, comments, or concerns.     Patient Response/Evaluation:    NA.

## 2018-02-02 ENCOUNTER — APPOINTMENT (OUTPATIENT)
Dept: LAB | Facility: CLINIC | Age: 78
End: 2018-02-02
Attending: INTERNAL MEDICINE
Payer: MEDICARE

## 2018-02-02 ENCOUNTER — INFUSION THERAPY VISIT (OUTPATIENT)
Dept: ONCOLOGY | Facility: CLINIC | Age: 78
End: 2018-02-02
Attending: INTERNAL MEDICINE
Payer: MEDICARE

## 2018-02-02 VITALS
BODY MASS INDEX: 31.12 KG/M2 | RESPIRATION RATE: 16 BRPM | TEMPERATURE: 97.8 F | OXYGEN SATURATION: 95 % | DIASTOLIC BLOOD PRESSURE: 65 MMHG | HEART RATE: 62 BPM | WEIGHT: 216.4 LBS | SYSTOLIC BLOOD PRESSURE: 149 MMHG

## 2018-02-02 DIAGNOSIS — C82.99 FOLLICULAR LYMPHOMA OF EXTRANODAL AND SOLID ORGAN SITES (H): Primary | ICD-10-CM

## 2018-02-02 LAB
BASOPHILS # BLD AUTO: 0 10E9/L (ref 0–0.2)
BASOPHILS NFR BLD AUTO: 0.3 %
DIFFERENTIAL METHOD BLD: NORMAL
EOSINOPHIL # BLD AUTO: 0.3 10E9/L (ref 0–0.7)
EOSINOPHIL NFR BLD AUTO: 3.8 %
ERYTHROCYTE [DISTWIDTH] IN BLOOD BY AUTOMATED COUNT: 13.2 % (ref 10–15)
HCT VFR BLD AUTO: 42.8 % (ref 40–53)
HGB BLD-MCNC: 13.6 G/DL (ref 13.3–17.7)
IMM GRANULOCYTES # BLD: 0 10E9/L (ref 0–0.4)
IMM GRANULOCYTES NFR BLD: 0.3 %
LYMPHOCYTES # BLD AUTO: 1.3 10E9/L (ref 0.8–5.3)
LYMPHOCYTES NFR BLD AUTO: 18.6 %
MCH RBC QN AUTO: 28.9 PG (ref 26.5–33)
MCHC RBC AUTO-ENTMCNC: 31.8 G/DL (ref 31.5–36.5)
MCV RBC AUTO: 91 FL (ref 78–100)
MONOCYTES # BLD AUTO: 0.5 10E9/L (ref 0–1.3)
MONOCYTES NFR BLD AUTO: 7.3 %
NEUTROPHILS # BLD AUTO: 4.8 10E9/L (ref 1.6–8.3)
NEUTROPHILS NFR BLD AUTO: 69.7 %
NRBC # BLD AUTO: 0 10*3/UL
NRBC BLD AUTO-RTO: 0 /100
PLATELET # BLD AUTO: 244 10E9/L (ref 150–450)
RBC # BLD AUTO: 4.7 10E12/L (ref 4.4–5.9)
WBC # BLD AUTO: 6.9 10E9/L (ref 4–11)

## 2018-02-02 PROCEDURE — 25000128 H RX IP 250 OP 636: Mod: ZF | Performed by: INTERNAL MEDICINE

## 2018-02-02 PROCEDURE — A9270 NON-COVERED ITEM OR SERVICE: HCPCS | Mod: ZF | Performed by: INTERNAL MEDICINE

## 2018-02-02 PROCEDURE — 96375 TX/PRO/DX INJ NEW DRUG ADDON: CPT

## 2018-02-02 PROCEDURE — 85025 COMPLETE CBC W/AUTO DIFF WBC: CPT | Performed by: INTERNAL MEDICINE

## 2018-02-02 PROCEDURE — 25000132 ZZH RX MED GY IP 250 OP 250 PS 637: Mod: ZF | Performed by: INTERNAL MEDICINE

## 2018-02-02 PROCEDURE — 96413 CHEMO IV INFUSION 1 HR: CPT

## 2018-02-02 RX ORDER — DIPHENHYDRAMINE HYDROCHLORIDE 50 MG/ML
50 INJECTION INTRAMUSCULAR; INTRAVENOUS ONCE
Status: COMPLETED | OUTPATIENT
Start: 2018-02-02 | End: 2018-02-02

## 2018-02-02 RX ORDER — ACETAMINOPHEN 325 MG/1
650 TABLET ORAL ONCE
Status: COMPLETED | OUTPATIENT
Start: 2018-02-02 | End: 2018-02-02

## 2018-02-02 RX ADMIN — RITUXIMAB 800 MG: 10 INJECTION, SOLUTION INTRAVENOUS at 09:15

## 2018-02-02 RX ADMIN — ACETAMINOPHEN 650 MG: 325 TABLET, FILM COATED ORAL at 08:50

## 2018-02-02 RX ADMIN — SODIUM CHLORIDE 250 ML: 9 INJECTION, SOLUTION INTRAVENOUS at 08:50

## 2018-02-02 RX ADMIN — DIPHENHYDRAMINE HYDROCHLORIDE 50 MG: 50 INJECTION INTRAMUSCULAR; INTRAVENOUS at 08:50

## 2018-02-02 ASSESSMENT — PAIN SCALES - GENERAL: PAINLEVEL: NO PAIN (0)

## 2018-02-02 NOTE — PATIENT INSTRUCTIONS
Contact Numbers  [facility]: [phone]    After Hours:  [phone]  Triage: [phone]    Please call the [facility] Triage line if you experience a temperature greater than or equal to 100.5, shaking chills, have uncontrolled nausea, vomiting and/or diarrhea, dizziness, shortness of breath, chest pain, bleeding, unexplained bruising, or if you have any other new/concerning symptoms, questions or concerns.     If it is after hours, weekends, or holidays, please call the main hospital  at  [phone] and ask to speak to the Oncology doctor on call.     If you are having any concerning symptoms or wish to speak to a provider before your next infusion visit, please call your care coordinator or triage to notify them so we can adequately serve you.     If you need a refill on a narcotic prescription or other medication, please call triage before your infusion appointment.           February 2018 Sunday Monday Tuesday Wednesday Thursday Friday Saturday                       1     2     [facility] LAB DRAW    8:00 AM   (15 min.)   UC [facility]ON[facility] LAB DRAW   [facility] Lab Draw     UMP ONC INFUSION 360    8:30 AM   (360 min.)    ONCOLOGY INFUSION   [facility] 3       4     5     6     7     8     9     [facility] LAB DRAW    6:30 AM   (15 min.)   UC [facility] LAB DRAW   [facility] Lab Draw     UMP ONC INFUSION 360    7:00 AM   (360 min.)    ONCOLOGY INFUSION   [facility] 10       11     12     13     14     15     16     LAB    7:30 AM   (15 min.)    LAB   [facility]     UMP ONC INFUSION 360    8:00 AM   (360 min.)    ONCOLOGY INFUSION   [facility] 17       18     19     20     21     US ABDOMEN LIMITED    9:00 AM   (60 min.)   US3   [facility] 22     23     24       25     26     27     28                               March 2018 Sunday Monday Tuesday Wednesday Thursday Friday Saturday                        1     2     3       4     5     6     7     UMP RETURN    7:45 AM   (30 min.)   Gal Bain MD   [facility] 8     9     10       11     12     13     14     15     16     17       18     19     20     21     22     23     24       25     26     27     28     29     30     31                  Lab Results:  Recent Results (from the past 12 hour(s))   CBC with platelets differential    Collection Time: 02/02/18  8:31 AM   Result Value Ref Range    WBC 6.9 4.0 - 11.0 10e9/L    RBC Count 4.70 4.4 - 5.9 10e12/L    Hemoglobin 13.6 13.3 - 17.7 g/dL    Hematocrit 42.8 40.0 - 53.0 %    MCV 91 78 - 100 fl    MCH 28.9 26.5 - 33.0 pg    MCHC 31.8 31.5 - 36.5 g/dL    RDW 13.2 10.0 - 15.0 %    Platelet Count 244 150 - 450 10e9/L    Diff Method Automated Method     % Neutrophils 69.7 %    % Lymphocytes 18.6 %    % Monocytes 7.3 %    % Eosinophils 3.8 %    % Basophils 0.3 %    % Immature Granulocytes 0.3 %    Nucleated RBCs 0 0 /100    Absolute Neutrophil 4.8 1.6 - 8.3 10e9/L    Absolute Lymphocytes 1.3 0.8 - 5.3 10e9/L    Absolute Monocytes 0.5 0.0 - 1.3 10e9/L    Absolute Eosinophils 0.3 0.0 - 0.7 10e9/L    Absolute Basophils 0.0 0.0 - 0.2 10e9/L    Abs Immature Granulocytes 0.0 0 - 0.4 10e9/L    Absolute Nucleated RBC 0.0

## 2018-02-02 NOTE — NURSING NOTE
Chief Complaint   Patient presents with     Blood Draw     Labs drawn from PIV placed by RN. Saline locked. Vs taken and pt checked in for appt     Labs drawn from PIV placed by RN. Line flushed with saline. Vitals taken. Pt checked in for appointment(s).    Atiya Cantrell RN

## 2018-02-02 NOTE — PROGRESS NOTES
Infusion Nursing Note:  Zack Demarco presents today for C1D8 Rituxan.    Patient seen by provider today: No   present during visit today: Not Applicable.    Note: Patient had 1 episode chills due to feeling cold, but not shaking short period less than 1-2 minutes. Denies fever, SOB and chest discomfort. States that he usual feels this cold chills in normal day. Instructed patient if experienced persistent and worsening  chills to call triage. Verbalized understanding. Otherwise well.    Intravenous Access:  Peripheral IV placed.    Treatment Conditions:  Lab Results   Component Value Date    HGB 13.6 02/02/2018     Lab Results   Component Value Date    WBC 6.9 02/02/2018      Lab Results   Component Value Date    ANEU 4.8 02/02/2018     Lab Results   Component Value Date     02/02/2018      Lab Results   Component Value Date     01/26/2018                   Lab Results   Component Value Date    POTASSIUM 4.1 01/26/2018           Lab Results   Component Value Date    MAG 2.2 11/27/2017            Lab Results   Component Value Date    CR 0.82 01/26/2018                   Lab Results   Component Value Date    ELVIRA 8.8 01/26/2018                Lab Results   Component Value Date    BILITOTAL 0.2 01/26/2018           Lab Results   Component Value Date    ALBUMIN 3.6 01/26/2018                    Lab Results   Component Value Date    ALT 25 01/26/2018           Lab Results   Component Value Date    AST 17 01/26/2018       Results reviewed, labs MET treatment parameters, ok to proceed with treatment.      Post Infusion Assessment:  Patient tolerated infusion without incident.  Blood return noted pre and post infusion.  Site patent and intact, free from redness, edema or discomfort.  No evidence of extravasations.  Access discontinued per protocol.    Discharge Plan:   Patient declined prescription refills.  Discharge instructions reviewed with: Patient and Family.  Patient and/or family verbalized  understanding of discharge instructions and all questions answered.  AVS to patient via [employer].  Patient will return 2/9/18 for next appointment.   Patient discharged in stable condition accompanied by: self.  Departure Mode: Ambulatory.    DARRYL ESCAMILLA RN

## 2018-02-02 NOTE — MR AVS SNAPSHOT
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     If it is after hours, weekends, or holidays, please call the main hospital  at  [phone] and ask to speak to the Oncology doctor on call.     If you are having any concerning symptoms or wish to speak to a provider before your next infusion visit, please call your care coordinator or triage to notify them so we can adequately serve you.     If you need a refill on a narcotic prescription or other medication, please call triage before your infusion appointment.           February 2018 Sunday Monday Tuesday Wednesday Thursday Friday Saturday                       1     2     [facility]    8:00 AM   (15 min.)    [facility] DRAW   [facility] Lab Draw     [facility] ONC INFUSION 360    8:30 AM   (360 min.)    ONCOLOGY INFUSION   [facility] 3       4     5     6     7     8     9     [facility]    6:30 AM   (15 min.)    [facility]     [facility] ONC INFUSION 360    7:00 AM   (360 min.)    ONCOLOGY INFUSION   [facility] 10       11     12     13     14     15     16     LAB    7:30 AM   (15  min.)    LAB   [facility]     [facility] ONC INFUSION 360    8:00 AM   (360 min.)   UC ONCOLOGY INFUSION   [facility] 17       18     19     20     21     US ABDOMEN LIMITED    9:00 AM   (60 min.)   UCUS3   [facility] 22     23     24       25     26     27     28 March 2018 Sunday Monday Tuesday Wednesday Thursday Friday Saturday                       1     2     3       4     5     6     7     [facility] RETURN    7:45 AM   (30 min.)   Gal Bain MD   [facility] 8     9     10       11     12     13     14     15     16     17       18     19     20     21     22     23     24       25     26     27     28     29     30     31                  Lab Results:  Recent Results (from the past 12 hour(s))   CBC with platelets differential    Collection Time: 02/02/18  8:31 AM   Result Value Ref Range    WBC 6.9 4.0 - 11.0 10e9/L    RBC Count 4.70 4.4 - 5.9 10e12/L    Hemoglobin 13.6 13.3 - 17.7 g/dL    Hematocrit 42.8 40.0 - 53.0 %    MCV 91 78 - 100 fl    MCH 28.9 26.5 - 33.0 pg    MCHC 31.8 31.5 - 36.5 g/dL    RDW 13.2 10.0 - 15.0 %    Platelet Count 244 150 - 450 10e9/L    Diff Method Automated Method     % Neutrophils 69.7 %    % Lymphocytes 18.6 %    % Monocytes 7.3 %    % Eosinophils 3.8 %    % Basophils 0.3 %    % Immature Granulocytes 0.3 %    Nucleated RBCs 0 0 /100    Absolute Neutrophil 4.8 1.6 - 8.3 10e9/L    Absolute Lymphocytes 1.3 0.8 - 5.3 10e9/L    Absolute Monocytes 0.5 0.0 - 1.3 10e9/L    Absolute Eosinophils 0.3 0.0 - 0.7 10e9/L    Absolute Basophils 0.0 0.0 - 0.2 10e9/L    Abs Immature Granulocytes 0.0 0 - 0.4 10e9/L    Absolute Nucleated RBC 0.0                Follow-ups after your visit        Your next 10 appointments already scheduled     Feb 09, 2018  6:30 AM [facility] Draw with  [facility] DRAW   [facility]    [address] [place] [address]  [address]  [facility] [address]   [phone]            Feb 09, 2018  7:00 AM CST   Infusion 360 with UC ONCOLOGY INFUSION, UC 10 ATC   [facility] ([facility])    [address]  [address]  [facility] [address]   [phone]            Feb 16, 2018  7:30 AM CST   LAB with  LAB   [facility] ([facility])    [address]  1st Floor  [facility] [address] [phone]           Please do not eat 10-12 hours before your appointment if you are coming in fasting for labs on lipids, cholesterol, or glucose (sugar). This does not apply to pregnant women. Water, hot tea and black coffee (with nothing added) are okay. Do not drink other fluids, diet soda or chew gum.            Feb 16, 2018  8:00 AM CST   Infusion 360 with UC ONCOLOGY INFUSION, UC 14 ATC   [facility] ([facility])    [address] [place] [address]  [address]  [facility] [address]   [phone]            Feb 21, 2018  9:00 AM CST   US ABDOMEN LIMITED with UCUS3   [facility] [place] [facility] ([facility])    [address]  1st Floor  [facility] [address] [phone]           Please bring a list of your medicines (including vitamins, minerals and over-the-counter drugs). Also, tell your doctor about any allergies you may have. Wear comfortable clothes and leave your valuables at home.  Adults: No eating or drinking for 8 hours before the exam. You may take medicine with a small sip of water.  Children: - Children 6+ years: No food or drink for 6 hours before exam. - Children 1-5 years: No food or drink for 4 hours before exam. - Infants, breast-fed: may have breast milk up to 2 hours before exam. - Infants, formula: may have bottle until 4 hours before exam.  Please call the Imaging Department at your exam site with any questions.            Mar 07, 2018  8:00 AM CST   (Arrive by 7:45  AM)   Return Visit with Gal Bain MD   [facility] ([facility]    [address]  [address]  [facility] [address] [phone]            Jul 18, 2018  8:15 AM CDT   Return Visit with Avtar Mccullough MD   [facility]    [address] [place] [address]  [place] [address] [phone]              Who to contact     If you have questions or need follow up information about today's clinic visit or your schedule please contact [facility] directly at [phone].  Normal or non-critical lab and imaging results will be communicated to you by MyChart, letter or phone within 4 business days after the clinic has received the results. If you do not hear from us within 7 days, please contact the clinic through [employer] or phone. If you have a critical or abnormal lab result, we will notify you by phone as soon as possible.  Submit refill requests through [employer] or call your pharmacy and they will forward the refill request to us. Please allow 3 business days for your refill to be completed.          Additional Information About Your Visit        [employer] Information     [employer] gives you secure access to your electronic health record. If you see a primary care provider, you can also send messages to your care team and make appointments. If you have questions, please call your primary care clinic.  If you do not have a primary care provider, please call [phone] and they will assist you.        Care EveryWhere ID     This is your Care EveryWhere ID. This could be used by other organizations to access your [facility] medical records  [id number]        Your Vitals Were     Pulse Temperature Respirations Pulse Oximetry BMI (Body Mass Index)       62 97.8  F (36.6  C) (Oral) 16 95% 31.12 kg/m2        Blood Pressure from Last 3 Encounters:   02/02/18 149/65   01/26/18 145/75   01/18/18 138/70    Weight from  Last 3 Encounters:   02/02/18 98.2 kg (216 lb 6.4 oz)   01/26/18 97.3 kg (214 lb 9.6 oz)   01/18/18 97.6 kg (215 lb 1.6 oz)              We Performed the Following     CBC with platelets differential        Primary Care Provider Office Phone # Fax #    Anastacio Love [phone] [address] [address] [address] [facility] [address]        Equal Access to Services     HALEY BURR : Hadii aad ku hadasho Soomaali, waaxda luqadaha, qaybta kaalmada adeegyada, waxay idiin hayaan adeeg kharash la'niecy . So [facility] [phone].    ATENCIÓN: Si habla español, tiene a warner disposición servicios gratuitos de asistencia lingüística. [facility] [phone].    We comply with applicable federal civil rights laws and [place] laws. We do not discriminate on the basis of race, color, national origin, age, disability, sex, sexual orientation, or gender identity.            Thank you!     Thank you for choosing [facility]  for your care. Our goal is always to provide you with excellent care. Hearing back from our patients is one way we can continue to improve our services. Please take a few minutes to complete the written survey that you may receive in the mail after your visit with us. Thank you!             Your Updated Medication List - Protect others around you: Learn how to safely use, store and throw away your medicines at www.disposemymeds.org.          This list is accurate as of 2/2/18  8:55 AM.  Always use your most recent med list.                   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)       aspirin 325 MG EC tablet      1/2 tab daily        atorvastatin 40 MG tablet    LIPITOR    60 tablet    Take 1 tablet (40 mg) by mouth daily    Hyperlipidemia LDL goal <100       latanoprost 0.005 % ophthalmic solution    XALATAN     3 Bottle    Place 1 drop into both eyes At Bedtime    Borderline glaucoma with ocular hypertension, unspecified laterality       levothyroxine 75 MCG tablet    SYNTHROID/LEVOTHROID    90 tablet    Take 1 tablet (75 mcg) by mouth daily    Hypothyroidism, unspecified type       metoprolol tartrate 25 MG tablet    LOPRESSOR    60 tablet    Take 0.5 tablets (12.5 mg) by mouth 2 times daily    S/P CABG (coronary artery bypass graft), Acute post-operative pain       multivitamin Tabs tablet      Take 1 tablet by mouth daily        nitroGLYcerin 0.4 MG sublingual tablet    NITROSTAT    25 tablet    For chest pain place 1 tablet under the tongue every 5 minutes for 3 doses. If symptoms persist 5 minutes after 1st dose call 911.    Exertional chest pain       VITAMIN D3 PO      Take by mouth daily

## 2018-02-08 ENCOUNTER — TRANSFERRED RECORDS (OUTPATIENT)
Dept: HEALTH INFORMATION MANAGEMENT | Facility: CLINIC | Age: 78
End: 2018-02-08

## 2018-02-09 ENCOUNTER — INFUSION THERAPY VISIT (OUTPATIENT)
Dept: ONCOLOGY | Facility: CLINIC | Age: 78
End: 2018-02-09
Attending: INTERNAL MEDICINE
Payer: MEDICARE

## 2018-02-09 ENCOUNTER — APPOINTMENT (OUTPATIENT)
Dept: LAB | Facility: CLINIC | Age: 78
End: 2018-02-09
Attending: INTERNAL MEDICINE
Payer: MEDICARE

## 2018-02-09 VITALS
OXYGEN SATURATION: 97 % | TEMPERATURE: 97.9 F | HEART RATE: 56 BPM | WEIGHT: 218.7 LBS | RESPIRATION RATE: 18 BRPM | BODY MASS INDEX: 31.45 KG/M2 | SYSTOLIC BLOOD PRESSURE: 136 MMHG | DIASTOLIC BLOOD PRESSURE: 67 MMHG

## 2018-02-09 DIAGNOSIS — C82.99 FOLLICULAR LYMPHOMA OF EXTRANODAL AND SOLID ORGAN SITES (H): Primary | ICD-10-CM

## 2018-02-09 LAB
BASOPHILS # BLD AUTO: 0 10E9/L (ref 0–0.2)
BASOPHILS NFR BLD AUTO: 0.4 %
DIFFERENTIAL METHOD BLD: NORMAL
EOSINOPHIL # BLD AUTO: 0.3 10E9/L (ref 0–0.7)
EOSINOPHIL NFR BLD AUTO: 3.5 %
ERYTHROCYTE [DISTWIDTH] IN BLOOD BY AUTOMATED COUNT: 13.4 % (ref 10–15)
HCT VFR BLD AUTO: 42.9 % (ref 40–53)
HGB BLD-MCNC: 13.6 G/DL (ref 13.3–17.7)
IMM GRANULOCYTES # BLD: 0 10E9/L (ref 0–0.4)
IMM GRANULOCYTES NFR BLD: 0.3 %
LYMPHOCYTES # BLD AUTO: 1.5 10E9/L (ref 0.8–5.3)
LYMPHOCYTES NFR BLD AUTO: 20.5 %
MCH RBC QN AUTO: 29 PG (ref 26.5–33)
MCHC RBC AUTO-ENTMCNC: 31.7 G/DL (ref 31.5–36.5)
MCV RBC AUTO: 92 FL (ref 78–100)
MONOCYTES # BLD AUTO: 0.5 10E9/L (ref 0–1.3)
MONOCYTES NFR BLD AUTO: 7.2 %
NEUTROPHILS # BLD AUTO: 5.1 10E9/L (ref 1.6–8.3)
NEUTROPHILS NFR BLD AUTO: 68.1 %
NRBC # BLD AUTO: 0 10*3/UL
NRBC BLD AUTO-RTO: 0 /100
PLATELET # BLD AUTO: 233 10E9/L (ref 150–450)
RBC # BLD AUTO: 4.69 10E12/L (ref 4.4–5.9)
WBC # BLD AUTO: 7.5 10E9/L (ref 4–11)

## 2018-02-09 PROCEDURE — 25000132 ZZH RX MED GY IP 250 OP 250 PS 637: Mod: ZF | Performed by: INTERNAL MEDICINE

## 2018-02-09 PROCEDURE — 96413 CHEMO IV INFUSION 1 HR: CPT

## 2018-02-09 PROCEDURE — 25000128 H RX IP 250 OP 636: Mod: ZF | Performed by: INTERNAL MEDICINE

## 2018-02-09 PROCEDURE — 96417 CHEMO IV INFUS EACH ADDL SEQ: CPT

## 2018-02-09 PROCEDURE — A9270 NON-COVERED ITEM OR SERVICE: HCPCS | Mod: ZF | Performed by: INTERNAL MEDICINE

## 2018-02-09 PROCEDURE — 85025 COMPLETE CBC W/AUTO DIFF WBC: CPT | Performed by: INTERNAL MEDICINE

## 2018-02-09 RX ORDER — ACETAMINOPHEN 325 MG/1
650 TABLET ORAL ONCE
Status: COMPLETED | OUTPATIENT
Start: 2018-02-09 | End: 2018-02-09

## 2018-02-09 RX ORDER — DIPHENHYDRAMINE HCL 25 MG
50 CAPSULE ORAL ONCE
Status: COMPLETED | OUTPATIENT
Start: 2018-02-09 | End: 2018-02-09

## 2018-02-09 RX ADMIN — RITUXIMAB 800 MG: 10 INJECTION, SOLUTION INTRAVENOUS at 07:59

## 2018-02-09 RX ADMIN — ACETAMINOPHEN 650 MG: 325 TABLET, FILM COATED ORAL at 07:26

## 2018-02-09 RX ADMIN — SODIUM CHLORIDE 250 ML: 9 INJECTION, SOLUTION INTRAVENOUS at 07:31

## 2018-02-09 RX ADMIN — DIPHENHYDRAMINE HYDROCHLORIDE 50 MG: 25 CAPSULE ORAL at 07:26

## 2018-02-09 ASSESSMENT — PAIN SCALES - GENERAL: PAINLEVEL: NO PAIN (0)

## 2018-02-09 NOTE — PATIENT INSTRUCTIONS
Contact Numbers  [facility]: [phone]  (Choose Option 3 for triage RN)  After Hours: [phone]    Call triage with chills and/or temperature greater than or equal to 100.5, uncontrolled nausea/vomiting, diarrhea, constipation, dizziness, shortness of breath, chest pain, bleeding, unexplained bruising, or any new/concerning symptoms, questions/concerns.     If after hours, weekends, or holidays, call the main clinic number. Calls will be forwarded to the hospital , please ask for the adult oncology doctor on call.     If you are having any concerning symptoms or wish to speak to a provider before your next infusion visit, please call your care coordinator or triage to notify them so we can adequately serve you.     If you need a refill on a narcotic prescription, please call triage or your care coordinator before your infusion appointment.             February 2018 Sunday Monday Tuesday Wednesday Thursday Friday Saturday                       1     2     [facility] LAB DRAW    8:00 AM   (15 min.)   [facility] [place] LAB DRAW   [facility] Lab Draw     [facility] ONC INFUSION 360    8:30 AM   (360 min.)    ONCOLOGY INFUSION   [facility] 3       4     5     6     7     8     9     [facility]    6:30 AM   (15 min.)   [facility] [place] LAB DRAW   [facility] Lab Draw     [facility] ONC INFUSION 360    7:00 AM   (360 min.)    ONCOLOGY INFUSION   [facility] 10       11     12     13     14     15     16     LAB    7:30 AM   (15 min.)    LAB   [facility]     [facility] ONC INFUSION 360    8:00 AM   (360 min.)    ONCOLOGY INFUSION   [facility] 17       18     19     20     21     US ABDOMEN LIMITED    9:00 AM   (60 min.)   UCUS3   [facility] 22     23     24       25     26     27     28                               March 2018 Sunday Monday Tuesday Wednesday Thursday Friday Saturday                       1     2     3        4     5     6     7     [facility] RETURN    7:45 AM   (30 min.)   Gal Bain MD   [facility] 8     9     10       11     12     13     14     15     16     17       18     19     20     21     22     23     24       25     26     27     28     29     30     31                  Lab Results:  Recent Results (from the past 12 hour(s))   CBC with platelets differential    Collection Time: 02/09/18  6:42 AM   Result Value Ref Range    WBC 7.5 4.0 - 11.0 10e9/L    RBC Count 4.69 4.4 - 5.9 10e12/L    Hemoglobin 13.6 13.3 - 17.7 g/dL    Hematocrit 42.9 40.0 - 53.0 %    MCV 92 78 - 100 fl    MCH 29.0 26.5 - 33.0 pg    MCHC 31.7 31.5 - 36.5 g/dL    RDW 13.4 10.0 - 15.0 %    Platelet Count 233 150 - 450 10e9/L    Diff Method Automated Method     % Neutrophils 68.1 %    % Lymphocytes 20.5 %    % Monocytes 7.2 %    % Eosinophils 3.5 %    % Basophils 0.4 %    % Immature Granulocytes 0.3 %    Nucleated RBCs 0 0 /100    Absolute Neutrophil 5.1 1.6 - 8.3 10e9/L    Absolute Lymphocytes 1.5 0.8 - 5.3 10e9/L    Absolute Monocytes 0.5 0.0 - 1.3 10e9/L    Absolute Eosinophils 0.3 0.0 - 0.7 10e9/L    Absolute Basophils 0.0 0.0 - 0.2 10e9/L    Abs Immature Granulocytes 0.0 0 - 0.4 10e9/L    Absolute Nucleated RBC 0.0

## 2018-02-09 NOTE — MR AVS SNAPSHOT
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     If after hours, weekends, or holidays, call the main clinic number. Calls will be forwarded to the hospital , please ask for the adult oncology doctor on call.     If you are having any concerning symptoms or wish to speak to a provider before your next infusion visit, please call your care coordinator or triage to notify them so we can adequately serve you.     If you need a refill on a narcotic prescription, please call triage or your care coordinator before your infusion appointment.             February 2018 Sunday Monday Tuesday Wednesday Thursday Friday Saturday                       1     2     [facility] LAB DRAW    8:00 AM   (15 min.)   [facility]RAW   [facility] Lab Draw     P ONC INFUSION 360    8:30 AM   (360 min.)    ONCOLOGY INFUSION   [facility] 3       4     5     6     7     8     9     P [facility] LAB DRAW    6:30 AM   (15 min.)   [facility] DRAW   [facility] Lab Draw     [facility] ONC INFUSION 360    7:00 AM   (360 min.)    ONCOLOGY INFUSION   [facility] 10       11     12     13     14     15     16     LAB    7:30 AM   (15 min.)    LAB   [facility]  Lab     [facility] ONC INFUSION 360    8:00 AM   (360 min.)   UC ONCOLOGY INFUSION   [facility] 17       18     19     20     21     US ABDOMEN LIMITED    9:00 AM   (60 min.)   UCUS3   [facility]  22     23     24       25     26     27     28 March 2018 Sunday Monday Tuesday Wednesday Thursday Friday Saturday                       1     2     3       4     5     6     7     [facility] RETURN    7:45 AM   (30 min.)   Gal Bain MD   [facility] 8     9     10       11     12     13     14     15     16     17       18     19     20     21     22     23     24       25     26     27     28     29     30     31                  Lab Results:  Recent Results (from the past 12 hour(s))   CBC with platelets differential    Collection Time: 02/09/18  6:42 AM   Result Value Ref Range    WBC 7.5 4.0 - 11.0 10e9/L    RBC Count 4.69 4.4 - 5.9 10e12/L    Hemoglobin 13.6 13.3 - 17.7 g/dL    Hematocrit 42.9 40.0 - 53.0 %    MCV 92 78 - 100 fl    MCH 29.0 26.5 - 33.0 pg    MCHC 31.7 31.5 - 36.5 g/dL    RDW 13.4 10.0 - 15.0 %    Platelet Count 233 150 - 450 10e9/L    Diff Method Automated Method     % Neutrophils 68.1 %    % Lymphocytes 20.5 %    % Monocytes 7.2 %    % Eosinophils 3.5 %    % Basophils 0.4 %    % Immature Granulocytes 0.3 %    Nucleated RBCs 0 0 /100    Absolute Neutrophil 5.1 1.6 - 8.3 10e9/L    Absolute Lymphocytes 1.5 0.8 - 5.3 10e9/L    Absolute Monocytes 0.5 0.0 - 1.3 10e9/L    Absolute Eosinophils 0.3 0.0 - 0.7 10e9/L    Absolute Basophils 0.0 0.0 - 0.2 10e9/L    Abs Immature Granulocytes 0.0 0 - 0.4 10e9/L    Absolute Nucleated RBC 0.0                Follow-ups after your visit        Your next 10 appointments already scheduled     Feb 16, 2018  7:30 AM CST   LAB with  LAB   [facility] ([facility] [place])    [address]  [address] [facility] [address] [phone]           Please do not eat  10-12 hours before your appointment if you are coming in fasting for labs on lipids, cholesterol, or glucose (sugar). This does not apply to pregnant women. Water, hot tea and black coffee (with nothing added) are okay. Do not drink other fluids, diet soda or chew gum.            Feb 16, 2018  8:00 AM CST   Infusion 360 with  ONCOLOGY INFUSION, UC 14 ATC   [facility] ([facility])    [address]  [address]  [facility] [address] [phone]            Feb 21, 2018  9:00 AM CST   US ABDOMEN LIMITED with UCUS3   [facility] [place] [facility] ([facility])    [address]  1st Floor  [facility] [id number] [phone]           Please bring a list of your medicines (including vitamins, minerals and over-the-counter drugs). Also, tell your doctor about any allergies you may have. Wear comfortable clothes and leave your valuables at home.  Adults: No eating or drinking for 8 hours before the exam. You may take medicine with a small sip of water.  Children: - Children 6+ years: No food or drink for 6 hours before exam. - Children 1-5 years: No food or drink for 4 hours before exam. - Infants, breast-fed: may have breast milk up to 2 hours before exam. - Infants, formula: may have bottle until 4 hours before exam.  Please call the Imaging Department at your exam site with any questions.            Mar 07, 2018  8:00 AM CST   (Arrive by 7:45 AM)   Return Visit with Gal Bain MD   [facility] ([facility])    [address]  [address]  [facility] [id number] [phone]            Jul 18, 2018  8:15 AM CDT   Return Visit with Avtar Mccullough MD   [facility] [place] ([facility] [place])    [address]  [place] [address] [phone]              Who to contact     If you have questions or need follow up information about today's clinic  visit or your schedule please contact [facility] directly at [phone].  Normal or non-critical lab and imaging results will be communicated to you by MyChart, letter or phone within 4 business days after the clinic has received the results. If you do not hear from us within 7 days, please contact the clinic through [employer] or phone. If you have a critical or abnormal lab result, we will notify you by phone as soon as possible.  Submit refill requests through Grabbit or call your pharmacy and they will forward the refill request to us. Please allow 3 business days for your refill to be completed.          Additional Information About Your Visit        [employer] Information     [employer] gives you secure access to your electronic health record. If you see a primary care provider, you can also send messages to your care team and make appointments. If you have questions, please call your primary care clinic.  If you do not have a primary care provider, please call [phone] and they will assist you.        Care EveryWhere ID     This is your Care EveryWhere ID. This could be used by other organizations to access your [place] medical records  [id number]        Your Vitals Were     Pulse Temperature Respirations Pulse Oximetry BMI (Body Mass Index)       56 97.9  F (36.6  C) (Oral) 18 97% 31.45 kg/m2        Blood Pressure from Last 3 Encounters:   02/09/18 136/67   02/02/18 149/65   01/26/18 145/75    Weight from Last 3 Encounters:   02/09/18 99.2 kg (218 lb 11.2 oz)   02/02/18 98.2 kg (216 lb 6.4 oz)   01/26/18 97.3 kg (214 lb 9.6 oz)              We Performed the Following     CBC with platelets differential        Primary Care Provider Office Phone # Fax #    Anastacio Love [phone] [address] [facility] [address]        Equal Access to Services     HALEY BURR : Vernell Farr, jozef tinoco, marilynn caballero  trini pelayokorinjermaine richards'aan ah. So [facility] [phone].    ATENCIÓN: Si devon muhammad, tiene a warner disposición servicios gratuitos de asistencia lingüística. Richard hodges [phone].    We comply with applicable federal civil rights laws and [place] laws. We do not discriminate on the basis of race, color, national origin, age, disability, sex, sexual orientation, or gender identity.            Thank you!     Thank you for choosing [facility]  for your care. Our goal is always to provide you with excellent care. Hearing back from our patients is one way we can continue to improve our services. Please take a few minutes to complete the written survey that you may receive in the mail after your visit with us. Thank you!             Your Updated Medication List - Protect others around you: Learn how to safely use, store and throw away your medicines at www.disposemymeds.org.          This list is accurate as of 2/9/18 10:00 AM.  Always use your most recent med list.                   Brand Name Dispense Instructions for use Diagnosis    aspirin 325 MG EC tablet      1/2 tab daily        atorvastatin 40 MG tablet    LIPITOR    60 tablet    Take 1 tablet (40 mg) by mouth daily    Hyperlipidemia LDL goal <100       latanoprost 0.005 % ophthalmic solution    XALATAN    3 Bottle    Place 1 drop into both eyes At Bedtime    Borderline glaucoma with ocular hypertension, unspecified laterality       levothyroxine 75 MCG tablet    SYNTHROID/LEVOTHROID    90 tablet    Take 1 tablet (75 mcg) by mouth daily    Hypothyroidism, unspecified type       metoprolol tartrate 25 MG tablet    LOPRESSOR    60 tablet    Take 0.5 tablets (12.5 mg) by mouth 2 times daily    S/P CABG (coronary artery bypass graft), Acute post-operative pain       multivitamin Tabs tablet      Take 1 tablet by mouth daily        nitroGLYcerin 0.4 MG sublingual tablet    NITROSTAT    25 tablet    For chest pain place 1 tablet under the tongue every 5  minutes for 3 doses. If symptoms persist 5 minutes after 1st dose call 911.    Exertional chest pain       VITAMIN D3 PO      Take by mouth daily

## 2018-02-09 NOTE — PROGRESS NOTES
Infusion Nursing Note:  Zack Demarco presents today for Day 15 Cycle 1 Rituxan.    Patient seen by provider today: No    Note: Zack reports doing well today. He offers no changes or concerns today.    Intravenous Access:  Peripheral IV placed in lab.    Treatment Conditions:  Lab Results   Component Value Date    HGB 13.6 02/09/2018     Lab Results   Component Value Date    WBC 7.5 02/09/2018      Lab Results   Component Value Date    ANEU 5.1 02/09/2018     Lab Results   Component Value Date     02/09/2018      Results reviewed, labs MET treatment parameters, ok to proceed with treatment.    Post Infusion Assessment:  Patient tolerated infusion without incident.  Blood return noted pre and post infusion.  Site patent and intact, free from redness, edema or discomfort.  No evidence of extravasations.  Access discontinued per protocol.    Discharge Plan:   Patient declined prescription refills.  AVS to patient via VR1T.  Patient will return 02/16 for next appointment.   Patient discharged in stable condition accompanied by: self.  Departure Mode: Ambulatory.    Albina Salazar RN

## 2018-02-09 NOTE — NURSING NOTE
Chief Complaint   Patient presents with     Blood Draw     Labs drawn via /PIV placed by RN. VS taken.     Belgica Munoz RN

## 2018-02-16 ENCOUNTER — INFUSION THERAPY VISIT (OUTPATIENT)
Dept: ONCOLOGY | Facility: CLINIC | Age: 78
End: 2018-02-16
Attending: INTERNAL MEDICINE
Payer: MEDICARE

## 2018-02-16 ENCOUNTER — APPOINTMENT (OUTPATIENT)
Dept: LAB | Facility: CLINIC | Age: 78
End: 2018-02-16
Payer: MEDICARE

## 2018-02-16 VITALS
OXYGEN SATURATION: 99 % | RESPIRATION RATE: 18 BRPM | BODY MASS INDEX: 31.51 KG/M2 | SYSTOLIC BLOOD PRESSURE: 140 MMHG | WEIGHT: 219.1 LBS | DIASTOLIC BLOOD PRESSURE: 75 MMHG | HEART RATE: 63 BPM | TEMPERATURE: 98 F

## 2018-02-16 DIAGNOSIS — C82.99 FOLLICULAR LYMPHOMA OF EXTRANODAL AND SOLID ORGAN SITES (H): Primary | ICD-10-CM

## 2018-02-16 LAB
BASOPHILS # BLD AUTO: 0 10E9/L (ref 0–0.2)
BASOPHILS NFR BLD AUTO: 0.4 %
DIFFERENTIAL METHOD BLD: NORMAL
EOSINOPHIL # BLD AUTO: 0.2 10E9/L (ref 0–0.7)
EOSINOPHIL NFR BLD AUTO: 3.4 %
ERYTHROCYTE [DISTWIDTH] IN BLOOD BY AUTOMATED COUNT: 13.8 % (ref 10–15)
HCT VFR BLD AUTO: 43.8 % (ref 40–53)
HGB BLD-MCNC: 13.8 G/DL (ref 13.3–17.7)
IMM GRANULOCYTES # BLD: 0 10E9/L (ref 0–0.4)
IMM GRANULOCYTES NFR BLD: 0.3 %
LYMPHOCYTES # BLD AUTO: 1.2 10E9/L (ref 0.8–5.3)
LYMPHOCYTES NFR BLD AUTO: 17.9 %
MCH RBC QN AUTO: 28.7 PG (ref 26.5–33)
MCHC RBC AUTO-ENTMCNC: 31.5 G/DL (ref 31.5–36.5)
MCV RBC AUTO: 91 FL (ref 78–100)
MONOCYTES # BLD AUTO: 0.6 10E9/L (ref 0–1.3)
MONOCYTES NFR BLD AUTO: 8.7 %
NEUTROPHILS # BLD AUTO: 4.7 10E9/L (ref 1.6–8.3)
NEUTROPHILS NFR BLD AUTO: 69.3 %
NRBC # BLD AUTO: 0 10*3/UL
NRBC BLD AUTO-RTO: 0 /100
PLATELET # BLD AUTO: 216 10E9/L (ref 150–450)
RBC # BLD AUTO: 4.81 10E12/L (ref 4.4–5.9)
WBC # BLD AUTO: 6.8 10E9/L (ref 4–11)

## 2018-02-16 PROCEDURE — 25000132 ZZH RX MED GY IP 250 OP 250 PS 637: Mod: ZF | Performed by: INTERNAL MEDICINE

## 2018-02-16 PROCEDURE — 96413 CHEMO IV INFUSION 1 HR: CPT

## 2018-02-16 PROCEDURE — A9270 NON-COVERED ITEM OR SERVICE: HCPCS | Mod: ZF | Performed by: INTERNAL MEDICINE

## 2018-02-16 PROCEDURE — 85025 COMPLETE CBC W/AUTO DIFF WBC: CPT | Performed by: INTERNAL MEDICINE

## 2018-02-16 PROCEDURE — 25000128 H RX IP 250 OP 636: Mod: ZF | Performed by: INTERNAL MEDICINE

## 2018-02-16 PROCEDURE — 96415 CHEMO IV INFUSION ADDL HR: CPT

## 2018-02-16 RX ORDER — ACETAMINOPHEN 325 MG/1
650 TABLET ORAL ONCE
Status: COMPLETED | OUTPATIENT
Start: 2018-02-16 | End: 2018-02-16

## 2018-02-16 RX ORDER — DIPHENHYDRAMINE HCL 25 MG
50 CAPSULE ORAL ONCE
Status: COMPLETED | OUTPATIENT
Start: 2018-02-16 | End: 2018-02-16

## 2018-02-16 RX ADMIN — SODIUM CHLORIDE 250 ML: 9 INJECTION, SOLUTION INTRAVENOUS at 08:03

## 2018-02-16 RX ADMIN — DIPHENHYDRAMINE HYDROCHLORIDE 50 MG: 25 CAPSULE ORAL at 08:13

## 2018-02-16 RX ADMIN — ACETAMINOPHEN 650 MG: 325 TABLET, FILM COATED ORAL at 08:12

## 2018-02-16 RX ADMIN — RITUXIMAB 800 MG: 10 INJECTION, SOLUTION INTRAVENOUS at 08:27

## 2018-02-16 ASSESSMENT — PAIN SCALES - GENERAL: PAINLEVEL: NO PAIN (0)

## 2018-02-16 NOTE — PATIENT INSTRUCTIONS
Contact Numbers  [facility] Nurse Triage: [phone]  After Hours Nurse Line:  [phone]    [facility] or after hours number if you experience a temperature greater than or equal to 100.5, shaking chills, have uncontrolled nausea, vomiting and/or diarrhea, dizziness, shortness of breath, chest pain, bleeding, unexplained bruising, or if you have any other new/concerning symptoms, questions or concerns.     If you are having any concerning symptoms or wish to speak to a provider before your next infusion visit, please call your care coordinator or triage to notify them so we can adequately serve you.     If you need a refill on a narcotic prescription or other medication, please call triage before your infusion appointment.           February 2018 Sunday Monday Tuesday Wednesday Thursday Friday Saturday                       1     2     [facility]    8:00 AM   (15 min.)    M[facility]ONIC LAB DRAW   [facility] Lab Draw     UMP ONC INFUSION 360    8:30 AM   (360 min.)    ONCOLOGY INFUSION   [facility] 3       4     5     6     7     8     9     [facility] DRAW    6:30 AM   (15 min.)    [facility]   [facility]     [facility] ONC INFUSION 360    7:00 AM   (360 min.)    ONCOLOGY INFUSION   [facility] 10       11     12     13     14     15     16     [facility] LAB DRAW    7:15 AM   (15 min.)    [facility] DRAW   [facility]     [facility] ONC INFUSION 360    8:00 AM   (360 min.)    ONCOLOGY INFUSION   [facility] 17       18     19     20     21     US ABDOMEN LIMITED    9:00 AM   (60 min.)   [address] US 22     23     24       25     26     27     28                               March 2018 Sunday Monday Tuesday Wednesday Thursday Friday Saturday                       1     2     3       4     5     6     7     [facility] RETURN    7:45 AM   (30  min.)   Gal Bain MD   [facility] 8     9     10       11     12     13     14     15     16     17       18     19     20     21     22     23     24       25     26     27     28     29     30     31                  Lab Results:  Recent Results (from the past 12 hour(s))   CBC with platelets differential    Collection Time: 02/16/18  7:21 AM   Result Value Ref Range    WBC 6.8 4.0 - 11.0 10e9/L    RBC Count 4.81 4.4 - 5.9 10e12/L    Hemoglobin 13.8 13.3 - 17.7 g/dL    Hematocrit 43.8 40.0 - 53.0 %    MCV 91 78 - 100 fl    MCH 28.7 26.5 - 33.0 pg    MCHC 31.5 31.5 - 36.5 g/dL    RDW 13.8 10.0 - 15.0 %    Platelet Count 216 150 - 450 10e9/L    Diff Method Automated Method     % Neutrophils 69.3 %    % Lymphocytes 17.9 %    % Monocytes 8.7 %    % Eosinophils 3.4 %    % Basophils 0.4 %    % Immature Granulocytes 0.3 %    Nucleated RBCs 0 0 /100    Absolute Neutrophil 4.7 1.6 - 8.3 10e9/L    Absolute Lymphocytes 1.2 0.8 - 5.3 10e9/L    Absolute Monocytes 0.6 0.0 - 1.3 10e9/L    Absolute Eosinophils 0.2 0.0 - 0.7 10e9/L    Absolute Basophils 0.0 0.0 - 0.2 10e9/L    Abs Immature Granulocytes 0.0 0 - 0.4 10e9/L    Absolute Nucleated RBC 0.0

## 2018-02-16 NOTE — PROGRESS NOTES
Infusion Nursing Note:  Zack Demarco presents today for Cycle 1 Day 22 Rituxan, rapid.    Patient seen by provider today: No   present during visit today: Not Applicable.    Note: patient denies any complaints today prior to infusion.  See assessment flowsheet.    Intravenous Access:  Peripheral IV placed in lab.    Treatment Conditions:  Lab Results   Component Value Date    HGB 13.8 02/16/2018     Lab Results   Component Value Date    WBC 6.8 02/16/2018      Lab Results   Component Value Date    ANEU 4.7 02/16/2018     Lab Results   Component Value Date     02/16/2018      Results reviewed, labs MET treatment parameters, ok to proceed with treatment.    Post Infusion Assessment:  Patient tolerated infusion without incident.  Blood return noted pre and post infusion.  Site patent and intact, free from redness, edema or discomfort.  No evidence of extravasations.  Access discontinued per protocol.    Discharge Plan:   Patient declined prescription refills.  Discharge instructions reviewed with: Patient.  Patient and/or family verbalized understanding of discharge instructions and all questions answered.  AVS to patient via HipSnipT.  Patient will return 03/07/2018 for next provider appointment.   Patient discharged in stable condition accompanied by: self.  Departure Mode: Ambulatory.    Aleida Sheppard RN

## 2018-02-16 NOTE — NURSING NOTE
done by RN with IV placement in right forearm, pt tolerated well, labs collected and sent, VS taken and pt checked in for next appt.   Flori Naik

## 2018-02-16 NOTE — MR AVS SNAPSHOT
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     If you are having any concerning symptoms or wish to speak to a provider before your next infusion visit, please call your care coordinator or triage to notify them so we can adequately serve you.     If you need a refill on a narcotic prescription or other medication, please call triage before your infusion appointment.           February 2018 Sunday Monday Tuesday Wednesday Thursday Friday Saturday                       1     2     [facility] DRAW    8:00 AM   (15 min.)    [facility]     P ONC INFUSION 360    8:30 AM   (360 min.)    ONCOLOGY INFUSION   [facility] 3       4     5     6     7     8     9     [facility]    6:30 AM   (15 min.)    [facility] LAB DRAW   [facility] Draw     [facility] ONC INFUSION 360    7:00 AM   (360 min.)    ONCOLOGY INFUSION   [facility] 10       11     12     13     14     15     16     [facility] DRAW    7:15 AM   (15 min.)    [facility] LAB DRAW   [facility] Lab Draw     [facility] ONC INFUSION 360    8:00 AM   (360 min.)     ONCOLOGY INFUSION   [facility] 17       18     19     20     21     US ABDOMEN LIMITED    9:00 AM   (60 min.)   [address] [facility] US 22     23     24       25     26     27     28                               March 2018 Sunday Monday Tuesday Wednesday Thursday Friday Saturday                       1     2     3       4     5     6     7     [facility] RETURN    7:45 AM   (30 min.)   Gal Bain MD   [facility] 8     9     10       11     12     13     14     15     16     17       18     19     20     21     22     23     24       25     26     27     28     29     30     31                  Lab Results:  Recent Results (from the past 12 hour(s))   CBC with platelets differential    Collection Time: 02/16/18  7:21 AM   Result Value Ref Range    WBC 6.8 4.0 - 11.0 10e9/L    RBC Count 4.81 4.4 - 5.9 10e12/L    Hemoglobin 13.8 13.3 - 17.7 g/dL    Hematocrit 43.8 40.0 - 53.0 %    MCV 91 78 - 100 fl    MCH 28.7 26.5 - 33.0 pg    MCHC 31.5 31.5 - 36.5 g/dL    RDW 13.8 10.0 - 15.0 %    Platelet Count 216 150 - 450 10e9/L    Diff Method Automated Method     % Neutrophils 69.3 %    % Lymphocytes 17.9 %    % Monocytes 8.7 %    % Eosinophils 3.4 %    % Basophils 0.4 %    % Immature Granulocytes 0.3 %    Nucleated RBCs 0 0 /100    Absolute Neutrophil 4.7 1.6 - 8.3 10e9/L    Absolute Lymphocytes 1.2 0.8 - 5.3 10e9/L    Absolute Monocytes 0.6 0.0 - 1.3 10e9/L    Absolute Eosinophils 0.2 0.0 - 0.7 10e9/L    Absolute Basophils 0.0 0.0 - 0.2 10e9/L    Abs Immature Granulocytes 0.0 0 - 0.4 10e9/L    Absolute Nucleated RBC 0.0                Follow-ups after your visit        Your next 10 appointments already scheduled     Feb 21, 2018  9:00 AM CST   US ABDOMEN LIMITED with [address] US ([facility])    [address] [address] [phone]           Please bring a list of your medicines (including  vitamins, minerals and over-the-counter drugs). Also, tell your doctor about any allergies you may have. Wear comfortable clothes and leave your valuables at home.  Adults: No eating or drinking for 8 hours before the exam. You may take medicine with a small sip of water.  Children: - Children 6+ years: No food or drink for 6 hours before exam. - Children 1-5 years: No food or drink for 4 hours before exam. - Infants, breast-fed: may have breast milk up to 2 hours before exam. - Infants, formula: may have bottle until 4 hours before exam.  Please call the Imaging Department at your exam site with any questions.            Mar 07, 2018  8:00 AM CST   (Arrive by 7:45 AM)   Return Visit with Gal Bain MD   [facility] ([facility])    [address] [place] [address]  [address]  [facility] [id number] [phone]            Jul 18, 2018  8:15 AM CDT   Return Visit with Avtar Mccullough MD   [facility] ([address] [id number] [phone]              Who to contact     If you have questions or need follow up information about today's clinic visit or your schedule please contact [facility] directly at [phone].  Normal or non-critical lab and imaging results will be communicated to you by Uversityhart, letter or phone within 4 business days after the clinic has received the results. If you do not hear from us within 7 days, please contact the clinic through Uversityhart or phone. If you have a critical or abnormal lab result, we will notify you by phone as soon as possible.  Submit refill requests through Voicendo or call your pharmacy and they will forward the refill request to us. Please allow 3 business days for your refill to be completed.          Additional Information About Your Visit        Voicendo Information     Voicendo gives you secure access to your electronic health record. If you see  a primary care provider, you can also send messages to your care team and make appointments. If you have questions, please call your primary care clinic.  If you do not have a primary care provider, please call [phone] and they will assist you.        Care EveryWhere ID     This is your Care EveryWhere ID. This could be used by other organizations to access your [place] medical records  [id number]        Your Vitals Were     Pulse Temperature Respirations Pulse Oximetry BMI (Body Mass Index)       63 98  F (36.7  C) 18 99% 31.51 kg/m2        Blood Pressure from Last 3 Encounters:   02/16/18 140/75   02/09/18 136/67   02/02/18 149/65    Weight from Last 3 Encounters:   02/16/18 99.4 kg (219 lb 1.6 oz)   02/09/18 99.2 kg (218 lb 11.2 oz)   02/02/18 98.2 kg (216 lb 6.4 oz)              We Performed the Following     CBC with platelets differential        Primary Care Provider Office Phone # Fax #    Anastacio Love [phone] [address] [facility] [address]        Equal Access to Services     [facility] : Hadii stefany allisono Sorob, waaxda luanishadaha, qaybta kaalmada adestacey, marilynn dasilva . So [facility] [phone].    ATENCIÓN: Si habla español, tiene a warner disposición servicios gratuitos de asistencia lingüística. Ll[facility] [phone].    We comply with applicable federal civil rights laws and [place] laws. We do not discriminate on the basis of race, color, national origin, age, disability, sex, sexual orientation, or gender identity.            Thank you!     Thank you for choosing [facility]  for your care. Our goal is always to provide you with excellent care. Hearing back from our patients is one way we can continue to improve our services. Please take a few minutes to complete the written survey that you may receive in the mail after your visit with us. Thank you!             Your Updated Medication List - Protect others  around you: Learn how to safely use, store and throw away your medicines at www.disposemymeds.org.          This list is accurate as of 2/16/18 10:10 AM.  Always use your most recent med list.                   Brand Name Dispense Instructions for use Diagnosis    aspirin 325 MG EC tablet      1/2 tab daily        atorvastatin 40 MG tablet    LIPITOR    60 tablet    Take 1 tablet (40 mg) by mouth daily    Hyperlipidemia LDL goal <100       latanoprost 0.005 % ophthalmic solution    XALATAN    3 Bottle    Place 1 drop into both eyes At Bedtime    Borderline glaucoma with ocular hypertension, unspecified laterality       levothyroxine 75 MCG tablet    SYNTHROID/LEVOTHROID    90 tablet    Take 1 tablet (75 mcg) by mouth daily    Hypothyroidism, unspecified type       metoprolol tartrate 25 MG tablet    LOPRESSOR    60 tablet    Take 0.5 tablets (12.5 mg) by mouth 2 times daily    S/P CABG (coronary artery bypass graft), Acute post-operative pain       multivitamin Tabs tablet      Take 1 tablet by mouth daily        nitroGLYcerin 0.4 MG sublingual tablet    NITROSTAT    25 tablet    For chest pain place 1 tablet under the tongue every 5 minutes for 3 doses. If symptoms persist 5 minutes after 1st dose call 911.    Exertional chest pain       VITAMIN D3 PO      Take by mouth daily

## 2018-02-27 ENCOUNTER — TRANSFERRED RECORDS (OUTPATIENT)
Dept: HEALTH INFORMATION MANAGEMENT | Facility: CLINIC | Age: 78
End: 2018-02-27

## 2018-03-05 ENCOUNTER — RADIANT APPOINTMENT (OUTPATIENT)
Dept: ULTRASOUND IMAGING | Facility: CLINIC | Age: 78
End: 2018-03-05
Attending: INTERNAL MEDICINE
Payer: COMMERCIAL

## 2018-03-05 DIAGNOSIS — C85.90 LYMPHOMA, UNSPECIFIED BODY REGION, UNSPECIFIED LYMPHOMA TYPE (H): ICD-10-CM

## 2018-03-07 ENCOUNTER — ONCOLOGY VISIT (OUTPATIENT)
Dept: ONCOLOGY | Facility: CLINIC | Age: 78
End: 2018-03-07
Attending: INTERNAL MEDICINE
Payer: COMMERCIAL

## 2018-03-07 VITALS
SYSTOLIC BLOOD PRESSURE: 154 MMHG | BODY MASS INDEX: 32.54 KG/M2 | TEMPERATURE: 98.2 F | RESPIRATION RATE: 16 BRPM | HEIGHT: 69 IN | OXYGEN SATURATION: 96 % | DIASTOLIC BLOOD PRESSURE: 74 MMHG | WEIGHT: 219.7 LBS | HEART RATE: 67 BPM

## 2018-03-07 DIAGNOSIS — I10 HYPERTENSION GOAL BP (BLOOD PRESSURE) < 150/90: ICD-10-CM

## 2018-03-07 DIAGNOSIS — I25.810 CORONARY ARTERY DISEASE INVOLVING AUTOLOGOUS VEIN CORONARY BYPASS GRAFT WITHOUT ANGINA PECTORIS: ICD-10-CM

## 2018-03-07 DIAGNOSIS — C82.99 FOLLICULAR LYMPHOMA OF EXTRANODAL AND SOLID ORGAN SITES (H): Primary | ICD-10-CM

## 2018-03-07 PROCEDURE — G0463 HOSPITAL OUTPT CLINIC VISIT: HCPCS | Mod: ZF

## 2018-03-07 PROCEDURE — 99214 OFFICE O/P EST MOD 30 MIN: CPT | Mod: ZP | Performed by: INTERNAL MEDICINE

## 2018-03-07 ASSESSMENT — PAIN SCALES - GENERAL: PAINLEVEL: NO PAIN (0)

## 2018-03-07 NOTE — PROGRESS NOTES
ONCOLOGY SUMMARY:  Zack Demarco is a 77-year-old man who we first saw in consultation on 12/27/2017 for a new diagnosis of follicular lymphoma. He was diagnosed incidental to an evaluation for cardiac bypass surgery in 11/2017.  The chest CT scan on 11/14 showed an unexpected retroperitoneal mass with surrounding lymphadenopathy suspicious for malignancy.  A further CT scan done on the same day showed a 2.3 x 7.2 x 6.1 retroperitoneal soft tissue mass with adjacent lymphadenopathy that mildly narrowed the left renal vein.  There also was nodularity within the mesentery and an enlarged hilar lymph node.  CT-guided biopsy of the retroperitoneal mass on 12/12/2017 established the diagnosis, but the sample was too small for adequate grading. There was no disease identified outside of the abdomen, but a bone marrow biopsy was not obtained as part of staging.      Relative to cardiac issues, he underwent an uncomplicated 3-vessel coronary artery bypass graft on 11/22/2017.  He recovered well after the surgery and, subsequently, has been symptom-free.       No significant exposure history, including no history of hepatitis.    With the renal and gonadal vein compression it was decided to start treatment with rituximab, alone, and if insufficient to add bendamustine. Interval evaluation planned with US, but recognizing that CT would eventually be necessary.     HISTORY OF PRESENT ILLNESS:  Mr. Demarco has now completed 4 weekly doses of Rituximab from 01/26 and 02/16/2018.  He had no difficulty with the treatment and was able to receive rapid infusion (90 minutes) for the last 3 doses.      INTERVAL HISTORY:  He has some low back pain that has been chronic and was aggravated by snow shoveling the past few days.  Otherwise, no new symptoms or concerns.  Specifically, no fevers, night sweats or additional weight loss.  No cough, shortness of breath, chest pain or palpitation.  No abdominal pain or lower extremity edema.       REVIEW OF SYSTEMS:  A 10-point review of systems is otherwise negative.      MEDICATIONS:     1.  Metoprolol.   2.  Atorvastatin.   3.  Levothyroxine 75 mcg.   4.  Vitamin D.   5.  Multivitamins.   6.  Xalatan ophthalmic solution.     7.  Aspirin 325.   8.  Nitroglycerin p.r.n. (has not used).      ALLERGIES:  None reported.      PHYSICAL EXAMINATION:     VITAL SIGNS:  Weight 99.7 kg (stable), blood pressure 154/74, pulse 67 and regular, respirations 16, temperature 98.2.  O2 saturation 96% on room air.   HEENT:  Anicteric.  No conjunctival injection.  Oropharynx with no masses.  Mucosae are moist and without lesion.   NECK:  No cervical/supraclavicular adenopathy.   CHEST:  Clear to auscultation.   AXILLAE:  No nodes.   HEART:  Regular rhythm without murmur or gallop.   ABDOMEN:  Soft and nontender.  No detectable organomegaly or mass.  Bowel sounds present.  No inguinal/femoral nodes.   EXTREMITIES:  No lower extremity edema.   SKIN:  No rashes.      LABORATORY:  None obtained.      ULTRASOUND: Mass adjacent to the left renal vein measures slightly smaller compared to the prior exam, currently measuring 5.2 x 3.2 x 4.7 cm, previously 6.2 x 3.4 x 4.1 cm. The left renal vein and left renal artery are patent.     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  Bone marrow biopsy was not obtained as part of his staging.      Mr. Demarco has now completed 4 weekly doses of rituximab.  On the initial post-treatment US there has been slight reduction in the size of the tumor with no evidence of obstruction of venous or arterial flow.  Vessels are patent.  The patient has no symptoms.  In view of these findings, we will continue to monitor the patient and see him back in approximately 4-5 weeks with an abdominal CT scan to more fully discern the effect of rituximab. Responses with  rituximab may be delayed. Will assess whether to proceed with rituximab plus bendamustine as we continue follow-up.      The patient was asked to contact us with any new symptoms.      I spent approximately 30 minutes with  Demarco, the majority of time in counseling.      Gal Bain MD  Professor of Medicine  Oncology  [facility]  Office: [phone]  Clinic Fax: [phone]       cc: MD Rolando Pearson MD Yohannes Gebre, MD Ronald Sih, MD

## 2018-03-07 NOTE — NURSING NOTE
"Oncology Rooming Note    March 7, 2018 7:48 AM   Zack Demarco is a 77 year old male who presents for:    Chief Complaint   Patient presents with     Oncology Clinic Visit     Return: Follicular lymphoma     Initial Vitals: /74  Pulse 67  Temp 98.2  F (36.8  C) (Oral)  Resp 16  Ht 1.753 m (5' 9\")  Wt 99.7 kg (219 lb 11.2 oz)  SpO2 96%  BMI 32.44 kg/m2 Estimated body mass index is 32.44 kg/(m^2) as calculated from the following:    Height as of this encounter: 1.753 m (5' 9\").    Weight as of this encounter: 99.7 kg (219 lb 11.2 oz). Body surface area is 2.2 meters squared.  No Pain (0) Comment: Data Unavailable   No LMP for male patient.  Allergies reviewed: Yes  Medications reviewed: Yes    Medications: Medication refills not needed today.  Pharmacy name entered into [employer]Spirit: [id number] [place], [place] - [address] [facility]    Clinical concerns: No New Concerns    5 minutes for nursing intake (face to face time)     DIANE Weir      "

## 2018-03-07 NOTE — LETTER
3/7/2018      RE: Zack Demarco  [address] [facility] [address]       ONCOLOGY SUMMARY:  Zack Demarco is a 77-year-old man who we first saw in consultation on 12/27/2017 for a new diagnosis of follicular lymphoma. He was diagnosed incidental to an evaluation for cardiac bypass surgery in 11/2017.  The chest CT scan on 11/14 showed an unexpected retroperitoneal mass with surrounding lymphadenopathy suspicious for malignancy.  A further CT scan done on the same day showed a 2.3 x 7.2 x 6.1 retroperitoneal soft tissue mass with adjacent lymphadenopathy that mildly narrowed the left renal vein.  There also was nodularity within the mesentery and an enlarged hilar lymph node.  CT-guided biopsy of the retroperitoneal mass on 12/12/2017 established the diagnosis, but the sample was too small for adequate grading. There was no disease identified outside of the abdomen, but a bone marrow biopsy was not obtained as part of staging.      Relative to cardiac issues, he underwent an uncomplicated 3-vessel coronary artery bypass graft on 11/22/2017.  He recovered well after the surgery and, subsequently, has been symptom-free.       No significant exposure history, including no history of hepatitis.    With the renal and gonadal vein compression it was decided to start treatment with rituximab, alone, and if insufficient to add bendamustine. Interval evaluation planned with US, but recognizing that CT would eventually be necessary.     HISTORY OF PRESENT ILLNESS:  Mr. Demarco has now completed 4 weekly doses of Rituximab from 01/26 and 02/16/2018.  He had no difficulty with the treatment and was able to receive rapid infusion (90 minutes) for the last 3 doses.      INTERVAL HISTORY:  He has some low back pain that has been chronic and was aggravated by snow shoveling the past few days.  Otherwise, no new symptoms or concerns.  Specifically, no fevers, night sweats or additional weight loss.  No cough, shortness of breath,  chest pain or palpitation.  No abdominal pain or lower extremity edema.      REVIEW OF SYSTEMS:  A 10-point review of systems is otherwise negative.      MEDICATIONS:     1.  Metoprolol.   2.  Atorvastatin.   3.  Levothyroxine 75 mcg.   4.  Vitamin D.   5.  Multivitamins.   6.  Xalatan ophthalmic solution.     7.  Aspirin 325.   8.  Nitroglycerin p.r.n. (has not used).      ALLERGIES:  None reported.      PHYSICAL EXAMINATION:     VITAL SIGNS:  Weight 99.7 kg (stable), blood pressure 154/74, pulse 67 and regular, respirations 16, temperature 98.2.  O2 saturation 96% on room air.   HEENT:  Anicteric.  No conjunctival injection.  Oropharynx with no masses.  Mucosae are moist and without lesion.   NECK:  No cervical/supraclavicular adenopathy.   CHEST:  Clear to auscultation.   AXILLAE:  No nodes.   HEART:  Regular rhythm without murmur or gallop.   ABDOMEN:  Soft and nontender.  No detectable organomegaly or mass.  Bowel sounds present.  No inguinal/femoral nodes.   EXTREMITIES:  No lower extremity edema.   SKIN:  No rashes.      LABORATORY:  None obtained.      ULTRASOUND: Mass adjacent to the left renal vein measures slightly smaller compared to the prior exam, currently measuring 5.2 x 3.2 x 4.7 cm, previously 6.2 x 3.4 x 4.1 cm. The left renal vein and left renal artery are patent.     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  Bone marrow biopsy was not obtained as part of his staging.      Mr. Demarco has now completed 4 weekly doses of rituximab.  On the initial post-treatment US there has been slight reduction in the size of the tumor with no evidence of obstruction of venous or arterial flow.  Vessels are patent.  The patient has no symptoms.  In view of these findings, we will continue to monitor the patient and see him back in approximately 4-5 weeks with an abdominal  CT scan to more fully discern the effect of rituximab. Responses with rituximab may be delayed. Will assess whether to proceed with rituximab plus bendamustine as we continue follow-up.      The patient was asked to contact us with any new symptoms.      I spent approximately 30 minutes with Mr. Demarco, the majority of time in counseling.      Gal Bain MD  Professor of Medicine  Oncology  [facility]  Office: [phone]  Clinic Fax: [phone]       cc: MD Rolando Pearson MD Yohannes Gebre, MD Ronald Sih, MD Bruce A. Peterson, MD

## 2018-03-07 NOTE — MR AVS SNAPSHOT
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      Your next 10 appointments already scheduled     Apr 09, 2018  7:40 AM CDT   (Arrive by 7:25 AM)   CT CHEST/ABDOMEN/PELVIS W CONTRAST with UCCT2   [facility] ([facility])    [address] [address] [phone]           Please bring any scans or X-rays taken at other hospitals, if similar tests were done. Also bring a list of your medicines, including vitamins, minerals and over-the-counter drugs. It is safest to leave personal items at home.  Be sure to tell your doctor:   If you have any allergies.   If there s any chance you are pregnant.   If you are breastfeeding.  You may have contrast for this exam. To prepare:   Do not eat or drink for 2 hours before your exam. If you need to take medicine, you may take it with small sips of water. (We may ask you to take liquid medicine as well.)   The day before your exam, drink extra fluids at least six 8-ounce glasses (unless your doctor tells you to restrict your fluids).   You will be given instructions on how to drink the contrast.  Patients over 70 or patients with diabetes or kidney problems:   If you haven t had a blood test (creatinine test) within the last 30 days, the Cardiologist/Radiologist may  require you to get this test prior to your exam.  If you have diabetes:   Continue to take your metformin medication on the day of your exam  Please wear loose clothing, such as a sweat suit or jogging clothes. Avoid snaps, zippers and other metal. We may ask you to undress and put on a hospital gown.  If you have any questions, please call the Imaging Department where you will have your exam.            Apr 09, 2018  8:15 AM CDT   LAB with  LAB   [facility])    [address]  1st Floor  [facility] [place] [address]   [phone]           Please do not eat 10-12 hours before your appointment if you are coming in fasting for labs on lipids, cholesterol, or glucose (sugar). This does not apply to pregnant women. Water, hot tea and black coffee (with nothing added) are okay. Do not drink other fluids, diet soda or chew gum.            Apr 11, 2018  8:30 AM CDT   (Arrive by 8:15 AM)   Return Visit with Gal Bain MD   [facility] ([facility])    [address] [place] [address]  [address] [place] [address]   [phone]            Jul 18, 2018  8:15 AM CDT   Return Visit with Avtar Mccullough MD   [facility] ([facility])    [address] [place] [address]   [phone]              Future tests that were ordered for you today     Open Future Orders        Priority Expected Expires Ordered    CT Chest/Abdomen/Pelvis w Contrast Routine 4/7/2018 3/7/2019 3/7/2018    Comprehensive metabolic panel Routine 4/7/2018 3/7/2019 3/7/2018    Lactate Dehydrogenase Routine 4/7/2018 3/7/2019 3/7/2018    Uric acid Routine 4/7/2018 3/7/2019 3/7/2018    *CBC with platelets differential Routine 4/7/2018 3/7/2019 3/7/2018            Who to contact     If you have questions or need follow up information about today's clinic visit or your schedule please contact [facility] directly at  "[phone].  Normal or non-critical lab and imaging results will be communicated to you by MyChart, letter or phone within 4 business days after the clinic has received the results. If you do not hear from us within 7 days, please contact the clinic through Superbhart or phone. If you have a critical or abnormal lab result, we will notify you by phone as soon as possible.  Submit refill requests through [employer] or call your pharmacy and they will forward the refill request to us. Please allow 3 business days for your refill to be completed.          Additional Information About Your Visit        Superbhart Information     [employer] gives you secure access to your electronic health record. If you see a primary care provider, you can also send messages to your care team and make appointments. If you have questions, please call your primary care clinic.  If you do not have a primary care provider, please call [phone] and they will assist you.        Care EveryWhere ID     This is your Care EveryWhere ID. This could be used by other organizations to access your [place] medical records  [id number]        Your Vitals Were     Pulse Temperature Respirations Height Pulse Oximetry BMI (Body Mass Index)    67 98.2  F (36.8  C) (Oral) 16 1.753 m (5' 9\") 96% 32.44 kg/m2       Blood Pressure from Last 3 Encounters:   03/07/18 154/74   02/16/18 140/75   02/09/18 136/67    Weight from Last 3 Encounters:   03/07/18 99.7 kg (219 lb 11.2 oz)   02/16/18 99.4 kg (219 lb 1.6 oz)   02/09/18 99.2 kg (218 lb 11.2 oz)               Primary Care Provider Office Phone # Fax #    Anastacio Love [phone] [address] [address]        Equal Access to Services     [facility] LENO : Vernell Farr, wanathanielda luanishadaha, qaybta kaalkirstin gross, marilynn reynolds. So [facility] [phone].    ATENCIÓN: Si habla español, tiene a warner disposición servicios gratuitos de asistencia " lingüística. Richard al [phone].    We comply with applicable federal civil rights laws and [place] laws. We do not discriminate on the basis of race, color, national origin, age, disability, sex, sexual orientation, or gender identity.            Thank you!     Thank you for choosing [facility]  for your care. Our goal is always to provide you with excellent care. Hearing back from our patients is one way we can continue to improve our services. Please take a few minutes to complete the written survey that you may receive in the mail after your visit with us. Thank you!             Your Updated Medication List - Protect others around you: Learn how to safely use, store and throw away your medicines at www.disposemymeds.org.          This list is accurate as of 3/7/18  8:54 AM.  Always use your most recent med list.                   Brand Name Dispense Instructions for use Diagnosis    aspirin 325 MG EC tablet      1/2 tab daily        atorvastatin 40 MG tablet    LIPITOR    60 tablet    Take 1 tablet (40 mg) by mouth daily    Hyperlipidemia LDL goal <100       latanoprost 0.005 % ophthalmic solution    XALATAN    3 Bottle    Place 1 drop into both eyes At Bedtime    Borderline glaucoma with ocular hypertension, unspecified laterality       levothyroxine 75 MCG tablet    SYNTHROID/LEVOTHROID    90 tablet    Take 1 tablet (75 mcg) by mouth daily    Hypothyroidism, unspecified type       metoprolol tartrate 25 MG tablet    LOPRESSOR    60 tablet    Take 0.5 tablets (12.5 mg) by mouth 2 times daily    S/P CABG (coronary artery bypass graft), Acute post-operative pain       multivitamin Tabs tablet      Take 1 tablet by mouth daily        nitroGLYcerin 0.4 MG sublingual tablet    NITROSTAT    25 tablet    For chest pain place 1 tablet under the tongue every 5 minutes for 3 doses. If symptoms persist 5 minutes after 1st dose call 911.    Exertional chest pain       VITAMIN D3 PO      Take by  mouth daily

## 2018-03-26 DIAGNOSIS — Z95.1 S/P CABG (CORONARY ARTERY BYPASS GRAFT): ICD-10-CM

## 2018-03-26 DIAGNOSIS — G89.18 ACUTE POST-OPERATIVE PAIN: ICD-10-CM

## 2018-03-27 RX ORDER — METOPROLOL TARTRATE 25 MG/1
TABLET, FILM COATED ORAL
Qty: 90 TABLET | Refills: 2 | Status: SHIPPED | OUTPATIENT
Start: 2018-03-27 | End: 2018-05-03 | Stop reason: DRUGHIGH

## 2018-03-27 NOTE — TELEPHONE ENCOUNTER
"Requested Prescriptions   Pending Prescriptions Disp Refills     metoprolol tartrate (LOPRESSOR) 25 MG tablet [Pharmacy Med Name: Metoprolol Tartrate Oral Tablet 25 MG] 60 tablet 0     Sig: TAKE 1/2 TABLET BY MOUTH TWICE DAILY    Beta-Blockers Protocol Failed    3/27/2018  9:55 AM       Failed - Blood pressure under 140/90 in past 12 months    BP Readings from Last 3 Encounters:   03/07/18 154/74   02/16/18 140/75   02/09/18 136/67                Passed - Patient is age 6 or older       Passed - Recent (12 mo) or future (30 days) visit within the authorizing provider's specialty    Patient had office visit in the last 12 months or has a visit in the next 30 days with authorizing provider or within the authorizing provider's specialty.  See \"Patient Info\" tab in inbasket, or \"Choose Columns\" in Meds & Orders section of the refill encounter.            Reviewed BP's from cardiac rehab dates 12/13/17-2/8/18. BP range was 98//50 .  Refilled for patient  Lilly Bran RN  Cardiology Care Coordinator      "

## 2018-04-09 ENCOUNTER — RADIANT APPOINTMENT (OUTPATIENT)
Dept: CT IMAGING | Facility: CLINIC | Age: 78
End: 2018-04-09
Attending: INTERNAL MEDICINE
Payer: MEDICARE

## 2018-04-09 DIAGNOSIS — I25.810 CORONARY ARTERY DISEASE INVOLVING AUTOLOGOUS VEIN CORONARY BYPASS GRAFT WITHOUT ANGINA PECTORIS: ICD-10-CM

## 2018-04-09 DIAGNOSIS — I10 HYPERTENSION GOAL BP (BLOOD PRESSURE) < 150/90: ICD-10-CM

## 2018-04-09 DIAGNOSIS — C82.99 FOLLICULAR LYMPHOMA OF EXTRANODAL AND SOLID ORGAN SITES (H): ICD-10-CM

## 2018-04-09 LAB
ALBUMIN SERPL-MCNC: 3.6 G/DL (ref 3.4–5)
ALP SERPL-CCNC: 79 U/L (ref 40–150)
ALT SERPL W P-5'-P-CCNC: 32 U/L (ref 0–70)
ANION GAP SERPL CALCULATED.3IONS-SCNC: 6 MMOL/L (ref 3–14)
AST SERPL W P-5'-P-CCNC: 18 U/L (ref 0–45)
BASOPHILS # BLD AUTO: 0 10E9/L (ref 0–0.2)
BASOPHILS NFR BLD AUTO: 0.5 %
BILIRUB SERPL-MCNC: 0.3 MG/DL (ref 0.2–1.3)
BUN SERPL-MCNC: 22 MG/DL (ref 7–30)
CALCIUM SERPL-MCNC: 8.7 MG/DL (ref 8.5–10.1)
CHLORIDE SERPL-SCNC: 108 MMOL/L (ref 94–109)
CO2 SERPL-SCNC: 27 MMOL/L (ref 20–32)
CREAT BLD-MCNC: 0.9 MG/DL (ref 0.66–1.25)
CREAT SERPL-MCNC: 0.94 MG/DL (ref 0.66–1.25)
DIFFERENTIAL METHOD BLD: NORMAL
EOSINOPHIL # BLD AUTO: 0.3 10E9/L (ref 0–0.7)
EOSINOPHIL NFR BLD AUTO: 4.1 %
ERYTHROCYTE [DISTWIDTH] IN BLOOD BY AUTOMATED COUNT: 15 % (ref 10–15)
GFR SERPL CREATININE-BSD FRML MDRD: 78 ML/MIN/1.7M2
GFR SERPL CREATININE-BSD FRML MDRD: 82 ML/MIN/1.7M2
GLUCOSE SERPL-MCNC: 116 MG/DL (ref 70–99)
HCT VFR BLD AUTO: 45.3 % (ref 40–53)
HGB BLD-MCNC: 14.6 G/DL (ref 13.3–17.7)
IMM GRANULOCYTES # BLD: 0 10E9/L (ref 0–0.4)
IMM GRANULOCYTES NFR BLD: 0.5 %
LDH SERPL L TO P-CCNC: 156 U/L (ref 85–227)
LYMPHOCYTES # BLD AUTO: 1.4 10E9/L (ref 0.8–5.3)
LYMPHOCYTES NFR BLD AUTO: 20.7 %
MCH RBC QN AUTO: 29.1 PG (ref 26.5–33)
MCHC RBC AUTO-ENTMCNC: 32.2 G/DL (ref 31.5–36.5)
MCV RBC AUTO: 90 FL (ref 78–100)
MONOCYTES # BLD AUTO: 0.5 10E9/L (ref 0–1.3)
MONOCYTES NFR BLD AUTO: 8.1 %
NEUTROPHILS # BLD AUTO: 4.3 10E9/L (ref 1.6–8.3)
NEUTROPHILS NFR BLD AUTO: 66.1 %
NRBC # BLD AUTO: 0 10*3/UL
NRBC BLD AUTO-RTO: 0 /100
PLATELET # BLD AUTO: 197 10E9/L (ref 150–450)
POTASSIUM SERPL-SCNC: 3.9 MMOL/L (ref 3.4–5.3)
PROT SERPL-MCNC: 7 G/DL (ref 6.8–8.8)
RAD FLAG-ADDENDUM: NORMAL
RBC # BLD AUTO: 5.01 10E12/L (ref 4.4–5.9)
SODIUM SERPL-SCNC: 142 MMOL/L (ref 133–144)
URATE SERPL-MCNC: 4.8 MG/DL (ref 3.5–7.2)
WBC # BLD AUTO: 6.5 10E9/L (ref 4–11)

## 2018-04-09 RX ORDER — IOPAMIDOL 755 MG/ML
134 INJECTION, SOLUTION INTRAVASCULAR ONCE
Status: COMPLETED | OUTPATIENT
Start: 2018-04-09 | End: 2018-04-09

## 2018-04-09 RX ADMIN — IOPAMIDOL 134 ML: 755 INJECTION, SOLUTION INTRAVASCULAR at 07:08

## 2018-04-09 NOTE — DISCHARGE INSTRUCTIONS

## 2018-04-11 ENCOUNTER — ONCOLOGY VISIT (OUTPATIENT)
Dept: ONCOLOGY | Facility: CLINIC | Age: 78
End: 2018-04-11
Attending: INTERNAL MEDICINE
Payer: COMMERCIAL

## 2018-04-11 VITALS
HEIGHT: 69 IN | TEMPERATURE: 97 F | SYSTOLIC BLOOD PRESSURE: 152 MMHG | HEART RATE: 59 BPM | DIASTOLIC BLOOD PRESSURE: 72 MMHG | WEIGHT: 225 LBS | RESPIRATION RATE: 18 BRPM | BODY MASS INDEX: 33.33 KG/M2 | OXYGEN SATURATION: 96 %

## 2018-04-11 DIAGNOSIS — C82.99 FOLLICULAR LYMPHOMA OF EXTRANODAL AND SOLID ORGAN SITES (H): Primary | ICD-10-CM

## 2018-04-11 PROCEDURE — 99214 OFFICE O/P EST MOD 30 MIN: CPT | Mod: ZP | Performed by: INTERNAL MEDICINE

## 2018-04-11 PROCEDURE — G0463 HOSPITAL OUTPT CLINIC VISIT: HCPCS | Mod: ZF

## 2018-04-11 RX ORDER — LOSARTAN POTASSIUM AND HYDROCHLOROTHIAZIDE 12.5; 5 MG/1; MG/1
TABLET ORAL
COMMUNITY
Start: 2017-08-29 | End: 2018-08-02

## 2018-04-11 ASSESSMENT — PAIN SCALES - GENERAL: PAINLEVEL: NO PAIN (0)

## 2018-04-11 NOTE — MR AVS SNAPSHOT
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      Your next 10 appointments already scheduled     Jul 09, 2018  7:20 AM CDT   (Arrive by 7:05 AM)   CT CHEST/ABDOMEN/PELVIS W CONTRAST with UCCT2   [facility] ([facility])    [address] [address] [phone]           Please bring any scans or X-rays taken at other hospitals, if similar tests were done. Also bring a list of your medicines, including vitamins, minerals and over-the-counter drugs. It is safest to leave personal items at home.  Be sure to tell your doctor:   If you have any allergies.   If there s any chance you are pregnant.   If you are breastfeeding.  You may have contrast for this exam. To prepare:   Do not eat or drink for 2 hours before your exam. If you need to take medicine, you may take it with small sips of water. (We may ask you to take liquid medicine as well.)   The day before your exam, drink extra fluids at least six 8-ounce glasses (unless your doctor tells you to restrict your fluids).   You will be given instructions on how to drink the contrast.  Patients over 70 or patients with diabetes or kidney problems:   If you haven t had a blood test (creatinine test) within the last 30 days, the Cardiologist/Radiologist may require you to get this test prior to your exam.  If you have diabetes:   Continue to take your metformin medication on the day of your exam  Please wear  loose clothing, such as a sweat suit or jogging clothes. Avoid snaps, zippers and other metal. We may ask you to undress and put on a hospital gown.  If you have any questions, please call the Imaging Department where you will have your exam.            Jul 09, 2018  7:45 AM CDT   LAB with  LAB   [facility])    [address]  1st Floor  [facility] [id number] [phone]           Please do not eat 10-12 hours before your appointment if you are coming in fasting for labs on lipids, cholesterol, or glucose (sugar). This does not apply to pregnant women. Water, hot tea and black coffee (with nothing added) are okay. Do not drink other fluids, diet soda or chew gum.            Jul 11, 2018  8:30 AM CDT   (Arrive by 8:15 AM)   Return Visit with Gal Bain MD   [facility] ([facility])    [address]  [address]  [facility] [id number] [phone]            Jul 18, 2018  8:15 AM CDT   Return Visit with Avtar Mccullough MD   [facility] ([facility])    [address] [place] [id number] [phone]              Future tests that were ordered for you today     Open Future Orders        Priority Expected Expires Ordered    CT Chest/Abdomen/Pelvis w Contrast Routine 7/11/2018 4/11/2019 4/11/2018    Comprehensive metabolic panel Routine 7/11/2018 4/11/2019 4/11/2018    Lactate Dehydrogenase Routine 7/11/2018 4/11/2019 4/11/2018    Uric acid Routine 7/11/2018 4/11/2019 4/11/2018    *CBC with platelets differential Routine 7/11/2018 4/11/2019 4/11/2018            Who to contact     If you have questions or need follow up information about today's clinic visit or your schedule please contact [facility] directly at [phone].  Normal or non-critical lab and imaging results will be communicated to you by MyChart, letter or phone within 4 business days  "after the clinic has received the results. If you do not hear from us within 7 days, please contact the clinic through Native or phone. If you have a critical or abnormal lab result, we will notify you by phone as soon as possible.  Submit refill requests through Native or call your pharmacy and they will forward the refill request to us. Please allow 3 business days for your refill to be completed.          Additional Information About Your Visit        Native Information     Native gives you secure access to your electronic health record. If you see a primary care provider, you can also send messages to your care team and make appointments. If you have questions, please call your primary care clinic.  If you do not have a primary care provider, please call [phone] and they will assist you.        Care EveryWhere ID     This is your Care EveryWhere ID. This could be used by other organizations to access your [place] medical records  [id number]        Your Vitals Were     Pulse Temperature Respirations Height Pulse Oximetry BMI (Body Mass Index)    59 97  F (36.1  C) (Oral) 18 1.753 m (5' 9.02\") 96% 33.21 kg/m2       Blood Pressure from Last 3 Encounters:   04/11/18 152/72   03/07/18 154/74   02/16/18 140/75    Weight from Last 3 Encounters:   04/11/18 102.1 kg (225 lb)   03/07/18 99.7 kg (219 lb 11.2 oz)   02/16/18 99.4 kg (219 lb 1.6 oz)               Primary Care Provider Office Phone # Fax #    Anastacio Love [phone] [id number] [facility] [address]        Equal Access to Services     [facility] Hospital[facility] : Hadii stefany Farr, jozef tinoco, marilynn caballero. So [facility] [phone].    ATENCIÓN: Si habla español, tiene a warner disposición servicios gratuitos de asistencia lingüística. Richard al [phone].    We comply with applicable federal civil rights laws and [place] laws. We do not discriminate on the basis " of race, color, national origin, age, disability, sex, sexual orientation, or gender identity.            Thank you!     Thank you for choosing [facility]  for your care. Our goal is always to provide you with excellent care. Hearing back from our patients is one way we can continue to improve our services. Please take a few minutes to complete the written survey that you may receive in the mail after your visit with us. Thank you!             Your Updated Medication List - Protect others around you: Learn how to safely use, store and throw away your medicines at www.disposemymeds.org.          This list is accurate as of 4/11/18  9:25 AM.  Always use your most recent med list.                   Brand Name Dispense Instructions for use Diagnosis    aspirin 325 MG EC tablet      1/2 tab daily        atorvastatin 40 MG tablet    LIPITOR    60 tablet    Take 1 tablet (40 mg) by mouth daily    Hyperlipidemia LDL goal <100       latanoprost 0.005 % ophthalmic solution    XALATAN    3 Bottle    Place 1 drop into both eyes At Bedtime    Borderline glaucoma with ocular hypertension, unspecified laterality       levothyroxine 75 MCG tablet    SYNTHROID/LEVOTHROID    90 tablet    Take 1 tablet (75 mcg) by mouth daily    Hypothyroidism, unspecified type       losartan-hydrochlorothiazide 50-12.5 MG per tablet    HYZAAR     TAKE 1 TABLET BY MOUTH EVERY DAY    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 If symptoms persist 5 minutes after 1st dose call 911.    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

## 2018-04-11 NOTE — NURSING NOTE
"Oncology Rooming Note    April 11, 2018 8:10 AM   Zack Demarco is a 77 year old male who presents for:    Chief Complaint   Patient presents with     Oncology Clinic Visit     Return visit related to Follicular Lymphoma     Initial Vitals: /72 (BP Location: Left arm, Patient Position: Sitting, Cuff Size: Adult Large)  Pulse 59  Temp 97  F (36.1  C) (Oral)  Resp 18  Ht 1.753 m (5' 9.02\")  Wt 102.1 kg (225 lb)  SpO2 96%  BMI 33.21 kg/m2 Estimated body mass index is 33.21 kg/(m^2) as calculated from the following:    Height as of this encounter: 1.753 m (5' 9.02\").    Weight as of this encounter: 102.1 kg (225 lb). Body surface area is 2.23 meters squared.  No Pain (0) Comment: Data Unavailable   No LMP for male patient.  Allergies reviewed: Yes  Medications reviewed: Yes    Medications: Medication refills not needed today.  Pharmacy name entered into [employer]: [id number] - [place], [place] - [address] [address] [facility]    Clinical concerns: No new concerns. Provider was notified.    10 minutes for nursing intake (face to face time)     Teena Rios LPN            "

## 2018-04-11 NOTE — PROGRESS NOTES
ONCOLOGY SUMMARY: Zack Demarco is a 77-year-old man first seen in consultation on 12/27/2017 for a new diagnosis of follicular lymphoma. He was diagnosed incidental to an evaluation for cardiac bypass surgery in 11/2017.  A chest CT scan on 11/14 showed an unexpected retroperitoneal mass with surrounding lymphadenopathy suspicious for malignancy.  A further CT scan done on the same day showed a 2.3 x 7.2 x 6.1 retroperitoneal soft tissue mass with adjacent lymphadenopathy that mildly narrowed the left renal vein.  There also was nodularity within the mesentery and an enlarged hilar lymph node.  CT-guided biopsy of the retroperitoneal mass on 12/12/2017 established the diagnosis, but the sample was too small for adequate grading. There was no disease identified outside of the abdomen; a bone marrow biopsy was not obtained as part of staging.      Relative to cardiac issues, he underwent an uncomplicated 3-vessel coronary artery bypass graft on 11/22/2017 and, subsequently, has been symptom-free.      With the renal and gonadal vein compression it was decided to start treatment with rituximab, alone, and if insufficient to add bendamustine.    Mr. Demarco has now completed 4 weekly doses of Rituximab from 01/26 and 02/16/2018.  He had no difficulty with the treatment and was able to receive rapid infusion (90 minutes) for the last 3 doses. Interval evaluation on 03/05 with an US, suggested improvement but we recognized that a repeat CT would eventually be necessary.      INTERVAL HISTORY:  The patient was last in my clinic on 03/07/2018.  Based on the ultrasound that showed some reduction in the left side of the retroperitoneal mass, he did not receive additional treatment and returns today with a CT scan.      Mr. Demarco has been feeling well. He has had intermittent chronic back discomfort; it is not related to the lymphoma.  Also, no urinary issues or bowel complaints, lower extremity edema.  No interval fevers, night  sweats or weight loss.      He has begun exercising on a regular basis, including cycling frequently at a local gymnasium.      REVIEW OF SYSTEMS:  A 10-point review of systems is otherwise negative.      MEDICATIONS:   1.  Metoprolol.   2.  Atorvastatin.   3.  Levothyroxine.   4.  Vitamin D.   5.  Multivitamins.   6.  Latanoprost ophthalmic solution.   7.  Aspirin 162 mg.   8.  Hyzaar.   9.  Nitroglycerin p.r.n.      ALLERGIES:  None reported.      PHYSICAL EXAMINATION:   VITAL SIGNS:  Weight 102.1 kg (compared with 99.7 kg in March), blood pressure 152/72, pulse 59 and regular, respirations 18, temperature 97.0, O2 saturation 96% on room air.   HEENT:  Pupils are equal and reactive.  EOM intact.  Anicteric.  Oropharynx - no masses.  Mucosa moist.  No plaque or ulceration.   NECK:  No cervical or supraclavicular adenopathy.   CHEST:  Clear to auscultation/percussion.  No CVA tenderness.   AXILLAE:  No nodes.   HEART:  Regular rhythm without murmur.   ABDOMEN:  Soft.  No detectable organomegaly or mass.  Nontender.  Bowel sounds active.  No inguinal/femoral nodes.   EXTREMITIES:  No significant lower extremity edema.   SKIN:  No rashes, petechiae or ecchymoses.      LABORATORY DATA:    Hemoglobin 14.6 g % with 6500 WBCs (66% neutrophils, 21% lymphocytes) and 197,000 platelets.   Electrolytes, calcium, creatinine (0.94) - normal.  Glucose 116 (non-fasting).   Liver enzymes, including serum LDH, normal.   Uric acid 4.8.      IMAGING:  Chest and abdominal CT scan    1. There is interval decrease in the size of the previously noted enlarged abdominal lymph nodes as detailed above. The left para-aortic mass/conglomerate of lymph nodes, encasing and narrowing of the left renal vasculature, also decreased in size however with no significant  change in the encasement of the left renal vessels.  2. Few scattered pulmonary nodules, for example 6 x 3 mm right lower lobe pulmonary nodule, not significantly changed as compared  to 11/13/2017 exam, recommend attention on follow-up.  3. Trace amount of fluid with mild soft tissue density/stranding posterior to the sternum, likely related to post surgical changes of sternotomy. Attention on follow-up studies.   4. The appendix is prominent, mildly dilated measuring 12 mm in diameter fluid-filled with intraluminal Hounsfield units attenuation of 8, previously measuring 9 mm in diameter and demonstrating hypermetabolic activity on PET CT 1/12/2018. Differentials include appendiceal mucocele however could also represent normal prominent/mildly dilated appendix. Attention on follow-up studies.    ASSESSMENT AND PLAN:  Follicular lymphoma, clinical stage IIA (disease limited to the abdomen with compression of the left renal and inguinal veins), but all vessels remain patent.      The CT scan shows a very good response with Rituximab alone.  Main aggregate tumor is approximately 1/3 of its original volume.      In view of the very good response to a 4-week course of rituximab, and the fact that the response may be continuing, I favor no further intervention at this time. Plan to bring Mr. Demarco back in approximately 3 months with a repeat CT scan. (This will also provide follow-up on the comments by Radiology.) At that time, a decision can be made about continued observation, maintenance rituximab if response sufficient or the introduction of rituximab plus bendamustine.  Benefits include avoiding unnecessary toxicity from bendamustine and reserving the drug for future use, should it be necessary.  Certainly, if he can have a reasonable and durable remission from rituximab alone, this would be advantageous. Also, may consider use of rituximab maintenanace.     Gal Bain MD  Professor of Medicine  Oncology  [facility]  Office: [phone]  Clinic Fax: [phone]       cc:   MD Rolando Pearson MD Yohannes Gebre, MD Ronald Sih, MD

## 2018-04-11 NOTE — LETTER
4/11/2018      RE: Zack Demarco  [address] [facility] [id number]       ONCOLOGY SUMMARY: Zack Demarco is a 77-year-old man first seen in consultation on 12/27/2017 for a new diagnosis of follicular lymphoma. He was diagnosed incidental to an evaluation for cardiac bypass surgery in 11/2017.  A chest CT scan on 11/14 showed an unexpected retroperitoneal mass with surrounding lymphadenopathy suspicious for malignancy.  A further CT scan done on the same day showed a 2.3 x 7.2 x 6.1 retroperitoneal soft tissue mass with adjacent lymphadenopathy that mildly narrowed the left renal vein.  There also was nodularity within the mesentery and an enlarged hilar lymph node.  CT-guided biopsy of the retroperitoneal mass on 12/12/2017 established the diagnosis, but the sample was too small for adequate grading. There was no disease identified outside of the abdomen; a bone marrow biopsy was not obtained as part of staging.      Relative to cardiac issues, he underwent an uncomplicated 3-vessel coronary artery bypass graft on 11/22/2017 and, subsequently, has been symptom-free.      With the renal and gonadal vein compression it was decided to start treatment with rituximab, alone, and if insufficient to add bendamustine.    Mr. Demarco has now completed 4 weekly doses of Rituximab from 01/26 and 02/16/2018.  He had no difficulty with the treatment and was able to receive rapid infusion (90 minutes) for the last 3 doses. Interval evaluation on 03/05 with an US, suggested improvement but we recognized that a repeat CT would eventually be necessary.      INTERVAL HISTORY:  The patient was last in my clinic on 03/07/2018.  Based on the ultrasound that showed some reduction in the left side of the retroperitoneal mass, he did not receive additional treatment and returns today with a CT scan.      Mr. Demarco has been feeling well. He has had intermittent chronic back discomfort; it is not related to the lymphoma.  Also, no urinary  issues or bowel complaints, lower extremity edema.  No interval fevers, night sweats or weight loss.      He has begun exercising on a regular basis, including cycling frequently at a local gymnasium.      REVIEW OF SYSTEMS:  A 10-point review of systems is otherwise negative.      MEDICATIONS:   1.  Metoprolol.   2.  Atorvastatin.   3.  Levothyroxine.   4.  Vitamin D.   5.  Multivitamins.   6.  Latanoprost ophthalmic solution.   7.  Aspirin 162 mg.   8.  Hyzaar.   9.  Nitroglycerin p.r.n.      ALLERGIES:  None reported.      PHYSICAL EXAMINATION:   VITAL SIGNS:  Weight 102.1 kg (compared with 99.7 kg in March), blood pressure 152/72, pulse 59 and regular, respirations 18, temperature 97.0, O2 saturation 96% on room air.   HEENT:  Pupils are equal and reactive.  EOM intact.  Anicteric.  Oropharynx - no masses.  Mucosa moist.  No plaque or ulceration.   NECK:  No cervical or supraclavicular adenopathy.   CHEST:  Clear to auscultation/percussion.  No CVA tenderness.   AXILLAE:  No nodes.   HEART:  Regular rhythm without murmur.   ABDOMEN:  Soft.  No detectable organomegaly or mass.  Nontender.  Bowel sounds active.  No inguinal/femoral nodes.   EXTREMITIES:  No significant lower extremity edema.   SKIN:  No rashes, petechiae or ecchymoses.      LABORATORY DATA:    Hemoglobin 14.6 g % with 6500 WBCs (66% neutrophils, 21% lymphocytes) and 197,000 platelets.   Electrolytes, calcium, creatinine (0.94) - normal.  Glucose 116 (non-fasting).   Liver enzymes, including serum LDH, normal.   Uric acid 4.8.      IMAGING:  Chest and abdominal CT scan    1. There is interval decrease in the size of the previously noted enlarged abdominal lymph nodes as detailed above. The left para-aortic mass/conglomerate of lymph nodes, encasing and narrowing of the left renal vasculature, also decreased in size however with no significant  change in the encasement of the left renal vessels.  2. Few scattered pulmonary nodules, for example 6 x 3  mm right lower lobe pulmonary nodule, not significantly changed as compared to 11/13/2017 exam, recommend attention on follow-up.  3. Trace amount of fluid with mild soft tissue density/stranding posterior to the sternum, likely related to post surgical changes of sternotomy. Attention on follow-up studies.   4. The appendix is prominent, mildly dilated measuring 12 mm in diameter fluid-filled with intraluminal Hounsfield units attenuation of 8, previously measuring 9 mm in diameter and demonstrating hypermetabolic activity on PET CT 1/12/2018. Differentials include appendiceal mucocele however could also represent normal prominent/mildly dilated appendix. Attention on follow-up studies.    ASSESSMENT AND PLAN:  Follicular lymphoma, clinical stage IIA (disease limited to the abdomen with compression of the left renal and inguinal veins), but all vessels remain patent.      The CT scan shows a very good response with Rituximab alone.  Main aggregate tumor is approximately 1/3 of its original volume.      In view of the very good response to a 4-week course of rituximab, and the fact that the response may be continuing, I favor no further intervention at this time. Plan to bring Mr. Demarco back in approximately 3 months with a repeat CT scan. (This will also provide follow-up on the comments by Radiology.) At that time, a decision can be made about continued observation, maintenance rituximab if response sufficient or the introduction of rituximab plus bendamustine.  Benefits include avoiding unnecessary toxicity from bendamustine and reserving the drug for future use, should it be necessary.  Certainly, if he can have a reasonable and durable remission from rituximab alone, this would be advantageous. Also, may consider use of rituximab maintenanace.     Gal Bain MD  Professor of Medicine  Oncology  [facility]  Office: [phone]  Clinic Fax: [phone]       cc:   Laquita Carbajal MD   [facility]  MD Anastacio Toro MD Ronald Sih, MD Bruce A. Peterson, MD

## 2018-04-30 NOTE — PROGRESS NOTES
HPI:    Zack is a 77 year old male here to discuss:    Left ear pain - he used a Q-tip to clean his left ear about one week ago. There was some pain and bleeding. That resolved. He just wants to make sure it's ok.  Evaluation and treatment:    Exam is normal today. Reassured.   Advised not to get into the ear canal with Q tips.    CAD/HTN - chest pain started around Oct 2017. Currently he has chest and rib soreness related to his surgery. Otherwise he denies angina, shortness of breath. 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 Not checking BP lately. BP fairly controlled in clinic but could be better.  Evaluation and treatment:    Was Hospitalized 11/22 to 11/27/17 - received CABG x 3.   Metoprolol 12.5 mg bid - dose limited by heart rate previously but now seems ok. Increase to 25 mg bid.   ASA qd   NTG prn but not using lately.   Losartan/HCTZ 50/12.5 qd stopped in the Hospital because it is not needed.   He follows with cardiology.    Last Basic Metabolic Panel:  Lab Results   Component Value Date     04/09/2018      Lab Results   Component Value Date    POTASSIUM 3.9 04/09/2018     Lab Results   Component Value Date    CHLORIDE 108 04/09/2018     Lab Results   Component Value Date    ELVIRA 8.7 04/09/2018     Lab Results   Component Value Date    CO2 27 04/09/2018     Lab Results   Component Value Date    BUN 22 04/09/2018     Lab Results   Component Value Date    CR 0.94 04/09/2018     Lab Results   Component Value Date     04/09/2018     CBC RESULTS:   Recent Labs   Lab Test  04/09/18   0642   WBC  6.5   RBC  5.01   HGB  14.6   HCT  45.3   MCV  90   MCH  29.1   MCHC  32.2   RDW  15.0   PLT  197        Lymphoma - found incidentally during other evaluation. No symptoms.  Evaluation and treatment:    He follows with oncology.    Dyslipidemia - has h/o CAD.  Evaluation and treatment:    Per ATP4, moderate to high intensity statin recommended.:   Crestor stopped due to cost   Simvastatin  changed to Lipitor 40 mg qd in the Hospital - no side effects.   Continue same treatment.  The 10-year ASCVD risk score (Needham DC Jr, et al., 2013) is: 38.3%    Values used to calculate the score:      Age: 77 years      Sex: Male      Is Non- : No      Diabetic: No      Tobacco smoker: No      Systolic Blood Pressure: 147 mmHg      Is BP treated: Yes      HDL Cholesterol: 32 mg/dL      Total Cholesterol: 148 mg/dL  Recent Labs   Lab Test  07/20/17   0725  06/09/16   0742   01/22/15   0816  01/14/14   0747   CHOL  148  191   < >  154  154   HDL  32*  33*   < >  43  33*   LDL  76  127*   < >  92  103   TRIG  201*  155*   < >  93  87   CHOLHDLRATIO   --    --    --   3.6  4.7    < > = values in this interval not displayed.       Hypothyroidism - No thyroid symptoms.  Evaluation and treatment:    Synthroid 75 mcg qd and recheck at a later date.      TSH   Date Value Ref Range Status   11/14/2017 6.20 (H) 0.40 - 4.00 mU/L Final     T4 Free   Date Value Ref Range Status   07/20/2017 0.84 0.76 - 1.46 ng/dL Final     Obesity - diet and exercise discussed.     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 Asymptomatic. Advised wearing compression stockings.    Previous surgeries - left rotator cuff surgery. Had anterior tibialis repair (for foot drop) on 8/18/15 - good results. CABG as above.    Hearing loss - he wants to hold off on hearing aides referral.     Preventive:    Immunization History   Administered Date(s) Administered     Influenza (High Dose) 3 valent vaccine 10/15/2015, 10/20/2016, 09/25/2017     Influenza (IIV3) PF 10/25/2012, 10/01/2013, 11/24/2014     Pneumo Conj 13-V (2010&after) 01/22/2016     Pneumococcal 23 valent 11/30/2006     TD (ADULT, 7+) 08/25/2010     TDAP Vaccine  (Boostrix) 01/21/2013     Zoster vaccine, live 07/15/2008     Colonoscopy: 9/19/16 - advised to redo in 5 years.     Prostate CA screen: discussed controversy. Prostate mildly enlarged on 7/15/14.     PSA   Date Value Ref Range Status   06/09/2016 2.76 0 - 4 ug/L Final       AAA: 7/18/14 negative    Advanced Directive: referred previously    Vit D Geriatrics Society Guidelines: Older adults should consume 4000 IU of Vit D daily from all sources. This will achieve serum level of 30. No need to test unless obese, malabsorption etc. You get only 100 units per glass of milk. 2000 units advised.    ROS:    Const: No fevers, weight changes or night sweats recently.  ENT: No runny nose, sore throat or ear pain.  Resp: No cough.  CV: No dizziness or cardiac palpitations.  GI: No nausea, vomiting, diarrhea or constipation. Denies blood in stools or black stools.  : No dysuria, frequency or hematuria.    SH:    Marital status: , lives by himself in [place].  Kids: 2  Employment: Works at a machine shop.  Exercise: Walks a lot at work. Had been running 4 miles per day 5 times per week but not lately.  Tobacco: Quit smoking around 1980. Has 14 pack year history of smoking.  Etoh: rarely  Recreational drugs: denies  Caffeine: 2 per day    Exam:    /62 (Cuff Size: Adult Large)  Pulse 83  Temp 97.3  F (36.3  C) (Oral)  Resp 18  Wt 221 lb (100.2 kg)  SpO2 97%  BMI 32.62 kg/m2    Gen: Healthy appearing male in no acute distress.   ENT: both TM's and ear canals are normal.  Neck: No enlarged lymph nodes, thyromegally or other masses.  Lungs: Good air movement and otherwise clear.  CV: Heart RRR with no murmurs. No JVD, carotid bruits or leg edema. Varicose veins on thighs and legs.    Assessment and Plan - Decision Making    1. Hypertension goal BP (blood pressure) < 140/90  Per HPI  - metoprolol tartrate (LOPRESSOR) 25 MG tablet; Take 1 tablet (25 mg) by mouth 2 times daily; Refill: 1    2. Left ear pain  Per  HPI      Written instructions given as follows:    Patient Instructions   1. Let's increase the Metoprolol to 25 mg twice per day.    2. See you in October for a physical with fasting labs.

## 2018-05-03 ENCOUNTER — OFFICE VISIT (OUTPATIENT)
Dept: FAMILY MEDICINE | Facility: CLINIC | Age: 78
End: 2018-05-03
Payer: COMMERCIAL

## 2018-05-03 VITALS
SYSTOLIC BLOOD PRESSURE: 138 MMHG | RESPIRATION RATE: 18 BRPM | WEIGHT: 221 LBS | TEMPERATURE: 97.3 F | BODY MASS INDEX: 32.62 KG/M2 | HEART RATE: 83 BPM | DIASTOLIC BLOOD PRESSURE: 62 MMHG | OXYGEN SATURATION: 97 %

## 2018-05-03 DIAGNOSIS — H92.02 LEFT EAR PAIN: ICD-10-CM

## 2018-05-03 DIAGNOSIS — I10 HYPERTENSION GOAL BP (BLOOD PRESSURE) < 140/90: Primary | ICD-10-CM

## 2018-05-03 PROCEDURE — 99214 OFFICE O/P EST MOD 30 MIN: CPT | Performed by: FAMILY MEDICINE

## 2018-05-03 RX ORDER — METOPROLOL TARTRATE 25 MG/1
25 TABLET, FILM COATED ORAL 2 TIMES DAILY
Refills: 1 | COMMUNITY
Start: 2018-05-03 | End: 2018-05-29

## 2018-05-03 NOTE — PATIENT INSTRUCTIONS
1. Let's increase the Metoprolol to 25 mg twice per day.    2. See you in October for a physical with fasting labs.

## 2018-05-03 NOTE — NURSING NOTE
"Chief Complaint   Patient presents with     Ear Problem     blood in left ear       Initial /68 (Cuff Size: Adult Large)  Pulse 83  Temp 97.3  F (36.3  C) (Oral)  Resp 18  Wt 221 lb (100.2 kg)  SpO2 97%  BMI 32.62 kg/m2 Estimated body mass index is 32.62 kg/(m^2) as calculated from the following:    Height as of 4/11/18: 5' 9.02\" (1.753 m).    Weight as of this encounter: 221 lb (100.2 kg).      Vibha Smyth LPN    "

## 2018-05-03 NOTE — MR AVS SNAPSHOT
After Visit Summary   5/3/2018    Zack Demarco    MRN: [id number]           Patient Information     Date Of Birth          1940        Visit Information        Provider Department      5/3/2018 10:30 AM Anastacio Love MD [facility]        Today's Diagnoses     Hypertension goal BP (blood pressure) < 140/90    -  1    Left ear pain          Care Instructions    1. Let's increase the Metoprolol to 25 mg twice per day.    2. See you in October for a physical with fasting labs.          Follow-ups after your visit        Your next 10 appointments already scheduled     Jul 09, 2018  7:20 AM CDT   CT CHEST/ABDOMEN/PELVIS W CONTRAST with [facility]   [facility] [place] [facility] ([facility])    [address]  1st Floor  [facility] [address] [phone]           Please bring any scans or X-rays taken at other hospitals, if similar tests were done. Also bring a list of your medicines, including vitamins, minerals and over-the-counter drugs. It is safest to leave personal items at home.  Be sure to tell your doctor:   If you have any allergies.   If there s any chance you are pregnant.   If you are breastfeeding.  How to prepare:   Do not eat or drink for 2 hours before your exam. If you need to take medicine, you may take it with small sips of water. (We may ask you to take liquid medicine as well.)   Please wear loose clothing, such as a sweat suit or jogging clothes. Avoid snaps, zippers and other metal. We may ask you to undress and put on a hospital gown.  Please arrive 30 minutes early for your CT. Once in the department you might be asked to drink water 15-20 minutes prior to your exam.  If indicated you may be asked to drink an oral contrast in advance of your CT.  If this is the case, the imaging team will let you know or be in contact with you prior to your appointment  Patients over 70 or patients with diabetes or kidney problems:   If you haven t  had a blood test (creatinine test) within the last 30 days, the Cardiologist/Radiologist may require you to get this test prior to your exam.  If you have diabetes:   Continue to take your metformin medication on the day of your exam  If you have any questions, please call the Imaging Department where you will have your exam.            Jul 09, 2018  7:45 AM CDT   LAB with  LAB   [facility] ([facility])    [address]  1st Floor  [facility] [address] [phone]           Please do not eat 10-12 hours before your appointment if you are coming in fasting for labs on lipids, cholesterol, or glucose (sugar). This does not apply to pregnant women. Water, hot tea and black coffee (with nothing added) are okay. Do not drink other fluids, diet soda or chew gum.            Jul 11, 2018  8:30 AM CDT   (Arrive by 8:15 AM)   Return Visit with Gal Bain MD   [facility] ([facility])    [address]  [address] [place] [id number] [phone]            Jul 18, 2018  8:15 AM CDT   Return Visit with Avtar Mccullough MD   [facility] ([facility])    [address] [address] [phone]              Who to contact     If you have questions or need follow up information about today's clinic visit or your schedule please contact [facility] directly at [phone].  Normal or non-critical lab and imaging results will be communicated to you by MyChart, letter or phone within 4 business days after the clinic has received the results. If you do not hear from us within 7 days, please contact the clinic through MyChart or phone. If you have a critical or abnormal lab result, we will notify you by phone as soon as possible.  Submit refill requests through [employer] or call your pharmacy and they will forward the refill request to us. Please allow 3 business days for  your refill to be completed.          Additional Information About Your Visit        [employer] Information     [employer] gives you secure access to your electronic health record. If you see a primary care provider, you can also send messages to your care team and make appointments. If you have questions, please call your primary care clinic.  If you do not have a primary care provider, please call [phone] and they will assist you.        Care EveryWhere ID     This is your Care EveryWhere ID. This could be used by other organizations to access your [place] medical records  [id number]        Your Vitals Were     Pulse Temperature Respirations Pulse Oximetry BMI (Body Mass Index)       83 97.3  F (36.3  C) (Oral) 18 97% 32.62 kg/m2        Blood Pressure from Last 3 Encounters:   05/03/18 138/62   04/11/18 152/72   03/07/18 154/74    Weight from Last 3 Encounters:   05/03/18 221 lb (100.2 kg)   04/11/18 225 lb (102.1 kg)   03/07/18 219 lb 11.2 oz (99.7 kg)              Today, you had the following     No orders found for display         Today's Medication Changes          These changes are accurate as of 5/3/18 10:46 AM.  If you have any questions, ask your nurse or doctor.               These medicines have changed or have updated prescriptions.        Dose/Directions    metoprolol tartrate 25 MG tablet   Commonly known as:  LOPRESSOR   This may have changed:  Another medication with the same name was removed. Continue taking this medication, and follow the directions you see here.   Used for:  Hypertension goal BP (blood pressure) < 140/90   Changed by:  Anastacio Love MD        Dose:  25 mg   Take 1 tablet (25 mg) by mouth 2 times daily   Refills:  1                Primary Care Provider Office Phone # Fax #    Anastacio Love [phone] [address] [facility] [address]        Equal Access to Services     HALEY BURR AH: jozef Carlos qaybta  marilynn rodriguezry dasilva ah. Sophie [facility] [phone].    ATENCIÓN: Si devon muhammad, tiene a warner disposición servicios gratuitos de asistencia lingüística. Richard al [phone].    We comply with applicable federal civil rights laws and [place] laws. We do not discriminate on the basis of race, color, national origin, age, disability, sex, sexual orientation, or gender identity.            Thank you!     [facility] you for [facility]. Our goal is always to provide you with excellent care. Hearing back from our patients is one way we can continue to improve our services. Please take a few minutes to complete the written survey that you may receive in the mail after your visit with us. Thank you!             Your Updated Medication List - Protect others around you: Learn how to safely use, store and throw away your medicines at www.disposemymeds.org.          This list is accurate as of 5/3/18 10:46 AM.  Always use your most recent med list.                   Brand Name Dispense Instructions for use Diagnosis    aspirin 325 MG EC tablet      1/2 tab daily        atorvastatin 40 MG tablet    LIPITOR    60 tablet    Take 1 tablet (40 mg) by mouth daily    Hyperlipidemia LDL goal <100       latanoprost 0.005 % ophthalmic solution    XALATAN    3 Bottle    Place 1 drop into both eyes At Bedtime    Borderline glaucoma with ocular hypertension, unspecified laterality       levothyroxine 75 MCG tablet    SYNTHROID/LEVOTHROID    90 tablet    Take 1 tablet (75 mcg) by mouth daily    Hypothyroidism, unspecified type       losartan-hydrochlorothiazide 50-12.5 MG per tablet    HYZAAR     TAKE 1 TABLET BY MOUTH EVERY DAY    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)       metoprolol tartrate 25 MG tablet    LOPRESSOR     Take 1 tablet (25 mg) by mouth 2 times daily    Hypertension goal BP (blood pressure) < 140/90       multivitamin Tabs tablet       Take 1 tablet by mouth daily        nitroGLYcerin 0.4 MG sublingual tablet    NITROSTAT    25 tablet    For chest pain place 1 tablet under the tongue every 5 minutes for 3 doses. If symptoms persist 5 minutes after 1st dose call 911.    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

## 2018-05-11 ENCOUNTER — CARE COORDINATION (OUTPATIENT)
Dept: ONCOLOGY | Facility: CLINIC | Age: 78
End: 2018-05-11

## 2018-05-11 NOTE — PROGRESS NOTES
"Reason for Outgoing call:    RNCC returning call that patient placed into triage yesterday. Message was as follows:    \"I am concerned about a red flag addendum. I see in the report:       Rad Flag-Addendum   Prominent/mildly dilated fluid-filled appendix     I don't recall discussing this at his April11th appointment.   Should I be concerned?   He is requesting a call back to phone [phone]\"      Nursing Action/Patient Instruction:    RNCC informed patient that Dr. Bain is aware of this and that this is something that should continue to monitor at this time.   RNCC reviewed the signs/symptoms of appendicitis and instructed him to be calling into the nurse triage line with any RLQ abdominal pain or fevers.     Patient Response/Evaluation:    Patient denies any symptoms at this time but notes the importance of calling in if he develops any new symptoms.  Patient was grateful for the follow up call and update.       "

## 2018-05-27 ENCOUNTER — MYC MEDICAL ADVICE (OUTPATIENT)
Dept: FAMILY MEDICINE | Facility: CLINIC | Age: 78
End: 2018-05-27

## 2018-05-27 DIAGNOSIS — I10 HYPERTENSION GOAL BP (BLOOD PRESSURE) < 140/90: ICD-10-CM

## 2018-05-29 RX ORDER — METOPROLOL TARTRATE 25 MG/1
25 TABLET, FILM COATED ORAL 2 TIMES DAILY
Qty: 180 TABLET | Refills: 3 | Status: SHIPPED | OUTPATIENT
Start: 2018-05-29 | End: 2019-05-30

## 2018-05-29 NOTE — TELEPHONE ENCOUNTER
Routing to PCP to approve refill of requested med since it is listed as historical on med list.     Lilly Madrigal, ARGENTINAN, RN

## 2018-05-30 ENCOUNTER — MYC MEDICAL ADVICE (OUTPATIENT)
Dept: FAMILY MEDICINE | Facility: CLINIC | Age: 78
End: 2018-05-30

## 2018-07-09 ENCOUNTER — RADIANT APPOINTMENT (OUTPATIENT)
Dept: CT IMAGING | Facility: CLINIC | Age: 78
End: 2018-07-09
Attending: INTERNAL MEDICINE
Payer: MEDICARE

## 2018-07-09 DIAGNOSIS — C82.99 FOLLICULAR LYMPHOMA OF EXTRANODAL AND SOLID ORGAN SITES (H): ICD-10-CM

## 2018-07-09 LAB
ALBUMIN SERPL-MCNC: 3.7 G/DL (ref 3.4–5)
ALP SERPL-CCNC: 78 U/L (ref 40–150)
ALT SERPL W P-5'-P-CCNC: 32 U/L (ref 0–70)
ANION GAP SERPL CALCULATED.3IONS-SCNC: 6 MMOL/L (ref 3–14)
AST SERPL W P-5'-P-CCNC: 19 U/L (ref 0–45)
BASOPHILS # BLD AUTO: 0 10E9/L (ref 0–0.2)
BASOPHILS NFR BLD AUTO: 0.2 %
BILIRUB SERPL-MCNC: 0.4 MG/DL (ref 0.2–1.3)
BUN SERPL-MCNC: 22 MG/DL (ref 7–30)
CALCIUM SERPL-MCNC: 9 MG/DL (ref 8.5–10.1)
CHLORIDE SERPL-SCNC: 109 MMOL/L (ref 94–109)
CO2 SERPL-SCNC: 26 MMOL/L (ref 20–32)
CREAT BLD-MCNC: 0.8 MG/DL (ref 0.66–1.25)
CREAT SERPL-MCNC: 0.86 MG/DL (ref 0.66–1.25)
DIFFERENTIAL METHOD BLD: NORMAL
EOSINOPHIL # BLD AUTO: 0.4 10E9/L (ref 0–0.7)
EOSINOPHIL NFR BLD AUTO: 4.3 %
ERYTHROCYTE [DISTWIDTH] IN BLOOD BY AUTOMATED COUNT: 12.8 % (ref 10–15)
GFR SERPL CREATININE-BSD FRML MDRD: 86 ML/MIN/1.7M2
GFR SERPL CREATININE-BSD FRML MDRD: >90 ML/MIN/1.7M2
GLUCOSE SERPL-MCNC: 112 MG/DL (ref 70–99)
HCT VFR BLD AUTO: 45.2 % (ref 40–53)
HGB BLD-MCNC: 14.8 G/DL (ref 13.3–17.7)
IMM GRANULOCYTES # BLD: 0.1 10E9/L (ref 0–0.4)
IMM GRANULOCYTES NFR BLD: 0.8 %
LDH SERPL L TO P-CCNC: 151 U/L (ref 85–227)
LYMPHOCYTES # BLD AUTO: 1 10E9/L (ref 0.8–5.3)
LYMPHOCYTES NFR BLD AUTO: 12.1 %
MCH RBC QN AUTO: 31 PG (ref 26.5–33)
MCHC RBC AUTO-ENTMCNC: 32.7 G/DL (ref 31.5–36.5)
MCV RBC AUTO: 95 FL (ref 78–100)
MONOCYTES # BLD AUTO: 0.5 10E9/L (ref 0–1.3)
MONOCYTES NFR BLD AUTO: 6.1 %
NEUTROPHILS # BLD AUTO: 6.4 10E9/L (ref 1.6–8.3)
NEUTROPHILS NFR BLD AUTO: 76.5 %
NRBC # BLD AUTO: 0 10*3/UL
NRBC BLD AUTO-RTO: 0 /100
PLATELET # BLD AUTO: 203 10E9/L (ref 150–450)
POTASSIUM SERPL-SCNC: 4 MMOL/L (ref 3.4–5.3)
PROT SERPL-MCNC: 7.2 G/DL (ref 6.8–8.8)
RBC # BLD AUTO: 4.78 10E12/L (ref 4.4–5.9)
SODIUM SERPL-SCNC: 142 MMOL/L (ref 133–144)
URATE SERPL-MCNC: 5 MG/DL (ref 3.5–7.2)
WBC # BLD AUTO: 8.4 10E9/L (ref 4–11)

## 2018-07-09 RX ORDER — IOPAMIDOL 755 MG/ML
135 INJECTION, SOLUTION INTRAVASCULAR ONCE
Status: COMPLETED | OUTPATIENT
Start: 2018-07-09 | End: 2018-07-09

## 2018-07-09 RX ADMIN — IOPAMIDOL 135 ML: 755 INJECTION, SOLUTION INTRAVASCULAR at 07:32

## 2018-07-09 NOTE — DISCHARGE INSTRUCTIONS

## 2018-07-11 ENCOUNTER — ONCOLOGY VISIT (OUTPATIENT)
Dept: ONCOLOGY | Facility: CLINIC | Age: 78
End: 2018-07-11
Attending: INTERNAL MEDICINE
Payer: COMMERCIAL

## 2018-07-11 VITALS
DIASTOLIC BLOOD PRESSURE: 71 MMHG | OXYGEN SATURATION: 95 % | RESPIRATION RATE: 18 BRPM | SYSTOLIC BLOOD PRESSURE: 153 MMHG | TEMPERATURE: 98.5 F | HEIGHT: 69 IN | HEART RATE: 58 BPM | BODY MASS INDEX: 33.33 KG/M2 | WEIGHT: 225 LBS

## 2018-07-11 DIAGNOSIS — C82.99 FOLLICULAR LYMPHOMA OF EXTRANODAL AND SOLID ORGAN SITES (H): Primary | ICD-10-CM

## 2018-07-11 PROCEDURE — 99215 OFFICE O/P EST HI 40 MIN: CPT | Mod: ZP | Performed by: INTERNAL MEDICINE

## 2018-07-11 PROCEDURE — G0463 HOSPITAL OUTPT CLINIC VISIT: HCPCS | Mod: ZF

## 2018-07-11 ASSESSMENT — PAIN SCALES - GENERAL: PAINLEVEL: NO PAIN (0)

## 2018-07-11 NOTE — LETTER
"7/11/2018      RE: Zack Demarco  [address] [facility] [address]       ONCOLOGY SUMMARY: Zack Demarco is a 77-year-old man first seen in consultation on 12/27/2017 for a new diagnosis of follicular lymphoma. He was diagnosed incidental to an evaluation for cardiac bypass surgery in 11/2017.  A chest CT scan on 11/14 showed an unexpected retroperitoneal mass with surrounding lymphadenopathy suspicious for malignancy.  A further CT scan done on the same day showed a 2.3 x 7.2 x 6.1 retroperitoneal soft tissue mass with adjacent lymphadenopathy that mildly narrowed the left renal vei  There also was nodularity within the mesentery and an enlarged hilar lymph node.  CT-guided biopsy of the retroperitoneal mass on 12/12/2017 established the diagnosis, but the sample was too small for adequate grading. There was no disease identified outside of the abdomen; a bone marrow biopsy was not obtained as part of staging.      Relative to cardiac issues, he underwent an uncomplicated 3-vessel coronary artery bypass graft on 11/22/2017 and, subsequently, has been symptom-free.       With the renal and gonadal vein compression it was decided to start treatment with rituximab, alone. Mr. Demarco completed 4 weekly doses from 01/26 and 02/16/2018.  He had no difficulty with the treatment and was able to receive rapid infusion (90 minutes) for the last 3 doses. Interval evaluation on 03/05 with an US, suggested improvement but we recognized that a repeat CT would eventually be necessary and a CT scan on 04/09 showed substantial regression.     INTERVAL HISTORY:  I last saw Mr. Demarco 04/11/2018.  In view of the CT scan findings of substantial regression it was decided to repeat the CT scan now to evaluate for any further change.  He returns today for scheduled followup with another CT scan.      Overall, Mr. Demarco is doing well.  He has had a \"chest cold\" for approximately the past 3 weeks.  No associated fever.  Nonproductive " cough.  Symptoms are improving.  Also, he is exercising less because of a chronic knee discomfort; however, he is golfing regularly. Pleased with his physical status.      No interval fevers, night sweats or weight loss.  No new aches or pains.  No gastrointestinal or urinary tract signs.      REVIEW OF SYSTEMS:  A 10-point review of systems is otherwise negative.      MEDICATIONS:   1.  Aspirin.   2.  Atorvastatin.   3.  Vitamin D.   4.  Latanoprost ophthalmic solution.   5.  Synthroid 75 mcg.     6.  Metoprolol.     7.  Multivitamin.     8.  Hyzaar.   9.  Nitroglycerin p.r.n.      ALLERGIES:  None reported.      PHYSICAL EXAMINATION:   VITAL SIGNS:  Weight 102.1 kg (stable), blood pressure 153/71, pulse 58 and regular, respirations 18, temperature 98.5, O2 saturation 95% on room air.   HEENT:  No icterus or conjunctival injection.  Oropharynx with no masses.  Mucosa moist.  No lesions.   NECK:  No cervical/supraclavicular adenopathy.  Some irregularity, but no discrete nodes in the right posterior cervical region.     CHEST:  Clear to auscultation.   AXILLAE:  No nodes.   HEART:  Regular rhythm.  No murmur or gallop.  Questionable soft systolic murmur.     ABDOMEN:  Soft and nontender.  Bowel sounds active.  No detectable organomegaly or mass.  No inguinal/femoral nodes.   EXTREMITIES:  Trace edema on the left side.   SKIN:  No rashes, petechiae or ecchymoses.      LABORATORY DATA:     Hemoglobin 14.8 grams percent with 8400 WBCs (normal differential) and 203,000 platelets.   Electrolytes, calcium, creatinine (0.86) normal.  Uric acid 5.0.   Liver enzymes, including serum LDH, normal.     Chest, abdomen and pelvic CT scan:    1. Stable mesenteric fat stranding along the root of the mesentery with associated prominent/enlarged mesenteric lymph nodes.  2. Stable left periaortic mass/conglomerate of lymph nodes encasing and narrowing the left renal vasculature, however patent. No new or enlarging abdominal or pelvic  lymphadenopathy.  3. Stable bilateral pulmonary nodules. No new or enlarging pulmonary nodules.  4. New patchy groundglass opacities in the right upper lobe, likely due to and inflammatory/infectious process. Attention on follow-up studies.  5. Stable prominent/mildly dilated appendix, fluid-filled, previously demonstrating hypermetabolic activity on PET CT 1/12/2018. Differentials include appendiceal mucocele, however could also represent prominent/mildly dilated appendix. Attention on follow-up studies.      ASSESSMENT AND PLAN:  Follicular lymphoma, clinical stage IIA (disease limited to the abdomen).  The patient had a very good response to rituximab alone as his initial therapy with substantial regression. No additional shrinkage. There is still some compressive effect on the left renal vein and portal veins, but remain patent.      I spent over 40 minutes with Mr. Demarco, the majority of time in counseling.  We reviewed the results of the CT scan in detail.  There was a good initial response to the rituximab, but a residual mass persists and there has been no change over 3 months.      We considered the options discussed briefly at the last visit, including close observation, which would involve serial surveillance CT scans; rituximab alone with a reevaluation after completion of therapy, or the initiation of rituximab plus bendamustine, a chemotherapy agent quite active in follicular lymphomas.  The details of each were described, including the schedule and some of the potential side effects from bendamustine. Bendamustine, however, is usually well tolerated. I indicated that any of the three alternatives discussed would be reasonable. Observation would require more scanning. The combination of rituximab + bendamustine would likely be more effective than just rituximab, but present a greater range of possible side effects and treatment would extend over a few to several months rather than 4 weeks.     After  considering these alternatives, Mr. Demarco indicated that beginning with rituximab, alone, is his preference.  We will arrange for the initiation of treatment within the next 1-2 weeks.  Plan to give 4 weekly doses and repeat imaging approximately 2-3 months after completion of therapy. If additional response, consider maintenance rituximab.    Gal Bain MD  Professor of Medicine  Oncology  [facility]  Office: [phone]  Clinic Fax: [phone]       cc:      MD Rolando Pearson MD Yohannes Gebre, MD Ronald Sih, MD Bruce A. Peterson, MD

## 2018-07-11 NOTE — MR AVS SNAPSHOT
After Visit Summary   7/11/2018    Zack Demarco    MRN: [id number]           Patient Information     Date Of Birth          1940        Visit Information        Provider Department      7/11/2018 8:30 AM Gal Bain MD [facility]        Today's Diagnoses     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)    -  1      Care Instructions          July 2018 Sunday Monday Tuesday Wednesday Thursday Friday Saturday   1     2     3     4     5     6     7       8     9     CT CHEST/ABDOMEN/PELVIS W    7:20 AM   (20 min.)   UCCT2   [facility] CT     LAB    7:45 AM   (15 min.)   UC LAB   [facility] 10     11     [facility] RETURN    8:15 AM   (30 min.)   Gal Bain MD   [facility] 12     13     14       15     16     17     18     RETURN    8:15 AM   (15 min.)   Avtar Mccullough MD   [facility] 19     20     21       22     23     24     25     26     [facility] LAB DRAW    6:30 AM   (15 min.)    [facility] LAB DRAW   [facility] Lab Draw     [facility] ONC INFUSION 360    7:00 AM   (360 min.)   UC ONCOLOGY INFUSION   [facility] 27     28       29     30     31 August 2018 Sunday Monday Tuesday Wednesday Thursday Friday Saturday                  1     2     [facility] LAB DRAW    7:00 AM   (15 min.)    [facility] DRAW   [facility]     [facility] ONC INFUSION 360    7:30 AM   (360 min.)   UC ONCOLOGY INFUSION   [facility] 3     4       5     6     7     8     9     [facility] LAB DRAW    7:30 AM   (15 min.)    [facility] LAB DRAW   [facility] Lab Draw     [facility] ONC INFUSION 360    8:00 AM   (360 min.)   UC ONCOLOGY INFUSION   [facility] 10     11       12     13     14     15     16     [facility] LAB DRAW    8:00 AM   (15 min.)    [facility] LAB DRAW   [facility] Lab Draw     [facility] ONC INFUSION 360     8:30 AM   (360 min.)   UC ONCOLOGY INFUSION   [facility] 17     18       19     20     21     22     23     24     25       26     27     28     29     30     31 September 2018 Sunday Monday Tuesday Wednesday Thursday Friday Saturday                                 1       2     3     4     5     6     7     8       9     10     11     12     13     14     15       16     17     18     19     20     [facility] LAB DRAW    7:00 AM   (15 min.)   [facility] LAB DRAW   [facility] Lab Draw     [facility] RETURN    7:15 AM   (30 min.)   Gal Bain MD   [facility] 21     22       23     24     25     26     27     28     29       30                                                    Follow-ups after your visit        Your next 10 appointments already scheduled     Jul 26, 2018  6:30 AM CDT   [facility] Lab Draw with [facility] LAB DRAW   [facility] ([facility])    [address]  [address]  [facility] [place] [address]   [phone]            Jul 26, 2018  7:00 AM CDT   Infusion 360 with UC ONCOLOGY INFUSION, UC 11 ATC   [facility] ([facility])    [address]  [address]  [place] [address]   [phone]            Aug 02, 2018  7:00 AM CDT   [facility] Lab Draw with [facility] LAB DRAW   [facility] Lab Draw ([facility])    [address]  [address]  [place] [place] [address]   [phone]            Aug 02, 2018  7:30 AM CDT   Infusion 360 with UC ONCOLOGY INFUSION, UC 32 ATC   [facility] ([facility])    [address]  [address]  [place] [address]   [phone]            Aug 09, 2018  7:30 AM CDT   [facility] Lab Draw with [facility] LAB DRAW   [facility] Lab Draw ([facility])    [address] [place] [address]  [address]  [facility]  [address]   [phone]            Aug 09, 2018  8:00 AM CDT   Infusion 360 with UC ONCOLOGY INFUSION, UC 14 ATC   [facility] ([facility])    [address]  [address]  [facility] [address] [phone]            Aug 16, 2018  8:00 AM CDT   [facility] Lab Draw with [facility] LAB DRAW   [facility] ([facility])    [address]  [address]  [facility] [address] [phone]              Future tests that were ordered for you today     Open Future Orders        Priority Expected Expires Ordered    CBC with platelets differential Routine 9/19/2018 12/31/2018 7/24/2018    Basic metabolic panel Routine 9/19/2018 12/31/2018 7/24/2018    Uric acid Routine 9/19/2018 12/31/2018 7/24/2018            Who to contact     If you have questions or need follow up information about today's clinic visit or your schedule please contact [facility] directly at [phone].  Normal or non-critical lab and imaging results will be communicated to you by [employer], letter or phone within 4 business days after the clinic has received the results. If you do not hear from us within 7 days, please contact the clinic through [employer] or phone. If you have a critical or abnormal lab result, we will notify you by phone as soon as possible.  Submit refill requests through [employer] or call your pharmacy and they will forward the refill request to us. Please allow 3 business days for your refill to be completed.          Additional Information About Your Visit        [employer] Information     [employer] gives you secure access to your electronic health record. If you see a primary care provider, you can also send messages to your care team and make appointments. If you have questions, please call your primary care clinic.  If you do not have a primary care provider, please call [phone] and they will assist you.        Care  "EveryWhere ID     This is your Care EveryWhere ID. This could be used by other organizations to access your [facility] medical records  [id number]        Your Vitals Were     Pulse Temperature Respirations Height Pulse Oximetry BMI (Body Mass Index)    58 98.5  F (36.9  C) (Oral) 18 1.753 m (5' 9.02\") 95% 33.21 kg/m2       Blood Pressure from Last 3 Encounters:   07/11/18 153/71   05/03/18 138/62   04/11/18 152/72    Weight from Last 3 Encounters:   07/11/18 102.1 kg (225 lb)   05/03/18 100.2 kg (221 lb)   04/11/18 102.1 kg (225 lb)              Today, you had the following     No orders found for display       Primary Care Provider Office Phone # Fax #    Anastacio Love [phone] [address] [facility] [address]        Equal Access to Services     SHEYLA BURR : Hadii aad ku hadasho Soomaali, waaxda luqadaha, qaybta kaalmada adeegyada, waxay katiein hayniecy dasilva . So [facility] [phone].    ATENCIÓN: Si epifaniola español, tiene a warner disposición servicios gratuitos de asistencia lingüística. Llame al [phone].    We comply with applicable federal civil rights laws and [place] laws. We do not discriminate on the basis of race, color, national origin, age, disability, sex, sexual orientation, or gender identity.            Thank you!     Thank you for choosing [facility]  for your care. Our goal is always to provide you with excellent care. Hearing back from our patients is one way we can continue to improve our services. Please take a few minutes to complete the written survey that you may receive in the mail after your visit with us. Thank you!             Your Updated Medication List - Protect others around you: Learn how to safely use, store and throw away your medicines at www.disposemymeds.org.          This list is accurate as of 7/11/18 11:59 PM.  Always use your most recent med list.                   Brand Name Dispense Instructions for use " Diagnosis    aspirin 325 MG EC tablet      1/2 tab daily        atorvastatin 40 MG tablet    LIPITOR    60 tablet    Take 1 tablet (40 mg) by mouth daily    Hyperlipidemia LDL goal <100       latanoprost 0.005 % ophthalmic solution    XALATAN    3 Bottle    Place 1 drop into both eyes At Bedtime    Borderline glaucoma with ocular hypertension, unspecified laterality       levothyroxine 75 MCG tablet    SYNTHROID/LEVOTHROID    90 tablet    Take 1 tablet (75 mcg) by mouth daily    Hypothyroidism, unspecified type       losartan-hydrochlorothiazide 50-12.5 MG per tablet    HYZAAR     TAKE 1 TABLET BY MOUTH EVERY DAY    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 If symptoms persist 5 minutes after 1st dose call 911.    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

## 2018-07-11 NOTE — PROGRESS NOTES
"ONCOLOGY SUMMARY: Zack Demarco is a 77-year-old man first seen in consultation on 12/27/2017 for a new diagnosis of follicular lymphoma. He was diagnosed incidental to an evaluation for cardiac bypass surgery in 11/2017.  A chest CT scan on 11/14 showed an unexpected retroperitoneal mass with surrounding lymphadenopathy suspicious for malignancy.  A further CT scan done on the same day showed a 2.3 x 7.2 x 6.1 retroperitoneal soft tissue mass with adjacent lymphadenopathy that mildly narrowed the left renal vei  There also was nodularity within the mesentery and an enlarged hilar lymph node.  CT-guided biopsy of the retroperitoneal mass on 12/12/2017 established the diagnosis, but the sample was too small for adequate grading. There was no disease identified outside of the abdomen; a bone marrow biopsy was not obtained as part of staging.      Relative to cardiac issues, he underwent an uncomplicated 3-vessel coronary artery bypass graft on 11/22/2017 and, subsequently, has been symptom-free.       With the renal and gonadal vein compression it was decided to start treatment with rituximab, alone. Mr. Demarco completed 4 weekly doses from 01/26 and 02/16/2018.  He had no difficulty with the treatment and was able to receive rapid infusion (90 minutes) for the last 3 doses. Interval evaluation on 03/05 with an US, suggested improvement but we recognized that a repeat CT would eventually be necessary and a CT scan on 04/09 showed substantial regression.     INTERVAL HISTORY:  I last saw Mr. Demarco 04/11/2018.  In view of the CT scan findings of substantial regression it was decided to repeat the CT scan now to evaluate for any further change.  He returns today for scheduled followup with another CT scan.      Overall, Mr. Demarco is doing well.  He has had a \"chest cold\" for approximately the past 3 weeks.  No associated fever.  Nonproductive cough.  Symptoms are improving.  Also, he is exercising less because of a chronic " knee discomfort; however, he is golfing regularly. Pleased with his physical status.      No interval fevers, night sweats or weight loss.  No new aches or pains.  No gastrointestinal or urinary tract signs.      REVIEW OF SYSTEMS:  A 10-point review of systems is otherwise negative.      MEDICATIONS:   1.  Aspirin.   2.  Atorvastatin.   3.  Vitamin D.   4.  Latanoprost ophthalmic solution.   5.  Synthroid 75 mcg.     6.  Metoprolol.     7.  Multivitamin.     8.  Hyzaar.   9.  Nitroglycerin p.r.n.      ALLERGIES:  None reported.      PHYSICAL EXAMINATION:   VITAL SIGNS:  Weight 102.1 kg (stable), blood pressure 153/71, pulse 58 and regular, respirations 18, temperature 98.5, O2 saturation 95% on room air.   HEENT:  No icterus or conjunctival injection.  Oropharynx with no masses.  Mucosa moist.  No lesions.   NECK:  No cervical/supraclavicular adenopathy.  Some irregularity, but no discrete nodes in the right posterior cervical region.     CHEST:  Clear to auscultation.   AXILLAE:  No nodes.   HEART:  Regular rhythm.  No murmur or gallop.  Questionable soft systolic murmur.     ABDOMEN:  Soft and nontender.  Bowel sounds active.  No detectable organomegaly or mass.  No inguinal/femoral nodes.   EXTREMITIES:  Trace edema on the left side.   SKIN:  No rashes, petechiae or ecchymoses.      LABORATORY DATA:     Hemoglobin 14.8 grams percent with 8400 WBCs (normal differential) and 203,000 platelets.   Electrolytes, calcium, creatinine (0.86) normal.  Uric acid 5.0.   Liver enzymes, including serum LDH, normal.     Chest, abdomen and pelvic CT scan:    1. Stable mesenteric fat stranding along the root of the mesentery with associated prominent/enlarged mesenteric lymph nodes.  2. Stable left periaortic mass/conglomerate of lymph nodes encasing and narrowing the left renal vasculature, however patent. No new or enlarging abdominal or pelvic lymphadenopathy.  3. Stable bilateral pulmonary nodules. No new or enlarging  pulmonary nodules.  4. New patchy groundglass opacities in the right upper lobe, likely due to and inflammatory/infectious process. Attention on follow-up studies.  5. Stable prominent/mildly dilated appendix, fluid-filled, previously demonstrating hypermetabolic activity on PET CT 1/12/2018. Differentials include appendiceal mucocele, however could also represent prominent/mildly dilated appendix. Attention on follow-up studies.      ASSESSMENT AND PLAN:  Follicular lymphoma, clinical stage IIA (disease limited to the abdomen).  The patient had a very good response to rituximab alone as his initial therapy with substantial regression. No additional shrinkage. There is still some compressive effect on the left renal vein and portal veins, but remain patent.      I spent over 40 minutes with Mr. Demarco, the majority of time in counseling.  We reviewed the results of the CT scan in detail.  There was a good initial response to the rituximab, but a residual mass persists and there has been no change over 3 months.      We considered the options discussed briefly at the last visit, including close observation, which would involve serial surveillance CT scans; rituximab alone with a reevaluation after completion of therapy, or the initiation of rituximab plus bendamustine, a chemotherapy agent quite active in follicular lymphomas.  The details of each were described, including the schedule and some of the potential side effects from bendamustine. Bendamustine, however, is usually well tolerated. I indicated that any of the three alternatives discussed would be reasonable. Observation would require more scanning. The combination of rituximab + bendamustine would likely be more effective than just rituximab, but present a greater range of possible side effects and treatment would extend over a few to several months rather than 4 weeks.     After considering these alternatives, Mr. Demarco indicated that beginning with  rituximab, alone, is his preference.  We will arrange for the initiation of treatment within the next 1-2 weeks.  Plan to give 4 weekly doses and repeat imaging approximately 2-3 months after completion of therapy. If additional response, consider maintenance rituximab.    Gal Bain MD  Professor of Medicine  Oncology  [facility]  Office: [phone]  Clinic Fax: [phone]       cc:      MD Rolando Pearson MD Yohannes Gebre, MD Ronald Sih, MD

## 2018-07-11 NOTE — NURSING NOTE
"Oncology Rooming Note    July 11, 2018 8:17 AM   Zack Demarco is a 77 year old male who presents for:    Chief Complaint   Patient presents with     Oncology Clinic Visit     Return visit related to Follicular Lymphoma     Initial Vitals: /71 (BP Location: Right arm, Patient Position: Sitting, Cuff Size: Adult Large)  Pulse 58  Temp 98.5  F (36.9  C) (Oral)  Resp 18  Ht 1.753 m (5' 9.02\")  Wt 102.1 kg (225 lb)  SpO2 95%  BMI 33.21 kg/m2 Estimated body mass index is 33.21 kg/(m^2) as calculated from the following:    Height as of this encounter: 1.753 m (5' 9.02\").    Weight as of this encounter: 102.1 kg (225 lb). Body surface area is 2.23 meters squared.  No Pain (0) Comment: Data Unavailable   No LMP for male patient.  Allergies reviewed: Yes  Medications reviewed: Yes    Medications: Medication refills not needed today.  Pharmacy name entered into [employer]: [id number] - [place], [place] - [address] [facility]    Clinical concerns: No new concerns. Provider was notified.    10 minutes for nursing intake (face to face time)     Teena Rios LPN            "

## 2018-07-18 ENCOUNTER — OFFICE VISIT (OUTPATIENT)
Dept: OPHTHALMOLOGY | Facility: CLINIC | Age: 78
End: 2018-07-18
Payer: COMMERCIAL

## 2018-07-18 DIAGNOSIS — H25.812 COMBINED FORMS OF AGE-RELATED CATARACT OF LEFT EYE: ICD-10-CM

## 2018-07-18 DIAGNOSIS — H40.053 BORDERLINE GLAUCOMA WITH OCULAR HYPERTENSION, BILATERAL: Primary | ICD-10-CM

## 2018-07-18 PROCEDURE — 92012 INTRM OPH EXAM EST PATIENT: CPT | Performed by: OPHTHALMOLOGY

## 2018-07-18 ASSESSMENT — VISUAL ACUITY
OD_PH_CC: 20/40
OS_CC+: -1
OD_CC: 20/30
OS_PH_CC: 20/40
METHOD: SNELLEN - LINEAR
OD_CC+: -1
OS_CC: 20/40

## 2018-07-18 ASSESSMENT — TONOMETRY
IOP_METHOD: TONOPEN
OS_IOP_MMHG: 18
OD_IOP_MMHG: 18

## 2018-07-18 ASSESSMENT — EXTERNAL EXAM - RIGHT EYE: OD_EXAM: 3+ BROW PTOSIS, MILD-MOD BROW

## 2018-07-18 ASSESSMENT — EXTERNAL EXAM - LEFT EYE: OS_EXAM: 3+ BROW PTOSIS, MILD-MOD BROW

## 2018-07-18 NOTE — LETTER
7/18/2018         RE: Zack Demarco  [address] [facility] [address]        Dear Colleague,    Thank you for referring your patient, Zack Demarco, to the [facility]. Please see a copy of my visit note below.     Current Eye Medications:  Latanoprost every evening both eyes (9:30pm)     Subjective:  TA.  Patient doing well. No complaints at this time.    Wears glasses golfing - helps.     Objective:  See Ophthalmology Exam.       Assessment:  Stable intraocular pressure both eyes in patient who is a treated glaucoma suspect.      Plan:  Continue using Latanoprost both eyes at bedtime.   Return visit in 6 months for complete exam.    Avtar Mccullough M.D.  [phone]        Again, thank you for allowing me to participate in the care of your patient.        Sincerely,        Avtar Mccullough MD

## 2018-07-18 NOTE — PROGRESS NOTES
Current Eye Medications:  Latanoprost every evening both eyes (9:30pm)     Subjective:  TA.  Patient doing well. No complaints at this time.    Wears glasses golfing - helps.     Objective:  See Ophthalmology Exam.       Assessment:  Stable intraocular pressure both eyes in patient who is a treated glaucoma suspect.      Plan:  Continue using Latanoprost both eyes at bedtime.   Return visit in 6 months for complete exam.    Avtar Mccullough M.D.  [phone]

## 2018-07-18 NOTE — PATIENT INSTRUCTIONS
Continue using Latanoprost both eyes at bedtime.   Return visit in 6 months for complete exam.    Avtar Mccullough M.D.  [phone]

## 2018-07-18 NOTE — MR AVS SNAPSHOT
After Visit Summary   7/18/2018    Zack Demarco    MRN: [id number]           Patient Information     Date Of Birth          1940        Visit Information        Provider Department      7/18/2018 8:15 AM Avtar Mccullough MD [facility]        Today's Diagnoses     Borderline glaucoma with ocular hypertension, bilateral - treated    -  1      Care Instructions    Continue using Latanoprost both eyes at bedtime.   Return visit in 6 months for complete exam.    Avtar Mccullough M.D.  [phone]            Follow-ups after your visit        Your next 10 appointments already scheduled     Jul 26, 2018  6:30 AM CDT   Masonic Lab Draw with UC MASONIC LAB DRAW   [facility] Masonic Lab Draw ([facility])    [address] [place] [address]  [address]  [facility] [address]   [phone]            Jul 26, 2018  7:00 AM CDT   Infusion 360 with UC ONCOLOGY INFUSION, UC 11 ATC   [facility] ([facility])    [address] [place] [address] Se  [address]  [facility] [address]   [phone]            Aug 02, 2018  7:00 AM CDT   Masonic Lab Draw with UC MASONIC LAB DRAW   [facility] Masonic Lab Draw ([facility])    [address]  [address]  [facility] [address]   [phone]            Aug 02, 2018  7:30 AM CDT   Infusion 360 with UC ONCOLOGY INFUSION, UC 32 ATC   [facility] ([facility])    [address]  [address]  [facility] [address]   [phone]            Aug 09, 2018  7:30 AM CDT   Masonic Lab Draw with UC MASONIC LAB DRAW   [facility] Masonic Lab Draw ([facility])    [address] [place] [address] Se  [address]  [facility] [address]   [phone]            Aug 09, 2018  8:00 AM CDT   Infusion 360 with UC ONCOLOGY INFUSION, UC 14 ATC   [facility] ([facility])    [address] [place] [address]  [address]  [address]  [facility] [address] [phone]            Aug 16, 2018  8:00 AM T   Masonic Lab Draw with  MASONIC LAB DRAW   [facility] Masonic Lab Draw ([facility])    [address]  [address]  [facility] [id number] [phone]              Who to contact     If you have questions or need follow up information about today's clinic visit or your schedule please contact [facility] directly at [phone].  Normal or non-critical lab and imaging results will be communicated to you by KeepGohart, letter or phone within 4 business days after the clinic has received the results. If you do not hear from us within 7 days, please contact the clinic through Aspiret or phone. If you have a critical or abnormal lab result, we will notify you by phone as soon as possible.  Submit refill requests through [employer] or call your pharmacy and they will forward the refill request to us. Please allow 3 business days for your refill to be completed.          Additional Information About Your Visit        [employer] Information     [employer] gives you secure access to your electronic health record. If you see a primary care provider, you can also send messages to your care team and make appointments. If you have questions, please call your primary care clinic.  If you do not have a primary care provider, please call [phone] and they will assist you.        Care EveryWhere ID     This is your Care EveryWhere ID. This could be used by other organizations to access your [place] medical records  [id number]         Blood Pressure from Last 3 Encounters:   07/11/18 153/71   05/03/18 138/62   04/11/18 152/72    Weight from Last 3 Encounters:   07/11/18 102.1 kg (225 lb)   05/03/18 100.2 kg (221 lb)   04/11/18 102.1 kg (225 lb)              Today, you had the following     No orders found for display       Primary Care Provider Office Phone # Fax Nancy Love MD  [phone]       [address] [facility] [address]        Equal Access to Services     HALEY BURR : Hadii aad ku hadla nena Farr, wanathanielda alejandrina, justinta katiara gross, marilynn srinivasan laMaryniecy reynolds. So [facility] [phone].    ATENCIÓN: Si habla español, tiene a warner disposición servicios gratuitos de asistencia lingüística. Llame al [phone].    We comply with applicable federal civil rights laws and [place] laws. We do not discriminate on the basis of race, color, national origin, age, disability, sex, sexual orientation, or gender identity.            Thank you!     [facility]  [facility]. Our goal is always to provide you with excellent care. Hearing back from our patients is one way we can continue to improve our services. Please take a few minutes to complete the written survey that you may receive in the mail after your visit with us. Thank you!             Your Updated Medication List - Protect others around you: Learn how to safely use, store and throw away your medicines at www.disposemymeds.org.          This list is accurate as of 7/18/18  8:31 AM.  Always use your most recent med list.                   Brand Name Dispense Instructions for use Diagnosis    aspirin 325 MG EC tablet      1/2 tab daily        atorvastatin 40 MG tablet    LIPITOR    60 tablet    Take 1 tablet (40 mg) by mouth daily    Hyperlipidemia LDL goal <100       latanoprost 0.005 % ophthalmic solution    XALATAN    3 Bottle    Place 1 drop into both eyes At Bedtime    Borderline glaucoma with ocular hypertension, unspecified laterality       levothyroxine 75 MCG tablet    SYNTHROID/LEVOTHROID    90 tablet    Take 1 tablet (75 mcg) by mouth daily    Hypothyroidism, unspecified type       losartan-hydrochlorothiazide 50-12.5 MG per tablet    HYZAAR     TAKE 1 TABLET BY MOUTH EVERY DAY    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 If symptoms persist 5 minutes after 1st dose call 911.    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

## 2018-07-24 DIAGNOSIS — C82.99 FOLLICULAR LYMPHOMA OF EXTRANODAL AND SOLID ORGAN SITES (H): Primary | ICD-10-CM

## 2018-07-24 RX ORDER — ALBUTEROL SULFATE 90 UG/1
1-2 AEROSOL, METERED RESPIRATORY (INHALATION)
Status: CANCELLED
Start: 2018-08-02

## 2018-07-24 RX ORDER — SODIUM CHLORIDE 9 MG/ML
1000 INJECTION, SOLUTION INTRAVENOUS CONTINUOUS PRN
Status: CANCELLED
Start: 2018-08-09

## 2018-07-24 RX ORDER — METHYLPREDNISOLONE SODIUM SUCCINATE 125 MG/2ML
125 INJECTION, POWDER, LYOPHILIZED, FOR SOLUTION INTRAMUSCULAR; INTRAVENOUS
Status: CANCELLED
Start: 2018-07-26

## 2018-07-24 RX ORDER — ACETAMINOPHEN 325 MG/1
650 TABLET ORAL ONCE
Status: CANCELLED
Start: 2018-08-09

## 2018-07-24 RX ORDER — ALBUTEROL SULFATE 0.83 MG/ML
2.5 SOLUTION RESPIRATORY (INHALATION)
Status: CANCELLED | OUTPATIENT
Start: 2018-07-26

## 2018-07-24 RX ORDER — EPINEPHRINE 1 MG/ML
0.3 INJECTION, SOLUTION INTRAMUSCULAR; SUBCUTANEOUS EVERY 5 MIN PRN
Status: CANCELLED | OUTPATIENT
Start: 2018-08-16

## 2018-07-24 RX ORDER — DIPHENHYDRAMINE HCL 25 MG
50 CAPSULE ORAL ONCE
Status: CANCELLED
Start: 2018-08-09

## 2018-07-24 RX ORDER — LORAZEPAM 2 MG/ML
0.5 INJECTION INTRAMUSCULAR EVERY 4 HOURS PRN
Status: CANCELLED
Start: 2018-08-09

## 2018-07-24 RX ORDER — DIPHENHYDRAMINE HYDROCHLORIDE 50 MG/ML
50 INJECTION INTRAMUSCULAR; INTRAVENOUS
Status: CANCELLED
Start: 2018-08-02

## 2018-07-24 RX ORDER — MEPERIDINE HYDROCHLORIDE 25 MG/ML
25 INJECTION INTRAMUSCULAR; INTRAVENOUS; SUBCUTANEOUS EVERY 30 MIN PRN
Status: CANCELLED | OUTPATIENT
Start: 2018-08-02

## 2018-07-24 RX ORDER — MEPERIDINE HYDROCHLORIDE 25 MG/ML
25 INJECTION INTRAMUSCULAR; INTRAVENOUS; SUBCUTANEOUS
Status: CANCELLED
Start: 2018-08-02

## 2018-07-24 RX ORDER — METHYLPREDNISOLONE SODIUM SUCCINATE 125 MG/2ML
125 INJECTION, POWDER, LYOPHILIZED, FOR SOLUTION INTRAMUSCULAR; INTRAVENOUS
Status: CANCELLED
Start: 2018-08-16

## 2018-07-24 RX ORDER — EPINEPHRINE 0.3 MG/.3ML
0.3 INJECTION SUBCUTANEOUS EVERY 5 MIN PRN
Status: CANCELLED | OUTPATIENT
Start: 2018-08-09

## 2018-07-24 RX ORDER — MEPERIDINE HYDROCHLORIDE 25 MG/ML
25 INJECTION INTRAMUSCULAR; INTRAVENOUS; SUBCUTANEOUS
Status: CANCELLED
Start: 2018-07-26

## 2018-07-24 RX ORDER — MEPERIDINE HYDROCHLORIDE 25 MG/ML
25 INJECTION INTRAMUSCULAR; INTRAVENOUS; SUBCUTANEOUS
Status: CANCELLED
Start: 2018-08-09

## 2018-07-24 RX ORDER — ALBUTEROL SULFATE 0.83 MG/ML
2.5 SOLUTION RESPIRATORY (INHALATION)
Status: CANCELLED | OUTPATIENT
Start: 2018-08-02

## 2018-07-24 RX ORDER — ACETAMINOPHEN 325 MG/1
650 TABLET ORAL ONCE
Status: CANCELLED
Start: 2018-07-26

## 2018-07-24 RX ORDER — ALBUTEROL SULFATE 90 UG/1
1-2 AEROSOL, METERED RESPIRATORY (INHALATION)
Status: CANCELLED
Start: 2018-08-16

## 2018-07-24 RX ORDER — METHYLPREDNISOLONE SODIUM SUCCINATE 125 MG/2ML
125 INJECTION, POWDER, LYOPHILIZED, FOR SOLUTION INTRAMUSCULAR; INTRAVENOUS
Status: CANCELLED
Start: 2018-08-09

## 2018-07-24 RX ORDER — EPINEPHRINE 1 MG/ML
0.3 INJECTION, SOLUTION INTRAMUSCULAR; SUBCUTANEOUS EVERY 5 MIN PRN
Status: CANCELLED | OUTPATIENT
Start: 2018-08-02

## 2018-07-24 RX ORDER — MEPERIDINE HYDROCHLORIDE 25 MG/ML
25 INJECTION INTRAMUSCULAR; INTRAVENOUS; SUBCUTANEOUS EVERY 30 MIN PRN
Status: CANCELLED | OUTPATIENT
Start: 2018-08-16

## 2018-07-24 RX ORDER — SODIUM CHLORIDE 9 MG/ML
1000 INJECTION, SOLUTION INTRAVENOUS CONTINUOUS PRN
Status: CANCELLED
Start: 2018-08-02

## 2018-07-24 RX ORDER — ALBUTEROL SULFATE 90 UG/1
1-2 AEROSOL, METERED RESPIRATORY (INHALATION)
Status: CANCELLED
Start: 2018-08-09

## 2018-07-24 RX ORDER — DIPHENHYDRAMINE HCL 25 MG
50 CAPSULE ORAL ONCE
Status: CANCELLED
Start: 2018-07-26

## 2018-07-24 RX ORDER — LORAZEPAM 2 MG/ML
0.5 INJECTION INTRAMUSCULAR EVERY 4 HOURS PRN
Status: CANCELLED
Start: 2018-08-02

## 2018-07-24 RX ORDER — EPINEPHRINE 0.3 MG/.3ML
0.3 INJECTION SUBCUTANEOUS EVERY 5 MIN PRN
Status: CANCELLED | OUTPATIENT
Start: 2018-08-02

## 2018-07-24 RX ORDER — LORAZEPAM 2 MG/ML
0.5 INJECTION INTRAMUSCULAR EVERY 4 HOURS PRN
Status: CANCELLED
Start: 2018-07-26

## 2018-07-24 RX ORDER — SODIUM CHLORIDE 9 MG/ML
1000 INJECTION, SOLUTION INTRAVENOUS CONTINUOUS PRN
Status: CANCELLED
Start: 2018-08-16

## 2018-07-24 RX ORDER — ALBUTEROL SULFATE 0.83 MG/ML
2.5 SOLUTION RESPIRATORY (INHALATION)
Status: CANCELLED | OUTPATIENT
Start: 2018-08-16

## 2018-07-24 RX ORDER — EPINEPHRINE 1 MG/ML
0.3 INJECTION, SOLUTION INTRAMUSCULAR; SUBCUTANEOUS EVERY 5 MIN PRN
Status: CANCELLED | OUTPATIENT
Start: 2018-08-09

## 2018-07-24 RX ORDER — DIPHENHYDRAMINE HYDROCHLORIDE 50 MG/ML
50 INJECTION INTRAMUSCULAR; INTRAVENOUS
Status: CANCELLED
Start: 2018-07-26

## 2018-07-24 RX ORDER — LORAZEPAM 2 MG/ML
0.5 INJECTION INTRAMUSCULAR EVERY 4 HOURS PRN
Status: CANCELLED
Start: 2018-08-16

## 2018-07-24 RX ORDER — ACETAMINOPHEN 325 MG/1
650 TABLET ORAL ONCE
Status: CANCELLED
Start: 2018-08-02

## 2018-07-24 RX ORDER — DIPHENHYDRAMINE HCL 25 MG
50 CAPSULE ORAL ONCE
Status: CANCELLED
Start: 2018-08-16

## 2018-07-24 RX ORDER — METHYLPREDNISOLONE SODIUM SUCCINATE 125 MG/2ML
125 INJECTION, POWDER, LYOPHILIZED, FOR SOLUTION INTRAMUSCULAR; INTRAVENOUS
Status: CANCELLED
Start: 2018-08-02

## 2018-07-24 RX ORDER — DIPHENHYDRAMINE HYDROCHLORIDE 50 MG/ML
50 INJECTION INTRAMUSCULAR; INTRAVENOUS
Status: CANCELLED
Start: 2018-08-09

## 2018-07-24 RX ORDER — ACETAMINOPHEN 325 MG/1
650 TABLET ORAL ONCE
Status: CANCELLED
Start: 2018-08-16

## 2018-07-24 RX ORDER — MEPERIDINE HYDROCHLORIDE 25 MG/ML
25 INJECTION INTRAMUSCULAR; INTRAVENOUS; SUBCUTANEOUS EVERY 30 MIN PRN
Status: CANCELLED | OUTPATIENT
Start: 2018-08-09

## 2018-07-24 RX ORDER — EPINEPHRINE 0.3 MG/.3ML
0.3 INJECTION SUBCUTANEOUS EVERY 5 MIN PRN
Status: CANCELLED | OUTPATIENT
Start: 2018-07-26

## 2018-07-24 RX ORDER — EPINEPHRINE 0.3 MG/.3ML
0.3 INJECTION SUBCUTANEOUS EVERY 5 MIN PRN
Status: CANCELLED | OUTPATIENT
Start: 2018-08-16

## 2018-07-24 RX ORDER — ALBUTEROL SULFATE 90 UG/1
1-2 AEROSOL, METERED RESPIRATORY (INHALATION)
Status: CANCELLED
Start: 2018-07-26

## 2018-07-24 RX ORDER — EPINEPHRINE 1 MG/ML
0.3 INJECTION, SOLUTION INTRAMUSCULAR; SUBCUTANEOUS EVERY 5 MIN PRN
Status: CANCELLED | OUTPATIENT
Start: 2018-07-26

## 2018-07-24 RX ORDER — MEPERIDINE HYDROCHLORIDE 25 MG/ML
25 INJECTION INTRAMUSCULAR; INTRAVENOUS; SUBCUTANEOUS EVERY 30 MIN PRN
Status: CANCELLED | OUTPATIENT
Start: 2018-07-26

## 2018-07-24 RX ORDER — MEPERIDINE HYDROCHLORIDE 25 MG/ML
25 INJECTION INTRAMUSCULAR; INTRAVENOUS; SUBCUTANEOUS
Status: CANCELLED
Start: 2018-08-16

## 2018-07-24 RX ORDER — SODIUM CHLORIDE 9 MG/ML
1000 INJECTION, SOLUTION INTRAVENOUS CONTINUOUS PRN
Status: CANCELLED
Start: 2018-07-26

## 2018-07-24 RX ORDER — ALBUTEROL SULFATE 0.83 MG/ML
2.5 SOLUTION RESPIRATORY (INHALATION)
Status: CANCELLED | OUTPATIENT
Start: 2018-08-09

## 2018-07-24 RX ORDER — DIPHENHYDRAMINE HYDROCHLORIDE 50 MG/ML
50 INJECTION INTRAMUSCULAR; INTRAVENOUS
Status: CANCELLED
Start: 2018-08-16

## 2018-07-24 RX ORDER — DIPHENHYDRAMINE HCL 25 MG
50 CAPSULE ORAL ONCE
Status: CANCELLED
Start: 2018-08-02

## 2018-07-25 NOTE — PATIENT INSTRUCTIONS
July 2018 Sunday Monday Tuesday Wednesday Thursday Friday Saturday   1     2     3     4     5     6     7       8     9     CT CHEST/ABDOMEN/PELVIS W    7:20 AM   (20 min.)   UCCT2   [facility] CT     LAB    7:45 AM   (15 min.)    LAB   [facility] 10     11     [facility] RETURN    8:15 AM   (30 min.)   Gal Bain MD   [facility] 12     13     14       15     16     17     18     RETURN    8:15 AM   (15 min.)   Avtar Mccullough MD   [facility] 19     20     21       22     23     24     25     26     [facility] MASONIC LAB DRAW    6:30 AM   (15 min.)    MASONIC LAB DRAW   [facility] Masonic Lab Draw     [facility] ONC INFUSION 360    7:00 AM   (360 min.)    ONCOLOGY INFUSION   [facility] 27     28       29     30     31 August 2018 Sunday Monday Tuesday Wednesday Thursday Friday Saturday                  1     2     [facility] MASONIC LAB DRAW    7:00 AM   (15 min.)    MASONIC LAB DRAW   [facility] Masonic Lab Draw     [facility] ONC INFUSION 360    7:30 AM   (360 min.)    ONCOLOGY INFUSION   [facility] 3     4       5     6     7     8     9     [facility] MASONIC LAB DRAW    7:30 AM   (15 min.)    MASONIC LAB DRAW   [facility] Masonic Lab Draw     [facility] ONC INFUSION 360    8:00 AM   (360 min.)    ONCOLOGY INFUSION   [facility] 10     11       12     13     14     15     16     [facility] MASONIC LAB DRAW    8:00 AM   (15 min.)    MASONIC LAB DRAW   [facility] Masonic Lab Draw     [facility] ONC INFUSION 360    8:30 AM   (360 min.)    ONCOLOGY INFUSION   [facility] 17     18       19     20     21     22     23     24     25       26     27     28     29 30 31 September 2018 Sunday Monday Tuesday Wednesday Thursday Friday Saturday                                 1       2     3     4     5     6     7     8       9     10     11     12     13      14     15       16     17     18     19     20     [facility]    7:00 AM   (15 min.)    MASONIC LAB DRAW   [facility] Lab Draw     [facility] RETURN    7:15 AM   (30 min.)   Gal Bain MD   [facility] 21     22       23     24     25     26     27     28     29       30

## 2018-07-26 ENCOUNTER — APPOINTMENT (OUTPATIENT)
Dept: LAB | Facility: CLINIC | Age: 78
End: 2018-07-26
Attending: INTERNAL MEDICINE
Payer: MEDICARE

## 2018-07-26 ENCOUNTER — INFUSION THERAPY VISIT (OUTPATIENT)
Dept: ONCOLOGY | Facility: CLINIC | Age: 78
End: 2018-07-26
Attending: INTERNAL MEDICINE
Payer: MEDICARE

## 2018-07-26 VITALS
BODY MASS INDEX: 33.02 KG/M2 | HEART RATE: 56 BPM | TEMPERATURE: 97.9 F | SYSTOLIC BLOOD PRESSURE: 136 MMHG | OXYGEN SATURATION: 96 % | WEIGHT: 223.7 LBS | DIASTOLIC BLOOD PRESSURE: 82 MMHG | RESPIRATION RATE: 16 BRPM

## 2018-07-26 DIAGNOSIS — C82.99 FOLLICULAR LYMPHOMA OF EXTRANODAL AND SOLID ORGAN SITES (H): Primary | ICD-10-CM

## 2018-07-26 LAB
BASOPHILS # BLD AUTO: 0 10E9/L (ref 0–0.2)
BASOPHILS NFR BLD AUTO: 0.5 %
DIFFERENTIAL METHOD BLD: NORMAL
EOSINOPHIL # BLD AUTO: 0.6 10E9/L (ref 0–0.7)
EOSINOPHIL NFR BLD AUTO: 7.1 %
ERYTHROCYTE [DISTWIDTH] IN BLOOD BY AUTOMATED COUNT: 12.6 % (ref 10–15)
HCT VFR BLD AUTO: 45.2 % (ref 40–53)
HGB BLD-MCNC: 15.2 G/DL (ref 13.3–17.7)
IMM GRANULOCYTES # BLD: 0 10E9/L (ref 0–0.4)
IMM GRANULOCYTES NFR BLD: 0.4 %
LYMPHOCYTES # BLD AUTO: 1.5 10E9/L (ref 0.8–5.3)
LYMPHOCYTES NFR BLD AUTO: 18.8 %
MCH RBC QN AUTO: 31.2 PG (ref 26.5–33)
MCHC RBC AUTO-ENTMCNC: 33.6 G/DL (ref 31.5–36.5)
MCV RBC AUTO: 93 FL (ref 78–100)
MONOCYTES # BLD AUTO: 0.6 10E9/L (ref 0–1.3)
MONOCYTES NFR BLD AUTO: 7.3 %
NEUTROPHILS # BLD AUTO: 5.1 10E9/L (ref 1.6–8.3)
NEUTROPHILS NFR BLD AUTO: 65.9 %
NRBC # BLD AUTO: 0 10*3/UL
NRBC BLD AUTO-RTO: 0 /100
PLATELET # BLD AUTO: 222 10E9/L (ref 150–450)
RBC # BLD AUTO: 4.87 10E12/L (ref 4.4–5.9)
WBC # BLD AUTO: 7.8 10E9/L (ref 4–11)

## 2018-07-26 PROCEDURE — A9270 NON-COVERED ITEM OR SERVICE: HCPCS | Mod: ZF | Performed by: INTERNAL MEDICINE

## 2018-07-26 PROCEDURE — 25000132 ZZH RX MED GY IP 250 OP 250 PS 637: Mod: ZF | Performed by: INTERNAL MEDICINE

## 2018-07-26 PROCEDURE — 25000128 H RX IP 250 OP 636: Mod: ZF | Performed by: INTERNAL MEDICINE

## 2018-07-26 PROCEDURE — 96413 CHEMO IV INFUSION 1 HR: CPT

## 2018-07-26 PROCEDURE — 96415 CHEMO IV INFUSION ADDL HR: CPT

## 2018-07-26 PROCEDURE — 85025 COMPLETE CBC W/AUTO DIFF WBC: CPT | Performed by: INTERNAL MEDICINE

## 2018-07-26 RX ORDER — ALLOPURINOL 300 MG/1
300 TABLET ORAL DAILY
Qty: 14 TABLET | Refills: 0 | Status: SHIPPED | OUTPATIENT
Start: 2018-07-26 | End: 2018-10-24

## 2018-07-26 RX ORDER — DIPHENHYDRAMINE HCL 25 MG
50 CAPSULE ORAL ONCE
Status: COMPLETED | OUTPATIENT
Start: 2018-07-26 | End: 2018-07-26

## 2018-07-26 RX ORDER — ALLOPURINOL 300 MG/1
300 TABLET ORAL DAILY
Qty: 14 TABLET | Refills: 0 | Status: SHIPPED | OUTPATIENT
Start: 2018-07-26 | End: 2018-07-26

## 2018-07-26 RX ORDER — ACETAMINOPHEN 325 MG/1
650 TABLET ORAL ONCE
Status: COMPLETED | OUTPATIENT
Start: 2018-07-26 | End: 2018-07-26

## 2018-07-26 RX ADMIN — ACETAMINOPHEN 650 MG: 325 TABLET ORAL at 07:37

## 2018-07-26 RX ADMIN — RITUXIMAB 800 MG: 10 INJECTION, SOLUTION INTRAVENOUS at 08:05

## 2018-07-26 RX ADMIN — SODIUM CHLORIDE 250 ML: 9 INJECTION, SOLUTION INTRAVENOUS at 07:41

## 2018-07-26 RX ADMIN — DIPHENHYDRAMINE HYDROCHLORIDE 50 MG: 25 CAPSULE ORAL at 07:37

## 2018-07-26 ASSESSMENT — PAIN SCALES - GENERAL: PAINLEVEL: NO PAIN (0)

## 2018-07-26 NOTE — MR AVS SNAPSHOT
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      If you are having any concerning symptoms or wish to speak to a provider before your next infusion visit, please call your care coordinator or triage to notify them so we can adequately serve you.     If you need a refill on a narcotic prescription or other medication, please call before your infusion appointment.                 July 2018 Sunday Monday Tuesday Wednesday Thursday Friday Saturday   1     2     3     4     5     6     7       8     9     CT CHEST/ABDOMEN/PELVIS W    7:20 AM   (20 min.)   CT2   [facility] CT     LAB    7:45 AM   (15 min.)    LAB   [facility] Lab 10     11     [facility] RETURN    8:15 AM   (30 min.)   Gal Bain MD   [facility] 12     13     14       15     16     17     18     RETURN    8:15 AM   (15 min.)   Avtar Mccullough MD   [facility] 19     20     21       22     23     24     25     26     [facility] MASONIC LAB DRAW    6:30 AM   (15 min.)    MASONIC LAB DRAW   [facility] Lab Draw     [facility] ONC INFUSION 360    7:00 AM   (360 min.)    ONCOLOGY INFUSION   [facility]  27     28       29     30     31                                    August 2018 Sunday Monday Tuesday Wednesday Thursday Friday Saturday                  1     2     [facility] MASONIC LAB DRAW    7:00 AM   (15 min.)    MASONIC LAB DRAW   [facility] Masonic Lab Draw     [facility] ONC INFUSION 360    7:30 AM   (360 min.)    ONCOLOGY INFUSION   [facility] 3     4       5     6     7     8     9     [facility] MASONIC LAB DRAW    7:30 AM   (15 min.)    MASONIC LAB DRAW   [facility] Masonic Lab Draw     [facility] ONC INFUSION 360    8:00 AM   (360 min.)    ONCOLOGY INFUSION   [facility] 10     11       12     13     14     15     16     [facility] MASONIC LAB DRAW    8:00 AM   (15 min.)    MASONIC LAB DRAW   [facility] Masonic Lab Draw     [facility] ONC INFUSION 360    8:30 AM   (360 min.)    ONCOLOGY INFUSION   [facility] 17     18       19     20     21     22     23     24     25       26     27     28     29     30     31                      Recent Results (from the past 24 hour(s))   CBC with platelets differential    Collection Time: 07/26/18  6:44 AM   Result Value Ref Range    WBC 7.8 4.0 - 11.0 10e9/L    RBC Count 4.87 4.4 - 5.9 10e12/L    Hemoglobin 15.2 13.3 - 17.7 g/dL    Hematocrit 45.2 40.0 - 53.0 %    MCV 93 78 - 100 fl    MCH 31.2 26.5 - 33.0 pg    MCHC 33.6 31.5 - 36.5 g/dL    RDW 12.6 10.0 - 15.0 %    Platelet Count 222 150 - 450 10e9/L    Diff Method Automated Method     % Neutrophils 65.9 %    % Lymphocytes 18.8 %    % Monocytes 7.3 %    % Eosinophils 7.1 %    % Basophils 0.5 %    % Immature Granulocytes 0.4 %    Nucleated RBCs 0 0 /100    Absolute Neutrophil 5.1 1.6 - 8.3 10e9/L    Absolute Lymphocytes 1.5 0.8 - 5.3 10e9/L    Absolute Monocytes 0.6 0.0 - 1.3 10e9/L    Absolute Eosinophils 0.6 0.0 - 0.7 10e9/L    Absolute Basophils 0.0 0.0 - 0.2 10e9/L    Abs Immature Granulocytes 0.0 0 - 0.4 10e9/L    Absolute Nucleated RBC 0.0                Follow-ups after your  visit        Your next 10 appointments already scheduled     Aug 02, 2018  7:00 AM CDT   Masonic Lab Draw with UC MASONIC LAB DRAW   [facility] Masonic Lab Draw ([facility])    [address]  [address]  [facility] [address]   [phone]            Aug 02, 2018  7:30 AM CDT   Infusion 360 with UC ONCOLOGY INFUSION, UC 32 ATC   [facility] ([facility])    [address]  [address]  [facility] [address]   [phone]            Aug 09, 2018  7:30 AM CDT   Masonic Lab Draw with UC MASONIC LAB DRAW   [facility] Masonic Lab Draw ([facility])    [address]  [address]  [facility] [address]   [phone]            Aug 09, 2018  8:00 AM CDT   Infusion 360 with UC ONCOLOGY INFUSION, UC 14 ATC   [facility] ([facility])    [address]  [address]  [facility] [address] [phone]            Aug 16, 2018  8:00 AM CDT   Masonic Lab Draw with UC MASONIC LAB DRAW   [facility] Masonic Lab Draw ([facility])    [address]  [address]  [facility] [address] [phone]            Aug 16, 2018  8:30 AM CDT   Infusion 360 with UC ONCOLOGY INFUSION, UC 20 ATC   [facility] ([facility])    [address]  [address]  [facility] [address] [phone]            Sep 20, 2018  7:00 AM CDT   Masonic Lab Draw with UC MASONIC LAB DRAW   [facility] Masonic Lab Draw ([facility])    [address]  [address]  [facility] [address] [phone]              Who to contact     If you have questions or need follow up information about today's clinic visit or your schedule please contact [facility] directly at [phone].  Normal or non-critical lab and imaging results will be communicated to you by Ria  letter or phone within 4 business days after the clinic has received the results. If you do not hear from us within 7 days, please contact the clinic through [employer] or phone. If you have a critical or abnormal lab result, we will notify you by phone as soon as possible.  Submit refill requests through [employer] or call your pharmacy and they will forward the refill request to us. Please allow 3 business days for your refill to be completed.          Additional Information About Your Visit        [employer] Information     [employer] gives you secure access to your electronic health record. If you see a primary care provider, you can also send messages to your care team and make appointments. If you have questions, please call your primary care clinic.  If you do not have a primary care provider, please call [phone] and they will assist you.        Care EveryWhere ID     This is your Care EveryWhere ID. This could be used by other organizations to access your [place] medical records  [id number]        Your Vitals Were     Pulse Temperature Respirations Pulse Oximetry BMI (Body Mass Index)       56 97.9  F (36.6  C) (Oral) 16 96% 33.02 kg/m2        Blood Pressure from Last 3 Encounters:   07/26/18 136/82   07/11/18 153/71   05/03/18 138/62    Weight from Last 3 Encounters:   07/26/18 101.5 kg (223 lb 11.2 oz)   07/11/18 102.1 kg (225 lb)   05/03/18 100.2 kg (221 lb)              We Performed the Following     CBC with platelets differential          Today's Medication Changes          These changes are accurate as of 7/26/18 11:17 AM.  If you have any questions, ask your nurse or doctor.               Start taking these medicines.        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)        Dose:  300 mg   Take 1 tablet (300 mg) by mouth daily   Quantity:  14 tablet   Refills:  0            Where to get your  medicines      These medications were sent to [place], [place] - [address]  [address], [facility] [address]    Hours:  TRANSPLANT PHONE NUMBER [phone] Phone:  [phone]     allopurinol 300 MG tablet                Primary Care Provider Office Phone # Fax #    Anastacio Love [phone] [address] [facility] [address]        Equal Access to Services     HALEY BURR : Hadii aad ku hadasho Soomaali, waaxda luqadaha, qaybta kaalmada adeegyada, waxay idiin hayaan adeeg kharash lamegan . So [facility] [phone].    ATENCIÓN: Si habla español, tiene a warner disposición servicios gratuitos de asistencia lingüística. Llame al [phone].    We comply with applicable federal civil rights laws and [place] laws. We do not discriminate on the basis of race, color, national origin, age, disability, sex, sexual orientation, or gender identity.            Thank you!     Thank you for choosing [facility]  for your care. Our goal is always to provide you with excellent care. Hearing back from our patients is one way we can continue to improve our services. Please take a few minutes to complete the written survey that you may receive in the mail after your visit with us. Thank you!             Your Updated Medication List - Protect others around you: Learn how to safely use, store and throw away your medicines at www.disposemymeds.org.          This list is accurate as of 7/26/18 11:17 AM.  Always use your most recent med list.                   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)       aspirin 325 MG EC tablet      1/2 tab daily        atorvastatin 40 MG tablet    LIPITOR    60 tablet    Take 1 tablet (40 mg) by mouth daily     Hyperlipidemia LDL goal <100       latanoprost 0.005 % ophthalmic solution    XALATAN    3 Bottle    Place 1 drop into both eyes At Bedtime    Borderline glaucoma with ocular hypertension, unspecified laterality       levothyroxine 75 MCG tablet    SYNTHROID/LEVOTHROID    90 tablet    Take 1 tablet (75 mcg) by mouth daily    Hypothyroidism, unspecified type       losartan-hydrochlorothiazide 50-12.5 MG per tablet    HYZAAR     TAKE 1 TABLET BY MOUTH EVERY DAY    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 If symptoms persist 5 minutes after 1st dose call 911.    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

## 2018-07-26 NOTE — PATIENT INSTRUCTIONS
Contact Numbers    [facility] Main Line: [phone]  [facility] Triage and after hours / weekends / holidays:  [phone]      Please call the triage or after hours line if you experience a temperature greater than or equal to 100.5, shaking chills, have uncontrolled nausea, vomiting and/or diarrhea, dizziness, shortness of breath, chest pain, bleeding, unexplained bruising, or if you have any other new/concerning symptoms, questions or concerns.      If you are having any concerning symptoms or wish to speak to a provider before your next infusion visit, please call your care coordinator or triage to notify them so we can adequately serve you.     If you need a refill on a narcotic prescription or other medication, please call before your infusion appointment.                 July 2018 Sunday Monday Tuesday Wednesday Thursday Friday Saturday   1     2     3     4     5     6     7       8     9     CT CHEST/ABDOMEN/PELVIS W    7:20 AM   (20 min.)   UCCT2   [facility] CT     LAB    7:45 AM   (15 min.)    LAB   [facility] Lab 10     11     [facility] RETURN    8:15 AM   (30 min.)   Gal Bain MD   [facility] 12     13     14       15     16     17     18     RETURN    8:15 AM   (15 min.)   Avtar Mccullough MD   [facility] 19     20     21       22     23     24     25     26     [facility] LAB DRAW    6:30 AM   (15 min.)    [facility] LAB DRAW   [facility] Lab Draw     UMP ONC INFUSION 360    7:00 AM   (360 min.)    ONCOLOGY INFUSION   [facility] 27     28       29     30     31 August 2018 Sunday Monday Tuesday Wednesday Thursday Friday Saturday                  1     2     [facility] LAB DRAW    7:00 AM   (15 min.)    [facility] LAB DRAW   [facility] Lab Draw     UMP ONC INFUSION 360    7:30 AM   (360 min.)    ONCOLOGY INFUSION   [facility] 3     4       5     6     7     8     9      [facility] LAB DRAW    7:30 AM   (15 min.)    MASONIC LAB DRAW   [facility] Mason[facility] Lab Draw     UMP ONC INFUSION 360    8:00 AM   (360 min.)    ONCOLOGY INFUSION   [facility] 10     11       12     13     14     15     16     [facility] LAB DRAW    8:00 AM   (15 min.)    M[facility]ONIC LAB DRAW   [facility] Lab Draw     P ONC INFUSION 360    8:30 AM   (360 min.)    ONCOLOGY INFUSION   [facility] 17     18       19     20     21     22     23     24     25       26     27     28     29     30     31                      Recent Results (from the past 24 hour(s))   CBC with platelets differential    Collection Time: 07/26/18  6:44 AM   Result Value Ref Range    WBC 7.8 4.0 - 11.0 10e9/L    RBC Count 4.87 4.4 - 5.9 10e12/L    Hemoglobin 15.2 13.3 - 17.7 g/dL    Hematocrit 45.2 40.0 - 53.0 %    MCV 93 78 - 100 fl    MCH 31.2 26.5 - 33.0 pg    MCHC 33.6 31.5 - 36.5 g/dL    RDW 12.6 10.0 - 15.0 %    Platelet Count 222 150 - 450 10e9/L    Diff Method Automated Method     % Neutrophils 65.9 %    % Lymphocytes 18.8 %    % Monocytes 7.3 %    % Eosinophils 7.1 %    % Basophils 0.5 %    % Immature Granulocytes 0.4 %    Nucleated RBCs 0 0 /100    Absolute Neutrophil 5.1 1.6 - 8.3 10e9/L    Absolute Lymphocytes 1.5 0.8 - 5.3 10e9/L    Absolute Monocytes 0.6 0.0 - 1.3 10e9/L    Absolute Eosinophils 0.6 0.0 - 0.7 10e9/L    Absolute Basophils 0.0 0.0 - 0.2 10e9/L    Abs Immature Granulocytes 0.0 0 - 0.4 10e9/L    Absolute Nucleated RBC 0.0

## 2018-07-26 NOTE — NURSING NOTE
Chief Complaint   Patient presents with     Blood Draw     labs drawn with PIV start by rn.  vs taken     Labs drawn with PIV start by rn.  Pt tolerated well.  VS taken and pt checked in for next appt.  Maria Luisa Lyman RN

## 2018-08-02 ENCOUNTER — APPOINTMENT (OUTPATIENT)
Dept: LAB | Facility: CLINIC | Age: 78
End: 2018-08-02
Attending: INTERNAL MEDICINE
Payer: MEDICARE

## 2018-08-02 ENCOUNTER — INFUSION THERAPY VISIT (OUTPATIENT)
Dept: ONCOLOGY | Facility: CLINIC | Age: 78
End: 2018-08-02
Attending: INTERNAL MEDICINE
Payer: MEDICARE

## 2018-08-02 VITALS
DIASTOLIC BLOOD PRESSURE: 69 MMHG | HEART RATE: 54 BPM | OXYGEN SATURATION: 96 % | BODY MASS INDEX: 33.31 KG/M2 | WEIGHT: 225.7 LBS | SYSTOLIC BLOOD PRESSURE: 147 MMHG | TEMPERATURE: 98.2 F

## 2018-08-02 DIAGNOSIS — C82.99 FOLLICULAR LYMPHOMA OF EXTRANODAL AND SOLID ORGAN SITES (H): Primary | ICD-10-CM

## 2018-08-02 LAB
BASOPHILS # BLD AUTO: 0 10E9/L (ref 0–0.2)
BASOPHILS NFR BLD AUTO: 0.5 %
DIFFERENTIAL METHOD BLD: NORMAL
EOSINOPHIL # BLD AUTO: 0.5 10E9/L (ref 0–0.7)
EOSINOPHIL NFR BLD AUTO: 6.5 %
ERYTHROCYTE [DISTWIDTH] IN BLOOD BY AUTOMATED COUNT: 12.4 % (ref 10–15)
HCT VFR BLD AUTO: 44.1 % (ref 40–53)
HGB BLD-MCNC: 14.8 G/DL (ref 13.3–17.7)
IMM GRANULOCYTES # BLD: 0 10E9/L (ref 0–0.4)
IMM GRANULOCYTES NFR BLD: 0.3 %
LYMPHOCYTES # BLD AUTO: 1.3 10E9/L (ref 0.8–5.3)
LYMPHOCYTES NFR BLD AUTO: 17.1 %
MCH RBC QN AUTO: 31.2 PG (ref 26.5–33)
MCHC RBC AUTO-ENTMCNC: 33.6 G/DL (ref 31.5–36.5)
MCV RBC AUTO: 93 FL (ref 78–100)
MONOCYTES # BLD AUTO: 0.6 10E9/L (ref 0–1.3)
MONOCYTES NFR BLD AUTO: 7.7 %
NEUTROPHILS # BLD AUTO: 5.1 10E9/L (ref 1.6–8.3)
NEUTROPHILS NFR BLD AUTO: 67.9 %
NRBC # BLD AUTO: 0 10*3/UL
NRBC BLD AUTO-RTO: 0 /100
PLATELET # BLD AUTO: 205 10E9/L (ref 150–450)
RBC # BLD AUTO: 4.74 10E12/L (ref 4.4–5.9)
WBC # BLD AUTO: 7.6 10E9/L (ref 4–11)

## 2018-08-02 PROCEDURE — 96415 CHEMO IV INFUSION ADDL HR: CPT

## 2018-08-02 PROCEDURE — A9270 NON-COVERED ITEM OR SERVICE: HCPCS | Mod: ZF | Performed by: INTERNAL MEDICINE

## 2018-08-02 PROCEDURE — 96413 CHEMO IV INFUSION 1 HR: CPT

## 2018-08-02 PROCEDURE — 85025 COMPLETE CBC W/AUTO DIFF WBC: CPT | Performed by: INTERNAL MEDICINE

## 2018-08-02 PROCEDURE — 25000128 H RX IP 250 OP 636: Mod: ZF | Performed by: INTERNAL MEDICINE

## 2018-08-02 PROCEDURE — 25000132 ZZH RX MED GY IP 250 OP 250 PS 637: Mod: ZF | Performed by: INTERNAL MEDICINE

## 2018-08-02 RX ORDER — DIPHENHYDRAMINE HCL 25 MG
50 CAPSULE ORAL ONCE
Status: COMPLETED | OUTPATIENT
Start: 2018-08-02 | End: 2018-08-02

## 2018-08-02 RX ORDER — ACETAMINOPHEN 325 MG/1
650 TABLET ORAL ONCE
Status: COMPLETED | OUTPATIENT
Start: 2018-08-02 | End: 2018-08-02

## 2018-08-02 RX ADMIN — RITUXIMAB 800 MG: 10 INJECTION, SOLUTION INTRAVENOUS at 08:17

## 2018-08-02 RX ADMIN — ACETAMINOPHEN 650 MG: 325 TABLET ORAL at 07:50

## 2018-08-02 RX ADMIN — DIPHENHYDRAMINE HYDROCHLORIDE 50 MG: 25 CAPSULE ORAL at 07:50

## 2018-08-02 ASSESSMENT — PAIN SCALES - GENERAL: PAINLEVEL: NO PAIN (0)

## 2018-08-02 NOTE — PATIENT INSTRUCTIONS
Contact numbers:  Triage Main/After hours Line: [phone]    Main (scheduling) line: [phone]    Call with chills and/or temperature greater than or equal to 100.5 and questions or concerns.    If after hours, weekends, or holidays, call main hospital  at  [phone] and ask for Oncology doctor on call.           August 2018 Sunday Monday Tuesday Wednesday Thursday Friday Saturday                  1     2     [facility] DRAW    7:00 AM   (15 min.)   [facility] DRAW   [facility] Lab Draw     UMP ONC INFUSION 360    7:30 AM   (360 min.)    ONCOLOGY INFUSION   [facility] 3     4       5     6     7     8     9     [facility] LAB DRAW    7:30 AM   (15 min.)    [facility] LAB DRAW   [facility] Lab Draw     UMP ONC INFUSION 360    8:00 AM   (360 min.)    ONCOLOGY INFUSION   [facility] 10     11       12     13     14     15     16     [facility] LAB DRAW    8:00 AM   (15 min.)    [facility] LAB DRAW   [facility] Lab Draw     UMP ONC INFUSION 360    8:30 AM   (360 min.)    ONCOLOGY INFUSION   [facility] 17     18       19     20     21     22     23     24     25       26     27     28     29     30     31                     September 2018 Sunday Monday Tuesday Wednesday Thursday Friday Saturday                                 1       2     3     4     5     6     7     8       9     10     11     12     13     14     15       16     17     18     19     20     [facility] DRAW    7:00 AM   (15 min.)    [facility]     [facility] RETURN    7:15 AM   (30 min.)   Gal Bain MD   [facility] 21     22       23     24     25     26     27     28     29       30                                                Lab Results:  Recent Results (from the past 12 hour(s))   CBC with platelets differential    Collection Time: 08/02/18  7:18 AM   Result Value Ref  Range    WBC 7.6 4.0 - 11.0 10e9/L    RBC Count 4.74 4.4 - 5.9 10e12/L    Hemoglobin 14.8 13.3 - 17.7 g/dL    Hematocrit 44.1 40.0 - 53.0 %    MCV 93 78 - 100 fl    MCH 31.2 26.5 - 33.0 pg    MCHC 33.6 31.5 - 36.5 g/dL    RDW 12.4 10.0 - 15.0 %    Platelet Count 205 150 - 450 10e9/L    Diff Method Automated Method     % Neutrophils 67.9 %    % Lymphocytes 17.1 %    % Monocytes 7.7 %    % Eosinophils 6.5 %    % Basophils 0.5 %    % Immature Granulocytes 0.3 %    Nucleated RBCs 0 0 /100    Absolute Neutrophil 5.1 1.6 - 8.3 10e9/L    Absolute Lymphocytes 1.3 0.8 - 5.3 10e9/L    Absolute Monocytes 0.6 0.0 - 1.3 10e9/L    Absolute Eosinophils 0.5 0.0 - 0.7 10e9/L    Absolute Basophils 0.0 0.0 - 0.2 10e9/L    Abs Immature Granulocytes 0.0 0 - 0.4 10e9/L    Absolute Nucleated RBC 0.0

## 2018-08-02 NOTE — NURSING NOTE
Chief Complaint   Patient presents with     Blood Draw     Please take BP in next appointment; labs drawn via PIV placed by RN     Pulse 54  Temp 98.2  F (36.8  C) (Oral)  Wt 102.4 kg (225 lb 11.2 oz)  SpO2 96%  BMI 33.31 kg/m2    PIV placed left lower forearm for infusion and labs. Labs drawn and sent. Pt tolerated well. Pt checked in for next appointment.    Jacquelyn Paul

## 2018-08-02 NOTE — MR AVS SNAPSHOT
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    If after hours, weekends, or holidays, call main hospital  at  [phone] and ask for Oncology doctor on call.           August 2018 Sunday Monday Tuesday Wednesday Thursday Friday Saturday                  1     2     [facility] LAB DRAW    7:00 AM   (15 min.)    [facility] LAB DRAW   [facility] Lab Draw     UMP ONC INFUSION 360    7:30 AM   (360 min.)    ONCOLOGY INFUSION   [facility] 3     4       5     6     7     8     9     [facility] LAB DRAW    7:30 AM   (15 min.)    [facility] LAB DRAW   [facility] Lab Draw     UMP ONC INFUSION 360    8:00 AM   (360 min.)    ONCOLOGY INFUSION   [facility] 10     11       12     13     14     15     16     [facility] LAB DRAW    8:00 AM   (15 min.)    [facility] LAB DRAW   [facility] Lab Draw     UMP ONC INFUSION 360    8:30 AM   (360 min.)    ONCOLOGY INFUSION   [facility] 17     18       19     20     21     22     23     24     25       26     27     28     29     30     31 September 2018 Sunday Monday Tuesday Wednesday Thursday Friday Saturday                                 1       2     3     4     5     6     7     8       9     10     11     12     13     14     15       16     17     18     19     20     [facility] LAB DRAW    7:00 AM   (15 min.)     [facility] LAB [facility]RAW   [facility]     [facility] RETURN    7:15 AM   (30 min.)   Gal Bain MD   [facility] 21     22       23     24     25     26     27     28     29       30                                                Lab Results:  Recent Results (from the past 12 hour(s))   CBC with platelets differential    Collection Time: 08/02/18  7:18 AM   Result Value Ref Range    WBC 7.6 4.0 - 11.0 10e9/L    RBC Count 4.74 4.4 - 5.9 10e12/L    Hemoglobin 14.8 13.3 - 17.7 g/dL    Hematocrit 44.1 40.0 - 53.0 %    MCV 93 78 - 100 fl    MCH 31.2 26.5 - 33.0 pg    MCHC 33.6 31.5 - 36.5 g/dL    RDW 12.4 10.0 - 15.0 %    Platelet Count 205 150 - 450 10e9/L    Diff Method Automated Method     % Neutrophils 67.9 %    % Lymphocytes 17.1 %    % Monocytes 7.7 %    % Eosinophils 6.5 %    % Basophils 0.5 %    % Immature Granulocytes 0.3 %    Nucleated RBCs 0 0 /100    Absolute Neutrophil 5.1 1.6 - 8.3 10e9/L    Absolute Lymphocytes 1.3 0.8 - 5.3 10e9/L    Absolute Monocytes 0.6 0.0 - 1.3 10e9/L    Absolute Eosinophils 0.5 0.0 - 0.7 10e9/L    Absolute Basophils 0.0 0.0 - 0.2 10e9/L    Abs Immature Granulocytes 0.0 0 - 0.4 10e9/L    Absolute Nucleated RBC 0.0                Follow-ups after your visit        Your next 10 appointments already scheduled     Aug 09, 2018  7:30 AM CDT   [facility] Lab Draw with  [facility] LAB DRAW   [facility]    [address]  [address]  [facility] [id number] [phone]            Aug 09, 2018  8:00 AM CDT   Infusion 360 with  ONCOLOGY INFUSION, UC 14 ATC   [facility] ([facility])    [address]  [address]  [place] [address] [phone]            Aug 16, 2018  8:00 AM CDT   [facility] Lab Draw with  M[facility]IC LAB DRAW   [facility])    [address]  [address]  [place] [address]    [phone]            Aug 16, 2018  8:30 AM CDT   Infusion 360 with UC ONCOLOGY INFUSION, UC 20 ATC   [facility] ([facility])    [address]  [address]  [place] [address] [phone]            Sep 20, 2018  7:00 AM CDT   Masonic Lab Draw with UC MASONIC LAB DRAW   [facility] Lab Draw ([facility])    [address] [place] [address]  [address]  [place] [place] [facility] [address] [phone]            Sep 20, 2018  7:30 AM CDT   (Arrive by 7:15 AM)   Return Visit with Gal Bain MD   [facility] ([facility])    [address] [place] [address]  [address]  [facility] [address] [phone]            Jan 18, 2019  8:00 AM CST   New Visit with Avtar Mccullough MD   [facility] ([address] [address] [phone]              Who to contact     If you have questions or need follow up information about today's clinic visit or your schedule please contact [facility] [place] directly at [phone].  Normal or non-critical lab and imaging results will be communicated to you by [employer], letter or phone within 4 business days after the clinic has received the results. If you do not hear from us within 7 days, please contact the clinic through [employer] or phone. If you have a critical or abnormal lab result, we will notify you by phone as soon as possible.  Submit refill requests through [employer] or call your pharmacy and they will forward the refill request to us. Please allow 3 business days for your refill to be completed.          Additional Information About Your Visit        [employer] Information     [employer] gives you secure access to your electronic health record. If you see a primary care provider, you can also send messages to your care team and make appointments. If you have questions, please call your primary care  clinic.  If you do not have a primary care provider, please call [phone] and they will assist you.        Care EveryWhere ID     This is your Care EveryWhere ID. This could be used by other organizations to access your [facility] medical records  [id number]        Your Vitals Were     Pulse Temperature Pulse Oximetry BMI (Body Mass Index)          54 98.2  F (36.8  C) (Oral) 96% 33.31 kg/m2         Blood Pressure from Last 3 Encounters:   08/02/18 147/69   07/26/18 136/82   07/11/18 153/71    Weight from Last 3 Encounters:   08/02/18 102.4 kg (225 lb 11.2 oz)   07/26/18 101.5 kg (223 lb 11.2 oz)   07/11/18 102.1 kg (225 lb)              We Performed the Following     CBC with platelets differential        Primary Care Provider Office Phone # Fax #    Anastacio Love [phone] [address] [facility] [address]        Equal Access to Services     SHEYLA BURR : Hadii aad ku hadasho Soomaali, waaxda luqadaha, qaybta kaalmada adeegyada, marilynn dasilva . So [facility] [phone].    ATENCIÓN: Si habla español, tiene a warner disposición servicios gratuitos de asistencia lingüística. Llame al [phone].    We comply with applicable federal civil rights laws and [place] laws. We do not discriminate on the basis of race, color, national origin, age, disability, sex, sexual orientation, or gender identity.            Thank you!     Thank you for choosing [facility]  for your care. Our goal is always to provide you with excellent care. Hearing back from our patients is one way we can continue to improve our services. Please take a few minutes to complete the written survey that you may receive in the mail after your visit with us. Thank you!             Your Updated Medication List - Protect others around you: Learn how to safely use, store and throw away your medicines at www.disposemymeds.org.          This list is accurate as of 8/2/18 12:07 PM.   Always use your most recent med list.                   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)       aspirin 325 MG EC tablet      1/2 tab daily        atorvastatin 40 MG tablet    LIPITOR    60 tablet    Take 1 tablet (40 mg) by mouth daily    Hyperlipidemia LDL goal <100       latanoprost 0.005 % ophthalmic solution    XALATAN    3 Bottle    Place 1 drop into both eyes At Bedtime    Borderline glaucoma with ocular hypertension, unspecified laterality       levothyroxine 75 MCG tablet    SYNTHROID/LEVOTHROID    90 tablet    Take 1 tablet (75 mcg) by mouth daily    Hypothyroidism, unspecified type       metoprolol tartrate 25 MG tablet    LOPRESSOR    180 tablet    Take 1 tablet (25 mg) by mouth 2 times daily    Hypertension goal BP (blood pressure) < 140/90       multivitamin Tabs tablet      Take 1 tablet by mouth daily        nitroGLYcerin 0.4 MG sublingual tablet    NITROSTAT    25 tablet    For chest pain place 1 tablet under the tongue every 5 minutes for 3 doses. If symptoms persist 5 minutes after 1st dose call 911.    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

## 2018-08-02 NOTE — PROGRESS NOTES
Infusion Nursing Note:  Zcak Demarco presents for C1D8 Rituxan     Note: pt feeling well today, no new issues or concerns to report.    Treatment Conditions:  Lab Results   Component Value Date    HGB 14.8 08/02/2018     Lab Results   Component Value Date    WBC 7.6 08/02/2018      Lab Results   Component Value Date    ANEU 5.1 08/02/2018     Lab Results   Component Value Date     08/02/2018      Lab Results   Component Value Date     07/09/2018                   Lab Results   Component Value Date    POTASSIUM 4.0 07/09/2018           Lab Results   Component Value Date    MAG 2.2 11/27/2017            Lab Results   Component Value Date    CR 0.86 07/09/2018                   Lab Results   Component Value Date    ELVIRA 9.0 07/09/2018                Lab Results   Component Value Date    BILITOTAL 0.4 07/09/2018           Lab Results   Component Value Date    ALBUMIN 3.7 07/09/2018                    Lab Results   Component Value Date    ALT 32 07/09/2018           Lab Results   Component Value Date    AST 19 07/09/2018       Results reviewed, labs MET treatment parameters, ok to proceed with treatment.    Intravenous Access:  Peripheral IV placed in lab.    Post Infusion Assessment:  Patient tolerated infusion without incident.  Blood return noted pre and post infusion.  No evidence of extravasations.  Access discontinued per protocol.    Discharge Plan:   Patient declined prescription refills.  Discharge instructions reviewed with: Patient.  Patient and/or family verbalized understanding of discharge instructions and all questions answered.  AVS to patient via [employer].  Patient will return 8/9 for next appointment.   Patient discharged in stable condition accompanied by: self.    Jacinda Veloz RN

## 2018-08-09 ENCOUNTER — APPOINTMENT (OUTPATIENT)
Dept: LAB | Facility: CLINIC | Age: 78
End: 2018-08-09
Attending: INTERNAL MEDICINE
Payer: MEDICARE

## 2018-08-09 ENCOUNTER — INFUSION THERAPY VISIT (OUTPATIENT)
Dept: ONCOLOGY | Facility: CLINIC | Age: 78
End: 2018-08-09
Attending: INTERNAL MEDICINE
Payer: MEDICARE

## 2018-08-09 VITALS
BODY MASS INDEX: 33.29 KG/M2 | WEIGHT: 225.5 LBS | RESPIRATION RATE: 16 BRPM | DIASTOLIC BLOOD PRESSURE: 62 MMHG | OXYGEN SATURATION: 95 % | TEMPERATURE: 98 F | HEART RATE: 50 BPM | SYSTOLIC BLOOD PRESSURE: 140 MMHG

## 2018-08-09 DIAGNOSIS — C82.99 FOLLICULAR LYMPHOMA OF EXTRANODAL AND SOLID ORGAN SITES (H): Primary | ICD-10-CM

## 2018-08-09 LAB
BASOPHILS # BLD AUTO: 0 10E9/L (ref 0–0.2)
BASOPHILS NFR BLD AUTO: 0.5 %
DIFFERENTIAL METHOD BLD: NORMAL
EOSINOPHIL # BLD AUTO: 0.3 10E9/L (ref 0–0.7)
EOSINOPHIL NFR BLD AUTO: 4.5 %
ERYTHROCYTE [DISTWIDTH] IN BLOOD BY AUTOMATED COUNT: 12.7 % (ref 10–15)
HCT VFR BLD AUTO: 44.7 % (ref 40–53)
HGB BLD-MCNC: 14.6 G/DL (ref 13.3–17.7)
IMM GRANULOCYTES # BLD: 0 10E9/L (ref 0–0.4)
IMM GRANULOCYTES NFR BLD: 0.3 %
LYMPHOCYTES # BLD AUTO: 1.2 10E9/L (ref 0.8–5.3)
LYMPHOCYTES NFR BLD AUTO: 18.5 %
MCH RBC QN AUTO: 31.3 PG (ref 26.5–33)
MCHC RBC AUTO-ENTMCNC: 32.7 G/DL (ref 31.5–36.5)
MCV RBC AUTO: 96 FL (ref 78–100)
MONOCYTES # BLD AUTO: 0.5 10E9/L (ref 0–1.3)
MONOCYTES NFR BLD AUTO: 7.8 %
NEUTROPHILS # BLD AUTO: 4.6 10E9/L (ref 1.6–8.3)
NEUTROPHILS NFR BLD AUTO: 68.4 %
NRBC # BLD AUTO: 0 10*3/UL
NRBC BLD AUTO-RTO: 0 /100
PLATELET # BLD AUTO: 204 10E9/L (ref 150–450)
RBC # BLD AUTO: 4.66 10E12/L (ref 4.4–5.9)
WBC # BLD AUTO: 6.7 10E9/L (ref 4–11)

## 2018-08-09 PROCEDURE — 25000128 H RX IP 250 OP 636: Mod: ZF | Performed by: INTERNAL MEDICINE

## 2018-08-09 PROCEDURE — 85025 COMPLETE CBC W/AUTO DIFF WBC: CPT | Performed by: INTERNAL MEDICINE

## 2018-08-09 PROCEDURE — 25000132 ZZH RX MED GY IP 250 OP 250 PS 637: Mod: ZF | Performed by: INTERNAL MEDICINE

## 2018-08-09 PROCEDURE — 96413 CHEMO IV INFUSION 1 HR: CPT

## 2018-08-09 PROCEDURE — A9270 NON-COVERED ITEM OR SERVICE: HCPCS | Mod: ZF | Performed by: INTERNAL MEDICINE

## 2018-08-09 PROCEDURE — 96415 CHEMO IV INFUSION ADDL HR: CPT

## 2018-08-09 RX ORDER — ACETAMINOPHEN 325 MG/1
650 TABLET ORAL ONCE
Status: COMPLETED | OUTPATIENT
Start: 2018-08-09 | End: 2018-08-09

## 2018-08-09 RX ORDER — DIPHENHYDRAMINE HCL 25 MG
50 CAPSULE ORAL ONCE
Status: COMPLETED | OUTPATIENT
Start: 2018-08-09 | End: 2018-08-09

## 2018-08-09 RX ADMIN — DIPHENHYDRAMINE HYDROCHLORIDE 50 MG: 25 CAPSULE ORAL at 08:21

## 2018-08-09 RX ADMIN — RITUXIMAB 800 MG: 10 INJECTION, SOLUTION INTRAVENOUS at 08:44

## 2018-08-09 RX ADMIN — SODIUM CHLORIDE 250 ML: 9 INJECTION, SOLUTION INTRAVENOUS at 08:21

## 2018-08-09 RX ADMIN — ACETAMINOPHEN 650 MG: 325 TABLET ORAL at 08:21

## 2018-08-09 ASSESSMENT — PAIN SCALES - GENERAL: PAINLEVEL: NO PAIN (0)

## 2018-08-09 NOTE — PROGRESS NOTES
Infusion Nursing Note:  Zack Demarco presents today for Cycle 1 Day 15 Rapid Rituxan.    Patient seen by provider today: No    Note: HR low today ranging 47-53. Pt asymptomatic and takes Metoprolol for a hx of heart bypass last year. Dr. Bain notified and no concerns.   IB sent to Dr. Bain as well to see if he wants pt to have a scan prior to following up with him September.    Intravenous Access:  Peripheral IV placed.    Treatment Conditions:  Lab Results   Component Value Date    HGB 14.6 08/09/2018     Lab Results   Component Value Date    WBC 6.7 08/09/2018      Lab Results   Component Value Date    ANEU 4.6 08/09/2018     Lab Results   Component Value Date     08/09/2018      Results reviewed, labs MET treatment parameters, ok to proceed with treatment.      Post Infusion Assessment:  Patient tolerated infusion without incident.  Blood return noted pre and post infusion.  Site patent and intact, free from redness, edema or discomfort.  No evidence of extravasations. Access discontinued per protocol.    Discharge Plan:   Patient declined prescription refills.  Copy of AVS reviewed with patient and/or family.  Patient will return 8/16 for next appointment.  Patient discharged in stable condition accompanied by: self.  Departure Mode: Ambulatory.    Brittany Aguilar RN

## 2018-08-09 NOTE — NURSING NOTE
Chief Complaint   Patient presents with     Blood Draw     PIV placed, labs collected from piv by RN.

## 2018-08-09 NOTE — MR AVS SNAPSHOT
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)    -  1       Follow-ups after your visit        Your next 10 appointments already scheduled     Aug 16, 2018  8:00 AM CDT   Masonic Lab Draw with UC MASONIC LAB DRAW   [facility] Lab Draw ([facility])    [address] [place] [address]  [address]  [facility] [address]   [phone]            Aug 16, 2018  8:30 AM CDT   Infusion 360 with UC ONCOLOGY INFUSION, UC 20 ATC   [facility] ([facility])    [address] [place] [address]  [address]  [facility] [address]   [phone]            Sep 20, 2018  7:00 AM CDT   Masonic Lab Draw with UC MASONIC LAB DRAW   [facility] Lab Draw ([facility])    [address] [place] [address]  [address]  [facility] [address] [phone]            Sep 20, 2018  7:30 AM CDT   (Arrive by 7:15 AM)   Return Visit with Gal Bain MD   [facility] ([facility])    [address] [place] [address]  [address]  [facility] [address] [phone]            Jan 18, 2019  8:00 AM CST   New Visit with Avtar Mccullough MD   [facility] ([facility]    [address] [facility] [address] [phone]              Who to contact     If you have questions or need follow up information about today's clinic visit or your schedule please contact [facility] directly at [phone].  Normal or non-critical lab and imaging results will be communicated to you by MyChart, letter or phone within 4 business days after the clinic has received the  results. If you do not hear from us within 7 days, please contact the clinic through [employer] or phone. If you have a critical or abnormal lab result, we will notify you by phone as soon as possible.  Submit refill requests through [employer] or call your pharmacy and they will forward the refill request to us. Please allow 3 business days for your refill to be completed.          Additional Information About Your Visit        [employer] Information     [employer] gives you secure access to your electronic health record. If you see a primary care provider, you can also send messages to your care team and make appointments. If you have questions, please call your primary care clinic.  If you do not have a primary care provider, please call [phone] and they will assist you.        Care EveryWhere ID     This is your Care EveryWhere ID. This could be used by other organizations to access your [place] medical records  [id number]        Your Vitals Were     Pulse Temperature Respirations Pulse Oximetry BMI (Body Mass Index)       50 98  F (36.7  C) (Oral) 16 95% 33.29 kg/m2        Blood Pressure from Last 3 Encounters:   08/09/18 140/62   08/02/18 147/69   07/26/18 136/82    Weight from Last 3 Encounters:   08/09/18 102.3 kg (225 lb 8 oz)   08/02/18 102.4 kg (225 lb 11.2 oz)   07/26/18 101.5 kg (223 lb 11.2 oz)              We Performed the Following     CBC with platelets differential        Primary Care Provider Office Phone # Fax #    Anastacio Love [phone] [address] [facility] [address]        Equal Access to Services     [facility]: Hadii aad ku hadasho Soomaali, waaxda luqadaha, qaybta kaalmada ginny, marilynn dasilva . So [facility] [phone].    ATENCIÓN: Si habla español, tiene a warner disposición servicios gratuitos de asistencia lingüística. Llame al [phone].    We comply with applicable federal civil rights laws and [place] laws. We do not  discriminate on the basis of race, color, national origin, age, disability, sex, sexual orientation, or gender identity.            Thank you!     Thank you for choosing [facility]  for your care. Our goal is always to provide you with excellent care. Hearing back from our patients is one way we can continue to improve our services. Please take a few minutes to complete the written survey that you may receive in the mail after your visit with us. Thank you!             Your Updated Medication List - Protect others around you: Learn how to safely use, store and throw away your medicines at www.disposemymeds.org.          This list is accurate as of 8/9/18 10:39 AM.  Always use your most recent med list.                   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)       aspirin 325 MG EC tablet      1/2 tab daily        atorvastatin 40 MG tablet    LIPITOR    60 tablet    Take 1 tablet (40 mg) by mouth daily    Hyperlipidemia LDL goal <100       latanoprost 0.005 % ophthalmic solution    XALATAN    3 Bottle    Place 1 drop into both eyes At Bedtime    Borderline glaucoma with ocular hypertension, unspecified laterality       levothyroxine 75 MCG tablet    SYNTHROID/LEVOTHROID    90 tablet    Take 1 tablet (75 mcg) by mouth daily    Hypothyroidism, unspecified type       metoprolol tartrate 25 MG tablet    LOPRESSOR    180 tablet    Take 1 tablet (25 mg) by mouth 2 times daily    Hypertension goal BP (blood pressure) < 140/90       multivitamin Tabs tablet      Take 1 tablet by mouth daily        nitroGLYcerin 0.4 MG sublingual tablet    NITROSTAT    25 tablet    For chest pain place 1 tablet under the tongue every 5 minutes for 3 doses. If symptoms persist 5 minutes after 1st dose call 911.    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

## 2018-08-16 ENCOUNTER — INFUSION THERAPY VISIT (OUTPATIENT)
Dept: ONCOLOGY | Facility: CLINIC | Age: 78
End: 2018-08-16
Attending: INTERNAL MEDICINE
Payer: MEDICARE

## 2018-08-16 VITALS
WEIGHT: 226.5 LBS | TEMPERATURE: 98.1 F | OXYGEN SATURATION: 98 % | DIASTOLIC BLOOD PRESSURE: 55 MMHG | BODY MASS INDEX: 33.55 KG/M2 | HEIGHT: 69 IN | SYSTOLIC BLOOD PRESSURE: 133 MMHG | RESPIRATION RATE: 16 BRPM | HEART RATE: 57 BPM

## 2018-08-16 DIAGNOSIS — C82.99 FOLLICULAR LYMPHOMA OF EXTRANODAL AND SOLID ORGAN SITES (H): Primary | ICD-10-CM

## 2018-08-16 LAB
BASOPHILS # BLD AUTO: 0 10E9/L (ref 0–0.2)
BASOPHILS NFR BLD AUTO: 0.4 %
DIFFERENTIAL METHOD BLD: NORMAL
EOSINOPHIL # BLD AUTO: 0.4 10E9/L (ref 0–0.7)
EOSINOPHIL NFR BLD AUTO: 5 %
ERYTHROCYTE [DISTWIDTH] IN BLOOD BY AUTOMATED COUNT: 12.8 % (ref 10–15)
HCT VFR BLD AUTO: 43.2 % (ref 40–53)
HGB BLD-MCNC: 14.4 G/DL (ref 13.3–17.7)
IMM GRANULOCYTES # BLD: 0 10E9/L (ref 0–0.4)
IMM GRANULOCYTES NFR BLD: 0.4 %
LYMPHOCYTES # BLD AUTO: 1.4 10E9/L (ref 0.8–5.3)
LYMPHOCYTES NFR BLD AUTO: 19.3 %
MCH RBC QN AUTO: 31.5 PG (ref 26.5–33)
MCHC RBC AUTO-ENTMCNC: 33.3 G/DL (ref 31.5–36.5)
MCV RBC AUTO: 95 FL (ref 78–100)
MONOCYTES # BLD AUTO: 0.6 10E9/L (ref 0–1.3)
MONOCYTES NFR BLD AUTO: 8.1 %
NEUTROPHILS # BLD AUTO: 4.8 10E9/L (ref 1.6–8.3)
NEUTROPHILS NFR BLD AUTO: 66.8 %
NRBC # BLD AUTO: 0 10*3/UL
NRBC BLD AUTO-RTO: 0 /100
PLATELET # BLD AUTO: 192 10E9/L (ref 150–450)
RBC # BLD AUTO: 4.57 10E12/L (ref 4.4–5.9)
WBC # BLD AUTO: 7.2 10E9/L (ref 4–11)

## 2018-08-16 PROCEDURE — 96413 CHEMO IV INFUSION 1 HR: CPT

## 2018-08-16 PROCEDURE — A9270 NON-COVERED ITEM OR SERVICE: HCPCS | Mod: ZF | Performed by: INTERNAL MEDICINE

## 2018-08-16 PROCEDURE — 96415 CHEMO IV INFUSION ADDL HR: CPT

## 2018-08-16 PROCEDURE — 25000132 ZZH RX MED GY IP 250 OP 250 PS 637: Mod: ZF | Performed by: INTERNAL MEDICINE

## 2018-08-16 PROCEDURE — 25000128 H RX IP 250 OP 636: Mod: ZF | Performed by: INTERNAL MEDICINE

## 2018-08-16 PROCEDURE — 85025 COMPLETE CBC W/AUTO DIFF WBC: CPT | Performed by: INTERNAL MEDICINE

## 2018-08-16 RX ORDER — ACETAMINOPHEN 325 MG/1
650 TABLET ORAL ONCE
Status: COMPLETED | OUTPATIENT
Start: 2018-08-16 | End: 2018-08-16

## 2018-08-16 RX ORDER — DIPHENHYDRAMINE HCL 25 MG
50 CAPSULE ORAL ONCE
Status: COMPLETED | OUTPATIENT
Start: 2018-08-16 | End: 2018-08-16

## 2018-08-16 RX ADMIN — SODIUM CHLORIDE 250 ML: 9 INJECTION, SOLUTION INTRAVENOUS at 08:26

## 2018-08-16 RX ADMIN — ACETAMINOPHEN 650 MG: 325 TABLET ORAL at 08:26

## 2018-08-16 RX ADMIN — DIPHENHYDRAMINE HYDROCHLORIDE 50 MG: 25 CAPSULE ORAL at 08:26

## 2018-08-16 RX ADMIN — RITUXIMAB 800 MG: 10 INJECTION, SOLUTION INTRAVENOUS at 08:52

## 2018-08-16 ASSESSMENT — PAIN SCALES - GENERAL: PAINLEVEL: NO PAIN (0)

## 2018-08-16 NOTE — PROGRESS NOTES
Infusion Nursing Note:  Zack Demarco presents today for Cycle 1 Day 22 Rapid Rituxan.    Patient seen by provider today: No    Note: Patient denies new concerns today. States he feels more tired lately and will be following up with his Cardiologist soon. Is still playing golfing three times/day and doing all daily activities as usual, just needs to take more breaks.     Intravenous Access:  Peripheral IV placed.    Treatment Conditions:  Lab Results   Component Value Date    HGB 14.4 08/16/2018     Lab Results   Component Value Date    WBC 7.2 08/16/2018      Lab Results   Component Value Date    ANEU 4.8 08/16/2018     Lab Results   Component Value Date     08/16/2018      Results reviewed, labs MET treatment parameters, ok to proceed with treatment.      Post Infusion Assessment:  Patient tolerated infusion without incident.  Blood return noted pre and post infusion.  Site patent and intact, free from redness, edema or discomfort.  No evidence of extravasations. Access discontinued per protocol.    Discharge Plan:   Patient declined prescription refills.  Copy of AVS reviewed with patient and/or family.  Patient will return 9/20 for next provider appointment.  Patient discharged in stable condition accompanied by: self.  Departure Mode: Ambulatory.    Brittany Aguilar RN

## 2018-08-16 NOTE — MR AVS SNAPSHOT
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 LAB DRAW   [facility] Lab Draw ([facility])    [address] [place] [address]  [address]  [facility] [address]   [phone]            Sep 20, 2018  7:30 AM CDT   (Arrive by 7:15 AM)   Return Visit with Gal Bain MD   [facility] ([facility])    [address] [place] [address]  [address] [place] [address] [phone]            Jan 18, 2019  8:00 AM CST   New Visit with Avtar Mccullough MD   [facility] ([address] [address] [phone]              Who to contact     If you have questions or need follow up information about today's clinic visit or your schedule please contact [facility] directly at [phone].  Normal or non-critical lab and imaging results will be communicated to you by MyChart, letter or phone within 4 business days after the clinic has received the results. If you do not hear from us within 7 days, please contact the clinic through MyChart or phone. If you have a critical or abnormal lab result, we will notify you by phone as soon as possible.  Submit refill requests through [employer] or call your pharmacy and they will forward the refill request to us. Please allow 3 business days for your refill to be completed.          Additional Information About Your Visit        MyChart Information     [employer] gives  "you secure access to your electronic health record. If you see a primary care provider, you can also send messages to your care team and make appointments. If you have questions, please call your primary care clinic.  If you do not have a primary care provider, please call [phone] and they will assist you.        Care EveryWhere ID     This is your Care EveryWhere ID. This could be used by other organizations to access your [place] medical records  [id number]        Your Vitals Were     Pulse Temperature Respirations Height Pulse Oximetry BMI (Body Mass Index)    57 98.1  F (36.7  C) (Oral) 16 1.753 m (5' 9\") 98% 33.45 kg/m2       Blood Pressure from Last 3 Encounters:   08/16/18 133/55   08/09/18 140/62   08/02/18 147/69    Weight from Last 3 Encounters:   08/16/18 102.7 kg (226 lb 8 oz)   08/09/18 102.3 kg (225 lb 8 oz)   08/02/18 102.4 kg (225 lb 11.2 oz)              We Performed the Following     CBC with platelets differential        Primary Care Provider Office Phone # Fax #    Anastacio Love [phone] [address] [facility] [address]        Equal Access to Services     HALEY BURR : Hadii aad ku hadasho Soomaali, waaxda luqadaha, qaybta kaalmada adeegyada, waxay katiein cony dasilva . So [facility] [phone].    ATENCIÓN: Si habla español, tiene a warner disposición servicios gratuitos de asistencia lingüística. Llame al [phone].    We comply with applicable federal civil rights laws and [place] laws. We do not discriminate on the basis of race, color, national origin, age, disability, sex, sexual orientation, or gender identity.            Thank you!     Thank you for choosing [facility]  for your care. Our goal is always to provide you with excellent care. Hearing back from our patients is one way we can continue to improve our services. Please take a few minutes to complete the written survey that you may receive in the mail after " your visit with us. Thank you!             Your Updated Medication List - Protect others around you: Learn how to safely use, store and throw away your medicines at www.disposemymeds.org.          This list is accurate as of 8/16/18 10:30 AM.  Always use your most recent med list.                   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)       aspirin 325 MG EC tablet      1/2 tab daily        atorvastatin 40 MG tablet    LIPITOR    60 tablet    Take 1 tablet (40 mg) by mouth daily    Hyperlipidemia LDL goal <100       latanoprost 0.005 % ophthalmic solution    XALATAN    3 Bottle    Place 1 drop into both eyes At Bedtime    Borderline glaucoma with ocular hypertension, unspecified laterality       levothyroxine 75 MCG tablet    SYNTHROID/LEVOTHROID    90 tablet    Take 1 tablet (75 mcg) by mouth daily    Hypothyroidism, unspecified type       metoprolol tartrate 25 MG tablet    LOPRESSOR    180 tablet    Take 1 tablet (25 mg) by mouth 2 times daily    Hypertension goal BP (blood pressure) < 140/90       multivitamin Tabs tablet      Take 1 tablet by mouth daily        nitroGLYcerin 0.4 MG sublingual tablet    NITROSTAT    25 tablet    For chest pain place 1 tablet under the tongue every 5 minutes for 3 doses. If symptoms persist 5 minutes after 1st dose call 911.    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

## 2018-08-21 ENCOUNTER — OFFICE VISIT (OUTPATIENT)
Dept: CARDIOLOGY | Facility: CLINIC | Age: 78
End: 2018-08-21
Payer: COMMERCIAL

## 2018-08-21 VITALS
BODY MASS INDEX: 33.18 KG/M2 | HEART RATE: 72 BPM | DIASTOLIC BLOOD PRESSURE: 67 MMHG | SYSTOLIC BLOOD PRESSURE: 125 MMHG | WEIGHT: 224.7 LBS | OXYGEN SATURATION: 94 %

## 2018-08-21 DIAGNOSIS — I25.709 CORONARY ARTERY DISEASE INVOLVING CORONARY BYPASS GRAFT OF NATIVE HEART WITH ANGINA PECTORIS (H): Primary | ICD-10-CM

## 2018-08-21 PROCEDURE — 99214 OFFICE O/P EST MOD 30 MIN: CPT | Performed by: INTERNAL MEDICINE

## 2018-08-21 ASSESSMENT — PAIN SCALES - GENERAL: PAINLEVEL: NO PAIN (0)

## 2018-08-21 NOTE — PATIENT INSTRUCTIONS
The following is a summary of your office visit:    Medications started today: None    Medications stopped today: None    Medication dose change: None    Nurse contact information: Lilly Bran RN  Cardiology Care Coordinator  [phone] Phone  [phone] Fax    Appointments made today:     Patient instructions:  Follow Up: See Dr. Reyes in one year    If you have had any blood work, imaging or other testing completed we will be in touch within 1-2 weeks regarding the results. If you have any questions, concerns or need to schedule a follow up, please contact us at [phone]. If you are needing refills please contact your pharmacy. For urgent after hour care please call the [place] [facility] at [phone] or the [facility] at [phone] and ask to speak to the cardiologist on call.    It was a pleasure meeting with you today. Please let us know if there is anything else we can do for you so that we can be sure you are leaving completely satisfied with your care experience.     Your Cardiology Team at [facility]  RN Care Coordinator: Lilly  Please call [phone] and ask for Cardiology with any new symptoms, questions, or concerns.     For urgent after hour care, please call the [place] [facility] at [phone], or the [facility] at [phone], and ask to speak to the cardiologist on call.    When to Call Your Doctor  Call your doctor if you have any of the following:  Changed or worsened chest pain.  Chest pain that started within the past 2 months and is now more severe.   Chest pain that happens 3 or more times per day.   Chest pain that suddenly becomes more frequent or severe, lasts longer, or is brought on by less exertion than before.   Chest pain that occurs at rest, with no obvious exertion or stress. It might wake you from sleep.  Call 911 or other emergency services if you have CAD that has been  diagnosed by a doctor and you have chest pain that doesn't go away after using your home treatment plan for angina.  When in Doubt:  If you aren't sure if your symptoms are serious or decide not to call 911, call our [place] [facility] at [phone] or the [facility] at [phone] and ask to speak to the cardiologist on call. They can help you decide if your pain is an emergency or not.

## 2018-08-21 NOTE — NURSING NOTE
Zack Demarco's goals for this visit include:   Chief Complaint   Patient presents with     RECHECK     Follow up with Dr. Reyes       He requests these members of his care team be copied on today's visit information: Yes    PCP: Anastacio Love    Referring Provider:  Referred Self, MD  No address on file    /67 (BP Location: Left arm, Patient Position: Sitting, Cuff Size: Adult Large)  Pulse 72  Wt 101.9 kg (224 lb 11.2 oz)  SpO2 94%  BMI 33.18 kg/m2    Do you need any medication refills at today's visit? No

## 2018-08-21 NOTE — MR AVS SNAPSHOT
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 Reyes in one year    If you have had any blood work, imaging or other testing completed we will be in touch within 1-2 weeks regarding the results. If you have any questions, concerns or need to schedule a follow up, please contact us at [phone]. If you are needing refills please contact your pharmacy. For urgent after hour care please call the [facility] at [phone] or the [facility] at [phone] and ask to speak to the cardiologist on call.    It was a pleasure meeting with you today. Please let us know if there is anything else we can do for you so that we can be sure you are leaving completely satisfied with your care experience.     Your Cardiology Team at [facility]  RN Care Coordinator: Lilly  Please call [phone] and ask for Cardiology with any new symptoms, questions, or concerns.     For urgent after hour care, please call the [facility] at [phone], or the [facility] at [phone], and ask to speak to the cardiologist on call.    When to Call Your Doctor  Call your doctor if you have any of the following:  Changed or worsened  chest pain.  Chest pain that started within the past 2 months and is now more severe.   Chest pain that happens 3 or more times per day.   Chest pain that suddenly becomes more frequent or severe, lasts longer, or is brought on by less exertion than before.   Chest pain that occurs at rest, with no obvious exertion or stress. It might wake you from sleep.  Call 911 or other emergency services if you have CAD that has been diagnosed by a doctor and you have chest pain that doesn't go away after using your home treatment plan for angina.  When in Doubt:  If you aren't sure if your symptoms are serious or decide not to call 911, call our [facility] at [phone] or the [facility] at [phone] and ask to speak to the cardiologist on call. They can help you decide if your pain is an emergency or not.              Follow-ups after your visit        Follow-up notes from your care team     Return in about 1 year (around 8/21/2019).      Your next 10 appointments already scheduled     Sep 20, 2018  7:00 AM CDT   [facility] Lab Draw with  [employer] Online Scheduling LAB DRAW   [facility] ([facility])    [address]  [address]  [facility] [id number] [phone]            Sep 20, 2018  7:30 AM CDT   (Arrive by 7:15 AM)   Return Visit with Gal Bain MD   [facility] ([facility])    [address]  [address]  [place] [id number] [phone]            Jan 18, 2019  8:00 AM CST   New Visit with Avtar Mccullough MD   [facility] [place] ([facility] [place])    [address]  [place] [address] [phone]              Who to contact     If you have questions or need follow up information about today's clinic visit or your schedule please contact [facility] directly at [phone].  Normal or non-critical lab and imaging results will be  communicated to you by Feng[employer], letter or phone within 4 business days after the clinic has received the results. If you do not hear from us within 7 days, please contact the clinic through [employer] or phone. If you have a critical or abnormal lab result, we will notify you by phone as soon as possible.  Submit refill requests through [employer] or call your pharmacy and they will forward the refill request to us. Please allow 3 business days for your refill to be completed.          Additional Information About Your Visit        [employer] Information     [employer] gives you secure access to your electronic health record. If you see a primary care provider, you can also send messages to your care team and make appointments. If you have questions, please call your primary care clinic.  If you do not have a primary care provider, please call [phone] and they will assist you.      [employer] is an electronic gateway that provides easy, online access to your medical records. With [employer], you can request a clinic appointment, read your test results, renew a prescription or communicate with your care team.     To access your existing account, please contact your [facility] or call [phone] for assistance.        Care EveryWhere ID     This is your Care EveryWhere ID. This could be used by other organizations to access your [place] medical records  [id number]        Your Vitals Were     Pulse Pulse Oximetry BMI (Body Mass Index)             72 94% 33.18 kg/m2          Blood Pressure from Last 3 Encounters:   08/21/18 125/67   08/16/18 133/55   08/09/18 140/62    Weight from Last 3 Encounters:   08/21/18 101.9 kg (224 lb 11.2 oz)   08/16/18 102.7 kg (226 lb 8 oz)   08/09/18 102.3 kg (225 lb 8 oz)              Today, you had the following     No orders found for display       Primary Care Provider Office Phone # Fax #    Anastacio Love [phone] [address]  [facility] [address]        Equal Access to Services     [facility] LENO : Hadii aad ku hadwilliamradha Soronali, wanathanielda luqadaha, qaybta kaalmamarilynn pompa. So [facility] [phone].    ATENCIÓN: Si habla español, tiene a warner disposición servicios gratuitos de asistencia lingüística. Richard al [phone].    We comply with applicable federal civil rights laws and [place] laws. We do not discriminate on the basis of race, color, national origin, age, disability, sex, sexual orientation, or gender identity.            Thank you!     Thank you for choosing [facility]  for your care. Our goal is always to provide you with excellent care. Hearing back from our patients is one way we can continue to improve our services. Please take a few minutes to complete the written survey that you may receive in the mail after your visit with us. Thank you!             Your Updated Medication List - Protect others around you: Learn how to safely use, store and throw away your medicines at www.disposemymeds.org.          This list is accurate as of 8/21/18  3:01 PM.  Always use your most recent med list.                   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)       aspirin 325 MG EC tablet      1/2 tab daily        atorvastatin 40 MG tablet    LIPITOR    60 tablet    Take 1 tablet (40 mg) by mouth daily    Hyperlipidemia LDL goal <100       latanoprost 0.005 % ophthalmic solution    XALATAN    3 Bottle    Place 1 drop into both eyes At Bedtime    Borderline glaucoma with ocular hypertension, unspecified laterality       levothyroxine 75 MCG tablet    SYNTHROID/LEVOTHROID    90 tablet    Take 1 tablet (75 mcg) by mouth daily    Hypothyroidism, unspecified type       metoprolol tartrate 25 MG tablet    LOPRESSOR    180  tablet    Take 1 tablet (25 mg) by mouth 2 times daily    Hypertension goal BP (blood pressure) < 140/90       multivitamin Tabs tablet      Take 1 tablet by mouth daily        nitroGLYcerin 0.4 MG sublingual tablet    NITROSTAT    25 tablet    For chest pain place 1 tablet under the tongue every 5 minutes for 3 doses. If symptoms persist 5 minutes after 1st dose call 911.    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

## 2018-08-22 NOTE — PROGRESS NOTES
2018            Anastacio Love MD   [facility]    [address] [place] [address]      Patient:  Zack Demarco   MRN:  [id number]   :  1940      Dear Dr. Love:      It was a pleasure participating in the care of your patient, Mr. Zack Demarco.  As you know, he is a 77-year-old gentleman who I see today for coronary artery disease.      His past medical history is significant for the followin.  Hypertension.   2.  Hyperlipidemia.   3.  Retroperitoneal mass and follicular lymphoma, currently undergoing therapy.   4.  Hypothyroidism.   5.  Borderline glaucoma.   6.  Right knee problems.   7.  Lipoma.   8.  Patellar tendinitis.      His cardiac history is significant for multivessel coronary disease and normal LV systolic function.  I last saw him 2017 for an abnormal stress test.  Coronary angiography on 2017 revealed the following:     Left main normal.   LAD 70%-80% ostial stenosis, 80%-90% stenosis in the mid-portion.   First diagonal 80%-90% stenosis.   Circumflex mild diffuse disease.   RCA 70% stenosis in the mid-portion.      Three-vessel coronary bypass surgery was performed (LIMA to LAD, vein graft to PDA, vein graft to D1).  He had originally presented with centrally located chest tightness with shortness of breath, which completely resolved afterwards.      He presents today for continuing care.      Since our last visit, he has done well.  He has not been exercising regularly due to knee problems.  He does golf 3 times a week, and he does this without gross limitation or symptoms.  He denies recurrent angina, chest pain, shortness of breath or other symptomatology.  He denies PND, orthopnea, edema, palpitations, syncope or near-syncope.  He essentially has no other complaints.  He has gained about 10 pounds since our last visit.      CURRENT MEDICATIONS:     1.  Aspirin 325 mg one-half tablet a day.   2.  Lipitor 40 mg a day.   3.  Levothyroxine.    4.  Metoprolol tartrate 25 twice daily.      PHYSICAL EXAMINATION:     VITAL SIGNS:  Blood pressure is 125/67 with a pulse of 72.  His weight is 224 pounds.   NECK:  Exam reveals no obvious jugular venous distention.   LUNGS:  Clear to auscultation.  Respiratory effort is normal.   CARDIAC:  Reveals a regular rate and rhythm, no obvious murmur or gallop appreciated.   ABDOMEN:  Belly soft, nontender.   EXTREMITIES:  Without gross edema.      LABORATORY:  2018, GFR normal.      Echocardiogram 2017, ejection fraction 60%-65% without gross valvular pathology.        IMPRESSION:      Zack is a 77-year-old gentleman whose cardiac history is significant for known multivessel coronary disease and normal LV systolic function.  He underwent 3-vessel coronary bypass surgery (LIMA to LAD, vein graft to PDA, vein graft to first diagonal on 2017).      Since revascularization, he has not had recurrent angina manifested by centrally located chest tightness with shortness of breath.  His LV systolic function is normal without gross valvular pathology as of echo 2017.  He is progressing nicely without recurrent symptoms.  He appears stable at this juncture.        PLAN:     1.  Continue present medications at present doses.     2.  Follow up in 1 year or earlier if needed.      Once again, it was a pleasure participating in the care of your patient, Mr. Zack Trivedi.  Please feel free to contact me anytime to ask any questions regarding his care in the future.         Sincerely,      TRUNG BENITO MD             D: 2018   T: 2018   MT:       Name:     ZACK TRIVEDI   MRN:      [id number]        Account:      [id number]   :      1940      Document: [id number]       cc: Anastacio Love MD

## 2018-09-20 ENCOUNTER — APPOINTMENT (OUTPATIENT)
Dept: LAB | Facility: CLINIC | Age: 78
End: 2018-09-20
Attending: INTERNAL MEDICINE
Payer: MEDICARE

## 2018-09-20 ENCOUNTER — ONCOLOGY VISIT (OUTPATIENT)
Dept: ONCOLOGY | Facility: CLINIC | Age: 78
End: 2018-09-20
Attending: INTERNAL MEDICINE
Payer: MEDICARE

## 2018-09-20 VITALS
BODY MASS INDEX: 33.36 KG/M2 | TEMPERATURE: 98.4 F | HEIGHT: 69 IN | OXYGEN SATURATION: 96 % | HEART RATE: 54 BPM | SYSTOLIC BLOOD PRESSURE: 161 MMHG | WEIGHT: 225.2 LBS | DIASTOLIC BLOOD PRESSURE: 78 MMHG | RESPIRATION RATE: 18 BRPM

## 2018-09-20 DIAGNOSIS — C82.99 FOLLICULAR LYMPHOMA OF EXTRANODAL AND SOLID ORGAN SITES (H): ICD-10-CM

## 2018-09-20 LAB
ANION GAP SERPL CALCULATED.3IONS-SCNC: 6 MMOL/L (ref 3–14)
BASOPHILS # BLD AUTO: 0 10E9/L (ref 0–0.2)
BASOPHILS NFR BLD AUTO: 0.5 %
BUN SERPL-MCNC: 26 MG/DL (ref 7–30)
CALCIUM SERPL-MCNC: 9 MG/DL (ref 8.5–10.1)
CHLORIDE SERPL-SCNC: 107 MMOL/L (ref 94–109)
CO2 SERPL-SCNC: 26 MMOL/L (ref 20–32)
CREAT SERPL-MCNC: 0.89 MG/DL (ref 0.66–1.25)
DIFFERENTIAL METHOD BLD: NORMAL
EOSINOPHIL # BLD AUTO: 0.3 10E9/L (ref 0–0.7)
EOSINOPHIL NFR BLD AUTO: 4 %
ERYTHROCYTE [DISTWIDTH] IN BLOOD BY AUTOMATED COUNT: 12.4 % (ref 10–15)
GFR SERPL CREATININE-BSD FRML MDRD: 83 ML/MIN/1.7M2
GLUCOSE SERPL-MCNC: 114 MG/DL (ref 70–99)
HCT VFR BLD AUTO: 44.7 % (ref 40–53)
HGB BLD-MCNC: 14.8 G/DL (ref 13.3–17.7)
IMM GRANULOCYTES # BLD: 0 10E9/L (ref 0–0.4)
IMM GRANULOCYTES NFR BLD: 0.6 %
LYMPHOCYTES # BLD AUTO: 1.4 10E9/L (ref 0.8–5.3)
LYMPHOCYTES NFR BLD AUTO: 21.8 %
MCH RBC QN AUTO: 30.8 PG (ref 26.5–33)
MCHC RBC AUTO-ENTMCNC: 33.1 G/DL (ref 31.5–36.5)
MCV RBC AUTO: 93 FL (ref 78–100)
MONOCYTES # BLD AUTO: 0.6 10E9/L (ref 0–1.3)
MONOCYTES NFR BLD AUTO: 10 %
NEUTROPHILS # BLD AUTO: 3.9 10E9/L (ref 1.6–8.3)
NEUTROPHILS NFR BLD AUTO: 63.1 %
NRBC # BLD AUTO: 0 10*3/UL
NRBC BLD AUTO-RTO: 0 /100
PLATELET # BLD AUTO: 208 10E9/L (ref 150–450)
POTASSIUM SERPL-SCNC: 4.1 MMOL/L (ref 3.4–5.3)
RBC # BLD AUTO: 4.81 10E12/L (ref 4.4–5.9)
SODIUM SERPL-SCNC: 139 MMOL/L (ref 133–144)
URATE SERPL-MCNC: 6.4 MG/DL (ref 3.5–7.2)
WBC # BLD AUTO: 6.2 10E9/L (ref 4–11)

## 2018-09-20 PROCEDURE — G0463 HOSPITAL OUTPT CLINIC VISIT: HCPCS | Mod: ZF

## 2018-09-20 PROCEDURE — 36415 COLL VENOUS BLD VENIPUNCTURE: CPT

## 2018-09-20 PROCEDURE — 84550 ASSAY OF BLOOD/URIC ACID: CPT | Performed by: INTERNAL MEDICINE

## 2018-09-20 PROCEDURE — 85025 COMPLETE CBC W/AUTO DIFF WBC: CPT | Performed by: INTERNAL MEDICINE

## 2018-09-20 PROCEDURE — 99214 OFFICE O/P EST MOD 30 MIN: CPT | Mod: GC | Performed by: INTERNAL MEDICINE

## 2018-09-20 PROCEDURE — 80048 BASIC METABOLIC PNL TOTAL CA: CPT | Performed by: INTERNAL MEDICINE

## 2018-09-20 ASSESSMENT — PAIN SCALES - GENERAL: PAINLEVEL: NO PAIN (0)

## 2018-09-20 NOTE — NURSING NOTE
"Oncology Rooming Note    September 20, 2018 7:15 AM   Zack Demarco is a 77 year old male who presents for:    Chief Complaint   Patient presents with     Blood Draw     Labs drawn via  by RN. VS taken.     Oncology Clinic Visit     REturn visit related to Follicular Lymphoma     Initial Vitals: /78 (BP Location: Right arm, Patient Position: Sitting, Cuff Size: Adult Regular)  Pulse 54  Temp 98.4  F (36.9  C) (Oral)  Resp 18  Ht 1.753 m (5' 9.02\")  Wt 102.2 kg (225 lb 3.2 oz)  SpO2 96%  BMI 33.24 kg/m2 Estimated body mass index is 33.24 kg/(m^2) as calculated from the following:    Height as of this encounter: 1.753 m (5' 9.02\").    Weight as of this encounter: 102.2 kg (225 lb 3.2 oz). Body surface area is 2.23 meters squared.  No Pain (0) Comment: Data Unavailable   No LMP for male patient.  Allergies reviewed: Yes  Medications reviewed: Yes    Medications: Medication refills not needed today.  Pharmacy name entered into KR[facility]: [id number] - [place], [place] - [address] [address]    Clinical concerns: No new concerns. Provider was notified.    10 minutes for nursing intake (face to face time)     Teena Rios LPN            "

## 2018-09-20 NOTE — LETTER
9/20/2018      RE: Zack Demarco  [address] [facility] [address]       Date of visit: September 20, 2018  Reason for visit: Follicular lymphoma    ONCOLOGY SUMMARY: Zack Demarco is a 77-year-old man first seen in consultation on 12/27/2017 for a new diagnosis of follicular lymphoma. He was diagnosed incidental to an evaluation for cardiac bypass surgery in 11/2017.  A chest CT scan on 11/14 showed an unexpected retroperitoneal mass with surrounding lymphadenopathy suspicious for malignancy.  A further CT scan done on the same day showed a 2.3 x 7.2 x 6.1 retroperitoneal soft tissue mass with adjacent lymphadenopathy that mildly narrowed the left renal vei  There also was nodularity within the mesentery and an enlarged hilar lymph node.  CT-guided biopsy of the retroperitoneal mass on 12/12/2017 established the diagnosis, but the sample was too small for adequate grading. There was no disease identified outside of the abdomen; a bone marrow biopsy was not obtained as part of staging.      Relative to cardiac issues, he underwent an uncomplicated 3-vessel coronary artery bypass graft on 11/22/2017 and, subsequently, has been symptom-free.       With the renal and gonadal vein compression it was decided to start treatment with rituximab, alone. Mr. Demarco completed 4 weekly doses from 01/26 and 02/16/2018. He had no difficulty with the treatment and was able to receive rapid infusion (90 minutes) for the last 3 doses. Interval evaluation on 03/05 with an US, suggested improvement but we recognized that a repeat CT would eventually be necessary and a CT scan on 04/09 showed substantial regression. Repeated CT 7/9/18 showed a good response but residual lymphoma around renal veins. Decided to proceed weekly rituximab x4 (7/26-8/22).      INTERVAL HISTORY:  Mr. Demarco was seen last in the clinic on 07/11/2018. Again based on residual lymphoma on repeated CT scan, he received x4 weekly rituximab. He did tolerate well  "without any side-effects. He currently denies any abdominal symptoms, constitutional symptoms or adenopathy. He was seen by cardiologist and recommended 1 year follow-up. No cardiac symptoms. No interval fevers, night sweats or weight loss.  No new aches or pains.  No gastrointestinal or urinary tract signs.      REVIEW OF SYSTEMS:  A 10-point review of systems is otherwise negative.      MEDICATIONS:   Current Outpatient Prescriptions   Medication     allopurinol (ZYLOPRIM) 300 MG tablet     aspirin 325 MG EC tablet     atorvastatin (LIPITOR) 40 MG tablet     Cholecalciferol (VITAMIN D3 PO)     latanoprost (XALATAN) 0.005 % ophthalmic solution     levothyroxine (SYNTHROID/LEVOTHROID) 75 MCG tablet     metoprolol tartrate (LOPRESSOR) 25 MG tablet     multivitamin (OCUVITE) TABS     UNABLE TO FIND     nitroGLYcerin (NITROSTAT) 0.4 MG sublingual tablet        ALLERGIES:  None reported.      PHYSICAL EXAMINATION:   VITAL SIGNS:  /78 (BP Location: Right arm, Patient Position: Sitting, Cuff Size: Adult Regular)  Pulse 54  Temp 98.4  F (36.9  C) (Oral)  Resp 18  Ht 1.753 m (5' 9.02\")  Wt 102.2 kg (225 lb 3.2 oz)  SpO2 96%  BMI 33.24 kg/m2  Wt Readings from Last 3 Encounters:   09/20/18 102.2 kg (225 lb 3.2 oz)   08/21/18 101.9 kg (224 lb 11.2 oz)   08/16/18 102.7 kg (226 lb 8 oz)     HEENT:  No icterus or conjunctival injection.  Oropharynx with no masses.  Mucosa moist.  No lesions.   Lymph nodes:  No cervical/supraclavicular/axillary/inguinal adenopathy.    CHEST:  Clear to auscultation.   HEART:  Regular rhythm.  No murmur or gallop.      ABDOMEN:  Soft and nontender.  Bowel sounds active.  No detectable organomegaly or mass.    EXTREMITIES:  Trace edema on the left side.   SKIN:  No rashes, petechiae or ecchymoses.      LABORATORY DATA:     CBC  ===  WBC (10e9/L)   Date Value   09/20/2018 6.2     Hemoglobin (g/dL)   Date Value   09/20/2018 14.8     Platelet Count (10e9/L)   Date Value   09/20/2018 208 "       BMP  ===  Sodium (mmol/L)   Date Value   09/20/2018 139     Potassium (mmol/L)   Date Value   09/20/2018 4.1     Urea Nitrogen (mg/dL)   Date Value   09/20/2018 26     Creatinine (mg/dL)   Date Value   09/20/2018 0.89     Calcium (mg/dL)   Date Value   09/20/2018 9.0     No repeated images this time.     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    The patient had a very good response to rituximab alone as his initial therapy with substantial regression. No additional shrinkage. There is still some compressive effect on the left renal vein and portal veins, but remain patent. During last visit, options (serial surveillance CT scans; rituximab alone with a reevaluation after completion of therapy, or the initiation of rituximab plus bendamustine) were discussed in detail with the patient. Mr. Demarco preferred beginning with rituximab alone, consequently received weekly rituximab x4 uneventfully. Clinically no symptoms or signs of lymphoma progression. Will have him back with repeated CT scan in a month which will be 2 months from the completion of weekly rituximab. If this CT shows additional response to rituximab, we will consider to give him maintenance rituximab (q 2 months for 2 years).        Plan:  - CT C/A/P in 4 weeks w/ labs  - RTC in 4 weeks for possible maintenance rituximab  - Encourage to take a flu vaccination prior to next visit    The patient was seen and care plans discussed with Dr. Bain.    Se dorota Borges MD  [facility]  Hematology oncology and transplantation fellow  Pager [phone]    Oncology Attending    Patient seen and examined with the Oncology Fellow, Dr. Borges. Agree with his findings, assessment and plan.    Gal Bain MD  Professor of Medicine  Oncology  [facility]  Office: [phone]  Clinic Fax: [phone]         cc:      MD Rolando Pearson MD Yohannes Gebre, MD     MD Gal Grant MD

## 2018-09-20 NOTE — PROGRESS NOTES
Date of visit: September 20, 2018  Reason for visit: Follicular lymphoma    ONCOLOGY SUMMARY: Zack Demarco is a 77-year-old man first seen in consultation on 12/27/2017 for a new diagnosis of follicular lymphoma. He was diagnosed incidental to an evaluation for cardiac bypass surgery in 11/2017.  A chest CT scan on 11/14 showed an unexpected retroperitoneal mass with surrounding lymphadenopathy suspicious for malignancy.  A further CT scan done on the same day showed a 2.3 x 7.2 x 6.1 retroperitoneal soft tissue mass with adjacent lymphadenopathy that mildly narrowed the left renal vei  There also was nodularity within the mesentery and an enlarged hilar lymph node.  CT-guided biopsy of the retroperitoneal mass on 12/12/2017 established the diagnosis, but the sample was too small for adequate grading. There was no disease identified outside of the abdomen; a bone marrow biopsy was not obtained as part of staging.      Relative to cardiac issues, he underwent an uncomplicated 3-vessel coronary artery bypass graft on 11/22/2017 and, subsequently, has been symptom-free.       With the renal and gonadal vein compression it was decided to start treatment with rituximab, alone. Mr. Demarco completed 4 weekly doses from 01/26 and 02/16/2018. He had no difficulty with the treatment and was able to receive rapid infusion (90 minutes) for the last 3 doses. Interval evaluation on 03/05 with an US, suggested improvement but we recognized that a repeat CT would eventually be necessary and a CT scan on 04/09 showed substantial regression. Repeated CT 7/9/18 showed a good response but residual lymphoma around renal veins. Decided to proceed weekly rituximab x4 (7/26-8/22).      INTERVAL HISTORY:  Mr. Demarco was seen last in the clinic on 07/11/2018. Again based on residual lymphoma on repeated CT scan, he received x4 weekly rituximab. He did tolerate well without any side-effects. He currently denies any abdominal symptoms,  "constitutional symptoms or adenopathy. He was seen by cardiologist and recommended 1 year follow-up. No cardiac symptoms. No interval fevers, night sweats or weight loss.  No new aches or pains.  No gastrointestinal or urinary tract signs.      REVIEW OF SYSTEMS:  A 10-point review of systems is otherwise negative.      MEDICATIONS:   Current Outpatient Prescriptions   Medication     allopurinol (ZYLOPRIM) 300 MG tablet     aspirin 325 MG EC tablet     atorvastatin (LIPITOR) 40 MG tablet     Cholecalciferol (VITAMIN D3 PO)     latanoprost (XALATAN) 0.005 % ophthalmic solution     levothyroxine (SYNTHROID/LEVOTHROID) 75 MCG tablet     metoprolol tartrate (LOPRESSOR) 25 MG tablet     multivitamin (OCUVITE) TABS     UNABLE TO FIND     nitroGLYcerin (NITROSTAT) 0.4 MG sublingual tablet        ALLERGIES:  None reported.      PHYSICAL EXAMINATION:   VITAL SIGNS:  /78 (BP Location: Right arm, Patient Position: Sitting, Cuff Size: Adult Regular)  Pulse 54  Temp 98.4  F (36.9  C) (Oral)  Resp 18  Ht 1.753 m (5' 9.02\")  Wt 102.2 kg (225 lb 3.2 oz)  SpO2 96%  BMI 33.24 kg/m2  Wt Readings from Last 3 Encounters:   09/20/18 102.2 kg (225 lb 3.2 oz)   08/21/18 101.9 kg (224 lb 11.2 oz)   08/16/18 102.7 kg (226 lb 8 oz)     HEENT:  No icterus or conjunctival injection.  Oropharynx with no masses.  Mucosa moist.  No lesions.   Lymph nodes:  No cervical/supraclavicular/axillary/inguinal adenopathy.    CHEST:  Clear to auscultation.   HEART:  Regular rhythm.  No murmur or gallop.      ABDOMEN:  Soft and nontender.  Bowel sounds active.  No detectable organomegaly or mass.    EXTREMITIES:  Trace edema on the left side.   SKIN:  No rashes, petechiae or ecchymoses.      LABORATORY DATA:     CBC  ===  WBC (10e9/L)   Date Value   09/20/2018 6.2     Hemoglobin (g/dL)   Date Value   09/20/2018 14.8     Platelet Count (10e9/L)   Date Value   09/20/2018 208       BMP  ===  Sodium (mmol/L)   Date Value   09/20/2018 139 "     Potassium (mmol/L)   Date Value   09/20/2018 4.1     Urea Nitrogen (mg/dL)   Date Value   09/20/2018 26     Creatinine (mg/dL)   Date Value   09/20/2018 0.89     Calcium (mg/dL)   Date Value   09/20/2018 9.0     No repeated images this time.     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    The patient had a very good response to rituximab alone as his initial therapy with substantial regression. No additional shrinkage. There is still some compressive effect on the left renal vein and portal veins, but remain patent. During last visit, options (serial surveillance CT scans; rituximab alone with a reevaluation after completion of therapy, or the initiation of rituximab plus bendamustine) were discussed in detail with the patient. Mr. Demarco preferred beginning with rituximab alone, consequently received weekly rituximab x4 uneventfully. Clinically no symptoms or signs of lymphoma progression. Will have him back with repeated CT scan in a month which will be 2 months from the completion of weekly rituximab. If this CT shows additional response to rituximab, we will consider to give him maintenance rituximab (q 2 months for 2 years).        Plan:  - CT C/A/P in 4 weeks w/ labs  - RTC in 4 weeks for possible maintenance rituximab  - Encourage to take a flu vaccination prior to next visit    The patient was seen and care plans discussed with Dr. Bain.    Se dorota Borges MD  [facility]  Hematology oncology and transplantation fellow  Pager [phone]    Oncology Attending    Patient seen and examined with the Oncology Fellow, Dr. Borges. Agree with his findings, assessment and plan.    Gal Bain MD  Professor of Medicine  Oncology  [facility]  Office: [phone]  Clinic Fax: [phone]         cc:      MD Rolando Pearson MD Yohannes Gebre, MD Ronald Sih, MD

## 2018-09-20 NOTE — NURSING NOTE
Chief Complaint   Patient presents with     Blood Draw     Labs drawn via  by RN. VS taken.     Belgica Munoz RN

## 2018-09-20 NOTE — MR AVS SNAPSHOT
After Visit Summary   9/20/2018    Zack Demarco    MRN: [id number]           Patient Information     Date Of Birth          1940        Visit Information        Provider Department      9/20/2018 7:30 AM Gal Bain MD [facility]        Today's Diagnoses     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)           Follow-ups after your visit        Follow-up notes from your care team     Return in about 1 month (around 10/20/2018) for Physical Exam, Lab Work.      Your next 10 appointments already scheduled     Oct 16, 2018  7:00 AM CDT   LAB with  LAB   [facility] ([facility])    [address] [place] [id number] [phone]           Please do not eat 10-12 hours before your appointment if you are coming in fasting for labs on lipids, cholesterol, or glucose (sugar). This does not apply to pregnant women. Water, hot tea and black coffee (with nothing added) are okay. Do not drink other fluids, diet soda or chew gum.            Oct 16, 2018  7:40 AM CDT   CT CHEST/ABDOMEN/PELVIS W CONTRAST with UCCT1   [facility] CT ([facility])    [address] [place] [address] [id number] [phone]           How do I prepare for my exam? (Food and drink instructions) To prepare: Do not eat or drink for 2 hours before your exam. If you need to take medicine, you may take it with small sips of water. (We may ask you to take liquid medicine as well.)  How do I prepare for my exam? (Other instructions) Please arrive 30 minutes early for your CT.  Once in the department you might be asked to drink water 15-20 minutes prior to your exam.  If indicated you may be asked to drink an oral contrast in advance of your CT.  If this is the case, the imaging team will let you know or be in contact with you prior to your appointment  Patients over 70 or patients with  diabetes or kidney problems: If you haven t had a blood test (creatinine test) within the last 30 days, the Cardiologist/Radiologist may require you to get this test prior to your exam.  If you have diabetes:  Continue to take your metformin medication on the day of your exam  What should I wear: Please wear loose clothing, such as a sweat suit or jogging clothes. Avoid snaps, zippers and other metal. We may ask you to undress and put on a hospital gown.  How long does the exam take: Most scans take less than 20 minutes.  What should I bring: Please bring any scans or X-rays taken at other hospitals, if similar tests were done. Also bring a list of your medicines, including vitamins, minerals and over-the-counter drugs. It is safest to leave personal items at home.  Do I need a : No  is needed.  What do I need to tell my doctor? Be sure to tell your doctor: * If you have any allergies. * If there s any chance you are pregnant. * If you are breastfeeding.  What should I do after the exam: No restrictions, You may resume normal activities.  What is this test: A CT (computed tomography) scan is a series of pictures that allows us to look inside your body. The scanner creates images of the body in cross sections, much like slices of bread. This helps us see any problems more clearly. You may receive contrast (X-ray dye) before or during your scan. You will be asked to drink the contrast.  Who should I call with questions: If you have any questions, please call the Imaging Department where you will have your exam. Directions, parking instructions, and other information is available on our website, [email or web address].            Oct 18, 2018  7:00 AM CDT   (Arrive by 6:45 AM)   Return Visit with Gal Bain MD   [facility])    [address] [place] [address]  [address]  [facility] [place] [address] [phone]            Jan 18, 2019  8:00 AM CST   New  Visit with Avtar Mccullough MD   [facility]    [address]  [place] [id number] [phone]            Aug 22, 2019  7:30 AM CDT   Return Visit with Torsten Reyes MD   [facility])    [address] [place] [id number] [phone]              Future tests that were ordered for you today     Open Future Orders        Priority Expected Expires Ordered    CT Chest/Abdomen/Pelvis w Contrast Routine 10/25/2018 4/18/2019 9/20/2018    Comprehensive metabolic panel Routine 10/25/2018 9/20/2019 9/20/2018    *CBC with platelets differential Routine 10/25/2018 9/20/2019 9/20/2018    Lactate Dehydrogenase Routine 10/25/2018 9/20/2019 9/20/2018            Who to contact     If you have questions or need follow up information about today's clinic visit or your schedule please contact [facility] directly at [phone].  Normal or non-critical lab and imaging results will be communicated to you by [employer], letter or phone within 4 business days after the clinic has received the results. If you do not hear from us within 7 days, please contact the clinic through [employer] or phone. If you have a critical or abnormal lab result, we will notify you by phone as soon as possible.  Submit refill requests through [employer] or call your pharmacy and they will forward the refill request to us. Please allow 3 business days for your refill to be completed.          Additional Information About Your Visit        [employer] Information     [employer] gives you secure access to your electronic health record. If you see a primary care provider, you can also send messages to your care team and make appointments. If you have questions, please call your primary care clinic.  If you do not have a primary care provider, please call [phone] and they will assist you.        Care EveryWhere ID     This is your Care  "EveryWhere ID. This could be used by other organizations to access your [place] medical records  [id number]        Your Vitals Were     Pulse Temperature Respirations Height Pulse Oximetry BMI (Body Mass Index)    54 98.4  F (36.9  C) (Oral) 18 1.753 m (5' 9.02\") 96% 33.24 kg/m2       Blood Pressure from Last 3 Encounters:   09/20/18 161/78   08/21/18 125/67   08/16/18 133/55    Weight from Last 3 Encounters:   09/20/18 102.2 kg (225 lb 3.2 oz)   08/21/18 101.9 kg (224 lb 11.2 oz)   08/16/18 102.7 kg (226 lb 8 oz)              We Performed the Following     Basic metabolic panel     CBC with platelets differential     Uric acid        Primary Care Provider Office Phone # Fax #    Anastacio Love [phone] [address] [address]        Equal Access to Services     [facility]LA [facility] : Hadii aad ku hadasho Soomaali, waaxda luqadaha, qaybta kaalmada adeegyada, waxay idiin hayjose rn trini dasilva . So [facility] [phone].    ATENCIÓN: Si habla español, tiene a warner disposición servicios gratuitos de asistencia lingüística. Llame al [phone].    We comply with applicable federal civil rights laws and [place] laws. We do not discriminate on the basis of race, color, national origin, age, disability, sex, sexual orientation, or gender identity.            Thank you!     Thank you for choosing [facility]  for your care. Our goal is always to provide you with excellent care. Hearing back from our patients is one way we can continue to improve our services. Please take a few minutes to complete the written survey that you may receive in the mail after your visit with us. Thank you!             Your Updated Medication List - Protect others around you: Learn how to safely use, store and throw away your medicines at www.disposemymeds.org.          This list is accurate as of 9/20/18  8:31 AM.  Always use your most recent med list.                   Brand Name " Dispense Instructions for use Diagnosis    allopurinol 300 MG tablet    ZYLOPRIM    14 tablet    Take 1 tablet (300 mg) by mouth daily    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)       aspirin 325 MG EC tablet      1/2 tab daily        atorvastatin 40 MG tablet    LIPITOR    60 tablet    Take 1 tablet (40 mg) by mouth daily    Hyperlipidemia LDL goal <100       latanoprost 0.005 % ophthalmic solution    XALATAN    3 Bottle    Place 1 drop into both eyes At Bedtime    Borderline glaucoma with ocular hypertension, unspecified laterality       levothyroxine 75 MCG tablet    SYNTHROID/LEVOTHROID    90 tablet    Take 1 tablet (75 mcg) by mouth daily    Hypothyroidism, unspecified type       metoprolol tartrate 25 MG tablet    LOPRESSOR    180 tablet    Take 1 tablet (25 mg) by mouth 2 times daily    Hypertension goal BP (blood pressure) < 140/90       multivitamin Tabs tablet      Take 1 tablet by mouth daily        nitroGLYcerin 0.4 MG sublingual tablet    NITROSTAT    25 tablet    For chest pain place 1 tablet under the tongue every 5 minutes for 3 doses. If symptoms persist 5 minutes after 1st dose call 911.    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

## 2018-09-24 ENCOUNTER — MYC MEDICAL ADVICE (OUTPATIENT)
Dept: FAMILY MEDICINE | Facility: CLINIC | Age: 78
End: 2018-09-24

## 2018-10-16 ENCOUNTER — RADIANT APPOINTMENT (OUTPATIENT)
Dept: CT IMAGING | Facility: CLINIC | Age: 78
End: 2018-10-16
Payer: MEDICARE

## 2018-10-16 DIAGNOSIS — C82.99 FOLLICULAR LYMPHOMA OF EXTRANODAL AND SOLID ORGAN SITES (H): ICD-10-CM

## 2018-10-16 LAB
ALBUMIN SERPL-MCNC: 3.8 G/DL (ref 3.4–5)
ALP SERPL-CCNC: 79 U/L (ref 40–150)
ALT SERPL W P-5'-P-CCNC: 41 U/L (ref 0–70)
ANION GAP SERPL CALCULATED.3IONS-SCNC: 6 MMOL/L (ref 3–14)
AST SERPL W P-5'-P-CCNC: 25 U/L (ref 0–45)
BASOPHILS # BLD AUTO: 0 10E9/L (ref 0–0.2)
BASOPHILS NFR BLD AUTO: 0.6 %
BILIRUB SERPL-MCNC: 0.4 MG/DL (ref 0.2–1.3)
BUN SERPL-MCNC: 24 MG/DL (ref 7–30)
CALCIUM SERPL-MCNC: 8.9 MG/DL (ref 8.5–10.1)
CHLORIDE SERPL-SCNC: 106 MMOL/L (ref 94–109)
CO2 SERPL-SCNC: 28 MMOL/L (ref 20–32)
CREAT SERPL-MCNC: 0.98 MG/DL (ref 0.66–1.25)
DIFFERENTIAL METHOD BLD: NORMAL
EOSINOPHIL # BLD AUTO: 0.3 10E9/L (ref 0–0.7)
EOSINOPHIL NFR BLD AUTO: 4.9 %
ERYTHROCYTE [DISTWIDTH] IN BLOOD BY AUTOMATED COUNT: 12.5 % (ref 10–15)
GFR SERPL CREATININE-BSD FRML MDRD: 74 ML/MIN/1.7M2
GLUCOSE SERPL-MCNC: 115 MG/DL (ref 70–99)
HCT VFR BLD AUTO: 46.5 % (ref 40–53)
HGB BLD-MCNC: 15.4 G/DL (ref 13.3–17.7)
IMM GRANULOCYTES # BLD: 0 10E9/L (ref 0–0.4)
IMM GRANULOCYTES NFR BLD: 0.4 %
LDH SERPL L TO P-CCNC: 193 U/L (ref 85–227)
LYMPHOCYTES # BLD AUTO: 1.9 10E9/L (ref 0.8–5.3)
LYMPHOCYTES NFR BLD AUTO: 27.8 %
MCH RBC QN AUTO: 31.1 PG (ref 26.5–33)
MCHC RBC AUTO-ENTMCNC: 33.1 G/DL (ref 31.5–36.5)
MCV RBC AUTO: 94 FL (ref 78–100)
MONOCYTES # BLD AUTO: 0.8 10E9/L (ref 0–1.3)
MONOCYTES NFR BLD AUTO: 11.1 %
NEUTROPHILS # BLD AUTO: 3.8 10E9/L (ref 1.6–8.3)
NEUTROPHILS NFR BLD AUTO: 55.2 %
NRBC # BLD AUTO: 0 10*3/UL
NRBC BLD AUTO-RTO: 0 /100
PLATELET # BLD AUTO: 233 10E9/L (ref 150–450)
POTASSIUM SERPL-SCNC: 4.3 MMOL/L (ref 3.4–5.3)
PROT SERPL-MCNC: 7.6 G/DL (ref 6.8–8.8)
RBC # BLD AUTO: 4.95 10E12/L (ref 4.4–5.9)
SODIUM SERPL-SCNC: 140 MMOL/L (ref 133–144)
WBC # BLD AUTO: 6.9 10E9/L (ref 4–11)

## 2018-10-16 RX ORDER — IOPAMIDOL 755 MG/ML
135 INJECTION, SOLUTION INTRAVASCULAR ONCE
Status: COMPLETED | OUTPATIENT
Start: 2018-10-16 | End: 2018-10-16

## 2018-10-16 RX ADMIN — IOPAMIDOL 135 ML: 755 INJECTION, SOLUTION INTRAVASCULAR at 07:02

## 2018-10-16 NOTE — DISCHARGE INSTRUCTIONS

## 2018-10-18 ENCOUNTER — ONCOLOGY VISIT (OUTPATIENT)
Dept: ONCOLOGY | Facility: CLINIC | Age: 78
End: 2018-10-18
Attending: INTERNAL MEDICINE
Payer: COMMERCIAL

## 2018-10-18 VITALS
SYSTOLIC BLOOD PRESSURE: 145 MMHG | BODY MASS INDEX: 34.11 KG/M2 | DIASTOLIC BLOOD PRESSURE: 78 MMHG | HEIGHT: 69 IN | HEART RATE: 64 BPM | RESPIRATION RATE: 18 BRPM | TEMPERATURE: 97.6 F | WEIGHT: 230.3 LBS | OXYGEN SATURATION: 96 %

## 2018-10-18 DIAGNOSIS — C82.93 FOLLICULAR LYMPHOMA OF INTRA-ABDOMINAL LYMPH NODES, UNSPECIFIED GRADE (H): Primary | ICD-10-CM

## 2018-10-18 PROCEDURE — 99215 OFFICE O/P EST HI 40 MIN: CPT | Mod: GC | Performed by: INTERNAL MEDICINE

## 2018-10-18 PROCEDURE — G0463 HOSPITAL OUTPT CLINIC VISIT: HCPCS | Mod: ZF

## 2018-10-18 RX ORDER — INFLUENZA A VIRUS A/VICTORIA/4897/2022 IVR-238 (H1N1) ANTIGEN (FORMALDEHYDE INACTIVATED), INFLUENZA A VIRUS A/CALIFORNIA/122/2022 SAN-022 (H3N2) ANTIGEN (FORMALDEHYDE INACTIVATED), AND INFLUENZA B VIRUS B/MICHIGAN/01/2021 ANTIGEN (FORMALDEHYDE INACTIVATED) 60; 60; 60 UG/.5ML; UG/.5ML; UG/.5ML
INJECTION, SUSPENSION INTRAMUSCULAR
Refills: 0 | COMMUNITY
Start: 2018-09-24 | End: 2018-10-24

## 2018-10-18 ASSESSMENT — PAIN SCALES - GENERAL: PAINLEVEL: NO PAIN (0)

## 2018-10-18 NOTE — PROGRESS NOTES
Date of visit: October 18, 2018  Reason for visit: Follicular lymphoma    ONCOLOGY SUMMARY: Zack Demarco is a 77-year-old man first seen in consultation on 12/27/2017 for a new diagnosis of follicular lymphoma. He was diagnosed incidental to an evaluation for cardiac bypass surgery in 11/2017.  A chest CT scan on 11/14 showed an unexpected retroperitoneal mass with surrounding lymphadenopathy suspicious for malignancy.  A further CT scan done on the same day showed a 2.3 x 7.2 x 6.1 retroperitoneal soft tissue mass with adjacent lymphadenopathy that mildly narrowed the left renal vei  There also was nodularity within the mesentery and an enlarged hilar lymph node.  CT-guided biopsy of the retroperitoneal mass on 12/12/2017 established the diagnosis, but the sample was too small for adequate grading. There was no disease identified outside of the abdomen; a bone marrow biopsy was not obtained as part of staging.      Relative to cardiac issues, he underwent an uncomplicated 3-vessel coronary artery bypass graft on 11/22/2017 and, subsequently, has been symptom-free.       With the renal and gonadal vein compression it was decided to start treatment with rituximab, alone. Mr. Demarco completed 4 weekly doses from 01/26 and 02/16/2018. He had no difficulty with the treatment and was able to receive rapid infusion (90 minutes) for the last 3 doses. Interval evaluation on 03/05 with an US, suggested improvement but we recognized that a repeat CT would eventually be necessary and a CT scan on 04/09 showed substantial regression. Repeated CT 7/9/18 showed a good response but residual lymphoma around renal veins. Decided to proceed weekly rituximab x4 (7/26-8/22).      INTERVAL HISTORY:  Mr. Demarco was seen last in the clinic on 09/20/2018. Since then, he has been doing well without any new complaints including infections, fevers, Wt changes, night sweat or new adenopathy. He had another CT scan which showed stable disease  "(unchanged previous adenopathy). He wonders what would be the next step for this. Denies any abdominal symptoms, constitutional symptoms or adenopathy.     REVIEW OF SYSTEMS:  A 10-point review of systems is otherwise negative.      MEDICATIONS:   Current Outpatient Prescriptions   Medication     aspirin 325 MG EC tablet     atorvastatin (LIPITOR) 40 MG tablet     Cholecalciferol (VITAMIN D3 PO)     FLUZONE HIGH-DOSE 0.5 ML injection     latanoprost (XALATAN) 0.005 % ophthalmic solution     levothyroxine (SYNTHROID/LEVOTHROID) 75 MCG tablet     metoprolol tartrate (LOPRESSOR) 25 MG tablet     multivitamin (OCUVITE) TABS     UNABLE TO FIND     allopurinol (ZYLOPRIM) 300 MG tablet     nitroGLYcerin (NITROSTAT) 0.4 MG sublingual tablet      ALLERGIES:  None reported.      PHYSICAL EXAMINATION:   VITAL SIGNS:  /78 (BP Location: Left arm, Patient Position: Sitting, Cuff Size: Adult Large)  Pulse 64  Temp 97.6  F (36.4  C) (Tympanic)  Resp 18  Ht 1.753 m (5' 9.02\")  Wt 104.5 kg (230 lb 4.8 oz)  SpO2 96%  BMI 33.99 kg/m2  Wt Readings from Last 3 Encounters:   10/18/18 104.5 kg (230 lb 4.8 oz)   09/20/18 102.2 kg (225 lb 3.2 oz)   08/21/18 101.9 kg (224 lb 11.2 oz)     HEENT:  No icterus or conjunctival injection.  Oropharynx with no masses.  Mucosa moist.  No lesions.   Lymph nodes:  No cervical/supraclavicular/axillary/inguinal adenopathy.    CHEST:  Clear to auscultation.   HEART:  Regular rhythm.  No murmur or gallop.      ABDOMEN:  Soft and nontender.  Bowel sounds active.  No detectable organomegaly or mass.    EXTREMITIES: no edema, intact strength  SKIN:  No rashes, petechiae or ecchymoses.       LABORATORY DATA:     CBC  ===  WBC (10e9/L)   Date Value   10/16/2018 6.9     Hemoglobin (g/dL)   Date Value   10/16/2018 15.4     Platelet Count (10e9/L)   Date Value   10/16/2018 233       BMP  ===  Sodium (mmol/L)   Date Value   10/16/2018 140     Potassium (mmol/L)   Date Value   10/16/2018 4.3     Urea " Nitrogen (mg/dL)   Date Value   10/16/2018 24     Creatinine (mg/dL)   Date Value   10/16/2018 0.98     Calcium (mg/dL)   Date Value   10/16/2018 8.9       LFTs  ====  Bilirubin Total (mg/dL)   Date Value   10/16/2018 0.4     Alkaline Phosphatase (U/L)   Date Value   10/16/2018 79     AST (U/L)   Date Value   10/16/2018 25     ALT (U/L)   Date Value   10/16/2018 41       CT C/A/P 10/16/18  IMPRESSION:  1. Stable mesenteric fat stranding along the root of the mesentery with associated prominent/enlarged mesenteric lymph nodes.  2. Stable left periaortic mass/conglomerate of lymph nodes encasing and narrowing the left renal vasculature, however patent. No new or enlarging abdominal or pelvic lymphadenopathy.  3. Stable bilateral pulmonary nodules. No new or enlarging pulmonary nodules.  4. Stable prominent fluid-filled appendix, differentials include developing mucocele. Attention on follow-up studies.  5. Colonic diverticulosis. No associated CT findings of acute diverticulitis.     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      After weekly rituximab x4 (7/26-8/22), he had a very good radiographic response on CT scan, but subsequent CT showed residual disease. There was still some compressive effect on the left renal vein and portal veins, but remained patent.  Creatinine has been stable. When he was seen last, we decided to give him 1 more month to reassess his response to rituximab. Repeated CT scan showed stable disease without any further response. It was clear that he had a good response to weekly rituximab at the beginning and then stayed unchanged.     For better or prolonged disease control, he will require further treatment. Options are either maintenance rituximab (q 2 months for 2 years) or the initiation of rituximab plus bendamustine as we discussed before. Given his good tolerance to rituximab and initial response with a goal of disease control,  maintenance rituximab is reasonable. If he progresses on maintenance, we can change to BR. The patient prefers maintenance rituximab.     Return 10/24/2018 to start maintenance infusion. Then, see Elvira rOr in 2 months with labs for next infusion, and Dr. Bain in 4 months to determine CT scan timing. Tentatively, plan to repeat CT scan in 6 months.      Plan:  - Maintenance rituximab q 2 months, starts 10/24  - RTC in 2 months with visit with Elvira Orr and infusion.    The patient was seen and care plans discussed with Dr. Bain.    Se dorota Borges MD  [facility]  Hematology oncology and transplantation fellow  Pager [phone]     cc:      MD Rolando Pearson MD Yohannes Gebre, MD Ronald Sih, MD    Oncology Attending    Patient seen and examined with the Oncology Fellow, Dr. Borges. Agree with his findings, assessment and plan.    Gal Bain MD  Professor of Medicine  Oncology  [facility]  Office: [phone]  Clinic Fax: [phone]     cc:      MD Rolando Pearson MD Yohannes Gebre, MD Ronald Sih, MD

## 2018-10-18 NOTE — NURSING NOTE
"Oncology Rooming Note    October 18, 2018 6:45 AM   Zack Demarco is a 77 year old male who presents for:    Chief Complaint   Patient presents with     Oncology Clinic Visit     Return visit related to follicular Lymphoma     Initial Vitals: /78 (BP Location: Left arm, Patient Position: Sitting, Cuff Size: Adult Large)  Pulse 64  Temp 97.6  F (36.4  C) (Tympanic)  Resp 18  Ht 1.753 m (5' 9.02\")  Wt 104.5 kg (230 lb 4.8 oz)  SpO2 96%  BMI 33.99 kg/m2 Estimated body mass index is 33.99 kg/(m^2) as calculated from the following:    Height as of this encounter: 1.753 m (5' 9.02\").    Weight as of this encounter: 104.5 kg (230 lb 4.8 oz). Body surface area is 2.26 meters squared.  No Pain (0) Comment: Data Unavailable   No LMP for male patient.  Allergies reviewed: Yes  Medications reviewed: Yes    Medications: Medication refills not needed today.  Pharmacy name entered into [employer]: [id number] - [place], [place] - [address] [address]    Clinical concerns: No new concerns. Provider was notified.    10 minutes for nursing intake (face to face time)     Teena Rios LPN            "

## 2018-10-18 NOTE — Clinical Note
Pt waiting in room to schedule, needs infusion appt next week for rituximab and return to [facility] in 2 months for exam with labs and infusion time.

## 2018-10-18 NOTE — MR AVS SNAPSHOT
After Visit Summary   10/18/2018    Zack Demarco    MRN: [id number]           Patient Information     Date Of Birth          1940        Visit Information        Provider Department      10/18/2018 7:00 AM Gal Bain MD [facility]        Today's Diagnoses     Follicular lymphoma of intra-abdominal lymph nodes, unspecified grade (H)    -  1       Follow-ups after your visit        Follow-up notes from your care team     Return in about 2 months (around 12/18/2018) for Physical Exam, Lab Work, chemoRx.      Your next 10 appointments already scheduled     Oct 24, 2018  8:00 AM CDT   Infusion 360 with UC ONCOLOGY INFUSION, UC 14 ATC   [facility] ([facility])    [address]  [address]  [facility] [address] [phone]            Dec 20, 2018  7:15 AM CST   [facility] Lab Draw with UC MASONIC LAB DRAW   [facility] ([facility])    [address]  [address]  [facility] [address] [phone]            Dec 20, 2018  7:50 AM CST   (Arrive by 7:35 AM)   Return Visit with HOLLY Walton CNP   [facility] ([facility])    [address]  [address]  [facility] [address] [phone]            Dec 20, 2018  8:30 AM CST   Infusion 360 with UC ONCOLOGY INFUSION, UC 19 ATC   [facility] ([facility])    [address]  [address]  [facility] [address] [phone]            Jan 18, 2019  8:00 AM CST   New Visit with Avtar Mccullough MD   [facility]    [address] [place] [address] [phone]            Aug 22, 2019  7:30 AM CDT   [facility] Visit with Torsten Reyes MD   [facility]    [address] [place] [id number] [phone]          "     Future tests that were ordered for you today     Open Future Orders        Priority Expected Expires Ordered    *CBC with platelets differential Routine 12/19/2018 10/18/2019 10/18/2018    Comprehensive metabolic panel Routine 12/19/2018 10/18/2019 10/18/2018    Lactate Dehydrogenase Routine 12/19/2018 10/18/2019 10/18/2018            Who to contact     If you have questions or need follow up information about today's clinic visit or your schedule please contact [facility] directly at [phone].  Normal or non-critical lab and imaging results will be communicated to you by Rexlyhart, letter or phone within 4 business days after the clinic has received the results. If you do not hear from us within 7 days, please contact the clinic through [email or web address] or phone. If you have a critical or abnormal lab result, we will notify you by phone as soon as possible.  Submit refill requests through [email or web address] or call your pharmacy and they will forward the refill request to us. Please allow 3 business days for your refill to be completed.          Additional Information About Your Visit        [email or web address] Information     [email or web address] gives you secure access to your electronic health record. If you see a primary care provider, you can also send messages to your care team and make appointments. If you have questions, please call your primary care clinic.  If you do not have a primary care provider, please call [phone] and they will assist you.        Care EveryWhere ID     This is your Care EveryWhere ID. This could be used by other organizations to access your [place] medical records  [id number]        Your Vitals Were     Pulse Temperature Respirations Height Pulse Oximetry BMI (Body Mass Index)    64 97.6  F (36.4  C) (Tympanic) 18 1.753 m (5' 9.02\") 96% 33.99 kg/m2       Blood Pressure from Last 3 Encounters:   10/18/18 145/78   09/20/18 161/78   08/21/18 125/67    Weight from Last 3 Encounters:   10/18/18 " 104.5 kg (230 lb 4.8 oz)   09/20/18 102.2 kg (225 lb 3.2 oz)   08/21/18 101.9 kg (224 lb 11.2 oz)               Primary Care Provider Office Phone # Fax #    Anastacio Love [phone] [address] [facility] [address]        Equal Access to Services     [facility] : Hadii aad ku hadasho Soomaali, waaxda luqadaha, qaybta kaalmada adeegyada, waxay idiin hayaan adeeg kharash la'aan . So [facility] [phone].    ATENCIÓN: Si habla espiveth, tiene a warner disposición servicios gratuitos de asistencia lingüística. Llame al [phone].    We comply with applicable federal civil rights laws and [place] laws. We do not discriminate on the basis of race, color, national origin, age, disability, sex, sexual orientation, or gender identity.            Thank you!     Thank you for choosing [facility]  for your care. Our goal is always to provide you with excellent care. Hearing back from our patients is one way we can continue to improve our services. Please take a few minutes to complete the written survey that you may receive in the mail after your visit with us. Thank you!             Your Updated Medication List - Protect others around you: Learn how to safely use, store and throw away your medicines at www.disposemymeds.org.          This list is accurate as of 10/18/18  8:27 AM.  Always use your most recent med list.                   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)       aspirin 325 MG EC tablet      1/2 tab daily        atorvastatin 40 MG tablet    LIPITOR    60 tablet    Take 1 tablet (40 mg) by mouth daily    Hyperlipidemia LDL goal <100       FLUZONE HIGH-DOSE 0.5 ML injection   Generic drug:  influenza Vac Split High-Dose      ADM 0.5ML IM UTD        latanoprost 0.005 % ophthalmic solution     XALATAN    3 Bottle    Place 1 drop into both eyes At Bedtime    Borderline glaucoma with ocular hypertension, unspecified laterality       levothyroxine 75 MCG tablet    SYNTHROID/LEVOTHROID    90 tablet    Take 1 tablet (75 mcg) by mouth daily    Hypothyroidism, unspecified type       metoprolol tartrate 25 MG tablet    LOPRESSOR    180 tablet    Take 1 tablet (25 mg) by mouth 2 times daily    Hypertension goal BP (blood pressure) < 140/90       multivitamin Tabs tablet      Take 1 tablet by mouth daily        nitroGLYcerin 0.4 MG sublingual tablet    NITROSTAT    25 tablet    For chest pain place 1 tablet under the tongue every 5 minutes for 3 doses. If symptoms persist 5 minutes after 1st dose call 911.    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

## 2018-10-18 NOTE — LETTER
10/18/2018       RE: Zakc Demarco  [address] [facility] [address]     Dear Colleague,    Thank you for referring your patient, Zack Demarco, to the [facility]. Please see a copy of my visit note below.    Date of visit: October 18, 2018  Reason for visit: Follicular lymphoma    ONCOLOGY SUMMARY: Zack Demarco is a 77-year-old man first seen in consultation on 12/27/2017 for a new diagnosis of follicular lymphoma. He was diagnosed incidental to an evaluation for cardiac bypass surgery in 11/2017.  A chest CT scan on 11/14 showed an unexpected retroperitoneal mass with surrounding lymphadenopathy suspicious for malignancy.  A further CT scan done on the same day showed a 2.3 x 7.2 x 6.1 retroperitoneal soft tissue mass with adjacent lymphadenopathy that mildly narrowed the left renal vei  There also was nodularity within the mesentery and an enlarged hilar lymph node.  CT-guided biopsy of the retroperitoneal mass on 12/12/2017 established the diagnosis, but the sample was too small for adequate grading. There was no disease identified outside of the abdomen; a bone marrow biopsy was not obtained as part of staging.      Relative to cardiac issues, he underwent an uncomplicated 3-vessel coronary artery bypass graft on 11/22/2017 and, subsequently, has been symptom-free.       With the renal and gonadal vein compression it was decided to start treatment with rituximab, alone. Mr. Demarco completed 4 weekly doses from 01/26 and 02/16/2018. He had no difficulty with the treatment and was able to receive rapid infusion (90 minutes) for the last 3 doses. Interval evaluation on 03/05 with an US, suggested improvement but we recognized that a repeat CT would eventually be necessary and a CT scan on 04/09 showed substantial regression. Repeated CT 7/9/18 showed a good response but residual lymphoma around renal veins. Decided to proceed weekly rituximab x4 (7/26-8/22).      INTERVAL HISTORY:  Mr. Demarco  "was seen last in the clinic on 09/20/2018. Since then, he has been doing well without any new complaints including infections, fevers, Wt changes, night sweat or new adenopathy. He had another CT scan which showed stable disease (unchanged previous adenopathy). He wonders what would be the next step for this. Denies any abdominal symptoms, constitutional symptoms or adenopathy.     REVIEW OF SYSTEMS:  A 10-point review of systems is otherwise negative.      MEDICATIONS:   Current Outpatient Prescriptions   Medication     aspirin 325 MG EC tablet     atorvastatin (LIPITOR) 40 MG tablet     Cholecalciferol (VITAMIN D3 PO)     FLUZONE HIGH-DOSE 0.5 ML injection     latanoprost (XALATAN) 0.005 % ophthalmic solution     levothyroxine (SYNTHROID/LEVOTHROID) 75 MCG tablet     metoprolol tartrate (LOPRESSOR) 25 MG tablet     multivitamin (OCUVITE) TABS     UNABLE TO FIND     allopurinol (ZYLOPRIM) 300 MG tablet     nitroGLYcerin (NITROSTAT) 0.4 MG sublingual tablet      ALLERGIES:  None reported.      PHYSICAL EXAMINATION:   VITAL SIGNS:  /78 (BP Location: Left arm, Patient Position: Sitting, Cuff Size: Adult Large)  Pulse 64  Temp 97.6  F (36.4  C) (Tympanic)  Resp 18  Ht 1.753 m (5' 9.02\")  Wt 104.5 kg (230 lb 4.8 oz)  SpO2 96%  BMI 33.99 kg/m2  Wt Readings from Last 3 Encounters:   10/18/18 104.5 kg (230 lb 4.8 oz)   09/20/18 102.2 kg (225 lb 3.2 oz)   08/21/18 101.9 kg (224 lb 11.2 oz)     HEENT:  No icterus or conjunctival injection.  Oropharynx with no masses.  Mucosa moist.  No lesions.   Lymph nodes:  No cervical/supraclavicular/axillary/inguinal adenopathy.    CHEST:  Clear to auscultation.   HEART:  Regular rhythm.  No murmur or gallop.      ABDOMEN:  Soft and nontender.  Bowel sounds active.  No detectable organomegaly or mass.    EXTREMITIES: no edema, intact strength  SKIN:  No rashes, petechiae or ecchymoses.       LABORATORY DATA:     CBC  ===  WBC (10e9/L)   Date Value   10/16/2018 6.9 "     Hemoglobin (g/dL)   Date Value   10/16/2018 15.4     Platelet Count (10e9/L)   Date Value   10/16/2018 233       BMP  ===  Sodium (mmol/L)   Date Value   10/16/2018 140     Potassium (mmol/L)   Date Value   10/16/2018 4.3     Urea Nitrogen (mg/dL)   Date Value   10/16/2018 24     Creatinine (mg/dL)   Date Value   10/16/2018 0.98     Calcium (mg/dL)   Date Value   10/16/2018 8.9       LFTs  ====  Bilirubin Total (mg/dL)   Date Value   10/16/2018 0.4     Alkaline Phosphatase (U/L)   Date Value   10/16/2018 79     AST (U/L)   Date Value   10/16/2018 25     ALT (U/L)   Date Value   10/16/2018 41       CT C/A/P 10/16/18  IMPRESSION:  1. Stable mesenteric fat stranding along the root of the mesentery with associated prominent/enlarged mesenteric lymph nodes.  2. Stable left periaortic mass/conglomerate of lymph nodes encasing and narrowing the left renal vasculature, however patent. No new or enlarging abdominal or pelvic lymphadenopathy.  3. Stable bilateral pulmonary nodules. No new or enlarging pulmonary nodules.  4. Stable prominent fluid-filled appendix, differentials include developing mucocele. Attention on follow-up studies.  5. Colonic diverticulosis. No associated CT findings of acute diverticulitis.     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      After weekly rituximab x4 (7/26-8/22), he had a very good radiographic response on CT scan, but subsequent CT showed residual disease. There was still some compressive effect on the left renal vein and portal veins, but remained patent.  Creatinine has been stable. When he was seen last, we decided to give him 1 more month to reassess his response to rituximab. Repeated CT scan showed stable disease without any further response. It was clear that he had a good response to weekly rituximab at the beginning and then stayed unchanged.     For better or prolonged disease control, he will require further  treatment. Options are either maintenance rituximab (q 2 months for 2 years) or the initiation of rituximab plus bendamustine as we discussed before. Given his good tolerance to rituximab and initial response with a goal of disease control, maintenance rituximab is reasonable. If he progresses on maintenance, we can change to BR. The patient prefers maintenance rituximab.     Return 10/24/2018 to start maintenance infusion. Then, see Elvira Orr in 2 months with labs for next infusion, and Dr. Bain in 4 months to determine CT scan timing. Tentatively, plan to repeat CT scan in 6 months.      Plan:  - Maintenance rituximab q 2 months, starts 10/24  - RTC in 2 months with visit with Elvira Orr and infusion.    The patient was seen and care plans discussed with Dr. Bain.    Se dorota Borges MD  [facility]  Hematology oncology and transplantation fellow  Pager [phone]     cc:      MD Rolando Pearson MD Yohannes Gebre, MD Ronald Sih, MD    Oncology Attending    Patient seen and examined with the Oncology Fellow, Dr. Borges. Agree with his findings, assessment and plan.    Gal Bain MD  Professor of Medicine  Oncology  [facility]  Office: [phone]  Clinic Fax: [phone]     cc:      MD Rolando Pearson MD Yohannes Gebre, MD Ronald Sih, MD

## 2018-10-22 RX ORDER — EPINEPHRINE 0.3 MG/.3ML
0.3 INJECTION SUBCUTANEOUS EVERY 5 MIN PRN
Status: CANCELLED | OUTPATIENT
Start: 2018-10-24

## 2018-10-22 RX ORDER — ACETAMINOPHEN 325 MG/1
650 TABLET ORAL ONCE
Status: CANCELLED
Start: 2018-10-24

## 2018-10-22 RX ORDER — EPINEPHRINE 1 MG/ML
0.3 INJECTION, SOLUTION INTRAMUSCULAR; SUBCUTANEOUS EVERY 5 MIN PRN
Status: CANCELLED | OUTPATIENT
Start: 2018-10-24

## 2018-10-22 RX ORDER — MEPERIDINE HYDROCHLORIDE 25 MG/ML
25 INJECTION INTRAMUSCULAR; INTRAVENOUS; SUBCUTANEOUS
Status: CANCELLED
Start: 2018-10-24

## 2018-10-22 RX ORDER — ALBUTEROL SULFATE 0.83 MG/ML
2.5 SOLUTION RESPIRATORY (INHALATION)
Status: CANCELLED | OUTPATIENT
Start: 2018-10-24

## 2018-10-22 RX ORDER — ALBUTEROL SULFATE 90 UG/1
1-2 AEROSOL, METERED RESPIRATORY (INHALATION)
Status: CANCELLED
Start: 2018-10-24

## 2018-10-22 RX ORDER — METHYLPREDNISOLONE SODIUM SUCCINATE 125 MG/2ML
125 INJECTION, POWDER, LYOPHILIZED, FOR SOLUTION INTRAMUSCULAR; INTRAVENOUS
Status: CANCELLED
Start: 2018-10-24

## 2018-10-22 RX ORDER — MEPERIDINE HYDROCHLORIDE 25 MG/ML
25 INJECTION INTRAMUSCULAR; INTRAVENOUS; SUBCUTANEOUS EVERY 30 MIN PRN
Status: CANCELLED | OUTPATIENT
Start: 2018-10-24

## 2018-10-22 RX ORDER — DIPHENHYDRAMINE HCL 25 MG
50 CAPSULE ORAL ONCE
Status: CANCELLED
Start: 2018-10-24

## 2018-10-22 RX ORDER — DIPHENHYDRAMINE HYDROCHLORIDE 50 MG/ML
50 INJECTION INTRAMUSCULAR; INTRAVENOUS
Status: CANCELLED
Start: 2018-10-24

## 2018-10-22 RX ORDER — SODIUM CHLORIDE 9 MG/ML
1000 INJECTION, SOLUTION INTRAVENOUS CONTINUOUS PRN
Status: CANCELLED
Start: 2018-10-24

## 2018-10-24 ENCOUNTER — INFUSION THERAPY VISIT (OUTPATIENT)
Dept: ONCOLOGY | Facility: CLINIC | Age: 78
End: 2018-10-24
Attending: INTERNAL MEDICINE
Payer: MEDICARE

## 2018-10-24 VITALS
HEART RATE: 54 BPM | TEMPERATURE: 98.1 F | SYSTOLIC BLOOD PRESSURE: 155 MMHG | RESPIRATION RATE: 18 BRPM | BODY MASS INDEX: 33.89 KG/M2 | WEIGHT: 229.6 LBS | OXYGEN SATURATION: 97 % | DIASTOLIC BLOOD PRESSURE: 60 MMHG

## 2018-10-24 DIAGNOSIS — C82.99 FOLLICULAR LYMPHOMA OF EXTRANODAL AND SOLID ORGAN SITES (H): Primary | ICD-10-CM

## 2018-10-24 PROCEDURE — A9270 NON-COVERED ITEM OR SERVICE: HCPCS | Mod: ZF | Performed by: INTERNAL MEDICINE

## 2018-10-24 PROCEDURE — 96413 CHEMO IV INFUSION 1 HR: CPT

## 2018-10-24 PROCEDURE — 40000556 ZZH STATISTIC PERIPHERAL IV START W US GUIDANCE: Mod: ZF

## 2018-10-24 PROCEDURE — 25000128 H RX IP 250 OP 636: Mod: ZF | Performed by: INTERNAL MEDICINE

## 2018-10-24 PROCEDURE — 25000132 ZZH RX MED GY IP 250 OP 250 PS 637: Mod: ZF | Performed by: INTERNAL MEDICINE

## 2018-10-24 RX ORDER — DIPHENHYDRAMINE HCL 25 MG
50 CAPSULE ORAL ONCE
Status: COMPLETED | OUTPATIENT
Start: 2018-10-24 | End: 2018-10-24

## 2018-10-24 RX ORDER — ACETAMINOPHEN 325 MG/1
650 TABLET ORAL ONCE
Status: COMPLETED | OUTPATIENT
Start: 2018-10-24 | End: 2018-10-24

## 2018-10-24 RX ADMIN — RITUXIMAB 800 MG: 10 INJECTION, SOLUTION INTRAVENOUS at 09:12

## 2018-10-24 RX ADMIN — DIPHENHYDRAMINE HYDROCHLORIDE 50 MG: 25 CAPSULE ORAL at 08:15

## 2018-10-24 RX ADMIN — SODIUM CHLORIDE 250 ML: 9 INJECTION, SOLUTION INTRAVENOUS at 09:12

## 2018-10-24 RX ADMIN — ACETAMINOPHEN 650 MG: 325 TABLET ORAL at 08:15

## 2018-10-24 ASSESSMENT — PAIN SCALES - GENERAL: PAINLEVEL: NO PAIN (0)

## 2018-10-24 NOTE — PATIENT INSTRUCTIONS
Contact Numbers    [facility] [place]: [phone]  [facility] Triage and after hours / weekends / holidays:  [phone]      Please call the triage or after hours line if you experience a temperature greater than or equal to 100.5, shaking chills, have uncontrolled nausea, vomiting and/or diarrhea, dizziness, shortness of breath, chest pain, bleeding, unexplained bruising, or if you have any other new/concerning symptoms, questions or concerns.      If you are having any concerning symptoms or wish to speak to a provider before your next infusion visit, please call your care coordinator or triage to notify them so we can adequately serve you.     If you need a refill on a narcotic prescription or other medication, please call before your infusion appointment.           October 2018 Sunday Monday Tuesday Wednesday Thursday Friday Saturday        1     2     3     4     5     6       7     8     9     10     11     12     13       14     15     16     LAB    7:00 AM   (15 min.)    LAB   [facility]     CT CHEST/ABDOMEN/PELVIS W    7:40 AM   (20 min.)   UCCT1   [facility] CT 17     18     [facility] RETURN    6:45 AM   (30 min.)   Gal Bain MD   [facility] 19     20       21     22     23     24     [facility] ONC INFUSION 360    8:00 AM   (360 min.)    ONCOLOGY INFUSION   [facility] 25     26     27       28     29     30     31 November 2018 Sunday Monday Tuesday Wednesday Thursday Friday Saturday                       1     2     3       4     5  Happy Birthday!     6     7     8     9     10       11     12     13     14     15     16     17       18     19     20     21     22     23     24       25     26     27     28     29     30                    No results found for this or any previous visit (from the past 24 hour(s)).

## 2018-10-24 NOTE — PROGRESS NOTES
Infusion Nursing Note:  Zack Demarco presents today for cycle 1, day 1 rapid rituxan.    Patient seen by provider today: No   present during visit today: Not Applicable.    Note: Patient states that he is feeling well, denies any new complaints since seeing Dr Bain last week.  He has previously taken allopurinol with his rituxan and questioned if needed again.    10/24/2018 0923 TORB Gal Bain MD/Brittany Hardy RN  -patient does NOT need to take any allopurinol with this cycle of rituxan    Intravenous Access:  Peripheral IV placed.    Treatment Conditions:  Not Applicable.      Post Infusion Assessment:  Patient tolerated infusion without incident.  Blood return noted pre and post infusion.  Site patent and intact, free from redness, edema or discomfort.  No evidence of extravasations.  Access discontinued per protocol.    Discharge Plan:   Patient declined prescription refills.  Discharge instructions reviewed with: Patient.  Patient and/or family verbalized understanding of discharge instructions and all questions answered.  AVS to patient via ChromaHART.  Patient will return 12/20 for next appointment.   Patient discharged in stable condition accompanied by: self.  Departure Mode: Ambulatory.  Face to Face time: 0.    Brittany Hardy, RN

## 2018-10-24 NOTE — MR AVS SNAPSHOT
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      If you are having any concerning symptoms or wish to speak to a provider before your next infusion visit, please call your care coordinator or triage to notify them so we can adequately serve you.     If you need a refill on a narcotic prescription or other medication, please call before your infusion appointment.           October 2018 Sunday Monday Tuesday Wednesday Thursday Friday Saturday        1     2     3     4     5     6       7     8     9     10     11     12     13       14     15     16     LAB    7:00 AM   (15 min.)    LAB   [facility]     CT CHEST/ABDOMEN/PELVIS W    7:40 AM   (20 min.)   UCCT1   [facility] CT 17     18     [facility] RETURN    6:45 AM   (30 min.)   Gal Bain MD   [facility] 19     20       21     22     23     24     [facility] ONC INFUSION 360    8:00 AM   (360 min.)    ONCOLOGY INFUSION   [facility] 25     26     27       28     29     30     31 November 2018 Sunday Monday Tuesday Wednesday Thursday Friday Saturday                       1     2     3        4     5  Happy Birthday!     6     7     8     9     10       11     12     13     14     15     16     17       18     19     20     21     22     23     24       25     26     27     28     29     30                    No results found for this or any previous visit (from the past 24 hour(s)).              Follow-ups after your visit        Your next 10 appointments already scheduled     Dec 20, 2018  7:15 AM CST   [facility] Draw with  [facility] DRAW   [facility] Draw ([facility])    [address]  [address]  [facility] [id number] [phone]            Dec 20, 2018  7:50 AM CST   (Arrive by 7:35 AM)   Return Visit with HOLLY Walton CNP   [facility] ([facility])    [address]  [address]  [place] [address] [phone]            Dec 20, 2018  8:30 AM CST   Infusion 360 with  ONCOLOGY INFUSION, UC 19 ATC   [facility] ([facility])    [address]  [address]  [place] [address] [phone]            Jan 18, 2019  8:00 AM CST   New Visit with Avtar Mccullough MD   [facility]    [address] [place] [address] [phone]            Aug 22, 2019  7:30 AM CDT   Return Visit with Torsten Reyes MD   [facility] ([facility])    [address] [place] [address] [phone]              Who to contact     If you have questions or need follow up information about today's clinic visit or your schedule please contact [facility] directly at [phone].  Normal or non-critical lab and imaging results will be communicated to you by MyChart, letter or phone within 4 business days after the clinic has received the results. If you do not hear from us within 7 days, please contact the clinic through [employer]  or phone. If you have a critical or abnormal lab result, we will notify you by phone as soon as possible.  Submit refill requests through [employer] or call your pharmacy and they will forward the refill request to us. Please allow 3 business days for your refill to be completed.          Additional Information About Your Visit        [employer] Information     [employer] gives you secure access to your electronic health record. If you see a primary care provider, you can also send messages to your care team and make appointments. If you have questions, please call your primary care clinic.  If you do not have a primary care provider, please call [phone] and they will assist you.        Care EveryWhere ID     This is your Care EveryWhere ID. This could be used by other organizations to access your [facility] medical records  [id number]        Your Vitals Were     Pulse Temperature Respirations Pulse Oximetry BMI (Body Mass Index)       54 98.1  F (36.7  C) (Oral) 18 97% 33.89 kg/m2        Blood Pressure from Last 3 Encounters:   10/24/18 155/60   10/18/18 145/78   09/20/18 161/78    Weight from Last 3 Encounters:   10/24/18 104.1 kg (229 lb 9.6 oz)   10/18/18 104.5 kg (230 lb 4.8 oz)   09/20/18 102.2 kg (225 lb 3.2 oz)              Today, you had the following     No orders found for display       Primary Care Provider Office Phone # Fax #    Anastacio Love [phone] [address] [place] [address] [address]        Equal Access to Services     [facility]: Hadii aad ku hadasho Soomaali, waaxda luqadaha, qaybta kaalmada adeegyada, marilynn reynolds. So [facility] [phone].    ATENCIÓN: Si habla español, tiene a warner disposición servicios gratuitos de asistencia lingüística. Llame al [phone].    We comply with applicable federal civil rights laws and [place] laws. We do not discriminate on the basis of race, color, national origin, age, disability, sex, sexual  orientation, or gender identity.            Thank you!     Thank you for choosing [facility]  for your care. Our goal is always to provide you with excellent care. Hearing back from our patients is one way we can continue to improve our services. Please take a few minutes to complete the written survey that you may receive in the mail after your visit with us. Thank you!             Your Updated Medication List - Protect others around you: Learn how to safely use, store and throw away your medicines at www.disposemymeds.org.          This list is accurate as of 10/24/18 10:53 AM.  Always use your most recent med list.                   Brand Name Dispense Instructions for use Diagnosis    aspirin 325 MG EC tablet      1/2 tab daily        atorvastatin 40 MG tablet    LIPITOR    60 tablet    Take 1 tablet (40 mg) by mouth daily    Hyperlipidemia LDL goal <100       latanoprost 0.005 % ophthalmic solution    XALATAN    3 Bottle    Place 1 drop into both eyes At Bedtime    Borderline glaucoma with ocular hypertension, unspecified laterality       levothyroxine 75 MCG tablet    SYNTHROID/LEVOTHROID    90 tablet    Take 1 tablet (75 mcg) by mouth daily    Hypothyroidism, unspecified type       metoprolol tartrate 25 MG tablet    LOPRESSOR    180 tablet    Take 1 tablet (25 mg) by mouth 2 times daily    Hypertension goal BP (blood pressure) < 140/90       multivitamin Tabs tablet      Take 1 tablet by mouth daily        nitroGLYcerin 0.4 MG sublingual tablet    NITROSTAT    25 tablet    For chest pain place 1 tablet under the tongue every 5 minutes for 3 doses. If symptoms persist 5 minutes after 1st dose call 911.    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

## 2018-11-16 ENCOUNTER — TRANSFERRED RECORDS (OUTPATIENT)
Dept: HEALTH INFORMATION MANAGEMENT | Facility: CLINIC | Age: 78
End: 2018-11-16

## 2018-11-29 ENCOUNTER — TELEPHONE (OUTPATIENT)
Dept: OPHTHALMOLOGY | Facility: CLINIC | Age: 78
End: 2018-11-29

## 2018-11-29 NOTE — TELEPHONE ENCOUNTER
Type of surgery: Cataract Extraction with Intraocular Lens Implant Left Eye CPT code 11883  Location of surgery: Other: [facility]  Date and time of surgery: 12/17/2018 @ 2:00pm  Surgeon: Dr. Mccullough  Pre-Op Appt Date: 12/10/2018  Post-Op Appt Date: 12/18/2018   Packet sent out: Yes  Pre-cert/Authorization completed:  No prior auth for [employer]. Follows Medicare guidelines. [facility] is Okay.   Date: 11/29/2018    Insurance valid.

## 2018-12-10 ENCOUNTER — OFFICE VISIT (OUTPATIENT)
Dept: FAMILY MEDICINE | Facility: CLINIC | Age: 78
End: 2018-12-10
Payer: COMMERCIAL

## 2018-12-10 VITALS
BODY MASS INDEX: 33.36 KG/M2 | WEIGHT: 226 LBS | OXYGEN SATURATION: 94 % | RESPIRATION RATE: 16 BRPM | SYSTOLIC BLOOD PRESSURE: 138 MMHG | DIASTOLIC BLOOD PRESSURE: 60 MMHG | HEART RATE: 79 BPM | TEMPERATURE: 98.7 F

## 2018-12-10 DIAGNOSIS — Z01.818 PREOP GENERAL PHYSICAL EXAM: Primary | ICD-10-CM

## 2018-12-10 DIAGNOSIS — H26.9 CATARACT OF LEFT EYE, UNSPECIFIED CATARACT TYPE: ICD-10-CM

## 2018-12-10 DIAGNOSIS — I10 HYPERTENSION GOAL BP (BLOOD PRESSURE) < 140/90: ICD-10-CM

## 2018-12-10 PROCEDURE — 99214 OFFICE O/P EST MOD 30 MIN: CPT | Performed by: FAMILY MEDICINE

## 2018-12-10 RX ORDER — LOSARTAN POTASSIUM 25 MG/1
25 TABLET ORAL DAILY
Qty: 90 TABLET | Refills: 3 | Status: SHIPPED | OUTPATIENT
Start: 2018-12-10 | End: 2019-04-18

## 2018-12-10 NOTE — PATIENT INSTRUCTIONS
1. Please follow your surgeon's recommendations before the surgery.  2. Please start Losartan 25 mg daily.  3. Continue your other medications.  4. See you soon for a physical with fasting labs.

## 2018-12-10 NOTE — NURSING NOTE
"Chief Complaint   Patient presents with     Pre-Op Exam       Initial /72 (Cuff Size: Adult Large)   Pulse 79   Temp 98.7  F (37.1  C) (Oral)   Resp 16   Wt 102.5 kg (226 lb)   SpO2 94%   BMI 33.36 kg/m   Estimated body mass index is 33.36 kg/m  as calculated from the following:    Height as of 10/18/18: 1.753 m (5' 9.02\").    Weight as of this encounter: 102.5 kg (226 lb).      Vibha Smyth LPN    "

## 2018-12-10 NOTE — PROGRESS NOTES
"  [facility]  [address] [address] [id number] [phone]  Dept: [phone]    PRE-OP EVALUATION:  Today's date: 12/10/2018    Zack Demarco (: 1940) presents for pre-operative evaluation assessment as requested by Dr. Mccullough.  He requires evaluation and anesthesia risk assessment prior to undergoing surgery/procedure \"Cataract Extraction with Intraocular Lens Implant Left Eye CPT code 18623\".    Fax number for surgical facility: ???  Primary Physician: Anastacio Love  Type of Anesthesia Anticipated: to be determined    Patient has a Health Care Directive or Living Will:  YES     Preop Questions 2018   Who is doing your surgery? Dr. Mccullough   What are you having done? [facility]   Date of Surgery/Procedure: 2018   Facility or Hospital where procedure/surgery will be performed[facility]   1.  Do you have a history of Heart attack, stroke, stent, coronary bypass surgery, or other heart surgery? YES  -    2.  Do you ever have any pain or discomfort in your chest? No   3.  Do you have a history of  Heart Failure? No   4.   Are you troubled by shortness of breath when:  walking on a level surface, or up a slight hill, or at night? No   5.  Do you currently have a cold, bronchitis or other respiratory infection? No   6.  Do you have a cough, shortness of breath, or wheezing? No   7.  Do you sometimes get pains in the calves of your legs when you walk? No   8. Do you or anyone in your family have previous history of blood clots? No   9.  Do you or does anyone in your family have a serious bleeding problem such as prolonged bleeding following surgeries or cuts? No   10. Have you ever had problems with anemia or been told to take iron pills? No   11. Have you had any abnormal blood loss such as black, tarry or bloody stools? No   12. Have you ever had a blood transfusion? No   13. Have you or any of your relatives ever had problems with anesthesia? " No   14. Do you have sleep apnea, excessive snoring or daytime drowsiness? No   15. Do you have any prosthetic heart valves? No   16. Do you have prosthetic joints? No         HPI:     Left eye cataract - has had blurry vision.  Evaluation and treatment:   Scheduled for surgery 12/17/18.   No contraindications to surgery - may proceed without further evaluation.     CAD/HTN - he denies angina, shortness of breath. 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 Also has varicose veins. Not checking BP lately. BP fairly controlled in clinic but could be better.  Evaluation and treatment:    Was Hospitalized 11/22 to 11/27/17 - received CABG x 3.   Metoprolol 25 mg bid - dose limited by heart rate.   ASA qd   NTG prn but not using lately.   Losartan/HCTZ 50/12.5 qd stopped in the Hospital because it is not needed.   I asked him to restart Losartan at 25 mg daily.   Compression stockings.   He follows with cardiology.    BP Readings from Last 6 Encounters:   12/10/18 138/60   10/24/18 155/60   10/18/18 145/78   09/20/18 161/78   08/21/18 125/67   08/16/18 133/55       Last Comprehensive Metabolic Panel:  Sodium   Date Value Ref Range Status   10/16/2018 140 133 - 144 mmol/L Final     Potassium   Date Value Ref Range Status   10/16/2018 4.3 3.4 - 5.3 mmol/L Final     Chloride   Date Value Ref Range Status   10/16/2018 106 94 - 109 mmol/L Final     Carbon Dioxide   Date Value Ref Range Status   10/16/2018 28 20 - 32 mmol/L Final     Anion Gap   Date Value Ref Range Status   10/16/2018 6 3 - 14 mmol/L Final     Glucose   Date Value Ref Range Status   10/16/2018 115 (H) 70 - 99 mg/dL Final     Urea Nitrogen   Date Value Ref Range Status   10/16/2018 24 7 - 30 mg/dL Final     Creatinine   Date Value Ref Range Status   10/16/2018 0.98 0.66 - 1.25 mg/dL Final     GFR Estimate   Date Value Ref Range Status   10/16/2018 74 >60 mL/min/1.7m2 Final     Comment:     Non  GFR Calc     Calcium   Date Value Ref Range  Status   10/16/2018 8.9 8.5 - 10.1 mg/dL Final     Lymphoma - found incidentally during other evaluation. No symptoms.  Evaluation and treatment:    He follows with oncology.    CBC RESULTS:   Recent Labs   Lab Test 10/16/18  0651   WBC 6.9   RBC 4.95   HGB 15.4   HCT 46.5   MCV 94   MCH 31.1   MCHC 33.1   RDW 12.5        Dyslipidemia - has h/o CAD.  Evaluation and treatment:    Per ATP4, moderate to high intensity statin recommended.:   Crestor stopped due to cost   Simvastatin changed to Lipitor 40 mg qd in the Hospital - no side effects.   Continue same treatment.    Recent Labs   Lab Test 07/20/17  0725 06/09/16  0742  01/22/15  0816 01/14/14  0747   CHOL 148 191   < > 154 154   HDL 32* 33*   < > 43 33*   LDL 76 127*   < > 92 103   TRIG 201* 155*   < > 93 87   CHOLHDLRATIO  --   --   --  3.6 4.7    < > = values in this interval not displayed.     Hypothyroidism - No thyroid symptoms.  Evaluation and treatment:    Synthroid 75 mcg every day.   Continue same tx for now.    TSH   Date Value Ref Range Status   11/14/2017 6.20 (H) 0.40 - 4.00 mU/L Final     T4 Free   Date Value Ref Range Status   07/20/2017 0.84 0.76 - 1.46 ng/dL Final     Obesity -   Evaluation and treatment:    diet and exercise discussed.     Body mass index is 33.36 kg/m .    Wt Readings from Last 5 Encounters:   12/10/18 102.5 kg (226 lb)   10/24/18 104.1 kg (229 lb 9.6 oz)   10/18/18 104.5 kg (230 lb 4.8 oz)   09/20/18 102.2 kg (225 lb 3.2 oz)   08/21/18 101.9 kg (224 lb 11.2 oz)     BPH - stream is reduced. Nocturia x 1.  Evaluation and treatment:    I asked him to let me know if he wants treatment.    Right shoulder pain -   Evaluation and treatment:    He follows with ortho.   He received a steroid shot.    Hearing loss - he wants to hold off on hearing aides referral.     Surgical history -    left rotator cuff surgery.    Had anterior tibialis repair (for foot drop) on 8/18/15 - good results.    CABG as above.    Preventive: I  advised Shingrix at our pharmacy but his oncologist has advised against it.    Immunization History   Administered Date(s) Administered     Influenza (High Dose) 3 valent vaccine 10/15/2015, 10/20/2016, 09/25/2017, 09/24/2018     Influenza (IIV3) PF 10/25/2012, 10/01/2013, 11/24/2014     Pneumo Conj 13-V (2010&after) 01/22/2016     Pneumococcal 23 valent 11/30/2006     TD (ADULT, 7+) 08/25/2010     TDAP Vaccine (Boostrix) 01/21/2013     Zoster vaccine, live 07/15/2008     Colonoscopy: 9/19/16 - advised to redo in 5 years.     Prostate CA screen: discussed controversy. Prostate mildly enlarged on 7/15/14.     PSA   Date Value Ref Range Status   06/09/2016 2.76 0 - 4 ug/L Final     AAA: 7/18/14 negative    Advanced Directive: referred previously    Vit D Geriatrics Society Guidelines: Older adults should consume 4000 IU of Vit D daily from all sources. This will achieve serum level of 30. No need to test unless obese, malabsorption etc. You get only 100 units per glass of milk. 2000 units advised.    SH:    Marital status: , lives by himself in [place].  Kids: 2  Employment: Works at a machine shop.  Exercise: Walks a lot at work. Had been running 4 miles per day 5 times per week but not lately.  Tobacco: Quit smoking around 1980. Has 14 pack year history of smoking.  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) 12/27/2017     Priority: Medium     Lymphoma, unspecified body region, unspecified lymphoma type (H) 12/15/2017     Priority: Medium     Coronary artery disease involving autologous vein coronary bypass graft without angina pectoris 12/06/2017     Priority: Medium     Coronary  artery disease 11/22/2017     Priority: Medium     Retroperitoneal mass 11/20/2017     Priority: Medium     Health Care Home 11/16/2017     Priority: Medium     Status:  Accepted  Care Coordinator:  Sriram Renteria RN    See Letters for [facility] Care Plan  Date:  November 16, 2017         Unstable angina (H) 11/13/2017     Priority: Medium     Macular drusen, bilateral 01/18/2017     Priority: Medium     Hypothyroidism, unspecified type 06/02/2016     Priority: Medium     Borderline glaucoma with ocular hypertension, bilateral - treated 01/15/2016     Priority: Medium     Posterior vitreous detachment, left 01/15/2016     Priority: Medium     Advanced directives, counseling/discussion 01/02/2012     Priority: Medium     Discussed Advance Directive planning with patient; information given to patient to review.           Hyperlipidemia LDL goal <130 01/02/2012     Priority: Medium     Arthritis of knee, right 09/06/2011     Priority: Medium     Generalized anxiety disorder 07/28/2009     Priority: Medium     Diagnosis updated by automated process. Provider to review and confirm.       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/28/2009     Priority: Medium     Patellar tendonitis 07/28/2009     Priority: Medium      Past Medical History:   Diagnosis Date     Coronary artery disease 11/22/2017     DJD (degenerative joint disease) of hip     right     Glaucoma      Hernia umbilical      Hypercholesterolemia      Hypertension      Hypothyroidism      Lipoma      Low back pain      Lymphoma, unspecified body region, unspecified lymphoma type (H) 12/15/2017     Nonsenile cataract      Obesity      Unstable angina (H) 11/13/2017     Past Surgical History:   Procedure Laterality Date     BIOPSY  1980's    fatty tissue     BYPASS GRAFT ARTERY CORONARY N/A 11/22/2017    Procedure: BYPASS GRAFT ARTERY CORONARY;  Median Sternotomy, Coronary Artery Bypass Graft x3, Using Left Internal  Mammary and  Endoscopic  [place] of Right Greater Saphenous Vein, On Cardiopulmonary Bypass, Transesophageal Echocardiogram;  Surgeon: Rolando Toro MD;  Location: [facility] OR     CL AFF SURGICAL PATHOLOGY       COLONOSCOPY       COLONOSCOPY WITH CO2 INSUFFLATION N/A 2016    Procedure: COLONOSCOPY WITH CO2 INSUFFLATION;  Surgeon: Jeffery Flores MD;  Location: [facility] OR     ROTATOR CUFF REPAIR RT/LT      left     SOFT TISSUE SURGERY  2014    [facility] Tendon transfer (Left Ankle)     Current Outpatient Medications   Medication Sig Dispense Refill     aspirin 325 MG EC tablet 1/2 tab daily       atorvastatin (LIPITOR) 40 MG tablet Take 1 tablet (40 mg) by mouth daily 60 tablet 11     Cholecalciferol (VITAMIN D3 PO) Take by mouth daily       latanoprost (XALATAN) 0.005 % ophthalmic solution Place 1 drop into both eyes At Bedtime 3 Bottle 4     levothyroxine (SYNTHROID/LEVOTHROID) 75 MCG tablet Take 1 tablet (75 mcg) by mouth daily 90 tablet 3     metoprolol tartrate (LOPRESSOR) 25 MG tablet Take 1 tablet (25 mg) by mouth 2 times daily 180 tablet 3     multivitamin (OCUVITE) TABS Take 1 tablet by mouth daily       nitroGLYcerin (NITROSTAT) 0.4 MG sublingual tablet For chest pain place 1 tablet under the tongue every 5 minutes for 3 doses. If symptoms persist 5 minutes after 1st dose call 911. (Patient not taking: Reported on 2018) 25 tablet 3     UNABLE TO FIND MULTIVIT/OPHTH AREDS2/LUTEIN/ZEAXANTHIN CAP/TAB       OTC products: None, except as noted above    Allergies   Allergen Reactions     Nkda [No Known Drug Allergies]       Latex Allergy: NO    Social History     Tobacco Use     Smoking status: Former Smoker     Packs/day: 1.00     Years: 20.00     Pack years: 20.00     Types: Cigarettes     Start date: 1959     Last attempt to quit: 1979     Years since quittin.3     Smokeless tobacco: Former User   Substance Use Topics     Alcohol use: Yes     Alcohol/week: 0.0 oz     Comment: rarely      History   Drug Use No       REVIEW OF SYSTEMS:   CONSTITUTIONAL: NEGATIVE for fever, chills, change in weight  INTEGUMENTARY/SKIN: NEGATIVE for worrisome rashes, moles or lesions  EYES: NEGATIVE for vision changes or irritation  ENT/MOUTH: NEGATIVE for ear, mouth and throat problems  RESP: NEGATIVE for significant cough or SOB  BREAST: NEGATIVE for masses, tenderness or discharge  CV: NEGATIVE for chest pain, palpitations or peripheral edema  GI: NEGATIVE for nausea, abdominal pain, heartburn, or change in bowel habits  : NEGATIVE for frequency, dysuria, or hematuria  MUSCULOSKELETAL: NEGATIVE for significant arthralgias or myalgia  NEURO: NEGATIVE for weakness, dizziness or paresthesias  ENDOCRINE: NEGATIVE for temperature intolerance, skin/hair changes  HEME: NEGATIVE for bleeding problems  PSYCHIATRIC: NEGATIVE for changes in mood or affect    EXAM:   /60   Pulse 79   Temp 98.7  F (37.1  C) (Oral)   Resp 16   Wt 102.5 kg (226 lb)   SpO2 94%   BMI 33.36 kg/m      GENERAL APPEARANCE: healthy, alert and no distress     EYES: EOMI,  PERRL     HENT: ear canals and TM's normal and nose and mouth without ulcers or lesions     NECK: no adenopathy, no asymmetry, masses, or scars and thyroid normal to palpation     RESP: lungs clear to auscultation - no rales, rhonchi or wheezes     CV: regular rates and rhythm, normal S1 S2, no S3 or S4 and no murmur, click or rub     ABDOMEN:  soft, nontender, no HSM or masses and bowel sounds normal     MS: extremities normal- no gross deformities noted, no evidence of inflammation in joints, FROM in all extremities.     SKIN: no suspicious lesions or rashes     NEURO: Normal strength and tone, sensory exam grossly normal, mentation intact and speech normal     PSYCH: mentation appears normal. and affect normal/bright     LYMPHATICS: No cervical adenopathy    DIAGNOSTICS:         Recent Labs   Lab Test 10/16/18  0651 09/20/18  0705  12/12/17  1140  11/24/17  0416   11/23/17  0358   HGB 15.4 14.8   < >  --    < > 8.6*   < > 9.2*    208   < >  --    < > 125*   < > 159   INR  --   --   --  1.2*  --  1.43*  --   --     139   < >  --    < > 144   < > 151*   POTASSIUM 4.3 4.1   < >  --    < > 4.5   < > 3.5   CR 0.98 0.89   < >  --    < > 0.97   < > 1.09   A1C  --   --   --   --   --   --   --  5.8    < > = values in this interval not displayed.      IMPRESSION:   Reason for surgery/procedure: Left eye cataract    The proposed surgical procedure is considered LOW risk.    REVISED CARDIAC RISK INDEX  The patient has the following serious cardiovascular risks for perioperative complications such as (MI, PE, VFib and 3  AV Block):  Coronary Artery Disease (MI, positive stress test, angina, Qs on EKG)  INTERPRETATION: 1 risks: Class II (low risk - 0.9% complication rate)    The patient has the following additional risks for perioperative complications:  No identified additional risks        RECOMMENDATIONS:     Assessment and Plan - Decision Making    1. Preop general physical exam    No contraindications to surgery - may proceed without further evaluation.      2. Cataract of left eye, unspecified cataract type    No contraindications to surgery - may proceed without further evaluation.      3. Hypertension goal BP (blood pressure) < 140/90    Per HPI    - losartan (COZAAR) 25 MG tablet; Take 1 tablet (25 mg) by mouth daily  Dispense: 90 tablet; Refill: 3      Written instructions given as follows:    Patient Instructions   1. Please follow your surgeon's recommendations before the surgery.  2. Please start Losartan 25 mg daily.  3. Continue your other medications.  4. See you soon for a physical with fasting labs.          Signed Electronically by: AALIYAH GARZA MD    Copy of this evaluation report is provided to requesting physician.    [facility] Preop Guidelines    Revised Cardiac Risk Index

## 2018-12-13 ENCOUNTER — OFFICE VISIT (OUTPATIENT)
Dept: OPHTHALMOLOGY | Facility: CLINIC | Age: 78
End: 2018-12-13
Payer: COMMERCIAL

## 2018-12-13 DIAGNOSIS — H25.812 COMBINED FORMS OF AGE-RELATED CATARACT OF LEFT EYE: ICD-10-CM

## 2018-12-13 DIAGNOSIS — H25.813 COMBINED FORMS OF AGE-RELATED CATARACT OF BOTH EYES: ICD-10-CM

## 2018-12-13 PROCEDURE — 92136 OPHTHALMIC BIOMETRY: CPT | Mod: TC | Performed by: OPHTHALMOLOGY

## 2018-12-13 PROCEDURE — 92012 INTRM OPH EXAM EST PATIENT: CPT | Performed by: OPHTHALMOLOGY

## 2018-12-13 RX ORDER — DICLOFENAC SODIUM 1 MG/ML
1 SOLUTION/ DROPS OPHTHALMIC 3 TIMES DAILY
Qty: 5 ML | Refills: 0 | Status: SHIPPED | OUTPATIENT
Start: 2018-12-18 | End: 2019-01-21

## 2018-12-13 RX ORDER — PREDNISOLONE ACETATE 10 MG/ML
1 SUSPENSION/ DROPS OPHTHALMIC 3 TIMES DAILY
Qty: 5 ML | Refills: 0 | Status: SHIPPED | OUTPATIENT
Start: 2018-12-18 | End: 2019-01-21

## 2018-12-13 ASSESSMENT — SLIT LAMP EXAM - LIDS: COMMENTS: 1+ DERMATOCHALASIS - UPPER LID, 2+ MEIBOMIAN GLAND DYSFUNCTION

## 2018-12-13 ASSESSMENT — VISUAL ACUITY
OS_CC: 20/40-2
METHOD: SNELLEN - LINEAR
CORRECTION_TYPE: GLASSES

## 2018-12-13 ASSESSMENT — TONOMETRY
OD_IOP_MMHG: 17
OS_IOP_MMHG: 18
IOP_METHOD: APPLANATION

## 2018-12-13 ASSESSMENT — EXTERNAL EXAM - RIGHT EYE: OD_EXAM: 3+ BROW PTOSIS, MILD-MOD BROW

## 2018-12-13 ASSESSMENT — CUP TO DISC RATIO
OS_RATIO: 0.5
OD_RATIO: 0.5

## 2018-12-13 ASSESSMENT — EXTERNAL EXAM - LEFT EYE: OS_EXAM: 3+ BROW PTOSIS, MILD-MOD BROW

## 2018-12-13 NOTE — PATIENT INSTRUCTIONS
PRE-OP CATARACT INSTRUCTIONS    *You will be picking up 2 eye drop bottles from your pharmacy.  You will use these the day after surgery.    *No solid food after midnight.    *Clear liquids: coffee (no cream), tea, water, and jello are OK before 8:00 A.M.  You may take your regular pills with this.    *If you are taking glaucoma drops, continue as usual.    Avtar Mccullough M.D.  [phone]

## 2018-12-13 NOTE — PROGRESS NOTES
Current Eye Medications:  Latanoprost nightly both eyes     Subjective:  preop kpe left eye  Schedule on 12/17/2018  Consent signed.      Objective:  See Ophthalmology Exam.       Assessment: Visually significant cataract left eye      Plan: Plan [place] phacoemulsification/ posterior chamber lens left eye local standby.  Risks, benefits, complications, and alternatives discussed with patient including possibility of limitations from coexistent eye disease and loss of vision.  Target refraction and multifocal lens options discussed.  Patient understands and consents.  Avtar Mccullough M.D.      See Patient Instructions.

## 2018-12-13 NOTE — LETTER
12/13/2018         RE: Zack Demarco  [address] [facility] [address]        Dear Colleague,    Thank you for referring your patient, Zack Demarco, to the [facility]. Please see a copy of my visit note below.     Current Eye Medications:  Latanoprost nightly both eyes     Subjective:  preop kpe left eye  Schedule on 12/17/2018  Consent signed.      Objective:  See Ophthalmology Exam.       Assessment: Visually significant cataract left eye      Plan: Plan [place] phacoemulsification/ posterior chamber lens left eye local standby.  Risks, benefits, complications, and alternatives discussed with patient including possibility of limitations from coexistent eye disease and loss of vision.  Target refraction and multifocal lens options discussed.  Patient understands and consents.  Avtar Mccullough M.D.      See Patient Instructions.         Again, thank you for allowing me to participate in the care of your patient.        Sincerely,        Avtar Mccullough MD

## 2018-12-18 ENCOUNTER — OFFICE VISIT (OUTPATIENT)
Dept: OPHTHALMOLOGY | Facility: CLINIC | Age: 78
End: 2018-12-18
Payer: COMMERCIAL

## 2018-12-18 DIAGNOSIS — Z96.1 PSEUDOPHAKIA: Primary | ICD-10-CM

## 2018-12-18 PROCEDURE — 99024 POSTOP FOLLOW-UP VISIT: CPT | Performed by: OPHTHALMOLOGY

## 2018-12-18 ASSESSMENT — TONOMETRY
IOP_METHOD: APPLANATION
OS_IOP_MMHG: 38

## 2018-12-18 ASSESSMENT — VISUAL ACUITY
METHOD: SNELLEN - LINEAR
OS_PH_SC: 20/25-2
OS_SC: 20/50+1

## 2018-12-18 ASSESSMENT — EXTERNAL EXAM - LEFT EYE: OS_EXAM: NORMAL

## 2018-12-18 ASSESSMENT — SLIT LAMP EXAM - LIDS: COMMENTS: NORMAL

## 2018-12-18 NOTE — PROGRESS NOTES
Current Eye Medications:  Diclofenac and 1% prednisolone three times a day left eye     Subjective:  po1 left eye   Pt reports his vision seems good and this eye feels fine.     Objective:  See Ophthalmology Exam.       Assessment:  Doing well status/post [place]man phacoemulsification/ posterior chamber lens left eye.      Plan:   See Patient Instructions.

## 2018-12-18 NOTE — LETTER
12/18/2018         RE: Zack Demarco  [address] [facility] [address]        Dear Colleague,    Thank you for referring your patient, Zack Demarco, to the [facility]. Please see a copy of my visit note below.     Current Eye Medications:  Diclofenac and 1% prednisolone three times a day left eye     Subjective:  po1 left eye   Pt reports his vision seems good and this eye feels fine.     Objective:  See Ophthalmology Exam.       Assessment:  Doing well status/post [place] phacoemulsification/ posterior chamber lens left eye.      Plan:   See Patient Instructions.         Again, thank you for allowing me to participate in the care of your patient.        Sincerely,        Avtar Mccullough MD

## 2018-12-18 NOTE — BRIEF OP NOTE
[facility] [place]    Brief Operative Note    Pre-operative diagnosis: Coronary Artery Disease   Post-operative diagnosis * No post-op diagnosis entered *  Procedure: Procedure(s):  Median Sternotomy, Coronary Artery Bypass Graft x3, Using Left Internal Mammary and  Endoscopic  [place] of Right Greater Saphenous Vein, On Cardiopulmonary Bypass, Transesophageal Echocardiogram - Wound Class: I-Clean  Surgeon: Surgeon(s) and Role:     * Rolando Toro MD - Primary     * Alberto Mclain MD - Assisting     * Kong Lopez PA-C - Assisting     * Tiffanie Funes PA-C - Assisting  Anesthesia: General   Estimated blood loss: 500 ml  Drains: Mediastinal 32 Fr x2, Left Pleural 28Fr.  Specimens: * No specimens in log *  Findings:   None.  Complications: None.  Implants: None.        
code stroke

## 2018-12-18 NOTE — PATIENT INSTRUCTIONS
POST-OP CATARACT INSTRUCTIONS    Use the following drops in the left eye:    Prednisolone (pink or white cap) 3 times a day  Diclofenac (gray cap) 3 times a day    ~Wait at least 5 minutes between drops.    ~Wear eye shield at night for one week.    ~Do not exert yourself to the point that you are red in the face for one week.    ~If your vision worsens, eye becomes increasingly red, or becomes painful, call [phone].    ~If you are taking glaucoma medications, continue as usual.    ~OK to resume aspirin and/or other blood thinners.    Avtar Mccullough M.D.

## 2018-12-20 ENCOUNTER — APPOINTMENT (OUTPATIENT)
Dept: LAB | Facility: CLINIC | Age: 78
End: 2018-12-20
Attending: INTERNAL MEDICINE
Payer: MEDICARE

## 2018-12-20 ENCOUNTER — INFUSION THERAPY VISIT (OUTPATIENT)
Dept: ONCOLOGY | Facility: CLINIC | Age: 78
End: 2018-12-20
Attending: INTERNAL MEDICINE
Payer: MEDICARE

## 2018-12-20 ENCOUNTER — ONCOLOGY VISIT (OUTPATIENT)
Dept: ONCOLOGY | Facility: CLINIC | Age: 78
End: 2018-12-20
Attending: NURSE PRACTITIONER
Payer: MEDICARE

## 2018-12-20 VITALS
OXYGEN SATURATION: 95 % | WEIGHT: 226.9 LBS | RESPIRATION RATE: 18 BRPM | DIASTOLIC BLOOD PRESSURE: 74 MMHG | BODY MASS INDEX: 33.49 KG/M2 | TEMPERATURE: 98.1 F | SYSTOLIC BLOOD PRESSURE: 154 MMHG | HEART RATE: 55 BPM

## 2018-12-20 DIAGNOSIS — C82.99 FOLLICULAR LYMPHOMA OF EXTRANODAL AND SOLID ORGAN SITES (H): ICD-10-CM

## 2018-12-20 DIAGNOSIS — C82.99 FOLLICULAR LYMPHOMA OF EXTRANODAL AND SOLID ORGAN SITES (H): Primary | ICD-10-CM

## 2018-12-20 DIAGNOSIS — C82.93 FOLLICULAR LYMPHOMA OF INTRA-ABDOMINAL LYMPH NODES, UNSPECIFIED GRADE (H): Primary | ICD-10-CM

## 2018-12-20 LAB
ALBUMIN SERPL-MCNC: 3.6 G/DL (ref 3.4–5)
ALP SERPL-CCNC: 72 U/L (ref 40–150)
ALT SERPL W P-5'-P-CCNC: 30 U/L (ref 0–70)
ANION GAP SERPL CALCULATED.3IONS-SCNC: 9 MMOL/L (ref 3–14)
AST SERPL W P-5'-P-CCNC: 15 U/L (ref 0–45)
BASOPHILS # BLD AUTO: 0 10E9/L (ref 0–0.2)
BASOPHILS NFR BLD AUTO: 0.3 %
BILIRUB SERPL-MCNC: 0.4 MG/DL (ref 0.2–1.3)
BUN SERPL-MCNC: 36 MG/DL (ref 7–30)
CALCIUM SERPL-MCNC: 8.9 MG/DL (ref 8.5–10.1)
CHLORIDE SERPL-SCNC: 111 MMOL/L (ref 94–109)
CO2 SERPL-SCNC: 20 MMOL/L (ref 20–32)
CREAT SERPL-MCNC: 0.96 MG/DL (ref 0.66–1.25)
DIFFERENTIAL METHOD BLD: NORMAL
EOSINOPHIL # BLD AUTO: 0.3 10E9/L (ref 0–0.7)
EOSINOPHIL NFR BLD AUTO: 4.4 %
ERYTHROCYTE [DISTWIDTH] IN BLOOD BY AUTOMATED COUNT: 12.8 % (ref 10–15)
GFR SERPL CREATININE-BSD FRML MDRD: 76 ML/MIN/{1.73_M2}
GLUCOSE SERPL-MCNC: 124 MG/DL (ref 70–99)
HCT VFR BLD AUTO: 46.1 % (ref 40–53)
HGB BLD-MCNC: 15.6 G/DL (ref 13.3–17.7)
IMM GRANULOCYTES # BLD: 0 10E9/L (ref 0–0.4)
IMM GRANULOCYTES NFR BLD: 0.3 %
LDH SERPL L TO P-CCNC: 136 U/L (ref 85–227)
LYMPHOCYTES # BLD AUTO: 1.5 10E9/L (ref 0.8–5.3)
LYMPHOCYTES NFR BLD AUTO: 20.5 %
MCH RBC QN AUTO: 31.4 PG (ref 26.5–33)
MCHC RBC AUTO-ENTMCNC: 33.8 G/DL (ref 31.5–36.5)
MCV RBC AUTO: 93 FL (ref 78–100)
MONOCYTES # BLD AUTO: 0.6 10E9/L (ref 0–1.3)
MONOCYTES NFR BLD AUTO: 7.9 %
NEUTROPHILS # BLD AUTO: 4.7 10E9/L (ref 1.6–8.3)
NEUTROPHILS NFR BLD AUTO: 66.6 %
NRBC # BLD AUTO: 0 10*3/UL
NRBC BLD AUTO-RTO: 0 /100
PLATELET # BLD AUTO: 231 10E9/L (ref 150–450)
POTASSIUM SERPL-SCNC: 3.9 MMOL/L (ref 3.4–5.3)
PROT SERPL-MCNC: 7.4 G/DL (ref 6.8–8.8)
RBC # BLD AUTO: 4.97 10E12/L (ref 4.4–5.9)
SODIUM SERPL-SCNC: 140 MMOL/L (ref 133–144)
WBC # BLD AUTO: 7.1 10E9/L (ref 4–11)

## 2018-12-20 PROCEDURE — 80053 COMPREHEN METABOLIC PANEL: CPT | Performed by: HOSPITALIST

## 2018-12-20 PROCEDURE — 96413 CHEMO IV INFUSION 1 HR: CPT

## 2018-12-20 PROCEDURE — 25000132 ZZH RX MED GY IP 250 OP 250 PS 637: Mod: ZF | Performed by: NURSE PRACTITIONER

## 2018-12-20 PROCEDURE — 99214 OFFICE O/P EST MOD 30 MIN: CPT | Mod: ZP | Performed by: NURSE PRACTITIONER

## 2018-12-20 PROCEDURE — A9270 NON-COVERED ITEM OR SERVICE: HCPCS | Mod: ZF | Performed by: NURSE PRACTITIONER

## 2018-12-20 PROCEDURE — G0463 HOSPITAL OUTPT CLINIC VISIT: HCPCS | Mod: ZF

## 2018-12-20 PROCEDURE — 25000128 H RX IP 250 OP 636: Mod: ZF | Performed by: NURSE PRACTITIONER

## 2018-12-20 PROCEDURE — 85025 COMPLETE CBC W/AUTO DIFF WBC: CPT | Performed by: HOSPITALIST

## 2018-12-20 PROCEDURE — 83615 LACTATE (LD) (LDH) ENZYME: CPT | Performed by: HOSPITALIST

## 2018-12-20 RX ORDER — DIPHENHYDRAMINE HCL 25 MG
50 CAPSULE ORAL ONCE
Status: CANCELLED
Start: 2018-12-20

## 2018-12-20 RX ORDER — MEPERIDINE HYDROCHLORIDE 25 MG/ML
25 INJECTION INTRAMUSCULAR; INTRAVENOUS; SUBCUTANEOUS
Status: CANCELLED
Start: 2018-12-20

## 2018-12-20 RX ORDER — METHYLPREDNISOLONE SODIUM SUCCINATE 125 MG/2ML
125 INJECTION, POWDER, LYOPHILIZED, FOR SOLUTION INTRAMUSCULAR; INTRAVENOUS
Status: CANCELLED
Start: 2018-12-20

## 2018-12-20 RX ORDER — ALBUTEROL SULFATE 90 UG/1
1-2 AEROSOL, METERED RESPIRATORY (INHALATION)
Status: CANCELLED
Start: 2018-12-20

## 2018-12-20 RX ORDER — SODIUM CHLORIDE 9 MG/ML
1000 INJECTION, SOLUTION INTRAVENOUS CONTINUOUS PRN
Status: CANCELLED
Start: 2018-12-20

## 2018-12-20 RX ORDER — DIPHENHYDRAMINE HYDROCHLORIDE 50 MG/ML
50 INJECTION INTRAMUSCULAR; INTRAVENOUS
Status: CANCELLED
Start: 2018-12-20

## 2018-12-20 RX ORDER — EPINEPHRINE 0.3 MG/.3ML
0.3 INJECTION SUBCUTANEOUS EVERY 5 MIN PRN
Status: CANCELLED | OUTPATIENT
Start: 2018-12-20

## 2018-12-20 RX ORDER — DIPHENHYDRAMINE HCL 25 MG
50 CAPSULE ORAL ONCE
Status: COMPLETED | OUTPATIENT
Start: 2018-12-20 | End: 2018-12-20

## 2018-12-20 RX ORDER — MEPERIDINE HYDROCHLORIDE 25 MG/ML
25 INJECTION INTRAMUSCULAR; INTRAVENOUS; SUBCUTANEOUS EVERY 30 MIN PRN
Status: CANCELLED | OUTPATIENT
Start: 2018-12-20

## 2018-12-20 RX ORDER — ACETAMINOPHEN 325 MG/1
650 TABLET ORAL ONCE
Status: COMPLETED | OUTPATIENT
Start: 2018-12-20 | End: 2018-12-20

## 2018-12-20 RX ORDER — ACETAMINOPHEN 325 MG/1
650 TABLET ORAL ONCE
Status: CANCELLED
Start: 2018-12-20

## 2018-12-20 RX ORDER — EPINEPHRINE 1 MG/ML
0.3 INJECTION, SOLUTION INTRAMUSCULAR; SUBCUTANEOUS EVERY 5 MIN PRN
Status: CANCELLED | OUTPATIENT
Start: 2018-12-20

## 2018-12-20 RX ORDER — ALBUTEROL SULFATE 0.83 MG/ML
2.5 SOLUTION RESPIRATORY (INHALATION)
Status: CANCELLED | OUTPATIENT
Start: 2018-12-20

## 2018-12-20 RX ADMIN — DIPHENHYDRAMINE HYDROCHLORIDE 50 MG: 25 CAPSULE ORAL at 08:34

## 2018-12-20 RX ADMIN — RITUXIMAB 800 MG: 10 INJECTION, SOLUTION INTRAVENOUS at 09:05

## 2018-12-20 RX ADMIN — ACETAMINOPHEN 650 MG: 325 TABLET ORAL at 08:34

## 2018-12-20 RX ADMIN — SODIUM CHLORIDE 250 ML: 9 INJECTION, SOLUTION INTRAVENOUS at 08:31

## 2018-12-20 ASSESSMENT — PAIN SCALES - GENERAL: PAINLEVEL: NO PAIN (0)

## 2018-12-20 NOTE — PROGRESS NOTES
Reason for Visit: seen in f/u of follicular lymphoma    Oncology HPI: Zack Demarco is a 77 year old man diagnosed with follicular lymphoma in 11/2017. At that time, he was scheduled for a cardiac bypass surgery and underwent CT chest imaging showing an unexpected retroperitoneal mass with surrounding lymphadenopathy suspicious for malignancy. Additional CT imaging showed a 2.3 x 7.2 x 6.1 retroperitoneal soft tissue mass with adjacent lymphadenopathy that mildly narrowed the left renal vein. There was also nodularity within the mesentery and an enlarged hilar lymph node. CT guided biopsy of the retroperitoneal mass on 12/12/2017 established the diagnosis, but the sample was too small for adequate grading. There was no disease outside of the abdomen. A bone marrow biopsy was no obtained as part of staging.    He underwent uncomplicated 3 vessel coronary artery bypass graft on 11/22/2017.    He was treated with weekly Rituximab x 4 doses from 1/26/18-2/16/18. F/U evaluation with US suggested improvement. CT evaluation in April shoed regression. He was treated with weekly rituxan x 4 from 7/26-8/22/18 when CT imaging showed residual lymphoma around the renal veins.Follow-up imaging post treatment showed a good response. CT imaging in October 2018 showed a residual disease.  He was initiated on maintenance rituximab (every 2 months x 2 years) on 10/24/18. He returns prior to the second cycle today.    Interval history: Zack is feeling well. Energy is good. Appetite is good. No sweats/fevers/chills. Had L  cataract surgery earlier this week. Mild eye irritation, but otherwise recovering well. Has a cataract on the right and will likely have lens replacement in the new year. No cough, sob, cp, palpitation, wheezing. No bloating, N/V, abdominal pain. Bowels are regular. Urination wnl. Noted a little more urination in the past week which he relates to coffee drinking. No hematuria, dysuria, urgency. No edema, rash, bruising.  No new lumps/bumps.     Current Outpatient Medications   Medication Sig Dispense Refill     aspirin 325 MG EC tablet 1/2 tab daily       atorvastatin (LIPITOR) 40 MG tablet Take 1 tablet (40 mg) by mouth daily 60 tablet 11     Cholecalciferol (VITAMIN D3 PO) Take by mouth daily       diclofenac (VOLTAREN) 0.1 % ophthalmic solution Place 1 drop Into the left eye 3 times daily 5 mL 0     latanoprost (XALATAN) 0.005 % ophthalmic solution Place 1 drop into both eyes At Bedtime 3 Bottle 4     levothyroxine (SYNTHROID/LEVOTHROID) 75 MCG tablet Take 1 tablet (75 mcg) by mouth daily 90 tablet 3     losartan (COZAAR) 25 MG tablet Take 1 tablet (25 mg) by mouth daily 90 tablet 3     metoprolol tartrate (LOPRESSOR) 25 MG tablet Take 1 tablet (25 mg) by mouth 2 times daily 180 tablet 3     multivitamin (OCUVITE) TABS Take 1 tablet by mouth daily       nitroGLYcerin (NITROSTAT) 0.4 MG sublingual tablet For chest pain place 1 tablet under the tongue every 5 minutes for 3 doses. If symptoms persist 5 minutes after 1st dose call 911. 25 tablet 3     prednisoLONE acetate (PRED FORTE) 1 % ophthalmic suspension Place 1 drop Into the left eye 3 times daily 5 mL 0     UNABLE TO FIND MULTIVIT/OPHTH AREDS2/LUTEIN/ZEAXANTHIN CAP/TAB            Allergies   Allergen Reactions     Nkda [No Known Drug Allergies]          Exam: alert, appears well.  Blood pressure 154/74, pulse 55, temperature 98.1  F (36.7  C), temperature source Oral, resp. rate 18, weight 102.9 kg (226 lb 14.4 oz), SpO2 95 %.  Wt Readings from Last 4 Encounters:   12/20/18 102.9 kg (226 lb 14.4 oz)   12/10/18 102.5 kg (226 lb)   10/24/18 104.1 kg (229 lb 9.6 oz)   10/18/18 104.5 kg (230 lb 4.8 oz)     Oropharynx is moist, no focal lesion. No icterus. Neck supple and without adenopathy. Lungs:CTA. Heart:RRR, no murmur or rub. Abdomen: soft, nontender, BS active. No masses or organomegaly. Extremities: warm, no edema. Speech is clear. CN wnl. Gait/station wnl. No neck, axillae  or supraclavicular nodes    Labs: Results for FÉLIX TRIVEDI (MRN [id number]) as of 12/20/2018 08:06   Ref. Range 12/20/2018 07:31   Sodium Latest Ref Range: 133 - 144 mmol/L 140   Potassium Latest Ref Range: 3.4 - 5.3 mmol/L 3.9   Chloride Latest Ref Range: 94 - 109 mmol/L 111 (H)   Carbon Dioxide Latest Ref Range: 20 - 32 mmol/L 20   Urea Nitrogen Latest Ref Range: 7 - 30 mg/dL 36 (H)   Creatinine Latest Ref Range: 0.66 - 1.25 mg/dL 0.96   GFR Estimate Latest Ref Range: >60 mL/min/1.73_m2 76   GFR Estimate If Black Latest Ref Range: >60 mL/min/1.73_m2 88   Calcium Latest Ref Range: 8.5 - 10.1 mg/dL 8.9   Anion Gap Latest Ref Range: 3 - 14 mmol/L 9   Albumin Latest Ref Range: 3.4 - 5.0 g/dL 3.6   Protein Total Latest Ref Range: 6.8 - 8.8 g/dL 7.4   Bilirubin Total Latest Ref Range: 0.2 - 1.3 mg/dL 0.4   Alkaline Phosphatase Latest Ref Range: 40 - 150 U/L 72   ALT Latest Ref Range: 0 - 70 U/L 30   AST Latest Ref Range: 0 - 45 U/L 15   Lactate Dehydrogenase Latest Ref Range: 85 - 227 U/L 136   Glucose Latest Ref Range: 70 - 99 mg/dL 124 (H)   WBC Latest Ref Range: 4.0 - 11.0 10e9/L 7.1   Hemoglobin Latest Ref Range: 13.3 - 17.7 g/dL 15.6   Hematocrit Latest Ref Range: 40.0 - 53.0 % 46.1   Platelet Count Latest Ref Range: 150 - 450 10e9/L 231   RBC Count Latest Ref Range: 4.4 - 5.9 10e12/L 4.97   MCV Latest Ref Range: 78 - 100 fl 93   MCH Latest Ref Range: 26.5 - 33.0 pg 31.4   MCHC Latest Ref Range: 31.5 - 36.5 g/dL 33.8   RDW Latest Ref Range: 10.0 - 15.0 % 12.8   Diff Method Unknown Automated Method   % Neutrophils Latest Units: % 66.6   % Lymphocytes Latest Units: % 20.5   % Monocytes Latest Units: % 7.9   % Eosinophils Latest Units: % 4.4   % Basophils Latest Units: % 0.3   % Immature Granulocytes Latest Units: % 0.3   Nucleated RBCs Latest Ref Range: 0 /100 0   Absolute Neutrophil Latest Ref Range: 1.6 - 8.3 10e9/L 4.7   Absolute Lymphocytes Latest Ref Range: 0.8 - 5.3 10e9/L 1.5   Absolute Monocytes Latest Ref  Range: 0.0 - 1.3 10e9/L 0.6   Absolute Eosinophils Latest Ref Range: 0.0 - 0.7 10e9/L 0.3   Absolute Basophils Latest Ref Range: 0.0 - 0.2 10e9/L 0.0   Abs Immature Granulocytes Latest Ref Range: 0 - 0.4 10e9/L 0.0   Absolute Nucleated RBC Unknown 0.0       Imaging: n/a    Impression/plan:   1. Follicular lymphoma, stage IIA, treated with rituximab weekly x 4 in January 2018 and July 2018 with a good response, but residual disease. Initiated maintenance rituximab (Q 2 month x 2 years) on 10/24/18  -he is tolerating therapy well. There is no finding on exam or labs raising concern for disease progression. Recommend he continue with treatment today.    -Will arrange f/u with Dr. Bain in 2 months with labs prior to the next rituximab infusion     2. CAD, s/p 3 vessel CABG  -no new sx. BP mildly elevated today. Remains on atorvastatin, levothyroxine, losartan, metoprolol. Follows with PCP

## 2018-12-20 NOTE — NURSING NOTE
"Oncology Rooming Note    December 20, 2018 7:41 AM   Zack Demarco is a 78 year old male who presents for:    Chief Complaint   Patient presents with     Blood Draw     Labs drawn via / PIV placed by RN in lab. VS taken.     Oncology Clinic Visit     Follicular Lymphoma; Active Tx     Initial Vitals: /74 (BP Location: Right arm, Patient Position: Sitting, Cuff Size: Adult Regular)   Pulse 55   Temp 98.1  F (36.7  C) (Oral)   Resp 18   Wt 102.9 kg (226 lb 14.4 oz)   SpO2 95%   BMI 33.49 kg/m   Estimated body mass index is 33.49 kg/m  as calculated from the following:    Height as of 10/18/18: 1.753 m (5' 9.02\").    Weight as of this encounter: 102.9 kg (226 lb 14.4 oz). Body surface area is 2.24 meters squared.  No Pain (0) Comment: Data Unavailable   No LMP for male patient.  Allergies reviewed: Yes  Medications reviewed: Yes    Medications: Medication refills not needed today.  Pharmacy name entered into [id number]: [id number] - [place] - [id number] [facility]    Clinical concerns:      8 minutes for nursing intake (face to face time)     Kandy Stephen CMA              "

## 2018-12-20 NOTE — PATIENT INSTRUCTIONS
Contact Numbers  [facility]: [phone]    After Hours:  [phone]  Triage: [phone]    Please call the [facility] Triage line if you experience a temperature greater than or equal to 100.5, shaking chills, have uncontrolled nausea, vomiting and/or diarrhea, dizziness, shortness of breath, chest pain, bleeding, unexplained bruising, or if you have any other new/concerning symptoms, questions or concerns.     If it is after hours, weekends, or holidays, please call the main hospital  at  [phone] and ask to speak to the Oncology doctor on call.     If you are having any concerning symptoms or wish to speak to a provider before your next infusion visit, please call your care coordinator or triage to notify them so we can adequately serve you.     If you need a refill on a narcotic prescription or other medication, please call triage before your infusion appointment.           December 2018 Sunday Monday Tuesday Wednesday Thursday Friday Saturday                                 1       2     3     4     5     6     7     8       9     10    PRE-OP   1:50 PM   (20 min.)   Anastacio Love MD   [facility] 11     12     13    RETURN  12:45 PM   (15 min.)   Avtar Mccullough MD   [facility] 14     15       16     17     18    RETURN   1:45 PM   (15 min.)   Avtar Mccullough MD   [facility] 19     20    [facility]   7:15 AM   (15 min.)    [facility] DRAW   [facility] Lab Draw    [facility] RETURN   7:35 AM   (50 min.)   Elvira Orr APRN CNP   [facility] ONC INFUSION 360   8:30 AM   (360 min.)    ONCOLOGY INFUSION   [facility] 21     22       23     24    RETURN  11:00 AM   (15 min.)   Avtar Mccullough MD   [facility] Day 25     26     27     28     29       30     31 January 2019 Sunday Monday Tuesday Wednesday Thursday Friday  Saturday             1     2     3     4     5       6     7     8     9     10     11     12       13     14     15     16     17     18    RETURN  12:45 PM   (15 min.)   Avtar Mccullough MD   [facility] 19 20 21     22     23     24     25     26       27     28     29     30     31                               Lab Results:  Recent Results (from the past 12 hour(s))   Lactate Dehydrogenase    Collection Time: 12/20/18  7:31 AM   Result Value Ref Range    Lactate Dehydrogenase 136 85 - 227 U/L   Comprehensive metabolic panel    Collection Time: 12/20/18  7:31 AM   Result Value Ref Range    Sodium 140 133 - 144 mmol/L    Potassium 3.9 3.4 - 5.3 mmol/L    Chloride 111 (H) 94 - 109 mmol/L    Carbon Dioxide 20 20 - 32 mmol/L    Anion Gap 9 3 - 14 mmol/L    Glucose 124 (H) 70 - 99 mg/dL    Urea Nitrogen 36 (H) 7 - 30 mg/dL    Creatinine 0.96 0.66 - 1.25 mg/dL    GFR Estimate 76 >60 mL/min/[1.73_m2]    GFR Estimate If Black 88 >60 mL/min/[1.73_m2]    Calcium 8.9 8.5 - 10.1 mg/dL    Bilirubin Total 0.4 0.2 - 1.3 mg/dL    Albumin 3.6 3.4 - 5.0 g/dL    Protein Total 7.4 6.8 - 8.8 g/dL    Alkaline Phosphatase 72 40 - 150 U/L    ALT 30 0 - 70 U/L    AST 15 0 - 45 U/L   *CBC with platelets differential    Collection Time: 12/20/18  7:31 AM   Result Value Ref Range    WBC 7.1 4.0 - 11.0 10e9/L    RBC Count 4.97 4.4 - 5.9 10e12/L    Hemoglobin 15.6 13.3 - 17.7 g/dL    Hematocrit 46.1 40.0 - 53.0 %    MCV 93 78 - 100 fl    MCH 31.4 26.5 - 33.0 pg    MCHC 33.8 31.5 - 36.5 g/dL    RDW 12.8 10.0 - 15.0 %    Platelet Count 231 150 - 450 10e9/L    Diff Method Automated Method     % Neutrophils 66.6 %    % Lymphocytes 20.5 %    % Monocytes 7.9 %    % Eosinophils 4.4 %    % Basophils 0.3 %    % Immature Granulocytes 0.3 %    Nucleated RBCs 0 0 /100    Absolute Neutrophil 4.7 1.6 - 8.3 10e9/L    Absolute Lymphocytes 1.5 0.8 - 5.3 10e9/L    Absolute Monocytes 0.6 0.0 - 1.3 10e9/L    Absolute Eosinophils 0.3 0.0 -  0.7 10e9/L    Absolute Basophils 0.0 0.0 - 0.2 10e9/L    Abs Immature Granulocytes 0.0 0 - 0.4 10e9/L    Absolute Nucleated RBC 0.0

## 2018-12-20 NOTE — LETTER
12/20/2018       RE: Zack Demarco  [address] [facility] [address]     Dear Colleague,    Thank you for referring your patient, Zack Demarco, to the [facility]. Please see a copy of my visit note below.    Reason for Visit: seen in f/u of follicular lymphoma    Oncology HPI: Zack Demarco is a 77 year old man diagnosed with follicular lymphoma in 11/2017. At that time, he was scheduled for a cardiac bypass surgery and underwent CT chest imaging showing an unexpected retroperitoneal mass with surrounding lymphadenopathy suspicious for malignancy. Additional CT imaging showed a 2.3 x 7.2 x 6.1 retroperitoneal soft tissue mass with adjacent lymphadenopathy that mildly narrowed the left renal vein. There was also nodularity within the mesentery and an enlarged hilar lymph node. CT guided biopsy of the retroperitoneal mass on 12/12/2017 established the diagnosis, but the sample was too small for adequate grading. There was no disease outside of the abdomen. A bone marrow biopsy was no obtained as part of staging.    He underwent uncomplicated 3 vessel coronary artery bypass graft on 11/22/2017.    He was treated with weekly Rituximab x 4 doses from 1/26/18-2/16/18. F/U evaluation with US suggested improvement. CT evaluation in April shoed regression. He was treated with weekly rituxan x 4 from 7/26-8/22/18 when CT imaging showed residual lymphoma around the renal veins.Follow-up imaging post treatment showed a good response. CT imaging in October 2018 showed a residual disease.  He was initiated on maintenance rituximab (every 2 months x 2 years) on 10/24/18. He returns prior to the second cycle today.    Interval history: Zack is feeling well. Energy is good. Appetite is good. No sweats/fevers/chills. Had L  cataract surgery earlier this week. Mild eye irritation, but otherwise recovering well. Has a cataract on the right and will likely have lens replacement in the new year. No cough, sob, cp,  palpitation, wheezing. No bloating, N/V, abdominal pain. Bowels are regular. Urination wnl. Noted a little more urination in the past week which he relates to coffee drinking. No hematuria, dysuria, urgency. No edema, rash, bruising. No new lumps/bumps.     Current Outpatient Medications   Medication Sig Dispense Refill     aspirin 325 MG EC tablet 1/2 tab daily       atorvastatin (LIPITOR) 40 MG tablet Take 1 tablet (40 mg) by mouth daily 60 tablet 11     Cholecalciferol (VITAMIN D3 PO) Take by mouth daily       diclofenac (VOLTAREN) 0.1 % ophthalmic solution Place 1 drop Into the left eye 3 times daily 5 mL 0     latanoprost (XALATAN) 0.005 % ophthalmic solution Place 1 drop into both eyes At Bedtime 3 Bottle 4     levothyroxine (SYNTHROID/LEVOTHROID) 75 MCG tablet Take 1 tablet (75 mcg) by mouth daily 90 tablet 3     losartan (COZAAR) 25 MG tablet Take 1 tablet (25 mg) by mouth daily 90 tablet 3     metoprolol tartrate (LOPRESSOR) 25 MG tablet Take 1 tablet (25 mg) by mouth 2 times daily 180 tablet 3     multivitamin (OCUVITE) TABS Take 1 tablet by mouth daily       nitroGLYcerin (NITROSTAT) 0.4 MG sublingual tablet For chest pain place 1 tablet under the tongue every 5 minutes for 3 doses. If symptoms persist 5 minutes after 1st dose call 911. 25 tablet 3     prednisoLONE acetate (PRED FORTE) 1 % ophthalmic suspension Place 1 drop Into the left eye 3 times daily 5 mL 0     UNABLE TO FIND MULTIVIT/OPHTH AREDS2/LUTEIN/ZEAXANTHIN CAP/TAB            Allergies   Allergen Reactions     Nkda [No Known Drug Allergies]          Exam: alert, appears well.  Blood pressure 154/74, pulse 55, temperature 98.1  F (36.7  C), temperature source Oral, resp. rate 18, weight 102.9 kg (226 lb 14.4 oz), SpO2 95 %.  Wt Readings from Last 4 Encounters:   12/20/18 102.9 kg (226 lb 14.4 oz)   12/10/18 102.5 kg (226 lb)   10/24/18 104.1 kg (229 lb 9.6 oz)   10/18/18 104.5 kg (230 lb 4.8 oz)     Oropharynx is moist, no focal lesion. No  icterus. Neck supple and without adenopathy. Lungs:CTA. Heart:RRR, no murmur or rub. Abdomen: soft, nontender, BS active. No masses or organomegaly. Extremities: warm, no edema. Speech is clear. CN wnl. Gait/station wnl. No neck, axillae or supraclavicular nodes    Labs: Results for FÉLIX TRIVEDI (MRN [id number]) as of 12/20/2018 08:06   Ref. Range 12/20/2018 07:31   Sodium Latest Ref Range: 133 - 144 mmol/L 140   Potassium Latest Ref Range: 3.4 - 5.3 mmol/L 3.9   Chloride Latest Ref Range: 94 - 109 mmol/L 111 (H)   Carbon Dioxide Latest Ref Range: 20 - 32 mmol/L 20   Urea Nitrogen Latest Ref Range: 7 - 30 mg/dL 36 (H)   Creatinine Latest Ref Range: 0.66 - 1.25 mg/dL 0.96   GFR Estimate Latest Ref Range: >60 mL/min/1.73_m2 76   GFR Estimate If Black Latest Ref Range: >60 mL/min/1.73_m2 88   Calcium Latest Ref Range: 8.5 - 10.1 mg/dL 8.9   Anion Gap Latest Ref Range: 3 - 14 mmol/L 9   Albumin Latest Ref Range: 3.4 - 5.0 g/dL 3.6   Protein Total Latest Ref Range: 6.8 - 8.8 g/dL 7.4   Bilirubin Total Latest Ref Range: 0.2 - 1.3 mg/dL 0.4   Alkaline Phosphatase Latest Ref Range: 40 - 150 U/L 72   ALT Latest Ref Range: 0 - 70 U/L 30   AST Latest Ref Range: 0 - 45 U/L 15   Lactate Dehydrogenase Latest Ref Range: 85 - 227 U/L 136   Glucose Latest Ref Range: 70 - 99 mg/dL 124 (H)   WBC Latest Ref Range: 4.0 - 11.0 10e9/L 7.1   Hemoglobin Latest Ref Range: 13.3 - 17.7 g/dL 15.6   Hematocrit Latest Ref Range: 40.0 - 53.0 % 46.1   Platelet Count Latest Ref Range: 150 - 450 10e9/L 231   RBC Count Latest Ref Range: 4.4 - 5.9 10e12/L 4.97   MCV Latest Ref Range: 78 - 100 fl 93   MCH Latest Ref Range: 26.5 - 33.0 pg 31.4   MCHC Latest Ref Range: 31.5 - 36.5 g/dL 33.8   RDW Latest Ref Range: 10.0 - 15.0 % 12.8   Diff Method Unknown Automated Method   % Neutrophils Latest Units: % 66.6   % Lymphocytes Latest Units: % 20.5   % Monocytes Latest Units: % 7.9   % Eosinophils Latest Units: % 4.4   % Basophils Latest Units: % 0.3   %  Immature Granulocytes Latest Units: % 0.3   Nucleated RBCs Latest Ref Range: 0 /100 0   Absolute Neutrophil Latest Ref Range: 1.6 - 8.3 10e9/L 4.7   Absolute Lymphocytes Latest Ref Range: 0.8 - 5.3 10e9/L 1.5   Absolute Monocytes Latest Ref Range: 0.0 - 1.3 10e9/L 0.6   Absolute Eosinophils Latest Ref Range: 0.0 - 0.7 10e9/L 0.3   Absolute Basophils Latest Ref Range: 0.0 - 0.2 10e9/L 0.0   Abs Immature Granulocytes Latest Ref Range: 0 - 0.4 10e9/L 0.0   Absolute Nucleated RBC Unknown 0.0       Imaging: n/a    Impression/plan:   1. Follicular lymphoma, stage IIA, treated with rituximab weekly x 4 in January 2018 and July 2018 with a good response, but residual disease. Initiated maintenance rituximab (Q 2 month x 2 years) on 10/24/18  -he is tolerating therapy well. There is no finding on exam or labs raising concern for disease progression. Recommend he continue with treatment today.    -Will arrange f/u with Dr. Bain in 2 months with labs prior to the next rituximab infusion     2. CAD, s/p 3 vessel CABG  -no new sx. BP mildly elevated today. Remains on atorvastatin, levothyroxine, losartan, metoprolol. Follows with PCP      Again, thank you for allowing me to participate in the care of your patient.      Sincerely,    HOLLY Draper CNP

## 2018-12-20 NOTE — NURSING NOTE
Chief Complaint   Patient presents with     Blood Draw     Labs drawn via / PIV placed by RN in lab. VS taken.     Belgica Munoz RN

## 2018-12-20 NOTE — PROGRESS NOTES
Infusion Nursing Note:  Zack Demarco presents today for C2D1 Rapid Rituxan(maintenance).    Patient seen by provider today: Yes: Elvira BARRERA   present during visit today: Not Applicable.    Note: Patient feels well. No complaints made.Otherwise well.     Intravenous Access:  Peripheral IV placed.    Treatment Conditions:  Lab Results   Component Value Date    HGB 15.6 12/20/2018     Lab Results   Component Value Date    WBC 7.1 12/20/2018      Lab Results   Component Value Date    ANEU 4.7 12/20/2018     Lab Results   Component Value Date     12/20/2018      Lab Results   Component Value Date     12/20/2018                   Lab Results   Component Value Date    POTASSIUM 3.9 12/20/2018           Lab Results   Component Value Date    MAG 2.2 11/27/2017            Lab Results   Component Value Date    CR 0.96 12/20/2018                   Lab Results   Component Value Date    ELVIRA 8.9 12/20/2018                Lab Results   Component Value Date    BILITOTAL 0.4 12/20/2018           Lab Results   Component Value Date    ALBUMIN 3.6 12/20/2018                    Lab Results   Component Value Date    ALT 30 12/20/2018           Lab Results   Component Value Date    AST 15 12/20/2018       Results reviewed, labs MET treatment parameters, ok to proceed with treatment.      Post Infusion Assessment:  Patient tolerated infusion without incident.  Blood return noted pre and post infusion.  Site patent and intact, free from redness, edema or discomfort.  No evidence of extravasations.  Access discontinued per protocol.    Discharge Plan:   Patient declined prescription refills.  Discharge instructions reviewed with: Patient.  Patient and/or family verbalized understanding of discharge instructions and all questions answered.  AVS to patient via [employer].  Patient will call return 2/20/19 for next appointment.   Patient discharged in stable condition accompanied by: self.  Departure Mode:  Ambulatory.    DARRYL ESCAMILLA, RN

## 2018-12-24 ENCOUNTER — OFFICE VISIT (OUTPATIENT)
Dept: OPHTHALMOLOGY | Facility: CLINIC | Age: 78
End: 2018-12-24
Payer: COMMERCIAL

## 2018-12-24 DIAGNOSIS — Z96.1 PSEUDOPHAKIA: Primary | ICD-10-CM

## 2018-12-24 PROCEDURE — 99024 POSTOP FOLLOW-UP VISIT: CPT | Performed by: OPHTHALMOLOGY

## 2018-12-24 RX ORDER — TIMOLOL MALEATE 5 MG/ML
1 SOLUTION/ DROPS OPHTHALMIC EVERY MORNING
Qty: 1 BOTTLE | Refills: 11 | Status: SHIPPED | OUTPATIENT
Start: 2018-12-24 | End: 2020-01-13

## 2018-12-24 ASSESSMENT — TONOMETRY
IOP_METHOD: APPLANATION
OS_IOP_MMHG: 35
OD_IOP_MMHG: 18

## 2018-12-24 ASSESSMENT — REFRACTION_MANIFEST
OS_AXIS: 010
OS_CYLINDER: +1.00
OS_ADD: +2.75
OS_SPHERE: -0.25

## 2018-12-24 ASSESSMENT — VISUAL ACUITY
METHOD: SNELLEN - LINEAR
OD_CC: 20/40
OD_CC+: -1
OS_SC: 20/30
CORRECTION_TYPE: GLASSES
OS_SC+: -1

## 2018-12-24 ASSESSMENT — SLIT LAMP EXAM - LIDS: COMMENTS: NORMAL

## 2018-12-24 ASSESSMENT — EXTERNAL EXAM - LEFT EYE: OS_EXAM: NORMAL

## 2018-12-24 NOTE — LETTER
12/24/2018         RE: Zack Demarco  [address] [facility] [address]        Dear Colleague,    Thank you for referring your patient, Zack Demarco, to the [facility]. Please see a copy of my visit note below.     Current Eye Medications:  Prednisolone three times a day left eye, Diclofenac three times a day left eye. Latanoprost at bedtime both eyes, last took at 10:30pm.     Subjective:  Po2, KPE left eye 12/17/18. Vision is dull right eye. Vision improved left eye, brighter. No eye pain or discomfort in either eye.      Objective:  See Ophthalmology Exam.       Assessment:  Doing well status/post Kelman phacoemulsification/ posterior chamber lens left eye.  Elevated intraocular pressure.      Plan:   See Patient Instructions.           Again, thank you for allowing me to participate in the care of your patient.        Sincerely,        Avtar Mccullough MD

## 2018-12-24 NOTE — PROGRESS NOTES
Current Eye Medications:  Prednisolone three times a day left eye, Diclofenac three times a day left eye. Latanoprost at bedtime both eyes, last took at 10:30pm.     Subjective:  Po2, KPE left eye 12/17/18. Vision is dull right eye. Vision improved left eye, brighter. No eye pain or discomfort in either eye.      Objective:  See Ophthalmology Exam.       Assessment:  Doing well status/post Kelman phacoemulsification/ posterior chamber lens left eye.  Elevated intraocular pressure.      Plan:   See Patient Instructions.

## 2018-12-24 NOTE — PATIENT INSTRUCTIONS
1)   Continue Diclofenc (gray) and Prednisolone (pink) three times daily until each is gone.   Continue Latanoprost (green top) both eyes at bedtime.   Start Timolol (yellow top) left eye daily in the morning.    Wait at least 5 minutes between drops if using more than one at a time.     2)   Stop shield one week following surgery.    3)   No eye rubbing.     Avtar Mccullough M.D.  [phone]

## 2019-01-02 ENCOUNTER — DOCUMENTATION ONLY (OUTPATIENT)
Dept: OPHTHALMOLOGY | Facility: CLINIC | Age: 79
End: 2019-01-02

## 2019-01-07 ENCOUNTER — OFFICE VISIT (OUTPATIENT)
Dept: OPHTHALMOLOGY | Facility: CLINIC | Age: 79
End: 2019-01-07
Payer: MEDICARE

## 2019-01-07 DIAGNOSIS — Z96.1 PSEUDOPHAKIA: Primary | ICD-10-CM

## 2019-01-07 DIAGNOSIS — H40.053 BORDERLINE GLAUCOMA WITH OCULAR HYPERTENSION, BILATERAL: ICD-10-CM

## 2019-01-07 PROCEDURE — 99024 POSTOP FOLLOW-UP VISIT: CPT | Performed by: OPHTHALMOLOGY

## 2019-01-07 ASSESSMENT — SLIT LAMP EXAM - LIDS: COMMENTS: NORMAL

## 2019-01-07 ASSESSMENT — EXTERNAL EXAM - LEFT EYE: OS_EXAM: NORMAL

## 2019-01-07 ASSESSMENT — VISUAL ACUITY
METHOD: SNELLEN - LINEAR
OS_SC: 20/25
OD_CC: 20/25
CORRECTION_TYPE: GLASSES

## 2019-01-07 ASSESSMENT — TONOMETRY
IOP_METHOD: APPLANATION
OS_IOP_MMHG: 24
OD_IOP_MMHG: 18

## 2019-01-07 NOTE — LETTER
1/7/2019         RE: Zack Demarco  [address] [facility] [address]        Dear Colleague,    Thank you for referring your patient, Zack Demarco, to the [facility]. Please see a copy of my visit note below.     Current Eye Medications:  Prednisolone 1% and diclofenac left eye three times a day, timolol left eye each morning, Latanoprost both eyes every evening.  Last drops:  5:30am.     Subjective:  Status/Post Kelman Phacoemulsification with Posterior Chamber Lens left eye:  12-17-18, with elevated intraocular pressure.  Patient is here for a pressure check.  Patient complains of decreasing vision in his left eye.  Left eye is comfortable.      Objective:  See Ophthalmology Exam.       Assessment:  Intraocular pressure improved left eye in post op patient.      Plan:  Continue using Prednisolone left eye three times daily,  Diclofenac left eye three times daily,  Timolol left eye daily in the morning,  And Latanoprost both eyes at bedtime.   Return visit for final MR.    Avtar Mccullough M.D.  [phone]           Again, thank you for allowing me to participate in the care of your patient.        Sincerely,        Avtar Mccullough MD

## 2019-01-07 NOTE — PROGRESS NOTES
Current Eye Medications:  Prednisolone 1% and diclofenac left eye three times a day, timolol left eye each morning, Latanoprost both eyes every evening.  Last drops:  5:30am.     Subjective:  Status/Post Kelman Phacoemulsification with Posterior Chamber Lens left eye:  12-17-18, with elevated intraocular pressure.  Patient is here for a pressure check.  Patient complains of decreasing vision in his left eye.  Left eye is comfortable.      Objective:  See Ophthalmology Exam.       Assessment:  Intraocular pressure improved left eye in post op patient.      Plan:  Continue using Prednisolone left eye three times daily,  Diclofenac left eye three times daily,  Timolol left eye daily in the morning,  And Latanoprost both eyes at bedtime.   Return visit for final MR. Avtar Mccullough M.D.  [phone]

## 2019-01-07 NOTE — PATIENT INSTRUCTIONS
Continue using Prednisolone left eye three times daily,  Diclofenac left eye three times daily,  Timolol left eye daily in the morning,  And Latanoprost both eyes at bedtime.   Return visit for final MR. Avtar Mccullough M.D.  [phone]

## 2019-01-18 ENCOUNTER — OFFICE VISIT (OUTPATIENT)
Dept: OPHTHALMOLOGY | Facility: CLINIC | Age: 79
End: 2019-01-18
Payer: MEDICARE

## 2019-01-18 ENCOUNTER — MYC MEDICAL ADVICE (OUTPATIENT)
Dept: OPHTHALMOLOGY | Facility: CLINIC | Age: 79
End: 2019-01-18

## 2019-01-18 DIAGNOSIS — H25.813 COMBINED FORMS OF AGE-RELATED CATARACT OF BOTH EYES: ICD-10-CM

## 2019-01-18 DIAGNOSIS — Z96.1 PSEUDOPHAKIA: Primary | ICD-10-CM

## 2019-01-18 PROCEDURE — 92136 OPHTHALMIC BIOMETRY: CPT | Mod: 26 | Performed by: OPHTHALMOLOGY

## 2019-01-18 PROCEDURE — 92012 INTRM OPH EXAM EST PATIENT: CPT | Mod: 24 | Performed by: OPHTHALMOLOGY

## 2019-01-18 RX ORDER — KETOROLAC TROMETHAMINE 5 MG/ML
1 SOLUTION OPHTHALMIC 3 TIMES DAILY
Qty: 1 BOTTLE | Refills: 0 | Status: SHIPPED | OUTPATIENT
Start: 2019-02-12 | End: 2019-01-21

## 2019-01-18 RX ORDER — PREDNISOLONE ACETATE 10 MG/ML
1 SUSPENSION/ DROPS OPHTHALMIC 3 TIMES DAILY
Qty: 1 BOTTLE | Refills: 0 | Status: SHIPPED | OUTPATIENT
Start: 2019-01-18 | End: 2019-03-21

## 2019-01-18 ASSESSMENT — REFRACTION_WEARINGRX
OD_SPHERE: +0.75
OS_SPHERE: -1.00
OD_ADD: +2.50
SPECS_TYPE: PAL
OS_AXIS: 170
OD_CYLINDER: +1.00
OS_CYLINDER: +2.25
OS_ADD: +2.50
OD_AXIS: 172

## 2019-01-18 ASSESSMENT — EXTERNAL EXAM - LEFT EYE: OS_EXAM: 3+ BROW PTOSIS, MILD-MOD BROW

## 2019-01-18 ASSESSMENT — VISUAL ACUITY
METHOD: SNELLEN - LINEAR
OS_SC: 20/30

## 2019-01-18 ASSESSMENT — REFRACTION_MANIFEST
OS_CYLINDER: +1.00
OS_SPHERE: -0.75
OD_CYLINDER: +1.50
OD_SPHERE: +0.75
OD_AXIS: 005
OS_AXIS: 015

## 2019-01-18 ASSESSMENT — SLIT LAMP EXAM - LIDS: COMMENTS: 1+ DERMATOCHALASIS - UPPER LID, 2+ MEIBOMIAN GLAND DYSFUNCTION

## 2019-01-18 ASSESSMENT — CUP TO DISC RATIO: OS_RATIO: 0.5

## 2019-01-18 ASSESSMENT — TONOMETRY
OD_IOP_MMHG: 17
OS_IOP_MMHG: 17
IOP_METHOD: APPLANATION

## 2019-01-18 ASSESSMENT — EXTERNAL EXAM - RIGHT EYE: OD_EXAM: 3+ BROW PTOSIS, MILD-MOD BROW

## 2019-01-18 NOTE — PROGRESS NOTES
Current Eye Medications: latanoprost nightly both eyes , timolol in morning left( 6am)     Subjective:  Final mr left eye   Pt reports is seeing well and his eyes feel fine.     Objective:  See Ophthalmology Exam.       Assessment:  Doing well status/post Kelman phacoemulsification/ posterior chamber lens left eye.  Visually significant cataract right eye.    Plan Kelman phacoemulsification/ posterior chamber lens right eye local standby.  Risks, benefits, complications, and alternatives discussed with patient including possibility of limitations from coexistent eye disease and loss of vision.  Target refraction and multifocal lens options discussed.  Patient understands and consents.  Avtar Mccullough M.D.       Plan:  Patient wishes to proceed with cataract surgery right eye; will schedule.  T = D.  Call preop.  Avtar Mccullough M.D.  [phone]    See Patient Instructions.

## 2019-01-18 NOTE — PATIENT INSTRUCTIONS
LEFT EYE - PRE-OP CATARACT INSTRUCTIONS  1)  Discontinue all drops, unless used prior to cataract surgery.    -Continue using Latanoprost (green top) both eyes at bedtime     -Continue using Timolol left eye in the morning.     2)   Fill Rx for new glasses or drugstore readers.    3)   Return to clinic in three months for a pressure check or earlier if problems should arise.    Avtar Mccullough M.D.  [phone]    RIGHT EYE - PRE-OP CATARACT INSTRUCTIONS    *You will be picking up 2 eye drop bottles from your pharmacy.  You will use these the day after surgery.    *No solid food after midnight.    *Clear liquids: coffee (no cream), tea, water, and jello are OK before 6:30 A.M.  You may take your regular pills with this.    *If you are taking glaucoma drops, continue as usual.    Avtar Mccullough M.D.  [phone]        AVAILABLE SURGERY DATES:       Monday 2/11 at 12:30pm     OR      Thursday 2/14 at 12:30pm

## 2019-01-18 NOTE — LETTER
1/18/2019         RE: Zack Demarco  [address] [facility] [address]        Dear Colleague,    Thank you for referring your patient, Zack Demarco, to the [facility]. Please see a copy of my visit note below.     Current Eye Medications: latanoprost nightly both eyes , timolol in morning left( 6am)     Subjective:  Final mr left eye   Pt reports is seeing well and his eyes feel fine.     Objective:  See Ophthalmology Exam.       Assessment:  Doing well status/post Kelman phacoemulsification/ posterior chamber lens left eye.  Visually significant cataract right eye.      Plan:  Patient wishes to proceed with cataract surgery right eye; will schedule.  T = D.  Call preop.  Avtar Mccullough M.D.  [phone]    See Patient Instructions.         Again, thank you for allowing me to participate in the care of your patient.        Sincerely,        Avtar Mccullough MD

## 2019-01-21 ENCOUNTER — TELEPHONE (OUTPATIENT)
Dept: OPHTHALMOLOGY | Facility: CLINIC | Age: 79
End: 2019-01-21

## 2019-01-21 NOTE — TELEPHONE ENCOUNTER
Type of surgery: Cataract Extraction with Intraocular Lens Implant Right Eye CPT code 73029  Combined forms of age-related cataract, mod, both eyes [H25.813]   Location of surgery: Other: [facility]  Date and time of surgery: 02/14/2019 @ 12:30pm  Surgeon: Dr. Paris  Pre-Op Appt Date: 02/04/2019  Post-Op Appt Date: 02/15/2019   Packet sent out: Yes  Pre-cert/Authorization completed:  Follows Medicare guidelines and accepted where Medicare is accepted.   Date: 01/21/2019

## 2019-02-04 ENCOUNTER — OFFICE VISIT (OUTPATIENT)
Dept: FAMILY MEDICINE | Facility: CLINIC | Age: 79
End: 2019-02-04
Payer: MEDICARE

## 2019-02-04 VITALS
DIASTOLIC BLOOD PRESSURE: 72 MMHG | WEIGHT: 227 LBS | HEIGHT: 69 IN | OXYGEN SATURATION: 97 % | SYSTOLIC BLOOD PRESSURE: 155 MMHG | RESPIRATION RATE: 18 BRPM | HEART RATE: 60 BPM | BODY MASS INDEX: 33.62 KG/M2 | TEMPERATURE: 98.3 F

## 2019-02-04 DIAGNOSIS — H26.9 CATARACT OF RIGHT EYE, UNSPECIFIED CATARACT TYPE: ICD-10-CM

## 2019-02-04 DIAGNOSIS — Z01.818 PREOP GENERAL PHYSICAL EXAM: Primary | ICD-10-CM

## 2019-02-04 LAB
ANION GAP SERPL CALCULATED.3IONS-SCNC: 8 MMOL/L (ref 3–14)
BUN SERPL-MCNC: 21 MG/DL (ref 7–30)
CALCIUM SERPL-MCNC: 8.9 MG/DL (ref 8.5–10.1)
CHLORIDE SERPL-SCNC: 109 MMOL/L (ref 94–109)
CO2 SERPL-SCNC: 25 MMOL/L (ref 20–32)
CREAT SERPL-MCNC: 0.84 MG/DL (ref 0.66–1.25)
GFR SERPL CREATININE-BSD FRML MDRD: 84 ML/MIN/{1.73_M2}
GLUCOSE SERPL-MCNC: 105 MG/DL (ref 70–99)
POTASSIUM SERPL-SCNC: 4.5 MMOL/L (ref 3.4–5.3)
SODIUM SERPL-SCNC: 142 MMOL/L (ref 133–144)

## 2019-02-04 PROCEDURE — 80048 BASIC METABOLIC PNL TOTAL CA: CPT | Performed by: FAMILY MEDICINE

## 2019-02-04 PROCEDURE — 36415 COLL VENOUS BLD VENIPUNCTURE: CPT | Performed by: FAMILY MEDICINE

## 2019-02-04 PROCEDURE — 99214 OFFICE O/P EST MOD 30 MIN: CPT | Performed by: FAMILY MEDICINE

## 2019-02-04 ASSESSMENT — MIFFLIN-ST. JEOR: SCORE: 1745.05

## 2019-02-04 ASSESSMENT — PAIN SCALES - GENERAL: PAINLEVEL: NO PAIN (0)

## 2019-02-04 NOTE — PROGRESS NOTES
[facility]  [address] [facility] [address] [phone]  Dept: [phone]    PRE-OP EVALUATION:  Today's date: 2019    Zack Demarco (: 1940) presents for pre-operative evaluation assessment as requested by Dr. cooney.  He requires evaluation and anesthesia risk assessment prior to undergoing surgery/procedure for treatment of Cataract right  .    Fax number for surgical facility: [facility]  Primary Physician: Anastacio Love  Type of Anesthesia Anticipated: Local with MAC    Patient has a Health Care Directive or Living Will:  YES     Preop Questions 2019   Who is doing your surgery? jose c   What are you having done? catarac   Date of Surgery/Procedure: 2019   Facility or Hospital where procedure/surgery will be performed: [facility]   1.  Do you have a history of Heart attack, stroke, stent, coronary bypass surgery, or other heart surgery? no   2.  Do you ever have any pain or discomfort in your chest? No   3.  Do you have a history of  Heart Failure? No   4.   Are you troubled by shortness of breath when:  walking on a level surface, or up a slight hill, or at night? No   5.  Do you currently have a cold, bronchitis or other respiratory infection? No   6.  Do you have a cough, shortness of breath, or wheezing? No   7.  Do you sometimes get pains in the calves of your legs when you walk? No   8. Do you or anyone in your family have previous history of blood clots? No   9.  Do you or does anyone in your family have a serious bleeding problem such as prolonged bleeding following surgeries or cuts? No   10. Have you ever had problems with anemia or been told to take iron pills? No   11. Have you had any abnormal blood loss such as black, tarry or bloody stools? -   12. Have you ever had a blood transfusion? -   13. Have you or any of your relatives ever had problems with anesthesia? -   14. Do you have sleep apnea, excessive snoring or daytime  drowsiness? -   15. Do you have any prosthetic heart valves? -   16. Do you have prosthetic joints? -         HPI:     HPI related to upcoming procedure: right eye cataract      CAD - Patient has a longstanding history of moderate-severe CAD. Patient denies recent chest pain or NTG use, denies exercise induced dyspnea or PND. Last Stress test 11/2017, .                                                                                                                                                    .  HYPERTENSION - Patient has longstanding history of HTN , currently denies any symptoms referable to elevated blood pressure. Specifically denies chest pain, palpitations, dyspnea, orthopnea, PND or peripheral edema. Blood pressure readings have been in normal range. Current medication regimen is as listed below. Patient denies any side effects of medication.                                                                                                                                                                                          .  HYPOTHYROIDISM - Patient has a longstanding history of chronic Hypothyroidism. Patient has been doing well, noting no tremor, insomnia, hair loss or changes in skin texture. Continues to take medications as directed, without adverse reactions or side effects. Last TSH   Lab Results   Component Value Date    TSH 6.20 (H) 11/14/2017   .                                                                                                                                                                                                                        .    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) 12/27/2017     Priority: Medium     Lymphoma, unspecified body region, unspecified lymphoma type (H) 12/15/2017     Priority: Medium     Coronary artery disease involving autologous vein coronary bypass graft without angina pectoris 12/06/2017     Priority: Medium     Coronary artery disease 11/22/2017     Priority: Medium     Retroperitoneal mass 11/20/2017     Priority: Medium     Health Care Home 11/16/2017     Priority: Medium     Status:  Accepted  Care Coordinator:  Sriram Renteria RN    See Letters for [facility] Care Plan  Date:  November 16, 2017         Unstable angina (H) 11/13/2017     Priority: Medium     Macular drusen, bilateral 01/18/2017     Priority: Medium     Hypothyroidism, unspecified type 06/02/2016     Priority: Medium     Borderline glaucoma with ocular hypertension, bilateral - treated 01/15/2016     Priority: Medium     Posterior vitreous detachment, left 01/15/2016     Priority: Medium     Advanced directives, counseling/discussion 01/02/2012     Priority: Medium     Discussed Advance Directive planning with patient; information given to patient to review.           Hyperlipidemia LDL goal <130 01/02/2012     Priority: Medium     Arthritis of knee, right 09/06/2011     Priority: Medium     Generalized anxiety disorder 07/28/2009     Priority: Medium     Diagnosis updated by automated process. Provider to review and confirm.       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/28/2009     Priority: Medium     Patellar tendonitis 07/28/2009     Priority: Medium      Past Medical History:   Diagnosis Date     Coronary artery disease 11/22/2017     DJD (degenerative joint disease) of hip     right     Glaucoma      Hernia umbilical      Hypercholesterolemia      Hypertension      Hypothyroidism      Lipoma      Low back pain      Lymphoma, unspecified body region, unspecified lymphoma type (H) 12/15/2017     Nonsenile cataract       Obesity      Unstable angina (H) 11/13/2017     Past Surgical History:   Procedure Laterality Date     BIOPSY  1980's    fatty tissue     BYPASS GRAFT ARTERY CORONARY N/A 11/22/2017    Procedure: BYPASS GRAFT ARTERY CORONARY;  Median Sternotomy, Coronary Artery Bypass Graft x3, Using Left Internal Mammary and  Endoscopic  Anderson of Right Greater Saphenous Vein, On Cardiopulmonary Bypass, Transesophageal Echocardiogram;  Surgeon: Rolando Toro MD;  Location: [facility] OR     [facility]FF SURGICAL PATHOLOGY       COLONOSCOPY  2011     COLONOSCOPY WITH CO2 INSUFFLATION N/A 9/19/2016    Procedure: COLONOSCOPY WITH CO2 INSUFFLATION;  Surgeon: Jeffery Flores MD;  Location: [facility] OR     PHACOEMULSIFICATION WITH STANDARD INTRAOCULAR LENS IMPLANT  12/2018    left eye     ROTATOR CUFF REPAIR RT/LT      left     SOFT TISSUE SURGERY  Sept. 2014    [facility] Tendon transfer (Left Ankle)     Current Outpatient Medications   Medication Sig Dispense Refill     aspirin 325 MG EC tablet 1/2 tab daily       atorvastatin (LIPITOR) 40 MG tablet Take 1 tablet (40 mg) by mouth daily 60 tablet 11     Cholecalciferol (VITAMIN D3 PO) Take by mouth daily       [START ON 2/15/2019] ketorolac (ACULAR) 0.5 % ophthalmic solution Place 1 drop into the right eye 3 times daily 1 Bottle 0     latanoprost (XALATAN) 0.005 % ophthalmic solution Place 1 drop into both eyes At Bedtime 3 Bottle 4     levothyroxine (SYNTHROID/LEVOTHROID) 75 MCG tablet Take 1 tablet (75 mcg) by mouth daily 90 tablet 3     losartan (COZAAR) 25 MG tablet Take 1 tablet (25 mg) by mouth daily 90 tablet 3     metoprolol tartrate (LOPRESSOR) 25 MG tablet Take 1 tablet (25 mg) by mouth 2 times daily 180 tablet 3     multivitamin (OCUVITE) TABS Take 1 tablet by mouth daily       nitroGLYcerin (NITROSTAT) 0.4 MG sublingual tablet For chest pain place 1 tablet under the tongue every 5 minutes for 3 doses. If symptoms persist 5 minutes after 1st dose call 911. 25 tablet 3     prednisoLONE  "acetate (PRED FORTE) 1 % ophthalmic suspension Place 1 drop into the right eye 3 times daily 1 Bottle 0     timolol maleate (TIMOPTIC) 0.5 % ophthalmic solution Place 1 drop Into the left eye every morning 1 Bottle 11     UNABLE TO FIND MULTIVIT/OPHTH AREDS2/LUTEIN/ZEAXANTHIN CAP/TAB       OTC products: Aspirin    Allergies   Allergen Reactions     Nkda [No Known Drug Allergies]       Latex Allergy: NO    Social History     Tobacco Use     Smoking status: Former Smoker     Packs/day: 1.00     Years: 20.00     Pack years: 20.00     Types: Cigarettes     Start date: 1959     Last attempt to quit: 1979     Years since quittin.5     Smokeless tobacco: Former User   Substance Use Topics     Alcohol use: Yes     Alcohol/week: 0.0 oz     Comment: rarely     History   Drug Use No       REVIEW OF SYSTEMS:   CONSTITUTIONAL: NEGATIVE for fever, chills, change in weight  INTEGUMENTARY/SKIN: NEGATIVE for worrisome rashes, moles or lesions  EYES: NEGATIVE for vision changes or irritation  ENT/MOUTH: NEGATIVE for ear, mouth and throat problems  RESP: NEGATIVE for significant cough or SOB  BREAST: NEGATIVE for masses, tenderness or discharge  CV: NEGATIVE for chest pain, palpitations or peripheral edema  GI: NEGATIVE for nausea, abdominal pain, heartburn, or change in bowel habits  : NEGATIVE for frequency, dysuria, or hematuria  MUSCULOSKELETAL: NEGATIVE for significant arthralgias or myalgia  NEURO: NEGATIVE for weakness, dizziness or paresthesias  ENDOCRINE: NEGATIVE for temperature intolerance, skin/hair changes  HEME: NEGATIVE for bleeding problems  PSYCHIATRIC: NEGATIVE for changes in mood or affect    EXAM:   /72   Pulse 60   Temp 98.3  F (36.8  C) (Oral)   Resp 18   Ht 1.753 m (5' 9\")   Wt 103 kg (227 lb)   SpO2 97%   BMI 33.52 kg/m      GENERAL APPEARANCE: healthy, alert and no distress     EYES: EOMI,  PERRL     HENT: ear canals and TM's normal and nose and mouth without ulcers or lesions     " NECK: no adenopathy, no asymmetry, masses, or scars and thyroid normal to palpation     RESP: lungs clear to auscultation - no rales, rhonchi or wheezes     CV: regular rates and rhythm, normal S1 S2, no S3 or S4 and no murmur, click or rub     ABDOMEN:  soft, nontender, no HSM or masses and bowel sounds normal     MS: extremities normal- no gross deformities noted, no evidence of inflammation in joints, FROM in all extremities.     SKIN: no suspicious lesions or rashes     NEURO: Normal strength and tone, sensory exam grossly normal, mentation intact and speech normal     PSYCH: mentation appears normal. and affect normal/bright     LYMPHATICS: No cervical adenopathy    DIAGNOSTICS:   Serum Potassium    Recent Labs   Lab Test 12/20/18  0731 10/16/18  0651  12/12/17  1140  11/24/17  0411  11/23/17  0358   HGB 15.6 15.4   < >  --    < > 8.6*   < > 9.2*    233   < >  --    < > 125*   < > 159   INR  --   --   --  1.2*  --  1.43*  --   --     140   < >  --    < > 144   < > 151*   POTASSIUM 3.9 4.3   < >  --    < > 4.5   < > 3.5   CR 0.96 0.98   < >  --    < > 0.97   < > 1.09   A1C  --   --   --   --   --   --   --  5.8    < > = values in this interval not displayed.        IMPRESSION:   Reason for surgery/procedure: .cataract    Diagnosis/reason for consult: Anesthesia     The proposed surgical procedure is considered LOW risk.    REVISED CARDIAC RISK INDEX  The patient has the following serious cardiovascular risks for perioperative complications such as (MI, PE, VFib and 3  AV Block):  No serious cardiac risks  INTERPRETATION: 1 risks: Class II (low risk - 0.9% complication rate)    The patient has the following additional risks for perioperative complications:  No identified additional risks      ICD-10-CM    1. Preop general physical exam Z01.818 Basic metabolic panel  (Ca, Cl, CO2, Creat, Gluc, K, Na, BUN)   2. Cataract of right eye, unspecified cataract type H26.9        RECOMMENDATIONS:          --Patient is to take all scheduled medications on the day of surgery EXCEPT for modifications listed below.    APPROVAL GIVEN to proceed with proposed procedure, without further diagnostic evaluation       Signed Electronically by: Eusebio Becerra MD    Copy of this evaluation report is provided to requesting physician.    [place] Preop Guidelines    Revised Cardiac Risk Index

## 2019-02-04 NOTE — NURSING NOTE
"Chief Complaint   Patient presents with     Pre-Op Exam       Initial /72   Pulse 60   Temp 98.3  F (36.8  C) (Oral)   Resp 18   Ht 1.753 m (5' 9\")   Wt 103 kg (227 lb)   SpO2 97%   BMI 33.52 kg/m   Estimated body mass index is 33.52 kg/m  as calculated from the following:    Height as of this encounter: 1.753 m (5' 9\").    Weight as of this encounter: 103 kg (227 lb).  Medication Reconciliation: complete  Doretha Crisostomo M.A.    "

## 2019-02-08 ENCOUNTER — TELEPHONE (OUTPATIENT)
Dept: FAMILY MEDICINE | Facility: CLINIC | Age: 79
End: 2019-02-08

## 2019-02-08 NOTE — TELEPHONE ENCOUNTER
[facility] eye [place] is calling to request pre op done 02/04/19, please FAX: [phone]. Thank you.

## 2019-02-14 PROBLEM — Z96.1 PSEUDOPHAKIA: Status: ACTIVE | Noted: 2018-12-18

## 2019-02-15 ENCOUNTER — OFFICE VISIT (OUTPATIENT)
Dept: OPHTHALMOLOGY | Facility: CLINIC | Age: 79
End: 2019-02-15
Payer: COMMERCIAL

## 2019-02-15 DIAGNOSIS — Z96.1 PSEUDOPHAKIA: Primary | ICD-10-CM

## 2019-02-15 PROCEDURE — 99024 POSTOP FOLLOW-UP VISIT: CPT | Performed by: OPHTHALMOLOGY

## 2019-02-15 ASSESSMENT — SLIT LAMP EXAM - LIDS: COMMENTS: 1+ DERMATOCHALASIS - UPPER LID, 2+ MEIBOMIAN GLAND DYSFUNCTION

## 2019-02-15 ASSESSMENT — VISUAL ACUITY
METHOD: SNELLEN - LINEAR
OD_SC: 20/60-1
OD_PH_SC: 20/30+3

## 2019-02-15 ASSESSMENT — EXTERNAL EXAM - RIGHT EYE: OD_EXAM: 3+ BROW PTOSIS, MILD-MOD BROW

## 2019-02-15 ASSESSMENT — EXTERNAL EXAM - LEFT EYE: OS_EXAM: 3+ BROW PTOSIS, MILD-MOD BROW

## 2019-02-15 ASSESSMENT — TONOMETRY
IOP_METHOD: APPLANATION
OD_IOP_MMHG: 38

## 2019-02-15 NOTE — PATIENT INSTRUCTIONS
POST-OP CATARACT INSTRUCTIONS    Use the following drops in the right eye:    Vigamox (tan cap) 3 times a day   Prednisolone (pink or white cap) 3 times a day  Ketorolac (gray cap) 3 times a day    ~Wait at least 5 minutes between drops.    ~Wear eye shield at night for one week.    ~Do not exert yourself to the point that you are red in the face for one week.    ~If your vision worsens, eye becomes increasingly red, or becomes painful, call [phone].    ~If you are taking glaucoma medications, continue as usual.    ~OK to resume aspirin and/or other blood thinners.    Avtar Mccullough M.D.

## 2019-02-15 NOTE — LETTER
2/15/2019         RE: Zack Demarco  [address] [facility] [address]        Dear Colleague,    Thank you for referring your patient, Zack Demarco, to the [facility]. Please see a copy of my visit note below.     Current Eye Medications:  Ketorolac and 1% prednisolone right eye and latanoprost both eye plus timolol left eye every morning.     Subjective:  po1 right eye  Pt reports he is seeing pretty good, eye feel fine now, however this eye hurt him enough to wake him up last night, pt states he took  Ibuprofen and this  alleviated the pain.     Objective:  See Ophthalmology Exam.       Assessment:  Doing well status/post Kelman phacoemulsification/ posterior chamber lens right eye.  Elevated intraocular pressure.      Plan:  Timolol drop given right eye.  See Patient Instructions.         Again, thank you for allowing me to participate in the care of your patient.        Sincerely,        Avtar Mccullough MD

## 2019-02-15 NOTE — PROGRESS NOTES
Current Eye Medications:  Ketorolac and 1% prednisolone right eye and latanoprost both eye plus timolol left eye every morning.     Subjective:  po1 right eye  Pt reports he is seeing pretty good, eye feel fine now, however this eye hurt him enough to wake him up last night, pt states he took  Ibuprofen and this  alleviated the pain.     Objective:  See Ophthalmology Exam.       Assessment:  Doing well status/post Kelman phacoemulsification/ posterior chamber lens right eye.  Elevated intraocular pressure.      Plan:  Timolol drop given right eye.  See Patient Instructions.

## 2019-02-20 ENCOUNTER — TRANSFERRED RECORDS (OUTPATIENT)
Dept: HEALTH INFORMATION MANAGEMENT | Facility: CLINIC | Age: 79
End: 2019-02-20

## 2019-02-20 ENCOUNTER — INFUSION THERAPY VISIT (OUTPATIENT)
Dept: ONCOLOGY | Facility: CLINIC | Age: 79
End: 2019-02-20
Attending: INTERNAL MEDICINE
Payer: MEDICARE

## 2019-02-20 ENCOUNTER — ONCOLOGY VISIT (OUTPATIENT)
Dept: ONCOLOGY | Facility: CLINIC | Age: 79
End: 2019-02-20
Attending: NURSE PRACTITIONER
Payer: MEDICARE

## 2019-02-20 VITALS
WEIGHT: 232.1 LBS | HEIGHT: 69 IN | RESPIRATION RATE: 18 BRPM | DIASTOLIC BLOOD PRESSURE: 68 MMHG | SYSTOLIC BLOOD PRESSURE: 154 MMHG | TEMPERATURE: 98.1 F | HEART RATE: 56 BPM | BODY MASS INDEX: 34.38 KG/M2 | OXYGEN SATURATION: 96 %

## 2019-02-20 DIAGNOSIS — C82.99 FOLLICULAR LYMPHOMA OF EXTRANODAL AND SOLID ORGAN SITES (H): ICD-10-CM

## 2019-02-20 DIAGNOSIS — C82.99 FOLLICULAR LYMPHOMA OF EXTRANODAL AND SOLID ORGAN SITES (H): Primary | ICD-10-CM

## 2019-02-20 LAB
ALBUMIN SERPL-MCNC: 3.6 G/DL (ref 3.4–5)
ALP SERPL-CCNC: 77 U/L (ref 40–150)
ALT SERPL W P-5'-P-CCNC: 33 U/L (ref 0–70)
ANION GAP SERPL CALCULATED.3IONS-SCNC: 7 MMOL/L (ref 3–14)
AST SERPL W P-5'-P-CCNC: 17 U/L (ref 0–45)
BASOPHILS # BLD AUTO: 0 10E9/L (ref 0–0.2)
BASOPHILS NFR BLD AUTO: 0.5 %
BILIRUB SERPL-MCNC: 0.5 MG/DL (ref 0.2–1.3)
BUN SERPL-MCNC: 21 MG/DL (ref 7–30)
CALCIUM SERPL-MCNC: 8.7 MG/DL (ref 8.5–10.1)
CHLORIDE SERPL-SCNC: 109 MMOL/L (ref 94–109)
CO2 SERPL-SCNC: 24 MMOL/L (ref 20–32)
CREAT SERPL-MCNC: 0.78 MG/DL (ref 0.66–1.25)
DIFFERENTIAL METHOD BLD: NORMAL
EOSINOPHIL # BLD AUTO: 0.3 10E9/L (ref 0–0.7)
EOSINOPHIL NFR BLD AUTO: 4.3 %
ERYTHROCYTE [DISTWIDTH] IN BLOOD BY AUTOMATED COUNT: 12.8 % (ref 10–15)
GFR SERPL CREATININE-BSD FRML MDRD: 87 ML/MIN/{1.73_M2}
GLUCOSE SERPL-MCNC: 119 MG/DL (ref 70–99)
HCT VFR BLD AUTO: 44.4 % (ref 40–53)
HGB BLD-MCNC: 14.8 G/DL (ref 13.3–17.7)
IMM GRANULOCYTES # BLD: 0 10E9/L (ref 0–0.4)
IMM GRANULOCYTES NFR BLD: 0.3 %
LDH SERPL L TO P-CCNC: 166 U/L (ref 85–227)
LYMPHOCYTES # BLD AUTO: 1 10E9/L (ref 0.8–5.3)
LYMPHOCYTES NFR BLD AUTO: 17.5 %
MCH RBC QN AUTO: 30.8 PG (ref 26.5–33)
MCHC RBC AUTO-ENTMCNC: 33.3 G/DL (ref 31.5–36.5)
MCV RBC AUTO: 93 FL (ref 78–100)
MONOCYTES # BLD AUTO: 0.5 10E9/L (ref 0–1.3)
MONOCYTES NFR BLD AUTO: 8.2 %
NEUTROPHILS # BLD AUTO: 4.1 10E9/L (ref 1.6–8.3)
NEUTROPHILS NFR BLD AUTO: 69.2 %
NRBC # BLD AUTO: 0 10*3/UL
NRBC BLD AUTO-RTO: 0 /100
PLATELET # BLD AUTO: 234 10E9/L (ref 150–450)
POTASSIUM SERPL-SCNC: 4 MMOL/L (ref 3.4–5.3)
PROT SERPL-MCNC: 7.1 G/DL (ref 6.8–8.8)
RBC # BLD AUTO: 4.8 10E12/L (ref 4.4–5.9)
SODIUM SERPL-SCNC: 141 MMOL/L (ref 133–144)
WBC # BLD AUTO: 5.9 10E9/L (ref 4–11)

## 2019-02-20 PROCEDURE — 25000128 H RX IP 250 OP 636: Mod: ZF | Performed by: INTERNAL MEDICINE

## 2019-02-20 PROCEDURE — 84165 PROTEIN E-PHORESIS SERUM: CPT | Performed by: INTERNAL MEDICINE

## 2019-02-20 PROCEDURE — 83615 LACTATE (LD) (LDH) ENZYME: CPT | Performed by: INTERNAL MEDICINE

## 2019-02-20 PROCEDURE — A9270 NON-COVERED ITEM OR SERVICE: HCPCS | Mod: ZF | Performed by: INTERNAL MEDICINE

## 2019-02-20 PROCEDURE — 80053 COMPREHEN METABOLIC PANEL: CPT | Performed by: INTERNAL MEDICINE

## 2019-02-20 PROCEDURE — G0463 HOSPITAL OUTPT CLINIC VISIT: HCPCS | Mod: ZF

## 2019-02-20 PROCEDURE — 96413 CHEMO IV INFUSION 1 HR: CPT

## 2019-02-20 PROCEDURE — 25000132 ZZH RX MED GY IP 250 OP 250 PS 637: Mod: ZF | Performed by: INTERNAL MEDICINE

## 2019-02-20 PROCEDURE — 96415 CHEMO IV INFUSION ADDL HR: CPT

## 2019-02-20 PROCEDURE — 00000402 ZZHCL STATISTIC TOTAL PROTEIN: Performed by: INTERNAL MEDICINE

## 2019-02-20 PROCEDURE — 99214 OFFICE O/P EST MOD 30 MIN: CPT | Mod: GC | Performed by: INTERNAL MEDICINE

## 2019-02-20 PROCEDURE — 25800030 ZZH RX IP 258 OP 636: Mod: ZF | Performed by: INTERNAL MEDICINE

## 2019-02-20 PROCEDURE — 85025 COMPLETE CBC W/AUTO DIFF WBC: CPT | Performed by: INTERNAL MEDICINE

## 2019-02-20 RX ORDER — SODIUM CHLORIDE 9 MG/ML
1000 INJECTION, SOLUTION INTRAVENOUS CONTINUOUS PRN
Status: CANCELLED
Start: 2019-02-20

## 2019-02-20 RX ORDER — MEPERIDINE HYDROCHLORIDE 25 MG/ML
25 INJECTION INTRAMUSCULAR; INTRAVENOUS; SUBCUTANEOUS
Status: CANCELLED
Start: 2019-02-20

## 2019-02-20 RX ORDER — EPINEPHRINE 0.3 MG/.3ML
0.3 INJECTION SUBCUTANEOUS EVERY 5 MIN PRN
Status: CANCELLED | OUTPATIENT
Start: 2019-02-20

## 2019-02-20 RX ORDER — ALBUTEROL SULFATE 0.83 MG/ML
2.5 SOLUTION RESPIRATORY (INHALATION)
Status: CANCELLED | OUTPATIENT
Start: 2019-02-20

## 2019-02-20 RX ORDER — DIPHENHYDRAMINE HYDROCHLORIDE 50 MG/ML
50 INJECTION INTRAMUSCULAR; INTRAVENOUS
Status: CANCELLED
Start: 2019-02-20

## 2019-02-20 RX ORDER — METHYLPREDNISOLONE SODIUM SUCCINATE 125 MG/2ML
125 INJECTION, POWDER, LYOPHILIZED, FOR SOLUTION INTRAMUSCULAR; INTRAVENOUS
Status: CANCELLED
Start: 2019-02-20

## 2019-02-20 RX ORDER — NITROGLYCERIN 0.4 MG/1
0.4 TABLET SUBLINGUAL
COMMUNITY
End: 2020-04-16

## 2019-02-20 RX ORDER — ACETAMINOPHEN 325 MG/1
650 TABLET ORAL ONCE
Status: COMPLETED | OUTPATIENT
Start: 2019-02-20 | End: 2019-02-20

## 2019-02-20 RX ORDER — DIPHENHYDRAMINE HCL 25 MG
50 CAPSULE ORAL ONCE
Status: CANCELLED
Start: 2019-02-20

## 2019-02-20 RX ORDER — EPINEPHRINE 1 MG/ML
0.3 INJECTION, SOLUTION INTRAMUSCULAR; SUBCUTANEOUS EVERY 5 MIN PRN
Status: CANCELLED | OUTPATIENT
Start: 2019-02-20

## 2019-02-20 RX ORDER — MEPERIDINE HYDROCHLORIDE 25 MG/ML
25 INJECTION INTRAMUSCULAR; INTRAVENOUS; SUBCUTANEOUS EVERY 30 MIN PRN
Status: CANCELLED | OUTPATIENT
Start: 2019-02-20

## 2019-02-20 RX ORDER — ALBUTEROL SULFATE 90 UG/1
1-2 AEROSOL, METERED RESPIRATORY (INHALATION)
Status: CANCELLED
Start: 2019-02-20

## 2019-02-20 RX ORDER — ACETAMINOPHEN 325 MG/1
650 TABLET ORAL ONCE
Status: CANCELLED
Start: 2019-02-20

## 2019-02-20 RX ORDER — DIPHENHYDRAMINE HCL 25 MG
50 CAPSULE ORAL ONCE
Status: COMPLETED | OUTPATIENT
Start: 2019-02-20 | End: 2019-02-20

## 2019-02-20 RX ADMIN — ACETAMINOPHEN 650 MG: 325 TABLET ORAL at 09:33

## 2019-02-20 RX ADMIN — SODIUM CHLORIDE 250 ML: 9 INJECTION, SOLUTION INTRAVENOUS at 09:39

## 2019-02-20 RX ADMIN — RITUXIMAB 800 MG: 10 INJECTION, SOLUTION INTRAVENOUS at 10:07

## 2019-02-20 RX ADMIN — DIPHENHYDRAMINE HYDROCHLORIDE 50 MG: 25 CAPSULE ORAL at 09:33

## 2019-02-20 ASSESSMENT — PAIN SCALES - GENERAL: PAINLEVEL: NO PAIN (0)

## 2019-02-20 ASSESSMENT — MIFFLIN-ST. JEOR: SCORE: 1768.43

## 2019-02-20 NOTE — PROGRESS NOTES
Infusion Nursing Note:  Zack SOLOMON Demarco presents today for Cycle 3 Day 1- Rapid Rituxan.    Patient seen by provider today: Yes: Dr. Bain   present during visit today: Not Applicable.    Note: N/A.    Intravenous Access:  Peripheral IV placed.    Treatment Conditions:  Lab Results   Component Value Date    HGB 14.8 02/20/2019     Lab Results   Component Value Date    WBC 5.9 02/20/2019      Lab Results   Component Value Date    ANEU 4.1 02/20/2019     Lab Results   Component Value Date     02/20/2019      Lab Results   Component Value Date     02/20/2019                   Lab Results   Component Value Date    POTASSIUM 4.0 02/20/2019           Lab Results   Component Value Date    MAG 2.2 11/27/2017            Lab Results   Component Value Date    CR 0.78 02/20/2019                   Lab Results   Component Value Date    ELVIRA 8.7 02/20/2019                Lab Results   Component Value Date    BILITOTAL 0.5 02/20/2019           Lab Results   Component Value Date    ALBUMIN 3.6 02/20/2019                    Lab Results   Component Value Date    ALT 33 02/20/2019           Lab Results   Component Value Date    AST 17 02/20/2019       Results reviewed, labs MET treatment parameters, ok to proceed with treatment.      Post Infusion Assessment:  Patient tolerated infusion without incident.  Blood return noted pre and post infusion.  Site patent and intact, free from redness, edema or discomfort.  No evidence of extravasations.  Access discontinued per protocol.    Discharge Plan:   Patient declined prescription refills.  Discharge instructions reviewed with: Patient.  Patient and/or family verbalized understanding of discharge instructions and all questions answered.  AVS to patient via [facility].  Patient aware scheduling making an appointment to return in about 2 months for next appointment.   Patient discharged in stable condition accompanied by: self.  Departure Mode: Ambulatory.    Kayleigh Smith  RN

## 2019-02-20 NOTE — NURSING NOTE
Chief Complaint   Patient presents with     Blood Draw     Labs drawn via /PIV placed by RN in lab. No lab orders, JIC purple and green tubes sent, provider informed to call *[phone] if labs are ordered. VS taken.      Belgica Munoz RN

## 2019-02-20 NOTE — LETTER
2/20/2019      RE: Zack Demarco  [address] [facility] [address]       Reason for Visit: seen in f/u of follicular lymphoma    ONCOLOGY SUMMARY (updated): Zack Demarco is a 78-year-old first seen in consultation on 12/27/2017 for a new diagnosis of follicular lymphoma. He was diagnosed incidental to an evaluation for cardiac bypass surgery in 11/2017. A chest CT scan on 11/14 showed an unexpected retroperitoneal mass with surrounding lymphadenopathy suspicious for malignancy.  A further CT scan done on the same day showed a 2.3 x 7.2 x 6.1 retroperitoneal soft tissue mass with adjacent lymphadenopathy that mildly narrowed the left renal vein. There also was nodularity within the mesentery and an enlarged hilar lymph node. CT-guided biopsy of the retroperitoneal mass on 12/12/2017 established the diagnosis, but the sample was too small for adequate grading. There was no disease identified outside of the abdomen; a bone marrow biopsy was not obtained as part of staging.      Relative to cardiac issues, he underwent an uncomplicated 3-vessel coronary artery bypass graft on 11/22/2017 and, subsequently, has been symptom-free.       With the renal and gonadal vein compression it was decided to start treatment with rituximab, alone. Mr. Demarco completed 4 weekly doses from 01/26 - 02/16/2018. He had no difficulty with the treatment and was able to receive rapid infusion (90 minutes) for the last 3 doses. Interval evaluation on 03/05/2018 with an US, suggested improvement. A CT scan on 04/09/2018 showed substantial regression. Repeated CT on 07/09/18 showed a good response but residual lymphoma around the renal veins. Decided to proceed weekly rituximab x4 (7/26-8/22/2018).     A subsequent CT scan on 10/16/2018 showed stable findings.     INTERVAL HISTORY: Patient returns for repeat evaluation today of his follicular lymphoma. Last evaluated 12/20/2018 at which time he was continued on maintenance rituximab every 2  months. Since his last visit, has been overall doing well. No complaints today. Underwent cataract surgery bilaterally without issues. Notes he is more tired, but no fevers, chills, weight loss, lumps/bumps.     Appetite good. Notes transient right sided abdominal pressure that was overall mild and so he didn't pay too much attention to it. It has not recurred. No diarrhea or constipation. Urinary stream slower but otherwise no changes. No issues with infection. Tolerates rituximab infusions without issues. No rashes. No chest pain, no shortness of breath. Occasional reflux.    ROS: 10 point review otherwise negative.    Current Outpatient Medications   Medication Sig Dispense Refill     aspirin 325 MG EC tablet 1/2 tab daily       atorvastatin (LIPITOR) 40 MG tablet Take 1 tablet (40 mg) by mouth daily 60 tablet 11     Cholecalciferol (VITAMIN D3 PO) Take by mouth daily       ketorolac (ACULAR) 0.5 % ophthalmic solution Place 1 drop into the right eye 3 times daily 1 Bottle 0     latanoprost (XALATAN) 0.005 % ophthalmic solution Place 1 drop into both eyes At Bedtime 3 Bottle 4     levothyroxine (SYNTHROID/LEVOTHROID) 75 MCG tablet Take 1 tablet (75 mcg) by mouth daily 90 tablet 3     losartan (COZAAR) 25 MG tablet Take 1 tablet (25 mg) by mouth daily 90 tablet 3     metoprolol tartrate (LOPRESSOR) 25 MG tablet Take 1 tablet (25 mg) by mouth 2 times daily 180 tablet 3     prednisoLONE acetate (PRED FORTE) 1 % ophthalmic suspension Place 1 drop into the right eye 3 times daily 1 Bottle 0     timolol maleate (TIMOPTIC) 0.5 % ophthalmic solution Place 1 drop Into the left eye every morning 1 Bottle 11     UNABLE TO FIND MULTIVIT/OPHTH AREDS2/LUTEIN/ZEAXANTHIN CAP/TAB       nitroGLYcerin (NITROSTAT) 0.4 MG sublingual tablet Place 0.4 mg under the tongue            Allergies   Allergen Reactions     Nkda [No Known Drug Allergies]        Exam: alert, appears well.  Blood pressure 154/68, pulse 56, temperature 98.1  F  "(36.7  C), temperature source Oral, resp. rate 18, height 1.753 m (5' 9.02\"), weight 105.3 kg (232 lb 1.6 oz), SpO2 96 %.  Wt Readings from Last 4 Encounters:   02/20/19 105.3 kg (232 lb 1.6 oz)   02/04/19 103 kg (227 lb)   12/20/18 102.9 kg (226 lb 14.4 oz)   12/10/18 102.5 kg (226 lb)     HEENT: Anicteric. Oropharynx is without lesion. Mucosa moist, no focal lesion, erythema.   NECK: supple and without adenopathy.   LUNGS:CTA.   AXILLAE: No nodes  HEART:RRR, no murmur or rub.   ABDOMEN: soft, nontender, BS active. No masses or organomegaly.  Unable to reproduce abdominal pain. No inguinal nodes.  EXTREMITIES: warm, no edema, no epitrochlear nodes.  NEUROLOGICAL: Speech is clear. CN wnl. Gait/station wnl.   SKIN: No rashes.    LABORATORY:  Hemoglobin 14.8, WBC 5900 (normal differential), Plts 234,000  Electrolytes, calcium and creatinine (0.78) normal.  SPEP: albumin 4.1, Ig 0.9, no monoclonal peak    IMAGING: n/a    ASSESSMENT/PLAN: Follicular lymphoma, stage IIA  (marrow not obtained), treated with rituximab weekly x 4 in 01/2018 and again in 07/2018 with a good response, but residual disease. Initiated maintenance rituximab (Q 2 month x 2 years) on 10/24/18.     Overall he continues to tolerate therapy well. No evidence concerning for disease progression on today's evaluation. He tolerates rituximab well.     Will continue with third dose of maintenance rituximab as planned today.      Return in 2 months with [facility] visit, repeat CT scan, labs, and next dose of rituximab.     2. CAD, s/p 3 vessel CABG: No new sx. BP continues to be mildly elevated today. Remains on atorvastatin, levothyroxine, losartan, metoprolol. Follows with PCP.    Patient discussed and evaluated with Dr. Bain.    Cierra Das MD  Medicine-Dermatology PGY-5  [phone]    Oncology Attending     Dav is aware of my planned departure from the [place] this spring. I will remain his Oncologist through the end of March 2019. We " discussed our plan for the subsequent transition of his oncology care to another provider.     Patient seen and examined with the Med-Derm Resident, Dr. Das. Agree with her findings, assessment and plan.    Gal Bain MD  Professor of Medicine  Oncology  [facility]  Office: [phone]  Clinic Fax: [phone]    cc:      MD Rolando Pearson MD Yohannes Gebre, MD Ronald Sih, MD       Reason for Visit: seen in f/u of follicular lymphoma    ONCOLOGY SUMMARY (updated): Zack Demarco is a 78-year-old first seen in consultation on 12/27/2017 for a new diagnosis of follicular lymphoma. He was diagnosed incidental to an evaluation for cardiac bypass surgery in 11/2017. A chest CT scan on 11/14 showed an unexpected retroperitoneal mass with surrounding lymphadenopathy suspicious for malignancy.  A further CT scan done on the same day showed a 2.3 x 7.2 x 6.1 retroperitoneal soft tissue mass with adjacent lymphadenopathy that mildly narrowed the left renal vein. There also was nodularity within the mesentery and an enlarged hilar lymph node. CT-guided biopsy of the retroperitoneal mass on 12/12/2017 established the diagnosis, but the sample was too small for adequate grading. There was no disease identified outside of the abdomen; a bone marrow biopsy was not obtained as part of staging.      Relative to cardiac issues, he underwent an uncomplicated 3-vessel coronary artery bypass graft on 11/22/2017 and, subsequently, has been symptom-free.       With the renal and gonadal vein compression it was decided to start treatment with rituximab, alone. Mr. Demarco completed 4 weekly doses from 01/26 - 02/16/2018. He had no difficulty with the treatment and was able to receive rapid infusion (90 minutes) for the last 3 doses. Interval evaluation on 03/05/2018 with an US, suggested improvement. A CT scan on 04/09/2018 showed substantial regression. Repeated CT on 07/09/18 showed a good response but  residual lymphoma around the renal veins. Decided to proceed weekly rituximab x4 (7/26-8/22/2018).     A subsequent CT scan on 10/16/2018 showed stable findings.     INTERVAL HISTORY: Patient returns for repeat evaluation today of his follicular lymphoma. Last evaluated 12/20/2018 at which time he was continued on maintenance rituximab every 2 months. Since his last visit, has been overall doing well. No complaints today. Underwent cataract surgery bilaterally without issues. Notes he is more tired, but no fevers, chills, weight loss, lumps/bumps.     Appetite good. Notes transient right sided abdominal pressure that was overall mild and so he didn't pay too much attention to it. It has not recurred. No diarrhea or constipation. Urinary stream slower but otherwise no changes. No issues with infection. Tolerates rituximab infusions without issues. No rashes. No chest pain, no shortness of breath. Occasional reflux.    ROS: 10 point review otherwise negative.    Current Outpatient Medications   Medication Sig Dispense Refill     aspirin 325 MG EC tablet 1/2 tab daily       atorvastatin (LIPITOR) 40 MG tablet Take 1 tablet (40 mg) by mouth daily 60 tablet 11     Cholecalciferol (VITAMIN D3 PO) Take by mouth daily       ketorolac (ACULAR) 0.5 % ophthalmic solution Place 1 drop into the right eye 3 times daily 1 Bottle 0     latanoprost (XALATAN) 0.005 % ophthalmic solution Place 1 drop into both eyes At Bedtime 3 Bottle 4     levothyroxine (SYNTHROID/LEVOTHROID) 75 MCG tablet Take 1 tablet (75 mcg) by mouth daily 90 tablet 3     losartan (COZAAR) 25 MG tablet Take 1 tablet (25 mg) by mouth daily 90 tablet 3     metoprolol tartrate (LOPRESSOR) 25 MG tablet Take 1 tablet (25 mg) by mouth 2 times daily 180 tablet 3     prednisoLONE acetate (PRED FORTE) 1 % ophthalmic suspension Place 1 drop into the right eye 3 times daily 1 Bottle 0     timolol maleate (TIMOPTIC) 0.5 % ophthalmic solution Place 1 drop Into the left eye  "every morning 1 Bottle 11     UNABLE TO FIND MULTIVIT/OPHTH AREDS2/LUTEIN/ZEAXANTHIN CAP/TAB       nitroGLYcerin (NITROSTAT) 0.4 MG sublingual tablet Place 0.4 mg under the tongue          Allergies   Allergen Reactions     Nkda [No Known Drug Allergies]      Exam: alert, appears well.  Blood pressure 154/68, pulse 56, temperature 98.1  F (36.7  C), temperature source Oral, resp. rate 18, height 1.753 m (5' 9.02\"), weight 105.3 kg (232 lb 1.6 oz), SpO2 96 %.  Wt Readings from Last 4 Encounters:   02/20/19 105.3 kg (232 lb 1.6 oz)   02/04/19 103 kg (227 lb)   12/20/18 102.9 kg (226 lb 14.4 oz)   12/10/18 102.5 kg (226 lb)     HEENT: Anicteric. Oropharynx is without lesion. Mucosa moist, no focal lesion, erythema.   NECK: supple and without adenopathy.   LUNGS:CTA.   AXILLAE: No nodes  HEART:RRR, no murmur or rub.   ABDOMEN: soft, nontender, BS active. No masses or organomegaly.  Unable to reproduce abdominal pain. No inguinal nodes.  EXTREMITIES: warm, no edema, no epitrochlear nodes.  NEUROLOGICAL: Speech is clear. CN wnl. Gait/station wnl.   SKIN: No rashes.    LABORATORY:  Hemoglobin 14.8, WBC 5900 (normal differential), Plts 234,000  Electrolytes, calcium and creatinine (0.78) normal.  SPEP: albumin 4.1, Ig 0.9, no monoclonal peak    IMAGING: n/a    ASSESSMENT/PLAN: Follicular lymphoma, stage IIA  (marrow not obtained), treated with rituximab weekly x 4 in 01/2018 and again in 07/2018 with a good response, but residual disease. Initiated maintenance rituximab (Q 2 month x 2 years) on 10/24/18.     Overall he continues to tolerate therapy well. No evidence concerning for disease progression on today's evaluation. He tolerates rituximab well.     Will continue with third dose of maintenance rituximab as planned today.      Return in 2 months with [facility] visit, repeat CT scan, labs, and next dose of rituximab.     2. CAD, s/p 3 vessel CABG: No new sx. BP continues to be mildly elevated today. Remains on atorvastatin, " levothyroxine, losartan, metoprolol. Follows with PCP.    Patient discussed and evaluated with Dr. Bain.    Cierra Das MD  Medicine-Dermatology PGY-5  [phone]    Oncology Attending    Mr. Demarco is aware of my planned departure from the [place] this spring. I will remain his Oncologist through the end of March 2019. We discussed our plan for the subsequent transition of his oncology care to another provider.     Patient seen and examined with the Med-Derm Resident, Dr. Das. Agree with her findings, assessment and plan.    Gal Bain MD  Professor of Medicine  Oncology  [facility]  Office: [phone]  Clinic Fax: [phone]    cc:      MD Rolando Pearson MD Yohannes Gebre, MD Ronald Sih, MD Bruce A. Peterson, MD

## 2019-02-20 NOTE — PATIENT INSTRUCTIONS
Contact Numbers  [facility]: [phone]    After Hours:  [phone]  Triage: [phone]    Please call the [facility] Triage line if you experience a temperature greater than or equal to 100.5, shaking chills, have uncontrolled nausea, vomiting and/or diarrhea, dizziness, shortness of breath, chest pain, bleeding, unexplained bruising, or if you have any other new/concerning symptoms, questions or concerns.     If it is after hours, weekends, or holidays, please call the main hospital  at  [phone] and ask to speak to the Oncology doctor on call.     If you are having any concerning symptoms or wish to speak to a provider before your next infusion visit, please call your care coordinator or triage to notify them so we can adequately serve you.     If you need a refill on a narcotic prescription or other medication, please call triage before your infusion appointment.       February 2019 Sunday Monday Tuesday Wednesday Thursday Friday Saturday                            1     2       3     4    PRE-OP   9:45 AM   (15 min.)   Eusebio Becerra MD   [facility] 5     6     7     8     9       10     11     12     13     14     15    RETURN   8:45 AM   (15 min.)   Avtar Mccullough MD   [facility] 16       17     18     19     20    [facility] DRAW   8:00 AM   (15 min.)    [facility] DRAW   [facility] Lab Draw    [facility] RETURN   8:15 AM   (30 min.)   Gal Bain MD   [facility] ONC INFUSION 360   9:00 AM   (360 min.)    ONCOLOGY INFUSION   [facility] 21    RETURN   8:45 AM   (15 min.)   Avtar Mccullough MD   [facility] 22     23       24     25     26     27     28                           March 2019 Sunday Monday Tuesday Wednesday Thursday Friday Saturday                            1     2       3     4     5     6     7     8     9       10     11     12     13      14     15     16       17     18     19     20     21    RETURN   8:45 AM   (15 min.)   Avtar Mccullough MD   [facility] 22     23       24     25     26     27     28     29     30       31                                                   Lab Results:  Recent Results (from the past 12 hour(s))   *CBC with platelets differential    Collection Time: 02/20/19  7:56 AM   Result Value Ref Range    WBC 5.9 4.0 - 11.0 10e9/L    RBC Count 4.80 4.4 - 5.9 10e12/L    Hemoglobin 14.8 13.3 - 17.7 g/dL    Hematocrit 44.4 40.0 - 53.0 %    MCV 93 78 - 100 fl    MCH 30.8 26.5 - 33.0 pg    MCHC 33.3 31.5 - 36.5 g/dL    RDW 12.8 10.0 - 15.0 %    Platelet Count 234 150 - 450 10e9/L    Diff Method Automated Method     % Neutrophils 69.2 %    % Lymphocytes 17.5 %    % Monocytes 8.2 %    % Eosinophils 4.3 %    % Basophils 0.5 %    % Immature Granulocytes 0.3 %    Nucleated RBCs 0 0 /100    Absolute Neutrophil 4.1 1.6 - 8.3 10e9/L    Absolute Lymphocytes 1.0 0.8 - 5.3 10e9/L    Absolute Monocytes 0.5 0.0 - 1.3 10e9/L    Absolute Eosinophils 0.3 0.0 - 0.7 10e9/L    Absolute Basophils 0.0 0.0 - 0.2 10e9/L    Abs Immature Granulocytes 0.0 0 - 0.4 10e9/L    Absolute Nucleated RBC 0.0    Lactate Dehydrogenase    Collection Time: 02/20/19  7:56 AM   Result Value Ref Range    Lactate Dehydrogenase 166 85 - 227 U/L   Comprehensive metabolic panel    Collection Time: 02/20/19  7:56 AM   Result Value Ref Range    Sodium 141 133 - 144 mmol/L    Potassium 4.0 3.4 - 5.3 mmol/L    Chloride 109 94 - 109 mmol/L    Carbon Dioxide 24 20 - 32 mmol/L    Anion Gap 7 3 - 14 mmol/L    Glucose 119 (H) 70 - 99 mg/dL    Urea Nitrogen 21 7 - 30 mg/dL    Creatinine 0.78 0.66 - 1.25 mg/dL    GFR Estimate 87 >60 mL/min/[1.73_m2]    GFR Estimate If Black >90 >60 mL/min/[1.73_m2]    Calcium 8.7 8.5 - 10.1 mg/dL    Bilirubin Total 0.5 0.2 - 1.3 mg/dL    Albumin 3.6 3.4 - 5.0 g/dL    Protein Total 7.1 6.8 - 8.8 g/dL    Alkaline Phosphatase 77 40 - 150 U/L     ALT 33 0 - 70 U/L    AST 17 0 - 45 U/L

## 2019-02-20 NOTE — NURSING NOTE
"Oncology Rooming Note    February 20, 2019 8:12 AM   Zack Demarco is a 78 year old male who presents for:    Chief Complaint   Patient presents with     Blood Draw     Labs drawn via /PIV placed by RN in lab. No lab orders, JIC purple and green tubes sent, provider informed to call *[phone] if labs are ordered. VS taken.      Oncology Clinic Visit     F/U Follicular Lymphoma     Initial Vitals: /68 (BP Location: Right arm, Patient Position: Sitting, Cuff Size: Adult Regular)   Pulse 56   Temp 98.1  F (36.7  C) (Oral)   Resp 18   Ht 1.753 m (5' 9.02\")   Wt 105.3 kg (232 lb 1.6 oz)   SpO2 96%   BMI 34.26 kg/m   Estimated body mass index is 34.26 kg/m  as calculated from the following:    Height as of this encounter: 1.753 m (5' 9.02\").    Weight as of this encounter: 105.3 kg (232 lb 1.6 oz). Body surface area is 2.26 meters squared.  No Pain (0) Comment: Data Unavailable   No LMP for male patient.  Allergies reviewed: Yes  Medications reviewed: Yes    Medications: Medication refills not needed today.  Pharmacy name entered into [employer]: [id number] - [place], [place] - [address] [address]    Clinical concerns: None, Dr Bain was NOT notified.      RAJENDRA ALCALA LPN            "

## 2019-02-20 NOTE — PROGRESS NOTES
Reason for Visit: seen in f/u of follicular lymphoma    ONCOLOGY SUMMARY (updated): Zack Demarco is a 78-year-old first seen in consultation on 12/27/2017 for a new diagnosis of follicular lymphoma. He was diagnosed incidental to an evaluation for cardiac bypass surgery in 11/2017. A chest CT scan on 11/14 showed an unexpected retroperitoneal mass with surrounding lymphadenopathy suspicious for malignancy.  A further CT scan done on the same day showed a 2.3 x 7.2 x 6.1 retroperitoneal soft tissue mass with adjacent lymphadenopathy that mildly narrowed the left renal vein. There also was nodularity within the mesentery and an enlarged hilar lymph node. CT-guided biopsy of the retroperitoneal mass on 12/12/2017 established the diagnosis, but the sample was too small for adequate grading. There was no disease identified outside of the abdomen; a bone marrow biopsy was not obtained as part of staging.      Relative to cardiac issues, he underwent an uncomplicated 3-vessel coronary artery bypass graft on 11/22/2017 and, subsequently, has been symptom-free.       With the renal and gonadal vein compression it was decided to start treatment with rituximab, alone. Mr. Demarco completed 4 weekly doses from 01/26 - 02/16/2018. He had no difficulty with the treatment and was able to receive rapid infusion (90 minutes) for the last 3 doses. Interval evaluation on 03/05/2018 with an US, suggested improvement. A CT scan on 04/09/2018 showed substantial regression. Repeated CT on 07/09/18 showed a good response but residual lymphoma around the renal veins. Decided to proceed weekly rituximab x4 (7/26-8/22/2018).     A subsequent CT scan on 10/16/2018 showed stable findings.     INTERVAL HISTORY: Patient returns for repeat evaluation today of his follicular lymphoma. Last evaluated 12/20/2018 at which time he was continued on maintenance rituximab every 2 months. Since his last visit, has been overall doing well. No complaints today.  "Underwent cataract surgery bilaterally without issues. Notes he is more tired, but no fevers, chills, weight loss, lumps/bumps.     Appetite good. Notes transient right sided abdominal pressure that was overall mild and so he didn't pay too much attention to it. It has not recurred. No diarrhea or constipation. Urinary stream slower but otherwise no changes. No issues with infection. Tolerates rituximab infusions without issues. No rashes. No chest pain, no shortness of breath. Occasional reflux.    ROS: 10 point review otherwise negative.    Current Outpatient Medications   Medication Sig Dispense Refill     aspirin 325 MG EC tablet 1/2 tab daily       atorvastatin (LIPITOR) 40 MG tablet Take 1 tablet (40 mg) by mouth daily 60 tablet 11     Cholecalciferol (VITAMIN D3 PO) Take by mouth daily       ketorolac (ACULAR) 0.5 % ophthalmic solution Place 1 drop into the right eye 3 times daily 1 Bottle 0     latanoprost (XALATAN) 0.005 % ophthalmic solution Place 1 drop into both eyes At Bedtime 3 Bottle 4     levothyroxine (SYNTHROID/LEVOTHROID) 75 MCG tablet Take 1 tablet (75 mcg) by mouth daily 90 tablet 3     losartan (COZAAR) 25 MG tablet Take 1 tablet (25 mg) by mouth daily 90 tablet 3     metoprolol tartrate (LOPRESSOR) 25 MG tablet Take 1 tablet (25 mg) by mouth 2 times daily 180 tablet 3     prednisoLONE acetate (PRED FORTE) 1 % ophthalmic suspension Place 1 drop into the right eye 3 times daily 1 Bottle 0     timolol maleate (TIMOPTIC) 0.5 % ophthalmic solution Place 1 drop Into the left eye every morning 1 Bottle 11     UNABLE TO FIND MULTIVIT/OPHTH AREDS2/LUTEIN/ZEAXANTHIN CAP/TAB       nitroGLYcerin (NITROSTAT) 0.4 MG sublingual tablet Place 0.4 mg under the tongue          Allergies   Allergen Reactions     Nkda [No Known Drug Allergies]      Exam: alert, appears well.  Blood pressure 154/68, pulse 56, temperature 98.1  F (36.7  C), temperature source Oral, resp. rate 18, height 1.753 m (5' 9.02\"), weight " 105.3 kg (232 lb 1.6 oz), SpO2 96 %.  Wt Readings from Last 4 Encounters:   02/20/19 105.3 kg (232 lb 1.6 oz)   02/04/19 103 kg (227 lb)   12/20/18 102.9 kg (226 lb 14.4 oz)   12/10/18 102.5 kg (226 lb)     HEENT: Anicteric. Oropharynx is without lesion. Mucosa moist, no focal lesion, erythema.   NECK: supple and without adenopathy.   LUNGS:CTA.   AXILLAE: No nodes  HEART:RRR, no murmur or rub.   ABDOMEN: soft, nontender, BS active. No masses or organomegaly.  Unable to reproduce abdominal pain. No inguinal nodes.  EXTREMITIES: warm, no edema, no epitrochlear nodes.  NEUROLOGICAL: Speech is clear. CN wnl. Gait/station wnl.   SKIN: No rashes.    LABORATORY:  Hemoglobin 14.8, WBC 5900 (normal differential), Plts 234,000  Electrolytes, calcium and creatinine (0.78) normal.  SPEP: albumin 4.1, Ig 0.9, no monoclonal peak    IMAGING: n/a    ASSESSMENT/PLAN: Follicular lymphoma, stage IIA  (marrow not obtained), treated with rituximab weekly x 4 in 01/2018 and again in 07/2018 with a good response, but residual disease. Initiated maintenance rituximab (Q 2 month x 2 years) on 10/24/18.     Overall he continues to tolerate therapy well. No evidence concerning for disease progression on today's evaluation. He tolerates rituximab well.     Will continue with third dose of maintenance rituximab as planned today.      Return in 2 months with [facility] visit, repeat CT scan, labs, and next dose of rituximab.     2. CAD, s/p 3 vessel CABG: No new sx. BP continues to be mildly elevated today. Remains on atorvastatin, levothyroxine, losartan, metoprolol. Follows with PCP.    Patient discussed and evaluated with Dr. Bain.    Cierra Das MD  Medicine-Dermatology PGY-5  [phone]    Oncology Attending     Dav is aware of my planned departure from the [place] this spring. I will remain his Oncologist through the end of March 2019. We discussed our plan for the subsequent transition of his oncology care to another  provider.     Patient seen and examined with the Med-Derm Resident, Dr. Das. Agree with her findings, assessment and plan.    Gal Bain MD  Professor of Medicine  Oncology  [facility]  Office: [phone]  Clinic Fax: [phone]    cc:      MD Rolando Pearson MD Yohannes Gebre, MD Ronald Sih, MD

## 2019-02-21 ENCOUNTER — DOCUMENTATION ONLY (OUTPATIENT)
Dept: OPHTHALMOLOGY | Facility: CLINIC | Age: 79
End: 2019-02-21

## 2019-02-21 ENCOUNTER — OFFICE VISIT (OUTPATIENT)
Dept: OPHTHALMOLOGY | Facility: CLINIC | Age: 79
End: 2019-02-21
Payer: COMMERCIAL

## 2019-02-21 DIAGNOSIS — Z96.1 PSEUDOPHAKIA: Primary | ICD-10-CM

## 2019-02-21 LAB
ALBUMIN SERPL ELPH-MCNC: 4.1 G/DL (ref 3.7–5.1)
ALPHA1 GLOB SERPL ELPH-MCNC: 0.3 G/DL (ref 0.2–0.4)
ALPHA2 GLOB SERPL ELPH-MCNC: 0.7 G/DL (ref 0.5–0.9)
B-GLOBULIN SERPL ELPH-MCNC: 0.9 G/DL (ref 0.6–1)
GAMMA GLOB SERPL ELPH-MCNC: 0.9 G/DL (ref 0.7–1.6)
M PROTEIN SERPL ELPH-MCNC: 0 G/DL
PROT PATTERN SERPL ELPH-IMP: NORMAL

## 2019-02-21 PROCEDURE — 99024 POSTOP FOLLOW-UP VISIT: CPT | Performed by: OPHTHALMOLOGY

## 2019-02-21 ASSESSMENT — VISUAL ACUITY
OS_SC: 20/30
OD_PH_SC: 20/30
OD_SC: 20/50
OD_SC+: -2
METHOD: SNELLEN - LINEAR

## 2019-02-21 ASSESSMENT — REFRACTION_MANIFEST
OD_AXIS: 007
OD_CYLINDER: +1.50
OD_SPHERE: -1.50

## 2019-02-21 ASSESSMENT — SLIT LAMP EXAM - LIDS: COMMENTS: 1+ DERMATOCHALASIS - UPPER LID, 2+ MEIBOMIAN GLAND DYSFUNCTION

## 2019-02-21 ASSESSMENT — TONOMETRY
OD_IOP_MMHG: 26
IOP_METHOD: APPLANATION

## 2019-02-21 ASSESSMENT — EXTERNAL EXAM - RIGHT EYE: OD_EXAM: 3+ BROW PTOSIS, MILD-MOD BROW

## 2019-02-21 ASSESSMENT — EXTERNAL EXAM - LEFT EYE: OS_EXAM: 3+ BROW PTOSIS, MILD-MOD BROW

## 2019-02-21 NOTE — PATIENT INSTRUCTIONS
1) Continue all drops three times daily until gone. Continue all glaucoma drops as normal.     2)  Stop using shield after one week one week after surgery.    3)  Return for final exam as scheduled in about one month.    Avtar Mccullough M.D.  [phone]

## 2019-02-21 NOTE — PROGRESS NOTES
Current Eye Medications:  Ketorolac and Prednisolone 1% right eye three times a day, Latanoprost both eyes every evening, timolol both eyes each morning.       Subjective:  Status/Post Kelman Phacoemulsification with Posterior Chamber Lens right eye:  2-14-19.  Vision is improving. Right eye is comfortable.  He is able to read without correction.  When driving, he had an occasionally where he saw a triple image of the speed limit sign.    Had immunotherapy at [facility] yesterday.      Objective:  See Ophthalmology Exam.       Assessment:  Doing well status/post Kelman phacoemulsification/ posterior chamber lens right eye.      Plan:   See Patient Instructions.

## 2019-02-21 NOTE — LETTER
2/21/2019         RE: Zack Demarco  [address] [facility] [address]        Dear Colleague,    Thank you for referring your patient, Zack Demarco, to the [facility]. Please see a copy of my visit note below.     Current Eye Medications:  Ketorolac and Prednisolone 1% right eye three times a day, Latanoprost both eyes every evening, timolol both eyes each morning.       Subjective:  Status/Post Kelman Phacoemulsification with Posterior Chamber Lens right eye:  2-14-19.  Vision is improving. Right eye is comfortable.  He is able to read without correction.  When driving, he had an occasionally where he saw a triple image of the speed limit sign.      Objective:  See Ophthalmology Exam.       Assessment:  Doing well status/post Kelman phacoemulsification/ posterior chamber lens right eye.      Plan:   See Patient Instructions.         Again, thank you for allowing me to participate in the care of your patient.        Sincerely,        Avtar Mccullough MD

## 2019-03-21 ENCOUNTER — OFFICE VISIT (OUTPATIENT)
Dept: OPHTHALMOLOGY | Facility: CLINIC | Age: 79
End: 2019-03-21
Payer: MEDICARE

## 2019-03-21 DIAGNOSIS — Z96.1 PSEUDOPHAKIA: Primary | ICD-10-CM

## 2019-03-21 PROCEDURE — 99024 POSTOP FOLLOW-UP VISIT: CPT | Performed by: OPHTHALMOLOGY

## 2019-03-21 ASSESSMENT — REFRACTION_MANIFEST
OD_CYLINDER: +1.75
OS_CYLINDER: +0.75
OD_AXIS: 180
OS_SPHERE: -0.75
OS_ADD: +2.75
OD_SPHERE: -2.00
OD_ADD: +2.75
OS_AXIS: 152

## 2019-03-21 ASSESSMENT — VISUAL ACUITY
METHOD: SNELLEN - LINEAR
OD_SC+: -1
OD_SC: 20/70
OS_SC+: -2
OS_SC: 20/25

## 2019-03-21 ASSESSMENT — TONOMETRY
IOP_METHOD: APPLANATION
OS_IOP_MMHG: 17
OD_IOP_MMHG: 15

## 2019-03-21 ASSESSMENT — SLIT LAMP EXAM - LIDS: COMMENTS: 1+ DERMATOCHALASIS - UPPER LID, 2+ MEIBOMIAN GLAND DYSFUNCTION

## 2019-03-21 ASSESSMENT — EXTERNAL EXAM - LEFT EYE: OS_EXAM: 3+ BROW PTOSIS, MILD-MOD BROW

## 2019-03-21 ASSESSMENT — CUP TO DISC RATIO: OD_RATIO: 0.5

## 2019-03-21 ASSESSMENT — EXTERNAL EXAM - RIGHT EYE: OD_EXAM: 3+ BROW PTOSIS, MILD-MOD BROW

## 2019-03-21 NOTE — LETTER
3/21/2019         RE: Zack Demarco  [address] [facility] [address]        Dear Colleague,    Thank you for referring your patient, Zack Demarco, to the [facility]. Please see a copy of my visit note below.     Current Eye Medications:  Timolol every morning both eyes, last took at 5:30am. Timolol says it should be left eye only. Latanoprost at bedtime both eyes, last took at 10pm.     Subjective:  Final MR, POLO right eye 2/14/19. POLO left eye 12/23/18. Vision is doing pretty well both eyes. Feels like something is in right eye since this morning. No eye pain in either eye. Not having any floaters, flashes, or Curtain effect both eyes.     Objective:  See Ophthalmology Exam.       Assessment:  Doing well status/post Kelman phacoemulsification/ posterior chamber lens right eye.  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      Plan:   See Patient Instructions.           Again, thank you for allowing me to participate in the care of your patient.        Sincerely,        Avtar Mccullough MD

## 2019-03-21 NOTE — PROGRESS NOTES
Current Eye Medications:  Timolol every morning both eyes, last took at 5:30am. Timolol says it should be left eye only. Latanoprost at bedtime both eyes, last took at 10pm.     Subjective:  Final POLO DEL RIO right eye 2/14/19. POLO left eye 12/23/18. Vision is doing pretty well both eyes. Feels like something is in right eye since this morning. No eye pain in either eye. Not having any floaters, flashes, or Curtain effect both eyes.     Objective:  See Ophthalmology Exam.       Assessment:  Doing well status/post Kelman phacoemulsification/ posterior chamber lens right eye.  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      Plan:   See Patient Instructions.

## 2019-03-21 NOTE — PATIENT INSTRUCTIONS
1)  Discontinue all drops, unless used prior to cataract surgery.   Continue using Timolol (yellow top) both eyes daily   Continue using Latanoprost (green top) both eyes at bedtime    2)   Fill Rx for new glasses or drugstore readers.    3)   Return to clinic in three months for a pressure check or earlier if problems should arise.     Avtar Mccullough M.D.  [phone]

## 2019-04-04 ENCOUNTER — DOCUMENTATION ONLY (OUTPATIENT)
Dept: FAMILY MEDICINE | Facility: CLINIC | Age: 79
End: 2019-04-04

## 2019-04-04 DIAGNOSIS — I10 HYPERTENSION GOAL BP (BLOOD PRESSURE) < 140/90: ICD-10-CM

## 2019-04-04 DIAGNOSIS — E03.9 HYPOTHYROIDISM, UNSPECIFIED TYPE: ICD-10-CM

## 2019-04-04 DIAGNOSIS — E78.5 HYPERLIPIDEMIA LDL GOAL <130: Primary | ICD-10-CM

## 2019-04-04 NOTE — PROGRESS NOTES
"PATIENT:  John Ahmadi  :  2003  JOEY:  2018  Medical Nutrition Therapy  Nutrition Reassessment  John is a 14 year old year old male seen for 2 month follow-up in [facility] with obesity. John was referred by Dr. Gutierrez for ongoing nutrition education and counseling, accompanied by father and mother.    Anthropometrics  Age:  14 year old male   Weight:    Wt Readings from Last 4 Encounters:   18 72.7 kg (160 lb 4.4 oz) (96 %)*   18 72.7 kg (160 lb 3.2 oz) (96 %)*   18 73.5 kg (162 lb 0.6 oz) (97 %)*   18 72.6 kg (160 lb) (97 %)*     * Growth percentiles are based on CDC 2-20 Years data.     Height:    Ht Readings from Last 2 Encounters:   18 1.597 m (5' 2.87\") (45 %)*   18 1.597 m (5' 2.87\") (45 %)*     * Growth percentiles are based on CDC 2-20 Years data.     Body Mass Index:  Body mass index is 28.49 kg/(m^2).  Body Mass Index Percentile:  98 %ile based on CDC 2-20 Years BMI-for-age data using vitals from 2018.    Nutrition History  John is down 2 lbs in the past 2 months. The family subscribed to a CSA and has been trying a lot of new veggies. They were most surprised to find that they like beets. They have been having a lot of zucchini. They feel that even after the CSA is done, they'll be able to continue eating a lot more veggies. He is eating a lot of fruit between meals. Adama is sedentary most of the day but Mom notices him trying to do other activities more including walking the dogs, playing with the dogs, and reading. He is still having personal training sessions on weekly basis at the .     Nutritional Intakes  Breakfast:   Fiber One bar or kodiac pancakes (high protein version) with some syrup; 1 cup whole milk  Lunch:   Protein shake (2 scoops Orgain, whole milk, banana, ice cubes) OR leftovers OR frozen cheeseburger OR mac n cheese  PM Snack:    Fruit or carrots  Dinner:   Baked zucchini with olive oil, " Please review lab orders sign and close encounter. Radha Lees MA/INOCENCIO    Physical 4/18/19   wax beans, mini potatoes, 1 brat OR homemade pesto flatbread  HS Snack:  Apple, occasionally dessert  Beverages:  Juice occasionally, lemonade once in past 2 weeks, rootbeer with grandma but less now   Dining Out  John eats out 0-1 times per week.     Activity Level  John is sedentary other than his weekly personal training on Saturdays. Trying to ride bike. Walks the dogs for 5 minutes. Walks with the Wii.    Medications/Vitamins/Minerals    Current Outpatient Prescriptions:      cetirizine (ZYRTEC) 10 MG tablet, Take 10 mg by mouth daily, Disp: , Rfl:      FLUoxetine (PROZAC) 10 MG capsule, Take 1 capsule (10 mg) by mouth daily, Disp: 90 capsule, Rfl: 3     Ibuprofen (ADVIL PO), Take 2 tsp. by mouth as needed Reported on 3/7/2017, Disp: , Rfl:     Nutrition Diagnosis  Obesity related to excessive energy intake as evidenced by BMI/age >95th %ile    Interventions & Education  Reviewed previous goals and progress. Discussed barriers to change and brainstormed ways to help. Provided written and verbal education on the following:  Meal Plan and Plate Method, Healthy meals/cooking, Healthy beverages, Portion sizes, Increasing fruit and vegetable intake, and avoiding simple sugars/refined grains    Goals  1) Reduce BMI.  2) Use Portion Plate/My Plate at meals for portion control and balance.  3) When school starts, might use protein shake for breakfast.  4) Pack lunches for at school.  5) No liquid calories.  6) Decreased processed foods like mac n cheese, processed meat, granola bar.  7) Increase exercise by lengthening walk with dogs. [facility] Use screen time restrictions.    Monitoring/Evaluation  Will continue to monitor progress towards goals and provide education in Pediatric Weight Management.    Spent 60 minutes in consult with patient & father and mother.

## 2019-04-04 NOTE — PROGRESS NOTES
.Please place or confirm pending lab orders for upcoming lab appointment on 4/11/19  Thank you,  Sherice

## 2019-04-11 DIAGNOSIS — E78.5 HYPERLIPIDEMIA LDL GOAL <130: ICD-10-CM

## 2019-04-11 DIAGNOSIS — I10 HYPERTENSION GOAL BP (BLOOD PRESSURE) < 140/90: ICD-10-CM

## 2019-04-11 DIAGNOSIS — E03.9 HYPOTHYROIDISM, UNSPECIFIED TYPE: ICD-10-CM

## 2019-04-11 LAB
ANION GAP SERPL CALCULATED.3IONS-SCNC: 7 MMOL/L (ref 3–14)
BUN SERPL-MCNC: 21 MG/DL (ref 7–30)
CALCIUM SERPL-MCNC: 9.2 MG/DL (ref 8.5–10.1)
CHLORIDE SERPL-SCNC: 107 MMOL/L (ref 94–109)
CHOLEST SERPL-MCNC: 127 MG/DL
CO2 SERPL-SCNC: 27 MMOL/L (ref 20–32)
CREAT SERPL-MCNC: 1.04 MG/DL (ref 0.66–1.25)
GFR SERPL CREATININE-BSD FRML MDRD: 69 ML/MIN/{1.73_M2}
GLUCOSE SERPL-MCNC: 117 MG/DL (ref 70–99)
HDLC SERPL-MCNC: 32 MG/DL
LDLC SERPL CALC-MCNC: 61 MG/DL
NONHDLC SERPL-MCNC: 95 MG/DL
POTASSIUM SERPL-SCNC: 4.5 MMOL/L (ref 3.4–5.3)
SODIUM SERPL-SCNC: 141 MMOL/L (ref 133–144)
T4 FREE SERPL-MCNC: 0.9 NG/DL (ref 0.76–1.46)
TRIGL SERPL-MCNC: 168 MG/DL
TSH SERPL DL<=0.005 MIU/L-ACNC: 7.09 MU/L (ref 0.4–4)

## 2019-04-11 PROCEDURE — 84443 ASSAY THYROID STIM HORMONE: CPT | Performed by: FAMILY MEDICINE

## 2019-04-11 PROCEDURE — 36415 COLL VENOUS BLD VENIPUNCTURE: CPT | Performed by: FAMILY MEDICINE

## 2019-04-11 PROCEDURE — 84439 ASSAY OF FREE THYROXINE: CPT | Performed by: FAMILY MEDICINE

## 2019-04-11 PROCEDURE — 80048 BASIC METABOLIC PNL TOTAL CA: CPT | Performed by: FAMILY MEDICINE

## 2019-04-11 PROCEDURE — 80061 LIPID PANEL: CPT | Performed by: FAMILY MEDICINE

## 2019-04-16 ENCOUNTER — ANCILLARY PROCEDURE (OUTPATIENT)
Dept: CT IMAGING | Facility: CLINIC | Age: 79
End: 2019-04-16
Attending: INTERNAL MEDICINE
Payer: MEDICARE

## 2019-04-16 DIAGNOSIS — C82.99 FOLLICULAR LYMPHOMA OF EXTRANODAL AND SOLID ORGAN SITES (H): ICD-10-CM

## 2019-04-16 RX ORDER — IOPAMIDOL 755 MG/ML
135 INJECTION, SOLUTION INTRAVASCULAR ONCE
Status: COMPLETED | OUTPATIENT
Start: 2019-04-16 | End: 2019-04-16

## 2019-04-16 RX ADMIN — IOPAMIDOL 135 ML: 755 INJECTION, SOLUTION INTRAVASCULAR at 07:17

## 2019-04-16 NOTE — DISCHARGE INSTRUCTIONS

## 2019-04-18 ENCOUNTER — OFFICE VISIT (OUTPATIENT)
Dept: FAMILY MEDICINE | Facility: CLINIC | Age: 79
End: 2019-04-18
Payer: MEDICARE

## 2019-04-18 ENCOUNTER — APPOINTMENT (OUTPATIENT)
Dept: LAB | Facility: CLINIC | Age: 79
End: 2019-04-18
Attending: INTERNAL MEDICINE
Payer: MEDICARE

## 2019-04-18 ENCOUNTER — OFFICE VISIT (OUTPATIENT)
Dept: TRANSPLANT | Facility: CLINIC | Age: 79
End: 2019-04-18
Attending: INTERNAL MEDICINE
Payer: MEDICARE

## 2019-04-18 VITALS
SYSTOLIC BLOOD PRESSURE: 148 MMHG | DIASTOLIC BLOOD PRESSURE: 78 MMHG | BODY MASS INDEX: 32.93 KG/M2 | TEMPERATURE: 98 F | HEART RATE: 66 BPM | WEIGHT: 230 LBS | OXYGEN SATURATION: 96 % | HEIGHT: 70 IN

## 2019-04-18 VITALS
TEMPERATURE: 98.1 F | WEIGHT: 234.3 LBS | SYSTOLIC BLOOD PRESSURE: 143 MMHG | BODY MASS INDEX: 33.54 KG/M2 | HEIGHT: 70 IN | HEART RATE: 54 BPM | OXYGEN SATURATION: 96 % | DIASTOLIC BLOOD PRESSURE: 59 MMHG

## 2019-04-18 DIAGNOSIS — E03.9 HYPOTHYROIDISM, UNSPECIFIED TYPE: ICD-10-CM

## 2019-04-18 DIAGNOSIS — I10 HYPERTENSION GOAL BP (BLOOD PRESSURE) < 140/90: ICD-10-CM

## 2019-04-18 DIAGNOSIS — Z00.00 ENCOUNTER FOR PREVENTIVE HEALTH EXAMINATION: Primary | ICD-10-CM

## 2019-04-18 LAB
CREAT SERPL-MCNC: 0.97 MG/DL (ref 0.66–1.25)
GFR SERPL CREATININE-BSD FRML MDRD: 74 ML/MIN/{1.73_M2}
POTASSIUM SERPL-SCNC: 4.6 MMOL/L (ref 3.4–5.3)
T4 FREE SERPL-MCNC: 0.83 NG/DL (ref 0.76–1.46)
TSH SERPL DL<=0.005 MIU/L-ACNC: 6.7 MU/L (ref 0.4–4)

## 2019-04-18 PROCEDURE — 82565 ASSAY OF CREATININE: CPT | Performed by: FAMILY MEDICINE

## 2019-04-18 PROCEDURE — 84439 ASSAY OF FREE THYROXINE: CPT | Performed by: FAMILY MEDICINE

## 2019-04-18 PROCEDURE — 36415 COLL VENOUS BLD VENIPUNCTURE: CPT

## 2019-04-18 PROCEDURE — G0463 HOSPITAL OUTPT CLINIC VISIT: HCPCS | Mod: ZF

## 2019-04-18 PROCEDURE — 84132 ASSAY OF SERUM POTASSIUM: CPT | Performed by: FAMILY MEDICINE

## 2019-04-18 PROCEDURE — G0439 PPPS, SUBSEQ VISIT: HCPCS | Performed by: FAMILY MEDICINE

## 2019-04-18 PROCEDURE — 84443 ASSAY THYROID STIM HORMONE: CPT | Performed by: FAMILY MEDICINE

## 2019-04-18 RX ORDER — LEVOTHYROXINE SODIUM 88 UG/1
88 TABLET ORAL DAILY
Qty: 30 TABLET | Refills: 1 | Status: SHIPPED | OUTPATIENT
Start: 2019-04-18 | End: 2019-05-24

## 2019-04-18 RX ORDER — LOSARTAN POTASSIUM 25 MG/1
50 TABLET ORAL DAILY
COMMUNITY
Start: 2019-04-18 | End: 2019-05-30

## 2019-04-18 ASSESSMENT — PAIN SCALES - GENERAL: PAINLEVEL: NO PAIN (0)

## 2019-04-18 ASSESSMENT — MIFFLIN-ST. JEOR
SCORE: 1774.52
SCORE: 1794.03

## 2019-04-18 NOTE — PROGRESS NOTES
"  SUBJECTIVE:   Zack Demarco is a 78 year old male who presents for Preventive Visit.  Are you in the first 12 months of your Medicare Part B coverage?  No    Physical Health:    In general, how would you rate your overall physical health? good    Outside of work, how many days during the week do you exercise? 2-3 days/week    Outside of work, approximately how many minutes a day do you exercise?greater than 60 minutes    If you drink alcohol do you typically have >3 drinks per day or >7 drinks per week? No    Do you usually eat at least 4 servings of fruit and vegetables a day, include whole grains & fiber and avoid regularly eating high fat or \"junk\" foods? Yes    Do you have any problems taking medications regularly?  No    Do you have any side effects from medications? none    Needs assistance for the following daily activities: no assistance needed    Which of the following safety concerns are present in your home?  none identified     Hearing impairment: Yes, has hearing aides but he does not wear them    In the past 6 months, have you been bothered by leaking of urine? no    Mental Health:    In general, how would you rate your overall mental or emotional health? good  PHQ-2 Score: 0    Do you feel safe in your environment? Yes    Do you have a Health Care Directive? Yes: Patient states has Advance Directive and will bring in a copy to clinic.      Fall risk:  Fallen 2 or more times in the past year?: No  Any fall with injury in the past year?: No    Cognitive Screenin) Repeat 3 items (Leader, Season, Table)    2) Clock draw: NORMAL  3) 3 item recall: Recalls 3 objects  Results: NORMAL clock, 1-2 items recalled: COGNITIVE IMPAIRMENT LESS LIKELY    Mini-CogTM Copyright NITIN Delgado. Licensed by the author for use in [facility]; reprinted with permission ([email or web address]@.[email or web address]). All rights reserved.          CAD/HTN - he denies angina, shortness of breath. 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 " Also has varicose veins. Not checking BP lately. BP fair in clinic but could be better.  Evaluation and treatment:    Was Hospitalized 11/22 to 11/27/17 - received CABG x 3.   Metoprolol 25 mg bid - dose limited by heart rate.   ASA qd   NTG prn but not using lately.   Losartan/HCTZ 50/12.5 qd stopped in the Hospital because it is not needed.   Losartan at 25 mg daily - no side effects - I asked him to increase to 50 mg daily.   Compression stockings.   He follows with cardiology.    BP Readings from Last 6 Encounters:   04/18/19 143/59   04/18/19 148/78   02/20/19 154/68   02/04/19 155/72   12/20/18 154/74   12/10/18 138/60       Last Comprehensive Metabolic Panel:  Sodium   Date Value Ref Range Status   04/11/2019 141 133 - 144 mmol/L Final     Potassium   Date Value Ref Range Status   04/11/2019 4.5 3.4 - 5.3 mmol/L Final     Chloride   Date Value Ref Range Status   04/11/2019 107 94 - 109 mmol/L Final     Carbon Dioxide   Date Value Ref Range Status   04/11/2019 27 20 - 32 mmol/L Final     Anion Gap   Date Value Ref Range Status   04/11/2019 7 3 - 14 mmol/L Final     Glucose   Date Value Ref Range Status   04/11/2019 117 (H) 70 - 99 mg/dL Final     Comment:     Fasting specimen     Urea Nitrogen   Date Value Ref Range Status   04/11/2019 21 7 - 30 mg/dL Final     Creatinine   Date Value Ref Range Status   04/11/2019 1.04 0.66 - 1.25 mg/dL Final     GFR Estimate   Date Value Ref Range Status   04/11/2019 69 >60 mL/min/[1.73_m2] Final     Comment:     Non  GFR Calc  Starting 12/18/2018, serum creatinine based estimated GFR (eGFR) will be   calculated using the Chronic Kidney Disease Epidemiology Collaboration   (CKD-EPI) equation.       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 No symptoms.  Evaluation and treatment:    He follows with oncology.  CBC RESULTS:   Recent Labs   Lab Test 02/20/19  0756   WBC  5.9   RBC 4.80   HGB 14.8   HCT 44.4   MCV 93   MCH 30.8   MCHC 33.3   RDW 12.8          Dyslipidemia - has h/o CAD.  Evaluation and treatment:    Per ATP4, moderate to high intensity statin recommended.:   Crestor stopped due to cost   Simvastatin changed to Lipitor 40 mg qd in the Hospital - no side effects.   Continue same treatment.    Recent Labs   Lab Test 04/11/19  0703 07/20/17  0725  01/22/15  0816 01/14/14  0747   CHOL 127 148   < > 154 154   HDL 32* 32*   < > 43 33*   LDL 61 76   < > 92 103   TRIG 168* 201*   < > 93 87   CHOLHDLRATIO  --   --   --  3.6 4.7    < > = values in this interval not displayed.     Hypothyroidism - No thyroid symptoms.  Evaluation and treatment:    Synthroid 75 mcg every day.   I asked him to increase to 88 mcg and recheck in 6 weeks.    TSH   Date Value Ref Range Status   04/11/2019 7.09 (H) 0.40 - 4.00 mU/L Final     T4 Free   Date Value Ref Range Status   04/11/2019 0.90 0.76 - 1.46 ng/dL Final     Obesity - some snoring but no known apnea.  Evaluation and treatment:    diet and exercise discussed.     Body mass index is 33 kg/m .    Wt Readings from Last 5 Encounters:   04/18/19 104.3 kg (230 lb)   02/20/19 105.3 kg (232 lb 1.6 oz)   02/04/19 103 kg (227 lb)   12/20/18 102.9 kg (226 lb 14.4 oz)   12/10/18 102.5 kg (226 lb)     BPH - stream is reduced. Nocturia x 1. Symptoms are not bad.  Evaluation and treatment:    I asked him to let me know if he wants treatment.    Right shoulder pain -   Evaluation and treatment:    He follows with ortho.   He received a steroid shot.    Hearing loss - he wants to hold off on hearing aides referral.     Umbilical hernia - noted on routine exam. He has no symptoms.  Evaluation and treatment:    He is advised to report any symptoms.   He is counseled on incarcerated hernia symptoms and to go to ED if those occur.    Surgical history -    left rotator cuff surgery.    Had anterior tibialis repair (for foot drop) on 8/18/15 - good  results.    CABG as above.   Left eye cataract surgery 18.    Preventive: I advised Shingrix at our pharmacy but his oncologist has advised against it. I asked him to ask them again.    Immunization History   Administered Date(s) Administered     Influenza (High Dose) 3 valent vaccine 10/15/2015, 10/20/2016, 2017, 2018     Influenza (IIV3) PF 10/25/2012, 10/01/2013, 2014     Pneumo Conj 13-V (2010&after) 2016     Pneumococcal 23 valent 2006     TD (ADULT, 7+) 2010     TDAP Vaccine (Boostrix) 2013     Zoster vaccine, live 07/15/2008     Colonoscopy: 17 - advised to redo in 5 years.     Prostate CA screen: discussed controversy. Prostate mildly enlarged on 7/15/14.     PSA   Date Value Ref Range Status   2016 2.76 0 - 4 ug/L Final     AAA: 14 negative    Advanced Directive: he will bring a copy from home.    Vit D Geriatrics Society Guidelines: Older adults should consume 4000 IU of Vit D daily from all sources. This will achieve serum level of 30. No need to test unless obese, malabsorption etc. You get only 100 units per glass of milk. 2000 units advised.    SH:    Marital status: , lives by himself in [place].  Kids: 2  Employment: Works at a machine shop.  Exercise: Walks a lot at work. Had been running 4 miles per day 5 times per week but not lately.  Tobacco: Quit smoking around . Has 14 pack year history of smoking.  Etoh: rarely  Recreational drugs: denies  Caffeine: 2 per day    Reviewed and updated as needed this visit by clinical staff  Tobacco  Allergies  Meds  Med Hx  Surg Hx  Fam Hx  Soc Hx        Reviewed and updated as needed this visit by Provider        Social History     Tobacco Use     Smoking status: Former Smoker     Packs/day: 1.00     Years: 20.00     Pack years: 20.00     Types: Cigarettes     Start date: 1959     Last attempt to quit: 1979     Years since quittin.7     Smokeless tobacco: Former  User   Substance Use Topics     Alcohol use: Yes     Alcohol/week: 0.0 oz     Comment: rarely                           Current providers sharing in care for this patient include:   Patient Care Team:  Anastacio Love MD as PCP - General (Family Practice)  Anastacio Love MD as MD (Family Practice)  Rolando Toro MD as MD (Transplant)  Gal Bain MD as MD (Internal Medicine)  Raissa Courtney, RN as Nurse Coordinator (Hematology & Oncology)  Gal Bain MD as Assigned PCP    The following health maintenance items are reviewed in Epic and correct as of today:  Health Maintenance   Topic Date Due     ZOSTER IMMUNIZATION (2 of 3) 09/09/2008     MEDICARE ANNUAL WELLNESS VISIT  06/15/2017     PHQ-2  01/01/2019     FALL RISK ASSESSMENT  01/04/2019     ADVANCE DIRECTIVE PLANNING Q5 YRS  07/15/2019     EYE EXAM Q1 YEAR  03/21/2020     TSH Q1 YEAR  04/11/2020     LIPID MONITORING Q1 YEAR  04/11/2020     COLONOSCOPY Q5 YR  09/19/2021     DTAP/TDAP/TD IMMUNIZATION (3 - Td) 01/21/2023     INFLUENZA VACCINE  Completed     IPV IMMUNIZATION  Aged Out     MENINGITIS IMMUNIZATION  Aged Out           ROS:  CONSTITUTIONAL: NEGATIVE for fever, chills, change in weight  INTEGUMENTARY/SKIN: NEGATIVE for worrisome rashes, moles or lesions  EYES: NEGATIVE for vision changes or irritation  ENT/MOUTH: NEGATIVE for ear, mouth and throat problems  RESP: NEGATIVE for significant cough or SOB  BREAST: NEGATIVE for masses, tenderness or discharge  CV: NEGATIVE for chest pain, palpitations or peripheral edema  GI: NEGATIVE for nausea, abdominal pain, heartburn, or change in bowel habits  : NEGATIVE for frequency, dysuria, or hematuria  MUSCULOSKELETAL: NEGATIVE for significant arthralgias or myalgia  NEURO: NEGATIVE for weakness, dizziness or paresthesias  ENDOCRINE: NEGATIVE for temperature intolerance, skin/hair changes  HEME: NEGATIVE for bleeding problems  PSYCHIATRIC: NEGATIVE for changes in mood or affect    OBJECTIVE:  "  /78   Pulse 66   Temp 98  F (36.7  C) (Oral)   Ht 1.778 m (5' 10\")   Wt 104.3 kg (230 lb)   SpO2 96%   BMI 33.00 kg/m   Estimated body mass index is 33 kg/m  as calculated from the following:    Height as of this encounter: 1.778 m (5' 10\").    Weight as of this encounter: 104.3 kg (230 lb).  EXAM:   GENERAL: healthy, alert and no distress  EYES: Eyes grossly normal to inspection, PERRL and conjunctivae and sclerae normal  HENT: ear canals and TM's normal, nose and mouth without ulcers or lesions  NECK: no adenopathy, no asymmetry, masses, or scars and thyroid normal to palpation  RESP: lungs clear to auscultation - no rales, rhonchi or wheezes  CV: regular rate and rhythm, normal S1 S2, no S3 or S4, no murmur, click or rub, no peripheral edema and peripheral pulses strong  ABDOMEN: soft, nontender, no hepatosplenomegaly, no masses and bowel sounds normal. Umbilical hernia about 2.5 cm, reducible.  MS: no gross musculoskeletal defects noted, no edema  SKIN: no suspicious lesions or rashes  NEURO: Normal strength and tone, mentation intact and speech normal  PSYCH: mentation appears normal, affect normal/bright        ASSESSMENT / PLAN:       End of Life Planning:  Patient currently has an advanced directive: No.  I have verified the patient's ablity to prepare an advanced directive/make health care decisions.  Literature was provided to assist patient in preparing an advanced directive.    COUNSELING:  Reviewed preventive health counseling, as reflected in patient instructions       Regular exercise       Healthy diet/nutrition    BP Readings from Last 1 Encounters:   04/18/19 162/75     Estimated body mass index is 33 kg/m  as calculated from the following:    Height as of this encounter: 1.778 m (5' 10\").    Weight as of this encounter: 104.3 kg (230 lb).      Weight management plan: Discussed healthy diet and exercise guidelines     reports that he quit smoking about 39 years ago. His smoking use " included cigarettes. He started smoking about 59 years ago. He has a 20.00 pack-year smoking history. He has quit using smokeless tobacco.      Appropriate preventive services were discussed with this patient, including applicable screening as appropriate for cardiovascular disease, diabetes, osteopenia/osteoporosis, and glaucoma.  As appropriate for age/gender, discussed screening for colorectal cancer, prostate cancer, breast cancer, and cervical cancer. Checklist reviewing preventive services available has been given to the patient.    Reviewed patients plan of care and provided an AVS. The Basic Care Plan (routine screening as documented in Health Maintenance) for Zack meets the Care Plan requirement. This Care Plan has been established and reviewed with the Patient.    Assessment and Plan - Decision Making    1. Encounter for preventive health examination    Results of today's exam given to patient verbally along with age appropriate preventive measures. Written instructions reviewed and handed to the patient.      2. Hypertension goal BP (blood pressure) < 140/90    Per HPI    - losartan (COZAAR) 25 MG tablet; Take 2 tablets (50 mg) by mouth daily  - Potassium; Future  - Creatinine; Future    3. Hypothyroidism, unspecified type    Per HPI    - levothyroxine (SYNTHROID/LEVOTHROID) 88 MCG tablet; Take 1 tablet (88 mcg) by mouth daily  Dispense: 30 tablet; Refill: 1  - TSH with free T4 reflex; Future      Written instructions given as follows:    Patient Instructions   1. I recommend including veggies, fresh fruits (3 to 5 servings), nuts (unsalted) in your daily diet. Cut down on carbs like bread, pasta, rice, potatoes. Cut down on red meats like burgers etc. Increase healthy proteins like beans, tofu, tuna, chicken and turkey.    2. Get regular exercise like jogging, biking, swimming at least 3 times per week (150 minutes per week).      3. Avoid the sun or otherwise use sun screen - please look at the guide I  gave you about melanoma.    4. See the dentist and eye doctor regularly.     5. Please bring a copy of your advanced directive at our next visit.    6. Ask your oncologist about the new Shingles vaccine.    7. Let's increase your thyroid medication to 88 mcg daily.    8. Let's increase the Losartan to 50 mg daily.    9. Let me see you back in 6 weeks with previsit labs.

## 2019-04-18 NOTE — NURSING NOTE
Chief Complaint   Patient presents with     Blood Draw     Labs w/JIC tube drawn via venipuncture by RN     /59 (BP Location: Left arm, Patient Position: Chair, Cuff Size: Adult Large)   Pulse 54   Temp 98.1  F (36.7  C) (Oral)   Wt 106.3 kg (234 lb 4.8 oz)   SpO2 96%   BMI 33.62 kg/m      Labs collected and sent from right antecubital venipuncture in lab by RN. Pt tolerated well. Pt checked in for next appointment.    Jacquelyn Paul RN

## 2019-04-18 NOTE — NURSING NOTE
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    Weight as of this encounter: 106.3 kg (234 lb 4.8 oz). Body surface area is 2.29 meters squared.  No Pain (0) Comment: Data Unavailable   No LMP for male patient.  Allergies reviewed: Yes  Medications reviewed: Yes    Medications: Medication refills not needed today.  Pharmacy name entered into [employer] Data: [id number] - [place], [place] - [address] [facility]    Clinical concerns: N/A        Jazmine Mckeon CMA              "

## 2019-04-18 NOTE — PATIENT INSTRUCTIONS
1. I recommend including veggies, fresh fruits (3 to 5 servings), nuts (unsalted) in your daily diet. Cut down on carbs like bread, pasta, rice, potatoes. Cut down on red meats like burgers etc. Increase healthy proteins like beans, tofu, tuna, chicken and turkey.    2. Get regular exercise like jogging, biking, swimming at least 3 times per week (150 minutes per week).      3. Avoid the sun or otherwise use sun screen - please look at the guide I gave you about melanoma.    4. See the dentist and eye doctor regularly.     5. Please bring a copy of your advanced directive at our next visit.    6. Ask your oncologist about the new Shingles vaccine.    7. Let's increase your thyroid medication to 88 mcg daily.    8. Let's increase the Losartan to 50 mg daily.    9. Let me see you back in 6 weeks with previsit labs.

## 2019-04-18 NOTE — PROGRESS NOTES
Reason for Visit: seen in f/u of follicular lymphoma    ONCOLOGY SUMMARY (updated): Zack Demarco is a 78-year-old first seen in consultation on 12/27/2017 for a new diagnosis of follicular lymphoma. He was diagnosed incidental to an evaluation for cardiac bypass surgery in 11/2017. A chest CT scan on 11/14 showed an unexpected retroperitoneal mass with surrounding lymphadenopathy suspicious for malignancy.  A further CT scan done on the same day showed a 2.3 x 7.2 x 6.1 retroperitoneal soft tissue mass with adjacent lymphadenopathy that mildly narrowed the left renal vein. There also was nodularity within the mesentery and an enlarged hilar lymph node. CT-guided biopsy of the retroperitoneal mass on 12/12/2017 established the diagnosis, but the sample was too small for adequate grading. There was no disease identified outside of the abdomen; a bone marrow biopsy was not obtained as part of staging.      Relative to cardiac issues, he underwent an uncomplicated 3-vessel coronary artery bypass graft on 11/22/2017 and, subsequently, has been symptom-free.       With the renal and gonadal vein compression it was decided to start treatment with rituximab, alone. Mr. Demarco completed 4 weekly doses from 01/26 - 02/16/2018. He had no difficulty with the treatment and was able to receive rapid infusion (90 minutes) for the last 3 doses. Interval evaluation on 03/05/2018 with an US, suggested improvement. A CT scan on 04/09/2018 showed substantial regression. Repeated CT on 07/09/18 showed a good response but residual lymphoma around the renal veins. Decided to proceed weekly rituximab x4 (7/26-8/22/2018).  He started rituximab maintenance in 10/2018.    Subsequent CT scans on 10/16/2018 and 4/16/2019 showed stable findings.    He was a patient of Dr. Cunningham, who retired in March 2019    INTERVAL HISTORY:  Patient returns unaccompanied for follow-up follicular lymphoma. Overall he is doing well. He lives by himself. He is  "independent on ADLs. He stays active.  He notices a little bit of more short of breath when he exerts himself.  He feels a bit tired and was told to increase his levothyroxine dose by his PCP today. He stopped aspirin on his own about one week ago and said \"AMA\" recommend against aspirin for patients older than 70.  Patient has been tolerating rituximab well.  He denies fever, chills, chest pain, night sweats, or weight loss.    ROS: Complete ROS otherwise negative.    Current Outpatient Medications   Medication Sig Dispense Refill     aspirin 325 MG EC tablet 1/2 tab daily       atorvastatin (LIPITOR) 40 MG tablet Take 1 tablet (40 mg) by mouth daily 60 tablet 11     Cholecalciferol (VITAMIN D3 PO) Take by mouth daily       latanoprost (XALATAN) 0.005 % ophthalmic solution Place 1 drop into both eyes At Bedtime 3 Bottle 4     levothyroxine (SYNTHROID/LEVOTHROID) 75 MCG tablet Take 1 tablet (75 mcg) by mouth daily 90 tablet 3     levothyroxine (SYNTHROID/LEVOTHROID) 88 MCG tablet Take 1 tablet (88 mcg) by mouth daily 30 tablet 1     losartan (COZAAR) 25 MG tablet Take 2 tablets (50 mg) by mouth daily       metoprolol tartrate (LOPRESSOR) 25 MG tablet Take 1 tablet (25 mg) by mouth 2 times daily 180 tablet 3     nitroGLYcerin (NITROSTAT) 0.4 MG sublingual tablet Place 0.4 mg under the tongue       timolol maleate (TIMOPTIC) 0.5 % ophthalmic solution Place 1 drop Into the left eye every morning 1 Bottle 11        Allergies   Allergen Reactions     Nkda [No Known Drug Allergies]      Exam:  Blood pressure 143/59, pulse 54, temperature 98.1  F (36.7  C), temperature source Oral, height 1.778 m (5' 10\"), weight 106.3 kg (234 lb 4.8 oz), SpO2 96 %.  Wt Readings from Last 4 Encounters:   04/18/19 106.3 kg (234 lb 4.8 oz)   04/18/19 104.3 kg (230 lb)   02/20/19 105.3 kg (232 lb 1.6 oz)   02/04/19 103 kg (227 lb)     Constitutional: Awake and alert, appears well-developed, not in acute distress.  Eyes: No scleral icterus. " Eyes exhibit no discharge.  ENT/Mouth: Oral mucosa pink and moist  Cardiovascular: Normal rate, regular rhythm, S1, S2. No murmur or rub. Trace LE edema.  Respiratory: No respiratory distress. Clear to auscultation bilaterally. No wheezes.  Gastrointestinal: Soft. No distension. No tenderness or garding.  Neurological: AAOX3, grossly non-focal  Psychiatric: Mentation and affect appear normal.  Skin: Skin is warm, not diaphoretic.  Hematologic/Lymphatic/Immunologic: No overt bleeding. No lymphadenopathy    LABORATORY:  CBC   Lab Results   Component Value Date/Time    WBC 5.9 02/20/2019 07:56 AM    HGB 14.8 02/20/2019 07:56 AM    HCT 44.4 02/20/2019 07:56 AM     02/20/2019 07:56 AM    MCV 93 02/20/2019 07:56 AM    RBC 4.80 02/20/2019 07:56 AM    ANEU 4.1 02/20/2019 07:56 AM     BMP  Lab Results   Component Value Date/Time     04/11/2019 07:03 AM    POTASSIUM 4.6 04/18/2019 02:29 PM    CHLORIDE 107 04/11/2019 07:03 AM    CO2 27 04/11/2019 07:03 AM    BUN 21 04/11/2019 07:03 AM    CR 0.97 04/18/2019 02:29 PM    ELVIRA 9.2 04/11/2019 07:03 AM    MAG 2.2 11/27/2017 07:14 AM     LFTs   Lab Results   Component Value Date    PROTTOTAL 7.1 02/20/2019    ALBUMIN 3.6 02/20/2019    AST 17 02/20/2019    ALT 33 02/20/2019    BILITOTAL 0.5 02/20/2019    DBIL 0.1 11/24/2017    ALKPHOS 77 02/20/2019       IMAGING:  CT CHEST/ABDOMEN/PELVIS W CONTRAST, 4/16/2019 7:25 AM                                                   IMPRESSION:   1. No significant change in the approximately 1.7 x 3.7 cm ill-defined soft tissue masslike lesion in the mesentery associated with extensive mesenteric fat stranding.  2. Minimal change in the left periaortic masslike lesion and associated lymph nodes.   3. Mild fluid filling and expansion of the appendix may represent developing mucocele. Attention on follow-up exams.    I personally reviewed the images and discussed with the reading radiologist over the phone.    ASSESSMENT/PLAN:  1. Follicular  lymphoma, stage IIA  (marrow not obtained), treated with rituximab weekly x 4 in 01/2018 and again in 07/2018 with a good response, but residual disease. Initiated maintenance rituximab (Q 2 month x 2 years) on 10/24/18. S/p 3 cycles, tolerating well.    No evidence concerning for disease progression on exam and imaging study. I called the reading radiologist Dr. Danielson and confirmed that his scan looks stable.    - Will continue with 4th dose of maintenance rituximab as planned tomorrow.    - Return in 2 months with [facility] visit, labs, and next dose of rituximab    2. CAD, s/p 3 vessel CABG: He stopped aspirin on his own about a week ago. I advised him to call and discuss with his cardiologist.    Addendum: I called Mr. Demarco after I spoke with the radiologist about his CT scan. I reassured him that his scan remains stable. He appreciated my call.    Staffed with Dr. Nasim Roblero MD, PhD  Hem/Onc Fellow    Physician Attestation   I, Agustin Rouse, saw this patient and agree with the findings and plan of care as documented in the note.      Items personally reviewed/procedural attestation: vitals, labs and imaging and agree with the interpretation documented in the note.    Briefly limited stage follicular lymphoma.  In a 78 year old one of the main goals is to avoid treatmetn related toxicity and hence I agree with Dr. Kramer plan of continuing rituxan for 2 years.  Mr. Dixon is doing well without any treatment related side effects on rituximab.  I will continue to follow him in 2 months with [facility] visit labs and next dose of rituximab.      Agustin Rouse MD

## 2019-04-19 ENCOUNTER — APPOINTMENT (OUTPATIENT)
Dept: LAB | Facility: CLINIC | Age: 79
End: 2019-04-19
Attending: INTERNAL MEDICINE
Payer: MEDICARE

## 2019-04-19 ENCOUNTER — INFUSION THERAPY VISIT (OUTPATIENT)
Dept: ONCOLOGY | Facility: CLINIC | Age: 79
End: 2019-04-19
Attending: INTERNAL MEDICINE
Payer: MEDICARE

## 2019-04-19 VITALS
HEART RATE: 47 BPM | RESPIRATION RATE: 18 BRPM | TEMPERATURE: 98 F | SYSTOLIC BLOOD PRESSURE: 131 MMHG | OXYGEN SATURATION: 97 % | DIASTOLIC BLOOD PRESSURE: 66 MMHG

## 2019-04-19 DIAGNOSIS — C82.99 FOLLICULAR LYMPHOMA OF EXTRANODAL AND SOLID ORGAN SITES (H): Primary | ICD-10-CM

## 2019-04-19 PROCEDURE — A9270 NON-COVERED ITEM OR SERVICE: HCPCS | Mod: ZF | Performed by: INTERNAL MEDICINE

## 2019-04-19 PROCEDURE — 96415 CHEMO IV INFUSION ADDL HR: CPT

## 2019-04-19 PROCEDURE — 25800030 ZZH RX IP 258 OP 636: Mod: ZF | Performed by: INTERNAL MEDICINE

## 2019-04-19 PROCEDURE — 96413 CHEMO IV INFUSION 1 HR: CPT

## 2019-04-19 PROCEDURE — 99207 ZZC NO BILLABLE SERVICE THIS VISIT: CPT | Mod: ZP

## 2019-04-19 PROCEDURE — 25000132 ZZH RX MED GY IP 250 OP 250 PS 637: Mod: ZF | Performed by: INTERNAL MEDICINE

## 2019-04-19 PROCEDURE — 25000128 H RX IP 250 OP 636: Mod: ZF | Performed by: INTERNAL MEDICINE

## 2019-04-19 RX ORDER — SODIUM CHLORIDE 9 MG/ML
1000 INJECTION, SOLUTION INTRAVENOUS CONTINUOUS PRN
Status: CANCELLED
Start: 2019-04-19

## 2019-04-19 RX ORDER — ACETAMINOPHEN 325 MG/1
650 TABLET ORAL ONCE
Status: CANCELLED
Start: 2019-04-19

## 2019-04-19 RX ORDER — EPINEPHRINE 0.3 MG/.3ML
0.3 INJECTION SUBCUTANEOUS EVERY 5 MIN PRN
Status: CANCELLED | OUTPATIENT
Start: 2019-04-19

## 2019-04-19 RX ORDER — EPINEPHRINE 1 MG/ML
0.3 INJECTION, SOLUTION INTRAMUSCULAR; SUBCUTANEOUS EVERY 5 MIN PRN
Status: CANCELLED | OUTPATIENT
Start: 2019-04-19

## 2019-04-19 RX ORDER — ACETAMINOPHEN 325 MG/1
650 TABLET ORAL ONCE
Status: COMPLETED | OUTPATIENT
Start: 2019-04-19 | End: 2019-04-19

## 2019-04-19 RX ORDER — METHYLPREDNISOLONE SODIUM SUCCINATE 125 MG/2ML
125 INJECTION, POWDER, LYOPHILIZED, FOR SOLUTION INTRAMUSCULAR; INTRAVENOUS
Status: CANCELLED
Start: 2019-04-19

## 2019-04-19 RX ORDER — DIPHENHYDRAMINE HYDROCHLORIDE 50 MG/ML
50 INJECTION INTRAMUSCULAR; INTRAVENOUS
Status: CANCELLED
Start: 2019-04-19

## 2019-04-19 RX ORDER — MEPERIDINE HYDROCHLORIDE 25 MG/ML
25 INJECTION INTRAMUSCULAR; INTRAVENOUS; SUBCUTANEOUS
Status: CANCELLED
Start: 2019-04-19

## 2019-04-19 RX ORDER — ALBUTEROL SULFATE 0.83 MG/ML
2.5 SOLUTION RESPIRATORY (INHALATION)
Status: CANCELLED | OUTPATIENT
Start: 2019-04-19

## 2019-04-19 RX ORDER — MEPERIDINE HYDROCHLORIDE 25 MG/ML
25 INJECTION INTRAMUSCULAR; INTRAVENOUS; SUBCUTANEOUS EVERY 30 MIN PRN
Status: CANCELLED | OUTPATIENT
Start: 2019-04-19

## 2019-04-19 RX ORDER — DIPHENHYDRAMINE HCL 25 MG
50 CAPSULE ORAL ONCE
Status: COMPLETED | OUTPATIENT
Start: 2019-04-19 | End: 2019-04-19

## 2019-04-19 RX ORDER — DIPHENHYDRAMINE HCL 25 MG
50 CAPSULE ORAL ONCE
Status: CANCELLED
Start: 2019-04-19

## 2019-04-19 RX ORDER — ALBUTEROL SULFATE 90 UG/1
1-2 AEROSOL, METERED RESPIRATORY (INHALATION)
Status: CANCELLED
Start: 2019-04-19

## 2019-04-19 RX ADMIN — SODIUM CHLORIDE 250 ML: 9 INJECTION, SOLUTION INTRAVENOUS at 09:50

## 2019-04-19 RX ADMIN — RITUXIMAB 800 MG: 10 INJECTION, SOLUTION INTRAVENOUS at 09:50

## 2019-04-19 RX ADMIN — DIPHENHYDRAMINE HYDROCHLORIDE 50 MG: 25 CAPSULE ORAL at 09:17

## 2019-04-19 RX ADMIN — ACETAMINOPHEN 650 MG: 325 TABLET ORAL at 09:17

## 2019-04-19 ASSESSMENT — PAIN SCALES - GENERAL: PAINLEVEL: NO PAIN (0)

## 2019-04-19 NOTE — PROGRESS NOTES
Infusion Nursing Note:  Zack Demarco presents today for Cycle 4 Day 1 of Rapid Rituxan.    Patient seen by provider today: No   present during visit today: Not Applicable.    Note: Patient presents to infusion feeling well today. He denies any chills, fevers, shortness of breath, or dizziness overnight. No new concerns or issues since seeing Dr. Rouse yesterday.    TORB: Kayleigh Smith RN/ Dr. Rouse @ 8AM on 4/19/19. Okay to proceed today without a CBC.     Intravenous Access:  Peripheral IV placed.    Treatment Conditions:  Lab Results   Component Value Date     04/11/2019                   Lab Results   Component Value Date    POTASSIUM 4.6 04/18/2019           Lab Results   Component Value Date    MAG 2.2 11/27/2017            Lab Results   Component Value Date    CR 0.97 04/18/2019                   Lab Results   Component Value Date    ELVIRA 9.2 04/11/2019                Lab Results   Component Value Date    BILITOTAL 0.5 02/20/2019           Lab Results   Component Value Date    ALBUMIN 3.6 02/20/2019                    Lab Results   Component Value Date    ALT 33 02/20/2019           Lab Results   Component Value Date    AST 17 02/20/2019       Results reviewed, labs MET treatment parameters, ok to proceed with treatment.      Post Infusion Assessment:  Patient tolerated infusion without incident.  Blood return noted pre and post infusion.  Site patent and intact, free from redness, edema or discomfort.  No evidence of extravasations.  Access discontinued per protocol.       Discharge Plan:   Patient declined prescription refills.  Discharge instructions reviewed with: Patient.  Patient and/or family verbalized understanding of discharge instructions and all questions answered.  AVS to patient via LiveStubT.  Patient aware he does not have any return appointments scheduled. Pt to call if nothing is scheduled in the next week.  Patient discharged in stable condition accompanied by:  self.  Departure Mode: Ambulatory.    Kayleigh Smith RN

## 2019-04-19 NOTE — PATIENT INSTRUCTIONS
Contact Numbers  [facility]: [phone]    After Hours:  [phone]  Triage: [phone]    Please call the [facility] Triage line if you experience a temperature greater than or equal to 100.5, shaking chills, have uncontrolled nausea, vomiting and/or diarrhea, dizziness, shortness of breath, chest pain, bleeding, unexplained bruising, or if you have any other new/concerning symptoms, questions or concerns.     If it is after hours, weekends, or holidays, please call the main hospital  at  [phone] and ask to speak to the Oncology doctor on call.     If you are having any concerning symptoms or wish to speak to a provider before your next infusion visit, please call your care coordinator or triage to notify them so we can adequately serve you.     If you need a refill on a narcotic prescription or other medication, please call triage before your infusion appointment.       April 2019 Sunday Monday Tuesday Wednesday Thursday Friday Saturday        1     2     3     4     5     6       7     8     9     10     11    LAB   7:15 AM   (15 min.)   AN LAB   [facility] 12     13       14     15     16    LAB   7:15 AM   (15 min.)    LAB   [facility] Lab    CT CHEST/ABDOMEN/PELVIS W   7:40 AM   (20 min.)   UCCT1   [facility] CT 17     18    PHYSICAL   7:30 AM   (20 min.)   Anastacio Love MD   [facility] LAB DRAW   2:45 PM   (15 min.)   [facility] LAB DRAW   [facility] Lab Draw    [facility] ONC RETURN   3:15 PM   (60 min.)   Agustin Rouse MD   [facility] Blood and Marrow Transplant 19    [facility] DRAW   8:45 AM   (15 min.)    [facility]   [facility]    [facility] ONC INFUSION 360   9:00 AM   (360 min.)    ONCOLOGY INFUSION   [facility] 20 21     22     23     24     25     26     27       28     29     30                                     May 2019      [place] Monday Tuesday Wednesday  Thursday Friday Saturday                  1     2     3     4       5     6     7     8     9     10     11       12     13     14     15     16     17     18       19     20     21     22     23    LAB   7:15 AM   (15 min.)   AN LAB   [facility] 24     25       26     27     28     29     30    LONG   8:10 AM   (20 min.)   Anastacio Love MD   [facility] 31                          Lab Results:  No results found for this or any previous visit (from the past 12 hour(s)).

## 2019-04-24 ENCOUNTER — MYC MEDICAL ADVICE (OUTPATIENT)
Dept: FAMILY MEDICINE | Facility: CLINIC | Age: 79
End: 2019-04-24

## 2019-05-03 ENCOUNTER — MYC MEDICAL ADVICE (OUTPATIENT)
Dept: FAMILY MEDICINE | Facility: CLINIC | Age: 79
End: 2019-05-03

## 2019-05-03 NOTE — TELEPHONE ENCOUNTER
[facility]axed office notes form 4/18/19 [facility] @ [phone].Radha Lees MA/INOCENCIO

## 2019-05-16 ENCOUNTER — DOCUMENTATION ONLY (OUTPATIENT)
Dept: FAMILY MEDICINE | Facility: CLINIC | Age: 79
End: 2019-05-16

## 2019-05-16 DIAGNOSIS — E03.9 HYPOTHYROIDISM, UNSPECIFIED TYPE: Primary | ICD-10-CM

## 2019-05-16 NOTE — PROGRESS NOTES
Patient scheduled to see Lab on 5/23/2019 for Pre-visit labs.  Patient does not qualify per Pre-visit protocol.  Please place future orders, or call and advise patient to cancel Lab appointment.

## 2019-05-16 NOTE — PROGRESS NOTES
Patient just had labs 4/19/19. Please advise if you beed labs again. If not, I will call and cancel the lab appointment.    BP appt 5/30/19

## 2019-05-23 DIAGNOSIS — E03.9 HYPOTHYROIDISM, UNSPECIFIED TYPE: ICD-10-CM

## 2019-05-23 LAB — TSH SERPL DL<=0.005 MIU/L-ACNC: 2.03 MU/L (ref 0.4–4)

## 2019-05-23 PROCEDURE — 84443 ASSAY THYROID STIM HORMONE: CPT | Performed by: FAMILY MEDICINE

## 2019-05-23 PROCEDURE — 36415 COLL VENOUS BLD VENIPUNCTURE: CPT | Performed by: FAMILY MEDICINE

## 2019-05-24 DIAGNOSIS — E03.9 HYPOTHYROIDISM, UNSPECIFIED TYPE: ICD-10-CM

## 2019-05-24 RX ORDER — LEVOTHYROXINE SODIUM 88 UG/1
88 TABLET ORAL DAILY
Qty: 90 TABLET | Refills: 3 | Status: SHIPPED | OUTPATIENT
Start: 2019-05-24 | End: 2020-11-19

## 2019-05-29 NOTE — PROGRESS NOTES
HPI:    Zack is a 78 year old male here for follow-up:    CAD/HTN - he denies angina, shortness of breath. 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 Also has varicose veins. Not checking BP lately. BP fair in clinic but could be better.  Evaluation and treatment:    Was Hospitalized 11/22 to 11/27/17 - received CABG x 3.   Metoprolol 25 mg bid - dose limited by heart rate.   ASA qd   NTG prn but not using lately.   Losartan/HCTZ 50/12.5 qd stopped in the Hospital because it is not needed.   Losartan 50 mg daily - no side effects.   Compression stockings.   He follows with cardiology.    BP Readings from Last 6 Encounters:   05/30/19 130/60   04/19/19 131/66   04/18/19 143/59   04/18/19 148/78   02/20/19 154/68   02/04/19 155/72       Last Comprehensive Metabolic Panel:  Sodium   Date Value Ref Range Status   04/11/2019 141 133 - 144 mmol/L Final     Potassium   Date Value Ref Range Status   04/18/2019 4.6 3.4 - 5.3 mmol/L Final     Chloride   Date Value Ref Range Status   04/11/2019 107 94 - 109 mmol/L Final     Carbon Dioxide   Date Value Ref Range Status   04/11/2019 27 20 - 32 mmol/L Final     Anion Gap   Date Value Ref Range Status   04/11/2019 7 3 - 14 mmol/L Final     Glucose   Date Value Ref Range Status   04/11/2019 117 (H) 70 - 99 mg/dL Final     Comment:     Fasting specimen     Urea Nitrogen   Date Value Ref Range Status   04/11/2019 21 7 - 30 mg/dL Final     Creatinine   Date Value Ref Range Status   04/18/2019 0.97 0.66 - 1.25 mg/dL Final     GFR Estimate   Date Value Ref Range Status   04/18/2019 74 >60 mL/min/[1.73_m2] Final     Comment:     Non  GFR Calc  Starting 12/18/2018, serum creatinine based estimated GFR (eGFR) will be   calculated using the Chronic Kidney Disease Epidemiology Collaboration   (CKD-EPI) equation.       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 No  symptoms.  Evaluation and treatment:    He follows with oncology.  CBC RESULTS:   Recent Labs   Lab Test 02/20/19  0756   WBC 5.9   RBC 4.80   HGB 14.8   HCT 44.4   MCV 93   MCH 30.8   MCHC 33.3   RDW 12.8          Dyslipidemia - has h/o CAD.  Evaluation and treatment:    Per ATP4, moderate to high intensity statin recommended.:   Crestor stopped due to cost   Simvastatin changed to Lipitor 40 mg qd in the Hospital - no side effects.   Continue same treatment.    Recent Labs   Lab Test 04/11/19  0703 07/20/17  0725  01/22/15  0816 01/14/14  0747   CHOL 127 148   < > 154 154   HDL 32* 32*   < > 43 33*   LDL 61 76   < > 92 103   TRIG 168* 201*   < > 93 87   CHOLHDLRATIO  --   --   --  3.6 4.7    < > = values in this interval not displayed.     Hypothyroidism - No thyroid symptoms.  Evaluation and treatment:    Synthroid 75 mcg every day.   I asked him to increase to 88 mcg and recheck in 6 weeks.    TSH   Date Value Ref Range Status   05/23/2019 2.03 0.40 - 4.00 mU/L Final     T4 Free   Date Value Ref Range Status   04/18/2019 0.83 0.76 - 1.46 ng/dL Final     Obesity - some snoring but no known apnea.  Evaluation and treatment:    diet and exercise discussed.     Body mass index is 30.56 kg/m .    Wt Readings from Last 5 Encounters:   05/30/19 96.6 kg (213 lb)   04/18/19 106.3 kg (234 lb 4.8 oz)   04/18/19 104.3 kg (230 lb)   02/20/19 105.3 kg (232 lb 1.6 oz)   02/04/19 103 kg (227 lb)     BPH - stream is reduced. Nocturia x 1. Symptoms are not bad.  Evaluation and treatment:    I asked him to let me know if he wants treatment.    Right shoulder pain -   Evaluation and treatment:    He follows with ortho.   He received a steroid shot.    Hearing loss - he wants to hold off on hearing aides referral.     Umbilical hernia - noted on routine exam. He has no symptoms.  Evaluation and treatment:    He is advised to report any symptoms.   He is counseled on incarcerated hernia symptoms and to go to ED if those  occur.    Surgical history -    left rotator cuff surgery.    Had anterior tibialis repair (for foot drop) on 8/18/15 - good results.    CABG as above.   Left eye cataract surgery 12/17/18.    Preventive: I advised Shingrix at our pharmacy - he has had his oncologist's approval.    Immunization History   Administered Date(s) Administered     Influenza (High Dose) 3 valent vaccine 10/15/2015, 10/20/2016, 09/25/2017, 09/24/2018     Influenza (IIV3) PF 10/25/2012, 10/01/2013, 11/24/2014     Pneumo Conj 13-V (2010&after) 01/22/2016     Pneumococcal 23 valent 11/30/2006     TD (ADULT, 7+) 08/25/2010     TDAP Vaccine (Boostrix) 01/21/2013     Zoster vaccine, live 07/15/2008     Colonoscopy: 9/19/16 - advised to redo in 5 years.     Prostate CA screen: discussed controversy. Prostate mildly enlarged on 7/15/14.     PSA   Date Value Ref Range Status   06/09/2016 2.76 0 - 4 ug/L Final     AAA: 7/18/14 negative    Advanced Directive: brought a copy today.    Vit D Geriatrics Society Guidelines: Older adults should consume 4000 IU of Vit D daily from all sources. This will achieve serum level of 30. No need to test unless obese, malabsorption etc. You get only 100 units per glass of milk. 2000 units advised.    SH:    Marital status: , lives by himself in [place].  Kids: 2  Employment: Works at a machine shop.  Exercise: Walks a lot at work. Had been running 4 miles per day 5 times per week but not lately.  Tobacco: Quit smoking around 1980. Has 14 pack year history of smoking.  Etoh: rarely  Recreational drugs: denies  Caffeine: 2 per day      Exam:    /60 (Cuff Size: Adult Regular)   Pulse 69   Temp 98.2  F (36.8  C) (Oral)   Wt 96.6 kg (213 lb)   SpO2 97%   BMI 30.56 kg/m      Gen: Healthy appearing male in no acute distress  Eyes: Conjunctiva and sclera normal. Pupils react normally to light. No nystagmus.  Neck: No enlarged lymph nodes, thyromegally or other masses.  Lungs: Good air movement and  otherwise clear.  CV: Heart RRR with no murmurs. No JVD, carotid bruits or leg edema.      Assessment and Plan - Decision Making    1. Hypertension goal BP (blood pressure) < 140/90    Per HPI    - losartan (COZAAR) 50 MG tablet; Take 1 tablet (50 mg) by mouth daily  Dispense: 90 tablet; Refill: 3  - metoprolol tartrate (LOPRESSOR) 25 MG tablet; Take 1 tablet (25 mg) by mouth 2 times daily  Dispense: 180 tablet; Refill: 3      Written instructions given as follows:    Patient Instructions   1. Good job on the weight - keep going.    2. See you in April 2020 for a physical with fasting labs.

## 2019-05-30 ENCOUNTER — OFFICE VISIT (OUTPATIENT)
Dept: FAMILY MEDICINE | Facility: CLINIC | Age: 79
End: 2019-05-30
Payer: MEDICARE

## 2019-05-30 VITALS
SYSTOLIC BLOOD PRESSURE: 130 MMHG | DIASTOLIC BLOOD PRESSURE: 60 MMHG | OXYGEN SATURATION: 97 % | HEART RATE: 69 BPM | BODY MASS INDEX: 30.56 KG/M2 | TEMPERATURE: 98.2 F | WEIGHT: 213 LBS

## 2019-05-30 DIAGNOSIS — I10 HYPERTENSION GOAL BP (BLOOD PRESSURE) < 140/90: Primary | ICD-10-CM

## 2019-05-30 PROCEDURE — 99213 OFFICE O/P EST LOW 20 MIN: CPT | Performed by: FAMILY MEDICINE

## 2019-05-30 RX ORDER — METOPROLOL TARTRATE 25 MG/1
25 TABLET, FILM COATED ORAL 2 TIMES DAILY
Qty: 180 TABLET | Refills: 3 | Status: SHIPPED | OUTPATIENT
Start: 2019-05-30 | End: 2020-07-06

## 2019-05-30 RX ORDER — LOSARTAN POTASSIUM 50 MG/1
50 TABLET ORAL DAILY
Qty: 90 TABLET | Refills: 3 | Status: SHIPPED | OUTPATIENT
Start: 2019-05-30 | End: 2020-11-19 | Stop reason: DRUGHIGH

## 2019-06-04 ENCOUNTER — DOCUMENTATION ONLY (OUTPATIENT)
Dept: OTHER | Facility: CLINIC | Age: 79
End: 2019-06-04

## 2019-06-12 ENCOUNTER — DOCUMENTATION ONLY (OUTPATIENT)
Dept: OTHER | Facility: CLINIC | Age: 79
End: 2019-06-12

## 2019-06-12 DIAGNOSIS — C82.99 FOLLICULAR LYMPHOMA OF EXTRANODAL AND SOLID ORGAN SITES (H): Primary | ICD-10-CM

## 2019-06-13 ENCOUNTER — APPOINTMENT (OUTPATIENT)
Dept: LAB | Facility: CLINIC | Age: 79
End: 2019-06-13
Attending: INTERNAL MEDICINE
Payer: MEDICARE

## 2019-06-13 ENCOUNTER — INFUSION THERAPY VISIT (OUTPATIENT)
Dept: ONCOLOGY | Facility: CLINIC | Age: 79
End: 2019-06-13
Attending: INTERNAL MEDICINE
Payer: MEDICARE

## 2019-06-13 ENCOUNTER — ONCOLOGY VISIT (OUTPATIENT)
Dept: ONCOLOGY | Facility: CLINIC | Age: 79
End: 2019-06-13
Attending: NURSE PRACTITIONER
Payer: MEDICARE

## 2019-06-13 VITALS
SYSTOLIC BLOOD PRESSURE: 147 MMHG | BODY MASS INDEX: 30.65 KG/M2 | WEIGHT: 214.1 LBS | OXYGEN SATURATION: 97 % | HEART RATE: 46 BPM | DIASTOLIC BLOOD PRESSURE: 69 MMHG | TEMPERATURE: 98 F | HEIGHT: 70 IN | RESPIRATION RATE: 18 BRPM

## 2019-06-13 DIAGNOSIS — C82.99 FOLLICULAR LYMPHOMA OF EXTRANODAL AND SOLID ORGAN SITES (H): Primary | ICD-10-CM

## 2019-06-13 DIAGNOSIS — C82.99 FOLLICULAR LYMPHOMA OF EXTRANODAL AND SOLID ORGAN SITES (H): ICD-10-CM

## 2019-06-13 LAB
ALBUMIN SERPL-MCNC: 3.8 G/DL (ref 3.4–5)
ALP SERPL-CCNC: 61 U/L (ref 40–150)
ALT SERPL W P-5'-P-CCNC: 45 U/L (ref 0–70)
ANION GAP SERPL CALCULATED.3IONS-SCNC: 4 MMOL/L (ref 3–14)
AST SERPL W P-5'-P-CCNC: 26 U/L (ref 0–45)
BASOPHILS # BLD AUTO: 0 10E9/L (ref 0–0.2)
BASOPHILS NFR BLD AUTO: 0.3 %
BILIRUB SERPL-MCNC: 0.5 MG/DL (ref 0.2–1.3)
BUN SERPL-MCNC: 24 MG/DL (ref 7–30)
CALCIUM SERPL-MCNC: 8.9 MG/DL (ref 8.5–10.1)
CHLORIDE SERPL-SCNC: 110 MMOL/L (ref 94–109)
CO2 SERPL-SCNC: 27 MMOL/L (ref 20–32)
CREAT SERPL-MCNC: 0.82 MG/DL (ref 0.66–1.25)
DIFFERENTIAL METHOD BLD: NORMAL
EOSINOPHIL # BLD AUTO: 0.2 10E9/L (ref 0–0.7)
EOSINOPHIL NFR BLD AUTO: 2.6 %
ERYTHROCYTE [DISTWIDTH] IN BLOOD BY AUTOMATED COUNT: 12.8 % (ref 10–15)
GFR SERPL CREATININE-BSD FRML MDRD: 85 ML/MIN/{1.73_M2}
GLUCOSE SERPL-MCNC: 112 MG/DL (ref 70–99)
HCT VFR BLD AUTO: 44.5 % (ref 40–53)
HGB BLD-MCNC: 14.4 G/DL (ref 13.3–17.7)
IMM GRANULOCYTES # BLD: 0 10E9/L (ref 0–0.4)
IMM GRANULOCYTES NFR BLD: 0.2 %
LDH SERPL L TO P-CCNC: 148 U/L (ref 85–227)
LYMPHOCYTES # BLD AUTO: 1 10E9/L (ref 0.8–5.3)
LYMPHOCYTES NFR BLD AUTO: 15.6 %
MCH RBC QN AUTO: 31.4 PG (ref 26.5–33)
MCHC RBC AUTO-ENTMCNC: 32.4 G/DL (ref 31.5–36.5)
MCV RBC AUTO: 97 FL (ref 78–100)
MONOCYTES # BLD AUTO: 0.5 10E9/L (ref 0–1.3)
MONOCYTES NFR BLD AUTO: 8.1 %
NEUTROPHILS # BLD AUTO: 4.8 10E9/L (ref 1.6–8.3)
NEUTROPHILS NFR BLD AUTO: 73.2 %
NRBC # BLD AUTO: 0 10*3/UL
NRBC BLD AUTO-RTO: 0 /100
PLATELET # BLD AUTO: 177 10E9/L (ref 150–450)
POTASSIUM SERPL-SCNC: 4.2 MMOL/L (ref 3.4–5.3)
PROT SERPL-MCNC: 7 G/DL (ref 6.8–8.8)
RBC # BLD AUTO: 4.58 10E12/L (ref 4.4–5.9)
SODIUM SERPL-SCNC: 141 MMOL/L (ref 133–144)
WBC # BLD AUTO: 6.5 10E9/L (ref 4–11)

## 2019-06-13 PROCEDURE — 85025 COMPLETE CBC W/AUTO DIFF WBC: CPT | Performed by: NURSE PRACTITIONER

## 2019-06-13 PROCEDURE — 25000128 H RX IP 250 OP 636: Mod: ZF | Performed by: NURSE PRACTITIONER

## 2019-06-13 PROCEDURE — 96415 CHEMO IV INFUSION ADDL HR: CPT

## 2019-06-13 PROCEDURE — 99214 OFFICE O/P EST MOD 30 MIN: CPT | Mod: ZP | Performed by: NURSE PRACTITIONER

## 2019-06-13 PROCEDURE — G0463 HOSPITAL OUTPT CLINIC VISIT: HCPCS | Mod: ZF

## 2019-06-13 PROCEDURE — 25000132 ZZH RX MED GY IP 250 OP 250 PS 637: Mod: GY,ZF | Performed by: NURSE PRACTITIONER

## 2019-06-13 PROCEDURE — 80053 COMPREHEN METABOLIC PANEL: CPT | Performed by: NURSE PRACTITIONER

## 2019-06-13 PROCEDURE — A9270 NON-COVERED ITEM OR SERVICE: HCPCS | Mod: GY,ZF | Performed by: NURSE PRACTITIONER

## 2019-06-13 PROCEDURE — 83615 LACTATE (LD) (LDH) ENZYME: CPT | Performed by: NURSE PRACTITIONER

## 2019-06-13 PROCEDURE — 96413 CHEMO IV INFUSION 1 HR: CPT

## 2019-06-13 PROCEDURE — 25800030 ZZH RX IP 258 OP 636: Mod: ZF | Performed by: NURSE PRACTITIONER

## 2019-06-13 RX ORDER — EPINEPHRINE 0.3 MG/.3ML
0.3 INJECTION SUBCUTANEOUS EVERY 5 MIN PRN
Status: CANCELLED | OUTPATIENT
Start: 2019-06-16

## 2019-06-13 RX ORDER — HEPARIN SODIUM (PORCINE) LOCK FLUSH IV SOLN 100 UNIT/ML 100 UNIT/ML
5 SOLUTION INTRAVENOUS EVERY 8 HOURS PRN
Status: DISCONTINUED | OUTPATIENT
Start: 2019-06-13 | End: 2019-06-13 | Stop reason: HOSPADM

## 2019-06-13 RX ORDER — DIPHENHYDRAMINE HCL 25 MG
50 CAPSULE ORAL ONCE
Status: COMPLETED | OUTPATIENT
Start: 2019-06-13 | End: 2019-06-13

## 2019-06-13 RX ORDER — MEPERIDINE HYDROCHLORIDE 25 MG/ML
25 INJECTION INTRAMUSCULAR; INTRAVENOUS; SUBCUTANEOUS EVERY 30 MIN PRN
Status: CANCELLED | OUTPATIENT
Start: 2019-06-16

## 2019-06-13 RX ORDER — METHYLPREDNISOLONE SODIUM SUCCINATE 125 MG/2ML
125 INJECTION, POWDER, LYOPHILIZED, FOR SOLUTION INTRAMUSCULAR; INTRAVENOUS
Status: CANCELLED
Start: 2019-06-16

## 2019-06-13 RX ORDER — ALBUTEROL SULFATE 90 UG/1
1-2 AEROSOL, METERED RESPIRATORY (INHALATION)
Status: CANCELLED
Start: 2019-06-16

## 2019-06-13 RX ORDER — MEPERIDINE HYDROCHLORIDE 25 MG/ML
25 INJECTION INTRAMUSCULAR; INTRAVENOUS; SUBCUTANEOUS
Status: CANCELLED
Start: 2019-06-16

## 2019-06-13 RX ORDER — DIPHENHYDRAMINE HYDROCHLORIDE 50 MG/ML
50 INJECTION INTRAMUSCULAR; INTRAVENOUS
Status: CANCELLED
Start: 2019-06-16

## 2019-06-13 RX ORDER — ACETAMINOPHEN 325 MG/1
650 TABLET ORAL ONCE
Status: COMPLETED | OUTPATIENT
Start: 2019-06-13 | End: 2019-06-13

## 2019-06-13 RX ORDER — SODIUM CHLORIDE 9 MG/ML
1000 INJECTION, SOLUTION INTRAVENOUS CONTINUOUS PRN
Status: CANCELLED
Start: 2019-06-16

## 2019-06-13 RX ORDER — DIPHENHYDRAMINE HCL 25 MG
50 CAPSULE ORAL ONCE
Status: CANCELLED | OUTPATIENT
Start: 2019-06-16

## 2019-06-13 RX ORDER — ACETAMINOPHEN 325 MG/1
650 TABLET ORAL ONCE
Status: CANCELLED | OUTPATIENT
Start: 2019-06-16

## 2019-06-13 RX ORDER — ALBUTEROL SULFATE 0.83 MG/ML
2.5 SOLUTION RESPIRATORY (INHALATION)
Status: CANCELLED | OUTPATIENT
Start: 2019-06-16

## 2019-06-13 RX ORDER — EPINEPHRINE 1 MG/ML
0.3 INJECTION, SOLUTION INTRAMUSCULAR; SUBCUTANEOUS EVERY 5 MIN PRN
Status: CANCELLED | OUTPATIENT
Start: 2019-06-16

## 2019-06-13 RX ADMIN — SODIUM CHLORIDE 250 ML: 9 INJECTION, SOLUTION INTRAVENOUS at 10:03

## 2019-06-13 RX ADMIN — ACETAMINOPHEN 650 MG: 325 TABLET ORAL at 09:26

## 2019-06-13 RX ADMIN — DIPHENHYDRAMINE HYDROCHLORIDE 50 MG: 25 CAPSULE ORAL at 09:26

## 2019-06-13 RX ADMIN — RITUXIMAB 800 MG: 10 INJECTION, SOLUTION INTRAVENOUS at 10:05

## 2019-06-13 ASSESSMENT — PAIN SCALES - GENERAL: PAINLEVEL: NO PAIN (0)

## 2019-06-13 ASSESSMENT — MIFFLIN-ST. JEOR: SCORE: 1702.4

## 2019-06-13 NOTE — PATIENT INSTRUCTIONS
Contact Numbers    [facility] Main Line: [phone]  [facility] Triage and After Hours Nurse Line:  [phone]    Please call the [facility] nurse triage or the after hours nurse line if you experience a temperature greater than or equal to 100.5, shaking chills, have uncontrolled nausea, vomiting and/or diarrhea, dizziness, lightheadedness, shortness of breath, chest pain, bleeding, unexplained bruising, or if you have any other new/concerning symptoms, questions or concerns.     If you are having any concerning symptoms or wish to speak to a provider before your next infusion visit, please call your care coordinator or triage to notify them so we can adequately serve you.     If you need a refill on a narcotic prescription or other medication, please call triage before your infusion appointment.       June 2019 Sunday Monday Tuesday Wednesday Thursday Friday Saturday                                 1       2     3     4     5     6     7     8       9     10     11     12     13    [facility]   7:45 AM   (15 min.)   [facility] LAB DRAW   [facility] Lab Draw    [facility] RETURN   8:25 AM   (50 min.)   Elvira Orr, HOLLY CNP   [facility] ONC INFUSION 360   9:30 AM   (360 min.)    ONCOLOGY INFUSION   [facility] 14     15       16     17     18     19     20     21    RETURN   7:45 AM   (15 min.)   Avtar Mccullough MD   [facility] 22       23     24     25     26     27     28     29       30                                               July 2019 Sunday Monday Tuesday Wednesday Thursday Friday Saturday        1     2     3     4     5     6       7     8     9     10     11     12     13       14     15     16     17     18     19     20       21     22     23     24     25     26     27       28     29     30     31                                    Lab Results:  Recent Results (from the past 12 hour(s))   Lactate Dehydrogenase    Collection  Time: 06/13/19  7:41 AM   Result Value Ref Range    Lactate Dehydrogenase 148 85 - 227 U/L   Comprehensive metabolic panel    Collection Time: 06/13/19  7:41 AM   Result Value Ref Range    Sodium 141 133 - 144 mmol/L    Potassium 4.2 3.4 - 5.3 mmol/L    Chloride 110 (H) 94 - 109 mmol/L    Carbon Dioxide 27 20 - 32 mmol/L    Anion Gap 4 3 - 14 mmol/L    Glucose 112 (H) 70 - 99 mg/dL    Urea Nitrogen 24 7 - 30 mg/dL    Creatinine 0.82 0.66 - 1.25 mg/dL    GFR Estimate 85 >60 mL/min/[1.73_m2]    GFR Estimate If Black >90 >60 mL/min/[1.73_m2]    Calcium 8.9 8.5 - 10.1 mg/dL    Bilirubin Total 0.5 0.2 - 1.3 mg/dL    Albumin 3.8 3.4 - 5.0 g/dL    Protein Total 7.0 6.8 - 8.8 g/dL    Alkaline Phosphatase 61 40 - 150 U/L    ALT 45 0 - 70 U/L    AST 26 0 - 45 U/L   CBC with platelets differential    Collection Time: 06/13/19  7:41 AM   Result Value Ref Range    WBC 6.5 4.0 - 11.0 10e9/L    RBC Count 4.58 4.4 - 5.9 10e12/L    Hemoglobin 14.4 13.3 - 17.7 g/dL    Hematocrit 44.5 40.0 - 53.0 %    MCV 97 78 - 100 fl    MCH 31.4 26.5 - 33.0 pg    MCHC 32.4 31.5 - 36.5 g/dL    RDW 12.8 10.0 - 15.0 %    Platelet Count 177 150 - 450 10e9/L    Diff Method Automated Method     % Neutrophils 73.2 %    % Lymphocytes 15.6 %    % Monocytes 8.1 %    % Eosinophils 2.6 %    % Basophils 0.3 %    % Immature Granulocytes 0.2 %    Nucleated RBCs 0 0 /100    Absolute Neutrophil 4.8 1.6 - 8.3 10e9/L    Absolute Lymphocytes 1.0 0.8 - 5.3 10e9/L    Absolute Monocytes 0.5 0.0 - 1.3 10e9/L    Absolute Eosinophils 0.2 0.0 - 0.7 10e9/L    Absolute Basophils 0.0 0.0 - 0.2 10e9/L    Abs Immature Granulocytes 0.0 0 - 0.4 10e9/L    Absolute Nucleated RBC 0.0

## 2019-06-13 NOTE — PROGRESS NOTES
Infusion Nursing Note:  Zack Demarco presents today for Cycle 5 Day 1 Rituxan.    Patient seen by provider today: Yes: Elvira Orr NP   present during visit today: Not Applicable.    Note: Patient offers no concerns or complaints after visit with NP.    Intravenous Access:  Peripheral IV placed.    Treatment Conditions:  Lab Results   Component Value Date    HGB 14.4 06/13/2019     Lab Results   Component Value Date    WBC 6.5 06/13/2019      Lab Results   Component Value Date    ANEU 4.8 06/13/2019     Lab Results   Component Value Date     06/13/2019      Lab Results   Component Value Date     06/13/2019                   Lab Results   Component Value Date    POTASSIUM 4.2 06/13/2019           Lab Results   Component Value Date    MAG 2.2 11/27/2017            Lab Results   Component Value Date    CR 0.82 06/13/2019                   Lab Results   Component Value Date    ELVIRA 8.9 06/13/2019                Lab Results   Component Value Date    BILITOTAL 0.5 06/13/2019           Lab Results   Component Value Date    ALBUMIN 3.8 06/13/2019                    Lab Results   Component Value Date    ALT 45 06/13/2019           Lab Results   Component Value Date    AST 26 06/13/2019       Results reviewed, labs MET treatment parameters, ok to proceed with treatment.      Post Infusion Assessment:  Patient tolerated infusion without incident.  Blood return noted pre and post infusion.  Site patent and intact, free from redness, edema or discomfort.  No evidence of extravasations.  Access discontinued per protocol.       Discharge Plan:   Patient declined prescription refills.  Discharge instructions reviewed with: Patient.  Patient and/or family verbalized understanding of discharge instructions and all questions answered.  AVS to patient via [email or web address].  Patient will return 8/15 for next appointment.   Patient discharged in stable condition accompanied by: self.  Departure Mode: Ambulatory.    Atiya  JACK Mabry

## 2019-06-13 NOTE — LETTER
6/13/2019       RE: Zack Demarco  [address] [facility] [address]     Dear Colleague,    Thank you for referring your patient, Zack Demarco, to the [facility]. Please see a copy of my visit note below.    Reason for Visit: seen in f/u of follicular lymphoma    Oncology HPI: (updated): Zack Demarco is a 78-year-old first seen in consultation on 12/27/2017 for a new diagnosis of follicular lymphoma. He was diagnosed incidental to an evaluation for cardiac bypass surgery in 11/2017. A chest CT scan on 11/14 showed an unexpected retroperitoneal mass with surrounding lymphadenopathy suspicious for malignancy.  A further CT scan done on the same day showed a 2.3 x 7.2 x 6.1 retroperitoneal soft tissue mass with adjacent lymphadenopathy that mildly narrowed the left renal vein. There also was nodularity within the mesentery and an enlarged hilar lymph node. CT-guided biopsy of the retroperitoneal mass on 12/12/2017 established the diagnosis, but the sample was too small for adequate grading. There was no disease identified outside of the abdomen; a bone marrow biopsy was not obtained as part of staging.      Relative to cardiac issues, he underwent an uncomplicated 3-vessel coronary artery bypass graft on 11/22/2017 and, subsequently, has been symptom-free.       With the renal and gonadal vein compression it was decided to start treatment with rituximab, alone. Mr. Demarco completed 4 weekly doses from 01/26 - 02/16/2018. He had no difficulty with the treatment and was able to receive rapid infusion (90 minutes) for the last 3 doses. Interval evaluation on 03/05/2018 with an US, suggested improvement. A CT scan on 04/09/2018 showed substantial regression. Repeated CT on 07/09/18 showed a good response but residual lymphoma around the renal veins. Decided to proceed weekly rituximab x4 (7/26-8/22/2018).  He started rituximab maintenance in 10/2018.     Subsequent CT scans on 10/16/2018 and  4/16/2019 showed stable findings.     He was a patient of Dr. Cunningham, who retired in March 2019. He is now followed by Dr. Rouse. He had a CT CAP on 4/16/19 showing stability in the mesenteric mass. He continued on rituximab maintenance    Interval history: Zack is feeling well. Energy has been good. He has been working out at the gym every day, walking the stair climber for 1/2 hour, then the treadmill. Has changed his diet and has given up bread. He has lost about 20 pounds over the last few months. No sweats/chills. No headaches, vision changes. No mouth sores, but has some sensitive teeth. Thinks he might need dentures soon. No cough, sob, cp, palpitation, dizziness. No mouth sores. No headaches, vision changes.     Past Medical History:   Diagnosis Date     Coronary artery disease 11/22/2017     DJD (degenerative joint disease) of hip     right     Glaucoma      Hernia umbilical      Hypercholesterolemia      Hypertension      Hypothyroidism      Lipoma      Low back pain      Lymphoma, unspecified body region, unspecified lymphoma type (H) 12/15/2017     Nonsenile cataract      Obesity      Unstable angina (H) 11/13/2017       Current Outpatient Medications   Medication Sig Dispense Refill     aspirin 325 MG EC tablet 1/2 tab daily       atorvastatin (LIPITOR) 40 MG tablet Take 1 tablet (40 mg) by mouth daily 60 tablet 11     Cholecalciferol (VITAMIN D3 PO) Take by mouth daily       latanoprost (XALATAN) 0.005 % ophthalmic solution Place 1 drop into both eyes At Bedtime 3 Bottle 4     levothyroxine (SYNTHROID/LEVOTHROID) 88 MCG tablet Take 1 tablet (88 mcg) by mouth daily 90 tablet 3     losartan (COZAAR) 50 MG tablet Take 1 tablet (50 mg) by mouth daily 90 tablet 3     metoprolol tartrate (LOPRESSOR) 25 MG tablet Take 1 tablet (25 mg) by mouth 2 times daily 180 tablet 3     nitroGLYcerin (NITROSTAT) 0.4 MG sublingual tablet Place 0.4 mg under the tongue       timolol maleate (TIMOPTIC) 0.5 %  ophthalmic solution Place 1 drop Into the left eye every morning 1 Bottle 11          Allergies   Allergen Reactions     Nkda [No Known Drug Allergies]          Exam: alert, appears well. Blood pressure 147/69, pulse (!) 46, temperature 98  F (36.7  C), temperature source Oral, resp. rate 18, weight 97.1 kg (214 lb 1.6 oz), SpO2 97 %.  Wt Readings from Last 4 Encounters:   06/13/19 97.1 kg (214 lb 1.6 oz)   05/30/19 96.6 kg (213 lb)   04/18/19 106.3 kg (234 lb 4.8 oz)   04/18/19 104.3 kg (230 lb)     Oropharynx is moist and without lesion. Neck supple and without adenopathy. Lungs:CTA. Heart: RRR, no murmur or rub. Abdomen: soft, nontender, BS active, no masses or organomegaly.  Extremities: warm, no edema. Speech is clear. CN wnl. Gait/station wnl. Skin: scabbed abrasion on the left forearm and left shin.  Nodes: no neck, supraclavicular or axillae nodes.      Labs: Results for FÉLIX TRIVEDI (MRN [id number]) as of 6/13/2019 08:23   Ref. Range 6/13/2019 07:41   Sodium Latest Ref Range: 133 - 144 mmol/L 141   Potassium Latest Ref Range: 3.4 - 5.3 mmol/L 4.2   Chloride Latest Ref Range: 94 - 109 mmol/L 110 (H)   Carbon Dioxide Latest Ref Range: 20 - 32 mmol/L 27   Urea Nitrogen Latest Ref Range: 7 - 30 mg/dL 24   Creatinine Latest Ref Range: 0.66 - 1.25 mg/dL 0.82   GFR Estimate Latest Ref Range: >60 mL/min/1.73_m2 85   GFR Estimate If Black Latest Ref Range: >60 mL/min/1.73_m2 >90   Calcium Latest Ref Range: 8.5 - 10.1 mg/dL 8.9   Anion Gap Latest Ref Range: 3 - 14 mmol/L 4   Albumin Latest Ref Range: 3.4 - 5.0 g/dL 3.8   Protein Total Latest Ref Range: 6.8 - 8.8 g/dL 7.0   Bilirubin Total Latest Ref Range: 0.2 - 1.3 mg/dL 0.5   Alkaline Phosphatase Latest Ref Range: 40 - 150 U/L 61   ALT Latest Ref Range: 0 - 70 U/L 45   AST Latest Ref Range: 0 - 45 U/L 26   Lactate Dehydrogenase Latest Ref Range: 85 - 227 U/L 148   Glucose Latest Ref Range: 70 - 99 mg/dL 112 (H)   WBC Latest Ref Range: 4.0 - 11.0 10e9/L 6.5    Hemoglobin Latest Ref Range: 13.3 - 17.7 g/dL 14.4   Hematocrit Latest Ref Range: 40.0 - 53.0 % 44.5   Platelet Count Latest Ref Range: 150 - 450 10e9/L 177   RBC Count Latest Ref Range: 4.4 - 5.9 10e12/L 4.58   MCV Latest Ref Range: 78 - 100 fl 97   MCH Latest Ref Range: 26.5 - 33.0 pg 31.4   MCHC Latest Ref Range: 31.5 - 36.5 g/dL 32.4   RDW Latest Ref Range: 10.0 - 15.0 % 12.8   Diff Method Unknown Automated Method   % Neutrophils Latest Units: % 73.2   % Lymphocytes Latest Units: % 15.6   % Monocytes Latest Units: % 8.1   % Eosinophils Latest Units: % 2.6   % Basophils Latest Units: % 0.3   % Immature Granulocytes Latest Units: % 0.2   Nucleated RBCs Latest Ref Range: 0 /100 0   Absolute Neutrophil Latest Ref Range: 1.6 - 8.3 10e9/L 4.8   Absolute Lymphocytes Latest Ref Range: 0.8 - 5.3 10e9/L 1.0   Absolute Monocytes Latest Ref Range: 0.0 - 1.3 10e9/L 0.5   Absolute Eosinophils Latest Ref Range: 0.0 - 0.7 10e9/L 0.2   Absolute Basophils Latest Ref Range: 0.0 - 0.2 10e9/L 0.0   Abs Immature Granulocytes Latest Ref Range: 0 - 0.4 10e9/L 0.0   Absolute Nucleated RBC Unknown 0.0       Imaging: n/a    Impression/plan:   1. Follicular lymphoma, stage IIA  (marrow not obtained), treated with rituximab weekly x 4 in 01/2018 and again in 07/2018 with a good response, but residual/stable disease. Initiated maintenance rituximab (Q 2 month x 2 years) on 10/24/18.   -due for cycle 5 today. He is tolerating it well. Labs stable, no clinical findings suggestive of progression. Ok to proceed   -will f/u with Dr. Rouse in 2 months and me in 4 months prior to infusion     2. CAD, s/p 3 vessel CABG:  follows with cardiology, on metoprolol, asa, atorvastatin, loasartan    3. Hypothyroidism:  -on levothyroxine, managed by Dr. Love    4. Dental pain: has poor dentition, hasn't seen a dentist for some time. Advised to see the dentist soon to avoid infection. He was agreeable.    Again, thank you for allowing me to participate  in the care of your patient.      Sincerely,    HOLLY Draper CNP

## 2019-06-13 NOTE — PROGRESS NOTES
Reason for Visit: seen in f/u of follicular lymphoma    Oncology HPI: (updated): Zack Demarco is a 78-year-old first seen in consultation on 12/27/2017 for a new diagnosis of follicular lymphoma. He was diagnosed incidental to an evaluation for cardiac bypass surgery in 11/2017. A chest CT scan on 11/14 showed an unexpected retroperitoneal mass with surrounding lymphadenopathy suspicious for malignancy.  A further CT scan done on the same day showed a 2.3 x 7.2 x 6.1 retroperitoneal soft tissue mass with adjacent lymphadenopathy that mildly narrowed the left renal vein. There also was nodularity within the mesentery and an enlarged hilar lymph node. CT-guided biopsy of the retroperitoneal mass on 12/12/2017 established the diagnosis, but the sample was too small for adequate grading. There was no disease identified outside of the abdomen; a bone marrow biopsy was not obtained as part of staging.      Relative to cardiac issues, he underwent an uncomplicated 3-vessel coronary artery bypass graft on 11/22/2017 and, subsequently, has been symptom-free.       With the renal and gonadal vein compression it was decided to start treatment with rituximab, alone. Mr. Demarco completed 4 weekly doses from 01/26 - 02/16/2018. He had no difficulty with the treatment and was able to receive rapid infusion (90 minutes) for the last 3 doses. Interval evaluation on 03/05/2018 with an US, suggested improvement. A CT scan on 04/09/2018 showed substantial regression. Repeated CT on 07/09/18 showed a good response but residual lymphoma around the renal veins. Decided to proceed weekly rituximab x4 (7/26-8/22/2018).  He started rituximab maintenance in 10/2018.     Subsequent CT scans on 10/16/2018 and 4/16/2019 showed stable findings.     He was a patient of Dr. Cunningham, who retired in March 2019. He is now followed by Dr. Rouse. He had a CT CAP on 4/16/19 showing stability in the mesenteric mass. He continued on rituximab  maintenance    Interval history: Zack is feeling well. Energy has been good. He has been working out at the gym every day, walking the stair climber for 1/2 hour, then the treadmill. Has changed his diet and has given up bread. He has lost about 20 pounds over the last few months. No sweats/chills. No headaches, vision changes. No mouth sores, but has some sensitive teeth. Thinks he might need dentures soon. No cough, sob, cp, palpitation, dizziness. No mouth sores. No headaches, vision changes.     Past Medical History:   Diagnosis Date     Coronary artery disease 11/22/2017     DJD (degenerative joint disease) of hip     right     Glaucoma      Hernia umbilical      Hypercholesterolemia      Hypertension      Hypothyroidism      Lipoma      Low back pain      Lymphoma, unspecified body region, unspecified lymphoma type (H) 12/15/2017     Nonsenile cataract      Obesity      Unstable angina (H) 11/13/2017       Current Outpatient Medications   Medication Sig Dispense Refill     aspirin 325 MG EC tablet 1/2 tab daily       atorvastatin (LIPITOR) 40 MG tablet Take 1 tablet (40 mg) by mouth daily 60 tablet 11     Cholecalciferol (VITAMIN D3 PO) Take by mouth daily       latanoprost (XALATAN) 0.005 % ophthalmic solution Place 1 drop into both eyes At Bedtime 3 Bottle 4     levothyroxine (SYNTHROID/LEVOTHROID) 88 MCG tablet Take 1 tablet (88 mcg) by mouth daily 90 tablet 3     losartan (COZAAR) 50 MG tablet Take 1 tablet (50 mg) by mouth daily 90 tablet 3     metoprolol tartrate (LOPRESSOR) 25 MG tablet Take 1 tablet (25 mg) by mouth 2 times daily 180 tablet 3     nitroGLYcerin (NITROSTAT) 0.4 MG sublingual tablet Place 0.4 mg under the tongue       timolol maleate (TIMOPTIC) 0.5 % ophthalmic solution Place 1 drop Into the left eye every morning 1 Bottle 11          Allergies   Allergen Reactions     Nkda [No Known Drug Allergies]          Exam: alert, appears well. Blood pressure 147/69, pulse (!) 46, temperature 98  F  (36.7  C), temperature source Oral, resp. rate 18, weight 97.1 kg (214 lb 1.6 oz), SpO2 97 %.  Wt Readings from Last 4 Encounters:   06/13/19 97.1 kg (214 lb 1.6 oz)   05/30/19 96.6 kg (213 lb)   04/18/19 106.3 kg (234 lb 4.8 oz)   04/18/19 104.3 kg (230 lb)     Oropharynx is moist and without lesion. Neck supple and without adenopathy. Lungs:CTA. Heart: RRR, no murmur or rub. Abdomen: soft, nontender, BS active, no masses or organomegaly.  Extremities: warm, no edema. Speech is clear. CN wnl. Gait/station wnl. Skin: scabbed abrasion on the left forearm and left shin.  Nodes: no neck, supraclavicular or axillae nodes.      Labs: Results for FÉLIX TRIVEDI (MRN [id number]) as of 6/13/2019 08:23   Ref. Range 6/13/2019 07:41   Sodium Latest Ref Range: 133 - 144 mmol/L 141   Potassium Latest Ref Range: 3.4 - 5.3 mmol/L 4.2   Chloride Latest Ref Range: 94 - 109 mmol/L 110 (H)   Carbon Dioxide Latest Ref Range: 20 - 32 mmol/L 27   Urea Nitrogen Latest Ref Range: 7 - 30 mg/dL 24   Creatinine Latest Ref Range: 0.66 - 1.25 mg/dL 0.82   GFR Estimate Latest Ref Range: >60 mL/min/1.73_m2 85   GFR Estimate If Black Latest Ref Range: >60 mL/min/1.73_m2 >90   Calcium Latest Ref Range: 8.5 - 10.1 mg/dL 8.9   Anion Gap Latest Ref Range: 3 - 14 mmol/L 4   Albumin Latest Ref Range: 3.4 - 5.0 g/dL 3.8   Protein Total Latest Ref Range: 6.8 - 8.8 g/dL 7.0   Bilirubin Total Latest Ref Range: 0.2 - 1.3 mg/dL 0.5   Alkaline Phosphatase Latest Ref Range: 40 - 150 U/L 61   ALT Latest Ref Range: 0 - 70 U/L 45   AST Latest Ref Range: 0 - 45 U/L 26   Lactate Dehydrogenase Latest Ref Range: 85 - 227 U/L 148   Glucose Latest Ref Range: 70 - 99 mg/dL 112 (H)   WBC Latest Ref Range: 4.0 - 11.0 10e9/L 6.5   Hemoglobin Latest Ref Range: 13.3 - 17.7 g/dL 14.4   Hematocrit Latest Ref Range: 40.0 - 53.0 % 44.5   Platelet Count Latest Ref Range: 150 - 450 10e9/L 177   RBC Count Latest Ref Range: 4.4 - 5.9 10e12/L 4.58   MCV Latest Ref Range: 78 - 100 fl 97    MCH Latest Ref Range: 26.5 - 33.0 pg 31.4   MCHC Latest Ref Range: 31.5 - 36.5 g/dL 32.4   RDW Latest Ref Range: 10.0 - 15.0 % 12.8   Diff Method Unknown Automated Method   % Neutrophils Latest Units: % 73.2   % Lymphocytes Latest Units: % 15.6   % Monocytes Latest Units: % 8.1   % Eosinophils Latest Units: % 2.6   % Basophils Latest Units: % 0.3   % Immature Granulocytes Latest Units: % 0.2   Nucleated RBCs Latest Ref Range: 0 /100 0   Absolute Neutrophil Latest Ref Range: 1.6 - 8.3 10e9/L 4.8   Absolute Lymphocytes Latest Ref Range: 0.8 - 5.3 10e9/L 1.0   Absolute Monocytes Latest Ref Range: 0.0 - 1.3 10e9/L 0.5   Absolute Eosinophils Latest Ref Range: 0.0 - 0.7 10e9/L 0.2   Absolute Basophils Latest Ref Range: 0.0 - 0.2 10e9/L 0.0   Abs Immature Granulocytes Latest Ref Range: 0 - 0.4 10e9/L 0.0   Absolute Nucleated RBC Unknown 0.0       Imaging: n/a    Impression/plan:   1. Follicular lymphoma, stage IIA  (marrow not obtained), treated with rituximab weekly x 4 in 01/2018 and again in 07/2018 with a good response, but residual/stable disease. Initiated maintenance rituximab (Q 2 month x 2 years) on 10/24/18.   -due for cycle 5 today. He is tolerating it well. Labs stable, no clinical findings suggestive of progression. Ok to proceed   -will f/u with Dr. Rouse in 2 months and me in 4 months prior to infusion     2. CAD, s/p 3 vessel CABG: follows with cardiology, on metoprolol, asa, atorvastatin, loasartan    3. Hypothyroidism:  -on levothyroxine, managed by Dr. Love    4. Dental pain: has poor dentition, hasn't seen a dentist for some time. Advised to see the dentist soon to avoid infection. He was agreeable.

## 2019-06-13 NOTE — NURSING NOTE
"Oncology Rooming Note    June 13, 2019 8:09 AM   Zack Demarco is a 78 year old male who presents for:    Chief Complaint   Patient presents with     Port Draw     Labs drawn via /PIV placed by RN in lab. VS taken.     Oncology Clinic Visit     Return visit related to Follicular Lymphoma     Initial Vitals: /69 (BP Location: Right arm, Patient Position: Sitting, Cuff Size: Adult Regular)   Pulse (!) 46   Temp 98  F (36.7  C) (Oral)   Resp 18   Ht 1.778 m (5' 10\")   Wt 97.1 kg (214 lb 1.6 oz)   SpO2 97%   BMI 30.72 kg/m   Estimated body mass index is 30.72 kg/m  as calculated from the following:    Height as of this encounter: 1.778 m (5' 10\").    Weight as of this encounter: 97.1 kg (214 lb 1.6 oz). Body surface area is 2.19 meters squared.  No Pain (0) Comment: Data Unavailable   No LMP for male patient.  Allergies reviewed: Yes  Medications reviewed: Yes    Medications: Medication refills not needed today.  Pharmacy name entered into [employer]: [id number] - [place], [place] - [address] [facility]    Clinical concerns: No new concerns. Provider was notified.      Teena Rios LPN            "

## 2019-06-13 NOTE — NURSING NOTE
Chief Complaint   Patient presents with     Port Draw     Labs drawn via /PIV placed by RN in lab. VS taken.     Belgica Munoz RN

## 2019-06-20 ENCOUNTER — TELEPHONE (OUTPATIENT)
Dept: FAMILY MEDICINE | Facility: CLINIC | Age: 79
End: 2019-06-20

## 2019-06-20 ENCOUNTER — OFFICE VISIT (OUTPATIENT)
Dept: FAMILY MEDICINE | Facility: CLINIC | Age: 79
End: 2019-06-20
Payer: MEDICARE

## 2019-06-20 ENCOUNTER — TRANSFERRED RECORDS (OUTPATIENT)
Dept: HEALTH INFORMATION MANAGEMENT | Facility: CLINIC | Age: 79
End: 2019-06-20

## 2019-06-20 ENCOUNTER — ANCILLARY PROCEDURE (OUTPATIENT)
Dept: ULTRASOUND IMAGING | Facility: CLINIC | Age: 79
End: 2019-06-20
Attending: FAMILY MEDICINE
Payer: MEDICARE

## 2019-06-20 ENCOUNTER — ANCILLARY PROCEDURE (OUTPATIENT)
Dept: CT IMAGING | Facility: CLINIC | Age: 79
End: 2019-06-20
Attending: FAMILY MEDICINE
Payer: MEDICARE

## 2019-06-20 VITALS
HEART RATE: 65 BPM | RESPIRATION RATE: 18 BRPM | SYSTOLIC BLOOD PRESSURE: 134 MMHG | BODY MASS INDEX: 30.06 KG/M2 | OXYGEN SATURATION: 97 % | TEMPERATURE: 97.8 F | WEIGHT: 210 LBS | DIASTOLIC BLOOD PRESSURE: 70 MMHG | HEIGHT: 70 IN

## 2019-06-20 DIAGNOSIS — R10.31 RLQ ABDOMINAL PAIN: ICD-10-CM

## 2019-06-20 DIAGNOSIS — R10.11 RUQ ABDOMINAL PAIN: ICD-10-CM

## 2019-06-20 DIAGNOSIS — R10.11 RUQ ABDOMINAL PAIN: Primary | ICD-10-CM

## 2019-06-20 LAB
ALBUMIN SERPL-MCNC: 3.8 G/DL (ref 3.4–5)
ALBUMIN UR-MCNC: NEGATIVE MG/DL
ALP SERPL-CCNC: 63 U/L (ref 40–150)
ALT SERPL W P-5'-P-CCNC: 33 U/L (ref 0–70)
ANION GAP SERPL CALCULATED.3IONS-SCNC: 4 MMOL/L (ref 3–14)
APPEARANCE UR: CLEAR
AST SERPL W P-5'-P-CCNC: 16 U/L (ref 0–45)
BASOPHILS # BLD AUTO: 0 10E9/L (ref 0–0.2)
BASOPHILS NFR BLD AUTO: 0.1 %
BILIRUB SERPL-MCNC: 0.9 MG/DL (ref 0.2–1.3)
BILIRUB UR QL STRIP: NEGATIVE
BUN SERPL-MCNC: 23 MG/DL (ref 7–30)
CALCIUM SERPL-MCNC: 9.6 MG/DL (ref 8.5–10.1)
CHLORIDE SERPL-SCNC: 106 MMOL/L (ref 94–109)
CO2 SERPL-SCNC: 30 MMOL/L (ref 20–32)
COLOR UR AUTO: YELLOW
CREAT SERPL-MCNC: 0.99 MG/DL (ref 0.66–1.25)
DIFFERENTIAL METHOD BLD: ABNORMAL
EOSINOPHIL # BLD AUTO: 0.1 10E9/L (ref 0–0.7)
EOSINOPHIL NFR BLD AUTO: 0.4 %
ERYTHROCYTE [DISTWIDTH] IN BLOOD BY AUTOMATED COUNT: 13 % (ref 10–15)
GFR SERPL CREATININE-BSD FRML MDRD: 73 ML/MIN/{1.73_M2}
GLUCOSE SERPL-MCNC: 117 MG/DL (ref 70–99)
GLUCOSE UR STRIP-MCNC: NEGATIVE MG/DL
HCT VFR BLD AUTO: 45.3 % (ref 40–53)
HGB BLD-MCNC: 14.6 G/DL (ref 13.3–17.7)
HGB UR QL STRIP: NEGATIVE
KETONES UR STRIP-MCNC: NEGATIVE MG/DL
LEUKOCYTE ESTERASE UR QL STRIP: NEGATIVE
LIPASE SERPL-CCNC: 126 U/L (ref 73–393)
LYMPHOCYTES # BLD AUTO: 0.9 10E9/L (ref 0.8–5.3)
LYMPHOCYTES NFR BLD AUTO: 7 %
MCH RBC QN AUTO: 31.3 PG (ref 26.5–33)
MCHC RBC AUTO-ENTMCNC: 32.2 G/DL (ref 31.5–36.5)
MCV RBC AUTO: 97 FL (ref 78–100)
MONOCYTES # BLD AUTO: 0.9 10E9/L (ref 0–1.3)
MONOCYTES NFR BLD AUTO: 7 %
NEUTROPHILS # BLD AUTO: 10.6 10E9/L (ref 1.6–8.3)
NEUTROPHILS NFR BLD AUTO: 85.5 %
NITRATE UR QL: NEGATIVE
PH UR STRIP: 6 PH (ref 5–7)
PLATELET # BLD AUTO: 179 10E9/L (ref 150–450)
POTASSIUM SERPL-SCNC: 4.7 MMOL/L (ref 3.4–5.3)
PROT SERPL-MCNC: 7.3 G/DL (ref 6.8–8.8)
RBC # BLD AUTO: 4.67 10E12/L (ref 4.4–5.9)
SODIUM SERPL-SCNC: 140 MMOL/L (ref 133–144)
SOURCE: NORMAL
SP GR UR STRIP: 1.02 (ref 1–1.03)
UROBILINOGEN UR STRIP-ACNC: 0.2 EU/DL (ref 0.2–1)
WBC # BLD AUTO: 12.4 10E9/L (ref 4–11)

## 2019-06-20 PROCEDURE — 83690 ASSAY OF LIPASE: CPT | Performed by: FAMILY MEDICINE

## 2019-06-20 PROCEDURE — 85025 COMPLETE CBC W/AUTO DIFF WBC: CPT | Performed by: FAMILY MEDICINE

## 2019-06-20 PROCEDURE — 80053 COMPREHEN METABOLIC PANEL: CPT | Performed by: FAMILY MEDICINE

## 2019-06-20 PROCEDURE — 99214 OFFICE O/P EST MOD 30 MIN: CPT | Performed by: FAMILY MEDICINE

## 2019-06-20 PROCEDURE — 81003 URINALYSIS AUTO W/O SCOPE: CPT | Performed by: FAMILY MEDICINE

## 2019-06-20 PROCEDURE — 76705 ECHO EXAM OF ABDOMEN: CPT

## 2019-06-20 PROCEDURE — 74177 CT ABD & PELVIS W/CONTRAST: CPT | Mod: TC

## 2019-06-20 PROCEDURE — 36415 COLL VENOUS BLD VENIPUNCTURE: CPT | Performed by: FAMILY MEDICINE

## 2019-06-20 RX ORDER — IOPAMIDOL 755 MG/ML
100 INJECTION, SOLUTION INTRAVASCULAR ONCE
Status: COMPLETED | OUTPATIENT
Start: 2019-06-20 | End: 2019-06-20

## 2019-06-20 RX ADMIN — IOPAMIDOL 100 ML: 755 INJECTION, SOLUTION INTRAVASCULAR at 12:38

## 2019-06-20 ASSESSMENT — PAIN SCALES - GENERAL: PAINLEVEL: EXTREME PAIN (9)

## 2019-06-20 ASSESSMENT — MIFFLIN-ST. JEOR: SCORE: 1683.8

## 2019-06-20 NOTE — TELEPHONE ENCOUNTER
The Radiologist from [facility] called stating that the patient has acute appendicitis.  Dr. Limon [phone].  Please advise.  Cheyanne Weber,

## 2019-06-20 NOTE — PROGRESS NOTES
HPI:    Zack is a 78 year old male here to discuss:    Abd pain - present since 6/19/19. It is located RUQ but also RLQ. The pain is rated at 10/10 last night but 8/10 right now. It is constant. It is sharp. Mild nausea but no vomiting diarrhea, constipation, blood in stool or black stools or fevers. Nothing seems to trigger the pain like eating drinking, urinating etc. Has daily BM's which seem to mostly soft. No previous history of abdominal surgery. He has history of umbilical hernia. Colonoscopy 9/19/16 - advised to redo in 5 years.  Evaluation and treatment:   Previous CT (done to to follow on lymphoma) showed appendix was abnormal.    The u/a is normal today. WBC slightly elevated and otherwise CBC unremarkable.   RUQ ultrasound unremarkable today.   The diff dx would include diverticulitis, constipation, malignancy, inflammatory, renal stones.   CMP and Lipase are pending.   We got abd CT - this showed appendicitis - one of my partners has advised the patient to go to the ER.    The following issues reviewed but not addressed today:     CAD/HTN - he denies angina, shortness of breath. 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 Also has varicose veins. Not checking BP lately. BP fair in clinic but could be better.  Evaluation and treatment:    Was Hospitalized 11/22 to 11/27/17 - received CABG x 3.   Metoprolol 25 mg bid - dose limited by heart rate.   ASA qd   NTG prn but not using lately.   Losartan/HCTZ 50/12.5 qd stopped in the Hospital because it is not needed.   Losartan 50 mg daily - no side effects.   Compression stockings.   He follows with cardiology.    BP Readings from Last 6 Encounters:   06/20/19 134/70   06/13/19 147/69   05/30/19 130/60   04/19/19 131/66   04/18/19 143/59   04/18/19 148/78       Last Comprehensive Metabolic Panel:  Sodium   Date Value Ref Range Status   06/13/2019 141 133 - 144 mmol/L Final     Potassium   Date Value Ref Range Status   06/13/2019 4.2 3.4 - 5.3 mmol/L  Final     Chloride   Date Value Ref Range Status   06/13/2019 110 (H) 94 - 109 mmol/L Final     Carbon Dioxide   Date Value Ref Range Status   06/13/2019 27 20 - 32 mmol/L Final     Anion Gap   Date Value Ref Range Status   06/13/2019 4 3 - 14 mmol/L Final     Glucose   Date Value Ref Range Status   06/13/2019 112 (H) 70 - 99 mg/dL Final     Urea Nitrogen   Date Value Ref Range Status   06/13/2019 24 7 - 30 mg/dL Final     Creatinine   Date Value Ref Range Status   06/13/2019 0.82 0.66 - 1.25 mg/dL Final     GFR Estimate   Date Value Ref Range Status   06/13/2019 85 >60 mL/min/[1.73_m2] Final     Comment:     Non  GFR Calc  Starting 12/18/2018, serum creatinine based estimated GFR (eGFR) will be   calculated using the Chronic Kidney Disease Epidemiology Collaboration   (CKD-EPI) equation.       Calcium   Date Value Ref Range Status   06/13/2019 8.9 8.5 - 10.1 mg/dL Final     Lymphoma - found incidentally during other evaluation. No symptoms.  Evaluation and treatment:    He follows with oncology.  CBC RESULTS:   Recent Labs   Lab Test 02/20/19  0756   WBC 5.9   RBC 4.80   HGB 14.8   HCT 44.4   MCV 93   MCH 30.8   MCHC 33.3   RDW 12.8          Dyslipidemia - has h/o CAD.  Evaluation and treatment:    Per ATP4, moderate to high intensity statin recommended.:   Crestor stopped due to cost   Simvastatin changed to Lipitor 40 mg qd in the Hospital - no side effects.   Continue same treatment.    Recent Labs   Lab Test 04/11/19  0703 07/20/17  0725  01/22/15  0816 01/14/14  0747   CHOL 127 148   < > 154 154   HDL 32* 32*   < > 43 33*   LDL 61 76   < > 92 103   TRIG 168* 201*   < > 93 87   CHOLHDLRATIO  --   --   --  3.6 4.7    < > = values in this interval not displayed.     Hypothyroidism - No thyroid symptoms.  Evaluation and treatment:    Synthroid 75 mcg every day.   I asked him to increase to 88 mcg and recheck in 6 weeks.    TSH   Date Value Ref Range Status   05/23/2019 2.03 0.40 - 4.00 mU/L  Final     T4 Free   Date Value Ref Range Status   04/18/2019 0.83 0.76 - 1.46 ng/dL Final     Obesity - some snoring but no known apnea.  Evaluation and treatment:    diet and exercise discussed.     Body mass index is 30.13 kg/m .    Wt Readings from Last 5 Encounters:   06/20/19 95.3 kg (210 lb)   06/13/19 97.1 kg (214 lb 1.6 oz)   05/30/19 96.6 kg (213 lb)   04/18/19 106.3 kg (234 lb 4.8 oz)   04/18/19 104.3 kg (230 lb)     BPH - stream is reduced. Nocturia x 1. Symptoms are not bad.  Evaluation and treatment:    I asked him to let me know if he wants treatment.    Right shoulder pain -   Evaluation and treatment:    He follows with ortho.   He received a steroid shot.    Hearing loss - he wants to hold off on hearing aides referral.     Umbilical hernia - noted on routine exam. He has no symptoms.  Evaluation and treatment:    He is advised to report any symptoms.   He is counseled on incarcerated hernia symptoms and to go to ED if those occur.    Surgical history -    left rotator cuff surgery.    Had anterior tibialis repair (for foot drop) on 8/18/15 - good results.    CABG as above.   Left eye cataract surgery 12/17/18.    Preventive: I advised Shingrix at our pharmacy - he has had his oncologist's approval.    Immunization History   Administered Date(s) Administered     Influenza (High Dose) 3 valent vaccine 10/15/2015, 10/20/2016, 09/25/2017, 09/24/2018     Influenza (IIV3) PF 10/25/2012, 10/01/2013, 11/24/2014     Pneumo Conj 13-V (2010&after) 01/22/2016     Pneumococcal 23 valent 11/30/2006     TD (ADULT, 7+) 08/25/2010     TDAP Vaccine (Boostrix) 01/21/2013     Zoster vaccine, live 07/15/2008     Colonoscopy: 9/19/16 - advised to redo in 5 years.     Prostate CA screen: discussed controversy. Prostate mildly enlarged on 7/15/14.     PSA   Date Value Ref Range Status   06/09/2016 2.76 0 - 4 ug/L Final     AAA: 7/18/14 negative    Advanced Directive: brought a copy today.    Vit D Geriatrics Society  "Guidelines: Older adults should consume 4000 IU of Vit D daily from all sources. This will achieve serum level of 30. No need to test unless obese, malabsorption etc. You get only 100 units per glass of milk. 2000 units advised.    SH:    Marital status: , lives by himself in [place].  Kids: 2  Employment: Works at a machine shop.  Exercise: Walks a lot at work. Had been running 4 miles per day 5 times per week but not lately.  Tobacco: Quit smoking around 1980. Has 14 pack year history of smoking.  Etoh: rarely  Recreational drugs: denies  Caffeine: 2 per day      Exam:    /70   Pulse 65   Temp 97.8  F (36.6  C) (Oral)   Resp 18   Ht 1.778 m (5' 10\")   Wt 95.3 kg (210 lb)   SpO2 97%   BMI 30.13 kg/m      Gen: Healthy appearing male in no acute distress  Eyes: Conjunctiva and sclera normal. Pupils react normally to light. No nystagmus.  Neck: No enlarged lymph nodes, thyromegally or other masses.  Lungs: Good air movement and otherwise clear.  CV: Heart RRR with no murmurs. No JVD, carotid bruits or leg edema.  Abd: RUQ and RLQ tenderness with some guarding but no rebound. Otherwise soft, ND with normal bowel sounds.    Assessment and Plan - Decision Making    1. RUQ abdominal pain    Per HPI    - Comprehensive metabolic panel  - Lipase  - UA reflex to Microscopic and Culture  - CBC with platelets differential  - US Abdomen Limited; Future  - CT Abdomen Pelvis w Contrast; Future    2. RLQ abdominal pain    Per HPI    - Comprehensive metabolic panel  - Lipase  - UA reflex to Microscopic and Culture  - CBC with platelets differential  - CT Abdomen Pelvis w Contrast; Future      Written instructions given as follows:    Patient Instructions   Set up the CT - [phone].    If symptoms are severe, go to the ER.          "

## 2019-06-20 NOTE — TELEPHONE ENCOUNTER
Pt notified via phone of results and plan as documented in provider note below. Pt will consider his options and will decide between using the ED at the [facility] vs [facility]. Pt thinks he prefers [facility] for this. RN advised pt have another person drive him. Pt states he will drive himself to the ED. Pt does not sound as if he is in any distress at this time.    ARGENTINA MarquezN, RN

## 2019-06-20 NOTE — TELEPHONE ENCOUNTER
Please call the patient and relay that he likely has an acute appendicitis. He needs to be evaluated by a surgeon and the quickest way to make that happen is to go to the emergency department.  Anil Montano MD

## 2019-06-21 NOTE — RESULT ENCOUNTER NOTE
Josh Dixon,    I see that one of my partners contacted you about this and he instructed you to go to the ER.    Let me know if you have any questions for me.    Regards,    Anastacio Love M.D.

## 2019-06-25 ENCOUNTER — OFFICE VISIT (OUTPATIENT)
Dept: OPHTHALMOLOGY | Facility: CLINIC | Age: 79
End: 2019-06-25
Payer: MEDICARE

## 2019-06-25 DIAGNOSIS — H02.9 EYELID LESION: ICD-10-CM

## 2019-06-25 DIAGNOSIS — Z96.1 PSEUDOPHAKIA: Primary | ICD-10-CM

## 2019-06-25 PROCEDURE — 99213 OFFICE O/P EST LOW 20 MIN: CPT | Mod: 25 | Performed by: OPHTHALMOLOGY

## 2019-06-25 PROCEDURE — 11440 EXC FACE-MM B9+MARG 0.5 CM/<: CPT | Performed by: OPHTHALMOLOGY

## 2019-06-25 ASSESSMENT — VISUAL ACUITY
OS_PH_SC+: -2
OS_PH_SC: 20/25
OD_SC: 20/70
OD_PH_SC+: -1
OD_SC+: -1
OS_SC: 20/50
METHOD: SNELLEN - LINEAR
OD_PH_SC: 20/30
OS_SC+: -1

## 2019-06-25 ASSESSMENT — TONOMETRY
OS_IOP_MMHG: 13
IOP_METHOD: APPLANATION
OD_IOP_MMHG: 14

## 2019-06-25 NOTE — LETTER
6/25/2019         RE: Zack Demarco  [address] [facility] [id number]        Dear Colleague,    Thank you for referring your patient, Zack Demarco, to the [facility]. Please see a copy of my visit note below.     Current Eye Medications:  Latanoprost at bedtime both eyes and Timolol qam both eyes      Subjective:  Here for Left eye skin tag removal x 2 and IOP check today.  Had emergency appendectomy perforation surgery on Thursday, still has drainage bag now.     Objective:  See Ophthalmology Exam.       Assessment:  Eyelid lesions left upper lid.  Doing well status/post Kelman phacoemulsification/ posterior chamber lens right eye.      Plan:  Lesions excised under 2% Lido with Epi.  Mild cautery.  Not submitted.  Well tolerated.    Continue same medications.  Possible clouding of posterior capsule both eyes discussed.  Keep areas of excision clean.  Return visit 6 months for intraocular pressure check, glaucoma OCT, Galvan Visual Field.  Avtar Mccullough M.D.  [phone]           The following medication was given:     MEDICATION: Lidocaine hci  ROUTE: SQ  SITE: left eyelid  DOSE: 1 ml  LOT #: -EV  :  Hospira  EXPIRATION DATE:  8/1/2019  NDC#: 7232-2440-40       Again, thank you for allowing me to participate in the care of your patient.        Sincerely,        Avtar Mccullough MD

## 2019-06-25 NOTE — PATIENT INSTRUCTIONS
Continue same medications.  Possible clouding of posterior capsule both eyes discussed.  Keep areas of excision clean.  Return visit 6 months for intraocular pressure check, glaucoma OCT, Galvan Visual Field.  Avtar Mccullough M.D.  [phone]

## 2019-06-25 NOTE — PROGRESS NOTES
The following medication was given:     MEDICATION: Lidocaine hci  ROUTE: SQ  SITE: left eyelid  DOSE: 1 ml  LOT #: -EV  :  Tiffanie  EXPIRATION DATE:  8/1/2019  NDC#: 6698-9233-12

## 2019-06-25 NOTE — PROGRESS NOTES
Current Eye Medications:  Latanoprost at bedtime both eyes and Timolol qam both eyes      Subjective:  Here for Left eye skin tag removal x 2 and IOP check today.  Had emergency appendectomy perforation surgery on Thursday, still has drainage bag now.     Objective:  See Ophthalmology Exam.       Assessment:  Eyelid lesions left upper lid.  Doing well status/post Kelman phacoemulsification/ posterior chamber lens right eye.      Plan:  Lesions excised under 2% Lido with Epi.  Mild cautery.  Not submitted.  Well tolerated.    Continue same medications.  Possible clouding of posterior capsule both eyes discussed.  Keep areas of excision clean.  Return visit 6 months for intraocular pressure check, glaucoma OCT, Galvan Visual Field.  Avtar Mccullough M.D.  [phone]

## 2019-08-15 ENCOUNTER — APPOINTMENT (OUTPATIENT)
Dept: LAB | Facility: CLINIC | Age: 79
End: 2019-08-15
Attending: INTERNAL MEDICINE
Payer: MEDICARE

## 2019-08-15 ENCOUNTER — INFUSION THERAPY VISIT (OUTPATIENT)
Dept: ONCOLOGY | Facility: CLINIC | Age: 79
End: 2019-08-15
Attending: INTERNAL MEDICINE
Payer: MEDICARE

## 2019-08-15 ENCOUNTER — OFFICE VISIT (OUTPATIENT)
Dept: TRANSPLANT | Facility: CLINIC | Age: 79
End: 2019-08-15
Attending: INTERNAL MEDICINE
Payer: MEDICARE

## 2019-08-15 VITALS
HEART RATE: 50 BPM | OXYGEN SATURATION: 95 % | BODY MASS INDEX: 29.82 KG/M2 | TEMPERATURE: 97.9 F | WEIGHT: 207.8 LBS | DIASTOLIC BLOOD PRESSURE: 66 MMHG | SYSTOLIC BLOOD PRESSURE: 144 MMHG

## 2019-08-15 DIAGNOSIS — C82.99 FOLLICULAR LYMPHOMA OF EXTRANODAL AND SOLID ORGAN SITES (H): Primary | ICD-10-CM

## 2019-08-15 LAB
ALBUMIN SERPL-MCNC: 3.6 G/DL (ref 3.4–5)
ALP SERPL-CCNC: 66 U/L (ref 40–150)
ALT SERPL W P-5'-P-CCNC: 35 U/L (ref 0–70)
ANION GAP SERPL CALCULATED.3IONS-SCNC: 5 MMOL/L (ref 3–14)
AST SERPL W P-5'-P-CCNC: 18 U/L (ref 0–45)
BASOPHILS # BLD AUTO: 0 10E9/L (ref 0–0.2)
BASOPHILS NFR BLD AUTO: 0.3 %
BILIRUB SERPL-MCNC: 0.5 MG/DL (ref 0.2–1.3)
BUN SERPL-MCNC: 29 MG/DL (ref 7–30)
CALCIUM SERPL-MCNC: 8.7 MG/DL (ref 8.5–10.1)
CHLORIDE SERPL-SCNC: 109 MMOL/L (ref 94–109)
CO2 SERPL-SCNC: 25 MMOL/L (ref 20–32)
CREAT SERPL-MCNC: 1 MG/DL (ref 0.66–1.25)
DIFFERENTIAL METHOD BLD: ABNORMAL
EOSINOPHIL # BLD AUTO: 0.2 10E9/L (ref 0–0.7)
EOSINOPHIL NFR BLD AUTO: 3.2 %
ERYTHROCYTE [DISTWIDTH] IN BLOOD BY AUTOMATED COUNT: 13.2 % (ref 10–15)
GFR SERPL CREATININE-BSD FRML MDRD: 72 ML/MIN/{1.73_M2}
GLUCOSE SERPL-MCNC: 90 MG/DL (ref 70–99)
HCT VFR BLD AUTO: 42 % (ref 40–53)
HGB BLD-MCNC: 13.8 G/DL (ref 13.3–17.7)
IMM GRANULOCYTES # BLD: 0 10E9/L (ref 0–0.4)
IMM GRANULOCYTES NFR BLD: 0.3 %
LDH SERPL L TO P-CCNC: 163 U/L (ref 85–227)
LYMPHOCYTES # BLD AUTO: 1.2 10E9/L (ref 0.8–5.3)
LYMPHOCYTES NFR BLD AUTO: 18.3 %
MCH RBC QN AUTO: 31.5 PG (ref 26.5–33)
MCHC RBC AUTO-ENTMCNC: 32.9 G/DL (ref 31.5–36.5)
MCV RBC AUTO: 96 FL (ref 78–100)
MONOCYTES # BLD AUTO: 0.5 10E9/L (ref 0–1.3)
MONOCYTES NFR BLD AUTO: 7.8 %
NEUTROPHILS # BLD AUTO: 4.6 10E9/L (ref 1.6–8.3)
NEUTROPHILS NFR BLD AUTO: 70.1 %
NRBC # BLD AUTO: 0 10*3/UL
NRBC BLD AUTO-RTO: 0 /100
PLATELET # BLD AUTO: 187 10E9/L (ref 150–450)
POTASSIUM SERPL-SCNC: 4.5 MMOL/L (ref 3.4–5.3)
PROT SERPL-MCNC: 7.1 G/DL (ref 6.8–8.8)
RBC # BLD AUTO: 4.38 10E12/L (ref 4.4–5.9)
SODIUM SERPL-SCNC: 140 MMOL/L (ref 133–144)
WBC # BLD AUTO: 6.6 10E9/L (ref 4–11)

## 2019-08-15 PROCEDURE — 83615 LACTATE (LD) (LDH) ENZYME: CPT | Performed by: NURSE PRACTITIONER

## 2019-08-15 PROCEDURE — 25800030 ZZH RX IP 258 OP 636: Mod: ZF | Performed by: INTERNAL MEDICINE

## 2019-08-15 PROCEDURE — 25000128 H RX IP 250 OP 636: Mod: ZF | Performed by: INTERNAL MEDICINE

## 2019-08-15 PROCEDURE — 25000132 ZZH RX MED GY IP 250 OP 250 PS 637: Mod: GY,ZF | Performed by: INTERNAL MEDICINE

## 2019-08-15 PROCEDURE — 96413 CHEMO IV INFUSION 1 HR: CPT

## 2019-08-15 PROCEDURE — 85025 COMPLETE CBC W/AUTO DIFF WBC: CPT | Performed by: NURSE PRACTITIONER

## 2019-08-15 PROCEDURE — 80053 COMPREHEN METABOLIC PANEL: CPT | Performed by: NURSE PRACTITIONER

## 2019-08-15 RX ORDER — SODIUM CHLORIDE 9 MG/ML
1000 INJECTION, SOLUTION INTRAVENOUS CONTINUOUS PRN
Status: CANCELLED
Start: 2019-08-15

## 2019-08-15 RX ORDER — MEPERIDINE HYDROCHLORIDE 25 MG/ML
25 INJECTION INTRAMUSCULAR; INTRAVENOUS; SUBCUTANEOUS EVERY 30 MIN PRN
Status: CANCELLED | OUTPATIENT
Start: 2019-08-15

## 2019-08-15 RX ORDER — METHYLPREDNISOLONE SODIUM SUCCINATE 125 MG/2ML
125 INJECTION, POWDER, LYOPHILIZED, FOR SOLUTION INTRAMUSCULAR; INTRAVENOUS
Status: CANCELLED
Start: 2019-08-15

## 2019-08-15 RX ORDER — DIPHENHYDRAMINE HCL 25 MG
50 CAPSULE ORAL ONCE
Status: CANCELLED | OUTPATIENT
Start: 2019-08-15

## 2019-08-15 RX ORDER — ACETAMINOPHEN 325 MG/1
650 TABLET ORAL ONCE
Status: CANCELLED | OUTPATIENT
Start: 2019-08-15

## 2019-08-15 RX ORDER — DIPHENHYDRAMINE HCL 25 MG
50 CAPSULE ORAL ONCE
Status: COMPLETED | OUTPATIENT
Start: 2019-08-15 | End: 2019-08-15

## 2019-08-15 RX ORDER — MEPERIDINE HYDROCHLORIDE 25 MG/ML
25 INJECTION INTRAMUSCULAR; INTRAVENOUS; SUBCUTANEOUS
Status: CANCELLED
Start: 2019-08-15

## 2019-08-15 RX ORDER — ALBUTEROL SULFATE 0.83 MG/ML
2.5 SOLUTION RESPIRATORY (INHALATION)
Status: CANCELLED | OUTPATIENT
Start: 2019-08-15

## 2019-08-15 RX ORDER — EPINEPHRINE 0.3 MG/.3ML
0.3 INJECTION SUBCUTANEOUS EVERY 5 MIN PRN
Status: CANCELLED | OUTPATIENT
Start: 2019-08-15

## 2019-08-15 RX ORDER — ACETAMINOPHEN 325 MG/1
650 TABLET ORAL ONCE
Status: COMPLETED | OUTPATIENT
Start: 2019-08-15 | End: 2019-08-15

## 2019-08-15 RX ORDER — EPINEPHRINE 1 MG/ML
0.3 INJECTION, SOLUTION INTRAMUSCULAR; SUBCUTANEOUS EVERY 5 MIN PRN
Status: CANCELLED | OUTPATIENT
Start: 2019-08-15

## 2019-08-15 RX ORDER — ALBUTEROL SULFATE 90 UG/1
1-2 AEROSOL, METERED RESPIRATORY (INHALATION)
Status: CANCELLED
Start: 2019-08-15

## 2019-08-15 RX ORDER — DIPHENHYDRAMINE HYDROCHLORIDE 50 MG/ML
50 INJECTION INTRAMUSCULAR; INTRAVENOUS
Status: CANCELLED
Start: 2019-08-15

## 2019-08-15 RX ADMIN — SODIUM CHLORIDE 250 ML: 9 INJECTION, SOLUTION INTRAVENOUS at 12:04

## 2019-08-15 RX ADMIN — DIPHENHYDRAMINE HYDROCHLORIDE 50 MG: 25 CAPSULE ORAL at 12:00

## 2019-08-15 RX ADMIN — RITUXIMAB 800 MG: 10 INJECTION, SOLUTION INTRAVENOUS at 12:52

## 2019-08-15 RX ADMIN — ACETAMINOPHEN 650 MG: 325 TABLET ORAL at 12:00

## 2019-08-15 ASSESSMENT — PAIN SCALES - GENERAL: PAINLEVEL: NO PAIN (0)

## 2019-08-15 NOTE — PATIENT INSTRUCTIONS
[facility] Main Line: [phone]    Call triage nurse with chills and/or temperature greater than or equal to 100.4, uncontrolled nausea/vomiting, diarrhea, constipation, dizziness, shortness of breath, chest pain, bleeding, unexplained bruising, or any new/concerning symptoms, questions/concerns.   If you are having any concerning symptoms or wish to speak to a provider before your next infusion visit, please call your care coordinator or triage to notify them so we can adequately serve you.   Nurse Triage line:  [phone]    If after hours, weekends, or holidays, call main hospital  and ask for Oncology doctor on call @ [phone]      August 2019 Sunday Monday Tuesday Wednesday Thursday Friday Saturday                       1     2     3       4     5     6     7     8     9     10       11     12     13     14     15    [facility] DRAW  11:15 AM   (15 min.)    [facility] DRAW   [facility] Lab Draw    [facility] ONC INFUSION 360  12:00 PM   (360 min.)    ONCOLOGY INFUSION   [facility]    [facility] ONC RETURN   2:45 PM   (30 min.)   Agustin Rouse MD   [facility] Blood and Marrow Transplant 16     17       18     19     20     21     22    RETURN   7:30 AM   (30 min.)   Torsten Reyes MD   [facility] 23     24       25     26     27     28     29     30     31 September 2019 Sunday Monday Tuesday Wednesday Thursday Friday Saturday   1     2     3     4     5     6     7       8     9     10     11     12     13     14       15     16     17     18     19     20     21       22     23     24     25     26     27     28       29     30                                              Lab Results:  Recent Results (from the past 12 hour(s))   Lactate Dehydrogenase    Collection Time: 08/15/19 10:58 AM   Result Value Ref Range    Lactate Dehydrogenase 163 85 - 227 U/L   Comprehensive metabolic panel    Collection Time:  08/15/19 10:58 AM   Result Value Ref Range    Sodium 140 133 - 144 mmol/L    Potassium 4.5 3.4 - 5.3 mmol/L    Chloride 109 94 - 109 mmol/L    Carbon Dioxide 25 20 - 32 mmol/L    Anion Gap 5 3 - 14 mmol/L    Glucose 90 70 - 99 mg/dL    Urea Nitrogen 29 7 - 30 mg/dL    Creatinine 1.00 0.66 - 1.25 mg/dL    GFR Estimate 72 >60 mL/min/[1.73_m2]    GFR Estimate If Black 83 >60 mL/min/[1.73_m2]    Calcium 8.7 8.5 - 10.1 mg/dL    Bilirubin Total 0.5 0.2 - 1.3 mg/dL    Albumin 3.6 3.4 - 5.0 g/dL    Protein Total 7.1 6.8 - 8.8 g/dL    Alkaline Phosphatase 66 40 - 150 U/L    ALT 35 0 - 70 U/L    AST 18 0 - 45 U/L   CBC with platelets differential    Collection Time: 08/15/19 10:58 AM   Result Value Ref Range    WBC 6.6 4.0 - 11.0 10e9/L    RBC Count 4.38 (L) 4.4 - 5.9 10e12/L    Hemoglobin 13.8 13.3 - 17.7 g/dL    Hematocrit 42.0 40.0 - 53.0 %    MCV 96 78 - 100 fl    MCH 31.5 26.5 - 33.0 pg    MCHC 32.9 31.5 - 36.5 g/dL    RDW 13.2 10.0 - 15.0 %    Platelet Count 187 150 - 450 10e9/L    Diff Method Automated Method     % Neutrophils 70.1 %    % Lymphocytes 18.3 %    % Monocytes 7.8 %    % Eosinophils 3.2 %    % Basophils 0.3 %    % Immature Granulocytes 0.3 %    Nucleated RBCs 0 0 /100    Absolute Neutrophil 4.6 1.6 - 8.3 10e9/L    Absolute Lymphocytes 1.2 0.8 - 5.3 10e9/L    Absolute Monocytes 0.5 0.0 - 1.3 10e9/L    Absolute Eosinophils 0.2 0.0 - 0.7 10e9/L    Absolute Basophils 0.0 0.0 - 0.2 10e9/L    Abs Immature Granulocytes 0.0 0 - 0.4 10e9/L    Absolute Nucleated RBC 0.0

## 2019-08-15 NOTE — PROGRESS NOTES
Infusion Nursing Note:  Zack Demarco presents today for C6D1 Rapid Rituxan.    Patient seen by provider today: Yes: Dr Marti in infusion   present during visit today: Not Applicable.    Note: Patient reported to clinic today with no new complaints.    Intravenous Access:  Peripheral IV placed.    Treatment Conditions:  Lab Results   Component Value Date    HGB 13.8 08/15/2019     Lab Results   Component Value Date    WBC 6.6 08/15/2019      Lab Results   Component Value Date    ANEU 4.6 08/15/2019     Lab Results   Component Value Date     08/15/2019      Lab Results   Component Value Date     08/15/2019                   Lab Results   Component Value Date    POTASSIUM 4.5 08/15/2019           Lab Results   Component Value Date    MAG 2.2 11/27/2017            Lab Results   Component Value Date    CR 1.00 08/15/2019                   Lab Results   Component Value Date    ELVIRA 8.7 08/15/2019                Lab Results   Component Value Date    BILITOTAL 0.5 08/15/2019           Lab Results   Component Value Date    ALBUMIN 3.6 08/15/2019                    Lab Results   Component Value Date    ALT 35 08/15/2019           Lab Results   Component Value Date    AST 18 08/15/2019       Results reviewed, labs MET treatment parameters, ok to proceed with treatment.      Post Infusion Assessment:  Patient tolerated infusion without incident.  Blood return noted pre and post infusion.  Site patent and intact, free from redness, edema or discomfort.  No evidence of extravasations.  Access discontinued per protocol.       Discharge Plan:   Patient declined prescription refills.  Discharge instructions reviewed with: Patient.  Patient and/or family verbalized understanding of discharge instructions and all questions answered.  AVS to patient via [employer].  Patient will return 10/16/19 for next appointment.   Patient discharged in stable condition accompanied by: self.  Departure Mode: Ambulatory.  Face  to Face time: 0 minutes.    Donny Kinsey RN

## 2019-08-15 NOTE — NURSING NOTE
Chief Complaint   Patient presents with     Blood Draw     venipuncture;andrew Bhandari CMA on 8/15/2019 at 11:00 AM

## 2019-08-22 ENCOUNTER — OFFICE VISIT (OUTPATIENT)
Dept: CARDIOLOGY | Facility: CLINIC | Age: 79
End: 2019-08-22
Payer: MEDICARE

## 2019-08-22 VITALS
SYSTOLIC BLOOD PRESSURE: 135 MMHG | OXYGEN SATURATION: 97 % | DIASTOLIC BLOOD PRESSURE: 57 MMHG | HEIGHT: 70 IN | BODY MASS INDEX: 30.18 KG/M2 | WEIGHT: 210.8 LBS | HEART RATE: 46 BPM

## 2019-08-22 DIAGNOSIS — I25.709 CORONARY ARTERY DISEASE INVOLVING CORONARY BYPASS GRAFT OF NATIVE HEART WITH ANGINA PECTORIS (H): Primary | ICD-10-CM

## 2019-08-22 PROCEDURE — 99214 OFFICE O/P EST MOD 30 MIN: CPT | Performed by: INTERNAL MEDICINE

## 2019-08-22 ASSESSMENT — PAIN SCALES - GENERAL: PAINLEVEL: NO PAIN (0)

## 2019-08-22 ASSESSMENT — MIFFLIN-ST. JEOR: SCORE: 1687.43

## 2019-08-22 NOTE — PROGRESS NOTES
Reason for Visit: seen in f/u of follicular lymphoma    ONCOLOGY SUMMARY (updated): Zack Demarco is a 78-year-old first seen in consultation on 12/27/2017 for a new diagnosis of follicular lymphoma. He was diagnosed incidental to an evaluation for cardiac bypass surgery in 11/2017. A chest CT scan on 11/14 showed an unexpected retroperitoneal mass with surrounding lymphadenopathy suspicious for malignancy.  A further CT scan done on the same day showed a 2.3 x 7.2 x 6.1 retroperitoneal soft tissue mass with adjacent lymphadenopathy that mildly narrowed the left renal vein. There also was nodularity within the mesentery and an enlarged hilar lymph node. CT-guided biopsy of the retroperitoneal mass on 12/12/2017 established the diagnosis, but the sample was too small for adequate grading. There was no disease identified outside of the abdomen; a bone marrow biopsy was not obtained as part of staging.      Relative to cardiac issues, he underwent an uncomplicated 3-vessel coronary artery bypass graft on 11/22/2017 and, subsequently, has been symptom-free.       With the renal and gonadal vein compression it was decided to start treatment with rituximab, alone. Mr. Demarco completed 4 weekly doses from 01/26 - 02/16/2018. He had no difficulty with the treatment and was able to receive rapid infusion (90 minutes) for the last 3 doses. Interval evaluation on 03/05/2018 with an US, suggested improvement. A CT scan on 04/09/2018 showed substantial regression. Repeated CT on 07/09/18 showed a good response but residual lymphoma around the renal veins. Decided to proceed weekly rituximab x4 (7/26-8/22/2018).  He started rituximab maintenance in 10/2018.    Subsequent CT scans on 10/16/2018 and 4/16/2019 showed stable findings.    He was a patient of Dr. Cunningham, who retired in March 2019    INTERVAL HISTORY:  Patient returns unaccompanied for follow-up follicular lymphoma. Overall he is doing well. He lives by himself. He is  independent on ADLs. He stays active. Patient has been tolerating rituximab well.  He denies fever, chills, chest pain, night sweats, or weight loss.    ROS: Complete ROS otherwise negative.    Current Outpatient Medications   Medication Sig Dispense Refill     aspirin 325 MG EC tablet 1/2 tab daily       atorvastatin (LIPITOR) 40 MG tablet Take 1 tablet (40 mg) by mouth daily 60 tablet 11     Cholecalciferol (VITAMIN D3 PO) Take by mouth daily       latanoprost (XALATAN) 0.005 % ophthalmic solution Place 1 drop into both eyes At Bedtime 3 Bottle 4     levothyroxine (SYNTHROID/LEVOTHROID) 88 MCG tablet Take 1 tablet (88 mcg) by mouth daily 90 tablet 3     losartan (COZAAR) 50 MG tablet Take 1 tablet (50 mg) by mouth daily 90 tablet 3     metoprolol tartrate (LOPRESSOR) 25 MG tablet Take 1 tablet (25 mg) by mouth 2 times daily 180 tablet 3     nitroGLYcerin (NITROSTAT) 0.4 MG sublingual tablet Place 0.4 mg under the tongue       timolol maleate (TIMOPTIC) 0.5 % ophthalmic solution Place 1 drop Into the left eye every morning 1 Bottle 11        Allergies   Allergen Reactions     Nkda [No Known Drug Allergies]      Exam:  There were no vitals taken for this visit.  Wt Readings from Last 4 Encounters:   08/22/19 95.6 kg (210 lb 12.8 oz)   08/15/19 94.3 kg (207 lb 12.8 oz)   06/20/19 95.3 kg (210 lb)   06/13/19 97.1 kg (214 lb 1.6 oz)     Constitutional: Awake and alert, appears well-developed, not in acute distress.  Eyes: No scleral icterus. Eyes exhibit no discharge.  ENT/Mouth: Oral mucosa pink and moist  Cardiovascular: Normal rate, regular rhythm, S1, S2. No murmur or rub. Trace LE edema.  Respiratory: No respiratory distress. Clear to auscultation bilaterally. No wheezes.  Gastrointestinal: Soft. No distension. No tenderness or garding.  Neurological: AAOX3, grossly non-focal  Psychiatric: Mentation and affect appear normal.  Skin: Skin is warm, not diaphoretic.  Hematologic/Lymphatic/Immunologic: No overt  bleeding. No lymphadenopathy    LABORATORY:  CBC   Lab Results   Component Value Date/Time    WBC 6.6 08/15/2019 10:58 AM    HGB 13.8 08/15/2019 10:58 AM    HCT 42.0 08/15/2019 10:58 AM     08/15/2019 10:58 AM    MCV 96 08/15/2019 10:58 AM    RBC 4.38 (L) 08/15/2019 10:58 AM    ANEU 4.6 08/15/2019 10:58 AM     BMP  Lab Results   Component Value Date/Time     08/15/2019 10:58 AM    POTASSIUM 4.5 08/15/2019 10:58 AM    CHLORIDE 109 08/15/2019 10:58 AM    CO2 25 08/15/2019 10:58 AM    BUN 29 08/15/2019 10:58 AM    CR 1.00 08/15/2019 10:58 AM    ELVIRA 8.7 08/15/2019 10:58 AM    MAG 2.2 11/27/2017 07:14 AM     LFTs   Lab Results   Component Value Date    PROTTOTAL 7.1 08/15/2019    ALBUMIN 3.6 08/15/2019    AST 18 08/15/2019    ALT 35 08/15/2019    BILITOTAL 0.5 08/15/2019    DBIL 0.1 11/24/2017    ALKPHOS 66 08/15/2019       IMAGING:  CT CHEST/ABDOMEN/PELVIS W CONTRAST, 4/16/2019 7:25 AM                                                   IMPRESSION:   1. No significant change in the approximately 1.7 x 3.7 cm ill-defined soft tissue masslike lesion in the mesentery associated with extensive mesenteric fat stranding.  2. Minimal change in the left periaortic masslike lesion and associated lymph nodes.   3. Mild fluid filling and expansion of the appendix may represent developing mucocele. Attention on follow-up exams.      ASSESSMENT/PLAN:  1. Follicular lymphoma, stage IIA  (marrow not obtained), treated with rituximab weekly x 4 in 01/2018 and again in 07/2018 with a good response, but residual disease. Initiated maintenance rituximab (Q 2 month x 2 years) on 10/24/18. S/p 3 cycles, tolerating well.    -continue with rituximab for a total of 2 years which would complete in 10/2018.  -imaging every 6 months in January 2019  -f/v with an [facility] in 3 months and with me in 6 months after imaging    2. CAD, s/p 3 vessel CABG: He stopped aspirin on his own about a week ago. I advised him to call and discuss with his  cardiologist.

## 2019-08-22 NOTE — PROGRESS NOTES
[id number]         Anastacio Love MD   [facility]    [address] [place] [address]      Patient:  Zack Demarco   MRN:  [id number]   :  1940         Dear Dr. Love:      It was a pleasure participating in the care of your patient, Mr. Zack Demarco.  As you know, he is a 78-year-old gentleman whom I had seen today for coronary artery disease.      His past medical history is significant for the followin.  Hypertension.   2.  Hyperlipidemia.   3.  Retroperitoneal mass with follicular lymphoma, currently undergoing therapy with Rituxan every 2 months.   4.  Hypothyroidism.   5.  Borderline glaucoma.   6.  Right knee problems.   7.  Lipoma.   8.  Patellar tendinitis.      His cardiac history is significant for multivessel coronary artery disease and normal LV systolic function.  Coronary angiography on 2017 revealed the following:      Left main normal.   LAD -- 70% to 80% ostial stenosis with 80% to 90% stenosis in the mid portion.   First diagonal -- 80% to 90% stenosis.   Circumflex -- mild diffuse disease.   RCA -- 70% stenosis in the mid portion.      Three-vessel coronary bypass surgery was performed (LIMA to LAD, vein graft to PDA, vein graft to D1).  He originally presented with centrally located chest tightness with shortness of breath, which completely resolved after revascularization.      I last saw him 2018 and since our last visit, he has done well.  He followed the [employer] diet, which is a modified low-carb diet, according to him, and lost 35 pounds, but gained 10 pounds back more recently.  He stopped golfing and he goes to the gym every day and he uses a stair-stepper for about 30 minutes and is able to climb 80 flights of stairs in 1 workout.  He also does some gardening as well.      His blood pressures at home have been running 119 systolic.  However, he does not check it very frequently.  He otherwise denies any significant chest pain,  shortness of breath, PND, orthopnea, edema, palpitations, syncope or near-syncope.  He denies recurrent angina.  He is doing well with his medications without side effects.      REVIEW OF SYSTEMS:  Positive for a ruptured appendix 3 weeks ago for which he had surgery.  He is doing well in recovery.  He has no other complaints.      In terms of his present medications, he is takin.  Aspirin 1/2 of 325 mg a day.   2.  Lipitor 40 mg a day.   3.  Levothyroxine.   4.  Losartan 50 mg a day.   5.  Metoprolol tartrate 25 twice daily.      PHYSICAL EXAMINATION:     VITAL SIGNS:  Blood pressure is 135/57 with a pulse of 50.  His weight is 210 pounds.   NECK:  Jugular venous pressure of approximately 9-10 cm of water without hepatojugular reflux.   LUNGS:  Clear to auscultation.  Respiratory effort is normal.   CARDIAC:  Regular rate and rhythm.  No obvious murmur.  Soft S4, no gross S3.   ABDOMEN:  Belly soft, nontender.   EXTREMITIES:  Significant for trace edema.      LABORATORY:  08/15/2019, potassium 4.5, GFR normal.      Echocardiogram 2017, ejection fraction 60% to 65% without gross valvular pathology.        IMPRESSION:      Zack is a 78-year-old gentleman whose cardiac history is significant for known multivessel coronary artery disease and normal LV systolic function.  He underwent 3-vessel coronary bypass surgery (LIMA to LAD, vein graft to PDA, vein graft to first diagonal on 2017).      Since revascularization, he has not had recurrent angina manifested by centrally located chest tightness with shortness of breath.  His LV systolic function is normal without gross valvular pathology as of echo of 2017.      From a clinical standpoint, he is doing well and is able to climb 80 flights of stairs on a stair-stepping machine in a half an hour without symptoms of recurrent angina.  He appears stable from a coronary disease standpoint.  His blood pressures at home are well controlled and he is  tolerating his medications well.  We are both pleased with his current clinical progress.        PLAN:     1.  Continue present medications at present doses.     2.  Follow up in 1 year or earlier if needed.      Once again, it was a pleasure participating in the care of your patient, Mr. Zack Trivedi.  Please feel free to contact me at any time if any questions regarding his care in the future.         Sincerely,      TRUNG BENITO MD             D: 2019   T: 2019   MT: REGI      Name:     ZACK TRIVEDI   MRN:      [id number]        Account:      [id number]   :      1940      Document: [id number]       cc: Anastacio Love MD

## 2019-10-16 NOTE — PROGRESS NOTES
Reason for Visit: seen in f/u of follicular lymphoma    Oncology HPI: (updated): Zack Demarco is a 78-year-old first seen in consultation on 12/27/2017 for a new diagnosis of follicular lymphoma. He was diagnosed incidental to an evaluation for cardiac bypass surgery in 11/2017. A chest CT scan on 11/14 showed an unexpected retroperitoneal mass with surrounding lymphadenopathy suspicious for malignancy.  A further CT scan done on the same day showed a 2.3 x 7.2 x 6.1 retroperitoneal soft tissue mass with adjacent lymphadenopathy that mildly narrowed the left renal vein. There also was nodularity within the mesentery and an enlarged hilar lymph node. CT-guided biopsy of the retroperitoneal mass on 12/12/2017 established the diagnosis, but the sample was too small for adequate grading. There was no disease identified outside of the abdomen; a bone marrow biopsy was not obtained as part of staging.      Relative to cardiac issues, he underwent an uncomplicated 3-vessel coronary artery bypass graft on 11/22/2017 and, subsequently, has been symptom-free.       With the renal and gonadal vein compression it was decided to start treatment with rituximab, alone. Mr. Demarco completed 4 weekly doses from 01/26 - 02/16/2018. He had no difficulty with the treatment and was able to receive rapid infusion (90 minutes) for the last 3 doses. Interval evaluation on 03/05/2018 with an US, suggested improvement. A CT scan on 04/09/2018 showed substantial regression. Repeated CT on 07/09/18 showed a good response but residual lymphoma around the renal veins. Decided to proceed weekly rituximab x4 (7/26-8/22/2018).  He started rituximab maintenance in 10/2018.     Subsequent CT scans on 10/16/2018 and 4/16/2019 showed stable findings.    He had a ruptured appendix in June 2019. He was hospitalized at [facility] and underwent a laparascopic appendectomy with drain placement. He did not have any ongoing complication from the rupture.       He was a patient of Dr. Sandss, who retired in March 2019    Interval history:   Zack is feeling well today. He developed a URI over the past 2 weeks that started with a sore throat and led to chest congestion. He is feeling much better. Cough is minimal now. No fevers/chills. No sweats. Energy and appetite are good. Is tired today, but slept poorly last night. No new lumps/bumps. No abdominal pain, bloating. Bowel and bladder function are wnl. Has some chronic bilateral knee pain, unchanged from baseline. No other bone aches/pains. No shortness of breath, chest pain, orthopnea, edema. No dizziness. Denies new medical problems since his last visit. Recently had f/u with cardiology, Dr. Reyes with a stable exam. Continues to work-out on a stair stepping machine for 1/2 hour without angina.    Current Outpatient Medications   Medication Sig Dispense Refill     aspirin 325 MG EC tablet 1/2 tab daily       atorvastatin (LIPITOR) 40 MG tablet Take 1 tablet (40 mg) by mouth daily 60 tablet 11     Cholecalciferol (VITAMIN D3 PO) Take by mouth daily       latanoprost (XALATAN) 0.005 % ophthalmic solution Place 1 drop into both eyes At Bedtime 3 Bottle 4     levothyroxine (SYNTHROID/LEVOTHROID) 88 MCG tablet Take 1 tablet (88 mcg) by mouth daily 90 tablet 3     losartan (COZAAR) 50 MG tablet Take 1 tablet (50 mg) by mouth daily 90 tablet 3     metoprolol tartrate (LOPRESSOR) 25 MG tablet Take 1 tablet (25 mg) by mouth 2 times daily 180 tablet 3     timolol maleate (TIMOPTIC) 0.5 % ophthalmic solution Place 1 drop Into the left eye every morning 1 Bottle 11     nitroGLYcerin (NITROSTAT) 0.4 MG sublingual tablet Place 0.4 mg under the tongue            Allergies   Allergen Reactions     Nkda [No Known Drug Allergies]          Exam: alert ,appears well. Blood pressure (!) 165/73, pulse (!) 48, temperature 98  F (36.7  C), temperature source Oral, resp. rate 16, weight 97.8 kg (215 lb 8 oz), SpO2 97 %.  Wt Readings from  Last 4 Encounters:   10/17/19 97.8 kg (215 lb 8 oz)   08/22/19 95.6 kg (210 lb 12.8 oz)   08/15/19 94.3 kg (207 lb 12.8 oz)   06/20/19 95.3 kg (210 lb)     Oropharynx is moist and without lesion. No icterus or conjunctival injection. PERRLA Neck supple and without adenopathy. Lungs:CTA. Heart: RRR, no murmur or rub. Abdomen: soft, nontender, BS active, no masses or palpable organomegaly. Small umbilical hernia noted.  Extremities: warm, no edema. Speech is clear. CN wnl. Gait/station wnl. Skin: no rashes or bruising on exposed skin. Nodes: no neck, supraclavicular, axillae or inguinal adenopathy palpable.      Labs: Results for FÉLIX TRIVEDI (MRN [id number]) as of 10/17/2019 07:34   Ref. Range 10/17/2019 06:31   Sodium Latest Ref Range: 133 - 144 mmol/L 141   Potassium Latest Ref Range: 3.4 - 5.3 mmol/L 4.3   Chloride Latest Ref Range: 94 - 109 mmol/L 110 (H)   Carbon Dioxide Latest Ref Range: 20 - 32 mmol/L 27   Urea Nitrogen Latest Ref Range: 7 - 30 mg/dL 24   Creatinine Latest Ref Range: 0.66 - 1.25 mg/dL 0.80   GFR Estimate Latest Ref Range: >60 mL/min/1.73_m2 85   GFR Estimate If Black Latest Ref Range: >60 mL/min/1.73_m2 >90   Calcium Latest Ref Range: 8.5 - 10.1 mg/dL 8.4 (L)   Anion Gap Latest Ref Range: 3 - 14 mmol/L 4   Albumin Latest Ref Range: 3.4 - 5.0 g/dL 3.4   Protein Total Latest Ref Range: 6.8 - 8.8 g/dL 6.8   Bilirubin Total Latest Ref Range: 0.2 - 1.3 mg/dL 0.2   Alkaline Phosphatase Latest Ref Range: 40 - 150 U/L 62   ALT Latest Ref Range: 0 - 70 U/L 43   AST Latest Ref Range: 0 - 45 U/L 20   Lactate Dehydrogenase Latest Ref Range: 85 - 227 U/L 158   Glucose Latest Ref Range: 70 - 99 mg/dL 124 (H)   WBC Latest Ref Range: 4.0 - 11.0 10e9/L 7.0   Hemoglobin Latest Ref Range: 13.3 - 17.7 g/dL 13.9   Hematocrit Latest Ref Range: 40.0 - 53.0 % 43.0   Platelet Count Latest Ref Range: 150 - 450 10e9/L 229   RBC Count Latest Ref Range: 4.4 - 5.9 10e12/L 4.53   MCV Latest Ref Range: 78 - 100 fl 95   MCH  Latest Ref Range: 26.5 - 33.0 pg 30.7   MCHC Latest Ref Range: 31.5 - 36.5 g/dL 32.3   RDW Latest Ref Range: 10.0 - 15.0 % 12.5   Diff Method Unknown Automated Method   % Neutrophils Latest Units: % 68.8   % Lymphocytes Latest Units: % 18.3   % Monocytes Latest Units: % 7.3   % Eosinophils Latest Units: % 5.0   % Basophils Latest Units: % 0.3   % Immature Granulocytes Latest Units: % 0.3   Nucleated RBCs Latest Ref Range: 0 /100 0   Absolute Neutrophil Latest Ref Range: 1.6 - 8.3 10e9/L 4.8   Absolute Lymphocytes Latest Ref Range: 0.8 - 5.3 10e9/L 1.3   Absolute Monocytes Latest Ref Range: 0.0 - 1.3 10e9/L 0.5   Absolute Eosinophils Latest Ref Range: 0.0 - 0.7 10e9/L 0.4   Absolute Basophils Latest Ref Range: 0.0 - 0.2 10e9/L 0.0   Abs Immature Granulocytes Latest Ref Range: 0 - 0.4 10e9/L 0.0   Absolute Nucleated RBC Unknown 0.0       Imaging: n/a    Impression/plan:   1. Follicular lymphoma, stage IIA  (marrow not obtained), treated with rituximab weekly x 4 in 01/2018 and again in 07/2018 with a good response, but residual disease. Initiated maintenance rituximab (Q 2 month x 2 years) on 10/24/18.    -he is tolerating well and is without clinical findings suggestive of progression  -will continue rituximab today.  -continue with rituximab for a total of 2 years which would complete in 10/2020  -imaging every 6 months, next due in December 2019. Will arrange f/u with Dr. Rouse with CT CAP and labs at that time.     2. CAD, s/p 3 vessel CABG, HTN:   -had f/u with Dr. Reyes on 8/22/19 with a stable exam, next f/u in 1 year  -is hypertensive today. He had taken in antihypertensive medications this AM and reports compliance. Will check at home and f/u with PCP if it remains elevated    3. Hypothyroidism  -managed by PCP, on levothyroxine 88 mcg daily

## 2019-10-17 ENCOUNTER — APPOINTMENT (OUTPATIENT)
Dept: LAB | Facility: CLINIC | Age: 79
End: 2019-10-17
Attending: INTERNAL MEDICINE
Payer: MEDICARE

## 2019-10-17 ENCOUNTER — ONCOLOGY VISIT (OUTPATIENT)
Dept: ONCOLOGY | Facility: CLINIC | Age: 79
End: 2019-10-17
Attending: INTERNAL MEDICINE
Payer: MEDICARE

## 2019-10-17 VITALS
HEART RATE: 48 BPM | BODY MASS INDEX: 30.92 KG/M2 | OXYGEN SATURATION: 97 % | DIASTOLIC BLOOD PRESSURE: 73 MMHG | RESPIRATION RATE: 16 BRPM | SYSTOLIC BLOOD PRESSURE: 165 MMHG | WEIGHT: 215.5 LBS | TEMPERATURE: 98 F

## 2019-10-17 DIAGNOSIS — C82.99 FOLLICULAR LYMPHOMA OF EXTRANODAL AND SOLID ORGAN SITES (H): Primary | ICD-10-CM

## 2019-10-17 PROBLEM — Z98.890 S/P PLICATION OF DIAPHRAGM: Status: ACTIVE | Noted: 2019-10-17

## 2019-10-17 PROBLEM — H91.90 HEARING LOSS: Status: ACTIVE | Noted: 2019-10-17

## 2019-10-17 PROBLEM — Z80.0 FAMILY HISTORY OF MALIGNANT NEOPLASM OF PANCREAS: Status: ACTIVE | Noted: 2019-10-17

## 2019-10-17 PROBLEM — K35.80 ACUTE APPENDICITIS: Status: ACTIVE | Noted: 2019-06-20

## 2019-10-17 PROBLEM — H93.19 TINNITUS: Status: ACTIVE | Noted: 2019-10-17

## 2019-10-17 LAB
ALBUMIN SERPL-MCNC: 3.4 G/DL (ref 3.4–5)
ALP SERPL-CCNC: 62 U/L (ref 40–150)
ALT SERPL W P-5'-P-CCNC: 43 U/L (ref 0–70)
ANION GAP SERPL CALCULATED.3IONS-SCNC: 4 MMOL/L (ref 3–14)
AST SERPL W P-5'-P-CCNC: 20 U/L (ref 0–45)
BASOPHILS # BLD AUTO: 0 10E9/L (ref 0–0.2)
BASOPHILS NFR BLD AUTO: 0.3 %
BILIRUB SERPL-MCNC: 0.2 MG/DL (ref 0.2–1.3)
BUN SERPL-MCNC: 24 MG/DL (ref 7–30)
CALCIUM SERPL-MCNC: 8.4 MG/DL (ref 8.5–10.1)
CHLORIDE SERPL-SCNC: 110 MMOL/L (ref 94–109)
CO2 SERPL-SCNC: 27 MMOL/L (ref 20–32)
CREAT SERPL-MCNC: 0.8 MG/DL (ref 0.66–1.25)
DIFFERENTIAL METHOD BLD: NORMAL
EOSINOPHIL # BLD AUTO: 0.4 10E9/L (ref 0–0.7)
EOSINOPHIL NFR BLD AUTO: 5 %
ERYTHROCYTE [DISTWIDTH] IN BLOOD BY AUTOMATED COUNT: 12.5 % (ref 10–15)
GFR SERPL CREATININE-BSD FRML MDRD: 85 ML/MIN/{1.73_M2}
GLUCOSE SERPL-MCNC: 124 MG/DL (ref 70–99)
HCT VFR BLD AUTO: 43 % (ref 40–53)
HGB BLD-MCNC: 13.9 G/DL (ref 13.3–17.7)
IMM GRANULOCYTES # BLD: 0 10E9/L (ref 0–0.4)
IMM GRANULOCYTES NFR BLD: 0.3 %
LDH SERPL L TO P-CCNC: 158 U/L (ref 85–227)
LYMPHOCYTES # BLD AUTO: 1.3 10E9/L (ref 0.8–5.3)
LYMPHOCYTES NFR BLD AUTO: 18.3 %
MCH RBC QN AUTO: 30.7 PG (ref 26.5–33)
MCHC RBC AUTO-ENTMCNC: 32.3 G/DL (ref 31.5–36.5)
MCV RBC AUTO: 95 FL (ref 78–100)
MONOCYTES # BLD AUTO: 0.5 10E9/L (ref 0–1.3)
MONOCYTES NFR BLD AUTO: 7.3 %
NEUTROPHILS # BLD AUTO: 4.8 10E9/L (ref 1.6–8.3)
NEUTROPHILS NFR BLD AUTO: 68.8 %
NRBC # BLD AUTO: 0 10*3/UL
NRBC BLD AUTO-RTO: 0 /100
PLATELET # BLD AUTO: 229 10E9/L (ref 150–450)
POTASSIUM SERPL-SCNC: 4.3 MMOL/L (ref 3.4–5.3)
PROT SERPL-MCNC: 6.8 G/DL (ref 6.8–8.8)
RBC # BLD AUTO: 4.53 10E12/L (ref 4.4–5.9)
SODIUM SERPL-SCNC: 141 MMOL/L (ref 133–144)
WBC # BLD AUTO: 7 10E9/L (ref 4–11)

## 2019-10-17 PROCEDURE — 83615 LACTATE (LD) (LDH) ENZYME: CPT | Performed by: NURSE PRACTITIONER

## 2019-10-17 PROCEDURE — 25000128 H RX IP 250 OP 636: Mod: ZF | Performed by: NURSE PRACTITIONER

## 2019-10-17 PROCEDURE — G0463 HOSPITAL OUTPT CLINIC VISIT: HCPCS | Mod: ZF

## 2019-10-17 PROCEDURE — 36415 COLL VENOUS BLD VENIPUNCTURE: CPT

## 2019-10-17 PROCEDURE — 96415 CHEMO IV INFUSION ADDL HR: CPT

## 2019-10-17 PROCEDURE — 25800030 ZZH RX IP 258 OP 636: Mod: ZF | Performed by: NURSE PRACTITIONER

## 2019-10-17 PROCEDURE — 99214 OFFICE O/P EST MOD 30 MIN: CPT | Mod: ZP | Performed by: NURSE PRACTITIONER

## 2019-10-17 PROCEDURE — 96413 CHEMO IV INFUSION 1 HR: CPT

## 2019-10-17 PROCEDURE — 85025 COMPLETE CBC W/AUTO DIFF WBC: CPT | Performed by: NURSE PRACTITIONER

## 2019-10-17 PROCEDURE — 25000132 ZZH RX MED GY IP 250 OP 250 PS 637: Mod: GY,ZF | Performed by: NURSE PRACTITIONER

## 2019-10-17 PROCEDURE — 80053 COMPREHEN METABOLIC PANEL: CPT | Performed by: NURSE PRACTITIONER

## 2019-10-17 RX ORDER — MEPERIDINE HYDROCHLORIDE 25 MG/ML
25 INJECTION INTRAMUSCULAR; INTRAVENOUS; SUBCUTANEOUS EVERY 30 MIN PRN
Status: CANCELLED | OUTPATIENT
Start: 2019-10-17

## 2019-10-17 RX ORDER — EPINEPHRINE 1 MG/ML
0.3 INJECTION, SOLUTION INTRAMUSCULAR; SUBCUTANEOUS EVERY 5 MIN PRN
Status: CANCELLED | OUTPATIENT
Start: 2019-10-17

## 2019-10-17 RX ORDER — DIPHENHYDRAMINE HYDROCHLORIDE 50 MG/ML
50 INJECTION INTRAMUSCULAR; INTRAVENOUS
Status: CANCELLED
Start: 2019-10-17

## 2019-10-17 RX ORDER — DIPHENHYDRAMINE HCL 25 MG
50 CAPSULE ORAL ONCE
Status: CANCELLED | OUTPATIENT
Start: 2019-10-17

## 2019-10-17 RX ORDER — DIPHENHYDRAMINE HCL 25 MG
50 CAPSULE ORAL ONCE
Status: COMPLETED | OUTPATIENT
Start: 2019-10-17 | End: 2019-10-17

## 2019-10-17 RX ORDER — MEPERIDINE HYDROCHLORIDE 25 MG/ML
25 INJECTION INTRAMUSCULAR; INTRAVENOUS; SUBCUTANEOUS
Status: CANCELLED
Start: 2019-10-17

## 2019-10-17 RX ORDER — EPINEPHRINE 0.3 MG/.3ML
0.3 INJECTION SUBCUTANEOUS EVERY 5 MIN PRN
Status: CANCELLED | OUTPATIENT
Start: 2019-10-17

## 2019-10-17 RX ORDER — ALBUTEROL SULFATE 90 UG/1
1-2 AEROSOL, METERED RESPIRATORY (INHALATION)
Status: CANCELLED
Start: 2019-10-17

## 2019-10-17 RX ORDER — ACETAMINOPHEN 325 MG/1
650 TABLET ORAL ONCE
Status: COMPLETED | OUTPATIENT
Start: 2019-10-17 | End: 2019-10-17

## 2019-10-17 RX ORDER — METHYLPREDNISOLONE SODIUM SUCCINATE 125 MG/2ML
125 INJECTION, POWDER, LYOPHILIZED, FOR SOLUTION INTRAMUSCULAR; INTRAVENOUS
Status: CANCELLED
Start: 2019-10-17

## 2019-10-17 RX ORDER — ACETAMINOPHEN 325 MG/1
650 TABLET ORAL ONCE
Status: CANCELLED | OUTPATIENT
Start: 2019-10-17

## 2019-10-17 RX ORDER — SODIUM CHLORIDE 9 MG/ML
1000 INJECTION, SOLUTION INTRAVENOUS CONTINUOUS PRN
Status: CANCELLED
Start: 2019-10-17

## 2019-10-17 RX ORDER — ALBUTEROL SULFATE 0.83 MG/ML
2.5 SOLUTION RESPIRATORY (INHALATION)
Status: CANCELLED | OUTPATIENT
Start: 2019-10-17

## 2019-10-17 RX ADMIN — DIPHENHYDRAMINE HYDROCHLORIDE 50 MG: 25 CAPSULE ORAL at 07:45

## 2019-10-17 RX ADMIN — RITUXIMAB 800 MG: 10 INJECTION, SOLUTION INTRAVENOUS at 08:16

## 2019-10-17 RX ADMIN — ACETAMINOPHEN 650 MG: 325 TABLET ORAL at 07:45

## 2019-10-17 RX ADMIN — SODIUM CHLORIDE 250 ML: 9 INJECTION, SOLUTION INTRAVENOUS at 08:13

## 2019-10-17 ASSESSMENT — PAIN SCALES - GENERAL: PAINLEVEL: NO PAIN (0)

## 2019-10-17 NOTE — LETTER
10/17/2019       RE: Zack Demarco  [address] [facility] [address]     Dear Colleague,    Thank you for referring your patient, Zack Demarco, to the [facility]. Please see a copy of my visit note below.    Reason for Visit: seen in f/u of follicular lymphoma    Oncology HPI: (updated): Zack Demarco is a 78-year-old first seen in consultation on 12/27/2017 for a new diagnosis of follicular lymphoma. He was diagnosed incidental to an evaluation for cardiac bypass surgery in 11/2017. A chest CT scan on 11/14 showed an unexpected retroperitoneal mass with surrounding lymphadenopathy suspicious for malignancy.  A further CT scan done on the same day showed a 2.3 x 7.2 x 6.1 retroperitoneal soft tissue mass with adjacent lymphadenopathy that mildly narrowed the left renal vein. There also was nodularity within the mesentery and an enlarged hilar lymph node. CT-guided biopsy of the retroperitoneal mass on 12/12/2017 established the diagnosis, but the sample was too small for adequate grading. There was no disease identified outside of the abdomen; a bone marrow biopsy was not obtained as part of staging.      Relative to cardiac issues, he underwent an uncomplicated 3-vessel coronary artery bypass graft on 11/22/2017 and, subsequently, has been symptom-free.       With the renal and gonadal vein compression it was decided to start treatment with rituximab, alone. Mr. Demarco completed 4 weekly doses from 01/26 - 02/16/2018. He had no difficulty with the treatment and was able to receive rapid infusion (90 minutes) for the last 3 doses. Interval evaluation on 03/05/2018 with an US, suggested improvement. A CT scan on 04/09/2018 showed substantial regression. Repeated CT on 07/09/18 showed a good response but residual lymphoma around the renal veins. Decided to proceed weekly rituximab x4 (7/26-8/22/2018).  He started rituximab maintenance in 10/2018.     Subsequent CT scans on 10/16/2018 and  4/16/2019 showed stable findings.    He had a ruptured appendix in June 2019. He was hospitalized at [facility] and underwent a laparascopic appendectomy with drain placement. He did not have any ongoing complication from the rupture.      He was a patient of Dr. Sandss, who retired in March 2019    Interval history:   Zack is feeling well today. He developed a URI over the past 2 weeks that started with a sore throat and led to chest congestion. He is feeling much better. Cough is minimal now. No fevers/chills. No sweats. Energy and appetite are good. Is tired today, but slept poorly last night. No new lumps/bumps. No abdominal pain, bloating. Bowel and bladder function are wnl. Has some chronic bilateral knee pain, unchanged from baseline. No other bone aches/pains. No shortness of breath, chest pain, orthopnea, edema. No dizziness. Denies new medical problems since his last visit. Recently had f/u with cardiology, Dr. Reyes with a stable exam. Continues to work-out on a stair stepping machine for 1/2 hour without angina.    Current Outpatient Medications   Medication Sig Dispense Refill     aspirin 325 MG EC tablet 1/2 tab daily       atorvastatin (LIPITOR) 40 MG tablet Take 1 tablet (40 mg) by mouth daily 60 tablet 11     Cholecalciferol (VITAMIN D3 PO) Take by mouth daily       latanoprost (XALATAN) 0.005 % ophthalmic solution Place 1 drop into both eyes At Bedtime 3 Bottle 4     levothyroxine (SYNTHROID/LEVOTHROID) 88 MCG tablet Take 1 tablet (88 mcg) by mouth daily 90 tablet 3     losartan (COZAAR) 50 MG tablet Take 1 tablet (50 mg) by mouth daily 90 tablet 3     metoprolol tartrate (LOPRESSOR) 25 MG tablet Take 1 tablet (25 mg) by mouth 2 times daily 180 tablet 3     timolol maleate (TIMOPTIC) 0.5 % ophthalmic solution Place 1 drop Into the left eye every morning 1 Bottle 11     nitroGLYcerin (NITROSTAT) 0.4 MG sublingual tablet Place 0.4 mg under the tongue            Allergies   Allergen Reactions      Nkda [No Known Drug Allergies]          Exam: alert ,appears well. Blood pressure (!) 165/73, pulse (!) 48, temperature 98  F (36.7  C), temperature source Oral, resp. rate 16, weight 97.8 kg (215 lb 8 oz), SpO2 97 %.  Wt Readings from Last 4 Encounters:   10/17/19 97.8 kg (215 lb 8 oz)   08/22/19 95.6 kg (210 lb 12.8 oz)   08/15/19 94.3 kg (207 lb 12.8 oz)   06/20/19 95.3 kg (210 lb)     Oropharynx is moist and without lesion. No icterus or conjunctival injection. PERRLA Neck supple and without adenopathy. Lungs:CTA. Heart: RRR, no murmur or rub. Abdomen: soft, nontender, BS active, no masses or palpable organomegaly. Small umbilical hernia noted.  Extremities: warm, no edema. Speech is clear. CN wnl. Gait/station wnl. Skin: no rashes or bruising on exposed skin. Nodes: no neck, supraclavicular, axillae or inguinal adenopathy palpable.      Labs: Results for FÉLIX TRIVEDI (MRN [id number]) as of 10/17/2019 07:34   Ref. Range 10/17/2019 06:31   Sodium Latest Ref Range: 133 - 144 mmol/L 141   Potassium Latest Ref Range: 3.4 - 5.3 mmol/L 4.3   Chloride Latest Ref Range: 94 - 109 mmol/L 110 (H)   Carbon Dioxide Latest Ref Range: 20 - 32 mmol/L 27   Urea Nitrogen Latest Ref Range: 7 - 30 mg/dL 24   Creatinine Latest Ref Range: 0.66 - 1.25 mg/dL 0.80   GFR Estimate Latest Ref Range: >60 mL/min/1.73_m2 85   GFR Estimate If Black Latest Ref Range: >60 mL/min/1.73_m2 >90   Calcium Latest Ref Range: 8.5 - 10.1 mg/dL 8.4 (L)   Anion Gap Latest Ref Range: 3 - 14 mmol/L 4   Albumin Latest Ref Range: 3.4 - 5.0 g/dL 3.4   Protein Total Latest Ref Range: 6.8 - 8.8 g/dL 6.8   Bilirubin Total Latest Ref Range: 0.2 - 1.3 mg/dL 0.2   Alkaline Phosphatase Latest Ref Range: 40 - 150 U/L 62   ALT Latest Ref Range: 0 - 70 U/L 43   AST Latest Ref Range: 0 - 45 U/L 20   Lactate Dehydrogenase Latest Ref Range: 85 - 227 U/L 158   Glucose Latest Ref Range: 70 - 99 mg/dL 124 (H)   WBC Latest Ref Range: 4.0 - 11.0 10e9/L 7.0   Hemoglobin  Latest Ref Range: 13.3 - 17.7 g/dL 13.9   Hematocrit Latest Ref Range: 40.0 - 53.0 % 43.0   Platelet Count Latest Ref Range: 150 - 450 10e9/L 229   RBC Count Latest Ref Range: 4.4 - 5.9 10e12/L 4.53   MCV Latest Ref Range: 78 - 100 fl 95   MCH Latest Ref Range: 26.5 - 33.0 pg 30.7   MCHC Latest Ref Range: 31.5 - 36.5 g/dL 32.3   RDW Latest Ref Range: 10.0 - 15.0 % 12.5   Diff Method Unknown Automated Method   % Neutrophils Latest Units: % 68.8   % Lymphocytes Latest Units: % 18.3   % Monocytes Latest Units: % 7.3   % Eosinophils Latest Units: % 5.0   % Basophils Latest Units: % 0.3   % Immature Granulocytes Latest Units: % 0.3   Nucleated RBCs Latest Ref Range: 0 /100 0   Absolute Neutrophil Latest Ref Range: 1.6 - 8.3 10e9/L 4.8   Absolute Lymphocytes Latest Ref Range: 0.8 - 5.3 10e9/L 1.3   Absolute Monocytes Latest Ref Range: 0.0 - 1.3 10e9/L 0.5   Absolute Eosinophils Latest Ref Range: 0.0 - 0.7 10e9/L 0.4   Absolute Basophils Latest Ref Range: 0.0 - 0.2 10e9/L 0.0   Abs Immature Granulocytes Latest Ref Range: 0 - 0.4 10e9/L 0.0   Absolute Nucleated RBC Unknown 0.0       Imaging: n/a    Impression/plan:   1. Follicular lymphoma, stage IIA  (marrow not obtained), treated with rituximab weekly x 4 in 01/2018 and again in 07/2018 with a good response, but residual disease. Initiated maintenance rituximab (Q 2 month x 2 years) on 10/24/18.    -he is tolerating well and is without clinical findings suggestive of progression  -will continue rituximab today.  -continue with rituximab for a total of 2 years which would complete in 10/2020  -imaging every 6 months, next due in December 2019. Will arrange f/u with Dr. Rouse with CT CAP and labs at that time.     2. CAD, s/p 3 vessel CABG, HTN:   -had f/u with Dr. Reyes on 8/22/19 with a stable exam, next f/u in 1 year  -is hypertensive today. He had taken in antihypertensive medications this AM and reports compliance. Will check at home and f/u with PCP if it remains  elevated    3. Hypothyroidism  -managed by PCP, on levothyroxine 88 mcg daily    Again, thank you for allowing me to participate in the care of your patient.      Sincerely,    HOLLY Draper CNP

## 2019-10-17 NOTE — NURSING NOTE
Chief Complaint   Patient presents with     Blood Draw     Labs drawn via /PIV placed by RN in lab. VS taken.     Belgica Munoz RN

## 2019-10-17 NOTE — NURSING NOTE
"Oncology Rooming Note    October 17, 2019 6:55 AM   Zack Demarco is a 78 year old male who presents for:    Chief Complaint   Patient presents with     Blood Draw     Labs drawn via /PIV placed by RN in lab. VS taken.     Oncology Clinic Visit     Return ; Follicular Lymphoma     Initial Vitals: BP (!) 165/73 (BP Location: Right arm, Patient Position: Sitting, Cuff Size: Adult Regular)   Pulse (!) 48   Temp 98  F (36.7  C) (Oral)   Resp 16   Wt 97.8 kg (215 lb 8 oz)   SpO2 97%   BMI 30.92 kg/m   Estimated body mass index is 30.92 kg/m  as calculated from the following:    Height as of 8/22/19: 1.778 m (5' 10\").    Weight as of this encounter: 97.8 kg (215 lb 8 oz). Body surface area is 2.2 meters squared.  No Pain (0) Comment: Data Unavailable   No LMP for male patient.  Allergies reviewed: Yes  Medications reviewed: Yes    Medications: Medication refills not needed today.  Pharmacy name entered into [employer]: [id number] - [place] - [address] [facility]    Clinical concerns: No new concerns.       Cande Bhandari CMA              "

## 2019-10-17 NOTE — PATIENT INSTRUCTIONS
Contact Numbers    [facility] Main Line (for Scheduling/Triage/After Hours Nurse Line): [phone]    Please call the [facility] nurse triage or the after hours nurse line if you experience a temperature greater than or equal to 100.5, shaking chills, have uncontrolled nausea, vomiting and/or diarrhea, dizziness, lightheadedness, shortness of breath, chest pain, bleeding, unexplained bruising, or if you have any other new/concerning symptoms, questions or concerns.     If you are having any concerning symptoms or wish to speak to a provider before your next infusion visit, please call your care coordinator or triage to notify them so we can adequately serve you.     If you need a refill on a narcotic prescription or other medication, please call triage before your infusion appointment.

## 2019-10-17 NOTE — PROGRESS NOTES
Infusion Nursing Note:  Zack Demarco presents today for Cycle 7 Day 1 Rapid Rituxan.    Patient seen by provider today: Yes: Elvira Orr NP.   present during visit today: Not Applicable.    Note: N/A.    Intravenous Access:  Peripheral IV placed in lab today.    Treatment Conditions:  Lab Results   Component Value Date    HGB 13.9 10/17/2019     Lab Results   Component Value Date    WBC 7.0 10/17/2019      Lab Results   Component Value Date    ANEU 4.8 10/17/2019     Lab Results   Component Value Date     10/17/2019      Lab Results   Component Value Date     10/17/2019                   Lab Results   Component Value Date    POTASSIUM 4.3 10/17/2019           Lab Results   Component Value Date    MAG 2.2 11/27/2017            Lab Results   Component Value Date    CR 0.80 10/17/2019                   Lab Results   Component Value Date    ELVIRA 8.4 10/17/2019                Lab Results   Component Value Date    BILITOTAL 0.2 10/17/2019           Lab Results   Component Value Date    ALBUMIN 3.4 10/17/2019                    Lab Results   Component Value Date    ALT 43 10/17/2019           Lab Results   Component Value Date    AST 20 10/17/2019       Results reviewed, labs MET treatment parameters, ok to proceed with treatment.    Post Infusion Assessment:  Patient tolerated infusion without incident.  Blood return noted pre and post infusion.  Site patent and intact, free from redness, edema or discomfort.  No evidence of extravasations.  Access discontinued per protocol.     Discharge Plan:   Patient declined prescription refills.  Discharge instructions reviewed with: Patient.  Patient and/or family verbalized understanding of discharge instructions and all questions answered.  AVS to patient via [employer].  Scheduling is working on patient's next appointments.  Patient is aware he is due for a scan, labs, appt with Janakiram, and Rituxan around 12/14/19.  Patient will monitor his MyChart and  call if he doesn't see the appointments scheduled in the next week or two.  Patient discharged in stable condition accompanied by: self.  Departure Mode: Ambulatory.  Face to Face time: 0.    EMILIANO DREW, RN, RN

## 2019-10-27 PROBLEM — H02.9 EYELID LESION: Status: ACTIVE | Noted: 2019-10-27

## 2019-10-27 ASSESSMENT — EXTERNAL EXAM - LEFT EYE: OS_EXAM: 3+ BROW PTOSIS, MILD-MOD BROW

## 2019-10-27 ASSESSMENT — EXTERNAL EXAM - RIGHT EYE: OD_EXAM: 3+ BROW PTOSIS, MILD-MOD BROW

## 2019-12-01 NOTE — TELEPHONE ENCOUNTER
This pt was Discharged from [facility] on 11/27/17 for Coronary Artery Disease      Please note this information was received from either Meme or Rodger MORRISON  IP daily report with Dr. Khan identified as the PCP.    A follow-up visit has not been scheduled.    Please follow-up with patient accordingly.       ankle pain/injury Negative

## 2019-12-17 ENCOUNTER — OFFICE VISIT (OUTPATIENT)
Dept: TRANSPLANT | Facility: CLINIC | Age: 79
End: 2019-12-17
Attending: INTERNAL MEDICINE
Payer: MEDICARE

## 2019-12-17 ENCOUNTER — INFUSION THERAPY VISIT (OUTPATIENT)
Dept: ONCOLOGY | Facility: CLINIC | Age: 79
End: 2019-12-17
Attending: INTERNAL MEDICINE
Payer: MEDICARE

## 2019-12-17 ENCOUNTER — APPOINTMENT (OUTPATIENT)
Dept: LAB | Facility: CLINIC | Age: 79
End: 2019-12-17
Attending: INTERNAL MEDICINE
Payer: MEDICARE

## 2019-12-17 ENCOUNTER — ANCILLARY PROCEDURE (OUTPATIENT)
Dept: CT IMAGING | Facility: CLINIC | Age: 79
End: 2019-12-17
Attending: NURSE PRACTITIONER
Payer: MEDICARE

## 2019-12-17 VITALS
OXYGEN SATURATION: 98 % | WEIGHT: 217.8 LBS | BODY MASS INDEX: 31.25 KG/M2 | TEMPERATURE: 98 F | SYSTOLIC BLOOD PRESSURE: 151 MMHG | DIASTOLIC BLOOD PRESSURE: 57 MMHG | HEART RATE: 58 BPM | RESPIRATION RATE: 18 BRPM

## 2019-12-17 DIAGNOSIS — C82.99 FOLLICULAR LYMPHOMA OF EXTRANODAL AND SOLID ORGAN SITES (H): ICD-10-CM

## 2019-12-17 DIAGNOSIS — C82.99 FOLLICULAR LYMPHOMA OF EXTRANODAL AND SOLID ORGAN SITES (H): Primary | ICD-10-CM

## 2019-12-17 LAB
ALBUMIN SERPL-MCNC: 3.6 G/DL (ref 3.4–5)
ALP SERPL-CCNC: 62 U/L (ref 40–150)
ALT SERPL W P-5'-P-CCNC: 29 U/L (ref 0–70)
ANION GAP SERPL CALCULATED.3IONS-SCNC: 5 MMOL/L (ref 3–14)
AST SERPL W P-5'-P-CCNC: 15 U/L (ref 0–45)
BASOPHILS # BLD AUTO: 0 10E9/L (ref 0–0.2)
BASOPHILS NFR BLD AUTO: 0.5 %
BILIRUB SERPL-MCNC: 0.6 MG/DL (ref 0.2–1.3)
BUN SERPL-MCNC: 28 MG/DL (ref 7–30)
CALCIUM SERPL-MCNC: 8.5 MG/DL (ref 8.5–10.1)
CHLORIDE SERPL-SCNC: 109 MMOL/L (ref 94–109)
CO2 SERPL-SCNC: 26 MMOL/L (ref 20–32)
CREAT BLD-MCNC: 1.1 MG/DL (ref 0.66–1.25)
CREAT SERPL-MCNC: 0.88 MG/DL (ref 0.66–1.25)
DIFFERENTIAL METHOD BLD: NORMAL
EOSINOPHIL # BLD AUTO: 0.3 10E9/L (ref 0–0.7)
EOSINOPHIL NFR BLD AUTO: 3.9 %
ERYTHROCYTE [DISTWIDTH] IN BLOOD BY AUTOMATED COUNT: 12.7 % (ref 10–15)
GFR SERPL CREATININE-BSD FRML MDRD: 65 ML/MIN/{1.73_M2}
GFR SERPL CREATININE-BSD FRML MDRD: 82 ML/MIN/{1.73_M2}
GLUCOSE SERPL-MCNC: 102 MG/DL (ref 70–99)
HCT VFR BLD AUTO: 43.3 % (ref 40–53)
HGB BLD-MCNC: 14.1 G/DL (ref 13.3–17.7)
IMM GRANULOCYTES # BLD: 0 10E9/L (ref 0–0.4)
IMM GRANULOCYTES NFR BLD: 0.3 %
LDH SERPL L TO P-CCNC: 152 U/L (ref 85–227)
LYMPHOCYTES # BLD AUTO: 1.1 10E9/L (ref 0.8–5.3)
LYMPHOCYTES NFR BLD AUTO: 16.7 %
MCH RBC QN AUTO: 31.1 PG (ref 26.5–33)
MCHC RBC AUTO-ENTMCNC: 32.6 G/DL (ref 31.5–36.5)
MCV RBC AUTO: 96 FL (ref 78–100)
MONOCYTES # BLD AUTO: 0.6 10E9/L (ref 0–1.3)
MONOCYTES NFR BLD AUTO: 8.7 %
NEUTROPHILS # BLD AUTO: 4.4 10E9/L (ref 1.6–8.3)
NEUTROPHILS NFR BLD AUTO: 69.9 %
NRBC # BLD AUTO: 0 10*3/UL
NRBC BLD AUTO-RTO: 0 /100
PLATELET # BLD AUTO: 191 10E9/L (ref 150–450)
POTASSIUM SERPL-SCNC: 4.3 MMOL/L (ref 3.4–5.3)
PROT SERPL-MCNC: 6.9 G/DL (ref 6.8–8.8)
RBC # BLD AUTO: 4.53 10E12/L (ref 4.4–5.9)
SODIUM SERPL-SCNC: 140 MMOL/L (ref 133–144)
WBC # BLD AUTO: 6.3 10E9/L (ref 4–11)

## 2019-12-17 PROCEDURE — 25800030 ZZH RX IP 258 OP 636: Mod: ZF | Performed by: INTERNAL MEDICINE

## 2019-12-17 PROCEDURE — 96523 IRRIG DRUG DELIVERY DEVICE: CPT

## 2019-12-17 PROCEDURE — G0463 HOSPITAL OUTPT CLINIC VISIT: HCPCS

## 2019-12-17 PROCEDURE — 80053 COMPREHEN METABOLIC PANEL: CPT | Performed by: NURSE PRACTITIONER

## 2019-12-17 PROCEDURE — 83615 LACTATE (LD) (LDH) ENZYME: CPT | Performed by: NURSE PRACTITIONER

## 2019-12-17 PROCEDURE — 25000128 H RX IP 250 OP 636: Mod: ZF | Performed by: INTERNAL MEDICINE

## 2019-12-17 PROCEDURE — 85025 COMPLETE CBC W/AUTO DIFF WBC: CPT | Performed by: NURSE PRACTITIONER

## 2019-12-17 PROCEDURE — 25000132 ZZH RX MED GY IP 250 OP 250 PS 637: Mod: GY,ZF | Performed by: INTERNAL MEDICINE

## 2019-12-17 PROCEDURE — 96413 CHEMO IV INFUSION 1 HR: CPT

## 2019-12-17 RX ORDER — IOPAMIDOL 755 MG/ML
132 INJECTION, SOLUTION INTRAVASCULAR ONCE
Status: COMPLETED | OUTPATIENT
Start: 2019-12-17 | End: 2019-12-17

## 2019-12-17 RX ORDER — ACETAMINOPHEN 325 MG/1
650 TABLET ORAL ONCE
Status: COMPLETED | OUTPATIENT
Start: 2019-12-17 | End: 2019-12-17

## 2019-12-17 RX ORDER — DIPHENHYDRAMINE HCL 25 MG
50 CAPSULE ORAL ONCE
Status: CANCELLED | OUTPATIENT
Start: 2020-02-18

## 2019-12-17 RX ORDER — ACETAMINOPHEN 325 MG/1
650 TABLET ORAL ONCE
Status: CANCELLED | OUTPATIENT
Start: 2020-02-18

## 2019-12-17 RX ORDER — METHYLPREDNISOLONE SODIUM SUCCINATE 125 MG/2ML
125 INJECTION, POWDER, LYOPHILIZED, FOR SOLUTION INTRAMUSCULAR; INTRAVENOUS
Status: CANCELLED
Start: 2019-12-17

## 2019-12-17 RX ORDER — EPINEPHRINE 0.3 MG/.3ML
0.3 INJECTION SUBCUTANEOUS EVERY 5 MIN PRN
Status: CANCELLED | OUTPATIENT
Start: 2020-02-18

## 2019-12-17 RX ORDER — DIPHENHYDRAMINE HCL 25 MG
50 CAPSULE ORAL ONCE
Status: CANCELLED | OUTPATIENT
Start: 2019-12-17

## 2019-12-17 RX ORDER — SODIUM CHLORIDE 9 MG/ML
1000 INJECTION, SOLUTION INTRAVENOUS CONTINUOUS PRN
Status: CANCELLED
Start: 2019-12-17

## 2019-12-17 RX ORDER — ALBUTEROL SULFATE 0.83 MG/ML
2.5 SOLUTION RESPIRATORY (INHALATION)
Status: CANCELLED | OUTPATIENT
Start: 2019-12-17

## 2019-12-17 RX ORDER — METHYLPREDNISOLONE SODIUM SUCCINATE 125 MG/2ML
125 INJECTION, POWDER, LYOPHILIZED, FOR SOLUTION INTRAMUSCULAR; INTRAVENOUS
Status: CANCELLED
Start: 2020-02-18

## 2019-12-17 RX ORDER — MEPERIDINE HYDROCHLORIDE 25 MG/ML
25 INJECTION INTRAMUSCULAR; INTRAVENOUS; SUBCUTANEOUS EVERY 30 MIN PRN
Status: CANCELLED | OUTPATIENT
Start: 2019-12-17

## 2019-12-17 RX ORDER — ALBUTEROL SULFATE 0.83 MG/ML
2.5 SOLUTION RESPIRATORY (INHALATION)
Status: CANCELLED | OUTPATIENT
Start: 2020-02-18

## 2019-12-17 RX ORDER — ACETAMINOPHEN 325 MG/1
650 TABLET ORAL ONCE
Status: CANCELLED | OUTPATIENT
Start: 2019-12-17

## 2019-12-17 RX ORDER — DIPHENHYDRAMINE HYDROCHLORIDE 50 MG/ML
50 INJECTION INTRAMUSCULAR; INTRAVENOUS
Status: CANCELLED
Start: 2020-02-18

## 2019-12-17 RX ORDER — MEPERIDINE HYDROCHLORIDE 25 MG/ML
25 INJECTION INTRAMUSCULAR; INTRAVENOUS; SUBCUTANEOUS
Status: CANCELLED
Start: 2019-12-17

## 2019-12-17 RX ORDER — DIPHENHYDRAMINE HCL 25 MG
50 CAPSULE ORAL ONCE
Status: COMPLETED | OUTPATIENT
Start: 2019-12-17 | End: 2019-12-17

## 2019-12-17 RX ORDER — MEPERIDINE HYDROCHLORIDE 25 MG/ML
25 INJECTION INTRAMUSCULAR; INTRAVENOUS; SUBCUTANEOUS
Status: CANCELLED
Start: 2020-02-18

## 2019-12-17 RX ORDER — EPINEPHRINE 1 MG/ML
0.3 INJECTION, SOLUTION INTRAMUSCULAR; SUBCUTANEOUS EVERY 5 MIN PRN
Status: CANCELLED | OUTPATIENT
Start: 2020-02-18

## 2019-12-17 RX ORDER — EPINEPHRINE 0.3 MG/.3ML
0.3 INJECTION SUBCUTANEOUS EVERY 5 MIN PRN
Status: CANCELLED | OUTPATIENT
Start: 2019-12-17

## 2019-12-17 RX ORDER — ALBUTEROL SULFATE 90 UG/1
1-2 AEROSOL, METERED RESPIRATORY (INHALATION)
Status: CANCELLED
Start: 2020-02-18

## 2019-12-17 RX ORDER — DIPHENHYDRAMINE HYDROCHLORIDE 50 MG/ML
50 INJECTION INTRAMUSCULAR; INTRAVENOUS
Status: CANCELLED
Start: 2019-12-17

## 2019-12-17 RX ORDER — EPINEPHRINE 1 MG/ML
0.3 INJECTION, SOLUTION INTRAMUSCULAR; SUBCUTANEOUS EVERY 5 MIN PRN
Status: CANCELLED | OUTPATIENT
Start: 2019-12-17

## 2019-12-17 RX ORDER — SODIUM CHLORIDE 9 MG/ML
1000 INJECTION, SOLUTION INTRAVENOUS CONTINUOUS PRN
Status: CANCELLED
Start: 2020-02-18

## 2019-12-17 RX ORDER — MEPERIDINE HYDROCHLORIDE 25 MG/ML
25 INJECTION INTRAMUSCULAR; INTRAVENOUS; SUBCUTANEOUS EVERY 30 MIN PRN
Status: CANCELLED | OUTPATIENT
Start: 2020-02-18

## 2019-12-17 RX ORDER — ALBUTEROL SULFATE 90 UG/1
1-2 AEROSOL, METERED RESPIRATORY (INHALATION)
Status: CANCELLED
Start: 2019-12-17

## 2019-12-17 RX ADMIN — RITUXIMAB 800 MG: 10 INJECTION, SOLUTION INTRAVENOUS at 11:20

## 2019-12-17 RX ADMIN — DIPHENHYDRAMINE HYDROCHLORIDE 50 MG: 25 CAPSULE ORAL at 10:50

## 2019-12-17 RX ADMIN — IOPAMIDOL 132 ML: 755 INJECTION, SOLUTION INTRAVASCULAR at 07:57

## 2019-12-17 RX ADMIN — ACETAMINOPHEN 650 MG: 325 TABLET ORAL at 10:50

## 2019-12-17 ASSESSMENT — PAIN SCALES - GENERAL: PAINLEVEL: NO PAIN (0)

## 2019-12-17 NOTE — PROGRESS NOTES
Reason for Visit: seen in f/u of follicular lymphoma    ONCOLOGY SUMMARY (updated): Zack Demarco is a 78-year-old first seen in consultation on 12/27/2017 for a new diagnosis of follicular lymphoma. He was diagnosed incidental to an evaluation for cardiac bypass surgery in 11/2017. A chest CT scan on 11/14 showed an unexpected retroperitoneal mass with surrounding lymphadenopathy suspicious for malignancy.  A further CT scan done on the same day showed a 2.3 x 7.2 x 6.1 retroperitoneal soft tissue mass with adjacent lymphadenopathy that mildly narrowed the left renal vein. There also was nodularity within the mesentery and an enlarged hilar lymph node. CT-guided biopsy of the retroperitoneal mass on 12/12/2017 established the diagnosis, but the sample was too small for adequate grading. There was no disease identified outside of the abdomen; a bone marrow biopsy was not obtained as part of staging.      Relative to cardiac issues, he underwent an uncomplicated 3-vessel coronary artery bypass graft on 11/22/2017 and, subsequently, has been symptom-free.       With the renal and gonadal vein compression it was decided to start treatment with rituximab, alone. Mr. Demarco completed 4 weekly doses from 01/26 - 02/16/2018. He had no difficulty with the treatment and was able to receive rapid infusion (90 minutes) for the last 3 doses. Interval evaluation on 03/05/2018 with an US, suggested improvement. A CT scan on 04/09/2018 showed substantial regression. Repeated CT on 07/09/18 showed a good response but residual lymphoma around the renal veins. Decided to proceed weekly rituximab x4 (7/26-8/22/2018).  He started rituximab maintenance in 10/2018.    Subsequent CT scans on 10/16/2018 and 4/16/2019 showed stable findings.    He was a patient of Dr. Cunningham, who retired in March 2019    INTERVAL HISTORY:  Patient returns unaccompanied for follow-up follicular lymphoma. Overall he is doing well. He lives by himself. He is  independent on ADLs. He stays active. Patient has been tolerating rituximab well.  He denies fever, chills, chest pain, night sweats, or weight loss. He wants to get his knee fixed and this is fine.    ROS: Complete ROS otherwise negative.    Current Outpatient Medications   Medication Sig Dispense Refill     aspirin 325 MG EC tablet 1/2 tab daily       atorvastatin (LIPITOR) 40 MG tablet Take 1 tablet (40 mg) by mouth daily 60 tablet 11     Cholecalciferol (VITAMIN D3 PO) Take by mouth daily       latanoprost (XALATAN) 0.005 % ophthalmic solution Place 1 drop into both eyes At Bedtime 3 Bottle 4     levothyroxine (SYNTHROID/LEVOTHROID) 88 MCG tablet Take 1 tablet (88 mcg) by mouth daily 90 tablet 3     losartan (COZAAR) 50 MG tablet Take 1 tablet (50 mg) by mouth daily 90 tablet 3     metoprolol tartrate (LOPRESSOR) 25 MG tablet Take 1 tablet (25 mg) by mouth 2 times daily 180 tablet 3     nitroGLYcerin (NITROSTAT) 0.4 MG sublingual tablet Place 0.4 mg under the tongue       timolol maleate (TIMOPTIC) 0.5 % ophthalmic solution Place 1 drop Into the left eye every morning 1 Bottle 11        No Known Allergies  Exam:  Blood pressure (!) 151/57, pulse 58, temperature 98  F (36.7  C), temperature source Oral, resp. rate 18, weight 98.8 kg (217 lb 12.8 oz), SpO2 98 %.  Wt Readings from Last 4 Encounters:   12/17/19 98.8 kg (217 lb 12.8 oz)   10/17/19 97.8 kg (215 lb 8 oz)   08/22/19 95.6 kg (210 lb 12.8 oz)   08/15/19 94.3 kg (207 lb 12.8 oz)     Constitutional: Awake and alert, appears well-developed, not in acute distress.  Eyes: No scleral icterus. Eyes exhibit no discharge.  ENT/Mouth: Oral mucosa pink and moist  Cardiovascular: Normal rate, regular rhythm, S1, S2. No murmur or rub. Trace LE edema.  Respiratory: No respiratory distress. Clear to auscultation bilaterally. No wheezes.  Gastrointestinal: Soft. No distension. No tenderness or garding.  Neurological: AAOX3, grossly non-focal  Psychiatric: Mentation and  affect appear normal.  Skin: Skin is warm, not diaphoretic.  Hematologic/Lymphatic/Immunologic: No overt bleeding. No lymphadenopathy    LABORATORY:  CBC   Lab Results   Component Value Date/Time    WBC 6.3 12/17/2019 08:24 AM    HGB 14.1 12/17/2019 08:24 AM    HCT 43.3 12/17/2019 08:24 AM     12/17/2019 08:24 AM    MCV 96 12/17/2019 08:24 AM    RBC 4.53 12/17/2019 08:24 AM    ANEU 4.4 12/17/2019 08:24 AM     BMP  Lab Results   Component Value Date/Time     12/17/2019 08:24 AM    POTASSIUM 4.3 12/17/2019 08:24 AM    CHLORIDE 109 12/17/2019 08:24 AM    CO2 26 12/17/2019 08:24 AM    BUN 28 12/17/2019 08:24 AM    CR 0.88 12/17/2019 08:24 AM    ELVIRA 8.5 12/17/2019 08:24 AM    MAG 2.2 11/27/2017 07:14 AM     LFTs   Lab Results   Component Value Date    PROTTOTAL 6.9 12/17/2019    ALBUMIN 3.6 12/17/2019    AST 15 12/17/2019    ALT 29 12/17/2019    BILITOTAL 0.6 12/17/2019    DBIL 0.1 11/24/2017    ALKPHOS 62 12/17/2019       IMAGING:  CT CHEST/ABDOMEN/PELVIS W CONTRAST, 4/16/2019 7:25 AM                                                   IMPRESSION:   1. No significant change in the approximately 1.7 x 3.7 cm ill-defined soft tissue masslike lesion in the mesentery associated with extensive mesenteric fat stranding.  2. Minimal change in the left periaortic masslike lesion and associated lymph nodes.   3. Mild fluid filling and expansion of the appendix may represent developing mucocele. Attention on follow-up exams.      ASSESSMENT/PLAN:  1. Follicular lymphoma, stage IIA  (marrow not obtained), treated with rituximab weekly x 4 in 01/2018 and again in 07/2018 with a good response, but residual disease. Initiated maintenance rituximab (Q 2 month x 2 years) on 10/24/18. S/p 3 cycles, tolerating well.    -continue with rituximab for a total of 2 years which would complete in 10/2020.  -imaging next year at the completion of treatment unless symptomatic  -f/v every 3 months with an GRACE and in 12 months with me  after imaging    2. CAD, s/p 3 vessel CABG: He stopped aspirin on his own about a week ago. I advised him to call and discuss with his cardiologist.    Agustin SHEPARD MS  Attending Physician  Pager - [phone]  Email - [email or web address]

## 2019-12-17 NOTE — PROGRESS NOTES
Infusion Nursing Note:  Zack Demarco presents today for C8D1 Rapid Rituxan.    Patient seen by provider today: Yes: Dr Rouse   present during visit today: Not Applicable.    Note: PT saw provider prior to chemotherapy, ok for treament.    Intravenous Access:  Peripheral IV placed at CT.    Treatment Conditions:  Lab Results   Component Value Date    HGB 14.1 12/17/2019     Lab Results   Component Value Date    WBC 6.3 12/17/2019      Lab Results   Component Value Date    ANEU 4.4 12/17/2019     Lab Results   Component Value Date     12/17/2019      Lab Results   Component Value Date     12/17/2019                   Lab Results   Component Value Date    POTASSIUM 4.3 12/17/2019           Lab Results   Component Value Date    MAG 2.2 11/27/2017            Lab Results   Component Value Date    CR 0.88 12/17/2019                   Lab Results   Component Value Date    ELVIRA 8.5 12/17/2019                Lab Results   Component Value Date    BILITOTAL 0.6 12/17/2019           Lab Results   Component Value Date    ALBUMIN 3.6 12/17/2019                    Lab Results   Component Value Date    ALT 29 12/17/2019           Lab Results   Component Value Date    AST 15 12/17/2019       Results reviewed, labs MET treatment parameters, ok to proceed with treatment.      Post Infusion Assessment:  Patient tolerated infusion without incident.  Blood return noted pre and post infusion.  Site patent and intact, free from redness, edema or discomfort.  No evidence of extravasations.  Access discontinued per protocol.       Discharge Plan:   Patient declined prescription refills.  Discharge instructions reviewed with: Patient.  Patient and/or family verbalized understanding of discharge instructions and all questions answered.  AVS to patient via [employer].  Checkout orders not completed by Dr Rouse prior to discharge.  Pt will follow up with scheduling.    Patient discharged in stable condition accompanied  by: self.  Departure Mode: Ambulatory.    Willow Wellington RN

## 2019-12-17 NOTE — NURSING NOTE
Chief Complaint   Patient presents with     Blood Draw     Right FA 20 angio alreasdy placed from CT scan this am, Labs drawn from IV, flushed with saline, vitals completed, checked into next appointment.   Yuliya Mann RN

## 2019-12-17 NOTE — NURSING NOTE
"Oncology Rooming Note    December 17, 2019 9:56 AM   Zack Demarco is a 79 year old male who presents for:    Chief Complaint   Patient presents with     Blood Draw     Right FA 20 angio alreasdy placed from CT scan this am, Labs drawn from IV, flushed with saline, vitals completed, checked into next appointment.     Oncology Clinic Visit     Pt is here for a rtn for Follicular Lymphoma     Initial Vitals: Blood Pressure (Abnormal) 151/57 (BP Location: Left arm, Patient Position: Sitting, Cuff Size: Adult Regular)   Pulse 58   Temperature 98  F (36.7  C) (Oral)   Respiration 18   Weight 98.8 kg (217 lb 12.8 oz)   Oxygen Saturation 98%   Body Mass Index 31.25 kg/m   Estimated body mass index is 31.25 kg/m  as calculated from the following:    Height as of 8/22/19: 1.778 m (5' 10\").    Weight as of this encounter: 98.8 kg (217 lb 12.8 oz). Body surface area is 2.21 meters squared.  No Pain (0) Comment: Data Unavailable   No LMP for male patient.  Allergies reviewed: Yes  Medications reviewed: Yes    Medications: Medication refills not needed today.  Pharmacy name entered into [employer]: [id number] - [place], [place] - [address] [facility]    Clinical concerns: none       Marga Padilla MA            "

## 2019-12-17 NOTE — PATIENT INSTRUCTIONS
Contact Numbers  [facility]: [phone]    After Hours:  [phone]  Triage: [phone]    Please call the [facility] Triage line if you experience a temperature greater than or equal to 100.5, shaking chills, have uncontrolled nausea, vomiting and/or diarrhea, dizziness, shortness of breath, chest pain, bleeding, unexplained bruising, or if you have any other new/concerning symptoms, questions or concerns.     If it is after hours, weekends, or holidays, please call the main hospital  at  [phone] and ask to speak to the Oncology doctor on call.     If you are having any concerning symptoms or wish to speak to a provider before your next infusion visit, please call your care coordinator or triage to notify them so we can adequately serve you.     If you need a refill on a narcotic prescription or other medication, please call triage before your infusion appointment.

## 2020-01-13 DIAGNOSIS — Z96.1 PSEUDOPHAKIA: ICD-10-CM

## 2020-01-13 RX ORDER — TIMOLOL MALEATE 5 MG/ML
1 SOLUTION/ DROPS OPHTHALMIC EVERY MORNING
Qty: 5 ML | Refills: 11 | Status: SHIPPED | OUTPATIENT
Start: 2020-01-13 | End: 2023-01-02

## 2020-01-13 NOTE — TELEPHONE ENCOUNTER
Message added to refill approval to schedule an appointment with Dr. Mccullough (patient was due in December of 2019).

## 2020-02-14 ENCOUNTER — TRANSFERRED RECORDS (OUTPATIENT)
Dept: HEALTH INFORMATION MANAGEMENT | Facility: CLINIC | Age: 80
End: 2020-02-14

## 2020-02-18 ENCOUNTER — APPOINTMENT (OUTPATIENT)
Dept: LAB | Facility: CLINIC | Age: 80
End: 2020-02-18
Attending: INTERNAL MEDICINE
Payer: MEDICARE

## 2020-02-18 ENCOUNTER — INFUSION THERAPY VISIT (OUTPATIENT)
Dept: ONCOLOGY | Facility: CLINIC | Age: 80
End: 2020-02-18
Attending: INTERNAL MEDICINE
Payer: MEDICARE

## 2020-02-18 VITALS
SYSTOLIC BLOOD PRESSURE: 130 MMHG | HEART RATE: 51 BPM | TEMPERATURE: 98.1 F | BODY MASS INDEX: 31.74 KG/M2 | DIASTOLIC BLOOD PRESSURE: 66 MMHG | WEIGHT: 221.2 LBS | OXYGEN SATURATION: 95 % | RESPIRATION RATE: 16 BRPM

## 2020-02-18 DIAGNOSIS — C82.99 FOLLICULAR LYMPHOMA OF EXTRANODAL AND SOLID ORGAN SITES (H): Primary | ICD-10-CM

## 2020-02-18 LAB
ALBUMIN SERPL-MCNC: 3.6 G/DL (ref 3.4–5)
ALP SERPL-CCNC: 72 U/L (ref 40–150)
ALT SERPL W P-5'-P-CCNC: 35 U/L (ref 0–70)
ANION GAP SERPL CALCULATED.3IONS-SCNC: 4 MMOL/L (ref 3–14)
AST SERPL W P-5'-P-CCNC: 23 U/L (ref 0–45)
BASOPHILS # BLD AUTO: 0.1 10E9/L (ref 0–0.2)
BASOPHILS NFR BLD AUTO: 0.7 %
BILIRUB SERPL-MCNC: 0.6 MG/DL (ref 0.2–1.3)
BUN SERPL-MCNC: 30 MG/DL (ref 7–30)
CALCIUM SERPL-MCNC: 8.9 MG/DL (ref 8.5–10.1)
CHLORIDE SERPL-SCNC: 109 MMOL/L (ref 94–109)
CO2 SERPL-SCNC: 27 MMOL/L (ref 20–32)
CREAT SERPL-MCNC: 0.91 MG/DL (ref 0.66–1.25)
DIFFERENTIAL METHOD BLD: NORMAL
EOSINOPHIL # BLD AUTO: 0.4 10E9/L (ref 0–0.7)
EOSINOPHIL NFR BLD AUTO: 5.3 %
ERYTHROCYTE [DISTWIDTH] IN BLOOD BY AUTOMATED COUNT: 12.6 % (ref 10–15)
GFR SERPL CREATININE-BSD FRML MDRD: 81 ML/MIN/{1.73_M2}
GLUCOSE SERPL-MCNC: 111 MG/DL (ref 70–99)
HCT VFR BLD AUTO: 43.2 % (ref 40–53)
HGB BLD-MCNC: 14.5 G/DL (ref 13.3–17.7)
IMM GRANULOCYTES # BLD: 0 10E9/L (ref 0–0.4)
IMM GRANULOCYTES NFR BLD: 0.3 %
LDH SERPL L TO P-CCNC: 208 U/L (ref 85–227)
LYMPHOCYTES # BLD AUTO: 1.3 10E9/L (ref 0.8–5.3)
LYMPHOCYTES NFR BLD AUTO: 19.7 %
MCH RBC QN AUTO: 31.5 PG (ref 26.5–33)
MCHC RBC AUTO-ENTMCNC: 33.6 G/DL (ref 31.5–36.5)
MCV RBC AUTO: 94 FL (ref 78–100)
MONOCYTES # BLD AUTO: 0.6 10E9/L (ref 0–1.3)
MONOCYTES NFR BLD AUTO: 9 %
NEUTROPHILS # BLD AUTO: 4.4 10E9/L (ref 1.6–8.3)
NEUTROPHILS NFR BLD AUTO: 65 %
NRBC # BLD AUTO: 0 10*3/UL
NRBC BLD AUTO-RTO: 0 /100
PLATELET # BLD AUTO: 197 10E9/L (ref 150–450)
POTASSIUM SERPL-SCNC: 4.3 MMOL/L (ref 3.4–5.3)
PROT SERPL-MCNC: 7 G/DL (ref 6.8–8.8)
RBC # BLD AUTO: 4.6 10E12/L (ref 4.4–5.9)
SODIUM SERPL-SCNC: 140 MMOL/L (ref 133–144)
WBC # BLD AUTO: 6.8 10E9/L (ref 4–11)

## 2020-02-18 PROCEDURE — 80053 COMPREHEN METABOLIC PANEL: CPT | Performed by: NURSE PRACTITIONER

## 2020-02-18 PROCEDURE — 25000128 H RX IP 250 OP 636: Mod: ZF | Performed by: INTERNAL MEDICINE

## 2020-02-18 PROCEDURE — 85025 COMPLETE CBC W/AUTO DIFF WBC: CPT | Performed by: NURSE PRACTITIONER

## 2020-02-18 PROCEDURE — 25800030 ZZH RX IP 258 OP 636: Mod: ZF | Performed by: INTERNAL MEDICINE

## 2020-02-18 PROCEDURE — 83615 LACTATE (LD) (LDH) ENZYME: CPT | Performed by: NURSE PRACTITIONER

## 2020-02-18 PROCEDURE — 25000132 ZZH RX MED GY IP 250 OP 250 PS 637: Mod: GY,ZF | Performed by: INTERNAL MEDICINE

## 2020-02-18 PROCEDURE — 96413 CHEMO IV INFUSION 1 HR: CPT

## 2020-02-18 PROCEDURE — 96415 CHEMO IV INFUSION ADDL HR: CPT

## 2020-02-18 PROCEDURE — 99213 OFFICE O/P EST LOW 20 MIN: CPT | Mod: ZP | Performed by: NURSE PRACTITIONER

## 2020-02-18 RX ORDER — ACETAMINOPHEN 325 MG/1
650 TABLET ORAL ONCE
Status: COMPLETED | OUTPATIENT
Start: 2020-02-18 | End: 2020-02-18

## 2020-02-18 RX ORDER — DIPHENHYDRAMINE HCL 25 MG
50 CAPSULE ORAL ONCE
Status: COMPLETED | OUTPATIENT
Start: 2020-02-18 | End: 2020-02-18

## 2020-02-18 RX ADMIN — RITUXIMAB 800 MG: 10 INJECTION, SOLUTION INTRAVENOUS at 08:10

## 2020-02-18 RX ADMIN — ACETAMINOPHEN 650 MG: 325 TABLET ORAL at 07:50

## 2020-02-18 RX ADMIN — DIPHENHYDRAMINE HYDROCHLORIDE 50 MG: 25 CAPSULE ORAL at 07:50

## 2020-02-18 ASSESSMENT — PAIN SCALES - GENERAL: PAINLEVEL: NO PAIN (0)

## 2020-02-18 NOTE — PROGRESS NOTES
Reason for Visit: seen in f/u of follicular lympyhoma    Oncology HPI:   Zack Demarco is a 78-year-old first seen in consultation on 12/27/2017 for a new diagnosis of follicular lymphoma. He was diagnosed incidental to an evaluation for cardiac bypass surgery in 11/2017. A chest CT scan on 11/14 showed an unexpected retroperitoneal mass with surrounding lymphadenopathy suspicious for malignancy.  A further CT scan done on the same day showed a 2.3 x 7.2 x 6.1 retroperitoneal soft tissue mass with adjacent lymphadenopathy that mildly narrowed the left renal vein. There also was nodularity within the mesentery and an enlarged hilar lymph node. CT-guided biopsy of the retroperitoneal mass on 12/12/2017 established the diagnosis, but the sample was too small for adequate grading. There was no disease identified outside of the abdomen; a bone marrow biopsy was not obtained as part of staging.      Relative to cardiac issues, he underwent an uncomplicated 3-vessel coronary artery bypass graft on 11/22/2017 and, subsequently, has been symptom-free.       With the renal and gonadal vein compression it was decided to start treatment with rituximab, alone. Mr. eDmarco completed 4 weekly doses from 01/26 - 02/16/2018. He had no difficulty with the treatment and was able to receive rapid infusion (90 minutes) for the last 3 doses. Interval evaluation on 03/05/2018 with an US, suggested improvement. A CT scan on 04/09/2018 showed substantial regression. Repeated CT on 07/09/18 showed a good response but residual lymphoma around the renal veins. Decided to proceed weekly rituximab x4 (7/26-8/22/2018).  He started rituximab maintenance in 10/2018.     Subsequent CT scans on 10/16/2018 and 4/16/2019 showed stable findings.     He had a ruptured appendix in June 2019. He was hospitalized at [facility] and underwent a laparascopic appendectomy with drain placement. He did not have any ongoing complication from the rupture.         Interval history: Zack is feeling well today. Energy has been good, remains active, working out at the gym regularly. Has chronic R knee pain, met with ortho and plans to have a knee replacement in May 2020. Appetite is good. Weight stable. Bowel/bladder function wnl. No sweats, new lumps/bumps. No cough, sob, cp, palpitation, dizziness. No infection concerns, fevers/chills. Has a runny nose, clear nasal drainage. No sinus/facial pressure, hearing changes, ear pain, watery eyes.    Current Outpatient Medications   Medication Sig Dispense Refill     aspirin 325 MG EC tablet 1/2 tab daily       atorvastatin (LIPITOR) 40 MG tablet Take 1 tablet (40 mg) by mouth daily 60 tablet 11     Cholecalciferol (VITAMIN D3 PO) Take by mouth daily       latanoprost (XALATAN) 0.005 % ophthalmic solution Place 1 drop into both eyes At Bedtime 3 Bottle 4     levothyroxine (SYNTHROID/LEVOTHROID) 88 MCG tablet Take 1 tablet (88 mcg) by mouth daily 90 tablet 3     losartan (COZAAR) 50 MG tablet Take 1 tablet (50 mg) by mouth daily 90 tablet 3     metoprolol tartrate (LOPRESSOR) 25 MG tablet Take 1 tablet (25 mg) by mouth 2 times daily 180 tablet 3     nitroGLYcerin (NITROSTAT) 0.4 MG sublingual tablet Place 0.4 mg under the tongue       timolol maleate (TIMOPTIC) 0.5 % ophthalmic solution Place 1 drop Into the left eye every morning 5 mL 11        No Known Allergies      Exam: alert, appears well. Blood pressure 130/66, pulse 51, temperature 98.1  F (36.7  C), temperature source Oral, resp. rate 16, weight 100.3 kg (221 lb 3.2 oz), SpO2 95 %.  Wt Readings from Last 4 Encounters:   02/18/20 100.3 kg (221 lb 3.2 oz)   12/17/19 98.8 kg (217 lb 12.8 oz)   10/17/19 97.8 kg (215 lb 8 oz)   08/22/19 95.6 kg (210 lb 12.8 oz)     Oropharynx is moist and without lesion. No icterus or conjunctival injection. Neck supple and without adenopathy. Lungs:CTA. Heart: RRR, no murmur or rub. Abdomen: soft, nontender, BS active, no masses or  organomegaly.  Extremities: warm, no edema. Speech is clear. CN wnl. Gait/station wnl. Skin: no rash or bruising on exposed skin. Nodes: no palpable neck, supraclavicular or axillae nodes palpable      Labs:   Results for FÉLIX TRIVEDI (MRN [id number]) as of 2/18/2020 07:52   Ref. Range 2/18/2020 06:50   Sodium Latest Ref Range: 133 - 144 mmol/L 140   Potassium Latest Ref Range: 3.4 - 5.3 mmol/L 4.3   Chloride Latest Ref Range: 94 - 109 mmol/L 109   Carbon Dioxide Latest Ref Range: 20 - 32 mmol/L 27   Urea Nitrogen Latest Ref Range: 7 - 30 mg/dL 30   Creatinine Latest Ref Range: 0.66 - 1.25 mg/dL 0.91   GFR Estimate Latest Ref Range: >60 mL/min/1.73_m2 81   GFR Estimate If Black Latest Ref Range: >60 mL/min/1.73_m2 >90   Calcium Latest Ref Range: 8.5 - 10.1 mg/dL 8.9   Anion Gap Latest Ref Range: 3 - 14 mmol/L 4   Albumin Latest Ref Range: 3.4 - 5.0 g/dL 3.6   Protein Total Latest Ref Range: 6.8 - 8.8 g/dL 7.0   Bilirubin Total Latest Ref Range: 0.2 - 1.3 mg/dL 0.6   Alkaline Phosphatase Latest Ref Range: 40 - 150 U/L 72   ALT Latest Ref Range: 0 - 70 U/L 35   AST Latest Ref Range: 0 - 45 U/L 23   Lactate Dehydrogenase Latest Ref Range: 85 - 227 U/L 208   Glucose Latest Ref Range: 70 - 99 mg/dL 111 (H)   WBC Latest Ref Range: 4.0 - 11.0 10e9/L 6.8   Hemoglobin Latest Ref Range: 13.3 - 17.7 g/dL 14.5   Hematocrit Latest Ref Range: 40.0 - 53.0 % 43.2   Platelet Count Latest Ref Range: 150 - 450 10e9/L 197   RBC Count Latest Ref Range: 4.4 - 5.9 10e12/L 4.60   MCV Latest Ref Range: 78 - 100 fl 94   MCH Latest Ref Range: 26.5 - 33.0 pg 31.5   MCHC Latest Ref Range: 31.5 - 36.5 g/dL 33.6   RDW Latest Ref Range: 10.0 - 15.0 % 12.6   Diff Method Unknown Automated Method   % Neutrophils Latest Units: % 65.0   % Lymphocytes Latest Units: % 19.7   % Monocytes Latest Units: % 9.0   % Eosinophils Latest Units: % 5.3   % Basophils Latest Units: % 0.7   % Immature Granulocytes Latest Units: % 0.3   Nucleated RBCs Latest Ref  Range: 0 /100 0   Absolute Neutrophil Latest Ref Range: 1.6 - 8.3 10e9/L 4.4   Absolute Lymphocytes Latest Ref Range: 0.8 - 5.3 10e9/L 1.3   Absolute Monocytes Latest Ref Range: 0.0 - 1.3 10e9/L 0.6   Absolute Eosinophils Latest Ref Range: 0.0 - 0.7 10e9/L 0.4   Absolute Basophils Latest Ref Range: 0.0 - 0.2 10e9/L 0.1   Abs Immature Granulocytes Latest Ref Range: 0 - 0.4 10e9/L 0.0   Absolute Nucleated RBC Unknown 0.0       Imaging: n/a    Impression/plan:   1. Follicular lymphoma, stage IIA  (marrow not obtained), treated with rituximab weekly x 4 in 01/2018 and again in 07/2018 with a good response, but residual disease. Initiated maintenance rituximab (Q 2 month x 2 years) on 10/24/18.    -tolerating treatment well. No clinical signs/symptoms of progression.  -continue with rituximab for a total of 2 years which would complete in 10/2020  -next imaging due at the completion of rituximab, in December 2020, will f/u with Dr. Rouse at that time, but will see me prior to rituximab infusions in the interval. Return to clinic in 2 months prior to infusion with labs and to see me.     2. CAD, s/p 3 vessel CABG, HTN:   -had f/u with Dr. Reyes on 8/22/19 with a stable exam, next f/u in 1 year    3. Hypothyroidism  -on levothyroxine 88 mcg daily, managed by PCP

## 2020-02-18 NOTE — PROGRESS NOTES
Infusion Nursing Note:  Zack Demarco presents today for cycle 9, day 1 rituxan.    Patient seen by provider today: Yes: Elvira Orr NP   present during visit today: Not Applicable.    Note: N/A.    Intravenous Access:  Peripheral IV placed in lab    Treatment Conditions:  Lab Results   Component Value Date    HGB 14.5 02/18/2020     Lab Results   Component Value Date    WBC 6.8 02/18/2020      Lab Results   Component Value Date    ANEU 4.4 02/18/2020     Lab Results   Component Value Date     02/18/2020      Lab Results   Component Value Date     02/18/2020                   Lab Results   Component Value Date    POTASSIUM 4.3 02/18/2020           Lab Results   Component Value Date    MAG 2.2 11/27/2017            Lab Results   Component Value Date    CR 0.91 02/18/2020                   Lab Results   Component Value Date    ELVIRA 8.9 02/18/2020                Lab Results   Component Value Date    BILITOTAL 0.6 02/18/2020           Lab Results   Component Value Date    ALBUMIN 3.6 02/18/2020                    Lab Results   Component Value Date    ALT 35 02/18/2020           Lab Results   Component Value Date    AST 23 02/18/2020       Results reviewed, No treatment parameters, ok to proceed with treatment.      Post Infusion Assessment:  Patient tolerated infusion without incident.  Blood return noted pre and post infusion.  Site patent and intact, free from redness, edema or discomfort.  No evidence of extravasations.  Access discontinued per protocol.       Discharge Plan:   Patient declined prescription refills.  Discharge instructions reviewed with: Patient.  Patient and/or family verbalized understanding of discharge instructions and all questions answered.  AVS to patient via [employer].  Patient will return 4/16/20 for next appointment.   Patient discharged in stable condition accompanied by: self.  Departure Mode: Ambulatory.  Face to Face time: 0.    Brittany Hardy,  RN

## 2020-02-18 NOTE — PATIENT INSTRUCTIONS
[facility] Triage and after hours / weekends / holidays:  [phone]    Please call the triage or after hours line if you experience a temperature greater than or equal to 100.5, shaking chills, have uncontrolled nausea, vomiting and/or diarrhea, dizziness, shortness of breath, chest pain, bleeding, unexplained bruising, or if you have any other new/concerning symptoms, questions or concerns.      If you are having any concerning symptoms or wish to speak to a provider before your next infusion visit, please call your care coordinator or triage to notify them so we can adequately serve you.     If you need a refill on a narcotic prescription or other medication, please call before your infusion appointment.         February 2020 Sunday Monday Tuesday Wednesday Thursday Friday Saturday                                 1       2     3     4     5     6     7     8       9     10     11     12     13     14     15       16     17     18    [facility] LAB DRAW   6:30 AM   (15 min.)   [facility] LAB DRAW   [facility] Lab Draw    [facility] RETURN   6:45 AM   (50 min.)   Elvira Orr, HOLLY CNP   [facility] ONC INFUSION 360   8:00 AM   (360 min.)    ONCOLOGY INFUSION   [facility] 19     20     21     22       23     24     25     26     27     28     29                 March 2020 Sunday Monday Tuesday Wednesday Thursday Friday Saturday   1     2     3     4     5     6     7       8     9     10     11     12     13     14       15     16     17     18     19     20     21       22     23     24     25     26     27     28       29     30     31                                        Recent Results (from the past 24 hour(s))   Lactate Dehydrogenase    Collection Time: 02/18/20  6:50 AM   Result Value Ref Range    Lactate Dehydrogenase 208 85 - 227 U/L   Comprehensive metabolic panel    Collection Time: 02/18/20  6:50 AM   Result Value Ref Range    Sodium 140 133  - 144 mmol/L    Potassium 4.3 3.4 - 5.3 mmol/L    Chloride 109 94 - 109 mmol/L    Carbon Dioxide 27 20 - 32 mmol/L    Anion Gap 4 3 - 14 mmol/L    Glucose 111 (H) 70 - 99 mg/dL    Urea Nitrogen 30 7 - 30 mg/dL    Creatinine 0.91 0.66 - 1.25 mg/dL    GFR Estimate 81 >60 mL/min/[1.73_m2]    GFR Estimate If Black >90 >60 mL/min/[1.73_m2]    Calcium 8.9 8.5 - 10.1 mg/dL    Bilirubin Total 0.6 0.2 - 1.3 mg/dL    Albumin 3.6 3.4 - 5.0 g/dL    Protein Total 7.0 6.8 - 8.8 g/dL    Alkaline Phosphatase 72 40 - 150 U/L    ALT 35 0 - 70 U/L    AST 23 0 - 45 U/L   CBC with platelets differential    Collection Time: 02/18/20  6:50 AM   Result Value Ref Range    WBC 6.8 4.0 - 11.0 10e9/L    RBC Count 4.60 4.4 - 5.9 10e12/L    Hemoglobin 14.5 13.3 - 17.7 g/dL    Hematocrit 43.2 40.0 - 53.0 %    MCV 94 78 - 100 fl    MCH 31.5 26.5 - 33.0 pg    MCHC 33.6 31.5 - 36.5 g/dL    RDW 12.6 10.0 - 15.0 %    Platelet Count 197 150 - 450 10e9/L    Diff Method Automated Method     % Neutrophils 65.0 %    % Lymphocytes 19.7 %    % Monocytes 9.0 %    % Eosinophils 5.3 %    % Basophils 0.7 %    % Immature Granulocytes 0.3 %    Nucleated RBCs 0 0 /100    Absolute Neutrophil 4.4 1.6 - 8.3 10e9/L    Absolute Lymphocytes 1.3 0.8 - 5.3 10e9/L    Absolute Monocytes 0.6 0.0 - 1.3 10e9/L    Absolute Eosinophils 0.4 0.0 - 0.7 10e9/L    Absolute Basophils 0.1 0.0 - 0.2 10e9/L    Abs Immature Granulocytes 0.0 0 - 0.4 10e9/L    Absolute Nucleated RBC 0.0

## 2020-02-18 NOTE — LETTER
2/18/2020       RE: Zack Demarco  [address] [facility] [address]     Dear Colleague,    Thank you for referring your patient, Zack Demarco, to the [facility]. Please see a copy of my visit note below.    Reason for Visit: seen in f/u of follicular lympyhoma    Oncology HPI:   Zack Demarco is a 78-year-old first seen in consultation on 12/27/2017 for a new diagnosis of follicular lymphoma. He was diagnosed incidental to an evaluation for cardiac bypass surgery in 11/2017. A chest CT scan on 11/14 showed an unexpected retroperitoneal mass with surrounding lymphadenopathy suspicious for malignancy.  A further CT scan done on the same day showed a 2.3 x 7.2 x 6.1 retroperitoneal soft tissue mass with adjacent lymphadenopathy that mildly narrowed the left renal vein. There also was nodularity within the mesentery and an enlarged hilar lymph node. CT-guided biopsy of the retroperitoneal mass on 12/12/2017 established the diagnosis, but the sample was too small for adequate grading. There was no disease identified outside of the abdomen; a bone marrow biopsy was not obtained as part of staging.      Relative to cardiac issues, he underwent an uncomplicated 3-vessel coronary artery bypass graft on 11/22/2017 and, subsequently, has been symptom-free.       With the renal and gonadal vein compression it was decided to start treatment with rituximab, alone. Mr. Demarco completed 4 weekly doses from 01/26 - 02/16/2018. He had no difficulty with the treatment and was able to receive rapid infusion (90 minutes) for the last 3 doses. Interval evaluation on 03/05/2018 with an US, suggested improvement. A CT scan on 04/09/2018 showed substantial regression. Repeated CT on 07/09/18 showed a good response but residual lymphoma around the renal veins. Decided to proceed weekly rituximab x4 (7/26-8/22/2018).  He started rituximab maintenance in 10/2018.     Subsequent CT scans on 10/16/2018 and 4/16/2019 showed  stable findings.     He had a ruptured appendix in June 2019. He was hospitalized at [facility] and underwent a laparascopic appendectomy with drain placement. He did not have any ongoing complication from the rupture.        Interval history: Zack is feeling well today. Energy has been good, remains active, working out at the gym regularly. Has chronic R knee pain, met with ortho and plans to have a knee replacement in May 2020. Appetite is good. Weight stable. Bowel/bladder function wnl. No sweats, new lumps/bumps. No cough, sob, cp, palpitation, dizziness. No infection concerns, fevers/chills. Has a runny nose, clear nasal drainage. No sinus/facial pressure, hearing changes, ear pain, watery eyes.    Current Outpatient Medications   Medication Sig Dispense Refill     aspirin 325 MG EC tablet 1/2 tab daily       atorvastatin (LIPITOR) 40 MG tablet Take 1 tablet (40 mg) by mouth daily 60 tablet 11     Cholecalciferol (VITAMIN D3 PO) Take by mouth daily       latanoprost (XALATAN) 0.005 % ophthalmic solution Place 1 drop into both eyes At Bedtime 3 Bottle 4     levothyroxine (SYNTHROID/LEVOTHROID) 88 MCG tablet Take 1 tablet (88 mcg) by mouth daily 90 tablet 3     losartan (COZAAR) 50 MG tablet Take 1 tablet (50 mg) by mouth daily 90 tablet 3     metoprolol tartrate (LOPRESSOR) 25 MG tablet Take 1 tablet (25 mg) by mouth 2 times daily 180 tablet 3     nitroGLYcerin (NITROSTAT) 0.4 MG sublingual tablet Place 0.4 mg under the tongue       timolol maleate (TIMOPTIC) 0.5 % ophthalmic solution Place 1 drop Into the left eye every morning 5 mL 11        No Known Allergies      Exam: alert, appears well. Blood pressure 130/66, pulse 51, temperature 98.1  F (36.7  C), temperature source Oral, resp. rate 16, weight 100.3 kg (221 lb 3.2 oz), SpO2 95 %.  Wt Readings from Last 4 Encounters:   02/18/20 100.3 kg (221 lb 3.2 oz)   12/17/19 98.8 kg (217 lb 12.8 oz)   10/17/19 97.8 kg (215 lb 8 oz)   08/22/19 95.6 kg (210 lb  12.8 oz)     Oropharynx is moist and without lesion. No icterus or conjunctival injection. Neck supple and without adenopathy. Lungs:CTA. Heart: RRR, no murmur or rub. Abdomen: soft, nontender, BS active, no masses or organomegaly.  Extremities: warm, no edema. Speech is clear. CN wnl. Gait/station wnl. Skin: no rash or bruising on exposed skin. Nodes: no palpable neck, supraclavicular or axillae nodes palpable      Labs:   Results for FÉLIX TRIVEDI (MRN [id number]) as of 2/18/2020 07:52   Ref. Range 2/18/2020 06:50   Sodium Latest Ref Range: 133 - 144 mmol/L 140   Potassium Latest Ref Range: 3.4 - 5.3 mmol/L 4.3   Chloride Latest Ref Range: 94 - 109 mmol/L 109   Carbon Dioxide Latest Ref Range: 20 - 32 mmol/L 27   Urea Nitrogen Latest Ref Range: 7 - 30 mg/dL 30   Creatinine Latest Ref Range: 0.66 - 1.25 mg/dL 0.91   GFR Estimate Latest Ref Range: >60 mL/min/1.73_m2 81   GFR Estimate If Black Latest Ref Range: >60 mL/min/1.73_m2 >90   Calcium Latest Ref Range: 8.5 - 10.1 mg/dL 8.9   Anion Gap Latest Ref Range: 3 - 14 mmol/L 4   Albumin Latest Ref Range: 3.4 - 5.0 g/dL 3.6   Protein Total Latest Ref Range: 6.8 - 8.8 g/dL 7.0   Bilirubin Total Latest Ref Range: 0.2 - 1.3 mg/dL 0.6   Alkaline Phosphatase Latest Ref Range: 40 - 150 U/L 72   ALT Latest Ref Range: 0 - 70 U/L 35   AST Latest Ref Range: 0 - 45 U/L 23   Lactate Dehydrogenase Latest Ref Range: 85 - 227 U/L 208   Glucose Latest Ref Range: 70 - 99 mg/dL 111 (H)   WBC Latest Ref Range: 4.0 - 11.0 10e9/L 6.8   Hemoglobin Latest Ref Range: 13.3 - 17.7 g/dL 14.5   Hematocrit Latest Ref Range: 40.0 - 53.0 % 43.2   Platelet Count Latest Ref Range: 150 - 450 10e9/L 197   RBC Count Latest Ref Range: 4.4 - 5.9 10e12/L 4.60   MCV Latest Ref Range: 78 - 100 fl 94   MCH Latest Ref Range: 26.5 - 33.0 pg 31.5   MCHC Latest Ref Range: 31.5 - 36.5 g/dL 33.6   RDW Latest Ref Range: 10.0 - 15.0 % 12.6   Diff Method Unknown Automated Method   % Neutrophils Latest Units: % 65.0    % Lymphocytes Latest Units: % 19.7   % Monocytes Latest Units: % 9.0   % Eosinophils Latest Units: % 5.3   % Basophils Latest Units: % 0.7   % Immature Granulocytes Latest Units: % 0.3   Nucleated RBCs Latest Ref Range: 0 /100 0   Absolute Neutrophil Latest Ref Range: 1.6 - 8.3 10e9/L 4.4   Absolute Lymphocytes Latest Ref Range: 0.8 - 5.3 10e9/L 1.3   Absolute Monocytes Latest Ref Range: 0.0 - 1.3 10e9/L 0.6   Absolute Eosinophils Latest Ref Range: 0.0 - 0.7 10e9/L 0.4   Absolute Basophils Latest Ref Range: 0.0 - 0.2 10e9/L 0.1   Abs Immature Granulocytes Latest Ref Range: 0 - 0.4 10e9/L 0.0   Absolute Nucleated RBC Unknown 0.0       Imaging: n/a    Impression/plan:   1. Follicular lymphoma, stage IIA  (marrow not obtained), treated with rituximab weekly x 4 in 01/2018 and again in 07/2018 with a good response, but residual disease. Initiated maintenance rituximab (Q 2 month x 2 years) on 10/24/18.    -tolerating treatment well. No clinical signs/symptoms of progression.  -continue with rituximab for a total of 2 years which would complete in 10/2020  -next imaging due at the completion of rituximab, in December 2020, will f/u with Dr. Rouse at that time, but will see me prior to rituximab infusions in the interval. Return to clinic in 2 months prior to infusion with labs and to see me.     2. CAD, s/p 3 vessel CABG, HTN:   -had f/u with Dr. Reyes on 8/22/19 with a stable exam, next f/u in 1 year    3. Hypothyroidism  -on levothyroxine 88 mcg daily, managed by PCP    Again, thank you for allowing me to participate in the care of your patient.      Sincerely,    HOLLY Draper CNP

## 2020-02-18 NOTE — NURSING NOTE
"Oncology Rooming Note    February 18, 2020 7:04 AM   Zack Demarco is a 79 year old male who presents for:    Chief Complaint   Patient presents with     Blood Draw     Labs drawn via /PIV placed by RN in lab. VS taken.     Oncology Clinic Visit     Pt is here for a rtn for Follicular Lymphoma     Initial Vitals: Blood Pressure 130/66 (BP Location: Right arm, Patient Position: Sitting, Cuff Size: Adult Regular)   Pulse 51   Temperature 98.1  F (36.7  C) (Oral)   Respiration 16   Weight 100.3 kg (221 lb 3.2 oz)   Oxygen Saturation 95%   Body Mass Index 31.74 kg/m   Estimated body mass index is 31.74 kg/m  as calculated from the following:    Height as of 8/22/19: 1.778 m (5' 10\").    Weight as of this encounter: 100.3 kg (221 lb 3.2 oz). Body surface area is 2.23 meters squared.  No Pain (0) Comment: Data Unavailable   No LMP for male patient.  Allergies reviewed: Yes  Medications reviewed: Yes    Medications: Medication refills not needed today.  Pharmacy name entered into [facility] [facility]: [id number] - [place] - [address] [facility]    Clinical concerns: none       Marga Padilla MA            "

## 2020-04-07 ENCOUNTER — MYC MEDICAL ADVICE (OUTPATIENT)
Dept: OPHTHALMOLOGY | Facility: CLINIC | Age: 80
End: 2020-04-07

## 2020-04-16 ENCOUNTER — VIRTUAL VISIT (OUTPATIENT)
Dept: ONCOLOGY | Facility: CLINIC | Age: 80
End: 2020-04-16
Attending: NURSE PRACTITIONER
Payer: MEDICARE

## 2020-04-16 ENCOUNTER — VIRTUAL VISIT (OUTPATIENT)
Dept: OPHTHALMOLOGY | Facility: CLINIC | Age: 80
End: 2020-04-16
Payer: MEDICARE

## 2020-04-16 ENCOUNTER — APPOINTMENT (OUTPATIENT)
Dept: LAB | Facility: CLINIC | Age: 80
End: 2020-04-16
Attending: INTERNAL MEDICINE
Payer: MEDICARE

## 2020-04-16 VITALS
HEART RATE: 80 BPM | WEIGHT: 230.7 LBS | DIASTOLIC BLOOD PRESSURE: 70 MMHG | OXYGEN SATURATION: 97 % | SYSTOLIC BLOOD PRESSURE: 135 MMHG | RESPIRATION RATE: 16 BRPM | BODY MASS INDEX: 33.1 KG/M2 | TEMPERATURE: 97.8 F

## 2020-04-16 DIAGNOSIS — C82.99 FOLLICULAR LYMPHOMA OF EXTRANODAL AND SOLID ORGAN SITES (H): Primary | ICD-10-CM

## 2020-04-16 DIAGNOSIS — H53.8 BLURRED VISION: Primary | ICD-10-CM

## 2020-04-16 LAB
ALBUMIN SERPL-MCNC: 3.7 G/DL (ref 3.4–5)
ALP SERPL-CCNC: 73 U/L (ref 40–150)
ALT SERPL W P-5'-P-CCNC: 35 U/L (ref 0–70)
ANION GAP SERPL CALCULATED.3IONS-SCNC: 5 MMOL/L (ref 3–14)
AST SERPL W P-5'-P-CCNC: 18 U/L (ref 0–45)
BASOPHILS # BLD AUTO: 0 10E9/L (ref 0–0.2)
BASOPHILS NFR BLD AUTO: 0.4 %
BILIRUB SERPL-MCNC: 0.5 MG/DL (ref 0.2–1.3)
BUN SERPL-MCNC: 24 MG/DL (ref 7–30)
CALCIUM SERPL-MCNC: 8.7 MG/DL (ref 8.5–10.1)
CHLORIDE SERPL-SCNC: 111 MMOL/L (ref 94–109)
CO2 SERPL-SCNC: 26 MMOL/L (ref 20–32)
CREAT SERPL-MCNC: 0.92 MG/DL (ref 0.66–1.25)
DIFFERENTIAL METHOD BLD: NORMAL
EOSINOPHIL # BLD AUTO: 0.3 10E9/L (ref 0–0.7)
EOSINOPHIL NFR BLD AUTO: 3.9 %
ERYTHROCYTE [DISTWIDTH] IN BLOOD BY AUTOMATED COUNT: 12.1 % (ref 10–15)
GFR SERPL CREATININE-BSD FRML MDRD: 79 ML/MIN/{1.73_M2}
GLUCOSE SERPL-MCNC: 111 MG/DL (ref 70–99)
HCT VFR BLD AUTO: 44.1 % (ref 40–53)
HGB BLD-MCNC: 14.8 G/DL (ref 13.3–17.7)
IMM GRANULOCYTES # BLD: 0 10E9/L (ref 0–0.4)
IMM GRANULOCYTES NFR BLD: 0.1 %
LDH SERPL L TO P-CCNC: 161 U/L (ref 85–227)
LYMPHOCYTES # BLD AUTO: 1.1 10E9/L (ref 0.8–5.3)
LYMPHOCYTES NFR BLD AUTO: 16.1 %
MCH RBC QN AUTO: 31.4 PG (ref 26.5–33)
MCHC RBC AUTO-ENTMCNC: 33.6 G/DL (ref 31.5–36.5)
MCV RBC AUTO: 93 FL (ref 78–100)
MONOCYTES # BLD AUTO: 0.6 10E9/L (ref 0–1.3)
MONOCYTES NFR BLD AUTO: 8.1 %
NEUTROPHILS # BLD AUTO: 5 10E9/L (ref 1.6–8.3)
NEUTROPHILS NFR BLD AUTO: 71.4 %
NRBC # BLD AUTO: 0 10*3/UL
NRBC BLD AUTO-RTO: 0 /100
PLATELET # BLD AUTO: 181 10E9/L (ref 150–450)
POTASSIUM SERPL-SCNC: 3.9 MMOL/L (ref 3.4–5.3)
PROT SERPL-MCNC: 7.1 G/DL (ref 6.8–8.8)
RBC # BLD AUTO: 4.72 10E12/L (ref 4.4–5.9)
SODIUM SERPL-SCNC: 142 MMOL/L (ref 133–144)
WBC # BLD AUTO: 7 10E9/L (ref 4–11)

## 2020-04-16 PROCEDURE — 96413 CHEMO IV INFUSION 1 HR: CPT

## 2020-04-16 PROCEDURE — 99214 OFFICE O/P EST MOD 30 MIN: CPT | Mod: 95 | Performed by: NURSE PRACTITIONER

## 2020-04-16 PROCEDURE — 25000128 H RX IP 250 OP 636: Mod: ZF | Performed by: NURSE PRACTITIONER

## 2020-04-16 PROCEDURE — 85025 COMPLETE CBC W/AUTO DIFF WBC: CPT | Performed by: NURSE PRACTITIONER

## 2020-04-16 PROCEDURE — 25000132 ZZH RX MED GY IP 250 OP 250 PS 637: Mod: GY,ZF | Performed by: NURSE PRACTITIONER

## 2020-04-16 PROCEDURE — 99441 ZZC PHYSICIAN TELEPHONE EVALUATION 5-10 MIN: CPT | Performed by: OPHTHALMOLOGY

## 2020-04-16 PROCEDURE — 80053 COMPREHEN METABOLIC PANEL: CPT | Performed by: NURSE PRACTITIONER

## 2020-04-16 PROCEDURE — 96415 CHEMO IV INFUSION ADDL HR: CPT

## 2020-04-16 PROCEDURE — 83615 LACTATE (LD) (LDH) ENZYME: CPT | Performed by: NURSE PRACTITIONER

## 2020-04-16 PROCEDURE — 25800030 ZZH RX IP 258 OP 636: Mod: ZF | Performed by: NURSE PRACTITIONER

## 2020-04-16 RX ORDER — DIPHENHYDRAMINE HCL 25 MG
50 CAPSULE ORAL ONCE
Status: CANCELLED | OUTPATIENT
Start: 2020-04-16

## 2020-04-16 RX ORDER — DIPHENHYDRAMINE HCL 25 MG
50 CAPSULE ORAL ONCE
Status: COMPLETED | OUTPATIENT
Start: 2020-04-16 | End: 2020-04-16

## 2020-04-16 RX ORDER — MEPERIDINE HYDROCHLORIDE 25 MG/ML
25 INJECTION INTRAMUSCULAR; INTRAVENOUS; SUBCUTANEOUS EVERY 30 MIN PRN
Status: CANCELLED | OUTPATIENT
Start: 2020-04-16

## 2020-04-16 RX ORDER — EPINEPHRINE 1 MG/ML
0.3 INJECTION, SOLUTION INTRAMUSCULAR; SUBCUTANEOUS EVERY 5 MIN PRN
Status: CANCELLED | OUTPATIENT
Start: 2020-04-16

## 2020-04-16 RX ORDER — ALBUTEROL SULFATE 0.83 MG/ML
2.5 SOLUTION RESPIRATORY (INHALATION)
Status: CANCELLED | OUTPATIENT
Start: 2020-04-16

## 2020-04-16 RX ORDER — ACETAMINOPHEN 325 MG/1
650 TABLET ORAL ONCE
Status: CANCELLED | OUTPATIENT
Start: 2020-04-16

## 2020-04-16 RX ORDER — METHYLPREDNISOLONE SODIUM SUCCINATE 125 MG/2ML
125 INJECTION, POWDER, LYOPHILIZED, FOR SOLUTION INTRAMUSCULAR; INTRAVENOUS
Status: CANCELLED
Start: 2020-04-16

## 2020-04-16 RX ORDER — DIPHENHYDRAMINE HYDROCHLORIDE 50 MG/ML
50 INJECTION INTRAMUSCULAR; INTRAVENOUS
Status: CANCELLED
Start: 2020-04-16

## 2020-04-16 RX ORDER — ACETAMINOPHEN 325 MG/1
650 TABLET ORAL ONCE
Status: COMPLETED | OUTPATIENT
Start: 2020-04-16 | End: 2020-04-16

## 2020-04-16 RX ORDER — SODIUM CHLORIDE 9 MG/ML
1000 INJECTION, SOLUTION INTRAVENOUS CONTINUOUS PRN
Status: CANCELLED
Start: 2020-04-16

## 2020-04-16 RX ORDER — EPINEPHRINE 0.3 MG/.3ML
0.3 INJECTION SUBCUTANEOUS EVERY 5 MIN PRN
Status: CANCELLED | OUTPATIENT
Start: 2020-04-16

## 2020-04-16 RX ORDER — MEPERIDINE HYDROCHLORIDE 25 MG/ML
25 INJECTION INTRAMUSCULAR; INTRAVENOUS; SUBCUTANEOUS
Status: CANCELLED
Start: 2020-04-16

## 2020-04-16 RX ORDER — ALBUTEROL SULFATE 90 UG/1
1-2 AEROSOL, METERED RESPIRATORY (INHALATION)
Status: CANCELLED
Start: 2020-04-16

## 2020-04-16 RX ADMIN — RITUXIMAB 800 MG: 10 INJECTION, SOLUTION INTRAVENOUS at 08:23

## 2020-04-16 RX ADMIN — ACETAMINOPHEN 650 MG: 325 TABLET ORAL at 07:43

## 2020-04-16 RX ADMIN — DIPHENHYDRAMINE HYDROCHLORIDE 50 MG: 25 CAPSULE ORAL at 07:43

## 2020-04-16 RX ADMIN — SODIUM CHLORIDE 250 ML: 9 INJECTION, SOLUTION INTRAVENOUS at 08:21

## 2020-04-16 ASSESSMENT — PAIN SCALES - GENERAL: PAINLEVEL: MILD PAIN (2)

## 2020-04-16 NOTE — NURSING NOTE
Chief Complaint   Patient presents with     Blood Draw     Labs drawn via /PIV. VS taken.     Belgica Munoz RN

## 2020-04-16 NOTE — PROGRESS NOTES
Reason for Visit: seen in f/u of follicular lymphoma    Oncology HPI:   Zack Demarco is a 78-year-old first seen in consultation on 12/27/2017 for a new diagnosis of follicular lymphoma. He was diagnosed incidental to an evaluation for cardiac bypass surgery in 11/2017. A chest CT scan on 11/14 showed an unexpected retroperitoneal mass with surrounding lymphadenopathy suspicious for malignancy.  A further CT scan done on the same day showed a 2.3 x 7.2 x 6.1 retroperitoneal soft tissue mass with adjacent lymphadenopathy that mildly narrowed the left renal vein. There also was nodularity within the mesentery and an enlarged hilar lymph node. CT-guided biopsy of the retroperitoneal mass on 12/12/2017 established the diagnosis, but the sample was too small for adequate grading. There was no disease identified outside of the abdomen; a bone marrow biopsy was not obtained as part of staging.      Relative to cardiac issues, he underwent an uncomplicated 3-vessel coronary artery bypass graft on 11/22/2017 and, subsequently, has been symptom-free.       With the renal and gonadal vein compression it was decided to start treatment with rituximab, alone. Mr. Demarco completed 4 weekly doses from 01/26 - 02/16/2018. He had no difficulty with the treatment and was able to receive rapid infusion (90 minutes) for the last 3 doses. Interval evaluation on 03/05/2018 with an US, suggested improvement. A CT scan on 04/09/2018 showed substantial regression. Repeated CT on 07/09/18 showed a good response but residual lymphoma around the renal veins. Decided to proceed weekly rituximab x4 (7/26-8/22/2018).  He started rituximab maintenance in 10/2018.     Subsequent CT scans on 10/16/2018 and 4/16/2019 showed stable findings.     He had a ruptured appendix in June 2019. He was hospitalized at [facility] and underwent a laparascopic appendectomy with drain placement. He did not have any ongoing complication from the  rupture.    Interval history:     Zack is feeling well. Has been isolating himself due to COVID 19 and is feeling less energetic. Had previously gone to the gym regularly, but that is not an option now. He finds he is gaining some weight. No cough, chest pain, congestion, fevers/chills. No headaches, vision changes, dizziness. No sweats/ new lumps/bumps. Bowel/bladder function wnl. He was scheduled for a knee replacement in May, but this is now post-poned.    Current Outpatient Medications   Medication Sig Dispense Refill     aspirin 325 MG EC tablet 1/2 tab daily       atorvastatin (LIPITOR) 40 MG tablet Take 1 tablet (40 mg) by mouth daily 60 tablet 11     Cholecalciferol (VITAMIN D3 PO) Take by mouth daily       latanoprost (XALATAN) 0.005 % ophthalmic solution Place 1 drop into both eyes At Bedtime 3 Bottle 4     levothyroxine (SYNTHROID/LEVOTHROID) 88 MCG tablet Take 1 tablet (88 mcg) by mouth daily 90 tablet 3     losartan (COZAAR) 50 MG tablet Take 1 tablet (50 mg) by mouth daily 90 tablet 3     metoprolol tartrate (LOPRESSOR) 25 MG tablet Take 1 tablet (25 mg) by mouth 2 times daily 180 tablet 3     nitroGLYcerin (NITROSTAT) 0.4 MG sublingual tablet Place 0.4 mg under the tongue       timolol maleate (TIMOPTIC) 0.5 % ophthalmic solution Place 1 drop Into the left eye every morning 5 mL 11        No Known Allergies      Exam: alert, appears in NAD. Oriented, appears comfortable.  There were no vitals taken for this visit.   Vitals 4/16/2020   Systolic 160   Diastolic 86   Pulse 64   Respiration 16   Temp 97.8   O2 97   Pain    Weight 230 lb 11.2 oz   Height    BSA    BMI      Wt Readings from Last 4 Encounters:   02/18/20 100.3 kg (221 lb 3.2 oz)   12/17/19 98.8 kg (217 lb 12.8 oz)   10/17/19 97.8 kg (215 lb 8 oz)   08/22/19 95.6 kg (210 lb 12.8 oz)     Labs:     Results for ZACK TRIVEDI (MRN [id number]) as of 4/16/2020 07:16   Ref. Range 4/16/2020 06:31   Sodium Latest Ref Range: 133 - 144 mmol/L 142    Potassium Latest Ref Range: 3.4 - 5.3 mmol/L 3.9   Chloride Latest Ref Range: 94 - 109 mmol/L 111 (H)   Carbon Dioxide Latest Ref Range: 20 - 32 mmol/L 26   Urea Nitrogen Latest Ref Range: 7 - 30 mg/dL 24   Creatinine Latest Ref Range: 0.66 - 1.25 mg/dL 0.92   GFR Estimate Latest Ref Range: >60 mL/min/1.73_m2 79   GFR Estimate If Black Latest Ref Range: >60 mL/min/1.73_m2 >90   Calcium Latest Ref Range: 8.5 - 10.1 mg/dL 8.7   Anion Gap Latest Ref Range: 3 - 14 mmol/L 5   Albumin Latest Ref Range: 3.4 - 5.0 g/dL 3.7   Protein Total Latest Ref Range: 6.8 - 8.8 g/dL 7.1   Bilirubin Total Latest Ref Range: 0.2 - 1.3 mg/dL 0.5   Alkaline Phosphatase Latest Ref Range: 40 - 150 U/L 73   ALT Latest Ref Range: 0 - 70 U/L 35   AST Latest Ref Range: 0 - 45 U/L 18   Lactate Dehydrogenase Latest Ref Range: 85 - 227 U/L 161   Glucose Latest Ref Range: 70 - 99 mg/dL 111 (H)   WBC Latest Ref Range: 4.0 - 11.0 10e9/L 7.0   Hemoglobin Latest Ref Range: 13.3 - 17.7 g/dL 14.8   Hematocrit Latest Ref Range: 40.0 - 53.0 % 44.1   Platelet Count Latest Ref Range: 150 - 450 10e9/L 181   RBC Count Latest Ref Range: 4.4 - 5.9 10e12/L 4.72   MCV Latest Ref Range: 78 - 100 fl 93   MCH Latest Ref Range: 26.5 - 33.0 pg 31.4   MCHC Latest Ref Range: 31.5 - 36.5 g/dL 33.6   RDW Latest Ref Range: 10.0 - 15.0 % 12.1   Diff Method Unknown Automated Method   % Neutrophils Latest Units: % 71.4   % Lymphocytes Latest Units: % 16.1   % Monocytes Latest Units: % 8.1   % Eosinophils Latest Units: % 3.9   % Basophils Latest Units: % 0.4   % Immature Granulocytes Latest Units: % 0.1   Nucleated RBCs Latest Ref Range: 0 /100 0   Absolute Neutrophil Latest Ref Range: 1.6 - 8.3 10e9/L 5.0   Absolute Lymphocytes Latest Ref Range: 0.8 - 5.3 10e9/L 1.1   Absolute Monocytes Latest Ref Range: 0.0 - 1.3 10e9/L 0.6   Absolute Eosinophils Latest Ref Range: 0.0 - 0.7 10e9/L 0.3   Absolute Basophils Latest Ref Range: 0.0 - 0.2 10e9/L 0.0   Abs Immature Granulocytes  Latest Ref Range: 0 - 0.4 10e9/L 0.0   Absolute Nucleated RBC Unknown 0.0       Imaging: n/a    Impression/plan:   1. Follicular lymphoma, stage IIA  (marrow not obtained), treated with rituximab weekly x 4 in 01/2018 and again in 07/2018 with a good response, but residual disease. Initiated maintenance rituximab (Q 2 month x 2 years) on 10/24/18.    -he continues to do well. No clinical symptoms that are concerning for recurrence at this time. He will continue rituximab today and every 2 months with a plan to complete 2 years of treatment in October 2020.   -next imaging due at the completion of rituximab, in December 2020, will f/u with Dr. Rouse at that time  -he will see me in 2 months prior to infusion with labs     2. CAD, s/p 3 vessel CABG, HTN:   -had f/u with Dr. Reyes on 8/22/19 with a stable exam, next f/u in 1 year    3. Hypothyroidism  -on levothyroxine 88 mcg daily, managed by PCP    4. Reviewed concerns/precautions around COVID 19, he does not have any concerning symptoms at this viktoria and is following guidelines to isolate/quarantine.

## 2020-04-16 NOTE — PROGRESS NOTES
Infusion Nursing Note:  Zack Demarco presents today for Day 1 Cycle 10 Rapid Rituxan.    Patient seen by provider today: Yes: Elvira COATS   present during visit today: Not Applicable.    Note: Pt presents to infusion feeling well. Patient denies acute pain and states no acute complaints or concerns needing to be addressed today.    TORB. 4/16/2020. 0730. Elvira COATS. Ryder Quiñonez RN. OK for treatment.     Intravenous Access:  Peripheral IV placed.    Treatment Conditions:  Lab Results   Component Value Date    HGB 14.8 04/16/2020     Lab Results   Component Value Date    WBC 7.0 04/16/2020      Lab Results   Component Value Date    ANEU 5.0 04/16/2020     Lab Results   Component Value Date     04/16/2020      Lab Results   Component Value Date     04/16/2020                   Lab Results   Component Value Date    POTASSIUM 3.9 04/16/2020           Lab Results   Component Value Date    MAG 2.2 11/27/2017            Lab Results   Component Value Date    CR 0.92 04/16/2020                   Lab Results   Component Value Date    ELVIRA 8.7 04/16/2020                Lab Results   Component Value Date    BILITOTAL 0.5 04/16/2020           Lab Results   Component Value Date    ALBUMIN 3.7 04/16/2020                    Lab Results   Component Value Date    ALT 35 04/16/2020           Lab Results   Component Value Date    AST 18 04/16/2020       Results reviewed, labs MET treatment parameters, ok to proceed with treatment.      Post Infusion Assessment:  Patient tolerated infusion without incident.  Blood return noted pre and post infusion.  Site patent and intact, free from redness, edema or discomfort.  No evidence of extravasations.  Access discontinued per protocol.       Discharge Plan:   Patient declined prescription refills.  Discharge instructions reviewed with: Patient.  Patient and/or family verbalized understanding of discharge instructions and all questions answered.  AVS to  patient via [employer].  Patient will return 6/18 for next appointment.   Patient discharged in stable condition accompanied by: self.  Departure Mode: Ambulatory.  Face to Face time: 0 minutes.    Ryder Quiñonez RN

## 2020-04-16 NOTE — PROGRESS NOTES
"Zack Demarco is a 79 year old male who is being evaluated via a billable telephone visit.      The patient has been notified of following:     \"This telephone visit will be conducted via a call between you and your physician/provider. We have found that certain health care needs can be provided without the need for a physical exam.  This service lets us provide the care you need with a short phone conversation.  If a prescription is necessary we can send it directly to your pharmacy.  If lab work is needed we can place an order for that and you can then stop by our lab to have the test done at a later time.    Telephone visits are billed at different rates depending on your insurance coverage. During this emergency period, for some insurers they may be billed the same as an in-person visit.  Please reach out to your insurance provider with any questions.    If during the course of the call the physician/provider feels a telephone visit is not appropriate, you will not be charged for this service.\"    Patient has given verbal consent for Telephone visit?  Yes    How would you like to obtain your AVS? Ria    Additional provider notes:      Current Eye Medications:  Timolol both eyes each morning, Latanoprost both eyes every evening.       Subjective:  Patient noticed yesterday while looking through a peep-sight, that his vision in his left eye was blurry.  He is able to find a fairly clear area within his glasses if he does a lot of head adjustment, however it is not as clear as his right eye.  When looking in the distance, without correction, he states his vision in his right eye is clearer than his left eye.    Left eye is more blurred both at distance and near, with and without glasses.  No specific metamorphopsia.  Thinks overall brightness is equal in both eyes.     Objective:  See Ophthalmology Exam.       Assessment:  Blurred vision left eye of indeterminate etiology.  Refractive shift, posterior capsule " opacity, or worsening of age related maculopathy are possibilities.      Plan:  Will schedule appointment for patient to be seen in office next week for MR, pupils, intraocular pressure check and dilation.  He will wear a mask to the appointment.    Avtar Mccullough M.D.  [phone]    Phone call duration: 5 minutes

## 2020-04-16 NOTE — PATIENT INSTRUCTIONS
Will schedule appointment for patient to be seen in office next week for MR, pupils, intraocular pressure check, and dilation.  He will wear a mask to the appointment.    Avtar Mccullough M.D.  [phone]

## 2020-04-16 NOTE — PROGRESS NOTES
"Zack Demarco is a 79 year old male who is being evaluated via a billable video visit.      The patient has been notified of following:     \"This video visit will be conducted via a call between you and your physician/provider. We have found that certain health care needs can be provided without the need for an in-person physical exam.  This service lets us provide the care you need with a video conversation.  If a prescription is necessary we can send it directly to your pharmacy.  If lab work is needed we can place an order for that and you can then stop by our lab to have the test done at a later time.    Video visits are billed at different rates depending on your insurance coverage.  Please reach out to your insurance provider with any questions.    If during the course of the call the physician/provider feels a video visit is not appropriate, you will not be charged for this service.\"    Patient has given verbal consent for Video visit? Yes    How would you like to obtain your AVS? Vonniehar    Patient would like the video invitation sent by: Text to cell phone: [phone]      No new questions or concerns.   No refills needed.     Milka Meeks [facility]    Video Start Time: 0705    Additional provider notes: insert own note template here see below      Video-Visit Details    Type of service:  Video Visit    Video End Time (time video stopped): 0716    Originating Location [place] Location[place] [facility] [place]    Distant Location (provider location):  [facility]     Mode of Communication:  Video Conference via HOLLY To CNP      "

## 2020-04-16 NOTE — PATIENT INSTRUCTIONS
[facility] Triage and after hours / weekends / holidays:  [phone]    Please call the triage or after hours line if you experience a temperature greater than or equal to 100.5, shaking chills, have uncontrolled nausea, vomiting and/or diarrhea, dizziness, shortness of breath, chest pain, bleeding, unexplained bruising, or if you have any other new/concerning symptoms, questions or concerns.      If you are having any concerning symptoms or wish to speak to a provider before your next infusion visit, please call your care coordinator or triage to notify them so we can adequately serve you.     If you need a refill on a narcotic prescription or other medication, please call before your infusion appointment.                 April 2020 Sunday Monday Tuesday Wednesday Thursday Friday Saturday                  1     2     3     4       5     6     7     8     9     10     11       12     13     14     15     16    [facility]   6:30 AM   (15 min.)   [facility] LAB DRAW   [facility] Lab Draw    VIDEO VISIT RETURN   6:45 AM   (50 min.)   Elvira Orr, HOLLY CNP   [facility] ONC INFUSION 360   8:00 AM   (360 min.)    ONCOLOGY INFUSION   [facility] 17     18       19     20     21     22     23     24     25       26     27     28     29     30                           May 2020      Mario Monday Tuesday Wednesday Thursday Friday Saturday                            1     2       3     4     5     6     7     8     9       10     11     12     13     14     15     16       17     18     19     20     21     22     23       24     25     26     27     28     29     30       31                                                   Lab Results:  Recent Results (from the past 12 hour(s))   CBC with platelets differential    Collection Time: 04/16/20  6:31 AM   Result Value Ref Range    WBC 7.0 4.0 - 11.0 10e9/L    RBC Count 4.72 4.4 - 5.9 10e12/L    Hemoglobin  14.8 13.3 - 17.7 g/dL    Hematocrit 44.1 40.0 - 53.0 %    MCV 93 78 - 100 fl    MCH 31.4 26.5 - 33.0 pg    MCHC 33.6 31.5 - 36.5 g/dL    RDW 12.1 10.0 - 15.0 %    Platelet Count 181 150 - 450 10e9/L    Diff Method Automated Method     % Neutrophils 71.4 %    % Lymphocytes 16.1 %    % Monocytes 8.1 %    % Eosinophils 3.9 %    % Basophils 0.4 %    % Immature Granulocytes 0.1 %    Nucleated RBCs 0 0 /100    Absolute Neutrophil 5.0 1.6 - 8.3 10e9/L    Absolute Lymphocytes 1.1 0.8 - 5.3 10e9/L    Absolute Monocytes 0.6 0.0 - 1.3 10e9/L    Absolute Eosinophils 0.3 0.0 - 0.7 10e9/L    Absolute Basophils 0.0 0.0 - 0.2 10e9/L    Abs Immature Granulocytes 0.0 0 - 0.4 10e9/L    Absolute Nucleated RBC 0.0    Comprehensive metabolic panel    Collection Time: 04/16/20  6:31 AM   Result Value Ref Range    Sodium 142 133 - 144 mmol/L    Potassium 3.9 3.4 - 5.3 mmol/L    Chloride 111 (H) 94 - 109 mmol/L    Carbon Dioxide 26 20 - 32 mmol/L    Anion Gap 5 3 - 14 mmol/L    Glucose 111 (H) 70 - 99 mg/dL    Urea Nitrogen 24 7 - 30 mg/dL    Creatinine 0.92 0.66 - 1.25 mg/dL    GFR Estimate 79 >60 mL/min/[1.73_m2]    GFR Estimate If Black >90 >60 mL/min/[1.73_m2]    Calcium 8.7 8.5 - 10.1 mg/dL    Bilirubin Total 0.5 0.2 - 1.3 mg/dL    Albumin 3.7 3.4 - 5.0 g/dL    Protein Total 7.1 6.8 - 8.8 g/dL    Alkaline Phosphatase 73 40 - 150 U/L    ALT 35 0 - 70 U/L    AST 18 0 - 45 U/L   Lactate Dehydrogenase    Collection Time: 04/16/20  6:31 AM   Result Value Ref Range    Lactate Dehydrogenase 161 85 - 227 U/L

## 2020-04-20 ENCOUNTER — OFFICE VISIT (OUTPATIENT)
Dept: OPHTHALMOLOGY | Facility: CLINIC | Age: 80
End: 2020-04-20
Payer: MEDICARE

## 2020-04-20 ENCOUNTER — TELEPHONE (OUTPATIENT)
Dept: OPHTHALMOLOGY | Facility: CLINIC | Age: 80
End: 2020-04-20

## 2020-04-20 DIAGNOSIS — H52.4 PRESBYOPIA: ICD-10-CM

## 2020-04-20 DIAGNOSIS — H35.363 MACULAR DRUSEN, BILATERAL: ICD-10-CM

## 2020-04-20 DIAGNOSIS — H40.053 BORDERLINE GLAUCOMA WITH OCULAR HYPERTENSION, BILATERAL: ICD-10-CM

## 2020-04-20 DIAGNOSIS — Z96.1 PSEUDOPHAKIA: ICD-10-CM

## 2020-04-20 DIAGNOSIS — H43.812 POSTERIOR VITREOUS DETACHMENT, LEFT: ICD-10-CM

## 2020-04-20 DIAGNOSIS — H26.492 POSTERIOR CAPSULAR OPACIFICATION VISUALLY SIGNIFICANT OF LEFT EYE: ICD-10-CM

## 2020-04-20 DIAGNOSIS — H53.8 BLURRED VISION: Primary | ICD-10-CM

## 2020-04-20 DIAGNOSIS — H35.363 MACULAR DRUSEN, BILATERAL: Primary | ICD-10-CM

## 2020-04-20 PROCEDURE — 92012 INTRM OPH EXAM EST PATIENT: CPT | Performed by: OPHTHALMOLOGY

## 2020-04-20 PROCEDURE — 92015 DETERMINE REFRACTIVE STATE: CPT | Mod: GY | Performed by: OPHTHALMOLOGY

## 2020-04-20 PROCEDURE — 66821 AFTER CATARACT LASER SURGERY: CPT | Mod: LT | Performed by: OPHTHALMOLOGY

## 2020-04-20 PROCEDURE — 92134 CPTRZ OPH DX IMG PST SGM RTA: CPT | Performed by: OPHTHALMOLOGY

## 2020-04-20 ASSESSMENT — VISUAL ACUITY
CORRECTION_TYPE: GLASSES
METHOD: SNELLEN - LINEAR
OD_CC: 20/30
OS_PH_CC: 20/50
OD_CC+: -2
OS_PH_CC+: +2

## 2020-04-20 ASSESSMENT — REFRACTION_MANIFEST
OS_SPHERE: -2.25
OD_SPHERE: -1.50
OS_ADD: +2.75
OD_ADD: +2.75
OS_AXIS: 002
OD_CYLINDER: +1.25
OS_CYLINDER: +2.50
OD_AXIS: 005

## 2020-04-20 ASSESSMENT — PACHYMETRY
OD_CT(UM): 552
OS_CT(UM): 548

## 2020-04-20 ASSESSMENT — CUP TO DISC RATIO
OD_RATIO: 0.5
OS_RATIO: 0.5

## 2020-04-20 ASSESSMENT — REFRACTION_WEARINGRX
OD_ADD: +2.75
OD_CYLINDER: +1.00
OD_AXIS: 176
OS_CYLINDER: +1.50
OD_SPHERE: -1.50
OS_ADD: +2.75
OS_SPHERE: -1.00
OS_AXIS: 157

## 2020-04-20 ASSESSMENT — SLIT LAMP EXAM - LIDS: COMMENTS: 1+ DERMATOCHALASIS - UPPER LID, 2+ MEIBOMIAN GLAND DYSFUNCTION

## 2020-04-20 ASSESSMENT — EXTERNAL EXAM - RIGHT EYE: OD_EXAM: 3+ BROW PTOSIS, MILD-MOD BROW

## 2020-04-20 ASSESSMENT — TONOMETRY
OS_IOP_MMHG: 16
OD_IOP_MMHG: 16
IOP_METHOD: APPLANATION

## 2020-04-20 ASSESSMENT — EXTERNAL EXAM - LEFT EYE: OS_EXAM: 3+ BROW PTOSIS, MILD-MOD BROW

## 2020-04-20 NOTE — PATIENT INSTRUCTIONS
Yag Posterior Capsulotomy left eye performed today.  Will schedule with retina specialist to rule out treatable macular disease left eye.  Will have back sometime thereafter for repeat refraction.  Avtar Mccullough M.D.  [phone]    Yag Capsulotomy performed without problems left eye under Proparacaine anesthetic.  Well tolerated.  Number of applications: 11  Power of applications: 1.4 mJ  Iopidine given.    Avtar Mccullough M.D.

## 2020-04-20 NOTE — TELEPHONE ENCOUNTER
Appointment scheduled with Dr. Duong at [facility] in [place].  April 23rd at 9:30.  Chart notes faxed to [facility].

## 2020-04-20 NOTE — PROGRESS NOTES
Current Eye Medications:  Timolol both eyes each morning, Latanoprost both eyes every evening.  Last drops:  7am.       Subjective:  Patient complains of decreased vision in his left eye, distance and near.  Vision is slightly better in the mornings, compared to later in the day.       Objective:  See Ophthalmology Exam.       Assessment:  Decreased vision left eye of indeterminate etiology.  Posterior capsule opacity may be contributing.  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      ICD-10-CM    1. Blurred vision, os  H53.8    2. Macular drusen, bilateral  H35.363 HC COMPUTERIZED OPHTHALMIC IMAGING RETINA   3. Posterior capsular opacification visually significant of left eye  H26.492 YAG LASER CAPSULOTOMY   4. Borderline glaucoma with ocular hypertension, bilateral - treated  H40.053 HC COMPUTERIZED OPHTHALMIC IMAGING OPTIC NERVE   5. Pseudophakia, ou  Z96.1    6. Posterior vitreous detachment, left  H43.812    7. Presbyopia  H52.4 REFRACTIVE STATUS           Plan:  Yag Posterior Capsulotomy left eye performed today.  Will schedule with retina specialist to rule out treatable macular disease left eye.  Will have back sometime thereafter for repeat refraction.  Avtar Mccullough M.D.  [phone]    Yag Capsulotomy performed without problems left eye under Proparacaine anesthetic.  Well tolerated.  Number of applications: 11  Power of applications: 1.4 mJ  Iopidine given.    Avtar Mccullough M.D.

## 2020-04-20 NOTE — LETTER
4/20/2020         RE: Zack Demarco  [address] [facility] [address]        Dear Colleague,    Thank you for referring your patient, Zack Demarco, to the [facility]. Please see a copy of my visit note below.     Current Eye Medications:  Timolol both eyes each morning, Latanoprost both eyes every evening.  Last drops:  7am.       Subjective:  Patient complains of decreased vision in his left eye, distance and near.  Vision is slightly better in the mornings, compared to later in the day.       Objective:  See Ophthalmology Exam.       Assessment:  Decreased vision left eye of indeterminate etiology.  Posterior capsule opacity may be contributing.  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      ICD-10-CM    1. Blurred vision, os  H53.8    2. Macular drusen, bilateral  H35.363 HC COMPUTERIZED OPHTHALMIC IMAGING RETINA   3. Posterior capsular opacification visually significant of left eye  H26.492 YAG LASER CAPSULOTOMY   4. Borderline glaucoma with ocular hypertension, bilateral - treated  H40.053 HC COMPUTERIZED OPHTHALMIC IMAGING OPTIC NERVE   5. Pseudophakia, ou  Z96.1    6. Posterior vitreous detachment, left  H43.812    7. Presbyopia  H52.4 REFRACTIVE STATUS           Plan:  Yag Posterior Capsulotomy left eye performed today.  Will schedule with retina specialist to rule out treatable macular disease left eye.  Will have back sometime thereafter for repeat refraction.  Avtar Mccullough M.D.  [phone]    Yag Capsulotomy performed without problems left eye under Proparacaine anesthetic.  Well tolerated.  Number of applications: 11  Power of applications: 1.4 mJ  Iopidine given.    Avtar Mccullough M.D.             Again, thank you for allowing me to participate in the care of your patient.        Sincerely,        Avtar Mccullough MD

## 2020-05-06 ENCOUNTER — TELEPHONE (OUTPATIENT)
Dept: OPHTHALMOLOGY | Facility: CLINIC | Age: 80
End: 2020-05-06

## 2020-05-06 NOTE — TELEPHONE ENCOUNTER
Discussion with patient.  Reviewed retina report; no treatable lesion.  He thinks some benefit from Yag left eye but still feels blurring left > right.  Will have in for MR when able.  Avtar Mccullough M.D.

## 2020-06-02 ENCOUNTER — MYC MEDICAL ADVICE (OUTPATIENT)
Dept: OPHTHALMOLOGY | Facility: CLINIC | Age: 80
End: 2020-06-02

## 2020-06-02 NOTE — TELEPHONE ENCOUNTER
6/2/2020  Requested notes have been faxed to the [facility] in [facility] Dr. Mejía @ [phone]    Lilli Surphace Optometric Assistant

## 2020-06-18 ENCOUNTER — VIRTUAL VISIT (OUTPATIENT)
Dept: ONCOLOGY | Facility: CLINIC | Age: 80
End: 2020-06-18
Attending: NURSE PRACTITIONER
Payer: MEDICARE

## 2020-06-18 VITALS
SYSTOLIC BLOOD PRESSURE: 171 MMHG | OXYGEN SATURATION: 97 % | RESPIRATION RATE: 16 BRPM | TEMPERATURE: 96.4 F | HEART RATE: 46 BPM | BODY MASS INDEX: 32.2 KG/M2 | DIASTOLIC BLOOD PRESSURE: 65 MMHG | WEIGHT: 224.4 LBS

## 2020-06-18 VITALS — SYSTOLIC BLOOD PRESSURE: 152 MMHG | HEART RATE: 66 BPM | DIASTOLIC BLOOD PRESSURE: 69 MMHG

## 2020-06-18 VITALS — BODY MASS INDEX: 32.14 KG/M2 | WEIGHT: 224 LBS

## 2020-06-18 DIAGNOSIS — C82.99 FOLLICULAR LYMPHOMA OF EXTRANODAL AND SOLID ORGAN SITES (H): Primary | ICD-10-CM

## 2020-06-18 LAB
ALBUMIN SERPL-MCNC: 3.7 G/DL (ref 3.4–5)
ALP SERPL-CCNC: 72 U/L (ref 40–150)
ALT SERPL W P-5'-P-CCNC: 37 U/L (ref 0–70)
ANION GAP SERPL CALCULATED.3IONS-SCNC: 5 MMOL/L (ref 3–14)
AST SERPL W P-5'-P-CCNC: 22 U/L (ref 0–45)
BASOPHILS # BLD AUTO: 0 10E9/L (ref 0–0.2)
BASOPHILS NFR BLD AUTO: 0.3 %
BILIRUB SERPL-MCNC: 0.5 MG/DL (ref 0.2–1.3)
BUN SERPL-MCNC: 24 MG/DL (ref 7–30)
CALCIUM SERPL-MCNC: 8.8 MG/DL (ref 8.5–10.1)
CHLORIDE SERPL-SCNC: 110 MMOL/L (ref 94–109)
CO2 SERPL-SCNC: 25 MMOL/L (ref 20–32)
CREAT SERPL-MCNC: 0.88 MG/DL (ref 0.66–1.25)
DIFFERENTIAL METHOD BLD: NORMAL
EOSINOPHIL # BLD AUTO: 0.3 10E9/L (ref 0–0.7)
EOSINOPHIL NFR BLD AUTO: 4.1 %
ERYTHROCYTE [DISTWIDTH] IN BLOOD BY AUTOMATED COUNT: 12.5 % (ref 10–15)
GFR SERPL CREATININE-BSD FRML MDRD: 82 ML/MIN/{1.73_M2}
GLUCOSE SERPL-MCNC: 110 MG/DL (ref 70–99)
HCT VFR BLD AUTO: 44.5 % (ref 40–53)
HGB BLD-MCNC: 14.5 G/DL (ref 13.3–17.7)
IMM GRANULOCYTES # BLD: 0 10E9/L (ref 0–0.4)
IMM GRANULOCYTES NFR BLD: 0.3 %
LYMPHOCYTES # BLD AUTO: 1 10E9/L (ref 0.8–5.3)
LYMPHOCYTES NFR BLD AUTO: 15.2 %
MCH RBC QN AUTO: 31 PG (ref 26.5–33)
MCHC RBC AUTO-ENTMCNC: 32.6 G/DL (ref 31.5–36.5)
MCV RBC AUTO: 95 FL (ref 78–100)
MONOCYTES # BLD AUTO: 0.5 10E9/L (ref 0–1.3)
MONOCYTES NFR BLD AUTO: 7 %
NEUTROPHILS # BLD AUTO: 4.7 10E9/L (ref 1.6–8.3)
NEUTROPHILS NFR BLD AUTO: 73.1 %
NRBC # BLD AUTO: 0 10*3/UL
NRBC BLD AUTO-RTO: 0 /100
PLATELET # BLD AUTO: 200 10E9/L (ref 150–450)
POTASSIUM SERPL-SCNC: 4.6 MMOL/L (ref 3.4–5.3)
PROT SERPL-MCNC: 7.2 G/DL (ref 6.8–8.8)
RBC # BLD AUTO: 4.67 10E12/L (ref 4.4–5.9)
SODIUM SERPL-SCNC: 140 MMOL/L (ref 133–144)
WBC # BLD AUTO: 6.4 10E9/L (ref 4–11)

## 2020-06-18 PROCEDURE — 25800030 ZZH RX IP 258 OP 636: Mod: ZF | Performed by: NURSE PRACTITIONER

## 2020-06-18 PROCEDURE — 99214 OFFICE O/P EST MOD 30 MIN: CPT | Mod: 95 | Performed by: NURSE PRACTITIONER

## 2020-06-18 PROCEDURE — 85025 COMPLETE CBC W/AUTO DIFF WBC: CPT | Performed by: NURSE PRACTITIONER

## 2020-06-18 PROCEDURE — 25000132 ZZH RX MED GY IP 250 OP 250 PS 637: Mod: GY,ZF | Performed by: NURSE PRACTITIONER

## 2020-06-18 PROCEDURE — 25000128 H RX IP 250 OP 636: Mod: ZF | Performed by: NURSE PRACTITIONER

## 2020-06-18 PROCEDURE — 96413 CHEMO IV INFUSION 1 HR: CPT

## 2020-06-18 PROCEDURE — 80053 COMPREHEN METABOLIC PANEL: CPT | Performed by: NURSE PRACTITIONER

## 2020-06-18 RX ORDER — ACETAMINOPHEN 325 MG/1
650 TABLET ORAL ONCE
Status: CANCELLED | OUTPATIENT
Start: 2020-06-18

## 2020-06-18 RX ORDER — METHYLPREDNISOLONE SODIUM SUCCINATE 125 MG/2ML
125 INJECTION, POWDER, LYOPHILIZED, FOR SOLUTION INTRAMUSCULAR; INTRAVENOUS
Status: CANCELLED
Start: 2020-06-18

## 2020-06-18 RX ORDER — ACETAMINOPHEN 325 MG/1
650 TABLET ORAL ONCE
Status: COMPLETED | OUTPATIENT
Start: 2020-06-18 | End: 2020-06-18

## 2020-06-18 RX ORDER — ALBUTEROL SULFATE 90 UG/1
1-2 AEROSOL, METERED RESPIRATORY (INHALATION)
Status: CANCELLED
Start: 2020-06-18

## 2020-06-18 RX ORDER — DIPHENHYDRAMINE HCL 25 MG
50 CAPSULE ORAL ONCE
Status: COMPLETED | OUTPATIENT
Start: 2020-06-18 | End: 2020-06-18

## 2020-06-18 RX ORDER — EPINEPHRINE 1 MG/ML
0.3 INJECTION, SOLUTION INTRAMUSCULAR; SUBCUTANEOUS EVERY 5 MIN PRN
Status: CANCELLED | OUTPATIENT
Start: 2020-06-18

## 2020-06-18 RX ORDER — SODIUM CHLORIDE 9 MG/ML
1000 INJECTION, SOLUTION INTRAVENOUS CONTINUOUS PRN
Status: CANCELLED
Start: 2020-06-18

## 2020-06-18 RX ORDER — DIPHENHYDRAMINE HCL 25 MG
50 CAPSULE ORAL ONCE
Status: CANCELLED | OUTPATIENT
Start: 2020-06-18

## 2020-06-18 RX ORDER — DIPHENHYDRAMINE HYDROCHLORIDE 50 MG/ML
50 INJECTION INTRAMUSCULAR; INTRAVENOUS
Status: CANCELLED
Start: 2020-06-18

## 2020-06-18 RX ORDER — EPINEPHRINE 0.3 MG/.3ML
0.3 INJECTION SUBCUTANEOUS EVERY 5 MIN PRN
Status: CANCELLED | OUTPATIENT
Start: 2020-06-18

## 2020-06-18 RX ORDER — MEPERIDINE HYDROCHLORIDE 25 MG/ML
25 INJECTION INTRAMUSCULAR; INTRAVENOUS; SUBCUTANEOUS EVERY 30 MIN PRN
Status: CANCELLED | OUTPATIENT
Start: 2020-06-18

## 2020-06-18 RX ORDER — MEPERIDINE HYDROCHLORIDE 25 MG/ML
25 INJECTION INTRAMUSCULAR; INTRAVENOUS; SUBCUTANEOUS
Status: CANCELLED
Start: 2020-06-18

## 2020-06-18 RX ORDER — ALBUTEROL SULFATE 0.83 MG/ML
2.5 SOLUTION RESPIRATORY (INHALATION)
Status: CANCELLED | OUTPATIENT
Start: 2020-06-18

## 2020-06-18 RX ADMIN — SODIUM CHLORIDE 250 ML: 9 INJECTION, SOLUTION INTRAVENOUS at 08:49

## 2020-06-18 RX ADMIN — RITUXIMAB 800 MG: 10 INJECTION, SOLUTION INTRAVENOUS at 09:25

## 2020-06-18 RX ADMIN — ACETAMINOPHEN 650 MG: 325 TABLET ORAL at 08:49

## 2020-06-18 RX ADMIN — DIPHENHYDRAMINE HYDROCHLORIDE 50 MG: 25 CAPSULE ORAL at 08:49

## 2020-06-18 ASSESSMENT — PAIN SCALES - GENERAL
PAINLEVEL: NO PAIN (0)
PAINLEVEL: NO PAIN (0)

## 2020-06-18 NOTE — LETTER
"    6/18/2020         RE: Zack Demarco  [address] [facility] [address]        Dear Colleague,    Thank you for referring your patient, Zack Demarco, to the [facility]. Please see a copy of my visit note below.    Zack Demarco is a 79 year old male who is being evaluated via a billable video visit.      The patient has been notified of following:     \"This video visit will be conducted via a call between you and your physician/provider. We have found that certain health care needs can be provided without the need for an in-person physical exam.  This service lets us provide the care you need with a video conversation.  If a prescription is necessary we can send it directly to your pharmacy.  If lab work is needed we can place an order for that and you can then stop by our lab to have the test done at a later time.    Video visits are billed at different rates depending on your insurance coverage.  Please reach out to your insurance provider with any questions.    If during the course of the call the physician/provider feels a video visit is not appropriate, you will not be charged for this service.\"    Patient has given verbal consent for Video visit? Yes    Will anyone else be joining your video visit? No      [employer]  [phone]  (Pt did not have [employer] phillip and wanted to use [employer])    I have reviewed and updated the patient's allergies and medication list.    Concerns: No  Refills: No    Silvia Das LPN        Video-Visit Details    Type of service:  Video Visit    Video Start Time: 0815  Video End Time: 0826      Originating Location (pt. Location): Other clinic    Distant Location (provider location):  [facility]     Platform used for Video Visit: SEE Forge    Queenie Arora CNP/Elvira Orr CNP        Reason for Visit: seen in follow up for follicular lymphoma    Oncology HPI:   Zack Demarco is a 79 year old male first seen in consultation on " 12/27/2017 for a new diagnosis of follicular lymphoma. He was diagnosed incidental to an evaluation for cardiac bypass surgery in 11/2017. A chest CT scan on 11/14 showed an unexpected retroperitoneal mass with surrounding lymphadenopathy suspicious for malignancy.  A further CT scan done on the same day showed a 2.3 x 7.2 x 6.1 retroperitoneal soft tissue mass with adjacent lymphadenopathy that mildly narrowed the left renal vein. There also was nodularity within the mesentery and an enlarged hilar lymph node. CT-guided biopsy of the retroperitoneal mass on 12/12/2017 established the diagnosis, but the sample was too small for adequate grading. There was no disease identified outside of the abdomen; a bone marrow biopsy was not obtained as part of staging.      Relative to cardiac issues, he underwent an uncomplicated 3-vessel coronary artery bypass graft on 11/22/2017 and, subsequently, has been symptom-free. He follows       With the renal and gonadal vein compression it was decided to start treatment with rituximab, alone. Mr. Demarco completed 4 weekly doses from 01/26 - 02/16/2018. He had no difficulty with the treatment and was able to receive rapid infusion (90 minutes) for the last 3 doses. Interval evaluation on 03/05/2018 with an US, suggested improvement. A CT scan on 04/09/2018 showed substantial regression. Repeated CT on 07/09/18 showed a good response but residual lymphoma around the renal veins. Decided to proceed weekly rituximab x4 (7/26-8/22/2018).  He started rituximab maintenance in 10/2018.     Subsequent CT scans on 10/16/2018 and 4/16/2019 and 12/17/19 showed stable findings.    Interval history: Zack is overall feeling well today. Still notes some decreased energy but overall can tolerate activity. He uses his incentive spirometer at home. He is getting his knee replaced 6/29 and looking forward to more activity after. Blood pressure is elevated, he forgot to take his losartan this morning.  His pulse is also low. He takes his blood pressure at home sometimes and usually runs in the 130s systolic. He states it is not abnormal for his heart rate to be in the 40's. He follows with cardiology.     No cough, sob, congestion. No cp, palpitations. No headache or dizziness. No new sweats/lumps/bumps.     Current Outpatient Medications   Medication Sig Dispense Refill     aspirin 325 MG EC tablet 1/2 tab daily       atorvastatin (LIPITOR) 40 MG tablet Take 1 tablet (40 mg) by mouth daily 60 tablet 11     Cholecalciferol (VITAMIN D3 PO) Take by mouth daily       latanoprost (XALATAN) 0.005 % ophthalmic solution Place 1 drop into both eyes At Bedtime 3 Bottle 4     levothyroxine (SYNTHROID/LEVOTHROID) 88 MCG tablet Take 1 tablet (88 mcg) by mouth daily 90 tablet 3     losartan (COZAAR) 50 MG tablet Take 1 tablet (50 mg) by mouth daily 90 tablet 3     metoprolol tartrate (LOPRESSOR) 25 MG tablet Take 1 tablet (25 mg) by mouth 2 times daily 180 tablet 3     timolol maleate (TIMOPTIC) 0.5 % ophthalmic solution Place 1 drop Into the left eye every morning 5 mL 11        No Known Allergies    Vital Signs 6/18/2020   Systolic 171   Diastolic 65   Pulse 46   Temperature 96.4   Respirations 16   Weight (LB) 224 lb 6.4 oz   Height    BMI (Calculated)    Pain 0   O2 97     Recheck   Vital Signs 6/18/2020   Systolic 152   Diastolic 69   Pulse 66       Video physical exam  General: Patient appears well in no acute distress.   Skin: No visualized rash or lesions on visualized skin  Eyes: EOMI, no erythema, sclera icterus or discharge noted  Resp: Appears to be breathing comfortably without accessory muscle usage, speaking in full sentences, no cough  MSK: Appears to have normal range of motion based on visualized movements  Neurologic: No apparent tremors, facial movements symmetric  Psych: affect engaging, alert and oriented  The rest of a comprehensive physical examination is deferred due to PHE (public health emergency)  "video restrictions\"           Weight 101.6 kg (224 lb).  Wt Readings from Last 4 Encounters:   06/18/20 101.8 kg (224 lb 6.4 oz)   06/18/20 101.6 kg (224 lb)   04/16/20 104.6 kg (230 lb 11.2 oz)   02/18/20 100.3 kg (221 lb 3.2 oz)       Labs:    Ref. Range 6/18/2020 07:38   Sodium Latest Ref Range: 133 - 144 mmol/L 140   Potassium Latest Ref Range: 3.4 - 5.3 mmol/L 4.6   Chloride Latest Ref Range: 94 - 109 mmol/L 110 (H)   Carbon Dioxide Latest Ref Range: 20 - 32 mmol/L 25   Urea Nitrogen Latest Ref Range: 7 - 30 mg/dL 24   Creatinine Latest Ref Range: 0.66 - 1.25 mg/dL 0.88   GFR Estimate Latest Ref Range: >60 mL/min/1.73_m2 82   GFR Estimate If Black Latest Ref Range: >60 mL/min/1.73_m2 >90   Calcium Latest Ref Range: 8.5 - 10.1 mg/dL 8.8   Anion Gap Latest Ref Range: 3 - 14 mmol/L 5   Albumin Latest Ref Range: 3.4 - 5.0 g/dL 3.7   Protein Total Latest Ref Range: 6.8 - 8.8 g/dL 7.2   Bilirubin Total Latest Ref Range: 0.2 - 1.3 mg/dL 0.5   Alkaline Phosphatase Latest Ref Range: 40 - 150 U/L 72   ALT Latest Ref Range: 0 - 70 U/L 37   AST Latest Ref Range: 0 - 45 U/L 22   Glucose Latest Ref Range: 70 - 99 mg/dL 110 (H)      Ref. Range 6/18/2020 07:38   WBC Latest Ref Range: 4.0 - 11.0 10e9/L 6.4   Hemoglobin Latest Ref Range: 13.3 - 17.7 g/dL 14.5   Hematocrit Latest Ref Range: 40.0 - 53.0 % 44.5   Platelet Count Latest Ref Range: 150 - 450 10e9/L 200   RBC Count Latest Ref Range: 4.4 - 5.9 10e12/L 4.67   MCV Latest Ref Range: 78 - 100 fl 95   MCH Latest Ref Range: 26.5 - 33.0 pg 31.0   MCHC Latest Ref Range: 31.5 - 36.5 g/dL 32.6   RDW Latest Ref Range: 10.0 - 15.0 % 12.5   Diff Method Unknown Automated Method   % Neutrophils Latest Units: % 73.1   % Lymphocytes Latest Units: % 15.2   % Monocytes Latest Units: % 7.0   % Eosinophils Latest Units: % 4.1   % Basophils Latest Units: % 0.3   % Immature Granulocytes Latest Units: % 0.3   Nucleated RBCs Latest Ref Range: 0 /100 0   Absolute Neutrophil Latest Ref Range: 1.6 " - 8.3 10e9/L 4.7   Absolute Lymphocytes Latest Ref Range: 0.8 - 5.3 10e9/L 1.0   Absolute Monocytes Latest Ref Range: 0.0 - 1.3 10e9/L 0.5   Absolute Eosinophils Latest Ref Range: 0.0 - 0.7 10e9/L 0.3   Absolute Basophils Latest Ref Range: 0.0 - 0.2 10e9/L 0.0   Abs Immature Granulocytes Latest Ref Range: 0 - 0.4 10e9/L 0.0   Absolute Nucleated RBC Unknown 0.0        Imaging: n/a    Impression/plan:   1. Follicular lymphoma, stage IIA. Nothing in clinical history to suggest progression. Tolerating treatment well.   -Continue on maintenance Rituximab, cycle 11 today. Scheduled for visit and labs prior to Cycle 12 infusion in 2 months. Discussed follow up imaging in December 2020 and visit with Dr. Rouse after completion of maintenance in October of this year.     2. Hypertension. Zack forgot to take his losartan this morning which I suspect is contributing to the elevated pressure today in clinic. Historically he has had an elevated pressure in clinic but reports normal levels at home. I asked he mention this to his cardiologist who he is due to see this August. Recheck of his blood pressure was lower; 155/69    3.CAD. Stable. Again, following up with cardiology in August    4. Hypothyroidism. No new symptoms. PCP follows    5. Degeneration of R knee. Replacement rescheduled from May for June 29, 2020. Okay from an oncology standpoint to proceed.       The patient was seen in conjunction with Queenie Arora CNP  who served as a scribe for today's visit. I have reviewed the note and agree with the above findings and plan. TW      Again, thank you for allowing me to participate in the care of your patient.        Sincerely,        HOLLY Draper CNP

## 2020-06-18 NOTE — PATIENT INSTRUCTIONS
Contact Numbers  [facility]: [phone]    After Hours:  [phone]  Triage: [phone]    Please call the [facility] Triage line if you experience a temperature greater than or equal to 100.5, shaking chills, have uncontrolled nausea, vomiting and/or diarrhea, dizziness, shortness of breath, chest pain, bleeding, unexplained bruising, or if you have any other new/concerning symptoms, questions or concerns.     If it is after hours, weekends, or holidays, please call the main hospital  at  [phone] and ask to speak to the Oncology doctor on call.     If you are having any concerning symptoms or wish to speak to a provider before your next infusion visit, please call your care coordinator or triage to notify them so we can adequately serve you.     If you need a refill on a narcotic prescription or other medication, please call triage before your infusion appointment.

## 2020-06-18 NOTE — NURSING NOTE
Chief Complaint   Patient presents with     Blood Draw     labs drawn with piv start by rn.  vs taken     Labs drawn with PIV start by rn.  Pt tolerated well.  VS taken.    Maria Luisa Lyman RN

## 2020-06-18 NOTE — PROGRESS NOTES
"Zack Demarco is a 79 year old male who is being evaluated via a billable video visit.      The patient has been notified of following:     \"This video visit will be conducted via a call between you and your physician/provider. We have found that certain health care needs can be provided without the need for an in-person physical exam.  This service lets us provide the care you need with a video conversation.  If a prescription is necessary we can send it directly to your pharmacy.  If lab work is needed we can place an order for that and you can then stop by our lab to have the test done at a later time.    Video visits are billed at different rates depending on your insurance coverage.  Please reach out to your insurance provider with any questions.    If during the course of the call the physician/provider feels a video visit is not appropriate, you will not be charged for this service.\"    Patient has given verbal consent for Video visit? Yes    Will anyone else be joining your video visit? No      [employer]  [phone]  (Pt did not have [employer] and wanted to use [employer])    I have reviewed and updated the patient's allergies and medication list.    Concerns: No  Refills: No    Silvia Das LPN        Video-Visit Details    Type of service:  Video Visit    Video Start Time: 0815  Video End Time: 0826      Originating Location (pt. Location): Other clinic    Distant Location (provider location):  [facility]     Platform used for Video Visit: [employer]    Queenie Arora CNP/Elvira Orr CNP        Reason for Visit: seen in follow up for follicular lymphoma    Oncology HPI:   Zack Demarco is a 79 year old male first seen in consultation on 12/27/2017 for a new diagnosis of follicular lymphoma. He was diagnosed incidental to an evaluation for cardiac bypass surgery in 11/2017. A chest CT scan on 11/14 showed an unexpected retroperitoneal mass with surrounding lymphadenopathy suspicious " for malignancy.  A further CT scan done on the same day showed a 2.3 x 7.2 x 6.1 retroperitoneal soft tissue mass with adjacent lymphadenopathy that mildly narrowed the left renal vein. There also was nodularity within the mesentery and an enlarged hilar lymph node. CT-guided biopsy of the retroperitoneal mass on 12/12/2017 established the diagnosis, but the sample was too small for adequate grading. There was no disease identified outside of the abdomen; a bone marrow biopsy was not obtained as part of staging.      Relative to cardiac issues, he underwent an uncomplicated 3-vessel coronary artery bypass graft on 11/22/2017 and, subsequently, has been symptom-free. He follows       With the renal and gonadal vein compression it was decided to start treatment with rituximab, alone. Mr. Demarco completed 4 weekly doses from 01/26 - 02/16/2018. He had no difficulty with the treatment and was able to receive rapid infusion (90 minutes) for the last 3 doses. Interval evaluation on 03/05/2018 with an US, suggested improvement. A CT scan on 04/09/2018 showed substantial regression. Repeated CT on 07/09/18 showed a good response but residual lymphoma around the renal veins. Decided to proceed weekly rituximab x4 (7/26-8/22/2018).  He started rituximab maintenance in 10/2018.     Subsequent CT scans on 10/16/2018 and 4/16/2019 and 12/17/19 showed stable findings.    Interval history: Zack is overall feeling well today. Still notes some decreased energy but overall can tolerate activity. He uses his incentive spirometer at home. He is getting his knee replaced 6/29 and looking forward to more activity after. Blood pressure is elevated, he forgot to take his losartan this morning. His pulse is also low. He takes his blood pressure at home sometimes and usually runs in the 130s systolic. He states it is not abnormal for his heart rate to be in the 40's. He follows with cardiology.     No cough, sob, congestion. No cp,  "palpitations. No headache or dizziness. No new sweats/lumps/bumps.     Current Outpatient Medications   Medication Sig Dispense Refill     aspirin 325 MG EC tablet 1/2 tab daily       atorvastatin (LIPITOR) 40 MG tablet Take 1 tablet (40 mg) by mouth daily 60 tablet 11     Cholecalciferol (VITAMIN D3 PO) Take by mouth daily       latanoprost (XALATAN) 0.005 % ophthalmic solution Place 1 drop into both eyes At Bedtime 3 Bottle 4     levothyroxine (SYNTHROID/LEVOTHROID) 88 MCG tablet Take 1 tablet (88 mcg) by mouth daily 90 tablet 3     losartan (COZAAR) 50 MG tablet Take 1 tablet (50 mg) by mouth daily 90 tablet 3     metoprolol tartrate (LOPRESSOR) 25 MG tablet Take 1 tablet (25 mg) by mouth 2 times daily 180 tablet 3     timolol maleate (TIMOPTIC) 0.5 % ophthalmic solution Place 1 drop Into the left eye every morning 5 mL 11        No Known Allergies    Vital Signs 6/18/2020   Systolic 171   Diastolic 65   Pulse 46   Temperature 96.4   Respirations 16   Weight (LB) 224 lb 6.4 oz   Height    BMI (Calculated)    Pain 0   O2 97     Recheck   Vital Signs 6/18/2020   Systolic 152   Diastolic 69   Pulse 66       Video physical exam  General: Patient appears well in no acute distress.   Skin: No visualized rash or lesions on visualized skin  Eyes: EOMI, no erythema, sclera icterus or discharge noted  Resp: Appears to be breathing comfortably without accessory muscle usage, speaking in full sentences, no cough  MSK: Appears to have normal range of motion based on visualized movements  Neurologic: No apparent tremors, facial movements symmetric  Psych: affect engaging, alert and oriented  The rest of a comprehensive physical examination is deferred due to PHE (public health emergency) video restrictions\"           Weight 101.6 kg (224 lb).  Wt Readings from Last 4 Encounters:   06/18/20 101.8 kg (224 lb 6.4 oz)   06/18/20 101.6 kg (224 lb)   04/16/20 104.6 kg (230 lb 11.2 oz)   02/18/20 100.3 kg (221 lb 3.2 oz)       Labs: "    Ref. Range 6/18/2020 07:38   Sodium Latest Ref Range: 133 - 144 mmol/L 140   Potassium Latest Ref Range: 3.4 - 5.3 mmol/L 4.6   Chloride Latest Ref Range: 94 - 109 mmol/L 110 (H)   Carbon Dioxide Latest Ref Range: 20 - 32 mmol/L 25   Urea Nitrogen Latest Ref Range: 7 - 30 mg/dL 24   Creatinine Latest Ref Range: 0.66 - 1.25 mg/dL 0.88   GFR Estimate Latest Ref Range: >60 mL/min/1.73_m2 82   GFR Estimate If Black Latest Ref Range: >60 mL/min/1.73_m2 >90   Calcium Latest Ref Range: 8.5 - 10.1 mg/dL 8.8   Anion Gap Latest Ref Range: 3 - 14 mmol/L 5   Albumin Latest Ref Range: 3.4 - 5.0 g/dL 3.7   Protein Total Latest Ref Range: 6.8 - 8.8 g/dL 7.2   Bilirubin Total Latest Ref Range: 0.2 - 1.3 mg/dL 0.5   Alkaline Phosphatase Latest Ref Range: 40 - 150 U/L 72   ALT Latest Ref Range: 0 - 70 U/L 37   AST Latest Ref Range: 0 - 45 U/L 22   Glucose Latest Ref Range: 70 - 99 mg/dL 110 (H)      Ref. Range 6/18/2020 07:38   WBC Latest Ref Range: 4.0 - 11.0 10e9/L 6.4   Hemoglobin Latest Ref Range: 13.3 - 17.7 g/dL 14.5   Hematocrit Latest Ref Range: 40.0 - 53.0 % 44.5   Platelet Count Latest Ref Range: 150 - 450 10e9/L 200   RBC Count Latest Ref Range: 4.4 - 5.9 10e12/L 4.67   MCV Latest Ref Range: 78 - 100 fl 95   MCH Latest Ref Range: 26.5 - 33.0 pg 31.0   MCHC Latest Ref Range: 31.5 - 36.5 g/dL 32.6   RDW Latest Ref Range: 10.0 - 15.0 % 12.5   Diff Method Unknown Automated Method   % Neutrophils Latest Units: % 73.1   % Lymphocytes Latest Units: % 15.2   % Monocytes Latest Units: % 7.0   % Eosinophils Latest Units: % 4.1   % Basophils Latest Units: % 0.3   % Immature Granulocytes Latest Units: % 0.3   Nucleated RBCs Latest Ref Range: 0 /100 0   Absolute Neutrophil Latest Ref Range: 1.6 - 8.3 10e9/L 4.7   Absolute Lymphocytes Latest Ref Range: 0.8 - 5.3 10e9/L 1.0   Absolute Monocytes Latest Ref Range: 0.0 - 1.3 10e9/L 0.5   Absolute Eosinophils Latest Ref Range: 0.0 - 0.7 10e9/L 0.3   Absolute Basophils Latest Ref Range:  0.0 - 0.2 10e9/L 0.0   Abs Immature Granulocytes Latest Ref Range: 0 - 0.4 10e9/L 0.0   Absolute Nucleated RBC Unknown 0.0        Imaging: n/a    Impression/plan:   1. Follicular lymphoma, stage IIA. Nothing in clinical history to suggest progression. Tolerating treatment well.   -Continue on maintenance Rituximab, cycle 11 today. Scheduled for visit and labs prior to Cycle 12 infusion in 2 months. Discussed follow up imaging in December 2020 and visit with Dr. Rouse after completion of maintenance in October of this year.     2. Hypertension. Zack forgot to take his losartan this morning which I suspect is contributing to the elevated pressure today in clinic. Historically he has had an elevated pressure in clinic but reports normal levels at home. I asked he mention this to his cardiologist who he is due to see this August. Recheck of his blood pressure was lower; 155/69    3.CAD. Stable. Again, following up with cardiology in August    4. Hypothyroidism. No new symptoms. PCP follows    5. Degeneration of R knee. Replacement rescheduled from May for June 29, 2020. Okay from an oncology standpoint to proceed.       The patient was seen in conjunction with Queenie Arora CNP  who served as a scribe for today's visit. I have reviewed the note and agree with the above findings and plan. TW

## 2020-06-18 NOTE — PROGRESS NOTES
Infusion Nursing Note:  Zack Demarco presents today for C11 Rapid Rituxan.    Patient seen by provider today: Yes: Elvira Orr DNP   present during visit today: Not Applicable.    Note: Pt saw provider prior to infusion, ok for treatment.    Intravenous Access:  Peripheral IV placed in lab .    Treatment Conditions:  Lab Results   Component Value Date    HGB 14.5 06/18/2020     Lab Results   Component Value Date    WBC 6.4 06/18/2020      Lab Results   Component Value Date    ANEU 4.7 06/18/2020     Lab Results   Component Value Date     06/18/2020      Lab Results   Component Value Date     06/18/2020                   Lab Results   Component Value Date    POTASSIUM 4.6 06/18/2020           Lab Results   Component Value Date    MAG 2.2 11/27/2017            Lab Results   Component Value Date    CR 0.88 06/18/2020                   Lab Results   Component Value Date    ELVIRA 8.8 06/18/2020                Lab Results   Component Value Date    BILITOTAL 0.5 06/18/2020           Lab Results   Component Value Date    ALBUMIN 3.7 06/18/2020                    Lab Results   Component Value Date    ALT 37 06/18/2020           Lab Results   Component Value Date    AST 22 06/18/2020       Results reviewed, labs MET treatment parameters, ok to proceed with treatment.      Post Infusion Assessment:  Patient tolerated infusion without incident.  Blood return noted pre and post infusion.  Site patent and intact, free from redness, edema or discomfort.  No evidence of extravasations.  Access discontinued per protocol.       Discharge Plan:   Patient declined prescription refills.  Discharge instructions reviewed with: Patient.  Patient and/or family verbalized understanding of discharge instructions and all questions answered.  AVS to patient via VCNCT.  Patient will return 8/20 for next appointment.   Patient discharged in stable condition accompanied by: self.  Departure Mode: Ambulatory.    Willow MONTES  JACK [place]

## 2020-06-19 NOTE — PROGRESS NOTES
[facility]  [address] [facility] [address] [phone]  Dept: [phone]    PRE-OP EVALUATION:  Today's date: 2020    Zack Demarco (: 1940) presents for pre-operative evaluation assessment as requested by Dr. Vázquez,  He requires evaluation and anesthesia risk assessment prior to undergoing surgery/procedure for treatment of right knee pain .  History cad, hypothyroid, htn, follicilic lymphoma and high cholesterol. Had triple bypass 2017and no mi. Seeing cardiology yearly has time told heart was ok. No chest pain or shortness of breath. Good exercise tolerance. Seeing oncology and said ok for surgery. Outside blood pressure reading 130/80. Stopped asa.   No joint replacements in past.   Proposed Surgery/ Procedure: RIGHT TOTAL KNEE ARTHROPLASTY  Date of Surgery/ Procedure: 2020  Time of Surgery/ Procedure: 9:15 am  Hospital/Surgical Facility: [facility]  Fax number for surgical facility: Fax: [phone]  Primary Physician: Anastacio Love  Type of Anesthesia Anticipated: General    Patient has a Health Care Directive or Living Will:  YES in [place] chart    1. YES - Do you have a history of heart attack, stroke, stent, bypass or surgery on an artery in the head, neck, heart or legs?  2. NO - Do you ever have any pain or discomfort in your chest?  3. NO - Do you have a history of  Heart Failure?  4. NO - Are you troubled by shortness of breath when: walking on the level, up a slight hill or at night?  5. NO - Do you currently have a cold, bronchitis or other respiratory infection?  6. NO - Do you have a cough, shortness of breath or wheezing?  7. NO - Do you sometimes get pains in the calves of your legs when you walk?  8. NO - Do you or anyone in your family have previous history of blood clots?  9. NO - Do you or does anyone in your family have a serious bleeding problem such as prolonged bleeding following surgeries or cuts?  10. NO - Have  you ever had problems with anemia or been told to take iron pills?  11. NO - Have you had any abnormal blood loss such as black, tarry or bloody stools, or abnormal vaginal bleeding?  12. NO - Have you ever had a blood transfusion?  13. NO - Have you or any of your relatives ever had problems with anesthesia?  14. NO - Do you have sleep apnea, excessive snoring or daytime drowsiness?  15. NO - Do you have any prosthetic heart valves?  16. NO - Do you have prosthetic joints?  17. NO - Is there any chance that you may be pregnant?      HPI:     HPI related to upcoming procedure: chronic knee pain right      History lymphoma - cbc normal and seeing oncology - ok for surgery per them    CAD - Patient has a longstanding history of moderate-severe CAD. Patient denies recent chest pain or NTG use, denies exercise induced dyspnea or PND. No chest pain or shortness of breath.      HYPERLIPIDEMIA - Patient has a long history of significant Hyperlipidemia requiring medication for treatment with recent good control. Patient reports no problems or side effects with the medication.     HYPERTENSION - Patient has longstanding history of HTN , currently denies any symptoms referable to elevated blood pressure. Specifically denies chest pain, palpitations, dyspnea, orthopnea, PND or peripheral edema. Blood pressure readings have been in normal range. Current medication regimen is as listed below. Patient denies any side effects of medication.     HYPOTHYROIDISM - Patient has a longstanding history of chronic Hypothyroidism. Patient has been doing well, noting no tremor, insomnia, hair loss or changes in skin texture. Continues to take medications as directed, without adverse reactions or side effects.   Lab Results   Component Value Date    TSH 2.03 05/23/2019   .        MEDICAL HISTORY:     Patient Active Problem List    Diagnosis Date Noted     Blurred vision, os 04/16/2020     Priority: Medium     Eyelid lesion 10/27/2019      Priority: Medium     S/P plication of diaphragm 10/17/2019     Priority: Medium     Family history of malignant neoplasm of pancreas 10/17/2019     Priority: Medium     Hearing loss 10/17/2019     Priority: Medium     Tinnitus 10/17/2019     Priority: Medium     Acute appendicitis 06/20/2019     Priority: Medium     Pseudophakia, ou; Yag Caps, os 12/18/2018     Priority: Medium     Hypertension goal BP (blood pressure) < 140/90 05/03/2018     Priority: Medium     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) 12/27/2017     Priority: Medium     Lymphoma, unspecified body region, unspecified lymphoma type (H) 12/15/2017     Priority: Medium     Coronary artery disease involving autologous vein coronary bypass graft without angina pectoris 12/06/2017     Priority: Medium     Coronary artery disease 11/22/2017     Priority: Medium     Retroperitoneal mass 11/20/2017     Priority: Medium     Health Care Home 11/16/2017     Priority: Medium     Status:  Accepted  Care Coordinator:  Sriram Renteria RN    See Letters for [facility] Care Plan  Date:  November 16, 2017         Unstable angina (H) 11/13/2017     Priority: Medium     Macular drusen, bilateral 01/18/2017     Priority: Medium     Hypothyroidism, unspecified type 06/02/2016     Priority: Medium     Borderline glaucoma with ocular hypertension, bilateral - treated 01/15/2016     Priority: Medium     Posterior vitreous detachment, left 01/15/2016     Priority: Medium     Hyperlipidemia LDL goal <130 01/02/2012     Priority: Medium     Arthritis of knee, right 09/06/2011     Priority: Medium     Generalized anxiety disorder 07/28/2009     Priority: Medium     Diagnosis updated by automated process. Provider to review and confirm.       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/28/2009     Priority: Medium     Patellar tendonitis 07/28/2009     Priority: Medium      Past Medical History:   Diagnosis Date      Coronary artery disease 11/22/2017     DJD (degenerative joint disease) of hip     right     Glaucoma      Hernia umbilical      Hypercholesterolemia      Hypertension      Hypothyroidism      Lipoma      Low back pain      Lymphoma, unspecified body region, unspecified lymphoma type (H) 12/15/2017     Nonsenile cataract      Obesity      Unstable angina (H) 11/13/2017     Past Surgical History:   Procedure Laterality Date     BIOPSY  1980's    fatty tissue     BYPASS GRAFT ARTERY CORONARY N/A 11/22/2017    Procedure: BYPASS GRAFT ARTERY CORONARY;  Median Sternotomy, Coronary Artery Bypass Graft x3, Using Left Internal Mammary and  Endoscopic  [employer] of Right Greater Saphenous Vein, On Cardiopulmonary Bypass, Transesophageal Echocardiogram;  Surgeon: Rolando Toro MD;  Location: [facility] OR     CATARACT IOL, RT/LT       CL AFF SURGICAL PATHOLOGY       COLONOSCOPY  2011     COLONOSCOPY WITH CO2 INSUFFLATION N/A 9/19/2016    Procedure: COLONOSCOPY WITH CO2 INSUFFLATION;  Surgeon: Jeffery Flores MD;  Location: [facility] OR     PHACOEMULSIFICATION WITH STANDARD INTRAOCULAR LENS IMPLANT  12/2018;2/2019    left eye; right eye     ROTATOR CUFF REPAIR RT/LT      left     SOFT TISSUE SURGERY  Sept. 2014    [facility] Tendon transfer (Left Ankle)     Current Outpatient Medications   Medication Sig Dispense Refill     aspirin 325 MG EC tablet 1/2 tab daily       atorvastatin (LIPITOR) 40 MG tablet Take 1 tablet (40 mg) by mouth daily 60 tablet 11     Cholecalciferol (VITAMIN D3 PO) Take by mouth daily       latanoprost (XALATAN) 0.005 % ophthalmic solution Place 1 drop into both eyes At Bedtime 3 Bottle 4     levothyroxine (SYNTHROID/LEVOTHROID) 88 MCG tablet Take 1 tablet (88 mcg) by mouth daily 90 tablet 3     losartan (COZAAR) 50 MG tablet Take 1 tablet (50 mg) by mouth daily 90 tablet 3     metoprolol tartrate (LOPRESSOR) 25 MG tablet Take 1 tablet (25 mg) by mouth 2 times daily 180 tablet 3     timolol maleate (TIMOPTIC) 0.5 %  "ophthalmic solution Place 1 drop Into the left eye every morning 5 mL 11     OTC products: Aspirin stopped taking    No Known Allergies   Latex Allergy: NO    Social History     Tobacco Use     Smoking status: Former Smoker     Packs/day: 1.00     Years: 20.00     Pack years: 20.00     Types: Cigarettes     Start date: 1959     Last attempt to quit: 1979     Years since quittin.9     Smokeless tobacco: Former User   Substance Use Topics     Alcohol use: Yes     Alcohol/week: 0.0 standard drinks     Comment: rarely     History   Drug Use No       REVIEW OF SYSTEMS:   CONSTITUTIONAL: NEGATIVE for fever, chills, change in weight  INTEGUMENTARY/SKIN: NEGATIVE for worrisome rashes  EYES: NEGATIVE for vision changes or irritation  ENT/MOUTH: NEGATIVE for ear, mouth and throat problems, some rhinorrhea on flonase  RESP: NEGATIVE for significant cough or SOB  CV: NEGATIVE for chest pain, palpitations or peripheral edema  GI: NEGATIVE for nausea, abdominal pain, heartburn, or change in bowel habits. No black or bloody stools.  : negative for dysuria, hematuria, decreased urinary stream, erectile dysfunction  MUSCULOSKELETAL: NEGATIVE for significant arthralgias or myalgia  NEURO: NEGATIVE for weakness, dizziness or paresthesias  ENDOCRINE: NEGATIVE for temperature intolerance, skin/hair changes  HEME/ALLERGY/IMMUNE: NEGATIVE for bleeding problems  PSYCHIATRIC: NEGATIVE for changes in mood or affect    EXAM:   /69   Pulse 80   Temp 98.6  F (37  C) (Oral)   Ht 1.765 m (5' 9.5\")   Wt 101.2 kg (223 lb 3.2 oz)   BMI 32.49 kg/m    GENERAL APPEARANCE: healthy, alert and no distress  EYES: Eyes grossly normal to inspection, PERRL and conjunctivae and sclerae normal  HENT: ear canals and TM's normal and nose and mouth without ulcers or lesions  NECK: no adenopathy, no asymmetry, masses, or scars and thyroid normal to palpation  RESP: lungs clear to auscultation - no rales, rhonchi or wheezes  CV: regular " rate and rhythm, normal S1 S2, no S3 or S4 and no murmur, click or rub   ABDOMEN: soft, nontender, no HSM or masses and bowel sounds normal  MS: extremities normal- no gross deformities noted  NEURO: Normal strength and tone, sensory exam grossly normal, mentation intact and speech normal  PSYCH: mentation appears normal and affect normal/bright    DIAGNOSTICS:     EKG: appears normal, NSR, normal axis, normal intervals, no acute ST/T changes c/w ischemia, no LVH by voltage criteria, Right Bundle Branch Block, unchanged from previous tracings  Labs Resulted Today:   Results for orders placed or performed in visit on 06/22/20   UA with Microscopic reflex to Culture     Status: None    Specimen: Midstream Urine   Result Value Ref Range    Color Urine Yellow     Appearance Urine Clear     Glucose Urine Negative NEG^Negative mg/dL    Bilirubin Urine Negative NEG^Negative    Ketones Urine Negative NEG^Negative mg/dL    Specific Gravity Urine >1.030 1.003 - 1.035    pH Urine 6.0 5.0 - 7.0 pH    Protein Albumin Urine Negative NEG^Negative mg/dL    Urobilinogen Urine 0.2 0.2 - 1.0 EU/dL    Nitrite Urine Negative NEG^Negative    Blood Urine Negative NEG^Negative    Leukocyte Esterase Urine Negative NEG^Negative    Source Midstream Urine     WBC Urine 0 - 5 OTO5^0 - 5 /HPF    RBC Urine O - 2 OTO2^O - 2 /HPF       Recent Labs   Lab Test 06/18/20  0738 04/16/20  0631  12/12/17  1140  11/24/17  0411  11/23/17  0358   HGB 14.5 14.8   < >  --    < > 8.6*   < > 9.2*    181   < >  --    < > 125*   < > 159   INR  --   --   --  1.2*  --  1.43*  --   --     142   < >  --    < > 144   < > 151*   POTASSIUM 4.6 3.9   < >  --    < > 4.5   < > 3.5   CR 0.88 0.92   < >  --    < > 0.97   < > 1.09   A1C  --   --   --   --   --   --   --  5.8    < > = values in this interval not displayed.        IMPRESSION:   Reason for surgery/procedure: chronic knee pain//TKR right   Diagnosis/reason for consult: cad/lymphoma/htn/high  cholesterol/ fitness for surgery  Ok for surgery. ekg stable. Denies chest pain or shortness of breath. Good exercise tolerance. Ok per oncology but I will forward to his cardiologist  for ok but I don't think there are changes in his cardiology status.   The proposed surgical procedure is considered HIGH risk.    REVISED CARDIAC RISK INDEX  The patient has the following serious cardiovascular risks for perioperative complications such as (MI, PE, VFib and 3  AV Block):  Coronary Artery Disease (MI, positive stress test, angina, Qs on EKG)  INTERPRETATION: 1 risks: Class II (low risk - 0.9% complication rate)    The patient has the following additional risks for perioperative complications:  No identified additional risks      ICD-10-CM    1. Preop general physical exam  Z01.818        RECOMMENDATIONS:     --Consult hospital rounder / IM to assist post-op medical management  --Because of DVT or PE history, patient should be on low molecular weight heparin and wear compression hose after surgery    --Patient is to take all scheduled medications on the day of surgery EXCEPT for modifications listed below. Continue hold asa.     APPROVAL GIVEN to proceed with proposed procedure, without further diagnostic evaluation pending ok with cardiology.      Addendum: I was unable to get a hold of cardiology. Since patient isn't having symptoms I will clear for surgery unless anesthesiology has concerns. Patient denies chest pain or shortness of breath and has decent exercise tolerance.     Signed Electronically by: Jamaal Gonzalez MD    Copy of this evaluation report is provided to requesting physician.    [facility] Preop Guidelines    Revised Cardiac Risk Index

## 2020-06-22 ENCOUNTER — OFFICE VISIT (OUTPATIENT)
Dept: FAMILY MEDICINE | Facility: CLINIC | Age: 80
End: 2020-06-22
Payer: MEDICARE

## 2020-06-22 VITALS
BODY MASS INDEX: 31.95 KG/M2 | DIASTOLIC BLOOD PRESSURE: 69 MMHG | HEIGHT: 70 IN | HEART RATE: 80 BPM | WEIGHT: 223.2 LBS | SYSTOLIC BLOOD PRESSURE: 132 MMHG | TEMPERATURE: 98.6 F

## 2020-06-22 DIAGNOSIS — C85.90 LYMPHOMA, UNSPECIFIED BODY REGION, UNSPECIFIED LYMPHOMA TYPE (H): ICD-10-CM

## 2020-06-22 DIAGNOSIS — G89.29 CHRONIC PAIN OF RIGHT KNEE: ICD-10-CM

## 2020-06-22 DIAGNOSIS — I10 HYPERTENSION GOAL BP (BLOOD PRESSURE) < 140/90: ICD-10-CM

## 2020-06-22 DIAGNOSIS — M25.561 CHRONIC PAIN OF RIGHT KNEE: ICD-10-CM

## 2020-06-22 DIAGNOSIS — I25.810 CORONARY ARTERY DISEASE INVOLVING AUTOLOGOUS VEIN CORONARY BYPASS GRAFT WITHOUT ANGINA PECTORIS: ICD-10-CM

## 2020-06-22 DIAGNOSIS — Z01.818 PREOP GENERAL PHYSICAL EXAM: Primary | ICD-10-CM

## 2020-06-22 LAB
ALBUMIN UR-MCNC: NEGATIVE MG/DL
APPEARANCE UR: CLEAR
BILIRUB UR QL STRIP: NEGATIVE
COLOR UR AUTO: YELLOW
GLUCOSE UR STRIP-MCNC: NEGATIVE MG/DL
HGB UR QL STRIP: NEGATIVE
KETONES UR STRIP-MCNC: NEGATIVE MG/DL
LEUKOCYTE ESTERASE UR QL STRIP: NEGATIVE
NITRATE UR QL: NEGATIVE
PH UR STRIP: 6 PH (ref 5–7)
RBC #/AREA URNS AUTO: NORMAL /HPF
SOURCE: NORMAL
SP GR UR STRIP: >1.03 (ref 1–1.03)
UROBILINOGEN UR STRIP-ACNC: 0.2 EU/DL (ref 0.2–1)
WBC #/AREA URNS AUTO: NORMAL /HPF

## 2020-06-22 PROCEDURE — 93000 ELECTROCARDIOGRAM COMPLETE: CPT | Performed by: FAMILY MEDICINE

## 2020-06-22 PROCEDURE — 99214 OFFICE O/P EST MOD 30 MIN: CPT | Performed by: FAMILY MEDICINE

## 2020-06-22 PROCEDURE — 81001 URINALYSIS AUTO W/SCOPE: CPT | Performed by: FAMILY MEDICINE

## 2020-06-22 ASSESSMENT — MIFFLIN-ST. JEOR: SCORE: 1730.74

## 2020-06-22 NOTE — Clinical Note
. Are you ok with Zack having a total knee replacement. I did and EKG and he isn't having symptoms but not seen you in also one hear. We can postpone surgery if needed to see you. Thanks. Jamaal Gonzalez MD

## 2020-06-23 ENCOUNTER — TELEPHONE (OUTPATIENT)
Dept: FAMILY MEDICINE | Facility: CLINIC | Age: 80
End: 2020-06-23

## 2020-06-23 NOTE — TELEPHONE ENCOUNTER
Please notify. I forwarded on chart to his cardiologist and not herd a response. I don't have and specific concerns about the surgery or his heart but since patient seeing cardiology yearly and not seen in 10 months I thought I'd ask if cardiology had concerns. Jamaal Gonzalez MD

## 2020-06-23 NOTE — TELEPHONE ENCOUNTER
Reason for Call:  Other pre op    Detailed comments: [facility] is calling regarding pre op wondering if provider is sending this to cardiology to clear patient or does patient need to see cardiology.   Patient is scheduled for surgery on Monday 06/29/2020     Phone Number Patient can be reached at: Other phone number:     Best Time: any    Can we leave a detailed message on this number? YES    Call taken on 6/23/2020 at 10:24 AM by Dunia Alvarado

## 2020-06-23 NOTE — TELEPHONE ENCOUNTER
Called and informed Adamaris at [facility], that the chart was routed to cardiology, and have not heard back yet.Radha Lees MA/INOCENCIO

## 2020-06-23 NOTE — TELEPHONE ENCOUNTER
Your note from yesterday-APPROVAL GIVEN to proceed with proposed procedure, without further diagnostic evaluation pending ok with cardiology.     Please advise on [facility] question.Radha Lees MA/TC

## 2020-06-26 ENCOUNTER — TELEPHONE (OUTPATIENT)
Dept: CARDIOLOGY | Facility: CLINIC | Age: 80
End: 2020-06-26

## 2020-06-26 ENCOUNTER — TELEPHONE (OUTPATIENT)
Dept: FAMILY MEDICINE | Facility: CLINIC | Age: 80
End: 2020-06-26

## 2020-06-26 NOTE — TELEPHONE ENCOUNTER
Given Dr. Reyes's pager number to contact about patients pre op information/clearance with Dr. Gonzalez. (see prop notes) - [facility]/Dr. Gonzalez requesting Dr. Reyes addend pre op visit notes if patient cleared.    Patient has surgery Monday so this is a time sensitive request. Clinic provided pager number and writer called Dr. Gonzalez's  back to page Dr. Reyes.     VANESSA

## 2020-06-26 NOTE — TELEPHONE ENCOUNTER
Katy is calling to see if provider will ok the patient for surgery on Monday, stated that patient needed to be cleared by cardiologist     Please call to discuss  Thank you

## 2020-06-26 NOTE — TELEPHONE ENCOUNTER
I haven't got a call yet. Maybe we can get him on the phone and pull me from a room if needed.Jamaal Gonzalez MD

## 2020-06-26 NOTE — TELEPHONE ENCOUNTER
Dr. Gonzalez please addend the Pre Op notes if the Cardiologist does not contact you.  Adamaris from [facility] states that they will need an addendum noted prior to the patients surgery on Monday, 6/29/20.      TC: Adamaris states that we DO NOT need to fax the Pre Op to [facility].  She will look it up online Monday morning.  Cheyanne Weber,

## 2020-06-26 NOTE — TELEPHONE ENCOUNTER
I spoke to Dr. Reyes's office and they will follow up and get back to me regarding Dr. Reyes's opinion.  My direct line given [phone].  Cheyanne Weber,

## 2020-06-26 NOTE — TELEPHONE ENCOUNTER
I paged Dr. Reyes twice but he hasn't called Dr. Gonzalez back yet.  I called [facility] and I asked if they can ask Dr. Reyes to call Dr. Gonzalez directly.  Cell number given.  Dr. Reyes's nurse Lilly is at lunch and will be back shortly.  My direct line given [phone].  Cheyanne Weber,

## 2020-06-26 NOTE — TELEPHONE ENCOUNTER
Can we call 's office and see if he got me message about pre-op clearance and if they have concerns. Thanks.Jamaal Gonzalez MD

## 2020-06-26 NOTE — TELEPHONE ENCOUNTER
Dr. Reyes's office called back and said I should page Dr. Reyes @ his pager #[phone].  I paged Dr. Reyes to call Dr. Gonzalez's cell number.  Routing to provider.  Cheyanne Weber,

## 2020-06-26 NOTE — TELEPHONE ENCOUNTER
Dr Gonzalez from [facility] is asking to speak with Dr Reyes.  Patient has surgery on Monday and he has a few questions.   Phone number is Dr Gonzalez's cell. Please call. If unable please call Cheyanne at [place] [facility] [phone]

## 2020-06-26 NOTE — TELEPHONE ENCOUNTER
No need to fax the Pre Op per Adamaris at [facility].  I spoke to her today and she said that she will look up the addended Pre Op online early Monday morning.  Cheyanne Weber,

## 2020-07-01 ENCOUNTER — PATIENT OUTREACH (OUTPATIENT)
Dept: CARE COORDINATION | Facility: CLINIC | Age: 80
End: 2020-07-01

## 2020-07-01 DIAGNOSIS — Z96.651 STATUS POST TOTAL RIGHT KNEE REPLACEMENT: Primary | ICD-10-CM

## 2020-07-01 NOTE — LETTER
[facility] CARE COORDINATION  [address] [facility] [address]  July 1, 2020    Zack Demarco  [address] [facility] [address]      Dear Zack,    I am a clinic community health worker who works with AALIYAH GARZA MD at [facility]. I wanted to thank you for spending the time to talk with me.  Below is a description of clinic care coordination and how I can further assist you.      The clinic care coordination team is made up of a registered nurse,  and community health worker who understand the health care system. The goal of clinic care coordination is to help you manage your health and improve access to the health care system in the most efficient manner. The team can assist you in meeting your health care goals by providing education, coordinating services, strengthening the communication among your providers and supporting you with any resource needs.    Please feel free to contact me at [phone] with any questions or concerns. We are focused on providing you with the highest-quality healthcare experience possible and that all starts with you.     Sincerely,     Patricia SIEGEL, Community Health Worker  Clinic Care Coordination  [facility] [place] [facility] [address]  Phone: [phone]

## 2020-07-06 ENCOUNTER — MYC REFILL (OUTPATIENT)
Dept: FAMILY MEDICINE | Facility: CLINIC | Age: 80
End: 2020-07-06

## 2020-07-06 DIAGNOSIS — I10 HYPERTENSION GOAL BP (BLOOD PRESSURE) < 140/90: ICD-10-CM

## 2020-07-06 RX ORDER — METOPROLOL TARTRATE 25 MG/1
25 TABLET, FILM COATED ORAL 2 TIMES DAILY
Qty: 180 TABLET | Refills: 3 | Status: SHIPPED | OUTPATIENT
Start: 2020-07-06 | End: 2020-11-19

## 2020-07-10 ENCOUNTER — TRANSFERRED RECORDS (OUTPATIENT)
Dept: HEALTH INFORMATION MANAGEMENT | Facility: CLINIC | Age: 80
End: 2020-07-10

## 2020-07-24 ENCOUNTER — TELEPHONE (OUTPATIENT)
Dept: OPHTHALMOLOGY | Facility: CLINIC | Age: 80
End: 2020-07-24

## 2020-07-24 NOTE — TELEPHONE ENCOUNTER
Called patient to schedule a refraction following his Yag Capsulotomy in April (no future appointments were scheduled due to COVID-19).  He was able to have his glasses prescription updated at a [facility] and his vision is good.  He requests to schedule his Comprehensive Eye Exam.  Appointment scheduled for 11-16-20.

## 2020-08-14 ENCOUNTER — TRANSFERRED RECORDS (OUTPATIENT)
Dept: HEALTH INFORMATION MANAGEMENT | Facility: CLINIC | Age: 80
End: 2020-08-14

## 2020-08-20 ENCOUNTER — VIRTUAL VISIT (OUTPATIENT)
Dept: ONCOLOGY | Facility: CLINIC | Age: 80
End: 2020-08-20
Attending: NURSE PRACTITIONER
Payer: MEDICARE

## 2020-08-20 ENCOUNTER — APPOINTMENT (OUTPATIENT)
Dept: LAB | Facility: CLINIC | Age: 80
End: 2020-08-20
Attending: NURSE PRACTITIONER
Payer: MEDICARE

## 2020-08-20 VITALS
TEMPERATURE: 98.1 F | HEART RATE: 48 BPM | HEIGHT: 69 IN | BODY MASS INDEX: 32.51 KG/M2 | RESPIRATION RATE: 18 BRPM | OXYGEN SATURATION: 97 % | WEIGHT: 219.5 LBS | DIASTOLIC BLOOD PRESSURE: 61 MMHG | SYSTOLIC BLOOD PRESSURE: 138 MMHG

## 2020-08-20 VITALS
OXYGEN SATURATION: 97 % | RESPIRATION RATE: 18 BRPM | SYSTOLIC BLOOD PRESSURE: 138 MMHG | BODY MASS INDEX: 31.96 KG/M2 | TEMPERATURE: 98.1 F | WEIGHT: 219.58 LBS | HEART RATE: 48 BPM | DIASTOLIC BLOOD PRESSURE: 61 MMHG

## 2020-08-20 DIAGNOSIS — C82.99 FOLLICULAR LYMPHOMA OF EXTRANODAL AND SOLID ORGAN SITES (H): Primary | ICD-10-CM

## 2020-08-20 LAB
ALBUMIN SERPL-MCNC: 3.6 G/DL (ref 3.4–5)
ALP SERPL-CCNC: 85 U/L (ref 40–150)
ALT SERPL W P-5'-P-CCNC: 30 U/L (ref 0–70)
ANION GAP SERPL CALCULATED.3IONS-SCNC: 6 MMOL/L (ref 3–14)
AST SERPL W P-5'-P-CCNC: 14 U/L (ref 0–45)
BASOPHILS # BLD AUTO: 0 10E9/L (ref 0–0.2)
BASOPHILS NFR BLD AUTO: 0.3 %
BILIRUB SERPL-MCNC: 0.6 MG/DL (ref 0.2–1.3)
BUN SERPL-MCNC: 30 MG/DL (ref 7–30)
CALCIUM SERPL-MCNC: 8.7 MG/DL (ref 8.5–10.1)
CHLORIDE SERPL-SCNC: 111 MMOL/L (ref 94–109)
CO2 SERPL-SCNC: 25 MMOL/L (ref 20–32)
CREAT SERPL-MCNC: 0.92 MG/DL (ref 0.66–1.25)
DIFFERENTIAL METHOD BLD: NORMAL
EOSINOPHIL # BLD AUTO: 0.2 10E9/L (ref 0–0.7)
EOSINOPHIL NFR BLD AUTO: 3.3 %
ERYTHROCYTE [DISTWIDTH] IN BLOOD BY AUTOMATED COUNT: 12.7 % (ref 10–15)
GFR SERPL CREATININE-BSD FRML MDRD: 79 ML/MIN/{1.73_M2}
GLUCOSE SERPL-MCNC: 112 MG/DL (ref 70–99)
HCT VFR BLD AUTO: 42.3 % (ref 40–53)
HGB BLD-MCNC: 13.8 G/DL (ref 13.3–17.7)
IMM GRANULOCYTES # BLD: 0 10E9/L (ref 0–0.4)
IMM GRANULOCYTES NFR BLD: 0.3 %
LDH SERPL L TO P-CCNC: 154 U/L (ref 85–227)
LYMPHOCYTES # BLD AUTO: 1.1 10E9/L (ref 0.8–5.3)
LYMPHOCYTES NFR BLD AUTO: 17.1 %
MCH RBC QN AUTO: 30.7 PG (ref 26.5–33)
MCHC RBC AUTO-ENTMCNC: 32.6 G/DL (ref 31.5–36.5)
MCV RBC AUTO: 94 FL (ref 78–100)
MONOCYTES # BLD AUTO: 0.5 10E9/L (ref 0–1.3)
MONOCYTES NFR BLD AUTO: 7.3 %
NEUTROPHILS # BLD AUTO: 4.7 10E9/L (ref 1.6–8.3)
NEUTROPHILS NFR BLD AUTO: 71.7 %
NRBC # BLD AUTO: 0 10*3/UL
NRBC BLD AUTO-RTO: 0 /100
PLATELET # BLD AUTO: 215 10E9/L (ref 150–450)
POTASSIUM SERPL-SCNC: 4.4 MMOL/L (ref 3.4–5.3)
PROT SERPL-MCNC: 7.2 G/DL (ref 6.8–8.8)
RBC # BLD AUTO: 4.5 10E12/L (ref 4.4–5.9)
SODIUM SERPL-SCNC: 142 MMOL/L (ref 133–144)
WBC # BLD AUTO: 6.6 10E9/L (ref 4–11)

## 2020-08-20 PROCEDURE — 83615 LACTATE (LD) (LDH) ENZYME: CPT | Performed by: NURSE PRACTITIONER

## 2020-08-20 PROCEDURE — 25000128 H RX IP 250 OP 636: Mod: ZF | Performed by: NURSE PRACTITIONER

## 2020-08-20 PROCEDURE — 80053 COMPREHEN METABOLIC PANEL: CPT | Performed by: NURSE PRACTITIONER

## 2020-08-20 PROCEDURE — G0463 HOSPITAL OUTPT CLINIC VISIT: HCPCS | Mod: ZF

## 2020-08-20 PROCEDURE — 25800030 ZZH RX IP 258 OP 636: Mod: ZF | Performed by: NURSE PRACTITIONER

## 2020-08-20 PROCEDURE — 25000132 ZZH RX MED GY IP 250 OP 250 PS 637: Mod: GY,ZF | Performed by: NURSE PRACTITIONER

## 2020-08-20 PROCEDURE — 96415 CHEMO IV INFUSION ADDL HR: CPT

## 2020-08-20 PROCEDURE — 96413 CHEMO IV INFUSION 1 HR: CPT

## 2020-08-20 PROCEDURE — 85025 COMPLETE CBC W/AUTO DIFF WBC: CPT | Performed by: NURSE PRACTITIONER

## 2020-08-20 PROCEDURE — 99214 OFFICE O/P EST MOD 30 MIN: CPT | Mod: ZP | Performed by: NURSE PRACTITIONER

## 2020-08-20 RX ORDER — METHYLPREDNISOLONE SODIUM SUCCINATE 125 MG/2ML
125 INJECTION, POWDER, LYOPHILIZED, FOR SOLUTION INTRAMUSCULAR; INTRAVENOUS
Status: CANCELLED
Start: 2020-08-20

## 2020-08-20 RX ORDER — DIPHENHYDRAMINE HYDROCHLORIDE 50 MG/ML
50 INJECTION INTRAMUSCULAR; INTRAVENOUS
Status: CANCELLED
Start: 2020-08-20

## 2020-08-20 RX ORDER — MEPERIDINE HYDROCHLORIDE 25 MG/ML
25 INJECTION INTRAMUSCULAR; INTRAVENOUS; SUBCUTANEOUS EVERY 30 MIN PRN
Status: CANCELLED | OUTPATIENT
Start: 2020-08-20

## 2020-08-20 RX ORDER — EPINEPHRINE 0.3 MG/.3ML
0.3 INJECTION SUBCUTANEOUS EVERY 5 MIN PRN
Status: CANCELLED | OUTPATIENT
Start: 2020-08-20

## 2020-08-20 RX ORDER — DIPHENHYDRAMINE HCL 25 MG
50 CAPSULE ORAL ONCE
Status: COMPLETED | OUTPATIENT
Start: 2020-08-20 | End: 2020-08-20

## 2020-08-20 RX ORDER — EPINEPHRINE 1 MG/ML
0.3 INJECTION, SOLUTION INTRAMUSCULAR; SUBCUTANEOUS EVERY 5 MIN PRN
Status: CANCELLED | OUTPATIENT
Start: 2020-08-20

## 2020-08-20 RX ORDER — ALBUTEROL SULFATE 90 UG/1
1-2 AEROSOL, METERED RESPIRATORY (INHALATION)
Status: CANCELLED
Start: 2020-08-20

## 2020-08-20 RX ORDER — ALBUTEROL SULFATE 0.83 MG/ML
2.5 SOLUTION RESPIRATORY (INHALATION)
Status: CANCELLED | OUTPATIENT
Start: 2020-08-20

## 2020-08-20 RX ORDER — SODIUM CHLORIDE 9 MG/ML
1000 INJECTION, SOLUTION INTRAVENOUS CONTINUOUS PRN
Status: CANCELLED
Start: 2020-08-20

## 2020-08-20 RX ORDER — ACETAMINOPHEN 325 MG/1
650 TABLET ORAL ONCE
Status: COMPLETED | OUTPATIENT
Start: 2020-08-20 | End: 2020-08-20

## 2020-08-20 RX ORDER — MEPERIDINE HYDROCHLORIDE 25 MG/ML
25 INJECTION INTRAMUSCULAR; INTRAVENOUS; SUBCUTANEOUS
Status: CANCELLED
Start: 2020-08-20

## 2020-08-20 RX ORDER — ACETAMINOPHEN 325 MG/1
650 TABLET ORAL ONCE
Status: CANCELLED | OUTPATIENT
Start: 2020-08-20

## 2020-08-20 RX ORDER — DIPHENHYDRAMINE HCL 25 MG
50 CAPSULE ORAL ONCE
Status: CANCELLED | OUTPATIENT
Start: 2020-08-20

## 2020-08-20 RX ADMIN — RITUXIMAB 800 MG: 10 INJECTION, SOLUTION INTRAVENOUS at 09:42

## 2020-08-20 RX ADMIN — SODIUM CHLORIDE 250 ML: 9 INJECTION, SOLUTION INTRAVENOUS at 09:42

## 2020-08-20 RX ADMIN — ACETAMINOPHEN 650 MG: 325 TABLET ORAL at 09:09

## 2020-08-20 RX ADMIN — DIPHENHYDRAMINE HYDROCHLORIDE 50 MG: 25 CAPSULE ORAL at 09:09

## 2020-08-20 ASSESSMENT — PAIN SCALES - GENERAL: PAINLEVEL: MILD PAIN (2)

## 2020-08-20 NOTE — PROGRESS NOTES
Infusion Nursing Note:  Zack Demarco presents today for Cycle 12 Day 1 Rapid Rituxan.    Patient seen by provider today: Yes: Elvira Orr NP   present during visit today: Not Applicable.    Note: N/A.    Intravenous Access:  Peripheral IV placed.    Treatment Conditions:  Lab Results   Component Value Date    HGB 13.8 08/20/2020     Lab Results   Component Value Date    WBC 6.6 08/20/2020      Lab Results   Component Value Date    ANEU 4.7 08/20/2020     Lab Results   Component Value Date     08/20/2020      Lab Results   Component Value Date     08/20/2020                   Lab Results   Component Value Date    POTASSIUM 4.4 08/20/2020           Lab Results   Component Value Date    MAG 2.2 11/27/2017            Lab Results   Component Value Date    CR 0.92 08/20/2020                   Lab Results   Component Value Date    ELVIRA 8.7 08/20/2020                Lab Results   Component Value Date    BILITOTAL 0.6 08/20/2020           Lab Results   Component Value Date    ALBUMIN 3.6 08/20/2020                    Lab Results   Component Value Date    ALT 30 08/20/2020           Lab Results   Component Value Date    AST 14 08/20/2020       Results reviewed, labs MET treatment parameters, ok to proceed with treatment.      Post Infusion Assessment:  Patient tolerated infusion without incident.  Blood return noted pre and post infusion.  Site patent and intact, free from redness, edema or discomfort.  No evidence of extravasations.  Access discontinued per protocol.       Discharge Plan:   Patient declined prescription refills.  Discharge instructions reviewed with: Patient.  Patient and/or family verbalized understanding of discharge instructions and all questions answered.  AVS to patient via [employer].  Next appointment not scheduled, [employer] message sent for further scheduling orders to be completed.   Patient discharged in stable condition accompanied by: self.  Departure Mode:  Ambulatory.    Milka Stanley RN

## 2020-08-20 NOTE — NURSING NOTE
Chief Complaint   Patient presents with     Blood Draw     labs drawn via PIV placed by RN in lab     /61 (BP Location: Left arm, Patient Position: Chair, Cuff Size: Adult Large)   Pulse (!) 48   Temp 98.1  F (36.7  C) (Oral)   Resp 18   Wt 99.6 kg (219 lb 8 oz)   SpO2 97%   BMI 31.95 kg/m      PIV placed right upper forearm by RN in lab for infusion and labs. Labs drawn and sent. Pt tolerated well.   Pt checked in for next appointment.    Jacquelyn Paul RN

## 2020-08-20 NOTE — NURSING NOTE
"Oncology Rooming Note    August 20, 2020 8:16 AM   Zack Demarco is a 79 year old male who presents for:    Chief Complaint   Patient presents with     Oncology visit     Follicular Lymphoma     Initial Vitals: /61   Pulse (!) 48   Temp 98.1  F (36.7  C)   Resp 18   Wt 99.6 kg (219 lb 9.3 oz)   SpO2 97%   BMI 31.96 kg/m   Estimated body mass index is 31.96 kg/m  as calculated from the following:    Height as of 6/22/20: 1.765 m (5' 9.5\").    Weight as of this encounter: 99.6 kg (219 lb 9.3 oz). Body surface area is 2.21 meters squared.  Data Unavailable Comment: Data Unavailable   No LMP for male patient.  Allergies reviewed: Yes  Medications reviewed: Yes    Medications: Medication refills not needed today.  Pharmacy name entered into [employer]:    [id number] - [place], [place] - [address] [facility] - [place], [place] - [address]    Clinical concerns: N/A       Ana Aguirre MA            "
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
## 2020-08-20 NOTE — PROGRESS NOTES
"Zack Demarco is a 79 year old male who is being evaluated via a billable video visit.      The patient has been notified of following:     \"This video visit will be conducted via a call between you and your physician/provider. We have found that certain health care needs can be provided without the need for an in-person physical exam.  This service lets us provide the care you need with a video conversation.  If a prescription is necessary we can send it directly to your pharmacy.  If lab work is needed we can place an order for that and you can then stop by our lab to have the test done at a later time.    Video visits are billed at different rates depending on your insurance coverage.  Please reach out to your insurance provider with any questions.    If during the course of the call the physician/provider feels a video visit is not appropriate, you will not be charged for this service.\"    Patient has given verbal consent for Video visit? Yes    How would you like to obtain your AVS? MyChart     If you are dropped from the video visit, the video invite should be resent to: Text to cell phone: [phone]     Will anyone else be joining your video visit? No     {If patient encounters technical issues they should call [phone] :[id number]}     I have reviewed and updated the patient's allergies and medication list. Patient was asked to provide any patient recorded vital signs, height and/or weight.  Please see \"Patient Reported Vital Signs\" tab for that information.        Concerns: Patient has a \"severe\" backache, in the lower-left portion of his back.  He rated it at 2 on a 1-10 scale.       Refills: None       ERIKA Robles      Video-Visit Details    Type of service:  Video Visit    Video Start Time: ***  Video End Time: {video visit start/end time:152948}    Originating Location (pt. Location): {video visit patient location:056291::\"Home\"}    Distant Location (provider location):  [facility] " [facility]     Platform used for Video Visit: {Virtual Visit Platforms:561686::\"Arsenio\"}    {signature options:532877}    *Did not end up with video visit, moved to in person*    "

## 2020-09-22 DIAGNOSIS — C82.99 FOLLICULAR LYMPHOMA OF EXTRANODAL AND SOLID ORGAN SITES (H): Primary | ICD-10-CM

## 2020-09-25 ENCOUNTER — ANCILLARY PROCEDURE (OUTPATIENT)
Dept: CT IMAGING | Facility: CLINIC | Age: 80
End: 2020-09-25
Attending: INTERNAL MEDICINE
Payer: MEDICARE

## 2020-09-25 DIAGNOSIS — C82.99 FOLLICULAR LYMPHOMA OF EXTRANODAL AND SOLID ORGAN SITES (H): ICD-10-CM

## 2020-09-25 LAB
CREAT BLD-MCNC: 0.9 MG/DL (ref 0.66–1.25)
GFR SERPL CREATININE-BSD FRML MDRD: 82 ML/MIN/{1.73_M2}

## 2020-09-25 RX ORDER — IOPAMIDOL 755 MG/ML
134 INJECTION, SOLUTION INTRAVASCULAR ONCE
Status: COMPLETED | OUTPATIENT
Start: 2020-09-25 | End: 2020-09-25

## 2020-09-25 RX ADMIN — IOPAMIDOL 134 ML: 755 INJECTION, SOLUTION INTRAVASCULAR at 11:06

## 2020-10-15 ENCOUNTER — ONCOLOGY VISIT (OUTPATIENT)
Dept: ONCOLOGY | Facility: CLINIC | Age: 80
End: 2020-10-15
Attending: NURSE PRACTITIONER
Payer: MEDICARE

## 2020-10-15 ENCOUNTER — APPOINTMENT (OUTPATIENT)
Dept: LAB | Facility: CLINIC | Age: 80
End: 2020-10-15
Attending: NURSE PRACTITIONER
Payer: MEDICARE

## 2020-10-15 VITALS
OXYGEN SATURATION: 97 % | TEMPERATURE: 98.1 F | WEIGHT: 220.1 LBS | BODY MASS INDEX: 32.23 KG/M2 | RESPIRATION RATE: 16 BRPM | SYSTOLIC BLOOD PRESSURE: 134 MMHG | HEART RATE: 52 BPM | DIASTOLIC BLOOD PRESSURE: 61 MMHG

## 2020-10-15 DIAGNOSIS — I25.709 CORONARY ARTERY DISEASE INVOLVING CORONARY BYPASS GRAFT OF NATIVE HEART WITH ANGINA PECTORIS (H): ICD-10-CM

## 2020-10-15 DIAGNOSIS — E03.9 HYPOTHYROIDISM, UNSPECIFIED TYPE: Primary | ICD-10-CM

## 2020-10-15 DIAGNOSIS — C82.99 FOLLICULAR LYMPHOMA OF EXTRANODAL AND SOLID ORGAN SITES (H): Primary | ICD-10-CM

## 2020-10-15 DIAGNOSIS — C82.99 FOLLICULAR LYMPHOMA OF EXTRANODAL AND SOLID ORGAN SITES (H): ICD-10-CM

## 2020-10-15 DIAGNOSIS — E78.5 HYPERLIPIDEMIA LDL GOAL <100: ICD-10-CM

## 2020-10-15 LAB
ALBUMIN SERPL-MCNC: 3.7 G/DL (ref 3.4–5)
ALP SERPL-CCNC: 77 U/L (ref 40–150)
ALT SERPL W P-5'-P-CCNC: 26 U/L (ref 0–70)
ANION GAP SERPL CALCULATED.3IONS-SCNC: 4 MMOL/L (ref 3–14)
AST SERPL W P-5'-P-CCNC: 15 U/L (ref 0–45)
BASOPHILS # BLD AUTO: 0 10E9/L (ref 0–0.2)
BASOPHILS NFR BLD AUTO: 0.3 %
BILIRUB SERPL-MCNC: 0.5 MG/DL (ref 0.2–1.3)
BUN SERPL-MCNC: 21 MG/DL (ref 7–30)
CALCIUM SERPL-MCNC: 8.9 MG/DL (ref 8.5–10.1)
CHLORIDE SERPL-SCNC: 110 MMOL/L (ref 94–109)
CHOLEST SERPL-MCNC: 120 MG/DL
CO2 SERPL-SCNC: 26 MMOL/L (ref 20–32)
CREAT SERPL-MCNC: 0.81 MG/DL (ref 0.66–1.25)
DIFFERENTIAL METHOD BLD: NORMAL
EOSINOPHIL # BLD AUTO: 0.3 10E9/L (ref 0–0.7)
EOSINOPHIL NFR BLD AUTO: 3.8 %
ERYTHROCYTE [DISTWIDTH] IN BLOOD BY AUTOMATED COUNT: 13.4 % (ref 10–15)
GFR SERPL CREATININE-BSD FRML MDRD: 85 ML/MIN/{1.73_M2}
GLUCOSE SERPL-MCNC: 110 MG/DL (ref 70–99)
HCT VFR BLD AUTO: 44.1 % (ref 40–53)
HDLC SERPL-MCNC: 35 MG/DL
HGB BLD-MCNC: 14 G/DL (ref 13.3–17.7)
IMM GRANULOCYTES # BLD: 0 10E9/L (ref 0–0.4)
IMM GRANULOCYTES NFR BLD: 0.3 %
LDLC SERPL CALC-MCNC: 58 MG/DL
LYMPHOCYTES # BLD AUTO: 0.9 10E9/L (ref 0.8–5.3)
LYMPHOCYTES NFR BLD AUTO: 12.1 %
MCH RBC QN AUTO: 30.5 PG (ref 26.5–33)
MCHC RBC AUTO-ENTMCNC: 31.7 G/DL (ref 31.5–36.5)
MCV RBC AUTO: 96 FL (ref 78–100)
MONOCYTES # BLD AUTO: 0.6 10E9/L (ref 0–1.3)
MONOCYTES NFR BLD AUTO: 7.8 %
NEUTROPHILS # BLD AUTO: 5.5 10E9/L (ref 1.6–8.3)
NEUTROPHILS NFR BLD AUTO: 75.7 %
NONHDLC SERPL-MCNC: 85 MG/DL
NRBC # BLD AUTO: 0 10*3/UL
NRBC BLD AUTO-RTO: 0 /100
PLATELET # BLD AUTO: 211 10E9/L (ref 150–450)
POTASSIUM SERPL-SCNC: 4.1 MMOL/L (ref 3.4–5.3)
PROT SERPL-MCNC: 7.3 G/DL (ref 6.8–8.8)
RBC # BLD AUTO: 4.59 10E12/L (ref 4.4–5.9)
SODIUM SERPL-SCNC: 141 MMOL/L (ref 133–144)
T4 FREE SERPL-MCNC: 0.85 NG/DL (ref 0.76–1.46)
TRIGL SERPL-MCNC: 135 MG/DL
TSH SERPL DL<=0.005 MIU/L-ACNC: 4.85 MU/L (ref 0.4–4)
WBC # BLD AUTO: 7.3 10E9/L (ref 4–11)

## 2020-10-15 PROCEDURE — 80061 LIPID PANEL: CPT | Performed by: NURSE PRACTITIONER

## 2020-10-15 PROCEDURE — 80053 COMPREHEN METABOLIC PANEL: CPT | Performed by: NURSE PRACTITIONER

## 2020-10-15 PROCEDURE — 96413 CHEMO IV INFUSION 1 HR: CPT

## 2020-10-15 PROCEDURE — 258N000003 HC RX IP 258 OP 636: Performed by: NURSE PRACTITIONER

## 2020-10-15 PROCEDURE — 96415 CHEMO IV INFUSION ADDL HR: CPT

## 2020-10-15 PROCEDURE — 99207 PR NO CHARGE LOS: CPT

## 2020-10-15 PROCEDURE — 250N000013 HC RX MED GY IP 250 OP 250 PS 637: Mod: GY | Performed by: NURSE PRACTITIONER

## 2020-10-15 PROCEDURE — 85025 COMPLETE CBC W/AUTO DIFF WBC: CPT | Performed by: NURSE PRACTITIONER

## 2020-10-15 PROCEDURE — 84439 ASSAY OF FREE THYROXINE: CPT | Performed by: NURSE PRACTITIONER

## 2020-10-15 PROCEDURE — G0463 HOSPITAL OUTPT CLINIC VISIT: HCPCS

## 2020-10-15 PROCEDURE — 36415 COLL VENOUS BLD VENIPUNCTURE: CPT

## 2020-10-15 PROCEDURE — 99214 OFFICE O/P EST MOD 30 MIN: CPT | Performed by: NURSE PRACTITIONER

## 2020-10-15 PROCEDURE — 84443 ASSAY THYROID STIM HORMONE: CPT | Performed by: NURSE PRACTITIONER

## 2020-10-15 PROCEDURE — 250N000011 HC RX IP 250 OP 636: Performed by: NURSE PRACTITIONER

## 2020-10-15 RX ORDER — MEPERIDINE HYDROCHLORIDE 25 MG/ML
25 INJECTION INTRAMUSCULAR; INTRAVENOUS; SUBCUTANEOUS EVERY 30 MIN PRN
Status: CANCELLED | OUTPATIENT
Start: 2020-10-15

## 2020-10-15 RX ORDER — SODIUM CHLORIDE 9 MG/ML
1000 INJECTION, SOLUTION INTRAVENOUS CONTINUOUS PRN
Status: CANCELLED
Start: 2020-10-15

## 2020-10-15 RX ORDER — MEPERIDINE HYDROCHLORIDE 25 MG/ML
25 INJECTION INTRAMUSCULAR; INTRAVENOUS; SUBCUTANEOUS
Status: CANCELLED
Start: 2020-10-15

## 2020-10-15 RX ORDER — EPINEPHRINE 1 MG/ML
0.3 INJECTION, SOLUTION INTRAMUSCULAR; SUBCUTANEOUS EVERY 5 MIN PRN
Status: CANCELLED | OUTPATIENT
Start: 2020-10-15

## 2020-10-15 RX ORDER — ACETAMINOPHEN 325 MG/1
650 TABLET ORAL ONCE
Status: CANCELLED | OUTPATIENT
Start: 2020-10-15

## 2020-10-15 RX ORDER — ALBUTEROL SULFATE 90 UG/1
1-2 AEROSOL, METERED RESPIRATORY (INHALATION)
Status: CANCELLED
Start: 2020-10-15

## 2020-10-15 RX ORDER — ACETAMINOPHEN 325 MG/1
650 TABLET ORAL ONCE
Status: COMPLETED | OUTPATIENT
Start: 2020-10-15 | End: 2020-10-15

## 2020-10-15 RX ORDER — DIPHENHYDRAMINE HCL 25 MG
50 CAPSULE ORAL ONCE
Status: COMPLETED | OUTPATIENT
Start: 2020-10-15 | End: 2020-10-15

## 2020-10-15 RX ORDER — METHYLPREDNISOLONE SODIUM SUCCINATE 125 MG/2ML
125 INJECTION, POWDER, LYOPHILIZED, FOR SOLUTION INTRAMUSCULAR; INTRAVENOUS
Status: CANCELLED
Start: 2020-10-15

## 2020-10-15 RX ORDER — DIPHENHYDRAMINE HCL 25 MG
50 CAPSULE ORAL ONCE
Status: CANCELLED | OUTPATIENT
Start: 2020-10-15

## 2020-10-15 RX ORDER — ALBUTEROL SULFATE 0.83 MG/ML
2.5 SOLUTION RESPIRATORY (INHALATION)
Status: CANCELLED | OUTPATIENT
Start: 2020-10-15

## 2020-10-15 RX ORDER — EPINEPHRINE 0.3 MG/.3ML
0.3 INJECTION SUBCUTANEOUS EVERY 5 MIN PRN
Status: CANCELLED | OUTPATIENT
Start: 2020-10-15

## 2020-10-15 RX ORDER — DIPHENHYDRAMINE HYDROCHLORIDE 50 MG/ML
50 INJECTION INTRAMUSCULAR; INTRAVENOUS
Status: CANCELLED
Start: 2020-10-15

## 2020-10-15 RX ADMIN — ACETAMINOPHEN 650 MG: 325 TABLET ORAL at 08:32

## 2020-10-15 RX ADMIN — SODIUM CHLORIDE 250 ML: 9 INJECTION, SOLUTION INTRAVENOUS at 08:33

## 2020-10-15 RX ADMIN — DIPHENHYDRAMINE HYDROCHLORIDE 50 MG: 25 CAPSULE ORAL at 08:33

## 2020-10-15 RX ADMIN — RITUXIMAB 800 MG: 10 INJECTION, SOLUTION INTRAVENOUS at 09:04

## 2020-10-15 ASSESSMENT — PAIN SCALES - GENERAL: PAINLEVEL: NO PAIN (0)

## 2020-10-15 NOTE — PROGRESS NOTES
Reason for Visit: f/u follicular lymphoma    Oncology HPI: Zack Demarco is a 79 year old male first seen in consultation on 12/27/2017 for a new diagnosis of follicular lymphoma. He was diagnosed incidental to an evaluation for cardiac bypass surgery in 11/2017. A chest CT scan on 11/14 showed an unexpected retroperitoneal mass with surrounding lymphadenopathy suspicious for malignancy.  A further CT scan done on the same day showed a 2.3 x 7.2 x 6.1 retroperitoneal soft tissue mass with adjacent lymphadenopathy that mildly narrowed the left renal vein. There also was nodularity within the mesentery and an enlarged hilar lymph node. CT-guided biopsy of the retroperitoneal mass on 12/12/2017 established the diagnosis, but the sample was too small for adequate grading. There was no disease identified outside of the abdomen; a bone marrow biopsy was not obtained as part of staging.      Relative to cardiac issues, he underwent an uncomplicated 3-vessel coronary artery bypass graft on 11/22/2017 and, subsequently, has been symptom-free. He follows       With the renal and gonadal vein compression it was decided to start treatment with rituximab, alone. Mr. Demarco completed 4 weekly doses from 01/26 - 02/16/2018. He had no difficulty with the treatment and was able to receive rapid infusion (90 minutes) for the last 3 doses. Interval evaluation on 03/05/2018 with an US, suggested improvement. A CT scan on 04/09/2018 showed substantial regression. Repeated CT on 07/09/18 showed a good response but residual lymphoma around the renal veins. Decided to proceed weekly rituximab x4 (7/26-8/22/2018).  He started rituximab maintenance in 10/2018.     Subsequent CT scans on 10/16/2018 and 4/16/2019 and 12/17/19 showed stable findings.    Zack is here for evaluation prior to the last planned dose of maintenance rituximab.    Interval history: Since our last visit, Zack moved up his CT scan as he was concerned with ongoing low back  pain. The scan was done in September. Since that time, his back pain has resolved. He has resumed regular exercise, takes a spinning class, works in the yard regularly. Feels fatigued and some shortness of breath when walking behind his mower. No chest pain or palpitation. Felt dizzy a week ago, felt similar to when he had vertigo. He did an epley maneuver with improvement. Appetite is good. Weight stable. No abdominal pain, bloating. Bowels are regular. Urination is frequent, no dysuria. No fevers/chills, cough. No headaches, vision changes. Has occasional edema at the sock line, no calf pain/tenderness. He is due for a follow-up with his PCP and is scheduled for that within the next week.    Current Outpatient Medications   Medication Sig Dispense Refill     aspirin 325 MG EC tablet 1/2 tab daily       atorvastatin (LIPITOR) 40 MG tablet Take 1 tablet (40 mg) by mouth daily 60 tablet 11     Cholecalciferol (VITAMIN D3 PO) Take by mouth daily       latanoprost (XALATAN) 0.005 % ophthalmic solution Place 1 drop into both eyes At Bedtime 3 Bottle 4     levothyroxine (SYNTHROID/LEVOTHROID) 88 MCG tablet Take 1 tablet (88 mcg) by mouth daily 90 tablet 3     losartan (COZAAR) 50 MG tablet Take 1 tablet (50 mg) by mouth daily 90 tablet 3     metoprolol tartrate (LOPRESSOR) 25 MG tablet Take 1 tablet (25 mg) by mouth 2 times daily 180 tablet 3     timolol maleate (TIMOPTIC) 0.5 % ophthalmic solution Place 1 drop Into the left eye every morning (Patient taking differently: Place 1 drop Into the left eye every morning Both eyes) 5 mL 11        No Known Allergies      Exam: alert, appears well.   Vital Signs 10/15/2020   Systolic 159   Diastolic 62   Pulse 52   Temperature 98.1   Respirations 16   Weight (LB) 220 lb 1.6 oz   Height    BMI (Calculated)    Pain    O2 97      There were no vitals taken for this visit.  Wt Readings from Last 4 Encounters:   08/20/20 99.6 kg (219 lb 8 oz)   08/20/20 99.6 kg (219 lb 9.3 oz)    06/22/20 101.2 kg (223 lb 3.2 oz)   06/18/20 101.8 kg (224 lb 6.4 oz)     Oropharynx is moist and without lesion. Neck supple and without adenopathy. Lungs:CTA. Heart: RRR, no murmur or rub. Abdomen: soft, nontender, BS active, no masses or organomegaly.  Extremities: warm, no edema. Speech is clear. CN wnl. Gait/station wnl. Skin: no rash or bruising noted. Nodes: no neck, supraclavicular or axillae nodes palpable.    Labs: Results for FÉLIX TRIVEDI (MRN [id number]) as of 10/15/2020 08:36   Ref. Range 10/15/2020 07:21   Sodium Latest Ref Range: 133 - 144 mmol/L 141   Potassium Latest Ref Range: 3.4 - 5.3 mmol/L 4.1   Chloride Latest Ref Range: 94 - 109 mmol/L 110 (H)   Carbon Dioxide Latest Ref Range: 20 - 32 mmol/L 26   Urea Nitrogen Latest Ref Range: 7 - 30 mg/dL 21   Creatinine Latest Ref Range: 0.66 - 1.25 mg/dL 0.81   GFR Estimate Latest Ref Range: >60 mL/min/1.73_m2 85   GFR Estimate If Black Latest Ref Range: >60 mL/min/1.73_m2 >90   Calcium Latest Ref Range: 8.5 - 10.1 mg/dL 8.9   Anion Gap Latest Ref Range: 3 - 14 mmol/L 4   Albumin Latest Ref Range: 3.4 - 5.0 g/dL 3.7   Protein Total Latest Ref Range: 6.8 - 8.8 g/dL 7.3   Bilirubin Total Latest Ref Range: 0.2 - 1.3 mg/dL 0.5   Alkaline Phosphatase Latest Ref Range: 40 - 150 U/L 77   ALT Latest Ref Range: 0 - 70 U/L 26   AST Latest Ref Range: 0 - 45 U/L 15   Cholesterol Latest Ref Range: <200 mg/dL 120   HDL Cholesterol Latest Ref Range: >39 mg/dL 35 (L)   LDL Cholesterol Calculated Latest Ref Range: <100 mg/dL 58   Non HDL Cholesterol Latest Ref Range: <130 mg/dL 85   Triglycerides Latest Ref Range: <150 mg/dL 135   Glucose Latest Ref Range: 70 - 99 mg/dL 110 (H)   WBC Latest Ref Range: 4.0 - 11.0 10e9/L 7.3   Hemoglobin Latest Ref Range: 13.3 - 17.7 g/dL 14.0   Hematocrit Latest Ref Range: 40.0 - 53.0 % 44.1   Platelet Count Latest Ref Range: 150 - 450 10e9/L 211   RBC Count Latest Ref Range: 4.4 - 5.9 10e12/L 4.59   MCV Latest Ref Range: 78 - 100 fl  96   MCH Latest Ref Range: 26.5 - 33.0 pg 30.5   MCHC Latest Ref Range: 31.5 - 36.5 g/dL 31.7   RDW Latest Ref Range: 10.0 - 15.0 % 13.4   Diff Method Unknown Automated Method   % Neutrophils Latest Units: % 75.7   % Lymphocytes Latest Units: % 12.1   % Monocytes Latest Units: % 7.8   % Eosinophils Latest Units: % 3.8   % Basophils Latest Units: % 0.3   % Immature Granulocytes Latest Units: % 0.3   Nucleated RBCs Latest Ref Range: 0 /100 0   Absolute Neutrophil Latest Ref Range: 1.6 - 8.3 10e9/L 5.5   Absolute Lymphocytes Latest Ref Range: 0.8 - 5.3 10e9/L 0.9   Absolute Monocytes Latest Ref Range: 0.0 - 1.3 10e9/L 0.6   Absolute Eosinophils Latest Ref Range: 0.0 - 0.7 10e9/L 0.3   Absolute Basophils Latest Ref Range: 0.0 - 0.2 10e9/L 0.0   Abs Immature Granulocytes Latest Ref Range: 0 - 0.4 10e9/L 0.0   Absolute Nucleated RBC Unknown 0.0       Imaging: Chest abdomen pelvis CT with contrast     Indication enlarged lymph nodes, chest axilla; follicular lymphoma,  assess treatment response     COMPARISON: 12/17/2019     Contrast: 134 mL intravenous [id number]     FINDINGS     CHEST: Several pulmonary nodules are redemonstrated (series 6),  including: 3 mm left lower lobe nodule (image 292), 3 mm right lower  lobe nodule (image 200) and a 2 mm right middle lobe nodule (image  157) there are no new or increasing size of any nodules noted. No  groundglass opacities. No pleural or pericardial effusion.  The included thyroid appears unremarkable. There are no enlarged  axillary, mediastinal or hilar lymph nodes. There is thoracic aortic  atherosclerosis. Median sternotomy. The thoracic aorta and main  pulmonary arteries are normal in size. There is coronary artery  calcification. Bovine aortic arch with the left common carotid artery  originating from the right brachiocephalic artery trunk.  Bones in the chest again show the median sternotomy. There are  degenerative changes in the thoracic spine. No suspicious sclerotic  "or  lytic/destructive lesions. Tendon anchors at the left humeral head our  present.     ABDOMEN/PELVIS: Liver appears normal with benign focal fatty  infiltration  Falciform ligament. Gallbladder appears grossly normal. Spleen appears  normal. Pancreas appears normal. Bilateral adrenal glands appear  normal. Bilateral kidneys appear normal. Retroaortic left renal vein  there is present. Soft tissue prominence in the left periaortic space  appears similar in encases the left renal artery, which appears  patent. Nonenlarged periaortic lymph nodes otherwise also appear  similar. There is calcified and noncalcified plaque in the abdominal  aorta with less than 50% stenosis. Prominent plaque noted in both  iliac arteries. There is a fatty umbilical hernia without bowel.  Urinary bladder is nicely distended without wall thickening. Prostate  is mildly large and does exert some mass effect upon the urinary  bladder. There are several nonenlarged left inguinal lymph nodes. No  enlarged deep pelvic lymph nodes. Another vague soft tissues are noted  in the left paramedian anterior mesenteric region adjacent to proximal  jejunum (series 3 image 407) which also appears similar. A slightly  \"rkystle mesentery\" is again noted which can be seen in lymphomas. A  wide common origin of the common hepatic an splenic arteries are  noted, rather than a single celiac axis trunk. There may also be a  separate right gastric artery origin from the aorta is well as opposed  to its branching off a celiac axis trunk. Also noted is another  anatomical variant is a retroaortic left renal vein, which is abutted  by the left periaortic soft tissue prominence. It is patent.  Bones in the abdomen and pelvis show prominent degenerative changes  with prominent osteophytes in the lumbar vertebral column. There is  bilateral sacroiliac DJD. No suspicious sclerotic or lytic/destructive  bony densities are present. Prominent degenerative disc disease " with  disc space narrowing and bone spurring noted at L2-3 through L5-S1  with prominent Schmorl's nodes at both sides of L1-2.  There is mild aneurysmal dilation again of the distalmost left common  iliac artery of 1.6 cm.                                                                      IMPRESSION:  1. Unchanged soft tissue densities in the left paramedian anterior  mesentery and left periaortic retroperitoneal spaces likely related to  history of lymphoma.  2. Unchanged sub-6 mm pulmonary nodules.  3. Diffuse atherosclerosis including coronary artery disease and mild  aneurysmal dilation of the distalmost left common iliac artery, 1.6  cm.  4. 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  5. Degenerative changes throughout the spine.  6. Median sternotomy.     TONIA LARKIN MD    Impression/plan:   Follicular lymphoma, stage IIA  -Zack is doing well. No new signs/symtoms suggestive of progression. I reviewed his imaging with him. There is no evidence of progression  -will proceed with rituximab today, which will complete his 2 years of maintenance.  -we discussed a plan for surveillance moving forward. He will have visits every few months with labs and exam. Will plan on yearly CT imaging unless he should have symptoms concerning for progression  -will request f/u with Dr. Rouse in 2-3 months      HTN: BP elevated today, likely exacerbated by white coat HTN  -BP stable when checked at home, on losartan, metorprolol    CAD  -reviewed the findings of atherosclerosis on the CT image. He has f/u with his PCP in the next week.    Hyperlipidemia  -he is fasting today and requested to check a lipid panel. I added this to the labs, he will review results/management with his PCP    Hypothyroidism  -on levothyroxine 88 mcg  daily, added TSH/T4 to his labs today

## 2020-10-15 NOTE — LETTER
10/15/2020         RE: Zack Demarco  [address] [facility] [address]        Dear Colleague,    Thank you for referring your patient, Zack Demarco, to the [facility]. Please see a copy of my visit note below.    Reason for Visit: f/u follicular lymphoma    Oncology HPI: Zack Demarco is a 79 year old male first seen in consultation on 12/27/2017 for a new diagnosis of follicular lymphoma. He was diagnosed incidental to an evaluation for cardiac bypass surgery in 11/2017. A chest CT scan on 11/14 showed an unexpected retroperitoneal mass with surrounding lymphadenopathy suspicious for malignancy.  A further CT scan done on the same day showed a 2.3 x 7.2 x 6.1 retroperitoneal soft tissue mass with adjacent lymphadenopathy that mildly narrowed the left renal vein. There also was nodularity within the mesentery and an enlarged hilar lymph node. CT-guided biopsy of the retroperitoneal mass on 12/12/2017 established the diagnosis, but the sample was too small for adequate grading. There was no disease identified outside of the abdomen; a bone marrow biopsy was not obtained as part of staging.      Relative to cardiac issues, he underwent an uncomplicated 3-vessel coronary artery bypass graft on 11/22/2017 and, subsequently, has been symptom-free. He follows       With the renal and gonadal vein compression it was decided to start treatment with rituximab, alone. Mr. Demarco completed 4 weekly doses from 01/26 - 02/16/2018. He had no difficulty with the treatment and was able to receive rapid infusion (90 minutes) for the last 3 doses. Interval evaluation on 03/05/2018 with an US, suggested improvement. A CT scan on 04/09/2018 showed substantial regression. Repeated CT on 07/09/18 showed a good response but residual lymphoma around the renal veins. Decided to proceed weekly rituximab x4 (7/26-8/22/2018).  He started rituximab maintenance in 10/2018.     Subsequent CT scans on 10/16/2018  and 4/16/2019 and 12/17/19 showed stable findings.    Zack is here for evaluation prior to the last planned dose of maintenance rituximab.    Interval history: Since our last visit, Zack moved up his CT scan as he was concerned with ongoing low back pain. The scan was done in September. Since that time, his back pain has resolved. He has resumed regular exercise, takes a spinning class, works in the yard regularly. Feels fatigued and some shortness of breath when walking behind his mower. No chest pain or palpitation. Felt dizzy a week ago, felt similar to when he had vertigo. He did an epley maneuver with improvement. Appetite is good. Weight stable. No abdominal pain, bloating. Bowels are regular. Urination is frequent, no dysuria. No fevers/chills, cough. No headaches, vision changes. Has occasional edema at the sock line, no calf pain/tenderness. He is due for a follow-up with his PCP and is scheduled for that within the next week.    Current Outpatient Medications   Medication Sig Dispense Refill     aspirin 325 MG EC tablet 1/2 tab daily       atorvastatin (LIPITOR) 40 MG tablet Take 1 tablet (40 mg) by mouth daily 60 tablet 11     Cholecalciferol (VITAMIN D3 PO) Take by mouth daily       latanoprost (XALATAN) 0.005 % ophthalmic solution Place 1 drop into both eyes At Bedtime 3 Bottle 4     levothyroxine (SYNTHROID/LEVOTHROID) 88 MCG tablet Take 1 tablet (88 mcg) by mouth daily 90 tablet 3     losartan (COZAAR) 50 MG tablet Take 1 tablet (50 mg) by mouth daily 90 tablet 3     metoprolol tartrate (LOPRESSOR) 25 MG tablet Take 1 tablet (25 mg) by mouth 2 times daily 180 tablet 3     timolol maleate (TIMOPTIC) 0.5 % ophthalmic solution Place 1 drop Into the left eye every morning (Patient taking differently: Place 1 drop Into the left eye every morning Both eyes) 5 mL 11        No Known Allergies      Exam: alert, appears well.   Vital Signs 10/15/2020   Systolic 159   Diastolic 62   Pulse 52   Temperature 98.1    Respirations 16   Weight (LB) 220 lb 1.6 oz   Height    BMI (Calculated)    Pain    O2 97      There were no vitals taken for this visit.  Wt Readings from Last 4 Encounters:   08/20/20 99.6 kg (219 lb 8 oz)   08/20/20 99.6 kg (219 lb 9.3 oz)   06/22/20 101.2 kg (223 lb 3.2 oz)   06/18/20 101.8 kg (224 lb 6.4 oz)     Oropharynx is moist and without lesion. Neck supple and without adenopathy. Lungs:CTA. Heart: RRR, no murmur or rub. Abdomen: soft, nontender, BS active, no masses or organomegaly.  Extremities: warm, no edema. Speech is clear. CN wnl. Gait/station wnl. Skin: no rash or bruising noted. Nodes: no neck, supraclavicular or axillae nodes palpable.    Labs: Results for FÉLIX TRIVEDI (MRN [id number]) as of 10/15/2020 08:36   Ref. Range 10/15/2020 07:21   Sodium Latest Ref Range: 133 - 144 mmol/L 141   Potassium Latest Ref Range: 3.4 - 5.3 mmol/L 4.1   Chloride Latest Ref Range: 94 - 109 mmol/L 110 (H)   Carbon Dioxide Latest Ref Range: 20 - 32 mmol/L 26   Urea Nitrogen Latest Ref Range: 7 - 30 mg/dL 21   Creatinine Latest Ref Range: 0.66 - 1.25 mg/dL 0.81   GFR Estimate Latest Ref Range: >60 mL/min/1.73_m2 85   GFR Estimate If Black Latest Ref Range: >60 mL/min/1.73_m2 >90   Calcium Latest Ref Range: 8.5 - 10.1 mg/dL 8.9   Anion Gap Latest Ref Range: 3 - 14 mmol/L 4   Albumin Latest Ref Range: 3.4 - 5.0 g/dL 3.7   Protein Total Latest Ref Range: 6.8 - 8.8 g/dL 7.3   Bilirubin Total Latest Ref Range: 0.2 - 1.3 mg/dL 0.5   Alkaline Phosphatase Latest Ref Range: 40 - 150 U/L 77   ALT Latest Ref Range: 0 - 70 U/L 26   AST Latest Ref Range: 0 - 45 U/L 15   Cholesterol Latest Ref Range: <200 mg/dL 120   HDL Cholesterol Latest Ref Range: >39 mg/dL 35 (L)   LDL Cholesterol Calculated Latest Ref Range: <100 mg/dL 58   Non HDL Cholesterol Latest Ref Range: <130 mg/dL 85   Triglycerides Latest Ref Range: <150 mg/dL 135   Glucose Latest Ref Range: 70 - 99 mg/dL 110 (H)   WBC Latest Ref Range: 4.0 - 11.0 10e9/L 7.3    Hemoglobin Latest Ref Range: 13.3 - 17.7 g/dL 14.0   Hematocrit Latest Ref Range: 40.0 - 53.0 % 44.1   Platelet Count Latest Ref Range: 150 - 450 10e9/L 211   RBC Count Latest Ref Range: 4.4 - 5.9 10e12/L 4.59   MCV Latest Ref Range: 78 - 100 fl 96   MCH Latest Ref Range: 26.5 - 33.0 pg 30.5   MCHC Latest Ref Range: 31.5 - 36.5 g/dL 31.7   RDW Latest Ref Range: 10.0 - 15.0 % 13.4   Diff Method Unknown Automated Method   % Neutrophils Latest Units: % 75.7   % Lymphocytes Latest Units: % 12.1   % Monocytes Latest Units: % 7.8   % Eosinophils Latest Units: % 3.8   % Basophils Latest Units: % 0.3   % Immature Granulocytes Latest Units: % 0.3   Nucleated RBCs Latest Ref Range: 0 /100 0   Absolute Neutrophil Latest Ref Range: 1.6 - 8.3 10e9/L 5.5   Absolute Lymphocytes Latest Ref Range: 0.8 - 5.3 10e9/L 0.9   Absolute Monocytes Latest Ref Range: 0.0 - 1.3 10e9/L 0.6   Absolute Eosinophils Latest Ref Range: 0.0 - 0.7 10e9/L 0.3   Absolute Basophils Latest Ref Range: 0.0 - 0.2 10e9/L 0.0   Abs Immature Granulocytes Latest Ref Range: 0 - 0.4 10e9/L 0.0   Absolute Nucleated RBC Unknown 0.0       Imaging: Chest abdomen pelvis CT with contrast     Indication enlarged lymph nodes, chest axilla; follicular lymphoma,  assess treatment response     COMPARISON: 12/17/2019     Contrast: 134 mL intravenous [id number]     FINDINGS     CHEST: Several pulmonary nodules are redemonstrated (series 6),  including: 3 mm left lower lobe nodule (image 292), 3 mm right lower  lobe nodule (image 200) and a 2 mm right middle lobe nodule (image  157) there are no new or increasing size of any nodules noted. No  groundglass opacities. No pleural or pericardial effusion.  The included thyroid appears unremarkable. There are no enlarged  axillary, mediastinal or hilar lymph nodes. There is thoracic aortic  atherosclerosis. Median sternotomy. The thoracic aorta and main  pulmonary arteries are normal in size. There is coronary artery  calcification.  "Bovine aortic arch with the left common carotid artery  originating from the right brachiocephalic artery trunk.  Bones in the chest again show the median sternotomy. There are  degenerative changes in the thoracic spine. No suspicious sclerotic or  lytic/destructive lesions. Tendon anchors at the left humeral head our  present.     ABDOMEN/PELVIS: Liver appears normal with benign focal fatty  infiltration  Falciform ligament. Gallbladder appears grossly normal. Spleen appears  normal. Pancreas appears normal. Bilateral adrenal glands appear  normal. Bilateral kidneys appear normal. Retroaortic left renal vein  there is present. Soft tissue prominence in the left periaortic space  appears similar in encases the left renal artery, which appears  patent. Nonenlarged periaortic lymph nodes otherwise also appear  similar. There is calcified and noncalcified plaque in the abdominal  aorta with less than 50% stenosis. Prominent plaque noted in both  iliac arteries. There is a fatty umbilical hernia without bowel.  Urinary bladder is nicely distended without wall thickening. Prostate  is mildly large and does exert some mass effect upon the urinary  bladder. There are several nonenlarged left inguinal lymph nodes. No  enlarged deep pelvic lymph nodes. Another vague soft tissues are noted  in the left paramedian anterior mesenteric region adjacent to proximal  jejunum (series 3 image 407) which also appears similar. A slightly  \"krystle mesentery\" is again noted which can be seen in lymphomas. A  wide common origin of the common hepatic an splenic arteries are  noted, rather than a single celiac axis trunk. There may also be a  separate right gastric artery origin from the aorta is well as opposed  to its branching off a celiac axis trunk. Also noted is another  anatomical variant is a retroaortic left renal vein, which is abutted  by the left periaortic soft tissue prominence. It is patent.  Bones in the abdomen and pelvis " show prominent degenerative changes  with prominent osteophytes in the lumbar vertebral column. There is  bilateral sacroiliac DJD. No suspicious sclerotic or lytic/destructive  bony densities are present. Prominent degenerative disc disease with  disc space narrowing and bone spurring noted at L2-3 through L5-S1  with prominent Schmorl's nodes at both sides of L1-2.  There is mild aneurysmal dilation again of the distalmost left common  iliac artery of 1.6 cm.                                                                      IMPRESSION:  1. Unchanged soft tissue densities in the left paramedian anterior  mesentery and left periaortic retroperitoneal spaces likely related to  history of lymphoma.  2. Unchanged sub-6 mm pulmonary nodules.  3. Diffuse atherosclerosis including coronary artery disease and mild  aneurysmal dilation of the distalmost left common iliac artery, 1.6  cm.  4. 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  5. Degenerative changes throughout the spine.  6. Median sternotomy.     TONIA LARKIN MD    Impression/plan:   Follicular lymphoma, stage IIA  -Zack is doing well. No new signs/symtoms suggestive of progression. I reviewed his imaging with him. There is no evidence of progression  -will proceed with rituximab today, which will complete his 2 years of maintenance.  -we discussed a plan for surveillance moving forward. He will have visits every few months with labs and exam. Will plan on yearly CT imaging unless he should have symptoms concerning for progression  -will request f/u with Dr. Rouse in 2-3 months      HTN: BP elevated today, likely exacerbated by white coat HTN  -BP stable when checked at home, on losartan, metorprolol    CAD  -reviewed the findings of atherosclerosis on the CT  image. He has f/u with his PCP in the next week.    Hyperlipidemia  -he is fasting today and requested to check a lipid panel. I added this to the labs, he will review results/management with his PCP    Hypothyroidism  -on levothyroxine 88 mcg daily, added TSH/T4 to his labs today      Again, thank you for allowing me to participate in the care of your patient.        Sincerely,        HOLLY Draper CNP

## 2020-10-15 NOTE — PROGRESS NOTES
Infusion Nursing Note:  Zack Demarco presents today for Cycle 13 Day 1 Rapid Rituxan.    Patient seen by provider today: Yes: Elvira Orr NP    Treatment Conditions:  Lab Results   Component Value Date    HGB 14.0 10/15/2020     Lab Results   Component Value Date    WBC 7.3 10/15/2020      Lab Results   Component Value Date    ANEU 5.5 10/15/2020     Lab Results   Component Value Date     10/15/2020      Lab Results   Component Value Date     10/15/2020                   Lab Results   Component Value Date    POTASSIUM 4.1 10/15/2020           Lab Results   Component Value Date    MAG 2.2 11/27/2017            Lab Results   Component Value Date    CR 0.81 10/15/2020                   Lab Results   Component Value Date    ELVIRA 8.9 10/15/2020                Lab Results   Component Value Date    BILITOTAL 0.5 10/15/2020           Lab Results   Component Value Date    ALBUMIN 3.7 10/15/2020                    Lab Results   Component Value Date    ALT 26 10/15/2020           Lab Results   Component Value Date    AST 15 10/15/2020       Results reviewed, labs MET treatment parameters, ok to proceed with treatment.    Note: Pt with no concerns during infusion visit. Pt stated this is his last planned Rituxan.    Intravenous Access:  Peripheral IV placed.    Post Infusion Assessment:  Patient tolerated infusion without incident.  Blood return noted pre and post infusion.  Access discontinued per protocol.    Discharge Plan:   Patient declined prescription refills.  Discharge instructions reviewed with: Patient.  Patient and/or family verbalized understanding of discharge instructions and all questions answered.  Copy of AVS reviewed with patient and/or family.  Patient will return 1/6/2021 for next appointment with MD.  Patient discharged in stable condition accompanied by: self.  Departure Mode: Ambulatory.  Face to Face time: 0.    Fartun Fong RN

## 2020-10-15 NOTE — PATIENT INSTRUCTIONS
[facility] Triage and after hours / weekends / holidays:  [phone]    Please call the triage or after hours line if you experience a temperature greater than or equal to 100.5, shaking chills, have uncontrolled nausea, vomiting and/or diarrhea, dizziness, shortness of breath, chest pain, bleeding, unexplained bruising, or if you have any other new/concerning symptoms, questions or concerns.      If you are having any concerning symptoms or wish to speak to a provider before your next infusion visit, please call your care coordinator or triage to notify them so we can adequately serve you.     If you need a refill on a narcotic prescription or other medication, please call before your infusion appointment.           October 2020 Sunday Monday Tuesday Wednesday Thursday Friday Saturday                       1     2     3       4     5     6     7     8     9     10       11     12     13     14     15    [facility]   7[facility]15 AM   (15 min.)   [facility]   [facility] RETURN   7:35 AM   (50 min.)   Elvira Orr APRN CNP   [facility] ONC INFUSION 360   9:00 AM   (360 min.)    ONCOLOGY INFUSION   [facility] 16     17       18     19     20     21     22     23     24       25     26     27     28     29     30     31                 November 2020 Sunday Monday Tuesday Wednesday Thursday Friday Saturday   1     2     3     4     5  Happy Birthday!     6     7       8     9     10     11     12     13     14       15     16    NEW   8:00 AM   (15 min.)   Avtar Mccullough MD   [facility] 17     18     19    PHYSICAL   8:00 AM   (20 min.)   Anastacio Love MD   [facility] 20     21       22     23     24     25     26     27     28       29     30                                             Recent Results (from the past 24 hour(s))   Comprehensive  metabolic panel    Collection Time: 10/15/20  7:21 AM   Result Value Ref Range    Sodium 141 133 - 144 mmol/L    Potassium 4.1 3.4 - 5.3 mmol/L    Chloride 110 (H) 94 - 109 mmol/L    Carbon Dioxide 26 20 - 32 mmol/L    Anion Gap 4 3 - 14 mmol/L    Glucose 110 (H) 70 - 99 mg/dL    Urea Nitrogen 21 7 - 30 mg/dL    Creatinine 0.81 0.66 - 1.25 mg/dL    GFR Estimate 85 >60 mL/min/[1.73_m2]    GFR Estimate If Black >90 >60 mL/min/[1.73_m2]    Calcium 8.9 8.5 - 10.1 mg/dL    Bilirubin Total 0.5 0.2 - 1.3 mg/dL    Albumin 3.7 3.4 - 5.0 g/dL    Protein Total 7.3 6.8 - 8.8 g/dL    Alkaline Phosphatase 77 40 - 150 U/L    ALT 26 0 - 70 U/L    AST 15 0 - 45 U/L   CBC with platelets differential    Collection Time: 10/15/20  7:21 AM   Result Value Ref Range    WBC 7.3 4.0 - 11.0 10e9/L    RBC Count 4.59 4.4 - 5.9 10e12/L    Hemoglobin 14.0 13.3 - 17.7 g/dL    Hematocrit 44.1 40.0 - 53.0 %    MCV 96 78 - 100 fl    MCH 30.5 26.5 - 33.0 pg    MCHC 31.7 31.5 - 36.5 g/dL    RDW 13.4 10.0 - 15.0 %    Platelet Count 211 150 - 450 10e9/L    Diff Method Automated Method     % Neutrophils 75.7 %    % Lymphocytes 12.1 %    % Monocytes 7.8 %    % Eosinophils 3.8 %    % Basophils 0.3 %    % Immature Granulocytes 0.3 %    Nucleated RBCs 0 0 /100    Absolute Neutrophil 5.5 1.6 - 8.3 10e9/L    Absolute Lymphocytes 0.9 0.8 - 5.3 10e9/L    Absolute Monocytes 0.6 0.0 - 1.3 10e9/L    Absolute Eosinophils 0.3 0.0 - 0.7 10e9/L    Absolute Basophils 0.0 0.0 - 0.2 10e9/L    Abs Immature Granulocytes 0.0 0 - 0.4 10e9/L    Absolute Nucleated RBC 0.0    Lipid panel reflex to direct LDL    Collection Time: 10/15/20  7:21 AM   Result Value Ref Range    Cholesterol 120 <200 mg/dL    Triglycerides 135 <150 mg/dL    HDL Cholesterol 35 (L) >39 mg/dL    LDL Cholesterol Calculated 58 <100 mg/dL    Non HDL Cholesterol 85 <130 mg/dL   TSH with free T4 reflex    Collection Time: 10/15/20  7:21 AM   Result Value Ref Range    TSH 4.85 (H) 0.40 - 4.00 mU/L   T4 free     Collection Time: 10/15/20  7:21 AM   Result Value Ref Range    T4 Free 0.85 0.76 - 1.46 ng/dL

## 2020-10-15 NOTE — NURSING NOTE
Blood drawn via vpt by Paramedic in lab. VS taken. Pt checked into next appointment.   Arjun Webb  Paramedic

## 2020-10-15 NOTE — NURSING NOTE
"Oncology Rooming Note    October 15, 2020 7:39 AM   Zack Demarco is a 79 year old male who presents for:    Chief Complaint   Patient presents with     Oncology Clinic Visit     Follicular Lymphoma      Initial Vitals: There were no vitals taken for this visit. Estimated body mass index is 32.23 kg/m  as calculated from the following:    Height as of 8/20/20: 1.76 m (5' 9.29\").    Weight as of an earlier encounter on 10/15/20: 99.8 kg (220 lb 1.6 oz). There is no height or weight on file to calculate BSA.  Data Unavailable Comment: Data Unavailable   No LMP for male patient.  Allergies reviewed: Yes  Medications reviewed: Yes    Medications: Medication refills not needed today.  Pharmacy name entered into Exposed Vocals:    [id number] - [place], [place] - [address] [facility] - [place], [place] - [address]    Clinical concerns: None      Tiffanie Mckeon CMA              "

## 2020-10-22 ENCOUNTER — MYC MEDICAL ADVICE (OUTPATIENT)
Dept: FAMILY MEDICINE | Facility: CLINIC | Age: 80
End: 2020-10-22

## 2020-11-15 ASSESSMENT — ENCOUNTER SYMPTOMS
SHORTNESS OF BREATH: 1
ARTHRALGIAS: 1

## 2020-11-15 ASSESSMENT — ACTIVITIES OF DAILY LIVING (ADL): CURRENT_FUNCTION: NO ASSISTANCE NEEDED

## 2020-11-16 ENCOUNTER — OFFICE VISIT (OUTPATIENT)
Dept: OPHTHALMOLOGY | Facility: CLINIC | Age: 80
End: 2020-11-16
Payer: MEDICARE

## 2020-11-16 DIAGNOSIS — H40.053 BORDERLINE GLAUCOMA WITH OCULAR HYPERTENSION, BILATERAL: ICD-10-CM

## 2020-11-16 DIAGNOSIS — Z01.01 ENCOUNTER FOR EXAMINATION OF EYES AND VISION WITH ABNORMAL FINDINGS: ICD-10-CM

## 2020-11-16 DIAGNOSIS — H52.4 PRESBYOPIA: ICD-10-CM

## 2020-11-16 DIAGNOSIS — Z96.1 PSEUDOPHAKIA: Primary | ICD-10-CM

## 2020-11-16 DIAGNOSIS — H43.812 POSTERIOR VITREOUS DETACHMENT, LEFT: ICD-10-CM

## 2020-11-16 DIAGNOSIS — H35.363 MACULAR DRUSEN, BILATERAL: ICD-10-CM

## 2020-11-16 PROCEDURE — 92014 COMPRE OPH EXAM EST PT 1/>: CPT | Performed by: OPHTHALMOLOGY

## 2020-11-16 PROCEDURE — 92015 DETERMINE REFRACTIVE STATE: CPT | Mod: GY | Performed by: OPHTHALMOLOGY

## 2020-11-16 ASSESSMENT — REFRACTION_WEARINGRX
OS_SPHERE: -2.75
OS_ADD: +2.75
OD_SPHERE: -2.25
OD_CYLINDER: +2.50
SPECS_TYPE: PAL
OS_CYLINDER: +2.25
OS_AXIS: 004
OD_ADD: +2.75

## 2020-11-16 ASSESSMENT — TONOMETRY
IOP_METHOD: APPLANATION
OD_IOP_MMHG: 15
OS_IOP_MMHG: 14

## 2020-11-16 ASSESSMENT — VISUAL ACUITY
OD_CC: J1
OS_CC: 20/30-2
METHOD: SNELLEN - LINEAR
OD_CC: 20/20
CORRECTION_TYPE: GLASSES
OS_CC: J2

## 2020-11-16 ASSESSMENT — EXTERNAL EXAM - RIGHT EYE: OD_EXAM: 3+ BROW PTOSIS, MILD-MOD BROW

## 2020-11-16 ASSESSMENT — REFRACTION_MANIFEST
OS_AXIS: 002
OS_ADD: +3.00
OS_CYLINDER: +2.75
OS_SPHERE: -2.75
OD_CYLINDER: +2.50
OD_ADD: +3.00
OD_SPHERE: -2.25
OD_AXIS: 180

## 2020-11-16 ASSESSMENT — SLIT LAMP EXAM - LIDS: COMMENTS: 1+ DERMATOCHALASIS - UPPER LID, 2+ MEIBOMIAN GLAND DYSFUNCTION

## 2020-11-16 ASSESSMENT — EXTERNAL EXAM - LEFT EYE: OS_EXAM: 3+ BROW PTOSIS, MILD-MOD BROW

## 2020-11-16 ASSESSMENT — CUP TO DISC RATIO
OS_RATIO: 0.5
OD_RATIO: 0.5

## 2020-11-16 ASSESSMENT — CONF VISUAL FIELD
OD_NORMAL: 1
OS_NORMAL: 1

## 2020-11-16 NOTE — PATIENT INSTRUCTIONS
"Glasses Rx given - optional  Possible clouding of posterior capsule right eye discussed.  Take a multiple vitamin or \"eye vitamin\" daily (AREDS2).  Protect your eyes outdoors from ultraviolet rays with sunglasses and/or brimmed hat.  Have spinach (cooked or raw), colorful fruits, walnuts, hazelnuts, almonds in your diet.  Monitor the vision in each eye weekly - call if any sudden persistent changes.  Continue same medications.  Return visit 6 months for an intraocular pressure check, glaucoma OCT, retinal OCT, and Galvan Visual Field.  Avtar Mccullough M.D.  [phone]     "

## 2020-11-16 NOTE — PROGRESS NOTES
" Current Eye Medications:  Latanoprost nightly both eyes, timolol every morning both eyes, last drops at 6am; AREDS 2 two daily     Subjective:  Comprehensive eye exam.   Pt reports is seeing well since got new glasses, vision better right  eye than left eye.    Got glasses 7/20 [facility]; their Rx.      Objective:  See Ophthalmology Exam.       Assessment:  Stable intraocular pressure and discs in patient who is a treated glaucoma suspect.      ICD-10-CM    1. Pseudophakia, ou; Yag Caps, os  Z96.1    2. 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 Macular drusen, bilateral  H35.363    4. Posterior vitreous detachment, left  H43.812    5. Encounter for examination of eyes and vision with abnormal findings  Z01.01    6. Presbyopia  H52.4 REFRACTIVE STATUS     EYE EXAM (SIMPLE-NONBILLABLE)        Plan:  Glasses Rx given - optional  Possible clouding of posterior capsule right eye discussed.  Take a multiple vitamin or \"eye vitamin\" daily (AREDS2).  Protect your eyes outdoors from ultraviolet rays with sunglasses and/or brimmed hat.  Have spinach (cooked or raw), colorful fruits, walnuts, hazelnuts, almonds in your diet.  Monitor the vision in each eye weekly - call if any sudden persistent changes.  Continue same medications.  Return visit 6 months for an intraocular pressure check, glaucoma OCT, retinal OCT, and Galvan Visual Field.  Avtar Mccullough M.D.  [phone]          "

## 2020-11-16 NOTE — LETTER
"    11/16/2020         RE: Zack Demarco  [address] [facility] [address]        Dear Colleague,    Thank you for referring your patient, Zack Demarco, to the [facility]. Please see a copy of my visit note below.     Current Eye Medications:  Latanoprost nightly both eyes, timolol every morning both eyes, last drops at 6am; AREDS 2 two daily     Subjective:  Comprehensive eye exam.   Pt reports is seeing well since got new glasses, vision better right  eye than left eye.    Got glasses 7/20 [facility]; their Rx.      Objective:  See Ophthalmology Exam.       Assessment:  Stable intraocular pressure and discs in patient who is a treated glaucoma suspect.      ICD-10-CM    1. Pseudophakia, ou; Yag Caps, os  Z96.1    2. 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 Macular drusen, bilateral  H35.363    4. Posterior vitreous detachment, left  H43.812    5. Encounter for examination of eyes and vision with abnormal findings  Z01.01    6. Presbyopia  H52.4 REFRACTIVE STATUS     EYE EXAM (SIMPLE-NONBILLABLE)        Plan:  Glasses Rx given - optional  Possible clouding of posterior capsule right eye discussed.  Take a multiple vitamin or \"eye vitamin\" daily (AREDS2).  Protect your eyes outdoors from ultraviolet rays with sunglasses and/or brimmed hat.  Have spinach (cooked or raw), colorful fruits, walnuts, hazelnuts, almonds in your diet.  Monitor the vision in each eye weekly - call if any sudden persistent changes.  Continue same medications.  Return visit 6 months for an intraocular pressure check, glaucoma OCT, retinal OCT, and Galvan Visual Field.  Avtar Mccullough M.D.  [phone]              Again, thank you for allowing me to participate in the care of your patient.        Sincerely,        Avtar Mccullough MD    "

## 2020-11-18 ASSESSMENT — ENCOUNTER SYMPTOMS
SHORTNESS OF BREATH: 1
ARTHRALGIAS: 1

## 2020-11-18 ASSESSMENT — ACTIVITIES OF DAILY LIVING (ADL): CURRENT_FUNCTION: NO ASSISTANCE NEEDED

## 2020-11-18 NOTE — PROGRESS NOTES
"SUBJECTIVE:   Zack Demarco is a 80 year old male who presents for Preventive Visit.      Patient has been advised of split billing requirements and indicates understanding: Yes   Are you in the first 12 months of your Medicare coverage?  No    Healthy Habits:     In general, how would you rate your overall health?  Good    Frequency of exercise:  1 day/week    Duration of exercise:  Less than 15 minutes    Do you usually eat at least 4 servings of fruit and vegetables a day, include whole grains    & fiber and avoid regularly eating high fat or \"junk\" foods?  No    Taking medications regularly:  Yes    Medication side effects:  None    Ability to successfully perform activities of daily living:  No assistance needed    Home Safety:  No safety concerns identified    Hearing Impairment:  No hearing concerns    In the past 6 months, have you been bothered by leaking of urine?  No    In general, how would you rate your overall mental or emotional health?  Good      PHQ-2 Total Score: 0    Additional concerns today:  No    Do you feel safe in your environment? Yes    Have you ever done Advance Care Planning? (For example, a Health Directive, POLST, or a discussion with a medical provider or your loved ones about your wishes): Yes, advance care planning is on file.      Fall risk       Cognitive Screening   1) Repeat 3 items (Leader, Season, Table)    2) Clock draw: NORMAL  3) 3 item recall: Recalls 3 objects  Results: 3 items recalled: COGNITIVE IMPAIRMENT LESS LIKELY    Mini-CogTM Copyright S Sandy. Licensed by the author for use in [facility]; reprinted with permission ([email or web address]). All rights reserved.      CAD/HTN - he reports intermittent exertional shortness of breath and chest tightness. But this is mild and does not happen all the time. For example he walked a mile yesterday without problems. 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 Also has varicose veins. Not checking BP lately. BP not " controlled in clinic.  Evaluation and treatment:    Was Hospitalized 11/22 to 11/27/17 - received CABG x 3.   Metoprolol 25 mg bid - dose limited by heart rate.   ASA qd   NTG prn but not using lately.   Losartan/HCTZ 50/12.5 qd stopped in the Hospital because it is not needed.   Losartan 50 mg daily - no side effects.   Compression stockings.   He follows with cardiology - he has virtual visit in Dec - I asked him to bring up the exertional symptoms.   I asked him to increase the Losartan to 100 mg. But new BP monitor and check daily and report next week to me at virtual visit.    BP Readings from Last 6 Encounters:   11/19/20 (!) 166/71   10/15/20 134/61   08/20/20 138/61   08/20/20 138/61   06/22/20 132/69   06/18/20 (!) 171/65       Last Comprehensive Metabolic Panel:  Sodium   Date Value Ref Range Status   10/15/2020 141 133 - 144 mmol/L Final     Potassium   Date Value Ref Range Status   10/15/2020 4.1 3.4 - 5.3 mmol/L Final     Chloride   Date Value Ref Range Status   10/15/2020 110 (H) 94 - 109 mmol/L Final     Carbon Dioxide   Date Value Ref Range Status   10/15/2020 26 20 - 32 mmol/L Final     Anion Gap   Date Value Ref Range Status   10/15/2020 4 3 - 14 mmol/L Final     Glucose   Date Value Ref Range Status   10/15/2020 110 (H) 70 - 99 mg/dL Final     Urea Nitrogen   Date Value Ref Range Status   10/15/2020 21 7 - 30 mg/dL Final     Creatinine   Date Value Ref Range Status   10/15/2020 0.81 0.66 - 1.25 mg/dL Final     GFR Estimate   Date Value Ref Range Status   10/15/2020 85 >60 mL/min/[1.73_m2] Final     Comment:     Non  GFR Calc  Starting 12/18/2018, serum creatinine based estimated GFR (eGFR) will be   calculated using the Chronic Kidney Disease Epidemiology Collaboration   (CKD-EPI) equation.       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 No symptoms.  Evaluation and treatment:    He  follows with oncology.  CBC RESULTS:   Recent Labs   Lab Test 02/20/19  0756   WBC 5.9   RBC 4.80   HGB 14.8   HCT 44.4   MCV 93   MCH 30.8   MCHC 33.3   RDW 12.8          Dyslipidemia - has h/o CAD.  Evaluation and treatment:    Per ATP4, moderate to high intensity statin recommended.:   Crestor stopped due to cost   Simvastatin changed to Lipitor 40 mg qd in the Hospital - no side effects.   Continue same treatment.    Recent Labs   Lab Test 10/15/20  0721 04/11/19  0703 01/22/15  0816 01/22/15  0816 01/14/14  0747   CHOL 120 127   < > 154 154   HDL 35* 32*   < > 43 33*   LDL 58 61   < > 92 103   TRIG 135 168*   < > 93 87   CHOLHDLRATIO  --   --   --  3.6 4.7    < > = values in this interval not displayed.     Hypothyroidism - No thyroid symptoms.  Evaluation and treatment:    Synthroid 88 mcg every day.   Continue same tx.    TSH   Date Value Ref Range Status   10/15/2020 4.85 (H) 0.40 - 4.00 mU/L Final     T4 Free   Date Value Ref Range Status   10/15/2020 0.85 0.76 - 1.46 ng/dL Final     Obesity - some snoring but no known apnea.  Evaluation and treatment:    diet and exercise discussed.   I asked him to get under 200#.     Body mass index is 32.93 kg/m .    Wt Readings from Last 5 Encounters:   11/19/20 101.2 kg (223 lb)   10/15/20 99.8 kg (220 lb 1.6 oz)   08/20/20 99.6 kg (219 lb 8 oz)   08/20/20 99.6 kg (219 lb 9.3 oz)   06/22/20 101.2 kg (223 lb 3.2 oz)     BPH - stream is reduced. Nocturia x 1. Symptoms are not bad.  Evaluation and treatment:    I asked him to let me know if he wants treatment.    Right shoulder pain -   Evaluation and treatment:    He follows with ortho.   He received a steroid shot.    Hearing loss - he wants to hold off on hearing aides referral.     Umbilical hernia - noted on routine exam. He has no symptoms.  Evaluation and treatment:    He is advised to report any symptoms.   He is counseled on incarcerated hernia symptoms and to go to ED if those occur.    Surgical history  -    left rotator cuff surgery.    Had anterior tibialis repair (for foot drop) on 8/18/15 - good results.    CABG as above.   Left eye cataract surgery 12/17/18.   Appendectomy June 2019.   Right knee replacement June 2020.    Preventive: I advised 2nd Shingrix at our pharmacy 1st part of 2021.    Immunization History   Administered Date(s) Administered     Influenza (H1N1) 01/06/2010     Influenza (High Dose) 3 valent vaccine 10/15/2015, 10/20/2016, 09/25/2017, 09/24/2018     Influenza (IIV3) PF 09/16/2009, 09/16/2010, 10/16/2011, 09/26/2012, 10/25/2012, 10/01/2013, 11/24/2014     Influenza Vaccine IM 18-49 Yrs, RIV3 09/30/2019     Influenza, Quad, High Dose, Pf, 65yr + 09/22/2020     Pneumo Conj 13-V (2010&after) 08/14/2015, 01/22/2016     Pneumococcal 23 valent 11/30/2006, 10/22/2020     TD (ADULT, 7+) 08/25/2010     TDAP Vaccine (Adacel) 01/21/2013     TDAP Vaccine (Boostrix) 01/21/2013     Td (Adult), Adsorbed 07/19/2002     Zoster vaccine recombinant adjuvanted (SHINGRIX) 10/22/2020     Zoster vaccine, live 07/15/2008     Colonoscopy: 9/19/16 - advised to redo in 5 years.     Prostate CA screen: discussed controversy. Prostate mildly enlarged on 7/15/14.     PSA   Date Value Ref Range Status   06/09/2016 2.76 0 - 4 ug/L Final     AAA: 7/18/14 negative    Advanced Directive: previously brought a copy today.    Vit D Geriatrics Society Guidelines: Older adults should consume 4000 IU of Vit D daily from all sources. This will achieve serum level of 30. No need to test unless obese, malabsorption etc. You get only 100 units per glass of milk. 2000 units advised.    SH:    Marital status: , lives by himself in [place].  Kids: 2  Employment: Works at a machine shop.  Exercise: Walks a lot at work. Had been running 4 miles per day 5 times per week but not lately.  Tobacco: Quit smoking around 1980. Has 14 pack year history of smoking.  Etoh: rarely  Recreational drugs: denies  Caffeine: 2 per day    Reviewed  and updated as needed this visit by clinical staff                 Reviewed and updated as needed this visit by Provider                Social History     Tobacco Use     Smoking status: Former Smoker     Packs/day: 1.00     Years: 20.00     Pack years: 20.00     Types: Cigarettes     Start date: 1959     Quit date: 1979     Years since quittin.3     Smokeless tobacco: Former User   Substance Use Topics     Alcohol use: Yes     Alcohol/week: 0.0 standard drinks     Comment: rarely         Alcohol Use 11/15/2020   Prescreen: >3 drinks/day or >7 drinks/week? No   Prescreen: >3 drinks/day or >7 drinks/week? -               Current providers sharing in care for this patient include:   Patient Care Team:  Anastacio Love MD as PCP - General (Family Practice)  Anastacio Love MD as MD (Family Practice)  Rolando Toro MD as MD (Transplant)  Gal Bain MD as MD (Internal Medicine)  Pat Gaviria, JACK as Specialty Care Coordinator (Hematology & Oncology)  Jamaal Gonzalez MD as Assigned PCP  Avtar Mccullough MD as Assigned Surgical Provider  Agustin Rouse MD as Assigned Cancer Care Provider  [facility]igned [facility] and Vascular Provider    The following health maintenance items are reviewed in Epic and correct as of today:  Health Maintenance   Topic Date Due     MEDICARE ANNUAL WELLNESS VISIT  2020     ZOSTER IMMUNIZATION (3 of 3) 2020     FALL RISK ASSESSMENT  2021     COLORECTAL CANCER SCREENING  2021     LIPID  10/15/2021     TSH W/FREE T4 REFLEX  10/15/2021     EYE EXAM  2021     DTAP/TDAP/TD IMMUNIZATION (3 - Td) 2023     ADVANCE CARE PLANNING  2024     HEPATITIS C SCREENING  Completed     PHQ-2  Completed     INFLUENZA VACCINE  Completed     Pneumococcal Vaccine: Pediatrics (0 to 5 Years) and At-Risk Patients (6 to 64 Years)  Completed     Pneumococcal Vaccine: 65+ Years  Completed     IPV IMMUNIZATION  Aged Out     MENINGITIS  "IMMUNIZATION  Aged Out     HEPATITIS B IMMUNIZATION  Aged Out           Review of Systems   HENT: Positive for hearing loss.    Respiratory: Positive for shortness of breath.    Genitourinary: Negative for discharge and impotence.   Musculoskeletal: Positive for arthralgias.     Otherwise negative.    OBJECTIVE:   BP (!) 154/60   Pulse 65   Temp 96.1  F (35.6  C) (Tympanic)   Ht 1.753 m (5' 9\")   Wt 101.2 kg (223 lb)   SpO2 96%   BMI 32.93 kg/m   Estimated body mass index is 32.93 kg/m  as calculated from the following:    Height as of this encounter: 1.753 m (5' 9\").    Weight as of this encounter: 101.2 kg (223 lb).  Physical Exam  GENERAL: healthy, alert and no distress  EYES: Eyes grossly normal to inspection, PERRL and conjunctivae and sclerae normal  HENT: ear canals and TM's normal, nose and mouth without ulcers or lesions  NECK: no adenopathy, no asymmetry, masses, or scars and thyroid normal to palpation  RESP: lungs clear to auscultation - no rales, rhonchi or wheezes  CV: regular rate and rhythm, normal S1 S2, no S3 or S4, no murmur, click or rub, no peripheral edema and peripheral pulses strong  ABDOMEN: soft, nontender, no hepatosplenomegaly, no masses and bowel sounds normal  MS: no gross musculoskeletal defects noted, no edema  SKIN: no suspicious lesions or rashes  NEURO: Normal strength and tone, mentation intact and speech normal  PSYCH: mentation appears normal, affect normal/bright        ASSESSMENT / PLAN:     COUNSELING:  Reviewed preventive health counseling, as reflected in patient instructions       Regular exercise       Healthy diet/nutrition    Estimated body mass index is 32.23 kg/m  as calculated from the following:    Height as of 8/20/20: 1.76 m (5' 9.29\").    Weight as of 10/15/20: 99.8 kg (220 lb 1.6 oz).        He reports that he quit smoking about 41 years ago. His smoking use included cigarettes. He started smoking about 61 years ago. He has a 20.00 pack-year smoking " history. He has quit using smokeless tobacco.      Appropriate preventive services were discussed with this patient, including applicable screening as appropriate for cardiovascular disease, diabetes, osteopenia/osteoporosis, and glaucoma.  As appropriate for age/gender, discussed screening for colorectal cancer, prostate cancer, breast cancer, and cervical cancer. Checklist reviewing preventive services available has been given to the patient.    Reviewed patients plan of care and provided an AVS. The Basic Care Plan (routine screening as documented in Health Maintenance) for Zack meets the Care Plan requirement. This Care Plan has been established and reviewed with the Patient.    Assessment and Plan - Decision Making    1. Encounter for Medicare annual wellness exam    Results of today's exam given to patient verbally along with age appropriate preventive measures. Written instructions reviewed and handed to the patient.      2. Hyperlipidemia LDL goal <100    Per HPI    - atorvastatin (LIPITOR) 40 MG tablet; Take 1 tablet (40 mg) by mouth daily  Dispense: 90 tablet; Refill: 3    3. Hypothyroidism, unspecified type    Per HPI    - levothyroxine (SYNTHROID/LEVOTHROID) 88 MCG tablet; Take 1 tablet (88 mcg) by mouth daily  Dispense: 90 tablet; Refill: 3    4. Hypertension goal BP (blood pressure) < 140/90    Per HPI    - losartan (COZAAR) 100 MG tablet; Take 1 tablet (100 mg) by mouth daily New dose  Dispense: 90 tablet; Refill: 3  - metoprolol tartrate (LOPRESSOR) 25 MG tablet; Take 1 tablet (25 mg) by mouth 2 times daily  Dispense: 180 tablet; Refill: 3      Written instructions given as follows:    Patient Instructions   1. I want you to get under 200#.    2. I recommend including veggies, fresh fruits (3 to 5 servings), nuts (unsalted) in your daily diet. Cut down on carbs like bread, pasta, rice, potatoes. Cut down on red meats like burgers etc. Increase healthy proteins like beans, tofu, tuna, chicken and  turkey.    3. Get regular exercise like jogging, biking, swimming at least 3 times per week (150 minutes per week).      4. Avoid the sun or otherwise use sun screen.    5. See the dentist and eye doctor regularly.     6. When you see your cardiologist next week, bring up the exertional symptoms.    7. Stop by our pharmacy and get the 2nd Shingles vaccine 1st part of 2021.    8. Let's increase the Losartan to 100 mg daily - buy new BP monitor and check daily - report that to me at our appointment in a couple of weeks.

## 2020-11-18 NOTE — PATIENT INSTRUCTIONS
1. I want you to get under 200#.    2. I recommend including veggies, fresh fruits (3 to 5 servings), nuts (unsalted) in your daily diet. Cut down on carbs like bread, pasta, rice, potatoes. Cut down on red meats like burgers etc. Increase healthy proteins like beans, tofu, tuna, chicken and turkey.    3. Get regular exercise like jogging, biking, swimming at least 3 times per week (150 minutes per week).      4. Avoid the sun or otherwise use sun screen.    5. See the dentist and eye doctor regularly.     6. When you see your cardiologist next week, bring up the exertional symptoms.    7. Stop by our pharmacy and get the 2nd Shingles vaccine 1st part of 2021.    8. Let's increase the Losartan to 100 mg daily - buy new BP monitor and check daily - report that to me at our appointment in a couple of weeks.

## 2020-11-19 ENCOUNTER — OFFICE VISIT (OUTPATIENT)
Dept: FAMILY MEDICINE | Facility: CLINIC | Age: 80
End: 2020-11-19
Payer: MEDICARE

## 2020-11-19 VITALS
TEMPERATURE: 96.1 F | DIASTOLIC BLOOD PRESSURE: 60 MMHG | WEIGHT: 223 LBS | HEIGHT: 69 IN | SYSTOLIC BLOOD PRESSURE: 154 MMHG | OXYGEN SATURATION: 96 % | HEART RATE: 65 BPM | BODY MASS INDEX: 33.03 KG/M2

## 2020-11-19 DIAGNOSIS — E78.5 HYPERLIPIDEMIA LDL GOAL <100: ICD-10-CM

## 2020-11-19 DIAGNOSIS — I10 HYPERTENSION GOAL BP (BLOOD PRESSURE) < 140/90: ICD-10-CM

## 2020-11-19 DIAGNOSIS — E03.9 HYPOTHYROIDISM, UNSPECIFIED TYPE: ICD-10-CM

## 2020-11-19 DIAGNOSIS — Z00.00 ENCOUNTER FOR MEDICARE ANNUAL WELLNESS EXAM: Primary | ICD-10-CM

## 2020-11-19 PROCEDURE — 99213 OFFICE O/P EST LOW 20 MIN: CPT | Mod: 25 | Performed by: FAMILY MEDICINE

## 2020-11-19 PROCEDURE — G0439 PPPS, SUBSEQ VISIT: HCPCS | Performed by: FAMILY MEDICINE

## 2020-11-19 RX ORDER — LEVOTHYROXINE SODIUM 88 UG/1
88 TABLET ORAL DAILY
Qty: 90 TABLET | Refills: 3 | Status: SHIPPED | OUTPATIENT
Start: 2020-11-19 | End: 2023-01-12

## 2020-11-19 RX ORDER — ATORVASTATIN CALCIUM 40 MG/1
40 TABLET, FILM COATED ORAL DAILY
Qty: 90 TABLET | Refills: 3 | Status: SHIPPED | OUTPATIENT
Start: 2020-11-19 | End: 2021-08-31

## 2020-11-19 RX ORDER — LOSARTAN POTASSIUM 100 MG/1
100 TABLET ORAL DAILY
Qty: 90 TABLET | Refills: 3 | Status: SHIPPED | OUTPATIENT
Start: 2020-11-19 | End: 2021-03-02

## 2020-11-19 RX ORDER — METOPROLOL TARTRATE 25 MG/1
25 TABLET, FILM COATED ORAL 2 TIMES DAILY
Qty: 180 TABLET | Refills: 3 | Status: SHIPPED | OUTPATIENT
Start: 2020-11-19 | End: 2021-08-31

## 2020-11-19 ASSESSMENT — MIFFLIN-ST. JEOR: SCORE: 1716.9

## 2020-11-25 ENCOUNTER — VIRTUAL VISIT (OUTPATIENT)
Dept: FAMILY MEDICINE | Facility: CLINIC | Age: 80
End: 2020-11-25
Payer: MEDICARE

## 2020-11-25 DIAGNOSIS — I10 HYPERTENSION GOAL BP (BLOOD PRESSURE) < 140/90: Primary | ICD-10-CM

## 2020-11-25 PROCEDURE — 99441 PR PHYSICIAN TELEPHONE EVALUATION 5-10 MIN: CPT | Mod: 95 | Performed by: FAMILY MEDICINE

## 2020-11-25 NOTE — PROGRESS NOTES
"Zack Demarco is a 80 year old male who is being evaluated via a billable telephone visit.      The patient has been notified of following:     \"This telephone visit will be conducted via a call between you and your physician/provider. We have found that certain health care needs can be provided without the need for a physical exam.  This service lets us provide the care you need with a short phone conversation.  If a prescription is necessary we can send it directly to your pharmacy.  If lab work is needed we can place an order for that and you can then stop by our lab to have the test done at a later time.    Telephone visits are billed at different rates depending on your insurance coverage. During this emergency period, for some insurers they may be billed the same as an in-person visit.  Please reach out to your insurance provider with any questions.    If during the course of the call the physician/provider feels a telephone visit is not appropriate, you will not be charged for this service.\"    Patient has given verbal consent for Telephone visit?  Yes    What phone number would you like to be contacted at? [phone]    How would you like to obtain your AVS? Vonniehart    Subjective     Zack Demarco is a 80 year old male who presents via phone visit today for the following health issues:    HPI         Please note: only the issues listed at the bottom under Assessment and Plan are addressed today. The rest of the medical problems are listed for the sake of completeness.    CAD/HTN - he reports intermittent exertional shortness of breath and chest tightness. But this is mild and does not happen all the time. For example he walked a mile yesterday without problems. 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 Also has varicose veins. Checking BP daily - around 130's/80's.  Evaluation and treatment:    Was Hospitalized 11/22 to 11/27/17 - received CABG x 3.   Metoprolol 25 mg bid - dose limited by heart " rate.   ASA qd   NTG prn but not using lately.   Losartan/HCTZ 50/12.5 qd stopped in the Hospital because it is not needed.   Losartan 100 mg daily - no side effects.   Compression stockings.   He follows with cardiology - he has virtual visit in Dec - I asked him to bring up the exertional symptoms.   Otherwise continue same tx.    BP Readings from Last 6 Encounters:   11/19/20 (!) 154/60   10/15/20 134/61   08/20/20 138/61   08/20/20 138/61   06/22/20 132/69   06/18/20 (!) 171/65       Last Comprehensive Metabolic Panel:  Sodium   Date Value Ref Range Status   10/15/2020 141 133 - 144 mmol/L Final     Potassium   Date Value Ref Range Status   10/15/2020 4.1 3.4 - 5.3 mmol/L Final     Chloride   Date Value Ref Range Status   10/15/2020 110 (H) 94 - 109 mmol/L Final     Carbon Dioxide   Date Value Ref Range Status   10/15/2020 26 20 - 32 mmol/L Final     Anion Gap   Date Value Ref Range Status   10/15/2020 4 3 - 14 mmol/L Final     Glucose   Date Value Ref Range Status   10/15/2020 110 (H) 70 - 99 mg/dL Final     Urea Nitrogen   Date Value Ref Range Status   10/15/2020 21 7 - 30 mg/dL Final     Creatinine   Date Value Ref Range Status   10/15/2020 0.81 0.66 - 1.25 mg/dL Final     GFR Estimate   Date Value Ref Range Status   10/15/2020 85 >60 mL/min/[1.73_m2] Final     Comment:     Non  GFR Calc  Starting 12/18/2018, serum creatinine based estimated GFR (eGFR) will be   calculated using the Chronic Kidney Disease Epidemiology Collaboration   (CKD-EPI) equation.       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 No symptoms.  Evaluation and treatment:    He follows with oncology.  CBC RESULTS:   Recent Labs   Lab Test 02/20/19  0756   WBC 5.9   RBC 4.80   HGB 14.8   HCT 44.4   MCV 93   MCH 30.8   MCHC 33.3   RDW 12.8          Dyslipidemia - has h/o CAD.  Evaluation and treatment:    Per ATP4, moderate to  high intensity statin recommended.:   Crestor stopped due to cost   Simvastatin changed to Lipitor 40 mg qd in the Hospital - no side effects.   Continue same treatment.    Recent Labs   Lab Test 10/15/20  0721 04/11/19  0703 01/22/15  0816 01/22/15  0816 01/14/14  0747   CHOL 120 127   < > 154 154   HDL 35* 32*   < > 43 33*   LDL 58 61   < > 92 103   TRIG 135 168*   < > 93 87   CHOLHDLRATIO  --   --   --  3.6 4.7    < > = values in this interval not displayed.     Hypothyroidism - No thyroid symptoms.  Evaluation and treatment:    Synthroid 88 mcg every day.   Continue same tx.    TSH   Date Value Ref Range Status   10/15/2020 4.85 (H) 0.40 - 4.00 mU/L Final     T4 Free   Date Value Ref Range Status   10/15/2020 0.85 0.76 - 1.46 ng/dL Final     Obesity - some snoring but no known apnea.  Evaluation and treatment:    diet and exercise discussed.   I asked him to get under 200#.     There is no height or weight on file to calculate BMI.    Wt Readings from Last 5 Encounters:   11/19/20 101.2 kg (223 lb)   10/15/20 99.8 kg (220 lb 1.6 oz)   08/20/20 99.6 kg (219 lb 8 oz)   08/20/20 99.6 kg (219 lb 9.3 oz)   06/22/20 101.2 kg (223 lb 3.2 oz)     BPH - stream is reduced. Nocturia x 1. Symptoms are not bad.  Evaluation and treatment:    I asked him to let me know if he wants treatment.    Right shoulder pain -   Evaluation and treatment:    He follows with ortho.   He received a steroid shot.    Hearing loss - he wants to hold off on hearing aides referral.     Umbilical hernia - noted on routine exam. He has no symptoms.  Evaluation and treatment:    He is advised to report any symptoms.   He is counseled on incarcerated hernia symptoms and to go to ED if those occur.    Surgical history -    left rotator cuff surgery.    Had anterior tibialis repair (for foot drop) on 8/18/15 - good results.    CABG as above.   Left eye cataract surgery 12/17/18.   Appendectomy June 2019.   Right knee replacement June  2020.    Preventive: I advised 2nd Shingrix at our pharmacy 1st part of 2021.    Immunization History   Administered Date(s) Administered     Influenza (H1N1) 01/06/2010     Influenza (High Dose) 3 valent vaccine 10/15/2015, 10/20/2016, 09/25/2017, 09/24/2018     Influenza (IIV3) PF 09/16/2009, 09/16/2010, 10/16/2011, 09/26/2012, 10/25/2012, 10/01/2013, 11/24/2014     Influenza Vaccine IM 18-49 Yrs, RIV3 09/30/2019     Influenza, Quad, High Dose, Pf, 65yr + 09/22/2020     Pneumo Conj 13-V (2010&after) 08/14/2015, 01/22/2016     Pneumococcal 23 valent 11/30/2006, 10/22/2020     TD (ADULT, 7+) 08/25/2010     TDAP Vaccine (Adacel) 01/21/2013     TDAP Vaccine (Boostrix) 01/21/2013     Td (Adult), Adsorbed 07/19/2002     Zoster vaccine recombinant adjuvanted (SHINGRIX) 10/22/2020     Zoster vaccine, live 07/15/2008     Colonoscopy: 9/19/16 - advised to redo in 5 years.     Prostate CA screen: discussed controversy. Prostate mildly enlarged on 7/15/14.     PSA   Date Value Ref Range Status   06/09/2016 2.76 0 - 4 ug/L Final     AAA: 7/18/14 negative    Advanced Directive: previously brought a copy today.    Vit D Geriatrics Society Guidelines: Older adults should consume 4000 IU of Vit D daily from all sources. This will achieve serum level of 30. No need to test unless obese, malabsorption etc. You get only 100 units per glass of milk. 2000 units advised.    SH:    Marital status: , lives by himself in [place].  Kids: 2  Employment: Works at a machine shop.  Exercise: Walks a lot at work. Had been running 4 miles per day 5 times per week but not lately.  Tobacco: Quit smoking around 1980. Has 14 pack year history of smoking.  Etoh: rarely  Recreational drugs: denies  Caffeine: 2 per day    Exam:    There were no vitals taken for this visit.    Gen: sounded healthy on the phone.    Assessment and Plan - Decision Making    1. Hypertension goal BP (blood pressure) < 140/90    Per HPI        Written instructions  given as follows:    Patient Instructions   1. I want you to get under 200#.    2. I recommend including veggies, fresh fruits (3 to 5 servings), nuts (unsalted) in your daily diet. Cut down on carbs like bread, pasta, rice, potatoes. Cut down on red meats like burgers etc. Increase healthy proteins like beans, tofu, tuna, chicken and turkey.    3. Get regular exercise like jogging, biking, swimming at least 3 times per week (150 minutes per week).      4. When you see your cardiologist next week, bring up the exertional symptoms.    5. Stop by our pharmacy and get the 2nd Shingles vaccine 1st part of 2021.    6. See you in person in clinic in 6 months - we will check your blood pressure, weight and kidney function at that time.     Total time on the phone and reviewing records: 6 min

## 2020-12-01 ENCOUNTER — VIRTUAL VISIT (OUTPATIENT)
Dept: CARDIOLOGY | Facility: CLINIC | Age: 80
End: 2020-12-01
Payer: MEDICARE

## 2020-12-01 DIAGNOSIS — R07.9 CHEST PAIN, UNSPECIFIED TYPE: Primary | ICD-10-CM

## 2020-12-01 PROCEDURE — 99213 OFFICE O/P EST LOW 20 MIN: CPT | Mod: 95 | Performed by: INTERNAL MEDICINE

## 2020-12-01 RX ORDER — ISOSORBIDE MONONITRATE 30 MG/1
30 TABLET, EXTENDED RELEASE ORAL DAILY
Qty: 90 TABLET | Refills: 3 | Status: SHIPPED | OUTPATIENT
Start: 2020-12-01 | End: 2021-08-31

## 2020-12-01 NOTE — PATIENT INSTRUCTIONS
1.  Echo and Matilde nuc stress this week  2.  Imdur 30mg po qam  3.  Virtual video visit followup 3mo  4.  Call patient in 2-3 weeks for BP/pulse/progress check

## 2020-12-01 NOTE — PROGRESS NOTES
"Zack Demarco is a 80 year old male who is being evaluated via a billable video visit.      The patient has been notified of following:     \"This video visit will be conducted via a call between you and your physician/provider. We have found that certain health care needs can be provided without the need for an in-person physical exam.  This service lets us provide the care you need with a video conversation.  If a prescription is necessary we can send it directly to your pharmacy.  If lab work is needed we can place an order for that and you can then stop by our lab to have the test done at a later time.    Video visits are billed at different rates depending on your insurance coverage.  Please reach out to your insurance provider with any questions.    If during the course of the call the physician/provider feels a video visit is not appropriate, you will not be charged for this service.\"    Patient has given verbal consent for Video visit? Yes  How would you like to obtain your AVS? MyChart  If you are dropped from the video visit, the video invite should be resent to: Text to cell phone: [phone]  Will anyone else be joining your video visit? No      Video-Visit Details    Type of service:  Video Visit    Video Start Time: 833am    Video End Time: 856am    Originating Location (pt. Location): Patient home    Distant Location (provider location):  Home office    Platform used for Video Visit: Diana    See dictation#[id number]        "

## 2020-12-01 NOTE — PROGRESS NOTES
2020         Anastacio Love MD   [facility]    [address] [place] [address]      Patient:  Zack Demarco   MRN:  [id number]   :  1941         Dear Dr. Love:      It was a pleasure participating in the care of your patient, Mr. Zack Demarco.  As you know, he is a 80-year-old gentleman who I saw today over a virtual video visit via [employer] for chest discomfort, shortness of breath, hypertension, and hyperlipidemia.      His past medical history is significant for the followin.  Hypertension.   2.  Hyperlipidemia.   3.  Retroperitoneal mass with follicular lymphoma, undergoing therapy.   4.  Hypothyroidism.   5.  Borderline glaucoma.   6.  Right knee problems.   7.  Lipoma.   8.  Patellar tendinitis.      His cardiac history is significant for multivessel coronary artery disease and normal LV systolic function.  Coronary angiography 2017 revealed the following:      Left main -- normal.   LAD -- 70% to 80% ostial stenosis with 80% to 90% stenosis in the mid-portion.   First diagonal -- 80% to 90% stenosis.   Circumflex -- mild diffuse disease.   RCA -- 70% stenosis in the mid-portion.      Three-vessel coronary bypass surgery was performed (LIMA to LAD, vein graft to PDA, vein graft to D1).      He originally presented with centrally-located chest tightness with shortness of breath that completely resolved after revascularization.      I last saw him 2019.  At that time, he was doing adequately.  He presents today for continuing care.      Since I last saw him, the COVID pandemic has taken place and he is no longer able to go to his gym to work on the stair-stepping machine and do his cycling class.  He is much less active than before.  However, he has complained of some new symptoms.      On 2020, he went deer hunting and while walking up a hill and up and down his tree stand, he experienced some chest tightness with shortness of breath.  He does  notice that this was the single and only episode of chest tightness that he has felt; however, he does get mildly short of breath if he exerts himself now as well.  He says that if he is carrying a load of laundry up the stairs, he will get short of breath, or if he is walking up a hill.  He does do 40-minute walks on a daily basis and feels fine if it is on a flat ground.  However, if he has to push himself slightly harder, then he will get mild symptoms.  They are not accompanied by any radiation or diaphoresis.      He did have a knee replacement at the end of  and was lot less active due to this and feels that part of this certainly could be deconditioning, but he is not sure.      Blood pressures at home have been running in the 138/63 range with a pulse in the 0s.      REVIEW OF SYSTEMS:  Ten-point review of systems is positive for the above-mentioned symptoms.  He otherwise denies gross PND, orthopnea, edema, palpitations, syncope or near-syncope.      In terms of his present medications, he is takin.  Half of 325 mg of aspirin every day.   2.  Lipitor 40 mg a day.   3.  Levothyroxine.   4.  Losartan 100 mg a day.   5.  Metoprolol tartrate 25 twice daily.      PHYSICAL EXAMINATION:     VITAL SIGNS:  His blood pressures at home have been generally running in the 130s/60s, pulse in the 50s.  However, he has not been taking his medications prior to taking his blood pressures and also not giving them a chance to enter his system.    GENERAL:  He is comfortable, well groomed.   PSYCHIATRIC:  He is alert and oriented x 3.   EYES:  Do not appear grossly erythematous or have exudate.     RESPIRATORY:  He is breathing comfortably without gross cough.      The remainder of the comprehensive physical exam was deferred secondary to COVID-19 pandemic and secondary to video visit restrictions.      LABORATORY DATA:  10/15/2020, his LDL was 58, potassium 4.1, GFR normal, hemoglobin normal.        IMPRESSION:       Zack is a 80-year-old gentleman who has several active issues:      1.  Coronary artery disease:      The patient has known multivessel coronary artery disease and normal LV systolic function.  He underwent 3-vessel coronary bypass surgery (LIMA to LAD, vein graft to PDA, vein graft to first diagonal) on 11/24/2017.      Since revascularization, he had not had recurrent angina until recently.  However, on 11/07/2020, while deer hunting and performing strenuous activity, he did experience his centrally-located chest tightness with shortness of breath on a single occasion, which resolved with rest quite quickly.      This is new for him since his bypass, and he continues to experience some mild exertional dyspnea as well, whenever he pushes himself.  He is not quite sure if this represents his typical angina, as he did have a knee replacement at the end of June and became more deconditioned.  Further noninvasive evaluation would be indicated.      2.  Hypertension:      Blood pressures at home have still been running in the mid 130s; however, he has not been taking his medications a couple hours prior to taking his blood pressures and he has not been resting quietly for 10-15 minutes prior to taking them.  He will gather further information since maximizing his losartan with you on a recent visit.      3.  Hyperlipidemia:  His LDL is well controlled in the 50s.        PLAN:     1.  Start Imdur 30 mg a day.     2.  He will track his blood pressures 2-3 hours after his morning medications and after resting quietly for 15 minutes.     3.  Echocardiogram to rule out significant new structural pathology as a cause for his symptoms.     4.  Pharmacologic nuclear stress test in order to rule out new inducible ischemia as a cause for symptoms (the patient cannot run).     5.  He is instructed to seek more immediate medical attention in the emergency room should his symptoms change significantly.     6.  One of our staff  will call for BP/pulse/progress check in 2 weeks.  Otherwise, he can follow up with me via virtual video visit in 3 months, earlier if needed.      Once again, it was a pleasure participating in the care of your patient, Mr. Zack Trivedi.  Please feel free to contact me anytime if any questions regarding his care in the future.         Sincerely,      TRUNG BENITO MD        Addendum 20:    Patient home BPs running low 100s systolic, pulse high 40s low 50s    No comments on symptoms or how patient feeling, however no complaints mentioned    Plan:    Continue present course for now     D: 2020   T: 2020   MT: REGI      Name:     ZACK TRIVEDI   MRN:      [id number]        Account:      [id number]   :      1940      Document: [id number]       cc: Anastacio Love MD

## 2020-12-18 ENCOUNTER — ANCILLARY PROCEDURE (OUTPATIENT)
Dept: CARDIOLOGY | Facility: CLINIC | Age: 80
End: 2020-12-18
Attending: INTERNAL MEDICINE
Payer: MEDICARE

## 2020-12-18 DIAGNOSIS — R07.9 CHEST PAIN, UNSPECIFIED TYPE: ICD-10-CM

## 2020-12-18 PROCEDURE — 93306 TTE W/DOPPLER COMPLETE: CPT | Performed by: INTERNAL MEDICINE

## 2021-01-06 ENCOUNTER — OFFICE VISIT (OUTPATIENT)
Dept: TRANSPLANT | Facility: CLINIC | Age: 81
End: 2021-01-06
Attending: INTERNAL MEDICINE
Payer: MEDICARE

## 2021-01-06 VITALS
SYSTOLIC BLOOD PRESSURE: 157 MMHG | WEIGHT: 215.8 LBS | OXYGEN SATURATION: 96 % | RESPIRATION RATE: 16 BRPM | HEART RATE: 48 BPM | DIASTOLIC BLOOD PRESSURE: 70 MMHG | BODY MASS INDEX: 31.87 KG/M2 | TEMPERATURE: 97 F

## 2021-01-06 DIAGNOSIS — C82.99 FOLLICULAR LYMPHOMA OF EXTRANODAL AND SOLID ORGAN SITES (H): Primary | ICD-10-CM

## 2021-01-06 DIAGNOSIS — C82.99 FOLLICULAR LYMPHOMA OF EXTRANODAL AND SOLID ORGAN SITES (H): ICD-10-CM

## 2021-01-06 LAB
ALBUMIN SERPL-MCNC: 3.8 G/DL (ref 3.4–5)
ALP SERPL-CCNC: 66 U/L (ref 40–150)
ALT SERPL W P-5'-P-CCNC: 28 U/L (ref 0–70)
ANION GAP SERPL CALCULATED.3IONS-SCNC: 3 MMOL/L (ref 3–14)
AST SERPL W P-5'-P-CCNC: 14 U/L (ref 0–45)
BASOPHILS # BLD AUTO: 0 10E9/L (ref 0–0.2)
BASOPHILS NFR BLD AUTO: 0.2 %
BILIRUB SERPL-MCNC: 0.5 MG/DL (ref 0.2–1.3)
BUN SERPL-MCNC: 29 MG/DL (ref 7–30)
CALCIUM SERPL-MCNC: 9.4 MG/DL (ref 8.5–10.1)
CHLORIDE SERPL-SCNC: 112 MMOL/L (ref 94–109)
CO2 SERPL-SCNC: 26 MMOL/L (ref 20–32)
CREAT SERPL-MCNC: 0.86 MG/DL (ref 0.66–1.25)
DIFFERENTIAL METHOD BLD: NORMAL
EOSINOPHIL # BLD AUTO: 0.2 10E9/L (ref 0–0.7)
EOSINOPHIL NFR BLD AUTO: 3.7 %
ERYTHROCYTE [DISTWIDTH] IN BLOOD BY AUTOMATED COUNT: 13.2 % (ref 10–15)
GFR SERPL CREATININE-BSD FRML MDRD: 82 ML/MIN/{1.73_M2}
GLUCOSE SERPL-MCNC: 91 MG/DL (ref 70–99)
HCT VFR BLD AUTO: 42.6 % (ref 40–53)
HGB BLD-MCNC: 13.8 G/DL (ref 13.3–17.7)
IMM GRANULOCYTES # BLD: 0 10E9/L (ref 0–0.4)
IMM GRANULOCYTES NFR BLD: 0.2 %
LYMPHOCYTES # BLD AUTO: 0.9 10E9/L (ref 0.8–5.3)
LYMPHOCYTES NFR BLD AUTO: 16.2 %
MCH RBC QN AUTO: 30.7 PG (ref 26.5–33)
MCHC RBC AUTO-ENTMCNC: 32.4 G/DL (ref 31.5–36.5)
MCV RBC AUTO: 95 FL (ref 78–100)
MONOCYTES # BLD AUTO: 0.5 10E9/L (ref 0–1.3)
MONOCYTES NFR BLD AUTO: 9 %
NEUTROPHILS # BLD AUTO: 3.8 10E9/L (ref 1.6–8.3)
NEUTROPHILS NFR BLD AUTO: 70.7 %
NRBC # BLD AUTO: 0 10*3/UL
NRBC BLD AUTO-RTO: 0 /100
PLATELET # BLD AUTO: 211 10E9/L (ref 150–450)
POTASSIUM SERPL-SCNC: 4.5 MMOL/L (ref 3.4–5.3)
PROT SERPL-MCNC: 7.3 G/DL (ref 6.8–8.8)
RBC # BLD AUTO: 4.5 10E12/L (ref 4.4–5.9)
SODIUM SERPL-SCNC: 140 MMOL/L (ref 133–144)
WBC # BLD AUTO: 5.4 10E9/L (ref 4–11)

## 2021-01-06 PROCEDURE — 85025 COMPLETE CBC W/AUTO DIFF WBC: CPT | Performed by: INTERNAL MEDICINE

## 2021-01-06 PROCEDURE — G0463 HOSPITAL OUTPT CLINIC VISIT: HCPCS

## 2021-01-06 PROCEDURE — 80053 COMPREHEN METABOLIC PANEL: CPT | Performed by: INTERNAL MEDICINE

## 2021-01-06 PROCEDURE — 36415 COLL VENOUS BLD VENIPUNCTURE: CPT

## 2021-01-06 PROCEDURE — 99213 OFFICE O/P EST LOW 20 MIN: CPT | Performed by: INTERNAL MEDICINE

## 2021-01-06 ASSESSMENT — PAIN SCALES - GENERAL: PAINLEVEL: NO PAIN (0)

## 2021-01-06 NOTE — LETTER
1/6/2021         RE: Zack Demarco  [address] [facility] [address]        Dear Colleague,    Thank you for referring your patient, Zack Demarco, to the [facility] [place]. Please see a copy of my visit note below.    Reason for Visit: seen in f/u of follicular lymphoma    ONCOLOGY SUMMARY (updated): Zack Demarco is a 78-year-old first seen in consultation on 12/27/2017 for a new diagnosis of follicular lymphoma. He was diagnosed incidental to an evaluation for cardiac bypass surgery in 11/2017. A chest CT scan on 11/14 showed an unexpected retroperitoneal mass with surrounding lymphadenopathy suspicious for malignancy.  A further CT scan done on the same day showed a 2.3 x 7.2 x 6.1 retroperitoneal soft tissue mass with adjacent lymphadenopathy that mildly narrowed the left renal vein. There also was nodularity within the mesentery and an enlarged hilar lymph node. CT-guided biopsy of the retroperitoneal mass on 12/12/2017 established the diagnosis, but the sample was too small for adequate grading. There was no disease identified outside of the abdomen; a bone marrow biopsy was not obtained as part of staging.      Relative to cardiac issues, he underwent an uncomplicated 3-vessel coronary artery bypass graft on 11/22/2017 and, subsequently, has been symptom-free.       With the renal and gonadal vein compression it was decided to start treatment with rituximab, alone. Mr. Demarco completed 4 weekly doses from 01/26 - 02/16/2018. He had no difficulty with the treatment and was able to receive rapid infusion (90 minutes) for the last 3 doses. Interval evaluation on 03/05/2018 with an US, suggested improvement. A CT scan on 04/09/2018 showed substantial regression. Repeated CT on 07/09/18 showed a good response but residual lymphoma around the renal veins. Decided to proceed weekly rituximab x4 (7/26-8/22/2018).  He started rituximab maintenance in  10/2018.    Subsequent CT scans on 10/16/2018 and 4/16/2019 showed stable findings.    He was a patient of Dr. Cunningham, who retired in March 2019    INTERVAL HISTORY:  Patient returns unaccompanied for follow-up follicular lymphoma. Overall he is doing well. He lives by himself. He is independent on ADLs. He stays active. Patient has been tolerating rituximab well.  He denies fever, chills, chest pain, night sweats, or weight loss. He wants to get his knee fixed and this is fine.    ROS: Complete ROS otherwise negative.    Current Outpatient Medications   Medication Sig Dispense Refill     aspirin 325 MG EC tablet 1/2 tab daily       atorvastatin (LIPITOR) 40 MG tablet Take 1 tablet (40 mg) by mouth daily 90 tablet 3     Cholecalciferol (VITAMIN D3 PO) Take by mouth daily       isosorbide mononitrate (IMDUR) 30 MG 24 hr tablet Take 1 tablet (30 mg) by mouth daily 90 tablet 3     latanoprost (XALATAN) 0.005 % ophthalmic solution Place 1 drop into both eyes At Bedtime 3 Bottle 4     levothyroxine (SYNTHROID/LEVOTHROID) 88 MCG tablet Take 1 tablet (88 mcg) by mouth daily 90 tablet 3     losartan (COZAAR) 100 MG tablet Take 1 tablet (100 mg) by mouth daily New dose 90 tablet 3     metoprolol tartrate (LOPRESSOR) 25 MG tablet Take 1 tablet (25 mg) by mouth 2 times daily 180 tablet 3     timolol maleate (TIMOPTIC) 0.5 % ophthalmic solution Place 1 drop Into the left eye every morning (Patient taking differently: Place 1 drop Into the left eye every morning Both eyes) 5 mL 11        No Known Allergies  Exam:  Blood pressure (!) 157/70, pulse (!) 48, temperature 97  F (36.1  C), temperature source Tympanic, resp. rate 16, weight 97.9 kg (215 lb 12.8 oz), SpO2 96 %.  Wt Readings from Last 4 Encounters:   01/06/21 97.9 kg (215 lb 12.8 oz)   11/19/20 101.2 kg (223 lb)   10/15/20 99.8 kg (220 lb 1.6 oz)   08/20/20 99.6 kg (219 lb 8 oz)     Constitutional: Awake and alert, appears well-developed, not in acute distress.  Eyes:  No scleral icterus. Eyes exhibit no discharge.  ENT/Mouth: Oral mucosa pink and moist  Cardiovascular: Normal rate, regular rhythm, S1, S2. No murmur or rub. Trace LE edema.  Respiratory: No respiratory distress. Clear to auscultation bilaterally. No wheezes.  Gastrointestinal: Soft. No distension. No tenderness or garding.  Neurological: AAOX3, grossly non-focal  Psychiatric: Mentation and affect appear normal.  Skin: Skin is warm, not diaphoretic.  Hematologic/Lymphatic/Immunologic: No overt bleeding. No lymphadenopathy    LABORATORY:  CBC   Lab Results   Component Value Date/Time    WBC 5.4 01/06/2021 08:58 AM    HGB 13.8 01/06/2021 08:58 AM    HCT 42.6 01/06/2021 08:58 AM     01/06/2021 08:58 AM    MCV 95 01/06/2021 08:58 AM    RBC 4.50 01/06/2021 08:58 AM    ANEU 3.8 01/06/2021 08:58 AM     BMP  Lab Results   Component Value Date/Time     01/06/2021 08:58 AM    POTASSIUM 4.5 01/06/2021 08:58 AM    CHLORIDE 112 (H) 01/06/2021 08:58 AM    CO2 26 01/06/2021 08:58 AM    BUN 29 01/06/2021 08:58 AM    CR 0.86 01/06/2021 08:58 AM    ELVIRA 9.4 01/06/2021 08:58 AM    MAG 2.2 11/27/2017 07:14 AM     LFTs   Lab Results   Component Value Date    PROTTOTAL 7.3 01/06/2021    ALBUMIN 3.8 01/06/2021    AST 14 01/06/2021    ALT 28 01/06/2021    BILITOTAL 0.5 01/06/2021    DBIL 0.1 11/24/2017    ALKPHOS 66 01/06/2021       IMAGING:  CT CHEST/ABDOMEN/PELVIS W CONTRAST, from Sep 2020 - reviewed. Lymphoma stable.      ASSESSMENT/PLAN:  1. Follicular lymphoma, stage IIA  (marrow not obtained), treated with rituximab weekly x 4 in 01/2018 and again in 07/2018 with a good response, but residual disease. Initiated maintenance rituximab (Q 2 month x 2 years) on 10/24/18 completed in 10/2020    -last imaging shows CT A/P - is stable  -he has no symptoms of lymphoma  -his labs are stable  -f/v with an [facility] in 4 months  -CT C/A/P in Sept 2021  -f/v with me after that    -2. CAD, s/p 3 vessel CABG: He is getting a stress test next  week      Agustin SHEPARD MS  Attending Physician  Pager - [phone]  Email - [email or web address]            Again, thank you for allowing me to participate in the care of your patient.        Sincerely,        Agustin Rouse MD

## 2021-01-06 NOTE — NURSING NOTE
"Oncology Rooming Note    January 6, 2021 9:37 AM   Zack Demarco is a 80 year old male who presents for:    Chief Complaint   Patient presents with     Blood Draw     Labs drawn via VPT by RN in lab. VS taken.      Oncology Clinic Visit     RETURN; FOLLICULAR LYMPHOMA      Initial Vitals: BP (!) 157/70   Pulse (!) 48   Temp 97  F (36.1  C) (Tympanic)   Resp 16   Wt 97.9 kg (215 lb 12.8 oz)   SpO2 96%   BMI 31.87 kg/m   Estimated body mass index is 31.87 kg/m  as calculated from the following:    Height as of 11/19/20: 1.753 m (5' 9\").    Weight as of this encounter: 97.9 kg (215 lb 12.8 oz). Body surface area is 2.18 meters squared.  No Pain (0) Comment: Data Unavailable   No LMP for male patient.  Allergies reviewed: Yes  Medications reviewed: Yes    Medications: Medication refills not needed today.  Pharmacy name entered into [employer]:    [id number] - [place], [place] - [address] [facility] - [place], [place] - [address]    Clinical concerns; PT HR in lab was 45. Rechecked pulse rate and 45 was still the HR.       Angi Drake MA             "

## 2021-01-06 NOTE — NURSING NOTE
Chief Complaint   Patient presents with     Blood Draw     Labs drawn via VPT by RN in lab. VS taken.      Labs collected from venipuncture by RN. Vitals taken. Checked in for appointment(s).    Mel SORIANO RN PHN BSN  BMT/Oncology Lab

## 2021-01-06 NOTE — LETTER
1/6/2021      RE: Zack Demarco  [address] [facility] [address]       Reason for Visit: seen in f/u of follicular lymphoma    ONCOLOGY SUMMARY (updated): Zack Demarco is a 78-year-old first seen in consultation on 12/27/2017 for a new diagnosis of follicular lymphoma. He was diagnosed incidental to an evaluation for cardiac bypass surgery in 11/2017. A chest CT scan on 11/14 showed an unexpected retroperitoneal mass with surrounding lymphadenopathy suspicious for malignancy.  A further CT scan done on the same day showed a 2.3 x 7.2 x 6.1 retroperitoneal soft tissue mass with adjacent lymphadenopathy that mildly narrowed the left renal vein. There also was nodularity within the mesentery and an enlarged hilar lymph node. CT-guided biopsy of the retroperitoneal mass on 12/12/2017 established the diagnosis, but the sample was too small for adequate grading. There was no disease identified outside of the abdomen; a bone marrow biopsy was not obtained as part of staging.      Relative to cardiac issues, he underwent an uncomplicated 3-vessel coronary artery bypass graft on 11/22/2017 and, subsequently, has been symptom-free.       With the renal and gonadal vein compression it was decided to start treatment with rituximab, alone. Mr. Demarco completed 4 weekly doses from 01/26 - 02/16/2018. He had no difficulty with the treatment and was able to receive rapid infusion (90 minutes) for the last 3 doses. Interval evaluation on 03/05/2018 with an US, suggested improvement. A CT scan on 04/09/2018 showed substantial regression. Repeated CT on 07/09/18 showed a good response but residual lymphoma around the renal veins. Decided to proceed weekly rituximab x4 (7/26-8/22/2018).  He started rituximab maintenance in 10/2018.    Subsequent CT scans on 10/16/2018 and 4/16/2019 showed stable findings.    He was a patient of Dr. Cunningham, who retired in March 2019    INTERVAL HISTORY:  Patient returns unaccompanied for  follow-up follicular lymphoma. Overall he is doing well. He lives by himself. He is independent on ADLs. He stays active. Patient has been tolerating rituximab well.  He denies fever, chills, chest pain, night sweats, or weight loss. He wants to get his knee fixed and this is fine.    ROS: Complete ROS otherwise negative.    Current Outpatient Medications   Medication Sig Dispense Refill     aspirin 325 MG EC tablet 1/2 tab daily       atorvastatin (LIPITOR) 40 MG tablet Take 1 tablet (40 mg) by mouth daily 90 tablet 3     Cholecalciferol (VITAMIN D3 PO) Take by mouth daily       isosorbide mononitrate (IMDUR) 30 MG 24 hr tablet Take 1 tablet (30 mg) by mouth daily 90 tablet 3     latanoprost (XALATAN) 0.005 % ophthalmic solution Place 1 drop into both eyes At Bedtime 3 Bottle 4     levothyroxine (SYNTHROID/LEVOTHROID) 88 MCG tablet Take 1 tablet (88 mcg) by mouth daily 90 tablet 3     losartan (COZAAR) 100 MG tablet Take 1 tablet (100 mg) by mouth daily New dose 90 tablet 3     metoprolol tartrate (LOPRESSOR) 25 MG tablet Take 1 tablet (25 mg) by mouth 2 times daily 180 tablet 3     timolol maleate (TIMOPTIC) 0.5 % ophthalmic solution Place 1 drop Into the left eye every morning (Patient taking differently: Place 1 drop Into the left eye every morning Both eyes) 5 mL 11        No Known Allergies  Exam:  Blood pressure (!) 157/70, pulse (!) 48, temperature 97  F (36.1  C), temperature source Tympanic, resp. rate 16, weight 97.9 kg (215 lb 12.8 oz), SpO2 96 %.  Wt Readings from Last 4 Encounters:   01/06/21 97.9 kg (215 lb 12.8 oz)   11/19/20 101.2 kg (223 lb)   10/15/20 99.8 kg (220 lb 1.6 oz)   08/20/20 99.6 kg (219 lb 8 oz)     Constitutional: Awake and alert, appears well-developed, not in acute distress.  Eyes: No scleral icterus. Eyes exhibit no discharge.  ENT/Mouth: Oral mucosa pink and moist  Cardiovascular: Normal rate, regular rhythm, S1, S2. No murmur or rub. Trace LE edema.  Respiratory: No respiratory  distress. Clear to auscultation bilaterally. No wheezes.  Gastrointestinal: Soft. No distension. No tenderness or garding.  Neurological: AAOX3, grossly non-focal  Psychiatric: Mentation and affect appear normal.  Skin: Skin is warm, not diaphoretic.  Hematologic/Lymphatic/Immunologic: No overt bleeding. No lymphadenopathy    LABORATORY:  CBC   Lab Results   Component Value Date/Time    WBC 5.4 01/06/2021 08:58 AM    HGB 13.8 01/06/2021 08:58 AM    HCT 42.6 01/06/2021 08:58 AM     01/06/2021 08:58 AM    MCV 95 01/06/2021 08:58 AM    RBC 4.50 01/06/2021 08:58 AM    ANEU 3.8 01/06/2021 08:58 AM     BMP  Lab Results   Component Value Date/Time     01/06/2021 08:58 AM    POTASSIUM 4.5 01/06/2021 08:58 AM    CHLORIDE 112 (H) 01/06/2021 08:58 AM    CO2 26 01/06/2021 08:58 AM    BUN 29 01/06/2021 08:58 AM    CR 0.86 01/06/2021 08:58 AM    ELVIRA 9.4 01/06/2021 08:58 AM    MAG 2.2 11/27/2017 07:14 AM     LFTs   Lab Results   Component Value Date    PROTTOTAL 7.3 01/06/2021    ALBUMIN 3.8 01/06/2021    AST 14 01/06/2021    ALT 28 01/06/2021    BILITOTAL 0.5 01/06/2021    DBIL 0.1 11/24/2017    ALKPHOS 66 01/06/2021       IMAGING:  CT CHEST/ABDOMEN/PELVIS W CONTRAST, from Sep 2020 - reviewed. Lymphoma stable.      ASSESSMENT/PLAN:  1. Follicular lymphoma, stage IIA  (marrow not obtained), treated with rituximab weekly x 4 in 01/2018 and again in 07/2018 with a good response, but residual disease. Initiated maintenance rituximab (Q 2 month x 2 years) on 10/24/18 completed in 10/2020    -last imaging shows CT A/P - is stable  -he has no symptoms of lymphoma  -his labs are stable  -f/v with an [facility] in 4 months  -CT C/A/P in Sept 2021  -f/v with me after that    -2. CAD, s/p 3 vessel CABG: He is getting a stress test next week      Agustin SHEPARD MS  Attending Physician  Pager - [phone]  Email - [email or web address]

## 2021-01-07 NOTE — PROGRESS NOTES
Reason for Visit: seen in f/u of follicular lymphoma    ONCOLOGY SUMMARY (updated): Zack Demarco is a 78-year-old first seen in consultation on 12/27/2017 for a new diagnosis of follicular lymphoma. He was diagnosed incidental to an evaluation for cardiac bypass surgery in 11/2017. A chest CT scan on 11/14 showed an unexpected retroperitoneal mass with surrounding lymphadenopathy suspicious for malignancy.  A further CT scan done on the same day showed a 2.3 x 7.2 x 6.1 retroperitoneal soft tissue mass with adjacent lymphadenopathy that mildly narrowed the left renal vein. There also was nodularity within the mesentery and an enlarged hilar lymph node. CT-guided biopsy of the retroperitoneal mass on 12/12/2017 established the diagnosis, but the sample was too small for adequate grading. There was no disease identified outside of the abdomen; a bone marrow biopsy was not obtained as part of staging.      Relative to cardiac issues, he underwent an uncomplicated 3-vessel coronary artery bypass graft on 11/22/2017 and, subsequently, has been symptom-free.       With the renal and gonadal vein compression it was decided to start treatment with rituximab, alone. Mr. Demarco completed 4 weekly doses from 01/26 - 02/16/2018. He had no difficulty with the treatment and was able to receive rapid infusion (90 minutes) for the last 3 doses. Interval evaluation on 03/05/2018 with an US, suggested improvement. A CT scan on 04/09/2018 showed substantial regression. Repeated CT on 07/09/18 showed a good response but residual lymphoma around the renal veins. Decided to proceed weekly rituximab x4 (7/26-8/22/2018).  He started rituximab maintenance in 10/2018.    Subsequent CT scans on 10/16/2018 and 4/16/2019 showed stable findings.    He was a patient of Dr. Cunningham, who retired in March 2019    INTERVAL HISTORY:  Patient returns unaccompanied for follow-up follicular lymphoma. Overall he is doing well. He lives by himself. He is  independent on ADLs. He stays active. Patient has been tolerating rituximab well.  He denies fever, chills, chest pain, night sweats, or weight loss. He wants to get his knee fixed and this is fine.    ROS: Complete ROS otherwise negative.    Current Outpatient Medications   Medication Sig Dispense Refill     aspirin 325 MG EC tablet 1/2 tab daily       atorvastatin (LIPITOR) 40 MG tablet Take 1 tablet (40 mg) by mouth daily 90 tablet 3     Cholecalciferol (VITAMIN D3 PO) Take by mouth daily       isosorbide mononitrate (IMDUR) 30 MG 24 hr tablet Take 1 tablet (30 mg) by mouth daily 90 tablet 3     latanoprost (XALATAN) 0.005 % ophthalmic solution Place 1 drop into both eyes At Bedtime 3 Bottle 4     levothyroxine (SYNTHROID/LEVOTHROID) 88 MCG tablet Take 1 tablet (88 mcg) by mouth daily 90 tablet 3     losartan (COZAAR) 100 MG tablet Take 1 tablet (100 mg) by mouth daily New dose 90 tablet 3     metoprolol tartrate (LOPRESSOR) 25 MG tablet Take 1 tablet (25 mg) by mouth 2 times daily 180 tablet 3     timolol maleate (TIMOPTIC) 0.5 % ophthalmic solution Place 1 drop Into the left eye every morning (Patient taking differently: Place 1 drop Into the left eye every morning Both eyes) 5 mL 11        No Known Allergies  Exam:  Blood pressure (!) 157/70, pulse (!) 48, temperature 97  F (36.1  C), temperature source Tympanic, resp. rate 16, weight 97.9 kg (215 lb 12.8 oz), SpO2 96 %.  Wt Readings from Last 4 Encounters:   01/06/21 97.9 kg (215 lb 12.8 oz)   11/19/20 101.2 kg (223 lb)   10/15/20 99.8 kg (220 lb 1.6 oz)   08/20/20 99.6 kg (219 lb 8 oz)     Constitutional: Awake and alert, appears well-developed, not in acute distress.  Eyes: No scleral icterus. Eyes exhibit no discharge.  ENT/Mouth: Oral mucosa pink and moist  Cardiovascular: Normal rate, regular rhythm, S1, S2. No murmur or rub. Trace LE edema.  Respiratory: No respiratory distress. Clear to auscultation bilaterally. No wheezes.  Gastrointestinal: Soft. No  distension. No tenderness or garding.  Neurological: AAOX3, grossly non-focal  Psychiatric: Mentation and affect appear normal.  Skin: Skin is warm, not diaphoretic.  Hematologic/Lymphatic/Immunologic: No overt bleeding. No lymphadenopathy    LABORATORY:  CBC   Lab Results   Component Value Date/Time    WBC 5.4 01/06/2021 08:58 AM    HGB 13.8 01/06/2021 08:58 AM    HCT 42.6 01/06/2021 08:58 AM     01/06/2021 08:58 AM    MCV 95 01/06/2021 08:58 AM    RBC 4.50 01/06/2021 08:58 AM    ANEU 3.8 01/06/2021 08:58 AM     BMP  Lab Results   Component Value Date/Time     01/06/2021 08:58 AM    POTASSIUM 4.5 01/06/2021 08:58 AM    CHLORIDE 112 (H) 01/06/2021 08:58 AM    CO2 26 01/06/2021 08:58 AM    BUN 29 01/06/2021 08:58 AM    CR 0.86 01/06/2021 08:58 AM    ELVIRA 9.4 01/06/2021 08:58 AM    MAG 2.2 11/27/2017 07:14 AM     LFTs   Lab Results   Component Value Date    PROTTOTAL 7.3 01/06/2021    ALBUMIN 3.8 01/06/2021    AST 14 01/06/2021    ALT 28 01/06/2021    BILITOTAL 0.5 01/06/2021    DBIL 0.1 11/24/2017    ALKPHOS 66 01/06/2021       IMAGING:  CT CHEST/ABDOMEN/PELVIS W CONTRAST, from Sep 2020 - reviewed. Lymphoma stable.      ASSESSMENT/PLAN:  1. Follicular lymphoma, stage IIA  (marrow not obtained), treated with rituximab weekly x 4 in 01/2018 and again in 07/2018 with a good response, but residual disease. Initiated maintenance rituximab (Q 2 month x 2 years) on 10/24/18 completed in 10/2020    -last imaging shows CT A/P - is stable  -he has no symptoms of lymphoma  -his labs are stable  -f/v with an GR[facility] in 4 months  -CT C/A/P in Sept 2021  -f/v with me after that    -2. CAD, s/p 3 vessel CABG: He is getting a stress test next week      Agustin SHEPARD MS  Attending Physician  Pager - [phone]  Email - [email or web address]

## 2021-01-11 ENCOUNTER — ANCILLARY PROCEDURE (OUTPATIENT)
Dept: NUCLEAR MEDICINE | Facility: CLINIC | Age: 81
End: 2021-01-11
Attending: INTERNAL MEDICINE
Payer: MEDICARE

## 2021-01-11 PROCEDURE — 93018 CV STRESS TEST I&R ONLY: CPT | Mod: ME | Performed by: INTERNAL MEDICINE

## 2021-01-11 PROCEDURE — 93018 CV STRESS TEST I&R ONLY: CPT | Performed by: INTERNAL MEDICINE

## 2021-01-11 PROCEDURE — 93017 CV STRESS TEST TRACING ONLY: CPT | Performed by: INTERNAL MEDICINE

## 2021-01-11 PROCEDURE — A9502 TC99M TETROFOSMIN: HCPCS | Performed by: INTERNAL MEDICINE

## 2021-01-11 PROCEDURE — G1004 CDSM NDSC: HCPCS | Performed by: INTERNAL MEDICINE

## 2021-01-11 PROCEDURE — 78452 HT MUSCLE IMAGE SPECT MULT: CPT | Mod: 26 | Performed by: INTERNAL MEDICINE

## 2021-01-11 PROCEDURE — 93016 CV STRESS TEST SUPVJ ONLY: CPT | Performed by: INTERNAL MEDICINE

## 2021-01-11 RX ORDER — REGADENOSON 0.08 MG/ML
0.4 INJECTION, SOLUTION INTRAVENOUS ONCE
Status: COMPLETED | OUTPATIENT
Start: 2021-01-11 | End: 2021-01-11

## 2021-01-11 RX ADMIN — REGADENOSON 0.4 MG: 0.08 INJECTION, SOLUTION INTRAVENOUS at 14:19

## 2021-02-08 DIAGNOSIS — C82.99 FOLLICULAR LYMPHOMA OF EXTRANODAL AND SOLID ORGAN SITES (H): Primary | ICD-10-CM

## 2021-03-02 ENCOUNTER — VIRTUAL VISIT (OUTPATIENT)
Dept: CARDIOLOGY | Facility: CLINIC | Age: 81
End: 2021-03-02
Payer: MEDICARE

## 2021-03-02 DIAGNOSIS — I10 HYPERTENSION GOAL BP (BLOOD PRESSURE) < 140/90: ICD-10-CM

## 2021-03-02 DIAGNOSIS — I25.709 CORONARY ARTERY DISEASE INVOLVING CORONARY BYPASS GRAFT OF NATIVE HEART WITH ANGINA PECTORIS (H): ICD-10-CM

## 2021-03-02 DIAGNOSIS — E78.5 HYPERLIPIDEMIA LDL GOAL <70: Primary | ICD-10-CM

## 2021-03-02 PROCEDURE — 99214 OFFICE O/P EST MOD 30 MIN: CPT | Mod: 95 | Performed by: INTERNAL MEDICINE

## 2021-03-02 RX ORDER — LOSARTAN POTASSIUM 100 MG/1
50 TABLET ORAL 2 TIMES DAILY
Qty: 90 TABLET | Refills: 3 | Status: SHIPPED | OUTPATIENT
Start: 2021-03-02 | End: 2021-08-31

## 2021-03-02 NOTE — PROGRESS NOTES
"Zack is a 80 year old who is being evaluated via a billable video visit.      The patient has been notified of following:     \"This video visit will be conducted via a call between you and your physician/provider. We have found that certain health care needs can be provided without the need for an in-person physical exam.  This service lets us provide the care you need with a video conversation.  If a prescription is necessary we can send it directly to your pharmacy.  If lab work is needed we can place an order for that and you can then stop by our lab to have the test done at a later time.    Video visits are billed at different rates depending on your insurance coverage.  Please reach out to your insurance provider with any questions.    If during the course of the call the physician/provider feels a video visit is not appropriate, you will not be charged for this service.\"    Patient has given verbal consent for video visit? Yes    How would you like to obtain your AVS? Mail    Video-Visit Details    Type of service:  Video Visit    Video Start Time:806am    Video End Time:824am    Total visit time 35min    Originating Location (pt. Location):patient home      Distant Location (provider location):  home office    Platform used for Video Visit: Diana    See dictation #[id number]  "

## 2021-03-02 NOTE — PROGRESS NOTES
2021              Anastacio Love MD   [facility]    [address] [place] [address]      Patient:  Zack Demarco   MRN:  [id number]   :  1940      Dear Dr. Love:      It was a pleasure participating in the care of your patient, . Zack Demarco.  As you know, he is a 80-year-old gentleman who I saw today via [facility] artery disease, hypertension and hyperlipidemia.      His past medical history is significant for the followin.  Hypertension.   2.  Hyperlipidemia.   3.  Retroperitoneal mass with follicular lymphoma, undergoing therapy.   4.  Hypothyroidism.   5.  Borderline glaucoma.   6.  Right knee problems.   7.  Lipoma.   8.  Patellar tendinitis.      His cardiac history is significant for known multivessel coronary artery disease and normal LV systolic function.  Coronary angiography 2017 revealed the following:     Left main:  Normal.   LAD:  70% to 80% ostial stenosis with 80% to 90% stenosis in the mid-portion.   First diagonal:  80% to 90% stenosis.   Circumflex:  Mild diffuse disease.   RCA:  70% stenosis in the mid-portion.      Three-vessel coronary bypass surgery was performed (LIMA to LAD, vein graft to PDA, vein graft to D1).  He originally presented with centrally located chest tightness with shortness of breath that completely resolved after revascularization.      I last saw him 2020.  At that time, he was having symptoms of occasional chest tightness when deer hunting and we had started low-dose Imdur along with an echo and a pharmacologic nuclear stress test.  The echo and stress test were unremarkable and after starting Imdur, his symptoms have completely resolved.  He is now able to walk on the treadmill for 40 minutes without stopping without any symptoms.  He says he can probably walk about 4-5 miles on flat ground without symptoms.  He used to get his angina when carrying laundry up the stairs, but has  not had any recurrences.  His only complaint is that of mild lightheadedness when getting up in the morning out of a lying position.  He has no trouble going from sitting to standing, but from lying to standing.  It will not happen very often, mainly first thing in the morning.  He does not actually pass out or feel near-syncopal, but he will have to rest for a moment before continuing.  It does not typically affect his quality of life.      REVIEW OF SYSTEMS:  Positive for 20-pound weight loss since December; otherwise unremarkable.      CURRENT MEDICATIONS:  He is taking half of a 325 mg aspirin a day, Lipitor 40 mg a day, Imdur 30 mg a day, losartan 100 mg a day, metoprolol tartrate 25 twice daily.      PHYSICAL EXAMINATION:     VITAL SIGNS:  His blood pressures are generally running anywhere from 108-120 with a pulse in the 50s.   GENERAL:  He appears comfortable, well groomed.   PSYCHIATRIC:  He is alert and oriented x3.   HEENT:  His eyes do not appear grossly erythematous or have exudate.   RESPIRATORY:  He is breathing comfortably without gross cough.      The remainder of the comprehensive physical exam was deferred secondary to COVID-19 pandemic and secondary to video visit restrictions.      LABORATORY DATA (01/06/2021):  Potassium 4.5, GFR normal.  LDL was 58 as of 10/15/2020.      On 12/18/2020, echocardiogram reveals ejection fraction 55% to 60% without gross valvular pathology.      Pharmacologic nuclear stress test 01/11/2021, no evidence for stress-induced ischemia.        IMPRESSION:      Zack is a 80-year-old gentleman with several active cardiac issues:     1.  Coronary artery disease:      The patient has known multivessel coronary artery disease and normal LV systolic function.  He underwent 3-vessel coronary bypass surgery (LIMA to LAD, vein graft to PDA, vein graft to first diagonal) 11/24/2017.      Since revascularization, he had not had recurrent angina until 12/2020; at which time, he  felt some centrally located chest tightness and shortness of breath on a single occasion that resolved quickly with rest.  His most recent pharmacologic nuclear stress test 2021 was negative for stress-induced ischemia and his echocardiogram was unremarkable as of 2020.      Since starting low-dose Imdur 30 mg a day, he is able to walk for 40 minutes on the treadmill and 4 to 5 miles on flat ground without gross recurrent symptoms or limitation.  He says he typically experienced his symptoms carrying laundry up the stairs, but has not felt any of these symptoms since starting Imdur.      We will continue to monitor clinically.     2.  Hypertension:      Blood pressures at home have been running somewhat low and he has been having mild orthostasis on occasion going from lying to standing.  We will perform further changes to optimize treatment.     3.  Hyperlipidemia:  Lipids are in goal range.        PLAN:     1.  Change losartan from 100 mg once a day to 50 mg twice a day.     2.  We will also consider decreasing metoprolol from 25 twice daily to 12.5 twice daily if symptoms do not resolve with this initial change in timing of the medication.     3.  Followup via virtual video visit 6 months with labs prior, earlier if needed.      Once again, it was a pleasure participating in the care of your patient, Mr. Zack Trivedi.  Please feel free to contact me at any time with any questions regarding his care in the future.         Sincerely,      TRUNG BENITO MD             D: 2021   T: 2021   MT: MARYELLEN      Name:     ZACK TRIVEDI   MRN:      [id number]        Account:      [id number]   :      1940      Document: [id number]       cc: Anastacio Love MD

## 2021-04-15 NOTE — PROGRESS NOTES
Reason for Visit: seen in f/u of follicular lymphoma    Oncology HPI:    Zack Demarco is a 79 year old male first seen in consultation on 12/27/2017 for a new diagnosis of follicular lymphoma. He was diagnosed incidental to an evaluation for cardiac bypass surgery in 11/2017. A chest CT scan on 11/14 showed an unexpected retroperitoneal mass with surrounding lymphadenopathy suspicious for malignancy.  A further CT scan done on the same day showed a 2.3 x 7.2 x 6.1 retroperitoneal soft tissue mass with adjacent lymphadenopathy that mildly narrowed the left renal vein. There also was nodularity within the mesentery and an enlarged hilar lymph node. CT-guided biopsy of the retroperitoneal mass on 12/12/2017 established the diagnosis, but the sample was too small for adequate grading. There was no disease identified outside of the abdomen; a bone marrow biopsy was not obtained as part of staging.      Relative to cardiac issues, he underwent an uncomplicated 3-vessel coronary artery bypass graft on 11/22/2017 and, subsequently, has been symptom-free. He follows       With the renal and gonadal vein compression it was decided to start treatment with rituximab, alone. Mr. Demarco completed 4 weekly doses from 01/26 - 02/16/2018. He had no difficulty with the treatment and was able to receive rapid infusion (90 minutes) for the last 3 doses. Interval evaluation on 03/05/2018 with an US, suggested improvement. A CT scan on 04/09/2018 showed substantial regression. Repeated CT on 07/09/18 showed a good response but residual lymphoma around the renal veins. Decided to proceed weekly rituximab x4 (7/26-8/22/2018).  He started rituximab maintenance in 10/2018.    He completed maintenance rituximab in October 2020. Ct imaging in Sept 2020 showed stable findings.    Interval history: Zack has been feeling fairly well. Has been working with cardilogy on adjustment of his cardiac medications. Had been having angina symptoms. He  underwent a nuclear stress test on 1/11/21 that was negative for stress induced ischemia. His ehcocardiogram was stable.  He was started on Imdur with improvement in his symptoms.    Denies chest pressure/pain today. Able to walk the stairs without difficulty.  His chronic knee pain is unchanged. Appetite is great. Weight has increased. No sweats, no new lumps/bumps. Bowels are regular. Urination notable for some increased frequency at night. No abdominal pain. No fevers/chills, cough, sob, congestion.    Current Outpatient Medications   Medication Sig Dispense Refill     aspirin 325 MG EC tablet 1/2 tab daily       atorvastatin (LIPITOR) 40 MG tablet Take 1 tablet (40 mg) by mouth daily 90 tablet 3     Cholecalciferol (VITAMIN D3 PO) Take by mouth daily       isosorbide mononitrate (IMDUR) 30 MG 24 hr tablet Take 1 tablet (30 mg) by mouth daily 90 tablet 3     latanoprost (XALATAN) 0.005 % ophthalmic solution Place 1 drop into both eyes At Bedtime 3 Bottle 4     levothyroxine (SYNTHROID/LEVOTHROID) 88 MCG tablet Take 1 tablet (88 mcg) by mouth daily 90 tablet 3     losartan (COZAAR) 100 MG tablet Take 0.5 tablets (50 mg) by mouth 2 times daily New dose 90 tablet 3     metoprolol tartrate (LOPRESSOR) 25 MG tablet Take 1 tablet (25 mg) by mouth 2 times daily 180 tablet 3     timolol maleate (TIMOPTIC) 0.5 % ophthalmic solution Place 1 drop Into the left eye every morning (Patient taking differently: Place 1 drop Into the left eye every morning Both eyes) 5 mL 11        No Known Allergies      Exam: Alert, oriented. Affect appropriated. Oropharynx is moist and without lesion. Neck supple and without adenopathy. Lungs:CTA. Heart: RRR, no murmur or rub. Abdomen: soft, nontender, BS active, no masses or organomegaly.  Extremities: warm, no edema. Speech is clear. CN wnl. Gait/station wnl. Skin: no rash or bruising on exposed skin Nodes: no palpable adenopathy in the neck, supraclavicular, axillae, epitrochlear or  inguinal spaces.   There were no vitals taken for this visit.  Wt Readings from Last 4 Encounters:   01/06/21 97.9 kg (215 lb 12.8 oz)   11/19/20 101.2 kg (223 lb)   10/15/20 99.8 kg (220 lb 1.6 oz)   08/20/20 99.6 kg (219 lb 8 oz)         Labs: Results for FÉLIX TRIVEDI (MRN [id number]) as of 4/16/2021 13:23   Ref. Range 4/16/2021 09:28   Sodium Latest Ref Range: 133 - 144 mmol/L 140   Potassium Latest Ref Range: 3.4 - 5.3 mmol/L 4.6   Chloride Latest Ref Range: 94 - 109 mmol/L 106   Carbon Dioxide Latest Ref Range: 20 - 32 mmol/L 27   Urea Nitrogen Latest Ref Range: 7 - 30 mg/dL 24   Creatinine Latest Ref Range: 0.66 - 1.25 mg/dL 0.98   GFR Estimate Latest Ref Range: >60 mL/min/1.73_m2 73   GFR Estimate If Black Latest Ref Range: >60 mL/min/1.73_m2 84   Calcium Latest Ref Range: 8.5 - 10.1 mg/dL 9.4   Anion Gap Latest Ref Range: 3 - 14 mmol/L 6   Albumin Latest Ref Range: 3.4 - 5.0 g/dL 3.7   Protein Total Latest Ref Range: 6.8 - 8.8 g/dL 7.1   Bilirubin Total Latest Ref Range: 0.2 - 1.3 mg/dL 0.5   Alkaline Phosphatase Latest Ref Range: 40 - 150 U/L 66   ALT Latest Ref Range: 0 - 70 U/L 36   AST Latest Ref Range: 0 - 45 U/L 18   Glucose Latest Ref Range: 70 - 99 mg/dL 96   WBC Latest Ref Range: 4.0 - 11.0 10e9/L 7.0   Hemoglobin Latest Ref Range: 13.3 - 17.7 g/dL 14.1   Hematocrit Latest Ref Range: 40.0 - 53.0 % 44.0   Platelet Count Latest Ref Range: 150 - 450 10e9/L 180   RBC Count Latest Ref Range: 4.4 - 5.9 10e12/L 4.49   MCV Latest Ref Range: 78 - 100 fl 98   MCH Latest Ref Range: 26.5 - 33.0 pg 31.4   MCHC Latest Ref Range: 31.5 - 36.5 g/dL 32.0   RDW Latest Ref Range: 10.0 - 15.0 % 12.7   Diff Method Unknown Automated Method   % Neutrophils Latest Units: % 73.7   % Lymphocytes Latest Units: % 15.1   % Monocytes Latest Units: % 7.2   % Eosinophils Latest Units: % 3.4   % Basophils Latest Units: % 0.3   % Immature Granulocytes Latest Units: % 0.3   Nucleated RBCs Latest Ref Range: 0 /100 0   Absolute  Neutrophil Latest Ref Range: 1.6 - 8.3 10e9/L 5.2   Absolute Lymphocytes Latest Ref Range: 0.8 - 5.3 10e9/L 1.1   Absolute Monocytes Latest Ref Range: 0.0 - 1.3 10e9/L 0.5   Absolute Eosinophils Latest Ref Range: 0.0 - 0.7 10e9/L 0.2   Absolute Basophils Latest Ref Range: 0.0 - 0.2 10e9/L 0.0   Abs Immature Granulocytes Latest Ref Range: 0 - 0.4 10e9/L 0.0   Absolute Nucleated RBC Unknown 0.0       Imaging: n/a    Impression/plan:   Follicular lymphoma, stage IIa  -clinically stable, labs stable. No concerns for recurrence/progression  -reviewed the plan for follow-up. Will have f/u with Dr. Rouse in Sept with his yearly CT imaging.     CAD-will continue to follow with Dr. Reyes in cardiology

## 2021-04-16 ENCOUNTER — APPOINTMENT (OUTPATIENT)
Dept: LAB | Facility: CLINIC | Age: 81
End: 2021-04-16
Attending: INTERNAL MEDICINE
Payer: MEDICARE

## 2021-04-16 ENCOUNTER — ONCOLOGY VISIT (OUTPATIENT)
Dept: ONCOLOGY | Facility: CLINIC | Age: 81
End: 2021-04-16
Attending: INTERNAL MEDICINE
Payer: MEDICARE

## 2021-04-16 VITALS
OXYGEN SATURATION: 98 % | DIASTOLIC BLOOD PRESSURE: 59 MMHG | SYSTOLIC BLOOD PRESSURE: 123 MMHG | WEIGHT: 217.9 LBS | TEMPERATURE: 97.4 F | RESPIRATION RATE: 16 BRPM | BODY MASS INDEX: 32.18 KG/M2 | HEART RATE: 48 BPM

## 2021-04-16 DIAGNOSIS — C82.99 FOLLICULAR LYMPHOMA OF EXTRANODAL AND SOLID ORGAN SITES (H): Primary | ICD-10-CM

## 2021-04-16 LAB
ALBUMIN SERPL-MCNC: 3.7 G/DL (ref 3.4–5)
ALP SERPL-CCNC: 66 U/L (ref 40–150)
ALT SERPL W P-5'-P-CCNC: 36 U/L (ref 0–70)
ANION GAP SERPL CALCULATED.3IONS-SCNC: 6 MMOL/L (ref 3–14)
AST SERPL W P-5'-P-CCNC: 18 U/L (ref 0–45)
BASOPHILS # BLD AUTO: 0 10E9/L (ref 0–0.2)
BASOPHILS NFR BLD AUTO: 0.3 %
BILIRUB SERPL-MCNC: 0.5 MG/DL (ref 0.2–1.3)
BUN SERPL-MCNC: 24 MG/DL (ref 7–30)
CALCIUM SERPL-MCNC: 9.4 MG/DL (ref 8.5–10.1)
CHLORIDE SERPL-SCNC: 106 MMOL/L (ref 94–109)
CO2 SERPL-SCNC: 27 MMOL/L (ref 20–32)
CREAT SERPL-MCNC: 0.98 MG/DL (ref 0.66–1.25)
DIFFERENTIAL METHOD BLD: NORMAL
EOSINOPHIL # BLD AUTO: 0.2 10E9/L (ref 0–0.7)
EOSINOPHIL NFR BLD AUTO: 3.4 %
ERYTHROCYTE [DISTWIDTH] IN BLOOD BY AUTOMATED COUNT: 12.7 % (ref 10–15)
GFR SERPL CREATININE-BSD FRML MDRD: 73 ML/MIN/{1.73_M2}
GLUCOSE SERPL-MCNC: 96 MG/DL (ref 70–99)
HCT VFR BLD AUTO: 44 % (ref 40–53)
HGB BLD-MCNC: 14.1 G/DL (ref 13.3–17.7)
IMM GRANULOCYTES # BLD: 0 10E9/L (ref 0–0.4)
IMM GRANULOCYTES NFR BLD: 0.3 %
LYMPHOCYTES # BLD AUTO: 1.1 10E9/L (ref 0.8–5.3)
LYMPHOCYTES NFR BLD AUTO: 15.1 %
MCH RBC QN AUTO: 31.4 PG (ref 26.5–33)
MCHC RBC AUTO-ENTMCNC: 32 G/DL (ref 31.5–36.5)
MCV RBC AUTO: 98 FL (ref 78–100)
MONOCYTES # BLD AUTO: 0.5 10E9/L (ref 0–1.3)
MONOCYTES NFR BLD AUTO: 7.2 %
NEUTROPHILS # BLD AUTO: 5.2 10E9/L (ref 1.6–8.3)
NEUTROPHILS NFR BLD AUTO: 73.7 %
NRBC # BLD AUTO: 0 10*3/UL
NRBC BLD AUTO-RTO: 0 /100
PLATELET # BLD AUTO: 180 10E9/L (ref 150–450)
POTASSIUM SERPL-SCNC: 4.6 MMOL/L (ref 3.4–5.3)
PROT SERPL-MCNC: 7.1 G/DL (ref 6.8–8.8)
RBC # BLD AUTO: 4.49 10E12/L (ref 4.4–5.9)
SODIUM SERPL-SCNC: 140 MMOL/L (ref 133–144)
WBC # BLD AUTO: 7 10E9/L (ref 4–11)

## 2021-04-16 PROCEDURE — 99213 OFFICE O/P EST LOW 20 MIN: CPT | Performed by: NURSE PRACTITIONER

## 2021-04-16 PROCEDURE — G0463 HOSPITAL OUTPT CLINIC VISIT: HCPCS

## 2021-04-16 PROCEDURE — 36415 COLL VENOUS BLD VENIPUNCTURE: CPT

## 2021-04-16 PROCEDURE — 85025 COMPLETE CBC W/AUTO DIFF WBC: CPT | Performed by: NURSE PRACTITIONER

## 2021-04-16 PROCEDURE — 80053 COMPREHEN METABOLIC PANEL: CPT | Performed by: NURSE PRACTITIONER

## 2021-04-16 ASSESSMENT — PAIN SCALES - GENERAL: PAINLEVEL: NO PAIN (0)

## 2021-04-16 NOTE — NURSING NOTE
Chief Complaint   Patient presents with     Blood Draw     labs drawn with vpt by rn.  vs taken     Labs drawn with vpt by rn.  Pt tolerated well.  VS taken.  Pt checked in for next appt.    Maria Luisa Lyman RN

## 2021-04-16 NOTE — LETTER
4/16/2021         RE: Zack Demarco  [address] [facility] [address]        Dear Colleague,    Thank you for referring your patient, Zack Demarco, to the [facility]. Please see a copy of my visit note below.    Reason for Visit: seen in f/u of follicular lymphoma    Oncology HPI:    Zack Demarco is a 79 year old male first seen in consultation on 12/27/2017 for a new diagnosis of follicular lymphoma. He was diagnosed incidental to an evaluation for cardiac bypass surgery in 11/2017. A chest CT scan on 11/14 showed an unexpected retroperitoneal mass with surrounding lymphadenopathy suspicious for malignancy.  A further CT scan done on the same day showed a 2.3 x 7.2 x 6.1 retroperitoneal soft tissue mass with adjacent lymphadenopathy that mildly narrowed the left renal vein. There also was nodularity within the mesentery and an enlarged hilar lymph node. CT-guided biopsy of the retroperitoneal mass on 12/12/2017 established the diagnosis, but the sample was too small for adequate grading. There was no disease identified outside of the abdomen; a bone marrow biopsy was not obtained as part of staging.      Relative to cardiac issues, he underwent an uncomplicated 3-vessel coronary artery bypass graft on 11/22/2017 and, subsequently, has been symptom-free. He follows       With the renal and gonadal vein compression it was decided to start treatment with rituximab, alone. Mr. Demarco completed 4 weekly doses from 01/26 - 02/16/2018. He had no difficulty with the treatment and was able to receive rapid infusion (90 minutes) for the last 3 doses. Interval evaluation on 03/05/2018 with an US, suggested improvement. A CT scan on 04/09/2018 showed substantial regression. Repeated CT on 07/09/18 showed a good response but residual lymphoma around the renal veins. Decided to proceed weekly rituximab x4 (7/26-8/22/2018).  He started rituximab maintenance in 10/2018.    He completed  maintenance rituximab in October 2020. Ct imaging in Sept 2020 showed stable findings.    Interval history: Zack has been feeling fairly well. Has been working with cardilogy on adjustment of his cardiac medications. Had been having angina symptoms. He underwent a nuclear stress test on 1/11/21 that was negative for stress induced ischemia. His ehcocardiogram was stable.  He was started on Imdur with improvement in his symptoms.    Denies chest pressure/pain today. Able to walk the stairs without difficulty.  His chronic knee pain is unchanged. Appetite is great. Weight has increased. No sweats, no new lumps/bumps. Bowels are regular. Urination notable for some increased frequency at night. No abdominal pain. No fevers/chills, cough, sob, congestion.    Current Outpatient Medications   Medication Sig Dispense Refill     aspirin 325 MG EC tablet 1/2 tab daily       atorvastatin (LIPITOR) 40 MG tablet Take 1 tablet (40 mg) by mouth daily 90 tablet 3     Cholecalciferol (VITAMIN D3 PO) Take by mouth daily       isosorbide mononitrate (IMDUR) 30 MG 24 hr tablet Take 1 tablet (30 mg) by mouth daily 90 tablet 3     latanoprost (XALATAN) 0.005 % ophthalmic solution Place 1 drop into both eyes At Bedtime 3 Bottle 4     levothyroxine (SYNTHROID/LEVOTHROID) 88 MCG tablet Take 1 tablet (88 mcg) by mouth daily 90 tablet 3     losartan (COZAAR) 100 MG tablet Take 0.5 tablets (50 mg) by mouth 2 times daily New dose 90 tablet 3     metoprolol tartrate (LOPRESSOR) 25 MG tablet Take 1 tablet (25 mg) by mouth 2 times daily 180 tablet 3     timolol maleate (TIMOPTIC) 0.5 % ophthalmic solution Place 1 drop Into the left eye every morning (Patient taking differently: Place 1 drop Into the left eye every morning Both eyes) 5 mL 11        No Known Allergies      Exam: Alert, oriented. Affect appropriated. Oropharynx is moist and without lesion. Neck supple and without adenopathy. Lungs:CTA. Heart: RRR, no murmur or rub. Abdomen: soft,  nontender, BS active, no masses or organomegaly.  Extremities: warm, no edema. Speech is clear. CN wnl. Gait/station wnl. Skin: no rash or bruising on exposed skin Nodes: no palpable adenopathy in the neck, supraclavicular, axillae, epitrochlear or inguinal spaces.   There were no vitals taken for this visit.  Wt Readings from Last 4 Encounters:   01/06/21 97.9 kg (215 lb 12.8 oz)   11/19/20 101.2 kg (223 lb)   10/15/20 99.8 kg (220 lb 1.6 oz)   08/20/20 99.6 kg (219 lb 8 oz)         Labs: Results for FÉLIX TRIVEDI (MRN [id number]) as of 4/16/2021 13:23   Ref. Range 4/16/2021 09:28   Sodium Latest Ref Range: 133 - 144 mmol/L 140   Potassium Latest Ref Range: 3.4 - 5.3 mmol/L 4.6   Chloride Latest Ref Range: 94 - 109 mmol/L 106   Carbon Dioxide Latest Ref Range: 20 - 32 mmol/L 27   Urea Nitrogen Latest Ref Range: 7 - 30 mg/dL 24   Creatinine Latest Ref Range: 0.66 - 1.25 mg/dL 0.98   GFR Estimate Latest Ref Range: >60 mL/min/1.73_m2 73   GFR Estimate If Black Latest Ref Range: >60 mL/min/1.73_m2 84   Calcium Latest Ref Range: 8.5 - 10.1 mg/dL 9.4   Anion Gap Latest Ref Range: 3 - 14 mmol/L 6   Albumin Latest Ref Range: 3.4 - 5.0 g/dL 3.7   Protein Total Latest Ref Range: 6.8 - 8.8 g/dL 7.1   Bilirubin Total Latest Ref Range: 0.2 - 1.3 mg/dL 0.5   Alkaline Phosphatase Latest Ref Range: 40 - 150 U/L 66   ALT Latest Ref Range: 0 - 70 U/L 36   AST Latest Ref Range: 0 - 45 U/L 18   Glucose Latest Ref Range: 70 - 99 mg/dL 96   WBC Latest Ref Range: 4.0 - 11.0 10e9/L 7.0   Hemoglobin Latest Ref Range: 13.3 - 17.7 g/dL 14.1   Hematocrit Latest Ref Range: 40.0 - 53.0 % 44.0   Platelet Count Latest Ref Range: 150 - 450 10e9/L 180   RBC Count Latest Ref Range: 4.4 - 5.9 10e12/L 4.49   MCV Latest Ref Range: 78 - 100 fl 98   MCH Latest Ref Range: 26.5 - 33.0 pg 31.4   MCHC Latest Ref Range: 31.5 - 36.5 g/dL 32.0   RDW Latest Ref Range: 10.0 - 15.0 % 12.7   Diff Method Unknown Automated Method   % Neutrophils Latest Units: % 73.7    % Lymphocytes Latest Units: % 15.1   % Monocytes Latest Units: % 7.2   % Eosinophils Latest Units: % 3.4   % Basophils Latest Units: % 0.3   % Immature Granulocytes Latest Units: % 0.3   Nucleated RBCs Latest Ref Range: 0 /100 0   Absolute Neutrophil Latest Ref Range: 1.6 - 8.3 10e9/L 5.2   Absolute Lymphocytes Latest Ref Range: 0.8 - 5.3 10e9/L 1.1   Absolute Monocytes Latest Ref Range: 0.0 - 1.3 10e9/L 0.5   Absolute Eosinophils Latest Ref Range: 0.0 - 0.7 10e9/L 0.2   Absolute Basophils Latest Ref Range: 0.0 - 0.2 10e9/L 0.0   Abs Immature Granulocytes Latest Ref Range: 0 - 0.4 10e9/L 0.0   Absolute Nucleated RBC Unknown 0.0       Imaging: n/a    Impression/plan:   Follicular lymphoma, stage IIa  -clinically stable, labs stable. No concerns for recurrence/progression  -reviewed the plan for follow-up. Will have f/u with Dr. Rouse in Sept with his yearly CT imaging.     CAD-will continue to follow with Dr. Reyes in cardiology            Again, thank you for allowing me to participate in the care of your patient.        Sincerely,        HOLLY Draper CNP

## 2021-04-16 NOTE — NURSING NOTE
"Oncology Rooming Note    April 16, 2021 10:00 AM   Zack Demarco is a 80 year old male who presents for:    Chief Complaint   Patient presents with     Blood Draw     labs drawn with vpt by rn.  vs taken     Oncology Clinic Visit     FOLLICULAR LYMPHOMA      Initial Vitals: /59 (BP Location: Right arm, Patient Position: Sitting, Cuff Size: Adult Large)   Pulse (!) 48   Temp 97.4  F (36.3  C) (Tympanic)   Resp 16   Wt 98.8 kg (217 lb 14.4 oz)   SpO2 98%   BMI 32.18 kg/m   Estimated body mass index is 32.18 kg/m  as calculated from the following:    Height as of 11/19/20: 1.753 m (5' 9\").    Weight as of this encounter: 98.8 kg (217 lb 14.4 oz). Body surface area is 2.19 meters squared.  No Pain (0) Comment: Data Unavailable   No LMP for male patient.  Allergies reviewed: Yes  Medications reviewed: Yes    Medications: Medication refills not needed today.  Pharmacy name entered into [employer]:    [id number] - [place], [place] - [address] [facility] - [place], [place] - [address]    Clinical concerns: NONE       Tiffanie Mckeon CMA              "

## 2021-04-19 ENCOUNTER — OFFICE VISIT (OUTPATIENT)
Dept: OPHTHALMOLOGY | Facility: CLINIC | Age: 81
End: 2021-04-19
Payer: MEDICARE

## 2021-04-19 DIAGNOSIS — H40.053 BORDERLINE GLAUCOMA WITH OCULAR HYPERTENSION, BILATERAL: Primary | ICD-10-CM

## 2021-04-19 DIAGNOSIS — H35.363 MACULAR DRUSEN, BILATERAL: ICD-10-CM

## 2021-04-19 PROCEDURE — 92134 CPTRZ OPH DX IMG PST SGM RTA: CPT | Performed by: OPHTHALMOLOGY

## 2021-04-19 PROCEDURE — 92012 INTRM OPH EXAM EST PATIENT: CPT | Performed by: OPHTHALMOLOGY

## 2021-04-19 PROCEDURE — 92083 EXTENDED VISUAL FIELD XM: CPT | Performed by: OPHTHALMOLOGY

## 2021-04-19 ASSESSMENT — TONOMETRY
OS_IOP_MMHG: 11
IOP_METHOD: APPLANATION
OD_IOP_MMHG: 13

## 2021-04-19 ASSESSMENT — VISUAL ACUITY
METHOD: SNELLEN - LINEAR
CORRECTION_TYPE: GLASSES
OD_CC: 20/20
OS_PH_CC: 20/25
OS_CC: 20/40

## 2021-04-19 NOTE — LETTER
4/19/2021         RE: Zack Demarco  [address] [facility] [address]        Dear Colleague,    Thank you for referring your patient, Zack Demarco, to the [facility]. Please see a copy of my visit note below.     Current Eye Medications:  Latanoprost both eyed and timolol both eyes   every morning, last drops at 6:30am  Eye vitamins daily.     Subjective:  Comprehensive eye exam.   Pt reports that he is seeing pretty well, sometimes eyes with feel tired.     Objective:  See Ophthalmology Exam.       Assessment:  Stable intraocular pressure, glaucoma OCT, and Galvan Visual Field both eyes in patient with treated ocular hypertension.  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      Plan:  Continue same medications.  Return visit 7 months for a complete exam.  Avtar Mccullough M.D.  [phone]           Again, thank you for allowing me to participate in the care of your patient.        Sincerely,        Avtar Mccullough MD

## 2021-04-19 NOTE — PROGRESS NOTES
Current Eye Medications:  Latanoprost both eyed and timolol both eyes   every morning, last drops at 6:30am  Eye vitamins daily.     Subjective:  Comprehensive eye exam.   Pt reports that he is seeing pretty well, sometimes eyes with feel tired.     Objective:  See Ophthalmology Exam.       Assessment:  Stable intraocular pressure, glaucoma OCT, and Galvan Visual Field both eyes in patient with treated ocular hypertension.  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      Plan:  Continue same medications.  Return visit 7 months for a complete exam.  Avtar Mccullough M.D.  [phone]

## 2021-04-19 NOTE — PATIENT INSTRUCTIONS
Continue same medications.  Return visit 7 months for a complete exam.  Avtar Mccullough M.D.  [phone]

## 2021-04-20 ASSESSMENT — PACHYMETRY
OS_CT(UM): 548
OD_CT(UM): 552

## 2021-04-20 ASSESSMENT — SLIT LAMP EXAM - LIDS: COMMENTS: 1+ DERMATOCHALASIS - UPPER LID, 2+ MEIBOMIAN GLAND DYSFUNCTION

## 2021-04-20 ASSESSMENT — EXTERNAL EXAM - LEFT EYE: OS_EXAM: 3+ BROW PTOSIS, MILD-MOD BROW

## 2021-04-20 ASSESSMENT — EXTERNAL EXAM - RIGHT EYE: OD_EXAM: 3+ BROW PTOSIS, MILD-MOD BROW

## 2021-07-08 ENCOUNTER — MYC MEDICAL ADVICE (OUTPATIENT)
Dept: FAMILY MEDICINE | Facility: CLINIC | Age: 81
End: 2021-07-08

## 2021-07-08 NOTE — TELEPHONE ENCOUNTER
Looks like colonoscopy is due 9/2021. Last one was 9/16/16. Please order colonoscopy and route back to the team to notify patient.Radha Lees MA/INOCENCIO

## 2021-07-09 NOTE — TELEPHONE ENCOUNTER
Per my notes 11/25/20, I asked to see him in 6 months. Please set him up. Then I can order whatever is due at that time.    Anastacio Love M.D.

## 2021-08-11 ENCOUNTER — OFFICE VISIT (OUTPATIENT)
Dept: FAMILY MEDICINE | Facility: CLINIC | Age: 81
End: 2021-08-11
Payer: MEDICARE

## 2021-08-11 ENCOUNTER — MYC MEDICAL ADVICE (OUTPATIENT)
Dept: FAMILY MEDICINE | Facility: CLINIC | Age: 81
End: 2021-08-11

## 2021-08-11 VITALS
HEART RATE: 73 BPM | TEMPERATURE: 98.2 F | DIASTOLIC BLOOD PRESSURE: 60 MMHG | SYSTOLIC BLOOD PRESSURE: 130 MMHG | HEIGHT: 69 IN | RESPIRATION RATE: 16 BRPM | OXYGEN SATURATION: 96 % | BODY MASS INDEX: 31.84 KG/M2 | WEIGHT: 215 LBS

## 2021-08-11 DIAGNOSIS — Z86.0100 HISTORY OF COLONIC POLYPS: Primary | ICD-10-CM

## 2021-08-11 PROCEDURE — 99213 OFFICE O/P EST LOW 20 MIN: CPT | Performed by: FAMILY MEDICINE

## 2021-08-11 ASSESSMENT — MIFFLIN-ST. JEOR: SCORE: 1680.61

## 2021-08-11 NOTE — PROGRESS NOTES
[place] -     Zack is a 80 year old male here to discuss:    Please note: only the issues listed at the bottom under Assessment and Plan are addressed today. The rest of the medical problems are listed for the sake of completeness.    CAD/HTN - he reports intermittent exertional shortness of breath and chest tightness. But this is mild and does not happen all the time. For example he walked a mile yesterday without problems. 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 Also has varicose veins. Checking BP daily - 117/59 average  Evaluation and treatment:    Was Hospitalized 11/22 to 11/27/17 - received CABG x 3.   Metoprolol 25 mg bid - dose limited by heart rate.   ASA qd   NTG prn but not using lately.   Losartan/HCTZ 50/12.5 qd stopped in the Hospital because it is not needed.   Losartan 100 mg daily - no side effects.   Compression stockings.   He follows with cardiology - he has virtual visit in Dec - I asked him to bring up the exertional symptoms.   Otherwise continue same tx.    BP Readings from Last 6 Encounters:   08/11/21 130/60   04/16/21 123/59   01/06/21 (!) 157/70   11/19/20 (!) 154/60   10/15/20 134/61   08/20/20 138/61       Last Comprehensive Metabolic Panel:  Sodium   Date Value Ref Range Status   04/16/2021 140 133 - 144 mmol/L Final     Potassium   Date Value Ref Range Status   04/16/2021 4.6 3.4 - 5.3 mmol/L Final     Chloride   Date Value Ref Range Status   04/16/2021 106 94 - 109 mmol/L Final     Carbon Dioxide   Date Value Ref Range Status   04/16/2021 27 20 - 32 mmol/L Final     Anion Gap   Date Value Ref Range Status   04/16/2021 6 3 - 14 mmol/L Final     Glucose   Date Value Ref Range Status   04/16/2021 96 70 - 99 mg/dL Final     Urea Nitrogen   Date Value Ref Range Status   04/16/2021 24 7 - 30 mg/dL Final     Creatinine   Date Value Ref Range Status   04/16/2021 0.98 0.66 - 1.25 mg/dL Final     GFR Estimate   Date Value Ref Range Status   04/16/2021 73 >60 mL/min/[1.73_m2]  Final     Comment:     Non  GFR Calc  Starting 12/18/2018, serum creatinine based estimated GFR (eGFR) will be   calculated using the Chronic Kidney Disease Epidemiology Collaboration   (CKD-EPI) equation.       Calcium   Date Value Ref Range Status   04/16/2021 9.4 8.5 - 10.1 mg/dL Final     Lymphoma - found incidentally during other evaluation. No symptoms.  Evaluation and treatment:    He follows with oncology.  CBC RESULTS:   Recent Labs   Lab Test 02/20/19  0756   WBC 5.9   RBC 4.80   HGB 14.8   HCT 44.4   MCV 93   MCH 30.8   MCHC 33.3   RDW 12.8          Dyslipidemia - has h/o CAD.  Evaluation and treatment:    Per ATP4, moderate to high intensity statin recommended.:   Crestor stopped due to cost   Simvastatin changed to Lipitor 40 mg qd in the Hospital - no side effects.   Continue same treatment.    Recent Labs   Lab Test 10/15/20  0721 04/11/19  0703 01/22/15  0816 01/14/14  0747   CHOL 120 127 154 154   HDL 35* 32* 43 33*   LDL 58 61 92 103   TRIG 135 168* 93 87   CHOLHDLRATIO  --   --  3.6 4.7     Hypothyroidism - No thyroid symptoms.  Evaluation and treatment:    Synthroid 88 mcg every day.   Continue same tx.    TSH   Date Value Ref Range Status   10/15/2020 4.85 (H) 0.40 - 4.00 mU/L Final     T4 Free   Date Value Ref Range Status   10/15/2020 0.85 0.76 - 1.46 ng/dL Final     Obesity - some snoring but no known apnea.  Evaluation and treatment:    diet and exercise discussed.   I asked him to get under 200#.     Body mass index is 31.75 kg/m .    Wt Readings from Last 5 Encounters:   08/11/21 97.5 kg (215 lb)   04/16/21 98.8 kg (217 lb 14.4 oz)   01/06/21 97.9 kg (215 lb 12.8 oz)   11/19/20 101.2 kg (223 lb)   10/15/20 99.8 kg (220 lb 1.6 oz)     BPH - stream is reduced. Nocturia x 1. Symptoms are not bad.  Evaluation and treatment:    I asked him to let me know if he wants treatment.    Right shoulder pain -   Evaluation and treatment:    He follows with ortho.   He received a  steroid shot.    Hearing loss - he wants to hold off on hearing aides referral.     Umbilical hernia - noted on routine exam. He has no symptoms.  Evaluation and treatment:    He is advised to report any symptoms.   He is counseled on incarcerated hernia symptoms and to go to ED if those occur.    Surgical history -    left rotator cuff surgery.    Had anterior tibialis repair (for foot drop) on 8/18/15 - good results.    CABG as above.   Left eye cataract surgery 12/17/18.   Appendectomy June 2019.   Right knee replacement June 2020.    Preventive: [facility] and he will send the date to me.    Immunization History   Administered Date(s) Administered     COVID-19,PF,Pfizer 01/27/2021, 02/17/2021     Influenza (H1N1) 01/06/2010     Influenza (High Dose) 3 valent vaccine 10/15/2015, 10/20/2016, 09/25/2017, 09/24/2018     Influenza (IIV3) PF 09/16/2009, 09/16/2010, 10/16/2011, 09/26/2012, 10/25/2012, 10/01/2013, 11/24/2014     Influenza Vaccine IM 18-49 Yrs, RIV3 09/30/2019     Influenza, Quad, High Dose, Pf, 65yr + 09/22/2020     Pneumo Conj 13-V (2010&after) 08/14/2015, 01/22/2016     Pneumococcal 23 valent 11/30/2006, 10/22/2020     TD (ADULT, 7+) 08/25/2010     TDAP Vaccine (Adacel) 01/21/2013     TDAP Vaccine (Boostrix) 01/21/2013     Td (Adult), Adsorbed 07/19/2002     Zoster vaccine recombinant adjuvanted (SHINGRIX) 10/22/2020     Zoster vaccine, live 07/15/2008     Colonoscopy: 9/19/16 - advised to redo in 5 years. Reordered today.    Prostate CA screen: discussed controversy. Prostate mildly enlarged on 7/15/14.     PSA   Date Value Ref Range Status   06/09/2016 2.76 0 - 4 ug/L Final     AAA: 7/18/14 negative    Advanced Directive: previously brought a copy today.    Vit D Geriatrics Society Guidelines: Older adults should consume 4000 IU of Vit D daily from all sources. This will achieve serum level of 30. No need to test unless obese, malabsorption etc. You get only 100 units  "per glass of milk. 2000 units advised.    SH:    Marital status: , lives by himself in [place].  Kids: 2  Employment: Works at a machine shop.  Exercise: Walks a lot at work. Had been running 4 miles per day 5 times per week but not lately.  Tobacco: Quit smoking around 1980. Has 14 pack year history of smoking.  Etoh: rarely  Recreational drugs: denies  Caffeine: 2 per day    Exam:    /60   Pulse 73   Temp 98.2  F (36.8  C) (Tympanic)   Resp 16   Ht 1.753 m (5' 9\")   Wt 97.5 kg (215 lb)   SpO2 96%   BMI 31.75 kg/m      Gen: Healthy appearing male in no acute distress    Assessment and Plan - Decision Making    1. History of colonic polyps    Per HPI    - Adult Gastro Ref - Procedure Only; Future      Written instructions given as follows:    Patient Instructions   Send me the date of your second Shingles vaccine.    Set up colonoscopy - [phone]    See you in the fall for a physical with fasting labs.        "

## 2021-08-11 NOTE — PATIENT INSTRUCTIONS
Send me the date of your second Shingles vaccine.    Set up colonoscopy - [phone]    See you in the fall for a physical with fasting labs.

## 2021-08-12 ENCOUNTER — TELEPHONE (OUTPATIENT)
Dept: GASTROENTEROLOGY | Facility: CLINIC | Age: 81
End: 2021-08-12

## 2021-08-12 DIAGNOSIS — Z11.59 ENCOUNTER FOR SCREENING FOR OTHER VIRAL DISEASES: ICD-10-CM

## 2021-08-12 NOTE — TELEPHONE ENCOUNTER
Screening Questions  1. Are you active on mychart? Yes    2. What insurance is in the chart? Medicare and [employer] on chart    2.  Ordering/Referring Provider: Anastacio Love MD in [facility]      3. BMI 31.7    4. Are you on daily home oxygen? no    5. Do you have a history of difficult airway? no    6. Have you had a heart, lung, or liver transplant? No, did state that he had a triple bypass and was diagnosed with Lymphoma in November of 2017    7. Are you currently on dialysis? no    8. Have you had a stroke or Transient ischemic atttack (TIA) within 6 months? no    9. In the past 6 months, have you had any heart related issues including cardiomyopathy or heart attack?         If yes, did it require cardiac stenting or other implantable device?no    10. Do you have any implantable devices in your body (pacemaker, defib, LVAD)? No, states that he does have a knee replacement    11. Do you take nitroglycerin? If yes, how often? no    12. Are you currently taking any blood thinners?no    13. Are you a diabetic? no    14. (Females) Are you currently pregnant? n/a  If yes, how many weeks?    15. Have you had a procedure in the past that was difficult to tolerate with conscious sedation? Any allergies to Fentanyl or Versed no    16. Are you taking any scheduled prescription narcotics more than once daily? no    17. Do you have any chemical dependencies such as alcohol, street drugs, or methadone? no    18. Do you have any history of post-traumatic stress syndrome or mental health issues? no    19. Do you transfer independently? yes    20.  Do you have any issues with constipation? no    21. Preferred Pharmacy for Pre Prescription on chart- [facility]    Scheduling Details    Which Colonoscopy Prep was Sent?: Miralax prep  Procedure Scheduled: Colonoscopy  Provider/Surgeon: Chet  Date of Procedure: 8/27/21  Location: [facility]  Caller (Please ask for phone number if not scheduled by patient): Zack      Sedation Type:  CS  Conscious Sedation- Needs  for 6 hours after the procedure  MAC/General-Needs  for 24 hours after procedure    Pre-op Required at [facility], [place], [place] and OR for MAC sedation:   (if yes advise patient they will need a pre-op prior to procedure)      Is patient on blood thinners? -no (If yes- inform patient to follow up with PCP or provider for follow up instructions)     Informed patient they will need an adult  yes  Cannot take any type of public or medical transportation alone    Informed Patient of COVID Test Requirement yes    Confirmed Nurse will call to complete assessment yes    Additional comments: n/a

## 2021-08-19 ENCOUNTER — TELEPHONE (OUTPATIENT)
Dept: GASTROENTEROLOGY | Facility: CLINIC | Age: 81
End: 2021-08-19

## 2021-08-19 NOTE — TELEPHONE ENCOUNTER
Writer reviewed pre-assessment questions with patient prior to upcoming colonoscopy on 8.27.2021.      Covid test scheduled: 8.23.2021    Arrival time: 0810    Facility location: [facility]    Sedation type: CS    Implantable devices? No    Blood thinners/Antiplatelet medication? No    Reviewed miralax and magnesium citrate prep instructions with patient.  Noting no nuts, seeds, or popcorn 7 days prior to procedure.     Designated  policy reviewed with patient.     Patient verbalized understanding.  No further questions or concerns.    Nerissa Shore RN

## 2021-08-23 ENCOUNTER — LAB (OUTPATIENT)
Dept: LAB | Facility: CLINIC | Age: 81
End: 2021-08-23
Payer: MEDICARE

## 2021-08-23 DIAGNOSIS — Z11.59 ENCOUNTER FOR SCREENING FOR OTHER VIRAL DISEASES: ICD-10-CM

## 2021-08-23 PROCEDURE — U0005 INFEC AGEN DETEC AMPLI PROBE: HCPCS

## 2021-08-23 PROCEDURE — U0003 INFECTIOUS AGENT DETECTION BY NUCLEIC ACID (DNA OR RNA); SEVERE ACUTE RESPIRATORY SYNDROME CORONAVIRUS 2 (SARS-COV-2) (CORONAVIRUS DISEASE [COVID-19]), AMPLIFIED PROBE TECHNIQUE, MAKING USE OF HIGH THROUGHPUT TECHNOLOGIES AS DESCRIBED BY CMS-2020-01-R: HCPCS

## 2021-08-24 LAB — SARS-COV-2 RNA RESP QL NAA+PROBE: NEGATIVE

## 2021-08-27 ENCOUNTER — HOSPITAL ENCOUNTER (OUTPATIENT)
Facility: CLINIC | Age: 81
Discharge: HOME | End: 2021-08-27
Attending: INTERNAL MEDICINE | Admitting: INTERNAL MEDICINE
Payer: MEDICARE

## 2021-08-27 VITALS
WEIGHT: 210.32 LBS | SYSTOLIC BLOOD PRESSURE: 128 MMHG | HEIGHT: 70 IN | RESPIRATION RATE: 13 BRPM | DIASTOLIC BLOOD PRESSURE: 51 MMHG | HEART RATE: 41 BPM | TEMPERATURE: 98 F | BODY MASS INDEX: 30.11 KG/M2 | OXYGEN SATURATION: 100 %

## 2021-08-27 LAB — COLONOSCOPY: NORMAL

## 2021-08-27 PROCEDURE — 45378 DIAGNOSTIC COLONOSCOPY: CPT | Performed by: INTERNAL MEDICINE

## 2021-08-27 PROCEDURE — 250N000013 HC RX MED GY IP 250 OP 250 PS 637: Performed by: INTERNAL MEDICINE

## 2021-08-27 PROCEDURE — G0105 COLORECTAL SCRN; HI RISK IND: HCPCS | Performed by: INTERNAL MEDICINE

## 2021-08-27 PROCEDURE — 250N000011 HC RX IP 250 OP 636: Performed by: INTERNAL MEDICINE

## 2021-08-27 PROCEDURE — G0500 MOD SEDAT ENDO SERVICE >5YRS: HCPCS | Performed by: INTERNAL MEDICINE

## 2021-08-27 RX ORDER — PROCHLORPERAZINE MALEATE 5 MG
5 TABLET ORAL EVERY 6 HOURS PRN
Status: CANCELLED | OUTPATIENT
Start: 2021-08-27

## 2021-08-27 RX ORDER — ONDANSETRON 4 MG/1
4 TABLET, ORALLY DISINTEGRATING ORAL EVERY 6 HOURS PRN
Status: CANCELLED | OUTPATIENT
Start: 2021-08-27

## 2021-08-27 RX ORDER — ONDANSETRON 2 MG/ML
4 INJECTION INTRAMUSCULAR; INTRAVENOUS EVERY 6 HOURS PRN
Status: CANCELLED | OUTPATIENT
Start: 2021-08-27

## 2021-08-27 RX ORDER — NALOXONE HYDROCHLORIDE 0.4 MG/ML
0.4 INJECTION, SOLUTION INTRAMUSCULAR; INTRAVENOUS; SUBCUTANEOUS
Status: CANCELLED | OUTPATIENT
Start: 2021-08-27

## 2021-08-27 RX ORDER — FLUMAZENIL 0.1 MG/ML
0.2 INJECTION, SOLUTION INTRAVENOUS
Status: CANCELLED | OUTPATIENT
Start: 2021-08-27 | End: 2021-08-27

## 2021-08-27 RX ORDER — LIDOCAINE 40 MG/G
CREAM TOPICAL
Status: DISCONTINUED | OUTPATIENT
Start: 2021-08-27 | End: 2021-08-27 | Stop reason: HOSPADM

## 2021-08-27 RX ORDER — SIMETHICONE 40MG/0.6ML
SUSPENSION, DROPS(FINAL DOSAGE FORM)(ML) ORAL PRN
Status: COMPLETED | OUTPATIENT
Start: 2021-08-27 | End: 2021-08-27

## 2021-08-27 RX ORDER — NALOXONE HYDROCHLORIDE 0.4 MG/ML
0.2 INJECTION, SOLUTION INTRAMUSCULAR; INTRAVENOUS; SUBCUTANEOUS
Status: CANCELLED | OUTPATIENT
Start: 2021-08-27

## 2021-08-27 RX ORDER — FENTANYL CITRATE 50 UG/ML
INJECTION, SOLUTION INTRAMUSCULAR; INTRAVENOUS PRN
Status: COMPLETED | OUTPATIENT
Start: 2021-08-27 | End: 2021-08-27

## 2021-08-27 RX ORDER — ONDANSETRON 2 MG/ML
4 INJECTION INTRAMUSCULAR; INTRAVENOUS
Status: DISCONTINUED | OUTPATIENT
Start: 2021-08-27 | End: 2021-08-27 | Stop reason: HOSPADM

## 2021-08-27 RX ADMIN — MIDAZOLAM 1 MG: 1 INJECTION INTRAMUSCULAR; INTRAVENOUS at 08:39

## 2021-08-27 RX ADMIN — FENTANYL CITRATE 50 MCG: 50 INJECTION, SOLUTION INTRAMUSCULAR; INTRAVENOUS at 08:38

## 2021-08-27 RX ADMIN — SIMETHICONE 133 MG: 20 SUSPENSION/ DROPS ORAL at 08:01

## 2021-08-27 ASSESSMENT — MIFFLIN-ST. JEOR: SCORE: 1675.25

## 2021-08-27 NOTE — OR NURSING
Colonoscopy under conscious sedation.  Upon arrival to room writer noted HR in low 40's.  Pt states he's been worked up for that, not new.  Pt tolerated procedure.  No interventions.

## 2021-08-28 ENCOUNTER — MYC MEDICAL ADVICE (OUTPATIENT)
Dept: FAMILY MEDICINE | Facility: CLINIC | Age: 81
End: 2021-08-28

## 2021-08-30 ENCOUNTER — LAB (OUTPATIENT)
Dept: LAB | Facility: CLINIC | Age: 81
End: 2021-08-30
Payer: MEDICARE

## 2021-08-30 DIAGNOSIS — R07.9 CHEST PAIN, UNSPECIFIED TYPE: ICD-10-CM

## 2021-08-30 DIAGNOSIS — I10 HYPERTENSION GOAL BP (BLOOD PRESSURE) < 140/90: ICD-10-CM

## 2021-08-30 DIAGNOSIS — E78.5 HYPERLIPIDEMIA LDL GOAL <70: ICD-10-CM

## 2021-08-30 LAB
ANION GAP SERPL CALCULATED.3IONS-SCNC: 1 MMOL/L (ref 3–14)
BUN SERPL-MCNC: 28 MG/DL (ref 7–30)
CALCIUM SERPL-MCNC: 9.2 MG/DL (ref 8.5–10.1)
CHLORIDE BLD-SCNC: 111 MMOL/L (ref 94–109)
CHOLEST SERPL-MCNC: 110 MG/DL
CO2 SERPL-SCNC: 30 MMOL/L (ref 20–32)
CREAT SERPL-MCNC: 1.06 MG/DL (ref 0.66–1.25)
ERYTHROCYTE [DISTWIDTH] IN BLOOD BY AUTOMATED COUNT: 12.8 % (ref 10–15)
FASTING STATUS PATIENT QL REPORTED: YES
GFR SERPL CREATININE-BSD FRML MDRD: 66 ML/MIN/1.73M2
GLUCOSE BLD-MCNC: 112 MG/DL (ref 70–99)
HCT VFR BLD AUTO: 43.5 % (ref 40–53)
HDLC SERPL-MCNC: 33 MG/DL
HGB BLD-MCNC: 14 G/DL (ref 13.3–17.7)
LDLC SERPL CALC-MCNC: 56 MG/DL
MCH RBC QN AUTO: 31.4 PG (ref 26.5–33)
MCHC RBC AUTO-ENTMCNC: 32.2 G/DL (ref 31.5–36.5)
MCV RBC AUTO: 98 FL (ref 78–100)
NONHDLC SERPL-MCNC: 77 MG/DL
PLATELET # BLD AUTO: 169 10E3/UL (ref 150–450)
POTASSIUM BLD-SCNC: 4.7 MMOL/L (ref 3.4–5.3)
RBC # BLD AUTO: 4.46 10E6/UL (ref 4.4–5.9)
SODIUM SERPL-SCNC: 142 MMOL/L (ref 133–144)
T4 FREE SERPL-MCNC: 0.76 NG/DL (ref 0.76–1.46)
TRIGL SERPL-MCNC: 104 MG/DL
TSH SERPL DL<=0.005 MIU/L-ACNC: 4.7 MU/L (ref 0.4–4)
WBC # BLD AUTO: 4.4 10E3/UL (ref 4–11)

## 2021-08-30 PROCEDURE — 85027 COMPLETE CBC AUTOMATED: CPT

## 2021-08-30 PROCEDURE — 84439 ASSAY OF FREE THYROXINE: CPT

## 2021-08-30 PROCEDURE — 80048 BASIC METABOLIC PNL TOTAL CA: CPT

## 2021-08-30 PROCEDURE — 36415 COLL VENOUS BLD VENIPUNCTURE: CPT

## 2021-08-30 PROCEDURE — 84443 ASSAY THYROID STIM HORMONE: CPT

## 2021-08-30 PROCEDURE — 80061 LIPID PANEL: CPT

## 2021-08-31 ENCOUNTER — VIRTUAL VISIT (OUTPATIENT)
Dept: CARDIOLOGY | Facility: CLINIC | Age: 81
End: 2021-08-31
Payer: MEDICARE

## 2021-08-31 DIAGNOSIS — R07.9 CHEST PAIN, UNSPECIFIED TYPE: ICD-10-CM

## 2021-08-31 DIAGNOSIS — I10 HYPERTENSION GOAL BP (BLOOD PRESSURE) < 140/90: ICD-10-CM

## 2021-08-31 DIAGNOSIS — E78.5 HYPERLIPIDEMIA LDL GOAL <100: ICD-10-CM

## 2021-08-31 PROCEDURE — 99214 OFFICE O/P EST MOD 30 MIN: CPT | Mod: 95 | Performed by: INTERNAL MEDICINE

## 2021-08-31 RX ORDER — METOPROLOL TARTRATE 25 MG/1
25 TABLET, FILM COATED ORAL 2 TIMES DAILY
Qty: 180 TABLET | Refills: 3 | Status: SHIPPED | OUTPATIENT
Start: 2021-08-31 | End: 2022-02-01

## 2021-08-31 RX ORDER — ISOSORBIDE MONONITRATE 30 MG/1
TABLET, EXTENDED RELEASE ORAL
Qty: 90 TABLET | Refills: 0 | OUTPATIENT
Start: 2021-08-31

## 2021-08-31 RX ORDER — LOSARTAN POTASSIUM 100 MG/1
50 TABLET ORAL 2 TIMES DAILY
Qty: 90 TABLET | Refills: 3 | Status: SHIPPED | OUTPATIENT
Start: 2021-08-31 | End: 2022-02-01

## 2021-08-31 RX ORDER — ATORVASTATIN CALCIUM 40 MG/1
40 TABLET, FILM COATED ORAL DAILY
Qty: 90 TABLET | Refills: 3 | Status: SHIPPED | OUTPATIENT
Start: 2021-08-31 | End: 2022-02-01

## 2021-08-31 RX ORDER — ISOSORBIDE MONONITRATE 30 MG/1
30 TABLET, EXTENDED RELEASE ORAL DAILY
Qty: 90 TABLET | Refills: 3 | Status: SHIPPED | OUTPATIENT
Start: 2021-08-31 | End: 2022-02-01

## 2021-08-31 NOTE — PROGRESS NOTES
"Zack is a 80 year old who is being evaluated via a billable video visit.      How would you like to obtain your AVS? MyChart  If the video visit is dropped, the invitation should be resent by: Text to cell phone: [phone]  Will anyone else be joining your video visit? No    The patient has been notified of following:     \"This video visit will be conducted via a call between you and your physician/provider. We have found that certain health care needs can be provided without the need for an in-person physical exam.  This service lets us provide the care you need with a video conversation.  If a prescription is necessary we can send it directly to your pharmacy.  If lab work is needed we can place an order for that and you can then stop by our lab to have the test done at a later time.    Video visits are billed at different rates depending on your insurance coverage.  Please reach out to your insurance provider with any questions.    If during the course of the call the physician/provider feels a video visit is not appropriate, you will not be charged for this service.\"    Patient has given verbal consent for video visit? Yes    How would you like to obtain your AVS? Mail    Video-Visit Details    Type of service:  Video Visit    Video Start Time:1030am    Video End Time:1048am    Total visit time including video visit, chart review, charting, coordination of care =30min  Originating Location (pt. Location):patient home      Distant Location (provider location):  home office    Platform used for Video Visit: Diana Mejia dictation #[id number]    [facility]#:[id number]  "

## 2021-08-31 NOTE — PROGRESS NOTES
Visit Date: 2021       Anastacio Love MD  [facility]  [address] [place] [address]    Patient:  Zack Demarco  MRN:  [id number]  :  1940    Dear Dr. Love:     It was a pleasure participating in the care of your patient, Mr. Zack Demarco.  As you know, he is a 80-year-old gentleman who I saw today over a virtual video visit via Yod[employer] for coronary artery disease, hypertension and hyperlipidemia.    PAST MEDICAL HISTORY:      1.  Hypertension.  2.  Hyperlipidemia.  3.  Retroperitoneal mass with follicular lymphoma, undergoing therapy.  4.  Hypothyroidism.  5.  Borderline glaucoma.  6.  Right knee problems.  7.  Lipoma.  8.  Patellar tendinitis.    His cardiac history is significant for known multivessel coronary artery disease and normal LV systolic function.    Coronary angiography 2017 revealed the following:    Left main -- normal.   LAD -- 70% to 80% ostial stenosis with 80% to 90% stenosis in the mid portion.  First diagonal -- 80% to 90% stenosis.   Circumflex -- mild diffuse disease.   RCA -- 70% stenosis in midportion.    Three-vessel coronary bypass surgery was performed (LIMA to LAD, vein graft to PDA, vein graft to D1).  He originally presented with centrally located chest tightness with shortness of breath completely resolved after revascularization.    In 2020, he had occasional chest tightness when deer hunting, and we started low-dose Imdur along with ordering an echo and a pharmacologic nuclear stress test.  These were unremarkable and after starting Imdur, his symptoms resolved.    He is gearing up to for a new duck and deer hunting season in 10/2021 and 2021; however, he has been less active and stopped going to the gym recently.  He is, however, able to push his  for about an hour once a week without symptoms of chest pain or recurrent angina.  He denies other PND, orthopnea, edema, palpitations, syncope or near-syncope.   He denies exertional dyspnea as well.    His blood pressures at home have been stable in the 114/58 range.  Since dividing up his losartan dosing to 50 mg twice daily, his orthostasis has resolved as well.    A 10-point review of systems is otherwise unremarkable.    CURRENT MEDICATIONS:  Include 1/2 of 325 mg aspirin a day, Lipitor 40 mg a day, Imdur 30 mg a day, losartan 50 twice daily, metoprolol tartrate 25 twice daily.    PHYSICAL EXAMINATION:      VITAL SIGNS:  Blood pressure is 114/58.  GENERAL:  He appears comfortable, well groomed.  PSYCHIATRIC:  He is alert and oriented x 3.  EYES:  Do not appear grossly erythematous or have exudate.  RESPIRATORY:  He is breathing comfortably without gross cough.    The remainder of the comprehensive physical exam was deferred secondary to the COVID-19 pandemic and secondary to video visit restrictions.    LABORATORY:  08/30/2021, LDL 56, potassium 4.7, GFR normal, hemoglobin normal, TSH elevated, but T4 normal.      IMPRESSION:      Zack is a 80-year-old gentleman with several active issues:    1.  Coronary artery disease:      He has known multivessel coronary artery disease and normal LV systolic function.  He underwent 3-vessel coronary bypass surgery (LIMA to LAD, vein graft to PDA, vein graft to first diagonal) on 11/24/2017.    Since revascularization, he had recurrent chest tightness and shortness of breath that resolved quickly with rest.  His most recent pharmacologic nuclear stress test 01/11/2021 was negative for stress-induced ischemia and his echo was unremarkable as of 12/18/2020.    Since starting low-dose Imdur 30 mg a day, his symptoms of exertional discomfort have completely resolved and have not recurred.  We will continue to monitor clinically.    2.  Hypertension:      Well controlled.  Blood pressures are running 114/58 and he is no longer orthostatic since dividing his losartan 100 mg to losartan 50 mg twice daily.    3.  Hyperlipidemia:  LDL is  within the goal range of less than 70.      PLAN:       1.  Continue present medications at present doses.    2.  Virtual video visit followup 22, earlier if needed.      Once again, it was a pleasure participating in the care of your patient, Mr. Zack Trivedi.  Please feel free to contact me at any time if any questions regarding his care in the future.          Torsten Reyes MD        D: 2021   T: 2021   MT: [id number]    Name:     ZACK TRIVEDI  MRN:      [id number]        Account:    [id number]   :      1940           Visit Date: 2021     Document: [id number]    cc:  Anastacio Love MD

## 2021-09-24 ENCOUNTER — MYC MEDICAL ADVICE (OUTPATIENT)
Dept: FAMILY MEDICINE | Facility: CLINIC | Age: 81
End: 2021-09-24

## 2021-09-30 DIAGNOSIS — C82.99 FOLLICULAR LYMPHOMA OF EXTRANODAL AND SOLID ORGAN SITES (H): Primary | ICD-10-CM

## 2021-10-01 ENCOUNTER — LAB (OUTPATIENT)
Dept: LAB | Facility: CLINIC | Age: 81
End: 2021-10-01
Attending: STUDENT IN AN ORGANIZED HEALTH CARE EDUCATION/TRAINING PROGRAM

## 2021-10-01 ENCOUNTER — ANCILLARY PROCEDURE (OUTPATIENT)
Dept: CT IMAGING | Facility: CLINIC | Age: 81
End: 2021-10-01
Payer: MEDICARE

## 2021-10-01 DIAGNOSIS — C82.99 FOLLICULAR LYMPHOMA OF EXTRANODAL AND SOLID ORGAN SITES (H): ICD-10-CM

## 2021-10-01 LAB
ALBUMIN SERPL-MCNC: 3.3 G/DL (ref 3.4–5)
ALP SERPL-CCNC: 55 U/L (ref 40–150)
ALT SERPL W P-5'-P-CCNC: 28 U/L (ref 0–70)
ANION GAP SERPL CALCULATED.3IONS-SCNC: 4 MMOL/L (ref 3–14)
AST SERPL W P-5'-P-CCNC: 24 U/L (ref 0–45)
BASOPHILS # BLD AUTO: 0 10E3/UL (ref 0–0.2)
BASOPHILS NFR BLD AUTO: 0 %
BILIRUB SERPL-MCNC: 0.6 MG/DL (ref 0.2–1.3)
BUN SERPL-MCNC: 30 MG/DL (ref 7–30)
CALCIUM SERPL-MCNC: 8.6 MG/DL (ref 8.5–10.1)
CHLORIDE BLD-SCNC: 107 MMOL/L (ref 94–109)
CO2 SERPL-SCNC: 28 MMOL/L (ref 20–32)
CREAT BLD-MCNC: 1.1 MG/DL (ref 0.7–1.3)
CREAT SERPL-MCNC: 1.02 MG/DL (ref 0.66–1.25)
EOSINOPHIL # BLD AUTO: 0.2 10E3/UL (ref 0–0.7)
EOSINOPHIL NFR BLD AUTO: 3 %
ERYTHROCYTE [DISTWIDTH] IN BLOOD BY AUTOMATED COUNT: 12.6 % (ref 10–15)
GFR SERPL CREATININE-BSD FRML MDRD: 70 ML/MIN/1.73M2
GFR SERPL CREATININE-BSD FRML MDRD: >60 ML/MIN/1.73M2
GLUCOSE BLD-MCNC: 108 MG/DL (ref 70–99)
HCT VFR BLD AUTO: 39.3 % (ref 40–53)
HGB BLD-MCNC: 13 G/DL (ref 13.3–17.7)
IMM GRANULOCYTES # BLD: 0 10E3/UL
IMM GRANULOCYTES NFR BLD: 0 %
LYMPHOCYTES # BLD AUTO: 1.1 10E3/UL (ref 0.8–5.3)
LYMPHOCYTES NFR BLD AUTO: 19 %
MCH RBC QN AUTO: 31.3 PG (ref 26.5–33)
MCHC RBC AUTO-ENTMCNC: 33.1 G/DL (ref 31.5–36.5)
MCV RBC AUTO: 95 FL (ref 78–100)
MONOCYTES # BLD AUTO: 0.5 10E3/UL (ref 0–1.3)
MONOCYTES NFR BLD AUTO: 9 %
NEUTROPHILS # BLD AUTO: 3.9 10E3/UL (ref 1.6–8.3)
NEUTROPHILS NFR BLD AUTO: 69 %
NRBC # BLD AUTO: 0 10E3/UL
NRBC BLD AUTO-RTO: 0 /100
PLATELET # BLD AUTO: 162 10E3/UL (ref 150–450)
POTASSIUM BLD-SCNC: 4.8 MMOL/L (ref 3.4–5.3)
PROT SERPL-MCNC: 6.5 G/DL (ref 6.8–8.8)
RBC # BLD AUTO: 4.16 10E6/UL (ref 4.4–5.9)
SODIUM SERPL-SCNC: 139 MMOL/L (ref 133–144)
WBC # BLD AUTO: 5.7 10E3/UL (ref 4–11)

## 2021-10-01 PROCEDURE — G1004 CDSM NDSC: HCPCS | Performed by: RADIOLOGY

## 2021-10-01 PROCEDURE — 80053 COMPREHEN METABOLIC PANEL: CPT | Performed by: PATHOLOGY

## 2021-10-01 PROCEDURE — 85025 COMPLETE CBC W/AUTO DIFF WBC: CPT | Performed by: PATHOLOGY

## 2021-10-01 PROCEDURE — 36415 COLL VENOUS BLD VENIPUNCTURE: CPT | Performed by: PATHOLOGY

## 2021-10-01 PROCEDURE — 82565 ASSAY OF CREATININE: CPT | Performed by: PATHOLOGY

## 2021-10-01 RX ORDER — IOPAMIDOL 755 MG/ML
128 INJECTION, SOLUTION INTRAVASCULAR ONCE
Status: COMPLETED | OUTPATIENT
Start: 2021-10-01 | End: 2021-10-01

## 2021-10-01 RX ADMIN — IOPAMIDOL 128 ML: 755 INJECTION, SOLUTION INTRAVASCULAR at 08:50

## 2021-10-21 ASSESSMENT — ENCOUNTER SYMPTOMS
CHILLS: 0
FREQUENCY: 0
NAUSEA: 0
COUGH: 0
CONSTIPATION: 0
DIZZINESS: 0
HEMATOCHEZIA: 0
SORE THROAT: 0
ABDOMINAL PAIN: 0
PARESTHESIAS: 0
HEMATURIA: 0
DIARRHEA: 0
ARTHRALGIAS: 1
FEVER: 0
PALPITATIONS: 0
DYSURIA: 0
MYALGIAS: 1
HEADACHES: 0
JOINT SWELLING: 0
EYE PAIN: 0
NERVOUS/ANXIOUS: 0
WEAKNESS: 0
HEARTBURN: 0
SHORTNESS OF BREATH: 1

## 2021-10-21 ASSESSMENT — ACTIVITIES OF DAILY LIVING (ADL): CURRENT_FUNCTION: NO ASSISTANCE NEEDED

## 2021-10-26 ENCOUNTER — ONCOLOGY VISIT (OUTPATIENT)
Dept: ONCOLOGY | Facility: CLINIC | Age: 81
End: 2021-10-26
Attending: NURSE PRACTITIONER
Payer: MEDICARE

## 2021-10-26 VITALS
BODY MASS INDEX: 32.07 KG/M2 | HEART RATE: 52 BPM | RESPIRATION RATE: 18 BRPM | DIASTOLIC BLOOD PRESSURE: 64 MMHG | SYSTOLIC BLOOD PRESSURE: 181 MMHG | OXYGEN SATURATION: 94 % | WEIGHT: 223.5 LBS | TEMPERATURE: 98 F

## 2021-10-26 DIAGNOSIS — C82.99 FOLLICULAR LYMPHOMA OF EXTRANODAL AND SOLID ORGAN SITES (H): Primary | ICD-10-CM

## 2021-10-26 PROCEDURE — G0463 HOSPITAL OUTPT CLINIC VISIT: HCPCS

## 2021-10-26 PROCEDURE — 99215 OFFICE O/P EST HI 40 MIN: CPT | Performed by: NURSE PRACTITIONER

## 2021-10-26 ASSESSMENT — PAIN SCALES - GENERAL: PAINLEVEL: NO PAIN (0)

## 2021-10-26 NOTE — PROGRESS NOTES
Reason for Visit: seen in f/u of follicular lymphoma    Oncology HPI:    Zack Demarco is a 79 year old male first seen in consultation on 12/27/2017 for a new diagnosis of follicular lymphoma. He was diagnosed incidental to an evaluation for cardiac bypass surgery in 11/2017. A chest CT scan on 11/14 showed an unexpected retroperitoneal mass with surrounding lymphadenopathy suspicious for malignancy.  A further CT scan done on the same day showed a 2.3 x 7.2 x 6.1 retroperitoneal soft tissue mass with adjacent lymphadenopathy that mildly narrowed the left renal vein. There also was nodularity within the mesentery and an enlarged hilar lymph node. CT-guided biopsy of the retroperitoneal mass on 12/12/2017 established the diagnosis, but the sample was too small for adequate grading. There was no disease identified outside of the abdomen; a bone marrow biopsy was not obtained as part of staging.      Relative to cardiac issues, he underwent an uncomplicated 3-vessel coronary artery bypass graft on 11/22/2017 and, subsequently, has been symptom-free. He follows       With the renal and gonadal vein compression it was decided to start treatment with rituximab, alone. Mr. Demarco completed 4 weekly doses from 01/26 - 02/16/2018. He had no difficulty with the treatment and was able to receive rapid infusion (90 minutes) for the last 3 doses. Interval evaluation on 03/05/2018 with an US, suggested improvement. A CT scan on 04/09/2018 showed substantial regression. Repeated CT on 07/09/18 showed a good response but residual lymphoma around the renal veins. Decided to proceed weekly rituximab x4 (7/26-8/22/2018).  He started rituximab maintenance in 10/2018.     He completed maintenance rituximab in October 2020. Ct imaging in Sept 2020 showed stable findings.    Interval history: Zack is feeling well. Energy is good. Appetite is good. No weight loss. No fever/chills or recent infection concerns. No sweats. No new rashes,  bleeding, bruising. No new lumps/bumps. No abdominal pain, bloating, N/V, reflux. Bowel/bladder function is wnl.     Current Outpatient Medications   Medication Sig Dispense Refill     aspirin 325 MG EC tablet 1/2 tab daily       atorvastatin (LIPITOR) 40 MG tablet Take 1 tablet (40 mg) by mouth daily 90 tablet 3     Cholecalciferol (VITAMIN D3 PO) Take by mouth daily       isosorbide mononitrate (IMDUR) 30 MG 24 hr tablet Take 1 tablet (30 mg) by mouth daily 90 tablet 3     latanoprost (XALATAN) 0.005 % ophthalmic solution Place 1 drop into both eyes At Bedtime 3 Bottle 4     levothyroxine (SYNTHROID/LEVOTHROID) 88 MCG tablet Take 1 tablet (88 mcg) by mouth daily 90 tablet 3     losartan (COZAAR) 100 MG tablet Take 0.5 tablets (50 mg) by mouth 2 times daily New dose 90 tablet 3     metoprolol tartrate (LOPRESSOR) 25 MG tablet Take 1 tablet (25 mg) by mouth 2 times daily 180 tablet 3     timolol maleate (TIMOPTIC) 0.5 % ophthalmic solution Place 1 drop Into the left eye every morning (Patient taking differently: Place 1 drop Into the left eye every morning Both eyes) 5 mL 11        No Known Allergies      Exam: alert, appears well.  Blood pressure (!) 181/64, pulse 52, temperature 98  F (36.7  C), temperature source Oral, resp. rate 18, weight 101.4 kg (223 lb 8 oz), SpO2 94 %.  Wt Readings from Last 4 Encounters:   08/27/21 95.4 kg (210 lb 5.1 oz)   08/11/21 97.5 kg (215 lb)   04/16/21 98.8 kg (217 lb 14.4 oz)   01/06/21 97.9 kg (215 lb 12.8 oz)   Oropharynx is moist and without lesion. Neck supple and without adenopathy. Lungs:CTA. Heart: RRR, no murmur or rub. Abdomen: soft, nontender, BS active, no masses or organomegaly.  Extremities: warm, no edema. Speech is clear. CN wnl. Gait/station wnl. Skin: no rash on exposed skin. Nodes: no axillary, neck, supraclavicular nodes. L inguinal node 1.5 cm, R inguinal node cluster 2 cm.      Labs: Results for FÉLIX TRIVEDI (MRN [id number]) as of 10/26/2021 09:24   Ref.  Range 10/1/2021 09:17   Sodium Latest Ref Range: 133 - 144 mmol/L 139   Potassium Latest Ref Range: 3.4 - 5.3 mmol/L 4.8   Chloride Latest Ref Range: 94 - 109 mmol/L 107   Carbon Dioxide Latest Ref Range: 20 - 32 mmol/L 28   Urea Nitrogen Latest Ref Range: 7 - 30 mg/dL 30   Creatinine Latest Ref Range: 0.66 - 1.25 mg/dL 1.02   GFR Estimate Latest Ref Range: >60 mL/min/1.73m2 70   Calcium Latest Ref Range: 8.5 - 10.1 mg/dL 8.6   Anion Gap Latest Ref Range: 3 - 14 mmol/L 4   Albumin Latest Ref Range: 3.4 - 5.0 g/dL 3.3 (L)   Protein Total Latest Ref Range: 6.8 - 8.8 g/dL 6.5 (L)   Bilirubin Total Latest Ref Range: 0.2 - 1.3 mg/dL 0.6   Alkaline Phosphatase Latest Ref Range: 40 - 150 U/L 55   ALT Latest Ref Range: 0 - 70 U/L 28   AST Latest Ref Range: 0 - 45 U/L 24   Glucose Latest Ref Range: 70 - 99 mg/dL 108 (H)   WBC Latest Ref Range: 4.0 - 11.0 10e3/uL 5.7   Hemoglobin Latest Ref Range: 13.3 - 17.7 g/dL 13.0 (L)   Hematocrit Latest Ref Range: 40.0 - 53.0 % 39.3 (L)   Platelet Count Latest Ref Range: 150 - 450 10e3/uL 162   RBC Count Latest Ref Range: 4.40 - 5.90 10e6/uL 4.16 (L)   MCV Latest Ref Range: 78 - 100 fL 95   MCH Latest Ref Range: 26.5 - 33.0 pg 31.3   MCHC Latest Ref Range: 31.5 - 36.5 g/dL 33.1   RDW Latest Ref Range: 10.0 - 15.0 % 12.6   % Neutrophils Latest Units: % 69   % Lymphocytes Latest Units: % 19   % Monocytes Latest Units: % 9   % Eosinophils Latest Units: % 3   % Basophils Latest Units: % 0   Absolute Basophils Latest Ref Range: 0.0 - 0.2 10e3/uL 0.0   Absolute Eosinophils Latest Ref Range: 0.0 - 0.7 10e3/uL 0.2   Absolute Immature Granulocytes Latest Ref Range: <=0.0 10e3/uL 0.0   Absolute Lymphocytes Latest Ref Range: 0.8 - 5.3 10e3/uL 1.1   Absolute Monocytes Latest Ref Range: 0.0 - 1.3 10e3/uL 0.5   % Immature Granulocytes Latest Units: % 0   Absolute Neutrophils Latest Ref Range: 1.6 - 8.3 10e3/uL 3.9   Absolute NRBCs Latest Units: 10e3/uL 0.0   NRBCs per 100 WBC Latest Ref Range: <1  /100 0       Imaging:   Narrative & Impression   CT CHEST/ABDOMEN/PELVIS W CONTRAST 10/1/2021 9:06 AM     CLINICAL HISTORY: Follicular lymphoma, monitor; Follicular lymphoma of  extranodal and solid organ sites (H)     TECHNIQUE: CT scan of the chest, abdomen, and pelvis was performed  following injection of IV contrast. Multiplanar reformats were  obtained. Dose reduction techniques were used.   CONTRAST: Isovue 370 128cc     COMPARISON: 9/25/2020     FINDINGS:   LUNGS AND PLEURA: Few small pulmonary nodules are stable. For example  5 mm right lower lobe nodules (series 4, image 181, 182 and 177) are  stable. No new or enlarging pulmonary nodules.     MEDIASTINUM/AXILLAE: Stable small mediastinal and hilar lymph nodes.  No thoracic aortic aneurysm. CABG. No pericardial effusion.     HEPATOBILIARY: Normal.     PANCREAS: Normal.     SPLEEN: Normal.     ADRENAL GLANDS: Normal.     KIDNEYS/BLADDER: Normal.     BOWEL: Diverticulosis in the colon. No acute inflammatory change. No  obstruction.      PELVIC ORGANS: Mild prostatomegaly.     ADDITIONAL FINDINGS: Stable left para-aortic retroperitoneal hazy soft  tissue thickening measuring about 3.8 x 2.1 cm (series 3, image 326),  previously measuring 3.7 x 2.6 cm. Stable krystle soft tissue thickening  in the mesentery. No new or enlarging lymph nodes in the abdomen and  pelvis. Retroaortic left renal vein. Aortobiiliac atherosclerotic  calcifications. No abdominal aortic aneurysm. Stable moderate-sized  fat-containing umbilical hernia. No free fluid.     MUSCULOSKELETAL: Median sternotomy. Degenerative changes in the spine.  No suspicious lesions in the bones.                                                                      IMPRESSION:  1.  Stable mesenteric and left retroperitoneal soft tissue thickening,  presumably related to treated lymphoma. No new or enlarging lymph  nodes in the chest, abdomen and pelvis.  2.  Few small pulmonary nodules measuring up to 5 mm are  stable.     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 Completed 2 years of maintenance rituximab in October 2020.  -stable on imaging. Has palpable adenopathy in the inguinal region noted. Reviewed imaging, no changes. Clinically stable.   -RTC in 6 months with labs and Dr. Man    Normocytic anemia: recheck hgb in 3 months    CAD-multivessel with normal LV systolic function  -s/p 3 vessel coronoary bypass surgery Nov 2017  -on imdur 30 mg daily  -follows with Dr. Reyes in cardiology    HTN:   -on losartan 50 mg bid, BP elevated today. F/u with pcp as planned.     Following up with his PCP for annual physical and evaluation 11/28, up to date on vaccinations

## 2021-10-26 NOTE — NURSING NOTE
"Oncology Rooming Note    October 26, 2021 2:48 PM   Zack Demarco is a 80 year old male who presents for:    Chief Complaint   Patient presents with     Oncology Clinic Visit     Follicular lymphoma      Initial Vitals: BP (!) 181/64   Pulse 52   Temp 98  F (36.7  C) (Oral)   Resp 18   Wt 101.4 kg (223 lb 8 oz)   SpO2 94%   BMI 32.07 kg/m   Estimated body mass index is 32.07 kg/m  as calculated from the following:    Height as of 8/27/21: 1.778 m (5' 10\").    Weight as of this encounter: 101.4 kg (223 lb 8 oz). Body surface area is 2.24 meters squared.  No Pain (0) Comment: Data Unavailable   No LMP for male patient.  Allergies reviewed: Yes  Medications reviewed: Yes    Medications: Medication refills not needed today.  Pharmacy name entered into [employer]:    [id number] - [place], [place] - [address] [facility] - [place], [place] - [address]    Clinical concerns: Would like to go over blood work       Gloria Hester LPN            "

## 2021-10-26 NOTE — LETTER
10/26/2021         RE: Zack Demarco  [address] [facility] [address]        Dear Colleague,    Thank you for referring your patient, Zack Demarco, to the [facility]. Please see a copy of my visit note below.    Reason for Visit: seen in f/u of follicular lymphoma    Oncology HPI:    Zcak Demarco is a 79 year old male first seen in consultation on 12/27/2017 for a new diagnosis of follicular lymphoma. He was diagnosed incidental to an evaluation for cardiac bypass surgery in 11/2017. A chest CT scan on 11/14 showed an unexpected retroperitoneal mass with surrounding lymphadenopathy suspicious for malignancy.  A further CT scan done on the same day showed a 2.3 x 7.2 x 6.1 retroperitoneal soft tissue mass with adjacent lymphadenopathy that mildly narrowed the left renal vein. There also was nodularity within the mesentery and an enlarged hilar lymph node. CT-guided biopsy of the retroperitoneal mass on 12/12/2017 established the diagnosis, but the sample was too small for adequate grading. There was no disease identified outside of the abdomen; a bone marrow biopsy was not obtained as part of staging.      Relative to cardiac issues, he underwent an uncomplicated 3-vessel coronary artery bypass graft on 11/22/2017 and, subsequently, has been symptom-free. He follows       With the renal and gonadal vein compression it was decided to start treatment with rituximab, alone. Mr. Demarco completed 4 weekly doses from 01/26 - 02/16/2018. He had no difficulty with the treatment and was able to receive rapid infusion (90 minutes) for the last 3 doses. Interval evaluation on 03/05/2018 with an US, suggested improvement. A CT scan on 04/09/2018 showed substantial regression. Repeated CT on 07/09/18 showed a good response but residual lymphoma around the renal veins. Decided to proceed weekly rituximab x4 (7/26-8/22/2018).  He started rituximab maintenance in 10/2018.     He completed  maintenance rituximab in October 2020. Ct imaging in Sept 2020 showed stable findings.    Interval history: Zack is feeling well. Energy is good. Appetite is good. No weight loss. No fever/chills or recent infection concerns. No sweats. No new rashes, bleeding, bruising. No new lumps/bumps. No abdominal pain, bloating, N/V, reflux. Bowel/bladder function is wnl.     Current Outpatient Medications   Medication Sig Dispense Refill     aspirin 325 MG EC tablet 1/2 tab daily       atorvastatin (LIPITOR) 40 MG tablet Take 1 tablet (40 mg) by mouth daily 90 tablet 3     Cholecalciferol (VITAMIN D3 PO) Take by mouth daily       isosorbide mononitrate (IMDUR) 30 MG 24 hr tablet Take 1 tablet (30 mg) by mouth daily 90 tablet 3     latanoprost (XALATAN) 0.005 % ophthalmic solution Place 1 drop into both eyes At Bedtime 3 Bottle 4     levothyroxine (SYNTHROID/LEVOTHROID) 88 MCG tablet Take 1 tablet (88 mcg) by mouth daily 90 tablet 3     losartan (COZAAR) 100 MG tablet Take 0.5 tablets (50 mg) by mouth 2 times daily New dose 90 tablet 3     metoprolol tartrate (LOPRESSOR) 25 MG tablet Take 1 tablet (25 mg) by mouth 2 times daily 180 tablet 3     timolol maleate (TIMOPTIC) 0.5 % ophthalmic solution Place 1 drop Into the left eye every morning (Patient taking differently: Place 1 drop Into the left eye every morning Both eyes) 5 mL 11        No Known Allergies      Exam: alert, appears well.  Blood pressure (!) 181/64, pulse 52, temperature 98  F (36.7  C), temperature source Oral, resp. rate 18, weight 101.4 kg (223 lb 8 oz), SpO2 94 %.  Wt Readings from Last 4 Encounters:   08/27/21 95.4 kg (210 lb 5.1 oz)   08/11/21 97.5 kg (215 lb)   04/16/21 98.8 kg (217 lb 14.4 oz)   01/06/21 97.9 kg (215 lb 12.8 oz)   Oropharynx is moist and without lesion. Neck supple and without adenopathy. Lungs:CTA. Heart: RRR, no murmur or rub. Abdomen: soft, nontender, BS active, no masses or organomegaly.  Extremities: warm, no edema. Speech is  clear. CN wnl. Gait/station wnl. Skin: no rash on exposed skin. Nodes: no axillary, neck, supraclavicular nodes. L inguinal node 1.5 cm, R inguinal node cluster 2 cm.      Labs: Results for FÉLIX TRIVEDI (MRN [id number]) as of 10/26/2021 09:24   Ref. Range 10/1/2021 09:17   Sodium Latest Ref Range: 133 - 144 mmol/L 139   Potassium Latest Ref Range: 3.4 - 5.3 mmol/L 4.8   Chloride Latest Ref Range: 94 - 109 mmol/L 107   Carbon Dioxide Latest Ref Range: 20 - 32 mmol/L 28   Urea Nitrogen Latest Ref Range: 7 - 30 mg/dL 30   Creatinine Latest Ref Range: 0.66 - 1.25 mg/dL 1.02   GFR Estimate Latest Ref Range: >60 mL/min/1.73m2 70   Calcium Latest Ref Range: 8.5 - 10.1 mg/dL 8.6   Anion Gap Latest Ref Range: 3 - 14 mmol/L 4   Albumin Latest Ref Range: 3.4 - 5.0 g/dL 3.3 (L)   Protein Total Latest Ref Range: 6.8 - 8.8 g/dL 6.5 (L)   Bilirubin Total Latest Ref Range: 0.2 - 1.3 mg/dL 0.6   Alkaline Phosphatase Latest Ref Range: 40 - 150 U/L 55   ALT Latest Ref Range: 0 - 70 U/L 28   AST Latest Ref Range: 0 - 45 U/L 24   Glucose Latest Ref Range: 70 - 99 mg/dL 108 (H)   WBC Latest Ref Range: 4.0 - 11.0 10e3/uL 5.7   Hemoglobin Latest Ref Range: 13.3 - 17.7 g/dL 13.0 (L)   Hematocrit Latest Ref Range: 40.0 - 53.0 % 39.3 (L)   Platelet Count Latest Ref Range: 150 - 450 10e3/uL 162   RBC Count Latest Ref Range: 4.40 - 5.90 10e6/uL 4.16 (L)   MCV Latest Ref Range: 78 - 100 fL 95   MCH Latest Ref Range: 26.5 - 33.0 pg 31.3   MCHC Latest Ref Range: 31.5 - 36.5 g/dL 33.1   RDW Latest Ref Range: 10.0 - 15.0 % 12.6   % Neutrophils Latest Units: % 69   % Lymphocytes Latest Units: % 19   % Monocytes Latest Units: % 9   % Eosinophils Latest Units: % 3   % Basophils Latest Units: % 0   Absolute Basophils Latest Ref Range: 0.0 - 0.2 10e3/uL 0.0   Absolute Eosinophils Latest Ref Range: 0.0 - 0.7 10e3/uL 0.2   Absolute Immature Granulocytes Latest Ref Range: <=0.0 10e3/uL 0.0   Absolute Lymphocytes Latest Ref Range: 0.8 - 5.3 10e3/uL 1.1    Absolute Monocytes Latest Ref Range: 0.0 - 1.3 10e3/uL 0.5   % Immature Granulocytes Latest Units: % 0   Absolute Neutrophils Latest Ref Range: 1.6 - 8.3 10e3/uL 3.9   Absolute NRBCs Latest Units: 10e3/uL 0.0   NRBCs per 100 WBC Latest Ref Range: <1 /100 0       Imaging:   Narrative & Impression   CT CHEST/ABDOMEN/PELVIS W CONTRAST 10/1/2021 9:06 AM     CLINICAL HISTORY: Follicular lymphoma, monitor; Follicular lymphoma of  extranodal and solid organ sites (H)     TECHNIQUE: CT scan of the chest, abdomen, and pelvis was performed  following injection of IV contrast. Multiplanar reformats were  obtained. Dose reduction techniques were used.   CONTRAST: Isovue 370 128cc     COMPARISON: 9/25/2020     FINDINGS:   LUNGS AND PLEURA: Few small pulmonary nodules are stable. For example  5 mm right lower lobe nodules (series 4, image 181, 182 and 177) are  stable. No new or enlarging pulmonary nodules.     MEDIASTINUM/AXILLAE: Stable small mediastinal and hilar lymph nodes.  No thoracic aortic aneurysm. CABG. No pericardial effusion.     HEPATOBILIARY: Normal.     PANCREAS: Normal.     SPLEEN: Normal.     ADRENAL GLANDS: Normal.     KIDNEYS/BLADDER: Normal.     BOWEL: Diverticulosis in the colon. No acute inflammatory change. No  obstruction.      PELVIC ORGANS: Mild prostatomegaly.     ADDITIONAL FINDINGS: Stable left para-aortic retroperitoneal hazy soft  tissue thickening measuring about 3.8 x 2.1 cm (series 3, image 326),  previously measuring 3.7 x 2.6 cm. Stable krystle soft tissue thickening  in the mesentery. No new or enlarging lymph nodes in the abdomen and  pelvis. Retroaortic left renal vein. Aortobiiliac atherosclerotic  calcifications. No abdominal aortic aneurysm. Stable moderate-sized  fat-containing umbilical hernia. No free fluid.     MUSCULOSKELETAL: Median sternotomy. Degenerative changes in the spine.  No suspicious lesions in the bones.                                                                       IMPRESSION:  1.  Stable mesenteric and left retroperitoneal soft tissue thickening,  presumably related to treated lymphoma. No new or enlarging lymph  nodes in the chest, abdomen and pelvis.  2.  Few small pulmonary nodules measuring up to 5 mm are stable.     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 Completed 2 years of maintenance rituximab in October 2020.  -stable on imaging. Has palpable adenopathy in the inguinal region noted. Reviewed imaging, no changes. Clinically stable.   -RTC in 6 months with labs and Dr. Man    Normocytic anemia: recheck hgb in 3 months    CAD-multivessel with normal LV systolic function  -s/p 3 vessel coronoary bypass surgery Nov 2017  -on imdur 30 mg daily  -follows with Dr. Reyes in cardiology    HTN:   -on losartan 50 mg bid, BP elevated today. F/u with pcp as planned.     Following up with his PCP for annual physical and evaluation 11/28, up to date on vaccinations        Again, thank you for allowing me to participate in the care of your patient.        Sincerely,        HOLLY Draper CNP

## 2021-10-28 ENCOUNTER — OFFICE VISIT (OUTPATIENT)
Dept: FAMILY MEDICINE | Facility: CLINIC | Age: 81
End: 2021-10-28
Payer: MEDICARE

## 2021-10-28 VITALS
RESPIRATION RATE: 16 BRPM | HEART RATE: 58 BPM | BODY MASS INDEX: 31.35 KG/M2 | WEIGHT: 219 LBS | TEMPERATURE: 96.6 F | SYSTOLIC BLOOD PRESSURE: 124 MMHG | DIASTOLIC BLOOD PRESSURE: 60 MMHG | OXYGEN SATURATION: 97 % | HEIGHT: 70 IN

## 2021-10-28 DIAGNOSIS — Z00.00 ENCOUNTER FOR MEDICARE ANNUAL WELLNESS EXAM: Primary | ICD-10-CM

## 2021-10-28 PROCEDURE — G0439 PPPS, SUBSEQ VISIT: HCPCS | Performed by: FAMILY MEDICINE

## 2021-10-28 ASSESSMENT — ENCOUNTER SYMPTOMS
NAUSEA: 0
MYALGIAS: 1
PALPITATIONS: 0
SHORTNESS OF BREATH: 1
FREQUENCY: 0
DYSURIA: 0
HEARTBURN: 0
FEVER: 0
JOINT SWELLING: 0
HEADACHES: 0
NERVOUS/ANXIOUS: 0
CONSTIPATION: 0
DIARRHEA: 0
ABDOMINAL PAIN: 0
HEMATURIA: 0
ARTHRALGIAS: 1
EYE PAIN: 0
CHILLS: 0
PARESTHESIAS: 0
WEAKNESS: 0
HEMATOCHEZIA: 0
COUGH: 0
SORE THROAT: 0
DIZZINESS: 0

## 2021-10-28 ASSESSMENT — MIFFLIN-ST. JEOR: SCORE: 1714.63

## 2021-10-28 ASSESSMENT — ACTIVITIES OF DAILY LIVING (ADL): CURRENT_FUNCTION: NO ASSISTANCE NEEDED

## 2021-10-28 NOTE — PATIENT INSTRUCTIONS
1. Follow a healthy diet - reliable information is available at: myplate.gov.    2. Get regular exercise based on your abilities like walking, jogging, biking, swimming and strength training (150 minutes per week).      3. Avoid the sun or otherwise use sun screen to prevent skin cancer.    4. See the dentist and eye doctor regularly.     5. If all is well, see you in one year for a physical with fasting labs.

## 2021-10-28 NOTE — PROGRESS NOTES
"SUBJECTIVE:   Zack Demarco is a 80 year old male who presents for Preventive Visit.      Patient has been advised of split billing requirements and indicates understanding: Yes   Are you in the first 12 months of your Medicare coverage?  No    Healthy Habits:     In general, how would you rate your overall health?  Fair    Frequency of exercise:  1 day/week    Duration of exercise:  Less than 15 minutes    Do you usually eat at least 4 servings of fruit and vegetables a day, include whole grains    & fiber and avoid regularly eating high fat or \"junk\" foods?  No    Medication side effects:  None    Ability to successfully perform activities of daily living:  No assistance needed    Home Safety:  Throw rugs in the hallway    Hearing Impairment:  Difficulty following a conversation in a noisy restaurant or crowded room, feel that people are mumbling or not speaking clearly and need to ask people to speak up or repeat themselves    In the past 6 months, have you been bothered by leaking of urine?  No    In general, how would you rate your overall mental or emotional health?  Good      PHQ-2 Total Score: 0    Additional concerns today:  No    Do you feel safe in your environment? Yes    Have you ever done Advance Care Planning? (For example, a Health Directive, POLST, or a discussion with a medical provider or your loved ones about your wishes): Yes, advance care planning is on file.       Fall risk  Fallen 2 or more times in the past year?: No  Any fall with injury in the past year?: No    Cognitive Screening   1) Repeat 3 items (Leader, Season, Table)    2) Clock draw: NORMAL  3) 3 item recall: Recalls 3 objects  Results: 3 items recalled: COGNITIVE IMPAIRMENT LESS LIKELY    Mini-CogTM Copyright NITIN Delgado. Licensed by the author for use in [facility]; reprinted with permission ([email or web address]). All rights reserved.          Reviewed and updated as needed this visit by clinical staff  Tobacco            "     Reviewed and updated as needed this visit by Provider                Social History     Tobacco Use     Smoking status: Former Smoker     Packs/day: 1.00     Years: 20.00     Pack years: 20.00     Types: Cigarettes     Start date: 1959     Quit date: 1979     Years since quittin.2     Smokeless tobacco: Former User   Substance Use Topics     Alcohol use: Yes     Alcohol/week: 0.0 standard drinks     Comment: rarely         Alcohol Use 10/21/2021   Prescreen: >3 drinks/day or >7 drinks/week? No   Prescreen: >3 drinks/day or >7 drinks/week? -     CAD/HTN - he reports intermittent exertional shortness of breath and chest tightness. But this is mild and does not happen all the time. For example he walked a mile yesterday without problems. 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 Also has varicose veins. Checking BP daily - 117/59 average  Evaluation and treatment:    Was Hospitalized  to 17 - received CABG x 3.   Metoprolol 25 mg bid - dose limited by heart rate.   ASA qd   NTG prn but not using lately.   Losartan/HCTZ 50/12.5 qd stopped in the Hospital because it is not needed.   Losartan 100 mg daily - no side effects.   Compression stockings.   He follows with cardiology - he has virtual visit in Dec - I asked him to bring up the exertional symptoms.   Otherwise continue same tx.    BP Readings from Last 6 Encounters:   10/28/21 124/60   10/26/21 (!) 181/64   21 128/51   21 130/60   21 123/59   21 (!) 157/70       Last Comprehensive Metabolic Panel:  Sodium   Date Value Ref Range Status   10/01/2021 139 133 - 144 mmol/L Final   2021 140 133 - 144 mmol/L Final     Potassium   Date Value Ref Range Status   10/01/2021 4.8 3.4 - 5.3 mmol/L Final   2021 4.6 3.4 - 5.3 mmol/L Final     Chloride   Date Value Ref Range Status   10/01/2021 107 94 - 109 mmol/L Final   2021 106 94 - 109 mmol/L Final     Carbon Dioxide   Date Value Ref Range Status    04/16/2021 27 20 - 32 mmol/L Final     Carbon Dioxide (CO2)   Date Value Ref Range Status   10/01/2021 28 20 - 32 mmol/L Final     Anion Gap   Date Value Ref Range Status   10/01/2021 4 3 - 14 mmol/L Final   04/16/2021 6 3 - 14 mmol/L Final     Glucose   Date Value Ref Range Status   10/01/2021 108 (H) 70 - 99 mg/dL Final   04/16/2021 96 70 - 99 mg/dL Final     Urea Nitrogen   Date Value Ref Range Status   10/01/2021 30 7 - 30 mg/dL Final   04/16/2021 24 7 - 30 mg/dL Final     Creatinine   Date Value Ref Range Status   10/01/2021 1.02 0.66 - 1.25 mg/dL Final   04/16/2021 0.98 0.66 - 1.25 mg/dL Final     Creatinine POCT   Date Value Ref Range Status   10/01/2021 1.1 0.7 - 1.3 mg/dL Final     GFR Estimate   Date Value Ref Range Status   10/01/2021 70 >60 mL/min/1.73m2 Final     Comment:     As of July 11, 2021, eGFR is calculated by the CKD-EPI creatinine equation, without race adjustment. eGFR can be influenced by muscle mass, exercise, and diet. The reported eGFR is an estimation only and is only applicable if the renal function is stable.   04/16/2021 73 >60 mL/min/[1.73_m2] Final     Comment:     Non  GFR Calc  Starting 12/18/2018, serum creatinine based estimated GFR (eGFR) will be   calculated using the Chronic Kidney Disease Epidemiology Collaboration   (CKD-EPI) equation.       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 No symptoms.  Evaluation and treatment:    He follows with oncology.  CBC RESULTS:   Recent Labs   Lab Test 02/20/19  0756   WBC 5.9   RBC 4.80   HGB 14.8   HCT 44.4   MCV 93   MCH 30.8   MCHC 33.3   RDW 12.8          Dyslipidemia - has h/o CAD.  Evaluation and treatment:    Per ATP4, moderate to high intensity statin recommended.:   Crestor  stopped due to cost   Simvastatin changed to Lipitor 40 mg qd in the Hospital - no side effects.   Continue same treatment.    Recent Labs   Lab Test 08/30/21  0719 10/15/20  0721 01/18/16  0708 01/22/15  0816 01/14/14  0747 01/14/14  0747   CHOL 110 120   < > 154   < > 154   HDL 33* 35*   < > 43   < > 33*   LDL 56 58   < > 92   < > 103   TRIG 104 135   < > 93   < > 87   CHOLHDLRATIO  --   --   --  3.6  --  4.7    < > = values in this interval not displayed.     Lab Results   Component Value Date    ALT 28 10/01/2021    ALT 36 04/16/2021     Hypothyroidism - No thyroid symptoms.  Evaluation and treatment:    Synthroid 88 mcg every day.   Continue same tx.    TSH   Date Value Ref Range Status   08/30/2021 4.70 (H) 0.40 - 4.00 mU/L Final   10/15/2020 4.85 (H) 0.40 - 4.00 mU/L Final     T4 Free   Date Value Ref Range Status   10/15/2020 0.85 0.76 - 1.46 ng/dL Final     Free T4   Date Value Ref Range Status   08/30/2021 0.76 0.76 - 1.46 ng/dL Final     Obesity - some snoring but no known apnea.  Evaluation and treatment:    diet and exercise discussed.   I asked him to get under 200#.     Body mass index is 31.42 kg/m .    Wt Readings from Last 5 Encounters:   10/28/21 99.3 kg (219 lb)   10/26/21 101.4 kg (223 lb 8 oz)   08/27/21 95.4 kg (210 lb 5.1 oz)   08/11/21 97.5 kg (215 lb)   04/16/21 98.8 kg (217 lb 14.4 oz)     BPH - stream is reduced. Nocturia x 1. Symptoms are not bad.  Evaluation and treatment:    I asked him to let me know if he wants treatment.    Right shoulder pain -   Evaluation and treatment:    He follows with ortho.   He received a steroid shot.    Hearing loss - he wants to hold off on hearing aides referral.     Umbilical hernia - noted on routine exam. He has no symptoms.  Evaluation and treatment:    He is advised to report any symptoms.   He is counseled on incarcerated hernia symptoms and to go to ED if those occur.    Surgical history -    left rotator cuff surgery.    Had anterior tibialis  repair (for foot drop) on 8/18/15 - good results.    CABG as above.   Left eye cataract surgery 12/17/18.   Appendectomy June 2019.   Right knee replacement June 2020.    Preventive:     Immunization History   Administered Date(s) Administered     COVID-19,PF,Pfizer 01/27/2021, 02/17/2021, 08/28/2021     Influenza (H1N1) 01/06/2010     Influenza (High Dose) 3 valent vaccine 10/15/2015, 10/20/2016, 09/25/2017, 09/24/2018, 09/24/2021     Influenza (IIV3) PF 09/16/2009, 09/16/2010, 10/16/2011, 09/26/2012, 10/25/2012, 10/01/2013, 11/24/2014     Influenza Vaccine IM 18-49 Yrs, RIV3 09/30/2019     Influenza, Quad, High Dose, Pf, 65yr+ (Fluzone HD) 09/22/2020     Pneumo Conj 13-V (2010&after) 08/14/2015, 01/22/2016     Pneumococcal 23 valent 11/30/2006, 10/22/2020     TD (ADULT, 7+) 08/25/2010     TDAP Vaccine (Adacel) 01/21/2013     TDAP Vaccine (Boostrix) 01/21/2013     Td (Adult), Adsorbed 07/19/2002     Zoster vaccine recombinant adjuvanted (SHINGRIX) 10/22/2020, 03/15/2021     Zoster vaccine, live 07/15/2008     Colonoscopy: 8/27/21 - no further colonoscopies due to age.     Prostate CA screen: discussed controversy.  He declines further PSA checks.      PSA   Date Value Ref Range Status   06/09/2016 2.76 0 - 4 ug/L Final     AAA: 7/18/14 negative    Advanced Directive: previously brought a copy     SH:    Marital status: , lives by himself in [place].  Kids: 2  Employment: Works at a machine shop.  Exercise: Walks a lot at work. Had been running 4 miles per day 5 times per week but not lately.  Tobacco: Quit smoking around 1980. Has 14 pack year history of smoking.  Etoh: rarely  Recreational drugs: denies  Caffeine: 2 per day      Current providers sharing in care for this patient include:   Patient Care Team:  Anastacio Love MD as PCP - General (Family Practice)  Anastacio Love MD as MD (Family Practice)  Rolando Toro MD as MD (Transplant)  Pat Gaviria, RN as Specialty Care Coordinator (Hematology &  "Oncology)  Avtar Mccullough MD as Assigned Surgical Provider  [facility]igned [facility] and Vascular Provider  Aanstacio Love MD as Assigned PCP  Elvira Orr APRN CNP as Assigned Cancer Care Provider    The following health maintenance items are reviewed in Epic and correct as of today:  Health Maintenance Due   Topic Date Due     ANNUAL REVIEW OF  ORDERS  Never done     EYE EXAM  11/16/2021     MEDICARE ANNUAL WELLNESS VISIT  11/19/2021     FALL RISK ASSESSMENT  11/19/2021               Review of Systems   Constitutional: Negative for chills and fever.   HENT: Positive for hearing loss. Negative for congestion, ear pain and sore throat.    Eyes: Negative for pain and visual disturbance.   Respiratory: Positive for shortness of breath. Negative for cough.    Cardiovascular: Negative for chest pain, palpitations and peripheral edema.   Gastrointestinal: Negative for abdominal pain, constipation, diarrhea, heartburn, hematochezia and nausea.   Genitourinary: Negative for discharge, dysuria, frequency, genital sores, hematuria, impotence and urgency.   Musculoskeletal: Positive for arthralgias and myalgias. Negative for joint swelling.   Skin: Negative for rash.   Neurological: Negative for dizziness, weakness, headaches and paresthesias.   Psychiatric/Behavioral: Negative for mood changes. The patient is not nervous/anxious.          OBJECTIVE:   BP (!) 152/71   Pulse 58   Temp (!) 96.6  F (35.9  C) (Tympanic)   Resp 16   Ht 1.778 m (5' 10\")   Wt 99.3 kg (219 lb)   SpO2 97%   BMI 31.42 kg/m   Estimated body mass index is 31.42 kg/m  as calculated from the following:    Height as of this encounter: 1.778 m (5' 10\").    Weight as of this encounter: 99.3 kg (219 lb).  Physical Exam  GENERAL: healthy, alert and no distress  EYES: Eyes grossly normal to inspection, PERRL and conjunctivae and sclerae normal  HENT: ear canals and TM's normal, nose and mouth without ulcers or lesions  NECK: no " "adenopathy, no asymmetry, masses, or scars and thyroid normal to palpation  RESP: lungs clear to auscultation - no rales, rhonchi or wheezes  CV: regular rate and rhythm, normal S1 S2, no S3 or S4, no murmur, click or rub, no peripheral edema and peripheral pulses strong  ABDOMEN: soft, nontender, no hepatosplenomegaly, no masses and bowel sounds normal  MS: no gross musculoskeletal defects noted, no edema  SKIN: no suspicious lesions or rashes  NEURO: Normal strength and tone, mentation intact and speech normal  PSYCH: mentation appears normal, affect normal/bright        ASSESSMENT / PLAN:       COUNSELING:  Reviewed preventive health counseling, as reflected in patient instructions       Regular exercise       Healthy diet/nutrition    Estimated body mass index is 31.42 kg/m  as calculated from the following:    Height as of this encounter: 1.778 m (5' 10\").    Weight as of this encounter: 99.3 kg (219 lb).    Weight management plan: Discussed healthy diet and exercise guidelines    He reports that he quit smoking about 42 years ago. His smoking use included cigarettes. He started smoking about 62 years ago. He has a 20.00 pack-year smoking history. He has quit using smokeless tobacco.      Appropriate preventive services were discussed with this patient, including applicable screening as appropriate for cardiovascular disease, diabetes, osteopenia/osteoporosis, and glaucoma.  As appropriate for age/gender, discussed screening for colorectal cancer, prostate cancer, breast cancer, and cervical cancer. Checklist reviewing preventive services available has been given to the patient.    Reviewed patients plan of care and provided an AVS. The Basic Care Plan (routine screening as documented in Health Maintenance) for Zack meets the Care Plan requirement. This Care Plan has been established and reviewed with the Patient.    Assessment and Plan - Decision Making    1. Encounter for Medicare annual wellness " exam    Results of today's exam given to patient verbally along with age appropriate preventive measures. Written instructions reviewed and handed to the patient.        Written instructions given as follows:    Patient Instructions   1. Follow a healthy diet - reliable information is available at: myplate.gov.    2. Get regular exercise based on your abilities like walking, jogging, biking, swimming and strength training (150 minutes per week).      3. Avoid the sun or otherwise use sun screen to prevent skin cancer.    4. See the dentist and eye doctor regularly.     5. If all is well, see you in one year for a physical with fasting labs.

## 2021-10-28 NOTE — PROGRESS NOTES
"SUBJECTIVE:   CC: Zack Demarco is an 80 year old male who presents for preventative health visit.       Patient has been advised of split billing requirements and indicates understanding: Yes  Healthy Habits:     In general, how would you rate your overall health?  Fair    Frequency of exercise:  1 day/week    Duration of exercise:  Less than 15 minutes    Do you usually eat at least 4 servings of fruit and vegetables a day, include whole grains    & fiber and avoid regularly eating high fat or \"junk\" foods?  No    Medication side effects:  None    Ability to successfully perform activities of daily living:  No assistance needed    Home Safety:  Throw rugs in the hallway    Hearing Impairment:  Difficulty following a conversation in a noisy restaurant or crowded room, feel that people are mumbling or not speaking clearly and need to ask people to speak up or repeat themselves    In the past 6 months, have you been bothered by leaking of urine?  No    In general, how would you rate your overall mental or emotional health?  Good      PHQ-2 Total Score: 0    Additional concerns today:  No    {Add if <65 person on Medicare  - Required Questions (Optional):083325}  {Outside tests to abstract? :839807}    {additional problems to add (Optional):372855}    Today's PHQ-2 Score:   PHQ-2 (  [employer]) 10/21/2021   Q1: Little interest or pleasure in doing things 0   Q2: Feeling down, depressed or hopeless 0   PHQ-2 Score 0   Q1: Little interest or pleasure in doing things Not at all   Q2: Feeling down, depressed or hopeless Not at all   PHQ-2 Score 0       Abuse: Current or Past(Physical, Sexual or Emotional)- No  Do you feel safe in your environment? Yes        Social History     Tobacco Use     Smoking status: Former Smoker     Packs/day: 1.00     Years: 20.00     Pack years: 20.00     Types: Cigarettes     Start date: 1959     Quit date: 1979     Years since quittin.2     Smokeless tobacco: Former User " "  Substance Use Topics     Alcohol use: Yes     Alcohol/week: 0.0 standard drinks     Comment: rarely         Alcohol Use 10/21/2021   Prescreen: >3 drinks/day or >7 drinks/week? No   Prescreen: >3 drinks/day or >7 drinks/week? -       Last PSA:   PSA   Date Value Ref Range Status   06/09/2016 2.76 0 - 4 ug/L Final       Reviewed orders with patient. Reviewed health maintenance and updated orders accordingly - { :549436::\"Yes\"}  {Chronicprobdata (optional):044817}    Reviewed and updated as needed this visit by clinical staff                 Reviewed and updated as needed this visit by Provider                {HISTORY OPTIONS (Optional):048372}    Review of Systems   Constitutional: Negative for chills and fever.   HENT: Positive for hearing loss. Negative for congestion, ear pain and sore throat.    Eyes: Negative for pain and visual disturbance.   Respiratory: Positive for shortness of breath. Negative for cough.    Cardiovascular: Negative for chest pain, palpitations and peripheral edema.   Gastrointestinal: Negative for abdominal pain, constipation, diarrhea, heartburn, hematochezia and nausea.   Genitourinary: Negative for discharge, dysuria, frequency, genital sores, hematuria, impotence and urgency.   Musculoskeletal: Positive for arthralgias and myalgias. Negative for joint swelling.   Skin: Negative for rash.   Neurological: Negative for dizziness, weakness, headaches and paresthesias.   Psychiatric/Behavioral: Negative for mood changes. The patient is not nervous/anxious.      {MALE ROS (Optional):350427::\"CONSTITUTIONAL: NEGATIVE for fever, chills, change in weight\",\"INTEGUMENTARY/SKIN: NEGATIVE for worrisome rashes, moles or lesions\",\"EYES: NEGATIVE for vision changes or irritation\",\"ENT: NEGATIVE for ear, mouth and throat problems\",\"RESP: NEGATIVE for significant cough or SOB\",\"CV: NEGATIVE for chest pain, palpitations or peripheral edema\",\"GI: NEGATIVE for nausea, abdominal pain, heartburn, or change " "in bowel habits\",\" male: negative for dysuria, hematuria, decreased urinary stream, erectile dysfunction, urethral discharge\",\"MUSCULOSKELETAL: NEGATIVE for significant arthralgias or myalgia\",\"NEURO: NEGATIVE for weakness, dizziness or paresthesias\",\"PSYCHIATRIC: NEGATIVE for changes in mood or affect\"}    OBJECTIVE:   There were no vitals taken for this visit.    Physical Exam  {Exam Choices (Optional):475580}    {Diagnostic Test Results (Optional):296144::\"Diagnostic Test Results:\",\"Labs reviewed in Epic\"}    ASSESSMENT/PLAN:   {Diag Picklist:745037}    Patient has been advised of split billing requirements and indicates understanding: {YES / NO:982100::\"Yes\"}  COUNSELING:   {MALE COUNSELING MESSAGES:414242::\"Reviewed preventive health counseling, as reflected in patient instructions\"}    Estimated body mass index is 32.07 kg/m  as calculated from the following:    Height as of 8/27/21: 1.778 m (5' 10\").    Weight as of 10/26/21: 101.4 kg (223 lb 8 oz).     {Weight Management Plan (ACO) Complete if BMI is abnormal-  Ages 18-64  BMI >24.9.  Age 65+ with BMI <23 or >30 (Optional):062783}    He reports that he quit smoking about 42 years ago. His smoking use included cigarettes. He started smoking about 62 years ago. He has a 20.00 pack-year smoking history. He has quit using smokeless tobacco.      Counseling Resources:  ATP IV Guidelines  Pooled Cohorts Equation Calculator  FRAX Risk Assessment  ICSI Preventive Guidelines  Dietary Guidelines for Americans, 2010  ecom[employer]'s MyPlate  ASA Prophylaxis  Lung CA Screening    AALIYAH GARZA MD  [facility]  "

## 2021-11-22 ENCOUNTER — OFFICE VISIT (OUTPATIENT)
Dept: OPHTHALMOLOGY | Facility: CLINIC | Age: 81
End: 2021-11-22
Payer: MEDICARE

## 2021-11-22 DIAGNOSIS — H43.813 POSTERIOR VITREOUS DETACHMENT OF BOTH EYES: ICD-10-CM

## 2021-11-22 DIAGNOSIS — Z96.1 PSEUDOPHAKIA: Primary | ICD-10-CM

## 2021-11-22 DIAGNOSIS — Z01.01 ENCOUNTER FOR EXAMINATION OF EYES AND VISION WITH ABNORMAL FINDINGS: ICD-10-CM

## 2021-11-22 DIAGNOSIS — H35.363 MACULAR DRUSEN, BILATERAL: ICD-10-CM

## 2021-11-22 DIAGNOSIS — H52.4 PRESBYOPIA: ICD-10-CM

## 2021-11-22 DIAGNOSIS — H40.053 BORDERLINE GLAUCOMA WITH OCULAR HYPERTENSION, BILATERAL: ICD-10-CM

## 2021-11-22 PROCEDURE — 92014 COMPRE OPH EXAM EST PT 1/>: CPT | Performed by: OPHTHALMOLOGY

## 2021-11-22 ASSESSMENT — VISUAL ACUITY
OS_PH_CC+: +2
METHOD: SNELLEN - LINEAR
OD_CC+: +1
CORRECTION_TYPE: GLASSES
OS_CC: 1
OD_PH_CC: 20/30
OD_PH_CC+: -2
OS_CC+: -1
OS_CC: 20/50
OS_PH_CC: 20/40
OD_CC: 20/40
OD_CC: 1+

## 2021-11-22 ASSESSMENT — REFRACTION_WEARINGRX
OD_CYLINDER: +2.25
SPECS_TYPE: PAL
OS_CYLINDER: +3.00
OD_SPHERE: -2.25
OD_ADD: +2.75
OS_AXIS: 001
OS_ADD: +2.75
OD_AXIS: 175
OS_SPHERE: -3.00

## 2021-11-22 ASSESSMENT — CUP TO DISC RATIO
OS_RATIO: 0.5
OD_RATIO: 0.5

## 2021-11-22 ASSESSMENT — TONOMETRY
OS_IOP_MMHG: 16
OD_IOP_MMHG: 16
IOP_METHOD: APPLANATION

## 2021-11-22 ASSESSMENT — CONF VISUAL FIELD
OD_NORMAL: 1
OS_NORMAL: 1

## 2021-11-22 ASSESSMENT — SLIT LAMP EXAM - LIDS: COMMENTS: 1+ DERMATOCHALASIS - UPPER LID, 2+ MEIBOMIAN GLAND DYSFUNCTION

## 2021-11-22 NOTE — PATIENT INSTRUCTIONS
"Continue same medication.  Take a multiple vitamin or \"eye vitamin\" daily (AREDS2).  Protect your eyes outdoors from ultraviolet rays with sunglasses and/or brimmed hat.  Have spinach (cooked or raw), colorful fruits, walnuts, hazelnuts, almonds in your diet.  Monitor the vision in each eye weekly - call if any sudden persistent changes.  Possible clouding of posterior capsule right eye discussed.   Possible posterior vitreous detachment (sudden onset large floater and/or flashing lights) right eye discussed.  Return visit 6 months for intraocular pressure check, glaucoma OCT, retinal OCT and Galvan Visual Field.    Avtar Mccullough M.D.  [phone]          "

## 2021-11-22 NOTE — PROGRESS NOTES
" Current Eye Medications:  AREDS2 twice a day, Timolol both eyes each morning, Latanoprost both eyes every evening.  Last drops:  6:30am     Subjective:  Comprehensive Eye Exam.  [facility] is requesting a summary of today's visit faxed to:  Chelita Mejía,OD@ [phone].  [facility]  [facility].  Present glasses are one month old, so he is declining an additional refraction. Patient finds he more dependent on his glasses for distance vision.  Vision with correction is good in each eye, distance and near.       Objective:  See Ophthalmology Exam.       Assessment:  Stable intraocular pressure and discs both eyes in patient who is a treated glaucoma suspect.  Otherwise stable eye exam.      ICD-10-CM    1. Pseudophakia, ou; Yag Caps, os  Z96.1    2. Borderline glaucoma with ocular hypertension, bilateral - treated  H40.053 EYE EXAM (SIMPLE-NONBILLABLE)   3. Macular drusen, bilateral  H35.363    4. Posterior vitreous detachment of both eyes  H43.813    5. Encounter for examination of eyes and vision with abnormal findings  Z01.01    6. Presbyopia  H52.4         Plan:  Continue same medication.  Take a multiple vitamin or \"eye vitamin\" daily (AREDS2).  Protect your eyes outdoors from ultraviolet rays with sunglasses and/or brimmed hat.  Have spinach (cooked or raw), colorful fruits, walnuts, hazelnuts, almonds in your diet.  Monitor the vision in each eye weekly - call if any sudden persistent changes.  Possible clouding of posterior capsule right eye discussed.   Possible posterior vitreous detachment (sudden onset large floater and/or flashing lights) right eye discussed.  Return visit 6 months for intraocular pressure check, glaucoma OCT, retinal OCT and Galvan Visual Field.    Avtar Mccullough M.D.  [phone]       "

## 2021-11-26 PROBLEM — H43.813 POSTERIOR VITREOUS DETACHMENT OF BOTH EYES: Status: ACTIVE | Noted: 2021-11-26

## 2021-11-29 ENCOUNTER — LAB (OUTPATIENT)
Dept: LAB | Facility: CLINIC | Age: 81
End: 2021-11-29
Payer: MEDICARE

## 2021-11-29 DIAGNOSIS — C82.99 FOLLICULAR LYMPHOMA OF EXTRANODAL AND SOLID ORGAN SITES (H): ICD-10-CM

## 2021-11-29 LAB
BASOPHILS # BLD AUTO: 0 10E3/UL (ref 0–0.2)
BASOPHILS NFR BLD AUTO: 0 %
EOSINOPHIL # BLD AUTO: 0.3 10E3/UL (ref 0–0.7)
EOSINOPHIL NFR BLD AUTO: 5 %
ERYTHROCYTE [DISTWIDTH] IN BLOOD BY AUTOMATED COUNT: 12.9 % (ref 10–15)
HCT VFR BLD AUTO: 44.3 % (ref 40–53)
HGB BLD-MCNC: 14.4 G/DL (ref 13.3–17.7)
LYMPHOCYTES # BLD AUTO: 1.4 10E3/UL (ref 0.8–5.3)
LYMPHOCYTES NFR BLD AUTO: 21 %
MCH RBC QN AUTO: 31.4 PG (ref 26.5–33)
MCHC RBC AUTO-ENTMCNC: 32.5 G/DL (ref 31.5–36.5)
MCV RBC AUTO: 97 FL (ref 78–100)
MONOCYTES # BLD AUTO: 0.5 10E3/UL (ref 0–1.3)
MONOCYTES NFR BLD AUTO: 8 %
NEUTROPHILS # BLD AUTO: 4.4 10E3/UL (ref 1.6–8.3)
NEUTROPHILS NFR BLD AUTO: 67 %
PLATELET # BLD AUTO: 182 10E3/UL (ref 150–450)
RBC # BLD AUTO: 4.58 10E6/UL (ref 4.4–5.9)
WBC # BLD AUTO: 6.6 10E3/UL (ref 4–11)

## 2021-11-29 PROCEDURE — 85025 COMPLETE CBC W/AUTO DIFF WBC: CPT

## 2021-11-29 PROCEDURE — 36415 COLL VENOUS BLD VENIPUNCTURE: CPT

## 2022-02-01 ENCOUNTER — VIRTUAL VISIT (OUTPATIENT)
Dept: CARDIOLOGY | Facility: CLINIC | Age: 82
End: 2022-02-01
Payer: MEDICARE

## 2022-02-01 DIAGNOSIS — R07.9 CHEST PAIN, UNSPECIFIED TYPE: ICD-10-CM

## 2022-02-01 DIAGNOSIS — I10 HYPERTENSION GOAL BP (BLOOD PRESSURE) < 140/90: ICD-10-CM

## 2022-02-01 DIAGNOSIS — E78.5 HYPERLIPIDEMIA LDL GOAL <100: ICD-10-CM

## 2022-02-01 PROCEDURE — 99214 OFFICE O/P EST MOD 30 MIN: CPT | Mod: 95 | Performed by: INTERNAL MEDICINE

## 2022-02-01 RX ORDER — LOSARTAN POTASSIUM 100 MG/1
50 TABLET ORAL 2 TIMES DAILY
Qty: 90 TABLET | Refills: 3 | Status: SHIPPED | OUTPATIENT
Start: 2022-02-01

## 2022-02-01 RX ORDER — ISOSORBIDE MONONITRATE 30 MG/1
30 TABLET, EXTENDED RELEASE ORAL DAILY
Qty: 90 TABLET | Refills: 3 | Status: SHIPPED | OUTPATIENT
Start: 2022-02-01 | End: 2023-02-02

## 2022-02-01 RX ORDER — METOPROLOL TARTRATE 25 MG/1
25 TABLET, FILM COATED ORAL 2 TIMES DAILY
Qty: 180 TABLET | Refills: 3 | Status: SHIPPED | OUTPATIENT
Start: 2022-02-01

## 2022-02-01 RX ORDER — ATORVASTATIN CALCIUM 40 MG/1
40 TABLET, FILM COATED ORAL DAILY
Qty: 90 TABLET | Refills: 3 | Status: SHIPPED | OUTPATIENT
Start: 2022-02-01 | End: 2023-02-02

## 2022-02-01 NOTE — PROGRESS NOTES
Visit Date: 2022       Anastacio Love MD  [facility]  [address] [place] [address]    Patient:  Zack Demarco  MRN:  [id number]  :  1940      Dear Dr. Love:    It was a pleasure participating in the care of your patient, Mr. Zack Demarco.  As you know, he is an 81-year-old gentleman who I saw over a virtual video visit today via [employer] for coronary artery disease, hypertension and hyperlipidemia.    PAST MEDICAL HISTORY:      1.  Hypertension.  2.  Hyperlipidemia.  3.  Retroperitoneal mass with follicular lymphoma, undergoing therapy.  4.  Hypothyroidism.  5.  Borderline glaucoma.  6.  Right knee problems.  7.  Lipoma.  8.  Patellar tendinitis.  9.  Carpal tunnel syndrome, right wrist.    His cardiac history is significant for known multivessel coronary artery disease and normal LV systolic function.    Coronary angiography 2017 revealed the following:     Left main -- normal.   LAD -- 70% to 80% ostial stenosis with 80% to 90% stenosis in the mid portion.   First diagonal -- 80% to 90% stenosis.   Circumflex -- mild diffuse disease.   RCA -- 70% stenosis in midportion.    Three-vessel coronary bypass surgery was performed (LIMA to LAD, vein graft to PDA, vein graft to D1).  He originally presented with centrally-located chest tightness with shortness of breath that completely resolved after revascularization.    In 2020, he had occasional chest tightness when deer hunting, and we started low-dose Imdur along with ordering an echo and pharmacologic nuclear stress test.  The tests were unremarkable and after starting Imdur, his symptoms resolved.    I last saw him 2021.  At that time, he was doing well.  He presents today for continuing care.    Since our last visit, he has not been very active at all during these cold winter months.  He stopped walking and he has gained 12 pounds of weight.    Fortunately, his blood pressures have still  been running in the 120s, and he denies any new chest pain or shortness of breath.  He denies other PND, orthopnea, edema, palpitations, syncope or near-syncope.    REVIEW OF SYSTEMS:  A 10-point review of systems is otherwise unremarkable.    CURRENT MEDICATIONS:  Include aspirin 325 mg a day, Lipitor 40 mg a day, Imdur 30 mg a day, levothyroxine, losartan 50 mg twice daily, metoprolol tartrate 25 twice daily.    PHYSICAL EXAMINATION:      GENERAL:  He appears comfortable, well groomed.  PSYCHIATRIC:  He is alert and oriented x 3.  EYES:  Do not appear grossly erythematous or have exudate.  RESPIRATORY:  He is breathing comfortably without gross cough.    The remainder of the comprehensive physical exam was deferred secondary to the COVID-19 pandemic and secondary to video visit restrictions.    LABORATORY:  08/30/2021, LDL 56.  10/01/2021, potassium 4.8, GFR normal.  11/29/2021, hemoglobin normal.      12/18/2020, echocardiogram revealed an ejection fraction of 55% to 60%.  No significant valvular pathology.    Pharmacologic nuclear stress test 01/11/2021 revealed no evidence for stress-induced ischemia.      IMPRESSION:      Zack is an 81-year-old gentleman who has several active cardiac issues:    1.  Coronary artery disease:      He has known multivessel coronary artery disease and normal LV systolic function.  He underwent 3-vessel coronary bypass surgery (LIMA to LAD, vein graft to PDA, vein graft to first diagonal) on 11/24/2017.    Since revascularization, he had recurrent chest tightness and shortness of breath that resolved quickly with rest.  Most recent pharmacologic nuclear stress test 01/11/2021 was negative for stress-induced ischemia, and an echo was unremarkable as of 12/18/2020.    Since starting low-dose Imdur 30 mg a day, symptoms of exertional chest discomfort have completely resolved and have not recurred.  Continue to monitor clinically.    2.  Hypertension, well controlled:      Blood  pressures continue to run in the mid-120s despite having gained 12 pounds.  Continue to monitor.    3.  Hyperlipidemia:      Goal LDL less than 70.  Last LDL 2021 was 56.  Continue to monitor.      PLAN:       1.  Continue present medications at present doses.    2.  Virtual video visit followup 1 year with lab work prior, earlier if needed.    Once again, it was a pleasure participating in the care of your patient, Mr. Zack Trivedi.  Please feel free to contact me at any time if any questions regarding his care in the future.              Torsten Reyes MD        D: 2022   T: 2022   MT: BUSHRA    Name:     ZACK TRIVEDI  MRN:      [id number]        Account:    [id number]   :      1940           Visit Date: 2022     Document: [id number]    cc:  Anastacio Love MD

## 2022-02-01 NOTE — NURSING NOTE
Chief Complaint   Patient presents with     Follow Up     per pt, no questions/concerns at time of rooming, pt will be taking BP before appt. in case provider wants it     Patient denies any changes since echeck-in regarding medication and allergies and states all information entered during echeck-in remains accurate.    Molly Mathias, TAB/CMA

## 2022-02-01 NOTE — PROGRESS NOTES
"Zack Demacro  is being evaluated via a billable video visit.      How would you like to obtain your AVS? MyChart  For the video visit, send the invitation by: Text to cell phone: [phone] if mychart fails   Will anyone else be joining your video visit? No  {If patient encounters technical issues they should call [phone]    The patient has been notified of following:     \"This video visit will be conducted via a call between you and your physician/provider. We have found that certain health care needs can be provided without the need for an in-person physical exam.  This service lets us provide the care you need with a video conversation.  If a prescription is necessary we can send it directly to your pharmacy.  If lab work is needed we can place an order for that and you can then stop by our lab to have the test done at a later time.    Video visits are billed at different rates depending on your insurance coverage.  Please reach out to your insurance provider with any questions.    If during the course of the call the physician/provider feels a video visit is not appropriate, you will not be charged for this service.\"    Patient has given verbal consent for video visit? Yes    How would you like to obtain your AVS? Mail    Video-Visit Details    Type of service:  Video Visit    Video Start Time:819am    Video End Time:834am    Total visit time including video visit, chart review, charting, coordination of care =30min    Originating Location (pt. Location):patient home      Distant Location (provider location):  home office    Platform used for Video Visit: Diana    See dictation #[id number]    [facility]#:[id number]      "

## 2022-03-05 NOTE — PATIENT INSTRUCTIONS
1. Good job on the weight - keep going.    2. See you in April 2020 for a physical with fasting labs.  
Attending

## 2022-03-22 ENCOUNTER — TELEPHONE (OUTPATIENT)
Dept: CARDIOLOGY | Facility: CLINIC | Age: 82
End: 2022-03-22
Payer: MEDICARE

## 2022-03-22 NOTE — TELEPHONE ENCOUNTER
See duplicate encounter with Ria Bran RN  Medical Speciality Care Coordinator  [facility]  Phone: [phone]

## 2022-03-22 NOTE — TELEPHONE ENCOUNTER
[facility] Call Center    Phone Message    May a detailed message be left on voicemail: yes     Reason for Call: Other: Pt is returning Debs call back but did not leave detail just to have her call him back     Action Taken: Message routed to:  [facility] cardio    Travel Screening: Not Applicable

## 2022-05-16 ENCOUNTER — APPOINTMENT (OUTPATIENT)
Dept: LAB | Facility: CLINIC | Age: 82
End: 2022-05-16
Attending: STUDENT IN AN ORGANIZED HEALTH CARE EDUCATION/TRAINING PROGRAM
Payer: MEDICARE

## 2022-05-16 ENCOUNTER — ONCOLOGY VISIT (OUTPATIENT)
Dept: ONCOLOGY | Facility: CLINIC | Age: 82
End: 2022-05-16
Attending: NURSE PRACTITIONER
Payer: MEDICARE

## 2022-05-16 VITALS
BODY MASS INDEX: 33.29 KG/M2 | HEART RATE: 52 BPM | OXYGEN SATURATION: 98 % | TEMPERATURE: 98.7 F | DIASTOLIC BLOOD PRESSURE: 63 MMHG | WEIGHT: 232 LBS | SYSTOLIC BLOOD PRESSURE: 132 MMHG | RESPIRATION RATE: 16 BRPM

## 2022-05-16 DIAGNOSIS — C82.99 FOLLICULAR LYMPHOMA OF EXTRANODAL AND SOLID ORGAN SITES (H): ICD-10-CM

## 2022-05-16 LAB
ALBUMIN SERPL-MCNC: 3.7 G/DL (ref 3.4–5)
ALP SERPL-CCNC: 65 U/L (ref 40–150)
ALT SERPL W P-5'-P-CCNC: 24 U/L (ref 0–70)
ANION GAP SERPL CALCULATED.3IONS-SCNC: 7 MMOL/L (ref 3–14)
AST SERPL W P-5'-P-CCNC: 18 U/L (ref 0–45)
BASOPHILS # BLD AUTO: 0 10E3/UL (ref 0–0.2)
BASOPHILS NFR BLD AUTO: 0 %
BILIRUB SERPL-MCNC: 0.5 MG/DL (ref 0.2–1.3)
BUN SERPL-MCNC: 37 MG/DL (ref 7–30)
CALCIUM SERPL-MCNC: 9.2 MG/DL (ref 8.5–10.1)
CHLORIDE BLD-SCNC: 111 MMOL/L (ref 94–109)
CO2 SERPL-SCNC: 23 MMOL/L (ref 20–32)
CREAT SERPL-MCNC: 1.04 MG/DL (ref 0.66–1.25)
EOSINOPHIL # BLD AUTO: 0.2 10E3/UL (ref 0–0.7)
EOSINOPHIL NFR BLD AUTO: 3 %
ERYTHROCYTE [DISTWIDTH] IN BLOOD BY AUTOMATED COUNT: 12.4 % (ref 10–15)
GFR SERPL CREATININE-BSD FRML MDRD: 73 ML/MIN/1.73M2
GLUCOSE BLD-MCNC: 101 MG/DL (ref 70–99)
HCT VFR BLD AUTO: 43.2 % (ref 40–53)
HGB BLD-MCNC: 14.1 G/DL (ref 13.3–17.7)
IMM GRANULOCYTES # BLD: 0 10E3/UL
IMM GRANULOCYTES NFR BLD: 0 %
LYMPHOCYTES # BLD AUTO: 1.6 10E3/UL (ref 0.8–5.3)
LYMPHOCYTES NFR BLD AUTO: 23 %
MCH RBC QN AUTO: 31.1 PG (ref 26.5–33)
MCHC RBC AUTO-ENTMCNC: 32.6 G/DL (ref 31.5–36.5)
MCV RBC AUTO: 95 FL (ref 78–100)
MONOCYTES # BLD AUTO: 0.5 10E3/UL (ref 0–1.3)
MONOCYTES NFR BLD AUTO: 8 %
NEUTROPHILS # BLD AUTO: 4.4 10E3/UL (ref 1.6–8.3)
NEUTROPHILS NFR BLD AUTO: 66 %
NRBC # BLD AUTO: 0 10E3/UL
NRBC BLD AUTO-RTO: 0 /100
PLATELET # BLD AUTO: 210 10E3/UL (ref 150–450)
POTASSIUM BLD-SCNC: 4.7 MMOL/L (ref 3.4–5.3)
PROT SERPL-MCNC: 7.5 G/DL (ref 6.8–8.8)
RBC # BLD AUTO: 4.53 10E6/UL (ref 4.4–5.9)
SODIUM SERPL-SCNC: 141 MMOL/L (ref 133–144)
WBC # BLD AUTO: 6.8 10E3/UL (ref 4–11)

## 2022-05-16 PROCEDURE — G0463 HOSPITAL OUTPT CLINIC VISIT: HCPCS

## 2022-05-16 PROCEDURE — 85025 COMPLETE CBC W/AUTO DIFF WBC: CPT | Performed by: STUDENT IN AN ORGANIZED HEALTH CARE EDUCATION/TRAINING PROGRAM

## 2022-05-16 PROCEDURE — 36415 COLL VENOUS BLD VENIPUNCTURE: CPT | Performed by: STUDENT IN AN ORGANIZED HEALTH CARE EDUCATION/TRAINING PROGRAM

## 2022-05-16 PROCEDURE — 80053 COMPREHEN METABOLIC PANEL: CPT | Performed by: STUDENT IN AN ORGANIZED HEALTH CARE EDUCATION/TRAINING PROGRAM

## 2022-05-16 PROCEDURE — 99214 OFFICE O/P EST MOD 30 MIN: CPT | Performed by: STUDENT IN AN ORGANIZED HEALTH CARE EDUCATION/TRAINING PROGRAM

## 2022-05-16 ASSESSMENT — PAIN SCALES - GENERAL: PAINLEVEL: SEVERE PAIN (7)

## 2022-05-16 NOTE — NURSING NOTE
Chief Complaint   Patient presents with     Blood Draw     Labs drawn via  by RN in lab. VS taken.      Francine Mullen RN

## 2022-05-16 NOTE — PROGRESS NOTES
Subjective   Zack is a 81 year old who presents for the follow up of follicular lymphoma    HPI     Oncology History Overview Note   Zack Demarco is a 79 year old male first seen in consultation on 12/27/2017 for a new diagnosis of follicular lymphoma. He was diagnosed incidental to an evaluation for cardiac bypass surgery in 11/2017. A chest CT scan on 11/14 showed an unexpected retroperitoneal mass with surrounding lymphadenopathy suspicious for malignancy.  A further CT scan done on the same day showed a 2.3 x 7.2 x 6.1 retroperitoneal soft tissue mass with adjacent lymphadenopathy that mildly narrowed the left renal vein. There also was nodularity within the mesentery and an enlarged hilar lymph node. CT-guided biopsy of the retroperitoneal mass on 12/12/2017 established the diagnosis, but the sample was too small for adequate grading. There was no disease identified outside of the abdomen; a bone marrow biopsy was not obtained as part of staging.      Relative to cardiac issues, he underwent an uncomplicated 3-vessel coronary artery bypass graft on 11/22/2017 and, subsequently, has been symptom-free. He follows       With the renal and gonadal vein compression it was decided to start treatment with rituximab, alone. Mr. Demarco completed 4 weekly doses from 01/26 - 02/16/2018. He had no difficulty with the treatment and was able to receive rapid infusion (90 minutes) for the last 3 doses. Interval evaluation on 03/05/2018 with an US, suggested improvement. A CT scan on 04/09/2018 showed substantial regression. Repeated CT on 07/09/18 showed a good response but residual lymphoma around the renal veins. Decided to proceed weekly rituximab x4 (7/26-8/22/2018).  He started rituximab maintenance in 10/2018.     He completed maintenance rituximab in October 2020. Ct imaging in Sept 2020 showed stable findings.       He comes today for follow up. No fevers, chills, night sweats or weight loss. No lymphadenopathy.  Weight, appetite and energy are stable. C/o new left wrist pain, dull, incited by movement, ROM is not restricted due to pain.    Review of Systems   8 point review of systems was negative except pertinent positives listed above.        Objective    /63   Pulse 52   Temp 98.7  F (37.1  C) (Oral)   Resp 16   Wt 105.2 kg (232 lb)   SpO2 98%   BMI 33.29 kg/m    Body mass index is 33.29 kg/m .  Physical Exam   GENERAL:  Alert oriented well nourished  SKIN:  no rash, hives, other lesions.  EYE: no icterus/pallor  ENT: no throat erythema, enlarged tonsills, no ulcers or mucositis in the mouth  LYMPHATIC: no abnormal lymph nodes palable in cervical, axillary, supraclavicular or inguinal area.  RESP:  No rales or rhonchi, breath sounds bilaterally equal and vesicular  CV:  No tachycardia, S1 S2 normal No murmur.  GI:  Abdomen soft nontender no hepato or splenomegaly  MUSCULOSKELETAL:  No visible joint redness or swelling.  NEURO:  No gross weakness gait normal    Labs reviewed. No cytopenias, renal or liver abnormalities.    Assessment and Plan:    1) Follicular lymphoma currently in partial remission after rituximab therapy and stable disease for 4 years:  -He does not have any clinical or radiographic evidence of lymphoma progression,  -We will continue surveillance with history, physical exam every 6 months.  -Last scan was 6 months ago, I will repeat a surveillance CT Chest/abdomen/pelvis in 6 months from  Now.    2) new onset wrist pain:  - seems musculoskeletal, we will monitor at this point.    Total time spent on date of service in review of medical records, review of labs, history taking, physical exam, discussion of assessment and plan, counseling and patient education is 30 minutes.    Return to clinic in 6 months.    Yuri Man MD  Attending Physician  Pager [phone]

## 2022-05-16 NOTE — LETTER
5/16/2022         RE: Zack Demarco  [address] [facility] [address]        Dear Colleague,    Thank you for referring your patient, Zack Demarco, to the [facility]. Please see a copy of my visit note below.      Cruzito Dixon is a 81 year old who presents for the follow up of follicular lymphoma    HPI     Oncology History Overview Note   Zack Demarco is a 79 year old male first seen in consultation on 12/27/2017 for a new diagnosis of follicular lymphoma. He was diagnosed incidental to an evaluation for cardiac bypass surgery in 11/2017. A chest CT scan on 11/14 showed an unexpected retroperitoneal mass with surrounding lymphadenopathy suspicious for malignancy.  A further CT scan done on the same day showed a 2.3 x 7.2 x 6.1 retroperitoneal soft tissue mass with adjacent lymphadenopathy that mildly narrowed the left renal vein. There also was nodularity within the mesentery and an enlarged hilar lymph node. CT-guided biopsy of the retroperitoneal mass on 12/12/2017 established the diagnosis, but the sample was too small for adequate grading. There was no disease identified outside of the abdomen; a bone marrow biopsy was not obtained as part of staging.      Relative to cardiac issues, he underwent an uncomplicated 3-vessel coronary artery bypass graft on 11/22/2017 and, subsequently, has been symptom-free. He follows       With the renal and gonadal vein compression it was decided to start treatment with rituximab, alone. Mr. Demarco completed 4 weekly doses from 01/26 - 02/16/2018. He had no difficulty with the treatment and was able to receive rapid infusion (90 minutes) for the last 3 doses. Interval evaluation on 03/05/2018 with an US, suggested improvement. A CT scan on 04/09/2018 showed substantial regression. Repeated CT on 07/09/18 showed a good response but residual lymphoma around the renal veins. Decided to proceed weekly rituximab x4 (7/26-8/22/2018).  He  started rituximab maintenance in 10/2018.     He completed maintenance rituximab in October 2020. Ct imaging in Sept 2020 showed stable findings.       He comes today for follow up. No fevers, chills, night sweats or weight loss. No lymphadenopathy. Weight, appetite and energy are stable. C/o new left wrist pain, dull, incited by movement, ROM is not restricted due to pain.    Review of Systems   8 point review of systems was negative except pertinent positives listed above.        Objective    /63   Pulse 52   Temp 98.7  F (37.1  C) (Oral)   Resp 16   Wt 105.2 kg (232 lb)   SpO2 98%   BMI 33.29 kg/m    Body mass index is 33.29 kg/m .  Physical Exam   GENERAL:  Alert oriented well nourished  SKIN:  no rash, hives, other lesions.  EYE: no icterus/pallor  ENT: no throat erythema, enlarged tonsills, no ulcers or mucositis in the mouth  LYMPHATIC: no abnormal lymph nodes palable in cervical, axillary, supraclavicular or inguinal area.  RESP:  No rales or rhonchi, breath sounds bilaterally equal and vesicular  CV:  No tachycardia, S1 S2 normal No murmur.  GI:  Abdomen soft nontender no hepato or splenomegaly  MUSCULOSKELETAL:  No visible joint redness or swelling.  NEURO:  No gross weakness gait normal    Labs reviewed. No cytopenias, renal or liver abnormalities.    Assessment and Plan:    1) Follicular lymphoma currently in partial remission after rituximab therapy and stable disease for 4 years:  -He does not have any clinical or radiographic evidence of lymphoma progression,  -We will continue surveillance with history, physical exam every 6 months.  -Last scan was 6 months ago, I will repeat a surveillance CT Chest/abdomen/pelvis in 6 months from  Now.    2) new onset wrist pain:  - seems musculoskeletal, we will monitor at this point.    Total time spent on date of service in review of medical records, review of labs, history taking, physical exam, discussion of assessment and plan, counseling and  patient education is 30 minutes.    Return to clinic in 6 months.      Yuri Man MD  Attending Physician  Pager [phone]

## 2022-05-16 NOTE — NURSING NOTE
"Oncology Rooming Note    May 16, 2022 11:52 AM   Zack Demarco is a 81 year old male who presents for:    Chief Complaint   Patient presents with     Blood Draw     Labs drawn via  by RN in lab. VS taken.      Oncology Clinic Visit     Follicular lymphomas of extranodal and solid organ sites     Initial Vitals: /63   Pulse 52   Temp 98.7  F (37.1  C) (Oral)   Resp 16   Wt 105.2 kg (232 lb)   SpO2 98%   BMI 33.29 kg/m   Estimated body mass index is 33.29 kg/m  as calculated from the following:    Height as of 10/28/21: 1.778 m (5' 10\").    Weight as of this encounter: 105.2 kg (232 lb). Body surface area is 2.28 meters squared.  Severe Pain (7) Comment: Data Unavailable   No LMP for male patient.  Allergies reviewed: Yes  Medications reviewed: Yes    Medications: Medication refills not needed today.  Pharmacy name entered into [employer]:    [id number] - [place], [place] - [address] [facility] - [place], [place] - [address]    Clinical concerns: none       Lui Scales            "

## 2022-06-27 ENCOUNTER — OFFICE VISIT (OUTPATIENT)
Dept: OPHTHALMOLOGY | Facility: CLINIC | Age: 82
End: 2022-06-27
Payer: MEDICARE

## 2022-06-27 DIAGNOSIS — H35.363 MACULAR DRUSEN, BILATERAL: ICD-10-CM

## 2022-06-27 DIAGNOSIS — H40.053 BORDERLINE GLAUCOMA WITH OCULAR HYPERTENSION, BILATERAL: Primary | ICD-10-CM

## 2022-06-27 PROCEDURE — 92134 CPTRZ OPH DX IMG PST SGM RTA: CPT | Performed by: OPHTHALMOLOGY

## 2022-06-27 PROCEDURE — 92083 EXTENDED VISUAL FIELD XM: CPT | Performed by: OPHTHALMOLOGY

## 2022-06-27 PROCEDURE — 92012 INTRM OPH EXAM EST PATIENT: CPT | Performed by: OPHTHALMOLOGY

## 2022-06-27 ASSESSMENT — VISUAL ACUITY
OS_PH_CC+: -2
OD_CC: 20/20
METHOD: SNELLEN - LINEAR
OS_SC+: -2
OS_PH_CC: 20/40
OD_CC+: -1
OS_SC: 20/30
OS_CC: 20/60
CORRECTION_TYPE: GLASSES

## 2022-06-27 ASSESSMENT — REFRACTION_WEARINGRX
OD_AXIS: 170
OD_SPHERE: -2.25
OS_ADD: +2.75
OD_CYLINDER: +2.25
OS_SPHERE: -2.75
OS_CYLINDER: +2.75
SPECS_TYPE: PAL
OD_ADD: +2.75
OS_AXIS: 005

## 2022-06-27 ASSESSMENT — TONOMETRY
OD_IOP_MMHG: 13
IOP_METHOD: APPLANATION
OS_IOP_MMHG: 13

## 2022-06-27 ASSESSMENT — SLIT LAMP EXAM - LIDS: COMMENTS: 1+ DERMATOCHALASIS - UPPER LID, 2+ MEIBOMIAN GLAND DYSFUNCTION

## 2022-06-27 NOTE — PROGRESS NOTES
Current Eye Medications:  AREDS2 twice a day, Timolol both eyes each morning, Latanoprost both eyes every evening.     Subjective: here for HVF and OCT mac and nerve for glaucoma. [facility], will be seen soon for those.  Doing very well.      Objective:  See Ophthalmology Exam.       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      Plan:  Continue same medications.  Return visit in 6 months for a complete exam.  Avtar Mccullough M.D.  [phone]       Patient seen by Dr. Myers, 01/28/20.   See note.

## 2022-06-27 NOTE — PATIENT INSTRUCTIONS
Continue same medications.  Return visit in 6 months for a complete exam.  Avtar Mccullough M.D.  [phone]

## 2022-06-27 NOTE — LETTER
6/27/2022         RE: Zack Demarco  [address] [facility] [address]        Dear Colleague,    Thank you for referring your patient, Zack Demarco, to the [facility]. Please see a copy of my visit note below.     Current Eye Medications:  AREDS2 twice a day, Timolol both eyes each morning, Latanoprost both eyes every evening.     Subjective: here for HVF and OCT mac and nerve for glaucoma. [facility] and refraction, will be seen soon for those.  Doing very well.      Objective:  See Ophthalmology Exam.       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      Plan:  Continue same medications.  Return visit in 6 months for a complete exam.  Avtar Mccullough M.D.  [phone]          Again, thank you for allowing me to participate in the care of your patient.        Sincerely,        Avtar Mccullough MD

## 2022-07-02 ASSESSMENT — PACHYMETRY
OS_CT(UM): 548
OD_CT(UM): 552

## 2022-07-03 ENCOUNTER — HEALTH MAINTENANCE LETTER (OUTPATIENT)
Age: 82
End: 2022-07-03

## 2022-07-03 ENCOUNTER — MYC MEDICAL ADVICE (OUTPATIENT)
Dept: FAMILY MEDICINE | Facility: CLINIC | Age: 82
End: 2022-07-03

## 2022-11-17 ENCOUNTER — ONCOLOGY VISIT (OUTPATIENT)
Dept: ONCOLOGY | Facility: CLINIC | Age: 82
End: 2022-11-17
Attending: STUDENT IN AN ORGANIZED HEALTH CARE EDUCATION/TRAINING PROGRAM
Payer: MEDICARE

## 2022-11-17 ENCOUNTER — ANCILLARY PROCEDURE (OUTPATIENT)
Dept: CT IMAGING | Facility: CLINIC | Age: 82
End: 2022-11-17
Attending: STUDENT IN AN ORGANIZED HEALTH CARE EDUCATION/TRAINING PROGRAM
Payer: MEDICARE

## 2022-11-17 ENCOUNTER — APPOINTMENT (OUTPATIENT)
Dept: LAB | Facility: CLINIC | Age: 82
End: 2022-11-17
Attending: STUDENT IN AN ORGANIZED HEALTH CARE EDUCATION/TRAINING PROGRAM
Payer: MEDICARE

## 2022-11-17 VITALS
OXYGEN SATURATION: 97 % | SYSTOLIC BLOOD PRESSURE: 144 MMHG | RESPIRATION RATE: 16 BRPM | WEIGHT: 227.8 LBS | HEART RATE: 47 BPM | TEMPERATURE: 98 F | DIASTOLIC BLOOD PRESSURE: 63 MMHG | BODY MASS INDEX: 32.69 KG/M2

## 2022-11-17 DIAGNOSIS — C82.99 FOLLICULAR LYMPHOMA OF EXTRANODAL AND SOLID ORGAN SITES (H): ICD-10-CM

## 2022-11-17 LAB
ALBUMIN SERPL BCG-MCNC: 4.1 G/DL (ref 3.5–5.2)
ALP SERPL-CCNC: 58 U/L (ref 40–129)
ALT SERPL W P-5'-P-CCNC: 14 U/L (ref 10–50)
ANION GAP SERPL CALCULATED.3IONS-SCNC: 9 MMOL/L (ref 7–15)
AST SERPL W P-5'-P-CCNC: 19 U/L (ref 10–50)
BASOPHILS # BLD AUTO: 0 10E3/UL (ref 0–0.2)
BASOPHILS NFR BLD AUTO: 0 %
BILIRUB SERPL-MCNC: 0.5 MG/DL
BUN SERPL-MCNC: 23.8 MG/DL (ref 8–23)
CALCIUM SERPL-MCNC: 9.7 MG/DL (ref 8.8–10.2)
CHLORIDE SERPL-SCNC: 104 MMOL/L (ref 98–107)
CREAT BLD-MCNC: 0.9 MG/DL (ref 0.7–1.3)
CREAT SERPL-MCNC: 0.93 MG/DL (ref 0.67–1.17)
DEPRECATED HCO3 PLAS-SCNC: 26 MMOL/L (ref 22–29)
EOSINOPHIL # BLD AUTO: 0.2 10E3/UL (ref 0–0.7)
EOSINOPHIL NFR BLD AUTO: 3 %
ERYTHROCYTE [DISTWIDTH] IN BLOOD BY AUTOMATED COUNT: 13 % (ref 10–15)
GFR SERPL CREATININE-BSD FRML MDRD: 82 ML/MIN/1.73M2
GFR SERPL CREATININE-BSD FRML MDRD: >60 ML/MIN/1.73M2
GLUCOSE SERPL-MCNC: 106 MG/DL (ref 70–99)
HCT VFR BLD AUTO: 41.8 % (ref 40–53)
HGB BLD-MCNC: 13.8 G/DL (ref 13.3–17.7)
IMM GRANULOCYTES # BLD: 0.1 10E3/UL
IMM GRANULOCYTES NFR BLD: 1 %
LDH SERPL L TO P-CCNC: 181 U/L (ref 0–250)
LYMPHOCYTES # BLD AUTO: 1.4 10E3/UL (ref 0.8–5.3)
LYMPHOCYTES NFR BLD AUTO: 20 %
MCH RBC QN AUTO: 31.4 PG (ref 26.5–33)
MCHC RBC AUTO-ENTMCNC: 33 G/DL (ref 31.5–36.5)
MCV RBC AUTO: 95 FL (ref 78–100)
MONOCYTES # BLD AUTO: 0.5 10E3/UL (ref 0–1.3)
MONOCYTES NFR BLD AUTO: 8 %
NEUTROPHILS # BLD AUTO: 4.6 10E3/UL (ref 1.6–8.3)
NEUTROPHILS NFR BLD AUTO: 68 %
NRBC # BLD AUTO: 0 10E3/UL
NRBC BLD AUTO-RTO: 0 /100
PLATELET # BLD AUTO: 203 10E3/UL (ref 150–450)
POTASSIUM SERPL-SCNC: 4.8 MMOL/L (ref 3.4–5.3)
PROT SERPL-MCNC: 6.8 G/DL (ref 6.4–8.3)
RBC # BLD AUTO: 4.4 10E6/UL (ref 4.4–5.9)
SODIUM SERPL-SCNC: 139 MMOL/L (ref 136–145)
WBC # BLD AUTO: 6.7 10E3/UL (ref 4–11)

## 2022-11-17 PROCEDURE — 99214 OFFICE O/P EST MOD 30 MIN: CPT | Performed by: STUDENT IN AN ORGANIZED HEALTH CARE EDUCATION/TRAINING PROGRAM

## 2022-11-17 PROCEDURE — 36415 COLL VENOUS BLD VENIPUNCTURE: CPT | Performed by: STUDENT IN AN ORGANIZED HEALTH CARE EDUCATION/TRAINING PROGRAM

## 2022-11-17 PROCEDURE — G0463 HOSPITAL OUTPT CLINIC VISIT: HCPCS

## 2022-11-17 PROCEDURE — 85025 COMPLETE CBC W/AUTO DIFF WBC: CPT | Performed by: STUDENT IN AN ORGANIZED HEALTH CARE EDUCATION/TRAINING PROGRAM

## 2022-11-17 PROCEDURE — 82565 ASSAY OF CREATININE: CPT | Performed by: PATHOLOGY

## 2022-11-17 PROCEDURE — 74177 CT ABD & PELVIS W/CONTRAST: CPT | Performed by: RADIOLOGY

## 2022-11-17 PROCEDURE — 71260 CT THORAX DX C+: CPT | Performed by: RADIOLOGY

## 2022-11-17 PROCEDURE — 80053 COMPREHEN METABOLIC PANEL: CPT | Performed by: STUDENT IN AN ORGANIZED HEALTH CARE EDUCATION/TRAINING PROGRAM

## 2022-11-17 PROCEDURE — 36592 COLLECT BLOOD FROM PICC: CPT

## 2022-11-17 PROCEDURE — 83615 LACTATE (LD) (LDH) ENZYME: CPT | Performed by: STUDENT IN AN ORGANIZED HEALTH CARE EDUCATION/TRAINING PROGRAM

## 2022-11-17 RX ORDER — IOPAMIDOL 755 MG/ML
125 INJECTION, SOLUTION INTRAVASCULAR ONCE
Status: COMPLETED | OUTPATIENT
Start: 2022-11-17 | End: 2022-11-17

## 2022-11-17 RX ORDER — LEVOTHYROXINE SODIUM 88 UG/1
0.09 TABLET ORAL
COMMUNITY
Start: 2022-05-31

## 2022-11-17 RX ADMIN — IOPAMIDOL 125 ML: 755 INJECTION, SOLUTION INTRAVASCULAR at 10:23

## 2022-11-17 NOTE — PROGRESS NOTES
Subjective   Zack is a 82 year old male coming in for follow up of follicular lymphoma    HPI     Oncology History Overview Note   Zack Demarco is a 79 year old male first seen in consultation on 12/27/2017 for a new diagnosis of follicular lymphoma. He was diagnosed incidental to an evaluation for cardiac bypass surgery in 11/2017. A chest CT scan on 11/14 showed an unexpected retroperitoneal mass with surrounding lymphadenopathy suspicious for malignancy.  A further CT scan done on the same day showed a 2.3 x 7.2 x 6.1 retroperitoneal soft tissue mass with adjacent lymphadenopathy that mildly narrowed the left renal vein. There also was nodularity within the mesentery and an enlarged hilar lymph node. CT-guided biopsy of the retroperitoneal mass on 12/12/2017 established the diagnosis, but the sample was too small for adequate grading. There was no disease identified outside of the abdomen; a bone marrow biopsy was not obtained as part of staging.      Relative to cardiac issues, he underwent an uncomplicated 3-vessel coronary artery bypass graft on 11/22/2017 and, subsequently, has been symptom-free. He follows       With the renal and gonadal vein compression it was decided to start treatment with rituximab, alone. Mr. Demarco completed 4 weekly doses from 01/26 - 02/16/2018. He had no difficulty with the treatment and was able to receive rapid infusion (90 minutes) for the last 3 doses. Interval evaluation on 03/05/2018 with an US, suggested improvement. A CT scan on 04/09/2018 showed substantial regression. Repeated CT on 07/09/18 showed a good response but residual lymphoma around the renal veins. Decided to proceed weekly rituximab x4 (7/26-8/22/2018).  He started rituximab maintenance in 10/2018.     He completed maintenance rituximab in October 2020. Ct imaging in Sept 2020 showed stable findings.       Patient comes today for follow up. No fevers, chills, night sweats or weight loss, no lymphadenopathy.  No pain. He has itchy dry rash on left tibial shin.      Review of Systems   8 point review of systems was negative except pertinent positives listed above.        Objective    BP (!) 144/63 (BP Location: Left arm, Patient Position: Sitting, Cuff Size: Adult Large)   Pulse (!) 47   Temp 98  F (36.7  C) (Oral)   Resp 16   Wt 103.3 kg (227 lb 12.8 oz)   SpO2 97%   BMI 32.69 kg/m    Body mass index is 32.69 kg/m .  Physical Exam   GENERAL:  Alert oriented well nourished  SKIN:  no rash, hives, other lesions.  EYE: no icterus/pallor  ENT: no throat erythema, enlarged tonsills, no ulcers or mucositis in the mouth  LYMPHATIC: no abnormal lymph nodes palable in cervical, axillary, supraclavicular or inguinal area.  RESP:  No rales or rhonchi, breath sounds bilaterally equal and vesicular  CV:  No tachycardia, S1 S2 normal No murmur.  GI:  Abdomen soft nontender no hepato or splenomegaly  MUSCULOSKELETAL:  No visible joint redness or swelling.  NEURO:  No gross weakness gait normal    Labs reviewed. No cytopenias, renal or liver abnormalities, LDH pending    Imaging: Ct chest/abdomen/pelvis reviewed, no concern for progression/recurrence of lymphoma. Stable subcentimeter pulmonary nodules do not require intervention      Assessment and Plan:    1) Follicular lymphoma in remission for 4 years after induction therapy:  - He has no clinical or radiographic evidence of lymphoma recurrence or progression.  - We will continue surveillance with every 6 months history, physical exam and labs. CT scan annually.    2) Atopic dermatitis on left tibial shin:  - continue topical steroids, advised to keep area moist.    3) Age appropriate immunizations:  - Vaccinated agains COVID, influenza-2022  - Shingrix in 2021  -  Pneumococcal 2020.    4) Stable pulmonary nodules:  - Continue to monitor, no intervention required.    Total time spent on date of service in review of medical records, review of labs, history taking, physical exam,  discussion of assessment and plan, counseling and patient education is 30 minutes.    Yuri Man MD  Attending Physician  Pager [phone]

## 2022-11-17 NOTE — NURSING NOTE
"Oncology Rooming Note    November 17, 2022 12:15 PM   Zack Demarco is a 82 year old male who presents for:    Chief Complaint   Patient presents with     Blood Draw     Vitals taken, labs drawn from PIV placed in CT, saline locked, PIV removed and checked into next appt     Oncology Clinic Visit     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)     Initial Vitals: BP (!) 144/63 (BP Location: Left arm, Patient Position: Sitting, Cuff Size: Adult Large)   Pulse (!) 47   Temp 98  F (36.7  C) (Oral)   Resp 16   Wt 103.3 kg (227 lb 12.8 oz)   SpO2 97%   BMI 32.69 kg/m   Estimated body mass index is 32.69 kg/m  as calculated from the following:    Height as of 10/28/21: 1.778 m (5' 10\").    Weight as of this encounter: 103.3 kg (227 lb 12.8 oz). Body surface area is 2.26 meters squared.  Data Unavailable Comment: Data Unavailable   No LMP for male patient.  Allergies reviewed: Yes  Medications reviewed: Yes    Medications: Medication refills not needed today.  Pharmacy name entered into [employer]:    [id number] - [place], [place] - [address] [facility] - [place], [place] - [address]    Clinical concerns: Patient reports right heel pain-occasionally.        Valerie Downs LPN November 17, 2022 12:16 PM            "

## 2022-11-17 NOTE — LETTER
11/17/2022         RE: Zack Demarco  [address] [facility] [address]        Dear Colleague,    Thank you for referring your patient, Zack Demarco, to the [facility]. Please see a copy of my visit note below.        Cruzito Dixon is a 82 year old male coming in for follow up of follicular lymphoma    HPI     Oncology History Overview Note   Zack Demarco is a 79 year old male first seen in consultation on 12/27/2017 for a new diagnosis of follicular lymphoma. He was diagnosed incidental to an evaluation for cardiac bypass surgery in 11/2017. A chest CT scan on 11/14 showed an unexpected retroperitoneal mass with surrounding lymphadenopathy suspicious for malignancy.  A further CT scan done on the same day showed a 2.3 x 7.2 x 6.1 retroperitoneal soft tissue mass with adjacent lymphadenopathy that mildly narrowed the left renal vein. There also was nodularity within the mesentery and an enlarged hilar lymph node. CT-guided biopsy of the retroperitoneal mass on 12/12/2017 established the diagnosis, but the sample was too small for adequate grading. There was no disease identified outside of the abdomen; a bone marrow biopsy was not obtained as part of staging.      Relative to cardiac issues, he underwent an uncomplicated 3-vessel coronary artery bypass graft on 11/22/2017 and, subsequently, has been symptom-free. He follows       With the renal and gonadal vein compression it was decided to start treatment with rituximab, alone. Mr. Demarco completed 4 weekly doses from 01/26 - 02/16/2018. He had no difficulty with the treatment and was able to receive rapid infusion (90 minutes) for the last 3 doses. Interval evaluation on 03/05/2018 with an US, suggested improvement. A CT scan on 04/09/2018 showed substantial regression. Repeated CT on 07/09/18 showed a good response but residual lymphoma around the renal veins. Decided to proceed weekly rituximab x4 (7/26-8/22/2018).  He  started rituximab maintenance in 10/2018.     He completed maintenance rituximab in October 2020. Ct imaging in Sept 2020 showed stable findings.       Patient comes today for follow up. No fevers, chills, night sweats or weight loss, no lymphadenopathy. No pain. He has itchy dry rash on left tibial shin.      Review of Systems   8 point review of systems was negative except pertinent positives listed above.       Objective    BP (!) 144/63 (BP Location: Left arm, Patient Position: Sitting, Cuff Size: Adult Large)   Pulse (!) 47   Temp 98  F (36.7  C) (Oral)   Resp 16   Wt 103.3 kg (227 lb 12.8 oz)   SpO2 97%   BMI 32.69 kg/m    Body mass index is 32.69 kg/m .  Physical Exam   GENERAL:  Alert oriented well nourished  SKIN:  no rash, hives, other lesions.  EYE: no icterus/pallor  ENT: no throat erythema, enlarged tonsills, no ulcers or mucositis in the mouth  LYMPHATIC: no abnormal lymph nodes palable in cervical, axillary, supraclavicular or inguinal area.  RESP:  No rales or rhonchi, breath sounds bilaterally equal and vesicular  CV:  No tachycardia, S1 S2 normal No murmur.  GI:  Abdomen soft nontender no hepato or splenomegaly  MUSCULOSKELETAL:  No visible joint redness or swelling.  NEURO:  No gross weakness gait normal    Labs reviewed. No cytopenias, renal or liver abnormalities, LDH pending    Imaging: Ct chest/abdomen/pelvis reviewed, no concern for progression/recurrence of lymphoma. Stable subcentimeter pulmonary nodules do not require intervention      Assessment and Plan:    1) Follicular lymphoma in remission for 4 years after induction therapy:  - He has no clinical or radiographic evidence of lymphoma recurrence or progression.  - We will continue surveillance with every 6 months history, physical exam and labs. CT scan annually.    2) Atopic dermatitis on left tibial shin:  - continue topical steroids, advised to keep area moist.    3) Age appropriate immunizations:  - Vaccinated agains COVID,  influenza-2022  - Shingrix in 2021  -  Pneumococcal 2020.    4) Stable pulmonary nodules:  - Continue to monitor, no intervention required.    Total time spent on date of service in review of medical records, review of labs, history taking, physical exam, discussion of assessment and plan, counseling and patient education is 30 minutes.          Again, thank you for allowing me to participate in the care of your patient.      Sincerely,    Yuri Man MD

## 2022-11-17 NOTE — NURSING NOTE
Chief Complaint   Patient presents with     Blood Draw     Vitals taken, labs drawn from PIV placed in CT, saline locked, PIV removed and checked into next appt     BP (!) 144/63 (BP Location: Left arm, Patient Position: Sitting, Cuff Size: Adult Large)   Pulse (!) 47   Temp 98  F (36.7  C) (Oral)   Resp 16   Wt 103.3 kg (227 lb 12.8 oz)   SpO2 97%   BMI 32.69 kg/m    Donny Cutler RN on 11/17/2022 at 11:43 AM

## 2023-01-01 NOTE — MR AVS SNAPSHOT
After Visit Summary   1/18/2018    Zack Demarco    MRN: [id number]           Patient Information     Date Of Birth          1940        Visit Information        Provider Department      1/18/2018 8:30 AM Gal Bain MD [facility]        Today's Diagnoses     Lymphoma, unspecified body region, unspecified lymphoma type (H)    -  1      Care Instructions          January 2018 Sunday Monday Tuesday Wednesday Thursday Friday Saturday        1     2     3     4     RETURN   10:00 AM   (30 min.)   Torsten Reyes MD   [facility] 5     6       7     8     9     10     11     12     PE EYE/TH ONCOLOGY    9:30 AM   (45 min.)   UUPET1   [facility] PET CT 13       14     15     16     17     NEW    8:00 AM   (15 min.)   Avtar Mccullough MD   [facility] 18     UMP RETURN    8:15 AM   (30 min.)   Gal Bain MD   [facility] 19     20       21     22     US ABDOMEN LIMITED    9:45 AM   (45 min.)   UCUS2   [facility]  23     24     25     26     UMP [facility] LAB DRAW    7:30 AM   (15 min.)    MASON[facility] LAB DRAW   [facility] Lab Draw     UMP ONC INFUSION 360    8:00 AM   (360 min.)   UC ONCOLOGY INFUSION   [facility] 27       28     29     30     31 February 2018 Sunday Monday Tuesday Wednesday Thursday Friday Saturday                       1     2     UMP [facility] LAB DRAW    8:00 AM   (15 min.)   [facility] LAB DRAW   [facility] Draw     UMP ONC INFUSION 360    8:30 AM   (360 min.)   UC ONCOLOGY INFUSION   [facility] 3       4     5     6     7     8     9     UMP [facility]ON[facility] LAB DRAW    6:30 AM   (15 min.)   UC [facility] LAB DRAW   [facility] Draw     UMP ONC INFUSION 360    7:00 AM   (360 min.)   UC ONCOLOGY INFUSION   [facility] 10       11     12     13     14     15     16     UMP MASON[facility]  LAB DRAW    7:30 AM   (15 min.)    MASONIC LAB DRAW   [facility] Lab Draw     UMP ONC INFUSION 360    8:00 AM   (360 min.)   UC ONCOLOGY INFUSION   [facility] 17       18     19     20     21     22     23     24       25     26     27     28 March 2018 Sunday Monday Tuesday Wednesday Thursday Friday Saturday                       1     2     3       4     5     6     7     UMP RETURN    7:45 AM   (30 min.)   Gal Bain MD   [facility] 8     9     10       11     12     13     14     15     16     17       18     19     20     21     22     23     24       25     26     27     28     29     30     31                      Follow-ups after your visit        Your next 10 appointments already scheduled     Jan 22, 2018  9:45 AM CST   US ABDOMEN LIMITED with UCUS2   [facility] US ([facility])    [address]  1st Floor  [place] [address] [phone]           Please bring a list of your medicines (including vitamins, minerals and over-the-counter drugs). Also, tell your doctor about any allergies you may have. Wear comfortable clothes and leave your valuables at home.  Adults: No eating or drinking for 8 hours before the exam. You may take medicine with a small sip of water.  Children: - Children 6+ years: No food or drink for 6 hours before exam. - Children 1-5 years: No food or drink for 4 hours before exam. - Infants, breast-fed: may have breast milk up to 2 hours before exam. - Infants, formula: may have bottle until 4 hours before exam.  Please call the Imaging Department at your exam site with any questions.            Jan 26, 2018  7:30 AM CST   M[facility]ic Lab Draw with  MASONIC LAB DRAW   [facility] Lab Draw ([facility])    [address]  [address] [place] [address] [phone]            Jan 26, 2018  8:00 AM CST    Infusion 360 with UC ONCOLOGY INFUSION, UC 27 ATC   [facility] [facility]    [address]  [address]  [place] [address]   [phone]            Feb 02, 2018  8:00 AM CST   Masonic Lab Draw with UC MASONIC LAB DRAW   [facility] ([place] [facility]    [address]  [address]  [place] [address]   [phone]            Feb 02, 2018  8:30 AM CST   Infusion 360 with UC ONCOLOGY INFUSION, UC 20 ATC   [facility] [facility]    [address]  [address]  [place] [address]   [phone]            Feb 09, 2018  6:30 AM CST   Masonic Lab Draw with UC MASONIC LAB DRAW   [facility] [facility]    [address]  [address]  [place] [address]   [phone]            Feb 09, 2018  7:00 AM CST   Infusion 360 with UC ONCOLOGY INFUSION, UC 10 ATC   [facility] ([place] [facility])    [address]  [address]  [place] [address]   [phone]              Future tests that were ordered for you today     Open Future Orders        Priority Expected Expires Ordered    Comprehensive metabolic panel Routine 1/23/2018 1/18/2019 1/18/2018    Uric acid Routine 1/23/2018 1/18/2019 1/18/2018    *CBC with platelets differential Routine 1/23/2018 1/18/2019 1/18/2018    US Abdomen Limited Routine 1/18/2018 1/18/2019 1/18/2018            Who to contact     If you have questions or need follow up information about today's clinic visit or your schedule please contact [facility] directly at [phone].  Normal or non-critical lab and imaging results will be communicated to you by MyChart, letter or phone within 4 business days after the clinic has received the results. If you do not hear from us within 7 days, please contact the clinic through  "MyChart or phone. If you have a critical or abnormal lab result, we will notify you by phone as soon as possible.  Submit refill requests through [employer] or call your pharmacy and they will forward the refill request to us. Please allow 3 business days for your refill to be completed.          Additional Information About Your Visit        [employer] Information     [employer] gives you secure access to your electronic health record. If you see a primary care provider, you can also send messages to your care team and make appointments. If you have questions, please call your primary care clinic.  If you do not have a primary care provider, please call [phone] and they will assist you.        Care EveryWhere ID     This is your Care EveryWhere ID. This could be used by other organizations to access your [facility] medical records  [id number]        Your Vitals Were     Pulse Temperature Respirations Height Pulse Oximetry BMI (Body Mass Index)    61 96.8  F (36  C) (Oral) 16 1.776 m (5' 9.92\") 96% 30.93 kg/m2       Blood Pressure from Last 3 Encounters:   01/18/18 138/70   01/04/18 133/72   12/27/17 137/71    Weight from Last 3 Encounters:   01/18/18 97.6 kg (215 lb 1.6 oz)   01/04/18 95.9 kg (211 lb 8 oz)   12/27/17 95.3 kg (210 lb 1.6 oz)               Primary Care Provider Office Phone # Fax #    Anastacio Love [phone] [address] [address] [address]        Equal Access to Services     SHEYLA BURR : Hadii stefany ku hadasho Soomaali, waaxda luqadaha, qaybta kaalmada adeegyayaw, marilynn reynolds. So [facility] [phone].    ATENCIÓN: Si habla español, tiene a warner disposición servicios gratuitos de asistencia lingüística. Llame al [phone].    We comply with applicable federal civil rights laws and [place] laws. We do not discriminate on the basis of race, color, national origin, age, disability, sex, sexual orientation, or gender identity.            Thank " you!     Thank you for choosing [facility]  for your care. Our goal is always to provide you with excellent care. Hearing back from our patients is one way we can continue to improve our services. Please take a few minutes to complete the written survey that you may receive in the mail after your visit with us. Thank you!             Your Updated Medication List - Protect others around you: Learn how to safely use, store and throw away your medicines at www.disposemymeds.org.          This list is accurate as of: 1/18/18  9:50 AM.  Always use your most recent med list.                   Brand Name Dispense Instructions for use Diagnosis    aspirin 325 MG EC tablet      1/2 tab daily        atorvastatin 40 MG tablet    LIPITOR    60 tablet    Take 1 tablet (40 mg) by mouth daily    Hyperlipidemia LDL goal <100       latanoprost 0.005 % ophthalmic solution    XALATAN    3 Bottle    Place 1 drop into both eyes At Bedtime    Borderline glaucoma with ocular hypertension, unspecified laterality       levothyroxine 75 MCG tablet    SYNTHROID/LEVOTHROID    90 tablet    Take 1 tablet (75 mcg) by mouth daily    Hypothyroidism, unspecified type       metoprolol tartrate 25 MG tablet    LOPRESSOR    60 tablet    Take 0.5 tablets (12.5 mg) by mouth 2 times daily    S/P CABG (coronary artery bypass graft), Acute post-operative pain       multivitamin Tabs tablet      Take 1 tablet by mouth daily        nitroGLYcerin 0.4 MG sublingual tablet    NITROSTAT    25 tablet    For chest pain place 1 tablet under the tongue every 5 minutes for 3 doses. If symptoms persist 5 minutes after 1st dose call 911.    Exertional chest pain       VITAMIN D3 PO      Take by mouth daily           2023 03:31

## 2023-01-02 ENCOUNTER — OFFICE VISIT (OUTPATIENT)
Dept: OPHTHALMOLOGY | Facility: CLINIC | Age: 83
End: 2023-01-02
Payer: MEDICARE

## 2023-01-02 DIAGNOSIS — H52.4 PRESBYOPIA: ICD-10-CM

## 2023-01-02 DIAGNOSIS — Z96.1 PSEUDOPHAKIA: Primary | ICD-10-CM

## 2023-01-02 DIAGNOSIS — H40.053 BORDERLINE GLAUCOMA WITH OCULAR HYPERTENSION, BILATERAL: ICD-10-CM

## 2023-01-02 DIAGNOSIS — H43.813 POSTERIOR VITREOUS DETACHMENT OF BOTH EYES: ICD-10-CM

## 2023-01-02 DIAGNOSIS — Z01.01 ENCOUNTER FOR EXAMINATION OF EYES AND VISION WITH ABNORMAL FINDINGS: ICD-10-CM

## 2023-01-02 DIAGNOSIS — H35.363 MACULAR DRUSEN, BILATERAL: ICD-10-CM

## 2023-01-02 PROCEDURE — 92014 COMPRE OPH EXAM EST PT 1/>: CPT | Performed by: OPHTHALMOLOGY

## 2023-01-02 RX ORDER — TIMOLOL MALEATE 5 MG/ML
1 SOLUTION/ DROPS OPHTHALMIC EVERY MORNING
Qty: 10 ML | Refills: 4 | Status: SHIPPED | OUTPATIENT
Start: 2023-01-02

## 2023-01-02 ASSESSMENT — VISUAL ACUITY
OS_CC: 20/50
OS_CC+: -1
CORRECTION_TYPE: GLASSES
OD_CC: 20/30
METHOD: SNELLEN - LINEAR
OD_CC: 1
OS_PH_CC: 20/30
OS_CC: 1+
OS_PH_CC+: +2
OD_CC+: -2

## 2023-01-02 ASSESSMENT — CONF VISUAL FIELD
OS_SUPERIOR_TEMPORAL_RESTRICTION: 0
OD_INFERIOR_TEMPORAL_RESTRICTION: 0
OD_SUPERIOR_NASAL_RESTRICTION: 0
OS_NORMAL: 1
OD_NORMAL: 1
OD_INFERIOR_NASAL_RESTRICTION: 0
OS_INFERIOR_NASAL_RESTRICTION: 0
OS_INFERIOR_TEMPORAL_RESTRICTION: 0
OD_SUPERIOR_TEMPORAL_RESTRICTION: 0
OS_SUPERIOR_NASAL_RESTRICTION: 0

## 2023-01-02 ASSESSMENT — REFRACTION_WEARINGRX
OS_CYLINDER: +3.25
OS_AXIS: 176
OD_AXIS: 174
OD_CYLINDER: +2.00
OD_SPHERE: -1.75
OS_ADD: +2.50
OD_ADD: +2.50
OS_SPHERE: -2.25

## 2023-01-02 ASSESSMENT — TONOMETRY
OS_IOP_MMHG: 15
OD_IOP_MMHG: 14
IOP_METHOD: APPLANATION

## 2023-01-02 ASSESSMENT — CUP TO DISC RATIO
OD_RATIO: 0.5
OS_RATIO: 0.5

## 2023-01-02 ASSESSMENT — PACHYMETRY
OD_CT(UM): 552
OS_CT(UM): 548

## 2023-01-02 NOTE — PATIENT INSTRUCTIONS
"Continue:   Latanoprost (green top) every evening both eyes.  Timolol (yellow top) every morning both eyes.     May use artificial tears up to four times a day (like Refresh Optive, Systane Balance, TheraTears, or generic artificial tears are ok. Avoid \"get the red out\" drops).   Possible clouding of posterior capsule right eye discussed.    Return visit 6 months for an intraocular pressure check, glaucoma OCT, retinal OCT, and Galvan Visual Field.   Avtar Mccullough M.D.  [phone]    "

## 2023-01-02 NOTE — PROGRESS NOTES
" Current Eye Medications:  Timolol both eyes each morning, Latanoprost both eyes every evening.  Last drops:  6am.    AREDS2 twice a day      Subjective:  Comprehensive Eye Exam.  He has new glasses (one month ago), but feels his distance vision is clearer if he pulls his glasses down on his nose.  Both eyes are comfortable.    He defers a refraction and refills on his drops - he does this through the [facility]     Objective:  See Ophthalmology Exam.       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      ICD-10-CM    1. Pseudophakia, ou; Yag Caps, os  Z96.1       2. Borderline glaucoma with ocular hypertension, bilateral - treated  H40.053 timolol maleate (TIMOPTIC) 0.5 % ophthalmic solution      3. Macular drusen, bilateral  H35.363       4. Posterior vitreous detachment of both eyes  H43.813       5. Encounter for examination of eyes and vision with abnormal findings  Z01.01       6. Presbyopia  H52.4            Plan:  Continue:   Latanoprost (green top) every evening both eyes.  Timolol (yellow top) every morning both eyes.     May use artificial tears up to four times a day (like Refresh Optive, Systane Balance, TheraTears, or generic artificial tears are ok. Avoid \"get the red out\" drops).   Possible clouding of posterior capsule right eye discussed.    Return visit 6 months for an intraocular pressure check, glaucoma OCT, retinal OCT, and Galvan Visual Field.   Avtar Mccullough M.D.  [phone]       "

## 2023-01-02 NOTE — LETTER
"    1/2/2023         RE: Zack Demarco  [address] [facility] [address]        Dear Colleague,    Thank you for referring your patient, Zack Demarco, to the [facility]. Please see a copy of my visit note below.     Current Eye Medications:  Timolol both eyes each morning, Latanoprost both eyes every evening.  Last drops:  6am.    AREDS2 twice a day      Subjective:  Comprehensive Eye Exam.  He has new glasses (one month ago), but feels his distance vision is clearer if he pulls his glasses down on his nose.  Both eyes are comfortable.    He defers a refraction and refills on his drops - he does this through the [facility]     Objective:  See Ophthalmology Exam.       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      ICD-10-CM    1. Pseudophakia, ou; Yag Caps, os  Z96.1       2. Borderline glaucoma with ocular hypertension, bilateral - treated  H40.053 timolol maleate (TIMOPTIC) 0.5 % ophthalmic solution      3. Macular drusen, bilateral  H35.363       4. Posterior vitreous detachment of both eyes  H43.813       5. Encounter for examination of eyes and vision with abnormal findings  Z01.01       6. Presbyopia  H52.4            Plan:  Continue:   Latanoprost (green top) every evening both eyes.  Timolol (yellow top) every morning both eyes.     May use artificial tears up to four times a day (like Refresh Optive, Systane Balance, TheraTears, or generic artificial tears are ok. Avoid \"get the red out\" drops).   Possible clouding of posterior capsule right eye discussed.    Return visit 6 months for an intraocular pressure check, glaucoma OCT, retinal OCT, and Galvan Visual Field.   Avtar Mccullough M.D.  [phone]           Again, thank you for allowing me to participate in the care of your patient.        Sincerely,        Avtar Mccullough MD    "

## 2023-02-01 ENCOUNTER — LAB (OUTPATIENT)
Dept: LAB | Facility: CLINIC | Age: 83
End: 2023-02-01
Payer: MEDICARE

## 2023-02-01 DIAGNOSIS — I10 HYPERTENSION GOAL BP (BLOOD PRESSURE) < 140/90: ICD-10-CM

## 2023-02-01 DIAGNOSIS — E78.5 HYPERLIPIDEMIA LDL GOAL <100: ICD-10-CM

## 2023-02-01 LAB
ANION GAP SERPL CALCULATED.3IONS-SCNC: 5 MMOL/L (ref 3–14)
BUN SERPL-MCNC: 33 MG/DL (ref 7–30)
CALCIUM SERPL-MCNC: 9.2 MG/DL (ref 8.5–10.1)
CHLORIDE BLD-SCNC: 107 MMOL/L (ref 94–109)
CHOLEST SERPL-MCNC: 136 MG/DL
CO2 SERPL-SCNC: 28 MMOL/L (ref 20–32)
CREAT SERPL-MCNC: 1.03 MG/DL (ref 0.66–1.25)
ERYTHROCYTE [DISTWIDTH] IN BLOOD BY AUTOMATED COUNT: 12.9 % (ref 10–15)
FASTING STATUS PATIENT QL REPORTED: YES
GFR SERPL CREATININE-BSD FRML MDRD: 73 ML/MIN/1.73M2
GLUCOSE BLD-MCNC: 108 MG/DL (ref 70–99)
HCT VFR BLD AUTO: 45.7 % (ref 40–53)
HDLC SERPL-MCNC: 36 MG/DL
HGB BLD-MCNC: 15 G/DL (ref 13.3–17.7)
LDLC SERPL CALC-MCNC: 71 MG/DL
MCH RBC QN AUTO: 31.8 PG (ref 26.5–33)
MCHC RBC AUTO-ENTMCNC: 32.8 G/DL (ref 31.5–36.5)
MCV RBC AUTO: 97 FL (ref 78–100)
NONHDLC SERPL-MCNC: 100 MG/DL
PLATELET # BLD AUTO: 189 10E3/UL (ref 150–450)
POTASSIUM BLD-SCNC: 4.3 MMOL/L (ref 3.4–5.3)
RBC # BLD AUTO: 4.72 10E6/UL (ref 4.4–5.9)
SODIUM SERPL-SCNC: 140 MMOL/L (ref 133–144)
T4 FREE SERPL-MCNC: 0.84 NG/DL (ref 0.76–1.46)
TRIGL SERPL-MCNC: 144 MG/DL
TSH SERPL DL<=0.005 MIU/L-ACNC: 6.51 MU/L (ref 0.4–4)
WBC # BLD AUTO: 7.6 10E3/UL (ref 4–11)

## 2023-02-01 PROCEDURE — 85027 COMPLETE CBC AUTOMATED: CPT

## 2023-02-01 PROCEDURE — 80061 LIPID PANEL: CPT

## 2023-02-01 PROCEDURE — 80048 BASIC METABOLIC PNL TOTAL CA: CPT

## 2023-02-01 PROCEDURE — 84439 ASSAY OF FREE THYROXINE: CPT

## 2023-02-01 PROCEDURE — 84443 ASSAY THYROID STIM HORMONE: CPT

## 2023-02-01 PROCEDURE — 36415 COLL VENOUS BLD VENIPUNCTURE: CPT

## 2023-02-02 ENCOUNTER — VIRTUAL VISIT (OUTPATIENT)
Dept: CARDIOLOGY | Facility: CLINIC | Age: 83
End: 2023-02-02
Payer: MEDICARE

## 2023-02-02 DIAGNOSIS — R07.9 CHEST PAIN, UNSPECIFIED TYPE: ICD-10-CM

## 2023-02-02 DIAGNOSIS — I10 HYPERTENSION, UNSPECIFIED TYPE: Primary | ICD-10-CM

## 2023-02-02 DIAGNOSIS — E78.5 HYPERLIPIDEMIA LDL GOAL <100: ICD-10-CM

## 2023-02-02 PROCEDURE — 99214 OFFICE O/P EST MOD 30 MIN: CPT | Mod: 95 | Performed by: INTERNAL MEDICINE

## 2023-02-02 RX ORDER — ATORVASTATIN CALCIUM 40 MG/1
60 TABLET, FILM COATED ORAL DAILY
Qty: 135 TABLET | Refills: 3 | Status: SHIPPED | OUTPATIENT
Start: 2023-02-02

## 2023-02-02 RX ORDER — ISOSORBIDE MONONITRATE 30 MG/1
60 TABLET, EXTENDED RELEASE ORAL DAILY
Qty: 180 TABLET | Refills: 3 | Status: SHIPPED | OUTPATIENT
Start: 2023-02-02 | End: 2023-12-05

## 2023-02-02 NOTE — PROGRESS NOTES
Visit Date: 2023    Anastacio Love M.D.  [facility]  [address] [place] [address]    RE:         Zack Demarco  :     1940  MRN#:  [id number]    Dear Dr. Love:    It was a pleasure participating in the care of your patient, Mr. Zack Demarco.  As you know, he is an 82-year-old gentleman who I saw over virtual video visit via [employer] today for coronary artery disease, hypertension and hyperlipidemia.    PAST MEDICAL HISTORY:      1.  Hypertension.  2.  Hyperlipidemia.  3.  Retroperitoneal mass with follicular lymphoma.  4.  Hypothyroidism.  5.  Borderline glaucoma.  6.  Right knee problems.  7.  Lipoma.  8.  Patellar tendinitis.  9.  Carpal tunnel syndrome, right wrist.    His cardiac history is significant for known multivessel coronary artery disease and normal LV systolic function.    Coronary angiography 2017 revealed the following:      Left main -- Normal.  LAD -- 70% to 80% ostial stenosis with 80% to 90% stenosis in midportion.  First diagonal  -- 89% to 90% stenosis.  Circumflex -- Mild diffuse disease.  RCA -- 70% stenosis in the midportion.    A 3-vessel coronary bypass surgery was performed (LIMA to LAD, vein graft to PDA, vein graft to D1).  He originally presented with centrally located chest tightness with shortness of breath that completely resolved after revascularization.    In the summer of , he had occasional chest tightness when deer hunting and we started low-dose Imdur along with an echo and a pharmacologic nuclear stress test.  The tests were unremarkable and after starting Imdur, his symptoms resolved.    I last saw him 2022.  At that time, he was doing well.  He presents today for continuing care.    Since our last visit, he has not been very active since dealing with COVID.  He says he used to be a gym rat, but now he does not do much in terms of exercise.  He notes some new symptoms this winter as he feels a fullness  in his chest when he pushes himself to the extremes.  For example, if he shovels heavy snow quite vigorously, he will feel the fullness in his chest without accompanying shortness of breath, nausea, vomiting, diaphoresis or radiation of symptoms.  It will go away within one minute, resting, and it will not come back if he restarts activity.  Similarly, if he carries something very heavy and goes very quickly up the stairs, he will feel it.  However, he is able to walk for an hour on the treadmill at 2.5 miles an hour without having any symptoms whatsoever.    Notably, if he maintains a level of exertion, that is moderate or less, he will not feel any of this chest fullness and will only feel it when he pushes himself quite hard, never at rest.  He does not feel that his symptoms have changed over the past few months and have been stable.    He otherwise denies any significant PND, orthopnea, edema, palpitations, syncope or near syncope.    Blood pressures at home have been stable at 121/58 with a pulse of 52.    CURRENT MEDICATIONS:      1.  Aspirin 162 mg a day.  2.  Lipitor 40 mg a day.  3.  Imdur 30 mg a day.  4.  Levothyroxine.  5.  Losartan 50 mg twice daily.  6.  Metoprolol tartrate 25 twice daily.    PHYSICAL EXAMINATION:      VITAL SIGNS:  His blood pressure is 121/58 with a pulse of 52.  GENERAL:  He appears comfortable, well groomed.  PSYCHIATRIC:  He is alert and oriented x 3.  HEENT:  Eyes do not appear grossly erythematous or have exudate.  RESPIRATORY:  He is breathing comfortably without gross cough.    The remainder of the comprehensive physical exam was deferred secondary to the COVID-19 pandemic and secondary to video visit restrictions.    LABORATORY:  Labs 02/01/2023, LDL 71.  Potassium 4.3.  GFR normal.  TSH elevated.  T4 normal.  Hemoglobin 15.      IMPRESSION:      Zack is an 82-year-old gentleman with several active issues:    1.  Coronary artery disease.      He has known multivessel  coronary artery disease and normal LV systolic function.  He underwent 3-vessel coronary bypass surgery (LIMA to LAD, vein graft to PDA, vein graft to first diagonal) on 11/24/2017.      Since revascularization, he had recurrent chest tightness and shortness of breath that resolved quickly with rest.  However, after starting low-dose Imdur at that time his symptoms resolved.      In these winter months, the patient has had a recurrence of these exertional symptoms.  Specifically, when he shovels heavy snow or pushes himself to the extremes, but never during moderate or less degrees of activity or at rest.  Further noninvasive evaluation and treatment would be indicated.    2.  Hypertension, well controlled.  Blood pressures running 121/58.  Continue to follow.    3.  Hyperlipidemia.  Goal LDL less than 70.  Currently, at just above goal.  We will attempt to optimize.      PLAN:      1.  Increase Lipitor from 40 to 60 mg a day to achieve a goal LDL of less than 70.      2.  Increase Imdur from 30 to 60 mg a day to hopefully alleviate his exertional symptoms, which certainly are consistent with likely stable angina.    3.  Echo and pharmacologic nuclear stress test in order to rule out significant structural pathology or inducible ischemia as a cause for symptoms (the patient does not feel he can run faster or far enough to elicit a good physical degree of stress).    If the patient should display a moderate or greater burden of ischemia with continued symptoms despite medical therapy, or a sudden acceleration or worsening of symptoms, then we would consider proceeding with coronary angiography at that time.    4.  Virtual video visit followup in 3 months with labs, earlier if needed.    5.  The patient was warned to seek more immediate medical attention should his symptoms change, worsen or start beginning to occur at rest.    6.  Further updates to follow.    Once again, it was a pleasure participating in the care  of your patient, Mr. Zack Trivedi.  Please feel free to contact me at any time if any questions regarding his care in the future.    Torsten Reyes MD      Addendum 3/16/23:    Matilde nuc stress does not reveal evidence for gross inducible ischemia    Echo reveals normal LV systolic function without gross valvular pathology, unchanged from 20      Plan:    1.  Keep scheduled appt 23    2.  Notify patient to seek more immediate medical attention should his symptoms change, worsen or start beginning to occur at rest.      D: 2023   T: 2023   MT: ROMINA    Name:     ZACK TRIVEDI  MRN:      [id number]        Account:    [id number]   :      1940           Visit Date: 2023     Document: [id number]

## 2023-02-02 NOTE — PROGRESS NOTES
"Zack is a 82 year old who is being evaluated via a billable video visit.      How would you like to obtain your AVS? MyChart  If the video visit is dropped, the invitation should be resent by: Send to e-mail at: [email or web address]  Will anyone else be joining your video visit? No    Debbi Funes, Visit Facilitator/MA.     The patient has been notified of following:     \"This video visit will be conducted via a call between you and your physician/provider. We have found that certain health care needs can be provided without the need for an in-person physical exam.  This service lets us provide the care you need with a video conversation.  If a prescription is necessary we can send it directly to your pharmacy.  If lab work is needed we can place an order for that and you can then stop by our lab to have the test done at a later time.    Video visits are billed at different rates depending on your insurance coverage.  Please reach out to your insurance provider with any questions.    If during the course of the call the physician/provider feels a video visit is not appropriate, you will not be charged for this service.\"    Patient has given verbal consent for video visit? Yes    How would you like to obtain your AVS? Mail    Video-Visit Details    Type of service:  Video Visit    Video Start Time:816am    Video End Time:837am    Total visit time including video visit, chart review, charting, coordination of care =38min    Originating Location (pt. Location):patient home      Distant Location (provider location):   Off site home office    Platform used for Video Visit: Diana    See dictation #[id number]    [facility]#:[id number]  "

## 2023-02-02 NOTE — NURSING NOTE
Chief Complaint   Patient presents with     Follow Up     Annual follow up, no updates per pt. Pt states when he sitting, starts to feel nauseous, and has been happening within the last 2 months. Pt also states that sometimes he'll feel pressure in his chest, but only with exertion, but no pain today per pt.      Patient denies any changes since echeck-in regarding medication and allergies and states all information entered during echeck-in remains accurate.    Debbi uFnes, Visit Facilitator/MA.

## 2023-03-15 ENCOUNTER — ANCILLARY PROCEDURE (OUTPATIENT)
Dept: CARDIOLOGY | Facility: CLINIC | Age: 83
End: 2023-03-15
Attending: INTERNAL MEDICINE
Payer: MEDICARE

## 2023-03-15 ENCOUNTER — ANCILLARY PROCEDURE (OUTPATIENT)
Dept: NUCLEAR MEDICINE | Facility: CLINIC | Age: 83
End: 2023-03-15
Attending: INTERNAL MEDICINE
Payer: MEDICARE

## 2023-03-15 DIAGNOSIS — R07.9 CHEST PAIN, UNSPECIFIED TYPE: ICD-10-CM

## 2023-03-15 LAB
BI-PLANE LVEF ECHO: NORMAL
CV STRESS MAX HR HE: 82
LVEF ECHO: NORMAL
RATE PRESSURE PRODUCT: NORMAL
STRESS ECHO BASELINE DIASTOLIC HE: 67
STRESS ECHO BASELINE HR: 61 BPM
STRESS ECHO BASELINE SYSTOLIC BP: 162
STRESS ECHO CALCULATED PERCENT HR: 59 %
STRESS ECHO LAST STRESS DIASTOLIC BP: 63
STRESS ECHO LAST STRESS SYSTOLIC BP: 156
STRESS ECHO TARGET HR: 139

## 2023-03-15 PROCEDURE — 93018 CV STRESS TEST I&R ONLY: CPT | Performed by: INTERNAL MEDICINE

## 2023-03-15 PROCEDURE — G1010 CDSM STANSON: HCPCS | Performed by: INTERNAL MEDICINE

## 2023-03-15 PROCEDURE — 93017 CV STRESS TEST TRACING ONLY: CPT | Performed by: INTERNAL MEDICINE

## 2023-03-15 PROCEDURE — A9502 TC99M TETROFOSMIN: HCPCS | Performed by: INTERNAL MEDICINE

## 2023-03-15 PROCEDURE — 78452 HT MUSCLE IMAGE SPECT MULT: CPT | Mod: ME | Performed by: PHYSICAL MEDICINE & REHABILITATION

## 2023-03-15 PROCEDURE — 93306 TTE W/DOPPLER COMPLETE: CPT | Performed by: INTERNAL MEDICINE

## 2023-03-15 PROCEDURE — 93016 CV STRESS TEST SUPVJ ONLY: CPT | Performed by: INTERNAL MEDICINE

## 2023-03-15 RX ORDER — REGADENOSON 0.08 MG/ML
0.4 INJECTION, SOLUTION INTRAVENOUS ONCE
Status: COMPLETED | OUTPATIENT
Start: 2023-03-15 | End: 2023-03-15

## 2023-03-15 RX ADMIN — Medication 3 ML: at 11:56

## 2023-03-15 RX ADMIN — REGADENOSON 0.4 MG: 0.08 INJECTION, SOLUTION INTRAVENOUS at 12:59

## 2023-03-29 ENCOUNTER — TELEPHONE (OUTPATIENT)
Dept: FAMILY MEDICINE | Facility: CLINIC | Age: 83
End: 2023-03-29
Payer: MEDICARE

## 2023-03-29 NOTE — TELEPHONE ENCOUNTER
Reason for Call:  Appointment Request    Patient requesting this type of appt:  Preventive     Requested provider: Anastacio Love    Reason patient unable to be scheduled: Not within requested timeframe    When does patient want to be seen/preferred time: Anytime before provider leaves    Comments: Pt would like call back if pcp can see pt for annual wellness before provider leaves. No current openings. Please call pt back to discuss.    Could we send this information to you in [facility] or would you prefer to receive a phone call?:   Patient would prefer a phone call   Okay to leave a detailed message?: Yes at Cell number on file:    Telephone Information:   Mobile [phone]       Call taken on 3/29/2023 at 12:15 PM by Tran Joe

## 2023-03-29 NOTE — TELEPHONE ENCOUNTER
Patient Quality Outreach    Patient is due for the following:   Physical Annual Wellness Visit    Next Steps:   Schedule a Annual Wellness Visit    Type of outreach:    Sent [facility] message.      Questions for provider review:    None     Shelby Tolbert CMA

## 2023-04-23 ENCOUNTER — HEALTH MAINTENANCE LETTER (OUTPATIENT)
Age: 83
End: 2023-04-23

## 2023-05-01 ENCOUNTER — LAB (OUTPATIENT)
Dept: LAB | Facility: CLINIC | Age: 83
End: 2023-05-01
Payer: MEDICARE

## 2023-05-01 DIAGNOSIS — E78.5 HYPERLIPIDEMIA LDL GOAL <100: ICD-10-CM

## 2023-05-01 LAB
CHOLEST SERPL-MCNC: 122 MG/DL
FASTING STATUS PATIENT QL REPORTED: YES
HDLC SERPL-MCNC: 33 MG/DL
LDLC SERPL CALC-MCNC: 62 MG/DL
NONHDLC SERPL-MCNC: 89 MG/DL
TRIGL SERPL-MCNC: 134 MG/DL

## 2023-05-01 PROCEDURE — 36415 COLL VENOUS BLD VENIPUNCTURE: CPT

## 2023-05-01 PROCEDURE — 80061 LIPID PANEL: CPT

## 2023-05-02 ENCOUNTER — TELEPHONE (OUTPATIENT)
Dept: CARE COORDINATION | Facility: CLINIC | Age: 83
End: 2023-05-02

## 2023-05-02 ENCOUNTER — VIRTUAL VISIT (OUTPATIENT)
Dept: CARDIOLOGY | Facility: CLINIC | Age: 83
End: 2023-05-02
Payer: MEDICARE

## 2023-05-02 DIAGNOSIS — I10 HYPERTENSION GOAL BP (BLOOD PRESSURE) < 140/90: ICD-10-CM

## 2023-05-02 DIAGNOSIS — E78.5 HYPERLIPIDEMIA LDL GOAL <100: ICD-10-CM

## 2023-05-02 DIAGNOSIS — R07.9 CHEST PAIN, UNSPECIFIED TYPE: ICD-10-CM

## 2023-05-02 PROCEDURE — 99214 OFFICE O/P EST MOD 30 MIN: CPT | Mod: VID | Performed by: INTERNAL MEDICINE

## 2023-05-02 NOTE — NURSING NOTE
Is the patient currently in the state of [place]? YES    Visit mode:VIDEO    If the visit is dropped, the patient can be reconnected by: VIDEO VISIT: Text to cell phone: [phone]    Will anyone else be joining the visit? NO      How would you like to obtain your AVS? MyChart    Are changes needed to the allergy or medication list? NO    Reason for visit: Follow Up (Pt states average BP over last 3 days is 119/56)    Molly Mathias, TAB/CMA

## 2023-05-02 NOTE — TELEPHONE ENCOUNTER
Left Voicemail (1st Attempt) and Sent Mychart (1st Attempt) for the patient to call back and schedule the following:    Appointment type: Return Cardiology  Provider: Dr. Reyes   Return date: 6 months, 11/2/2023  Specialty phone number: [phone]  Additional appointment(s) needed: labs prior   Additonal Notes:     Check-out Note    Return in about 26 weeks (around 10/31/2023) for Follow up, with me, using a video visit.  Virtual video visit followup in 6mo with labs prior

## 2023-05-02 NOTE — PROGRESS NOTES
Service Date: 2023    Anastacio Love MD  [address] [place] [address]     RE:  Zack Demarco   MRN:  [id number]   :  1940       Dear Dr. Love:    It was a pleasure participating in the care of your patient, Zack Demarco .  As you know, he is a 82 year old year old person who I met over a virtual visit today to establish cardiac care.    Past medical history is significant for the followin.  Hypertension.  2.  Hyperlipidemia.  3.  Retroperitoneal mass with follicular lymphoma.  4.  Hypothyroidism.  5.  Borderline glaucoma.  6.  Right knee problems.  7.  Lipoma.  8.  Patellar tendinitis.  9.  Carpal tunnel syndrome, right wrist.     His cardiac history is significant for known multivessel coronary artery disease and normal LV systolic function.     Coronary angiography 2017 revealed the following:       Left main -- Normal.  LAD -- 70% to 80% ostial stenosis with 80% to 90% stenosis in midportion.  First diagonal  -- 89% to 90% stenosis.  Circumflex -- Mild diffuse disease.  RCA -- 70% stenosis in the midportion.     A 3-vessel coronary bypass surgery was performed (LIMA to LAD, vein graft to PDA, vein graft to D1).  He originally presented with centrally located chest tightness with shortness of breath that completely resolved after revascularization.     In the summer of , he had occasional chest tightness when deer hunting and we started low-dose Imdur along with an echo and a pharmacologic nuclear stress test.  The tests were unremarkable and after starting Imdur, his symptoms resolved.    I last saw him 2023 and at that time he was experiencing some chest discomfort with heavy exertion.  We had increased his Imdur and ordered an echo and a pharmacologic nuclear stress test.  Presents today for follow-up    Since our last visit his echo and pharmacologic nuclear stress test were unremarkable.  He tolerated the Imdur nicely.  He relates that his episodes of chest  fullness with exertion have essentially resolved.  He is able to rake his yard until his garden without any exertional limitation or symptoms.  With the warmer weather he has been more active outside and he feels good with his activities.    The patient otherwise denies gross chest pain, shortness of breath, PND, orthopnea, edema, palpitations, syncope or near syncope.    10 Point Review of Systems: Positive for some aching in his hands due to arthritis which prevents him from gripping firmly.    MEDICATIONS:    1.  Aspirin 162 mg a day.  2.  Lipitor 60 mg a day.  3.  Imdur 60 mg a day.  4.  Levothyroxine.  5.  Losartan 50 mg twice daily.  6.  Metoprolol tartrate 25 twice daily.    PHYSICAL EXAM:    Vitals his blood pressure is 115/56, pulse 52  General:  Patient appears comfortable, well groomed  Psych:  Patient is alert and oriented X 3  Eyes:  No gross erythema, exudate  Respiratory:  Patient is breathing comfortably without gross cough    The remainder of the comprehensive physical exam was deferred secondary to the COVID-19 pandemic and secondary to virtual visit restrictions.    LABS:    Echocardiogram 3/15/2023 reveals overall LV systolic function 60 to 65% no gross valvular pathology identified, unchanged from study 12/18/2020    Pharmacologic nuclear stress test 3/15/2023 reveals no evidence for stress-induced ischemia.    Lipids 5/1/2023 revealed total cholesterol 122 LDL 62      IMPRESSION:    Zack is an 82-year-old gentleman with several active issues:     1.  Coronary artery disease.       He has known multivessel coronary artery disease and normal LV systolic function.  He underwent 3-vessel coronary bypass surgery (LIMA to LAD, vein graft to PDA, vein graft to first diagonal) on 11/24/2017.       Since revascularization, he had recurrent chest tightness and shortness of breath that resolved quickly with rest.  However, after starting low-dose Imdur at that time his symptoms resolved.       He had  "another recurrence of exertional symptoms this winter, when he experienced some chest fullness while shoveling heavy snow.  However after increasing his Imdur to 60 mg a day, his symptoms have completely gone away.  His latest echo and pharmacologic nuclear stress test did not reveal any significant pathology.    Further monitoring of his clinical condition would be indicated     2.  Hypertension, well controlled.  Blood pressures running 115/56.  Continue to follow.     3.  Hyperlipidemia.  Goal LDL less than 70.    Currently at goal, continue to follow    PLAN:    1.  Continue present medications at present doses    2.  Virtual video visit follow-up 6 months with labs prior, earlier if needed    3.  He would like to try some over-the-counter remedies for his hand arthritis, and he will try \"Tiger balm\" to start with.    Once again, it was a pleasure participating in the care of your patient, Zack Demarco .  Please feel free to contact me at any time if there are any questions regarding his care in the future.      Sincerely,          Torsten Reyes M.D.  Cardiovascular Division  [facility]        The patient has been notified of following:     \"This video visit will be conducted via a call between you and your physician/provider. We have found that certain health care needs can be provided without the need for an in-person physical exam.  This service lets us provide the care you need with a video conversation.  If a prescription is necessary we can send it directly to your pharmacy.  If lab work is needed we can place an order for that and you can then stop by our lab to have the test done at a later time.    Video visits are billed at different rates depending on your insurance coverage.  Please reach out to your insurance provider with any questions.    If during the course of the call the physician/provider feels a video visit is not appropriate, you will not be charged for this " "service.\"    Patient has given verbal consent for video visit? Yes    How would you like to obtain your AVS? Mail              Video-Visit Details    Type of service:  Video Visit    Video Start Time:815am    Video End Time:827am    Total visit time including video visit, chart review, charting, coordination of care =33min    Originating Location (pt. Location):patient home      Distant Location (provider location):   Off site home office    Platform used for Video Visit: Diana SOOD#:[id number]  "

## 2023-05-06 ASSESSMENT — ENCOUNTER SYMPTOMS
PALPITATIONS: 0
CHILLS: 0
PARESTHESIAS: 0
HEADACHES: 0
DYSURIA: 0
WEAKNESS: 1
DIARRHEA: 0
HEMATOCHEZIA: 0
HEARTBURN: 0
ABDOMINAL PAIN: 0
DIZZINESS: 0
ARTHRALGIAS: 1
SORE THROAT: 0
FEVER: 0
NAUSEA: 0
COUGH: 0
JOINT SWELLING: 0
MYALGIAS: 0
HEMATURIA: 0
CONSTIPATION: 0
NERVOUS/ANXIOUS: 0
EYE PAIN: 0
SHORTNESS OF BREATH: 1

## 2023-05-06 ASSESSMENT — ACTIVITIES OF DAILY LIVING (ADL): CURRENT_FUNCTION: NO ASSISTANCE NEEDED

## 2023-05-11 ENCOUNTER — OFFICE VISIT (OUTPATIENT)
Dept: FAMILY MEDICINE | Facility: CLINIC | Age: 83
End: 2023-05-11
Payer: MEDICARE

## 2023-05-11 VITALS
BODY MASS INDEX: 32.93 KG/M2 | TEMPERATURE: 97.8 F | DIASTOLIC BLOOD PRESSURE: 67 MMHG | WEIGHT: 230 LBS | SYSTOLIC BLOOD PRESSURE: 135 MMHG | OXYGEN SATURATION: 97 % | HEIGHT: 70 IN | HEART RATE: 63 BPM | RESPIRATION RATE: 16 BRPM

## 2023-05-11 DIAGNOSIS — Z00.00 ENCOUNTER FOR MEDICARE ANNUAL WELLNESS EXAM: Primary | ICD-10-CM

## 2023-05-11 PROCEDURE — G0439 PPPS, SUBSEQ VISIT: HCPCS | Performed by: FAMILY MEDICINE

## 2023-05-11 ASSESSMENT — ENCOUNTER SYMPTOMS
EYE PAIN: 0
PALPITATIONS: 0
JOINT SWELLING: 0
ARTHRALGIAS: 1
CHILLS: 0
FEVER: 0
SORE THROAT: 0
CONSTIPATION: 0
WEAKNESS: 1
HEARTBURN: 0
PARESTHESIAS: 0
DIZZINESS: 0
HEMATOCHEZIA: 0
HEMATURIA: 0
SHORTNESS OF BREATH: 1
NAUSEA: 0
COUGH: 0
NERVOUS/ANXIOUS: 0
DYSURIA: 0
HEADACHES: 0
DIARRHEA: 0
MYALGIAS: 0
ABDOMINAL PAIN: 0

## 2023-05-11 ASSESSMENT — PAIN SCALES - GENERAL: PAINLEVEL: NO PAIN (0)

## 2023-05-11 ASSESSMENT — ACTIVITIES OF DAILY LIVING (ADL): CURRENT_FUNCTION: NO ASSISTANCE NEEDED

## 2023-05-11 NOTE — PATIENT INSTRUCTIONS
Patient Education   Personalized Prevention Plan  You are due for the preventive services outlined below.  Your care team is available to assist you in scheduling these services.  If you have already completed any of these items, please share that information with your care team to update in your medical record.  Health Maintenance Due   Topic Date Due     ANNUAL REVIEW OF HM ORDERS  Never done     Annual Wellness Visit  10/28/2022

## 2023-05-11 NOTE — PROGRESS NOTES
"SUBJECTIVE:   Zack is a 82 year old who presents for Preventive Visit.       View : No data to display.            Patient has been advised of split billing requirements and indicates understanding: Yes  Are you in the first 12 months of your Medicare coverage?  No    Healthy Habits:     In general, how would you rate your overall health?  Fair    Frequency of exercise:  None    Do you usually eat at least 4 servings of fruit and vegetables a day, include whole grains    & fiber and avoid regularly eating high fat or \"junk\" foods?  Yes    Taking medications regularly:  Yes    Ability to successfully perform activities of daily living:  No assistance needed    Home Safety:  No safety concerns identified    Hearing Impairment:  Difficulty following a conversation in a noisy restaurant or crowded room, difficulty following dialogue in the theater, difficult to understand a speaker at a public meeting or Denominational service, need to ask people to speak up or repeat themselves, difficulty understanding soft or whispered speech and difficulty understanding speech on the telephone    In the past 6 months, have you been bothered by leaking of urine?  No    In general, how would you rate your overall mental or emotional health?  Fair      PHQ-2 Total Score: 0    Additional concerns today:  Yes      Have you ever done Advance Care Planning? (For example, a Health Directive, POLST, or a discussion with a medical provider or your loved ones about your wishes): No, advance care planning information given to patient to review.  Patient declined advance care planning discussion at this time.    Fall risk  Fallen 2 or more times in the past year?: No  Any fall with injury in the past year?: No    Cognitive Screening   1) Repeat 3 items (Leader, Season, Table)    2) Clock draw:   3) 3 item recall: Recalls 3 objects  Results: 3 items recalled: COGNITIVE IMPAIRMENT LESS LIKELY    Mini-CogTM Copyright S Sandy. Licensed by the author for " use in [facility]; reprinted with permission ([email or web address]). All rights reserved.      Do you have sleep apnea, excessive snoring or daytime drowsiness?: no    Reviewed and updated as needed this visit by clinical staff                  Reviewed and updated as needed this visit by Provider                 Social History     Tobacco Use     Smoking status: Former     Packs/day: 1.00     Years: 40.00     Pack years: 40.00     Types: Cigarettes     Start date: 1959     Quit date: 1979     Years since quittin.8     Smokeless tobacco: Former   Vaping Use     Vaping status: Not on file   Substance Use Topics     Alcohol use: Yes     Comment: rarely             2023     7:24 AM   Alcohol Use   Prescreen: >3 drinks/day or >7 drinks/week? No          View : No data to display.              Do you have a current opioid prescription? No  Do you use any other controlled substances or medications that are not prescribed by a provider? None      CAD/HTN - he reports intermittent exertional shortness of breath and chest tightness. Previously he used to walk a mile or longer but now he can only go a 1-2 blocks before he has to stop. Has varicose veins. Checking BP at home a few times per week - around 115/60 average  Evaluation and treatment:    Was Hospitalized  to 17 - received CABG x 3.   Metoprolol 25 mg bid - dose limited by heart rate.   ASA qd   NTG prn but not using lately.   Losartan/HCTZ 50/12.5 qd stopped in the Hospital because it is not needed.   Losartan 100 mg daily (he is taking 1/2 bid) - no side effects.   Imdur 60 mg bid   Compression stockings.   He follows with cardiology - on 3/15/23 resting echo and stress nuclear tests were fine.   He continues to have exertional symptoms (small vessel disease?) - I offered him pulmonology referral - he wants to hold off [facility].   Otherwise continue same tx.    BP Readings from Last 6 Encounters:   23  135/67   11/17/22 (!) 144/63   05/16/22 132/63   10/28/21 124/60   10/26/21 (!) 181/64   08/27/21 128/51       Last Comprehensive Metabolic Panel:  Sodium   Date Value Ref Range Status   02/01/2023 140 133 - 144 mmol/L Final   04/16/2021 140 133 - 144 mmol/L Final     Potassium   Date Value Ref Range Status   02/01/2023 4.3 3.4 - 5.3 mmol/L Final   04/16/2021 4.6 3.4 - 5.3 mmol/L Final     Chloride   Date Value Ref Range Status   02/01/2023 107 94 - 109 mmol/L Final   04/16/2021 106 94 - 109 mmol/L Final     Carbon Dioxide   Date Value Ref Range Status   04/16/2021 27 20 - 32 mmol/L Final     Carbon Dioxide (CO2)   Date Value Ref Range Status   02/01/2023 28 20 - 32 mmol/L Final     Anion Gap   Date Value Ref Range Status   02/01/2023 5 3 - 14 mmol/L Final   04/16/2021 6 3 - 14 mmol/L Final     Glucose   Date Value Ref Range Status   02/01/2023 108 (H) 70 - 99 mg/dL Final   04/16/2021 96 70 - 99 mg/dL Final     Urea Nitrogen   Date Value Ref Range Status   02/01/2023 33 (H) 7 - 30 mg/dL Final   04/16/2021 24 7 - 30 mg/dL Final     Creatinine   Date Value Ref Range Status   02/01/2023 1.03 0.66 - 1.25 mg/dL Final   04/16/2021 0.98 0.66 - 1.25 mg/dL Final     GFR Estimate   Date Value Ref Range Status   02/01/2023 73 >60 mL/min/1.73m2 Final     Comment:     eGFR calculated using 2021 CKD-EPI equation.   04/16/2021 73 >60 mL/min/[1.73_m2] Final     Comment:     Non  GFR Calc  Starting 12/18/2018, serum creatinine based estimated GFR (eGFR) will be   calculated using the Chronic Kidney Disease Epidemiology Collaboration   (CKD-EPI) equation.       GFR, ESTIMATED POCT   Date Value Ref Range Status   11/17/2022 >60 >60 mL/min/1.73m2 Final     Calcium   Date Value Ref Range Status   02/01/2023 9.2 8.5 - 10.1 mg/dL Final   04/16/2021 9.4 8.5 - 10.1 mg/dL Final     Lymphoma - found incidentally during other evaluation. No symptoms.  Evaluation and treatment:    He follows with oncology.    Dyslipidemia - has  h/o CAD.  Evaluation and treatment:    Per ATP4, moderate to high intensity statin recommended.:   Crestor stopped due to cost   Simvastatin changed to Lipitor 40 mg qd in the Hospital - no side effects.   Continue same treatment.    Recent Labs   Lab Test 05/01/23  0717 02/01/23  0759   CHOL 122 136   HDL 33* 36*   LDL 62 71   TRIG 134 144     Lab Results   Component Value Date    ALT 14 11/17/2022    ALT 36 04/16/2021     Hypothyroidism - No thyroid symptoms.  Evaluation and treatment:    Synthroid 88 mcg every day.   Continue same tx.    TSH   Date Value Ref Range Status   02/01/2023 6.51 (H) 0.40 - 4.00 mU/L Final   10/15/2020 4.85 (H) 0.40 - 4.00 mU/L Final     T4 Free   Date Value Ref Range Status   10/15/2020 0.85 0.76 - 1.46 ng/dL Final     Free T4   Date Value Ref Range Status   02/01/2023 0.84 0.76 - 1.46 ng/dL Final     Obesity - some snoring but no known apnea.  Evaluation and treatment:    diet and exercise discussed.   I asked him to get under 200#.     Body mass index is 33 kg/m .    Wt Readings from Last 5 Encounters:   05/11/23 104.3 kg (230 lb)   11/17/22 103.3 kg (227 lb 12.8 oz)   05/16/22 105.2 kg (232 lb)   10/28/21 99.3 kg (219 lb)   10/26/21 101.4 kg (223 lb 8 oz)     BPH - stream is reduced. Nocturia x 1. Symptoms are not bad.  Evaluation and treatment:    I asked him to let me know if he wants treatment.    Right shoulder pain -   Evaluation and treatment:    He follows with ortho.   He received a steroid shot.    Hearing loss - he wants to hold off on hearing aides referral.     Umbilical hernia - noted on routine exam. He has no symptoms.  Evaluation and treatment:    He is advised to report any symptoms.   He is counseled on incarcerated hernia symptoms and to go to ED if those occur.    Surgical history -    left rotator cuff surgery.    Had anterior tibialis repair (for foot drop) on 8/18/15 - good results.    CABG as above.   Left eye cataract surgery 12/17/18.   Appendectomy June  2019.   Right knee replacement June 2020.    Preventive:     Immunization History   Administered Date(s) Administered     COVID-19 Bivalent 12+ (Pfizer) 09/08/2022     COVID-19 MONOVALENT 12+ (Pfizer) 01/27/2021, 02/17/2021, 08/28/2021     COVID-19 Monovalent 12+ (Pfizer 2022) 03/10/2022     FLUAD(HD)65+ QUAD 09/08/2022     Flu, Unspecified 09/08/2022     Influenza (H1N1) 01/06/2010     Influenza (High Dose) 3 valent vaccine 10/15/2015, 10/20/2016, 09/25/2017, 09/24/2018, 09/24/2021     Influenza (IIV3) PF 09/16/2009, 09/16/2010, 10/16/2011, 09/26/2012, 10/25/2012, 10/01/2013, 11/24/2014     Influenza Vaccine 65+ (Fluzone HD) 09/22/2020     Influenza Vaccine IM 18-49 Yrs, RIV3 09/30/2019     Pneumo Conj 13-V (2010&after) 08/14/2015, 01/22/2016     Pneumococcal 23 valent 01/01/2006, 11/30/2006, 10/22/2020     TD,PF 7+ (Tenivac) 08/25/2010     TDAP (Adacel,Boostrix) 05/01/2023     TDAP Vaccine (Adacel) 01/21/2013     TDAP Vaccine (Boostrix) 01/21/2013     Td (Adult), Adsorbed 07/19/2002     Zoster recombinant adjuvanted (SHINGRIX) 10/22/2020, 03/15/2021     Zoster vaccine, live 07/15/2008     Colonoscopy: 8/27/21 - no further colonoscopies due to age.     Prostate CA screen: discussed controversy.  He declines further PSA checks.      PSA   Date Value Ref Range Status   06/09/2016 2.76 0 - 4 ug/L Final     AAA: 7/18/14 negative    Advanced Directive: previously brought a copy     SH:    Marital status: , lives by himself in [place].  Kids: 2  Employment: Works at a machine shop.  Exercise: Walks a lot at work. Had been running 4 miles per day 5 times per week but not lately.  Tobacco: Quit smoking around 1980. Has 14 pack year history of smoking.  Etoh: rarely  Recreational drugs: denies  Caffeine: 2 per day    Current providers sharing in care for this patient include  Patient Care Team:  Anastacio Love MD as PCP - General (Family Practice)  Anastacio Love MD as MD (Family Practice)  Rolando Toro as MD  (Transplant)  Avtar Mccullough MD as Assigned Surgical Provider  [facility]igned [facility] and Vascular Provider  Anastacio Love MD as Assigned PCP  Yuri Man MD as MD (Hematology & Oncology)  Terese Beaver, RN as Specialty Care Coordinator (Hematology & Oncology)    The following health maintenance items are reviewed in Epic and correct as of today:  Health Maintenance   Topic Date Due     ANNUAL REVIEW OF HM ORDERS  Never done     MEDICARE ANNUAL WELLNESS VISIT  10/28/2022     EYE EXAM  01/02/2024     TSH W/FREE T4 REFLEX  02/01/2024     LIPID  05/01/2024     FALL RISK ASSESSMENT  05/11/2024     ADVANCE CARE PLANNING  10/28/2026     DTAP/TDAP/TD IMMUNIZATION (4 - Td or Tdap) 05/01/2033     PHQ-2 (once per calendar year)  Completed     INFLUENZA VACCINE  Completed     Pneumococcal Vaccine: 65+ Years  Completed     ZOSTER IMMUNIZATION  Completed     COVID-19 Vaccine  Completed     IPV IMMUNIZATION  Aged Out     MENINGITIS IMMUNIZATION  Aged Out     COLORECTAL CANCER SCREENING  Discontinued     Review of Systems   Constitutional: Negative for chills and fever.   HENT: Positive for hearing loss. Negative for congestion, ear pain and sore throat.    Eyes: Positive for visual disturbance. Negative for pain.   Respiratory: Positive for shortness of breath. Negative for cough.    Cardiovascular: Positive for chest pain. Negative for palpitations and peripheral edema.   Gastrointestinal: Negative for abdominal pain, constipation, diarrhea, heartburn, hematochezia and nausea.   Genitourinary: Negative for dysuria, genital sores, hematuria, impotence, penile discharge and urgency.   Musculoskeletal: Positive for arthralgias. Negative for joint swelling and myalgias.   Skin: Negative for rash.   Neurological: Positive for weakness. Negative for dizziness, headaches and paresthesias.   Psychiatric/Behavioral: Negative for mood changes. The patient is not nervous/anxious.        OBJECTIVE:   /67    "Pulse 63   Temp 97.8  F (36.6  C) (Tympanic)   Resp 16   Ht 1.778 m (5' 10\")   Wt 104.3 kg (230 lb)   SpO2 97%   BMI 33.00 kg/m      GENERAL: healthy, alert and no distress  EYES: Eyes grossly normal to inspection, PERRL and conjunctivae and sclerae normal  HENT: ear canals and TM's normal, nose and mouth without ulcers or lesions  NECK: no adenopathy, no asymmetry, masses, or scars and thyroid normal to palpation  RESP: lungs clear to auscultation - no rales, rhonchi or wheezes  CV: regular rate and rhythm, normal S1 S2, no S3 or S4, no murmur, click or rub, no peripheral edema and peripheral pulses strong  ABDOMEN: soft, nontender, no hepatosplenomegaly, no masses and bowel sounds normal  MS: no gross musculoskeletal defects noted, no edema  SKIN: no suspicious lesions or rashes  NEURO: Normal strength and tone, mentation intact and speech normal  PSYCH: mentation appears normal, affect normal/bright      ASSESSMENT / PLAN:         COUNSELING:  Reviewed preventive health counseling, as reflected in patient instructions       Regular exercise       Healthy diet/nutrition        He reports that he quit smoking about 43 years ago. His smoking use included cigarettes. He started smoking about 63 years ago. He has a 40.00 pack-year smoking history. He has quit using smokeless tobacco.      Appropriate preventive services were discussed with this patient, including applicable screening as appropriate for cardiovascular disease, diabetes, osteopenia/osteoporosis, and glaucoma.  As appropriate for age/gender, discussed screening for colorectal cancer, prostate cancer, breast cancer, and cervical cancer. Checklist reviewing preventive services available has been given to the patient.    Reviewed patients plan of care and provided an AVS. The Basic Care Plan (routine screening as documented in Health Maintenance) for Zack meets the Care Plan requirement. This Care Plan has been established and reviewed with the " Patient.        Identified Health Risks:    I have reviewed Opioid Use Disorder and Substance Use Disorder risk factors and made any needed referrals.

## 2023-05-18 ENCOUNTER — ONCOLOGY VISIT (OUTPATIENT)
Dept: ONCOLOGY | Facility: CLINIC | Age: 83
End: 2023-05-18
Attending: INTERNAL MEDICINE
Payer: MEDICARE

## 2023-05-18 ENCOUNTER — APPOINTMENT (OUTPATIENT)
Dept: LAB | Facility: CLINIC | Age: 83
End: 2023-05-18
Attending: INTERNAL MEDICINE
Payer: MEDICARE

## 2023-05-18 VITALS
TEMPERATURE: 98 F | SYSTOLIC BLOOD PRESSURE: 126 MMHG | DIASTOLIC BLOOD PRESSURE: 65 MMHG | WEIGHT: 232.2 LBS | BODY MASS INDEX: 33.32 KG/M2 | HEART RATE: 48 BPM | OXYGEN SATURATION: 96 % | RESPIRATION RATE: 18 BRPM

## 2023-05-18 DIAGNOSIS — C82.99 FOLLICULAR LYMPHOMA OF EXTRANODAL AND SOLID ORGAN SITES (H): ICD-10-CM

## 2023-05-18 LAB
ALBUMIN SERPL BCG-MCNC: 4 G/DL (ref 3.5–5.2)
ALP SERPL-CCNC: 63 U/L (ref 40–129)
ALT SERPL W P-5'-P-CCNC: 16 U/L (ref 10–50)
ANION GAP SERPL CALCULATED.3IONS-SCNC: 8 MMOL/L (ref 7–15)
AST SERPL W P-5'-P-CCNC: 19 U/L (ref 10–50)
BASOPHILS # BLD AUTO: 0 10E3/UL (ref 0–0.2)
BASOPHILS NFR BLD AUTO: 1 %
BILIRUB SERPL-MCNC: 0.6 MG/DL
BUN SERPL-MCNC: 28.9 MG/DL (ref 8–23)
CALCIUM SERPL-MCNC: 9.3 MG/DL (ref 8.8–10.2)
CHLORIDE SERPL-SCNC: 107 MMOL/L (ref 98–107)
CREAT SERPL-MCNC: 1.05 MG/DL (ref 0.67–1.17)
DEPRECATED HCO3 PLAS-SCNC: 24 MMOL/L (ref 22–29)
EOSINOPHIL # BLD AUTO: 0.3 10E3/UL (ref 0–0.7)
EOSINOPHIL NFR BLD AUTO: 4 %
ERYTHROCYTE [DISTWIDTH] IN BLOOD BY AUTOMATED COUNT: 12.9 % (ref 10–15)
GFR SERPL CREATININE-BSD FRML MDRD: 71 ML/MIN/1.73M2
GLUCOSE SERPL-MCNC: 98 MG/DL (ref 70–99)
HCT VFR BLD AUTO: 41.4 % (ref 40–53)
HGB BLD-MCNC: 13.7 G/DL (ref 13.3–17.7)
IMM GRANULOCYTES # BLD: 0 10E3/UL
IMM GRANULOCYTES NFR BLD: 0 %
LDH SERPL L TO P-CCNC: 185 U/L (ref 0–250)
LYMPHOCYTES # BLD AUTO: 1.7 10E3/UL (ref 0.8–5.3)
LYMPHOCYTES NFR BLD AUTO: 23 %
MCH RBC QN AUTO: 31 PG (ref 26.5–33)
MCHC RBC AUTO-ENTMCNC: 33.1 G/DL (ref 31.5–36.5)
MCV RBC AUTO: 94 FL (ref 78–100)
MONOCYTES # BLD AUTO: 0.7 10E3/UL (ref 0–1.3)
MONOCYTES NFR BLD AUTO: 9 %
NEUTROPHILS # BLD AUTO: 5 10E3/UL (ref 1.6–8.3)
NEUTROPHILS NFR BLD AUTO: 63 %
NRBC # BLD AUTO: 0 10E3/UL
NRBC BLD AUTO-RTO: 0 /100
PLATELET # BLD AUTO: 186 10E3/UL (ref 150–450)
POTASSIUM SERPL-SCNC: 4.8 MMOL/L (ref 3.4–5.3)
PROT SERPL-MCNC: 6.8 G/DL (ref 6.4–8.3)
RBC # BLD AUTO: 4.42 10E6/UL (ref 4.4–5.9)
SODIUM SERPL-SCNC: 139 MMOL/L (ref 136–145)
WBC # BLD AUTO: 7.7 10E3/UL (ref 4–11)

## 2023-05-18 PROCEDURE — 83615 LACTATE (LD) (LDH) ENZYME: CPT | Performed by: STUDENT IN AN ORGANIZED HEALTH CARE EDUCATION/TRAINING PROGRAM

## 2023-05-18 PROCEDURE — 85004 AUTOMATED DIFF WBC COUNT: CPT | Performed by: STUDENT IN AN ORGANIZED HEALTH CARE EDUCATION/TRAINING PROGRAM

## 2023-05-18 PROCEDURE — G0463 HOSPITAL OUTPT CLINIC VISIT: HCPCS | Performed by: STUDENT IN AN ORGANIZED HEALTH CARE EDUCATION/TRAINING PROGRAM

## 2023-05-18 PROCEDURE — 99213 OFFICE O/P EST LOW 20 MIN: CPT | Performed by: STUDENT IN AN ORGANIZED HEALTH CARE EDUCATION/TRAINING PROGRAM

## 2023-05-18 PROCEDURE — 36415 COLL VENOUS BLD VENIPUNCTURE: CPT | Performed by: STUDENT IN AN ORGANIZED HEALTH CARE EDUCATION/TRAINING PROGRAM

## 2023-05-18 PROCEDURE — 80053 COMPREHEN METABOLIC PANEL: CPT | Performed by: STUDENT IN AN ORGANIZED HEALTH CARE EDUCATION/TRAINING PROGRAM

## 2023-05-18 ASSESSMENT — PAIN SCALES - GENERAL: PAINLEVEL: NO PAIN (0)

## 2023-05-18 NOTE — NURSING NOTE
Chief Complaint   Patient presents with     Blood Draw     Labs drawn via  by RN in lab. VS taken.      Labs collected from venipuncture by RN. Vitals taken. Checked in for appointment(s).    Brittany Kapadia RN

## 2023-05-18 NOTE — PROGRESS NOTES
Cruzito Dixon is a 82 year old, presenting for the following health issues:  Blood Draw (Labs drawn via  by RN in lab. VS taken. 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 He was diagnosed incidental to an evaluation for cardiac bypass surgery in 11/2017. A chest CT scan on 11/14 showed an unexpected retroperitoneal mass with surrounding lymphadenopathy suspicious for malignancy.  A further CT scan done on the same day showed a 2.3 x 7.2 x 6.1 retroperitoneal soft tissue mass with adjacent lymphadenopathy that mildly narrowed the left renal vein. There also was nodularity within the mesentery and an enlarged hilar lymph node. CT-guided biopsy of the retroperitoneal mass on 12/12/2017 established the diagnosis, but the sample was too small for adequate grading. There was no disease identified outside of the abdomen; a bone marrow biopsy was not obtained as part of staging.      Relative to cardiac issues, he underwent an uncomplicated 3-vessel coronary artery bypass graft on 11/22/2017 and, subsequently, has been symptom-free. He follows       With the renal and gonadal vein compression it was decided to start treatment with rituximab, alone. Mr. Demarco completed 4 weekly doses from 01/26 - 02/16/2018. He had no difficulty with the treatment and was able to receive rapid infusion (90 minutes) for the last 3 doses. Interval evaluation on 03/05/2018 with an US, suggested improvement. A CT scan on 04/09/2018 showed substantial regression. Repeated CT on 07/09/18 showed a good response but residual lymphoma around the renal veins. Decided to proceed weekly rituximab x4 (7/26-8/22/2018).  He started rituximab maintenance in 10/2018.     He completed maintenance rituximab in October 2020. Ct imaging in Sept 2020  showed stable findings.       Patient comes today for follow up. No fevers, chills, night sweats or weight loss, no lymphadenopathy. No pain.      Review of Systems   8 point review of systems was negative except pertinent positives listed above.        Objective    /65 (BP Location: Right arm, Patient Position: Sitting, Cuff Size: Adult Large)   Pulse (!) 48   Temp 98  F (36.7  C) (Oral)   Resp 18   Wt 105.3 kg (232 lb 3.2 oz)   SpO2 96%   BMI 33.32 kg/m    Body mass index is 33.32 kg/m .  Physical Exam   GENERAL:  Alert oriented well nourished  HEAD: normocephalic atraumatic  SKIN:  no rash, hives, other lesions.  EYE: anicteric Sclerae  ENT: no throat erythema, enlarged tonsills, no ulcers or mucositis in the mouth  LYMPHATIC: no abnormal lymph nodes palable in cervical, axillary, supraclavicular or inguinal area.  RESP:  No rales or rhonchi, breath sounds bilaterally equal and vesicular  CV:  No tachycardia, S1 S2 normal No murmur.  GI:  Abdomen soft nontender no hepato or splenomegaly  MUSCULOSKELETAL:  No visible joint redness or swelling.  NEURO:  No gross weakness gait normal  PSYCH: pleasant affect    Labs: Labs reviewed. No cytopenias, renal or liver abnormalities, LDH normal.    Assessment and Plan:    1) Follicular lymphoma in remission for 4 years after induction therapy:  - He has no clinical or radiographic evidence of lymphoma recurrence or progression.  - We will continue surveillance with every 6 months history, physical exam and labs. CT scan annually.  -Return to clinic in 6 months with labs and scan.    2) Age appropriate immunizations:  - Vaccinated agains COVID, influenza-2022  - Shingrix in 2021  -  Pneumococcal 2020.    3) stable pulmonary nodules:  - Continue to follow on CT scan in 6 months.    Total time spent on date of service in review of medical records, review of labs, history taking, physical exam, discussion of assessment and plan, counseling and patient education is 20  minutes.      Yuri Man MD  Attending Physician  Pager [phone]

## 2023-05-18 NOTE — NURSING NOTE
"Oncology Rooming Note    May 18, 2023 11:24 AM   Zack Demarco is a 82 year old male who presents for:    Chief Complaint   Patient presents with     Blood Draw     Labs drawn via  by RN in lab. VS taken.      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    Weight as of this encounter: 105.3 kg (232 lb 3.2 oz). Body surface area is 2.28 meters squared.  No Pain (0) Comment: Data Unavailable   No LMP for male patient.  Allergies reviewed: Yes  Medications reviewed: Yes    Medications: Medication refills not needed today.  Pharmacy name entered into [employer]:    [id number] - [place], [place] - [address] [facility] - [place], [place] - [address]    Clinical concerns: No new clinical concerns other than reason for visit today.     Bella Arnold, EMT    "

## 2023-07-10 ENCOUNTER — OFFICE VISIT (OUTPATIENT)
Dept: OPHTHALMOLOGY | Facility: CLINIC | Age: 83
End: 2023-07-10
Payer: MEDICARE

## 2023-07-10 DIAGNOSIS — H40.053 BORDERLINE GLAUCOMA WITH OCULAR HYPERTENSION, BILATERAL: Primary | ICD-10-CM

## 2023-07-10 DIAGNOSIS — H35.363 MACULAR DRUSEN, BILATERAL: ICD-10-CM

## 2023-07-10 PROCEDURE — 92083 EXTENDED VISUAL FIELD XM: CPT | Performed by: OPHTHALMOLOGY

## 2023-07-10 PROCEDURE — 92134 CPTRZ OPH DX IMG PST SGM RTA: CPT | Performed by: OPHTHALMOLOGY

## 2023-07-10 PROCEDURE — 92012 INTRM OPH EXAM EST PATIENT: CPT | Performed by: OPHTHALMOLOGY

## 2023-07-10 ASSESSMENT — VISUAL ACUITY
OS_CC: 20/40
METHOD: SNELLEN - LINEAR
OD_CC: 20/30
OD_CC+: -1
OS_CC+: -2

## 2023-07-10 ASSESSMENT — TONOMETRY
OD_IOP_MMHG: 17
IOP_METHOD: APPLANATION
OS_IOP_MMHG: 17

## 2023-07-10 NOTE — Clinical Note
7/10/2023         RE: Zack Demarco  [address] [facility] [address]        Dear Colleague,    Thank you for referring your patient, Zack Demarco, to the [facility]. Please see a copy of my visit note below.    No notes on file    Again, thank you for allowing me to participate in the care of your patient.        Sincerely,        Avtar Mccullough MD

## 2023-07-10 NOTE — PROGRESS NOTES
Current Eye Medications:  Latanoprost (green top) every evening both eyes, last took at 10:15 pm.  Timolol (yellow top) every morning both eyes, last took at 6:14 am.   Eye vitamins- 2 daily.     Subjective:   6 month follow up for Glaucoma Testing. [facility]. Vision is doing OK right eye. Vision seems worse left eye. Patient started noticing half of a [place] on computer screen for last 4-5 weeks, unsure what eye. No eye pain or discomfort in either eye.      Objective:  See Ophthalmology Exam.       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      Plan:  Continue:   Latanoprost (green top) every evening both eyes.  Timolol (yellow top) every morning both eyes.  Wait at least 5 minutes between drops if using more than one at a time.   Continue with eye vitamins twice daily.  Return visit in 6 months for a complete exam.   Avtar Mccullough M.D.  [phone]

## 2023-07-10 NOTE — PATIENT INSTRUCTIONS
Continue:   Latanoprost (green top) every evening both eyes.  Timolol (yellow top) every morning both eyes.  Wait at least 5 minutes between drops if using more than one at a time.   Continue with eye vitamins twice daily.  Return visit in 6 months for a complete exam.   Avtar Mccullough M.D.  [phone]

## 2023-08-24 ASSESSMENT — PACHYMETRY
OS_CT(UM): 548
OD_CT(UM): 552

## 2023-09-18 ENCOUNTER — TRANSFERRED RECORDS (OUTPATIENT)
Dept: HEALTH INFORMATION MANAGEMENT | Facility: CLINIC | Age: 83
End: 2023-09-18
Payer: MEDICARE

## 2023-10-10 ENCOUNTER — TELEPHONE (OUTPATIENT)
Dept: FAMILY MEDICINE | Facility: CLINIC | Age: 83
End: 2023-10-10
Payer: MEDICARE

## 2023-10-10 NOTE — TELEPHONE ENCOUNTER
Patient Quality Outreach    Patient is due for the following:       Topic Date Due    Flu Vaccine (1) 09/01/2023    COVID-19 Vaccine (6 - 2023-24 season) 09/01/2023       Next Steps:   Schedule a nurse only visit for immunizations.    Type of outreach:    Sent [employer] message.      Questions for provider review:    None           DIEGO WHITE MA

## 2023-10-12 ENCOUNTER — TRANSFERRED RECORDS (OUTPATIENT)
Dept: HEALTH INFORMATION MANAGEMENT | Facility: CLINIC | Age: 83
End: 2023-10-12
Payer: MEDICARE

## 2023-11-16 ENCOUNTER — ONCOLOGY VISIT (OUTPATIENT)
Dept: ONCOLOGY | Facility: CLINIC | Age: 83
End: 2023-11-16
Attending: STUDENT IN AN ORGANIZED HEALTH CARE EDUCATION/TRAINING PROGRAM
Payer: MEDICARE

## 2023-11-16 ENCOUNTER — ANCILLARY PROCEDURE (OUTPATIENT)
Dept: CT IMAGING | Facility: CLINIC | Age: 83
End: 2023-11-16
Attending: STUDENT IN AN ORGANIZED HEALTH CARE EDUCATION/TRAINING PROGRAM
Payer: MEDICARE

## 2023-11-16 ENCOUNTER — LAB (OUTPATIENT)
Dept: LAB | Facility: CLINIC | Age: 83
End: 2023-11-16
Payer: MEDICARE

## 2023-11-16 VITALS
TEMPERATURE: 97.9 F | BODY MASS INDEX: 33 KG/M2 | OXYGEN SATURATION: 97 % | WEIGHT: 230 LBS | HEART RATE: 59 BPM | SYSTOLIC BLOOD PRESSURE: 161 MMHG | DIASTOLIC BLOOD PRESSURE: 74 MMHG

## 2023-11-16 DIAGNOSIS — C82.99 FOLLICULAR LYMPHOMA OF EXTRANODAL AND SOLID ORGAN SITES (H): ICD-10-CM

## 2023-11-16 DIAGNOSIS — I10 HYPERTENSION GOAL BP (BLOOD PRESSURE) < 140/90: ICD-10-CM

## 2023-11-16 DIAGNOSIS — E78.5 HYPERLIPIDEMIA LDL GOAL <100: ICD-10-CM

## 2023-11-16 DIAGNOSIS — C82.90 FOLLICULAR LYMPHOMA, UNSPECIFIED FOLLICULAR LYMPHOMA TYPE, UNSPECIFIED BODY REGION (H): Primary | ICD-10-CM

## 2023-11-16 LAB
ALBUMIN SERPL BCG-MCNC: 4.1 G/DL (ref 3.5–5.2)
ALP SERPL-CCNC: 65 U/L (ref 40–150)
ALT SERPL W P-5'-P-CCNC: 13 U/L (ref 0–70)
ANION GAP SERPL CALCULATED.3IONS-SCNC: 7 MMOL/L (ref 7–15)
AST SERPL W P-5'-P-CCNC: 19 U/L (ref 0–45)
BASOPHILS # BLD AUTO: 0 10E3/UL (ref 0–0.2)
BASOPHILS NFR BLD AUTO: 0 %
BILIRUB SERPL-MCNC: 0.5 MG/DL
BUN SERPL-MCNC: 22.3 MG/DL (ref 8–23)
CALCIUM SERPL-MCNC: 9.1 MG/DL (ref 8.8–10.2)
CHLORIDE SERPL-SCNC: 108 MMOL/L (ref 98–107)
CHOLEST SERPL-MCNC: 111 MG/DL
CREAT BLD-MCNC: 0.9 MG/DL (ref 0.7–1.3)
CREAT SERPL-MCNC: 0.92 MG/DL (ref 0.67–1.17)
DEPRECATED HCO3 PLAS-SCNC: 27 MMOL/L (ref 22–29)
EGFRCR SERPLBLD CKD-EPI 2021: 83 ML/MIN/1.73M2
EGFRCR SERPLBLD CKD-EPI 2021: >60 ML/MIN/1.73M2
EOSINOPHIL # BLD AUTO: 0.3 10E3/UL (ref 0–0.7)
EOSINOPHIL NFR BLD AUTO: 4 %
ERYTHROCYTE [DISTWIDTH] IN BLOOD BY AUTOMATED COUNT: 13 % (ref 10–15)
GLUCOSE SERPL-MCNC: 101 MG/DL (ref 70–99)
HCT VFR BLD AUTO: 42.2 % (ref 40–53)
HDLC SERPL-MCNC: 32 MG/DL
HGB BLD-MCNC: 13.7 G/DL (ref 13.3–17.7)
IMM GRANULOCYTES # BLD: 0 10E3/UL
IMM GRANULOCYTES NFR BLD: 0 %
LDH SERPL L TO P-CCNC: 179 U/L (ref 0–250)
LDLC SERPL CALC-MCNC: 46 MG/DL
LYMPHOCYTES # BLD AUTO: 1.5 10E3/UL (ref 0.8–5.3)
LYMPHOCYTES NFR BLD AUTO: 23 %
MCH RBC QN AUTO: 31.4 PG (ref 26.5–33)
MCHC RBC AUTO-ENTMCNC: 32.5 G/DL (ref 31.5–36.5)
MCV RBC AUTO: 97 FL (ref 78–100)
MONOCYTES # BLD AUTO: 0.5 10E3/UL (ref 0–1.3)
MONOCYTES NFR BLD AUTO: 8 %
NEUTROPHILS # BLD AUTO: 4.4 10E3/UL (ref 1.6–8.3)
NEUTROPHILS NFR BLD AUTO: 65 %
NONHDLC SERPL-MCNC: 79 MG/DL
NRBC # BLD AUTO: 0 10E3/UL
NRBC BLD AUTO-RTO: 0 /100
PLATELET # BLD AUTO: 197 10E3/UL (ref 150–450)
POTASSIUM SERPL-SCNC: 4.7 MMOL/L (ref 3.4–5.3)
PROT SERPL-MCNC: 6.8 G/DL (ref 6.4–8.3)
RBC # BLD AUTO: 4.37 10E6/UL (ref 4.4–5.9)
SODIUM SERPL-SCNC: 142 MMOL/L (ref 135–145)
TRIGL SERPL-MCNC: 163 MG/DL
WBC # BLD AUTO: 6.7 10E3/UL (ref 4–11)

## 2023-11-16 PROCEDURE — 71260 CT THORAX DX C+: CPT | Mod: MG | Performed by: RADIOLOGY

## 2023-11-16 PROCEDURE — 74177 CT ABD & PELVIS W/CONTRAST: CPT | Mod: MG | Performed by: RADIOLOGY

## 2023-11-16 PROCEDURE — 83615 LACTATE (LD) (LDH) ENZYME: CPT

## 2023-11-16 PROCEDURE — G1010 CDSM STANSON: HCPCS | Mod: GC | Performed by: RADIOLOGY

## 2023-11-16 PROCEDURE — 80061 LIPID PANEL: CPT | Performed by: PATHOLOGY

## 2023-11-16 PROCEDURE — 36415 COLL VENOUS BLD VENIPUNCTURE: CPT

## 2023-11-16 PROCEDURE — 80053 COMPREHEN METABOLIC PANEL: CPT | Performed by: PATHOLOGY

## 2023-11-16 PROCEDURE — 99214 OFFICE O/P EST MOD 30 MIN: CPT | Performed by: STUDENT IN AN ORGANIZED HEALTH CARE EDUCATION/TRAINING PROGRAM

## 2023-11-16 PROCEDURE — G0463 HOSPITAL OUTPT CLINIC VISIT: HCPCS | Performed by: STUDENT IN AN ORGANIZED HEALTH CARE EDUCATION/TRAINING PROGRAM

## 2023-11-16 PROCEDURE — 80053 COMPREHEN METABOLIC PANEL: CPT

## 2023-11-16 PROCEDURE — 85025 COMPLETE CBC W/AUTO DIFF WBC: CPT

## 2023-11-16 RX ORDER — IOPAMIDOL 755 MG/ML
114 INJECTION, SOLUTION INTRAVASCULAR ONCE
Status: COMPLETED | OUTPATIENT
Start: 2023-11-16 | End: 2023-11-16

## 2023-11-16 RX ADMIN — IOPAMIDOL 114 ML: 755 INJECTION, SOLUTION INTRAVASCULAR at 11:02

## 2023-11-16 ASSESSMENT — PAIN SCALES - GENERAL: PAINLEVEL: NO PAIN (0)

## 2023-11-16 NOTE — LETTER
11/16/2023         RE: Zack Demarco  [address] [facility] [address]        Dear Colleague,    Thank you for referring your patient, Zack Demarco, to the [facility]. Please see a copy of my visit note below.      Cruzito Dixon is a 83 year old, presenting for the following health issues:  Follicular lymphoma     HPI     Oncology History Overview Note   Zack Demarco is a 79 year old male first seen in consultation on 12/27/2017 for a new diagnosis of follicular lymphoma. He was diagnosed incidental to an evaluation for cardiac bypass surgery in 11/2017. A chest CT scan on 11/14 showed an unexpected retroperitoneal mass with surrounding lymphadenopathy suspicious for malignancy.  A further CT scan done on the same day showed a 2.3 x 7.2 x 6.1 retroperitoneal soft tissue mass with adjacent lymphadenopathy that mildly narrowed the left renal vein. There also was nodularity within the mesentery and an enlarged hilar lymph node. CT-guided biopsy of the retroperitoneal mass on 12/12/2017 established the diagnosis, but the sample was too small for adequate grading. There was no disease identified outside of the abdomen; a bone marrow biopsy was not obtained as part of staging.      Relative to cardiac issues, he underwent an uncomplicated 3-vessel coronary artery bypass graft on 11/22/2017 and, subsequently, has been symptom-free. He follows       With the renal and gonadal vein compression it was decided to start treatment with rituximab, alone. Mr. Demarco completed 4 weekly doses from 01/26 - 02/16/2018. He had no difficulty with the treatment and was able to receive rapid infusion (90 minutes) for the last 3 doses. Interval evaluation on 03/05/2018 with an US, suggested improvement. A CT scan on 04/09/2018 showed substantial regression. Repeated CT on 07/09/18 showed a good response but residual lymphoma around the renal veins. Decided to proceed weekly rituximab x4  (7/26-8/22/2018).  He started rituximab maintenance in 10/2018.     He completed maintenance rituximab in October 2020. Ct imaging in Sept 2020 showed stable findings.       Patient comes today for follow up. No fevers, chills, night sweats or weight loss, no lymphadenopathy. No pain.        Review of Systems         Objective   There were no vitals taken for this visit.  There is no height or weight on file to calculate BMI.  Physical Exam   GENERAL:  Alert oriented well nourished  HEAD: normocephalic atraumatic  SKIN:  no rash, hives, other lesions.  LYMPHATIC: no abnormal lymph nodes palable in cervical, axillary, supraclavicular or inguinal area.  RESP:  No rales or rhonchi, breath sounds bilaterally equal and vesicular  CV:  No tachycardia, S1 S2 normal No murmur.  GI:  Abdomen soft nontender no hepato or splenomegaly  MUSCULOSKELETAL:  No visible joint redness or swelling.  NEURO:  No gross weakness gait normal  PSYCH: pleasant affect    Labs: I personally reviewed complete blood count, differential, renal function test, liver function tests LDH.  No cytopenias, LFTs within normal limits, renal function test within normal limits and LDH is normal as well.    Imaging: I personally reviewed CT chest/abdomen/pelvis and discussed with the patient., no concern for progression/recurrence of lymphoma    Assessment and Plan:    1) Follicular lymphoma:  - He is now 5 years out without any progression of his disease.  - I will see him annually now, if he does not have any significant lymphadenopathy, we will defer further CT scans.    Total time spent on date of service in review of medical records, review of labs, history taking, physical exam, discussion of assessment and plan, counseling and patient education is 30 minutes.    Yuri Man MD  Attending Physician  Pager [phone]

## 2023-11-16 NOTE — DISCHARGE INSTRUCTIONS

## 2023-11-16 NOTE — PROGRESS NOTES
Cruzito Dixon is a 83 year old, presenting for the following health issues:  Follicular lymphoma     HPI     Oncology History Overview Note   Zack Demarco is a 79 year old male first seen in consultation on 12/27/2017 for a new diagnosis of follicular lymphoma. He was diagnosed incidental to an evaluation for cardiac bypass surgery in 11/2017. A chest CT scan on 11/14 showed an unexpected retroperitoneal mass with surrounding lymphadenopathy suspicious for malignancy.  A further CT scan done on the same day showed a 2.3 x 7.2 x 6.1 retroperitoneal soft tissue mass with adjacent lymphadenopathy that mildly narrowed the left renal vein. There also was nodularity within the mesentery and an enlarged hilar lymph node. CT-guided biopsy of the retroperitoneal mass on 12/12/2017 established the diagnosis, but the sample was too small for adequate grading. There was no disease identified outside of the abdomen; a bone marrow biopsy was not obtained as part of staging.      Relative to cardiac issues, he underwent an uncomplicated 3-vessel coronary artery bypass graft on 11/22/2017 and, subsequently, has been symptom-free. He follows       With the renal and gonadal vein compression it was decided to start treatment with rituximab, alone. Mr. Demarco completed 4 weekly doses from 01/26 - 02/16/2018. He had no difficulty with the treatment and was able to receive rapid infusion (90 minutes) for the last 3 doses. Interval evaluation on 03/05/2018 with an US, suggested improvement. A CT scan on 04/09/2018 showed substantial regression. Repeated CT on 07/09/18 showed a good response but residual lymphoma around the renal veins. Decided to proceed weekly rituximab x4 (7/26-8/22/2018).  He started rituximab maintenance in 10/2018.     He completed maintenance rituximab in October 2020. Ct imaging in Sept 2020 showed stable findings.       Patient comes today for follow up. No fevers, chills, night sweats or weight loss, no  lymphadenopathy. No pain.        Review of Systems         Objective    There were no vitals taken for this visit.  There is no height or weight on file to calculate BMI.  Physical Exam   GENERAL:  Alert oriented well nourished  HEAD: normocephalic atraumatic  SKIN:  no rash, hives, other lesions.  LYMPHATIC: no abnormal lymph nodes palable in cervical, axillary, supraclavicular or inguinal area.  RESP:  No rales or rhonchi, breath sounds bilaterally equal and vesicular  CV:  No tachycardia, S1 S2 normal No murmur.  GI:  Abdomen soft nontender no hepato or splenomegaly  MUSCULOSKELETAL:  No visible joint redness or swelling.  NEURO:  No gross weakness gait normal  PSYCH: pleasant affect    Labs: I personally reviewed complete blood count, differential, renal function test, liver function tests LDH.  No cytopenias, LFTs within normal limits, renal function test within normal limits and LDH is normal as well.    Imaging: I personally reviewed CT chest/abdomen/pelvis and discussed with the patient., no concern for progression/recurrence of lymphoma    Assessment and Plan:    1) Follicular lymphoma:  - He is now 5 years out without any progression of his disease.  - I will see him annually now, if he does not have any significant lymphadenopathy, we will defer further CT scans.    Total time spent on date of service in review of medical records, review of labs, history taking, physical exam, discussion of assessment and plan, counseling and patient education is 30 minutes.    Yuri Man MD  Attending Physician  Pager [phone]

## 2023-11-22 NOTE — PROGRESS NOTES
[facility] Cardiology   Cardiology Clinic Note      HPI:   Mr. Zack Demarco is a pleasant 83 year old male with medical history pertinent for CAD s/p CABGx3 in 2017 (LIMA to LAD, vein graft to PDA, vein graft to D1), HTN, HLD, follicular lymphoma with retroperitoneal mass, and hypothyroidism. He presents to cardiology clinic for follow up and establishing care with new provider.    He originally presented with centrally located chest tightness with shortness of breath that completely resolved after revascularization. In 2020, he had occasional chest tightness when deer hunting and was started on low-dose Imdur along with an echo and a pharmacologic nuclear stress test.  The tests were unremarkable and after starting Imdur, his symptoms resolved.     In February of this year, he was experiencing some chest discomfort with heavy exertion so his Imdur was increased and a pharmacologic nuclear stress test was ordered which was unremarkable for any indications of myocardial ischemia. Since patient's last visit, he reports he continues to feel like he has less energy and he also continues to have the same chest tightness when he does yard work. He reports he is currently taken 1 tablet of Imdur daily and is unsure if they are 30 mg tablets or 60 mg tablets.     Patient reports he used to exercise at the gym 4-5 times per week, but since COVID, he has not returned to the gym more than a few times per year. He reports he still has his gym membership and hopes to resume regular exercise soon.     Today in clinic, he denies palpitations, dizziness, syncope, or lower extremity edema.       PAST MEDICAL HISTORY:  Past Medical History:   Diagnosis Date    Coronary artery disease 11/22/2017    DJD (degenerative joint disease) of hip     right    Glaucoma     Hernia umbilical     Hypercholesterolemia     Hypertension     Hypothyroidism     Lipoma     Low back pain     Lymphoma, unspecified body region, unspecified lymphoma type (H)  12/15/2017    Nonsenile cataract     Obesity     Unstable angina (H) 2017       FAMILY HISTORY:  Family History   Problem Relation Age of Onset    Cancer Mother         pancreatic    Cancer Father         lung    Glaucoma Brother     Respiratory Brother         COPD    Eye Disorder Brother     Macular Degeneration Brother 60    Cancer Sister         liver    Glaucoma Sister     Cancer Sister     Musculoskeletal Disorder Sister         back    Glaucoma Sister     Macular Degeneration Sister 60       SOCIAL HISTORY:  Social History     Socioeconomic History    Marital status:    Tobacco Use    Smoking status: Former     Packs/day: 1.00     Years: 40.00     Additional pack years: 0.00     Total pack years: 40.00     Types: Cigarettes     Start date: 1959     Quit date: 1979     Years since quittin.3    Smokeless tobacco: Former   Substance and Sexual Activity    Alcohol use: Yes     Comment: rarely    Drug use: No    Sexual activity: Not Currently     Partners: Female   Other Topics Concern    Parent/sibling w/ CABG, MI or angioplasty before 65F 55M? No       CURRENT MEDICATIONS:  aspirin 325 MG EC tablet, 1/2 tab daily  atorvastatin (LIPITOR) 40 MG tablet, Take 1.5 tablets (60 mg) by mouth daily  Cholecalciferol (VITAMIN D3 PO), Take by mouth daily  isosorbide mononitrate (IMDUR) 30 MG 24 hr tablet, Take 2 tablets (60 mg) by mouth daily  latanoprost (XALATAN) 0.005 % ophthalmic solution, Place 1 drop into both eyes At Bedtime  levothyroxine (SYNTHROID/LEVOTHROID) 88 MCG tablet, 0.088 mg  losartan (COZAAR) 100 MG tablet, Take 0.5 tablets (50 mg) by mouth 2 times daily New dose  metoprolol tartrate (LOPRESSOR) 25 MG tablet, Take 1 tablet (25 mg) by mouth 2 times daily  timolol maleate (TIMOPTIC) 0.5 % ophthalmic solution, Place 1 drop into both eyes every morning    No current facility-administered medications on file prior to visit.      ROS:   Refer to HPI    EXAM:  BP (!) 160/67 (BP  Location: Right arm, Patient Position: Sitting, Cuff Size: Adult Regular)   Pulse (!) 47   Wt 105 kg (231 lb 8 oz)   SpO2 97%   BMI 33.22 kg/m    GENERAL: Appears comfortable, in no acute distress.   HEENT: Eye symmetrical, no discharge or icterus bilaterally. Mucous membranes moist and without lesions.  CV: RRR, +S1S2, no murmur, rub, or gallop.  RESPIRATORY: Respirations regular, even, and unlabored. Lungs CTA throughout.   EXTREMITIES: no peripheral edema. 2+ bilateral pedal pulses.   NEUROLOGIC: Alert and oriented x 3. No focal deficits.   MUSCULOSKELETAL: No joint swelling or tenderness.   SKIN: No jaundice. No rashes or lesions.     Labs, reviewed with patient in clinic today:  CBC RESULTS:  Lab Results   Component Value Date    WBC 6.7 11/16/2023    WBC 7.0 04/16/2021    RBC 4.37 (L) 11/16/2023    RBC 4.49 04/16/2021    HGB 13.7 11/16/2023    HGB 14.1 04/16/2021    HCT 42.2 11/16/2023    HCT 44.0 04/16/2021    MCV 97 11/16/2023    MCV 98 04/16/2021    MCH 31.4 11/16/2023    MCH 31.4 04/16/2021    MCHC 32.5 11/16/2023    MCHC 32.0 04/16/2021    RDW 13.0 11/16/2023    RDW 12.7 04/16/2021     11/16/2023     04/16/2021       CMP RESULTS:  Lab Results   Component Value Date     11/16/2023     04/16/2021    POTASSIUM 4.7 11/16/2023    POTASSIUM 4.3 02/01/2023    POTASSIUM 4.6 04/16/2021    CHLORIDE 108 (H) 11/16/2023    CHLORIDE 107 02/01/2023    CHLORIDE 106 04/16/2021    CO2 27 11/16/2023    CO2 28 02/01/2023    CO2 27 04/16/2021    ANIONGAP 7 11/16/2023    ANIONGAP 5 02/01/2023    ANIONGAP 6 04/16/2021     (H) 11/16/2023     (H) 02/01/2023    GLC 96 04/16/2021    BUN 22.3 11/16/2023    BUN 33 (H) 02/01/2023    BUN 24 04/16/2021    CR 0.9 11/16/2023    CR 0.92 11/16/2023    CR 0.98 04/16/2021    GFRESTIMATED >60 11/16/2023    GFRESTIMATED 83 11/16/2023    GFRESTIMATED 73 04/16/2021    GFRESTBLACK 84 04/16/2021    ELVIRA 9.1 11/16/2023    ELVIRA 9.4 04/16/2021    BILITOTAL 0.5  "11/16/2023    BILITOTAL 0.5 04/16/2021    ALBUMIN 4.1 11/16/2023    ALBUMIN 3.7 05/16/2022    ALBUMIN 3.7 04/16/2021    ALKPHOS 65 11/16/2023    ALKPHOS 66 04/16/2021    ALT 13 11/16/2023    ALT 36 04/16/2021    AST 19 11/16/2023    AST 18 04/16/2021        INR RESULTS:  Lab Results   Component Value Date    INR 1.2 (H) 12/12/2017    INR 1.43 (H) 11/24/2017       Lab Results   Component Value Date    MAG 2.2 11/27/2017     No results found for: \"NTBNPI\"  No results found for: \"NTBNP\"    LIPIDS:  Lab Results   Component Value Date    CHOL 111 11/16/2023    CHOL 120 10/15/2020     Lab Results   Component Value Date    HDL 32 11/16/2023    HDL 35 10/15/2020     Lab Results   Component Value Date    LDL 46 11/16/2023    LDL 58 10/15/2020     Lab Results   Component Value Date    TRIG 163 11/16/2023    TRIG 135 10/15/2020     Lab Results   Component Value Date    CHOLHDLRATIO 3.6 01/22/2015     Lexiscan Stress Test 3/15/23:    ECG Baseline electrocardiogram demonstrates sinus rhythm, nonspecific ST-T abnormalities, left bundle branch block and right bundle branch block.   The stress electrocardiogram is negative for inducible ischemic EKG changes.  There were no arrhythmias during stress.  There were no arrhythmias during recovery.   Technical Comments    Cardiac Protocol A pharmacologic stress test was performed following a supine Lexiscan protocol using 0.4 mg of intravenous regadenoson administered over 10 seconds under the supervision of Dr. Wayne.   Isotope Administration Nuclear imaging was accomplished using a one day protocol with 38 mCi of technetium tetrofosmin injected at the completion of Lexiscan infusion on 3/15/2023 and 11.7 mCi of technetium tetrofosmin at rest on 3/15/2023.   Nuclear Study Quality Final image quality is satisfactory.   Stress Measurements    Baseline Vitals   Baseline HR 61 bpm         Baseline Systolic          Baseline Diastolic BP 67         Peak Stress Vitals   Max HR 82       "   Last Stress Systolic          Last Stress Diastolic BP 63         Exercise Data   Target          Max Predicted HR 59 %            Nuclear Perfusion    Stress Summed Score: 0 Percent Abnormal: 0.00%        The left ventricular perfusion is normal.         Resting Summed Score: 0 Percent Abnormal: 0.00%        The left ventricular perfusion is normal.               Wall Score    Wall Motion    Score Index: 1.00         The left ventricular wall motion is normal.                   Assessment and Plan:   Mr. Demarco is a 83 year old male with a PMH of CAD s/p CABGx3 in 2017 (LIMA to LAD, vein graft to PDA, vein graft to D1), HTN, HLD, follicular lymphoma with retroperitoneal mass, and hypothyroidism.    # CAD s/p CABGx3 in 2017 (LIMA to LAD, vein graft to PDA, vein graft to D1)  # HLD  He has known multivessel coronary artery disease and normal LV systolic function.  He underwent 3-vessel coronary bypass surgery (LIMA to LAD, vein graft to PDA, vein graft to first diagonal) on 11/24/2017.  Since revascularization, he had recurrent chest tightness and shortness of breath that resolved quickly with rest. He reports the pain had improved after starting Imdur. His dose was increased to 60mg earlier this year, but he is unsure if he is taking 30mg or 60mg daily. Goal LDL less than 70, most recent LDL 46 (11/16/23).  - Continue Lipitor 60mg daily  - Continue ASA 81 mg daily  - Continue Lopressor 25mg BID; declined adjusting to Toprol   - [facility] dose of Imdur he has at home. He has been taking 1 tablet daily. If he has 30mg tablets, he should begin taking 2 tablets to reach previously prescribed 60mg. If he does in fact have 60mg tablets, we can start a 1 week trial of increasing dose to 120mg daily. Will hold off making changes until patient checks dose at home    # HTN  Currently well controlled.  Blood pressures running 115-120/56-58 at home. Rarely gets to ; reports  some white coat HTN in office.  -Continue Losartan 50mg BID      Follow up:  1 year from now   Chart review time today: 25 minutes  Visit time today: 20 minutes  Total time spent today: 45 minutes        Catherine Ross PA-C  General Cardiology   11/28/23

## 2023-11-28 ENCOUNTER — TELEPHONE (OUTPATIENT)
Dept: CARDIOLOGY | Facility: CLINIC | Age: 83
End: 2023-11-28

## 2023-11-28 ENCOUNTER — OFFICE VISIT (OUTPATIENT)
Dept: CARDIOLOGY | Facility: CLINIC | Age: 83
End: 2023-11-28
Payer: MEDICARE

## 2023-11-28 VITALS
BODY MASS INDEX: 33.22 KG/M2 | HEART RATE: 47 BPM | WEIGHT: 231.5 LBS | OXYGEN SATURATION: 97 % | SYSTOLIC BLOOD PRESSURE: 160 MMHG | DIASTOLIC BLOOD PRESSURE: 67 MMHG

## 2023-11-28 DIAGNOSIS — I10 HYPERTENSION GOAL BP (BLOOD PRESSURE) < 140/90: ICD-10-CM

## 2023-11-28 DIAGNOSIS — E78.5 HYPERLIPIDEMIA LDL GOAL <130: Primary | ICD-10-CM

## 2023-11-28 DIAGNOSIS — I25.810 CORONARY ARTERY DISEASE INVOLVING AUTOLOGOUS VEIN CORONARY BYPASS GRAFT WITHOUT ANGINA PECTORIS: ICD-10-CM

## 2023-11-28 PROCEDURE — 99215 OFFICE O/P EST HI 40 MIN: CPT

## 2023-11-28 NOTE — TELEPHONE ENCOUNTER
Encounter finished on mychart. Pt will trial 120mg of imdur daily. Using current supply. Will update us in 1 week with how he is feeling and blood pressures.

## 2023-11-28 NOTE — PATIENT INSTRUCTIONS
Take your medicines every day, as directed     Changes made today:  Let us know how much imdur you have been taking, will make adjustments as needed        Cardiology Care Coordinators:      Vani MONTES RN         Phone  [phone]      Fax [phone]    To Contact us     During Business Hours:  [phone]     If you are needing refills please contact your pharmacy.     For urgent after hour care please call the [facility] at [phone] or the [facility] at [phone] and ask to speak to the cardiologist on call.            HOW TO CHECK YOUR BLOOD PRESSURE AT HOME:     Avoid eating, smoking, and exercising for at least 30 minutes before taking a reading.     Be sure you have taken your BP medication at least 2-3 hours before you check it.      Sit quietly for 10 minutes before a reading.      Sit in a chair with your feet flat on the floor. Rest your  arm on a table so that the arm cuff is at the same level as your heart.     Remain still during the reading.  Record your blood pressure and pulse in a log and bring to your next appointment.       Use [email or web address] allows you to communicate directly with your heart team through secure messaging.  [employer] can be accessed any time on your phone, computer, or tablet.  If you need assistance, we'd be happy to help!             Keep your Heart Appointments:     Follow-up in 1 year

## 2023-11-28 NOTE — TELEPHONE ENCOUNTER
[facility] Call Center    Phone Message    May a detailed message be left on voicemail: yes     Reason for Call: Other: Pt is calling to update his current dosage. He takes 60MG once a day.    isosorbide mononitrate (IMDUR) 30 MG 24 hr tablet   Action Taken: Cardiology    Travel Screening: Not Applicable      Thank you!  [facility]

## 2023-11-28 NOTE — NURSING NOTE
"Zack Demarco's goals for this visit include:   Chief Complaint   Patient presents with    Follow Up     6 month follow-up on CAD       PCP: No Ref-Primary, Physician    Referring Provider:  No referring provider defined for this encounter.      Initial BP (!) 160/67 (BP Location: Right arm, Patient Position: Sitting, Cuff Size: Adult Regular)   Pulse (!) 47   Wt 105 kg (231 lb 8 oz)   SpO2 97%   BMI 33.22 kg/m   Estimated body mass index is 33.22 kg/m  as calculated from the following:    Height as of 5/11/23: 1.778 m (5' 10\").    Weight as of this encounter: 105 kg (231 lb 8 oz).    Medication Reconciliation: complete    Do you need any medication refills at today's visit? none    DIANE Hodgson  Rheumatology/Infectious disease  [facility]   [phone]      "

## 2024-01-10 ENCOUNTER — TELEPHONE (OUTPATIENT)
Dept: FAMILY MEDICINE | Facility: CLINIC | Age: 84
End: 2024-01-10
Payer: MEDICARE

## 2024-01-10 NOTE — TELEPHONE ENCOUNTER
Patient Quality Outreach    Patient is due for the following:   Hypertension -  BP check    Next Steps:   Schedule a nurse only visit for Blood Pressure Check    Type of outreach:    Sent [employer] message.      Questions for provider review:    None           Michaela Mckay

## 2024-01-12 ENCOUNTER — OFFICE VISIT (OUTPATIENT)
Dept: OPHTHALMOLOGY | Facility: CLINIC | Age: 84
End: 2024-01-12
Payer: MEDICARE

## 2024-01-12 DIAGNOSIS — H52.4 PRESBYOPIA: ICD-10-CM

## 2024-01-12 DIAGNOSIS — H43.813 POSTERIOR VITREOUS DETACHMENT OF BOTH EYES: ICD-10-CM

## 2024-01-12 DIAGNOSIS — Z96.1 PSEUDOPHAKIA: Primary | ICD-10-CM

## 2024-01-12 DIAGNOSIS — H40.053 BORDERLINE GLAUCOMA WITH OCULAR HYPERTENSION, BILATERAL: ICD-10-CM

## 2024-01-12 DIAGNOSIS — Z01.01 ENCOUNTER FOR EXAMINATION OF EYES AND VISION WITH ABNORMAL FINDINGS: ICD-10-CM

## 2024-01-12 DIAGNOSIS — H35.3131 EARLY DRY STAGE NONEXUDATIVE AGE-RELATED MACULAR DEGENERATION OF BOTH EYES: ICD-10-CM

## 2024-01-12 PROCEDURE — 92014 COMPRE OPH EXAM EST PT 1/>: CPT | Performed by: OPHTHALMOLOGY

## 2024-01-12 PROCEDURE — 92015 DETERMINE REFRACTIVE STATE: CPT | Mod: GY | Performed by: OPHTHALMOLOGY

## 2024-01-12 ASSESSMENT — VISUAL ACUITY
OD_CC+: +2
OD_CC: 20/25
METHOD: SNELLEN - LINEAR
OS_CC: 20/40
CORRECTION_TYPE: GLASSES

## 2024-01-12 ASSESSMENT — CONF VISUAL FIELD
OS_SUPERIOR_TEMPORAL_RESTRICTION: 0
OD_NORMAL: 1
OS_NORMAL: 1
OS_INFERIOR_TEMPORAL_RESTRICTION: 0
OS_INFERIOR_NASAL_RESTRICTION: 0
OS_SUPERIOR_NASAL_RESTRICTION: 0
OD_SUPERIOR_NASAL_RESTRICTION: 0
OD_INFERIOR_TEMPORAL_RESTRICTION: 0
OD_INFERIOR_NASAL_RESTRICTION: 0
OD_SUPERIOR_TEMPORAL_RESTRICTION: 0

## 2024-01-12 ASSESSMENT — REFRACTION_WEARINGRX
OD_AXIS: 174
OS_CYLINDER: +3.25
OS_ADD: +2.50
OS_AXIS: 176
OD_SPHERE: -1.75
OD_CYLINDER: +2.00
OD_ADD: +2.50
OS_SPHERE: -2.25

## 2024-01-12 ASSESSMENT — EXTERNAL EXAM - LEFT EYE: OS_EXAM: 3+ BROW PTOSIS

## 2024-01-12 ASSESSMENT — CUP TO DISC RATIO
OS_RATIO: 0.5
OD_RATIO: 0.5

## 2024-01-12 ASSESSMENT — TONOMETRY
IOP_METHOD: APPLANATION
OS_IOP_MMHG: 16
OD_IOP_MMHG: 16

## 2024-01-12 ASSESSMENT — EXTERNAL EXAM - RIGHT EYE: OD_EXAM: 3+ BROW PTOSIS

## 2024-01-12 NOTE — PATIENT INSTRUCTIONS
"Continue:   Latanoprost (green top) every evening both eyes.  Timolol (yellow top) every morning both eyes.      May use artificial tears up to four times a day (like Refresh Optive, Systane Balance, or TheraTears. Avoid \"get the red out\" drops and generic artifical tears).     Wait at least 5 minutes between drops if using more than one at a time.     Continue to take a multiple vitamin or \"eye vitamin\" daily (AREDS2).  Protect your eyes outdoors from ultraviolet rays with sunglasses and/or brimmed hat.  Have spinach (cooked or raw), colorful fruits, walnuts, hazelnuts, almonds in your diet.  Monitor the vision in each eye weekly - call if any sudden persistent changes.    Possible clouding of posterior capsule right eye discussed.     Glasses prescription given - optional    Return visit 6 months for an intraocular pressure check, glaucoma OCT, retinal OCT, and Galvan Visual Field.     Avtar Mccullough M.D.  [phone]    "

## 2024-01-12 NOTE — PROGRESS NOTES
" Current Eye Medications:    Latanoprost (green top) every evening both eyes.  Timolol (yellow top) every morning both eyes.  AREDS2     Subjective: Here for complete eye exam. Vision is stable at distance and near in both eyes. No eye pain. Using glaucoma drops as directed.      Objective:  See Ophthalmology Exam.       Assessment:  Stable intraocular pressures and discs both eyes in patient with treated ocular hypertension; otherwise stable eye exam.      ICD-10-CM    1. Pseudophakia, ou; Yag Caps, os  Z96.1       2. Borderline glaucoma with ocular hypertension, bilateral - treated  H40.053       3. Posterior vitreous detachment of both eyes  H43.813       4. Early dry stage nonexudative age-related macular degeneration of both eyes  H35.3131       5. Encounter for examination of eyes and vision with abnormal findings  Z01.01       6. Presbyopia  H52.4            Plan:  Continue:   Latanoprost (green top) every evening both eyes.  Timolol (yellow top) every morning both eyes.      May use artificial tears up to four times a day (like Refresh Optive, Systane Balance, or TheraTears. Avoid \"get the red out\" drops and generic artifical tears).     Wait at least 5 minutes between drops if using more than one at a time.     Continue to take a multiple vitamin or \"eye vitamin\" daily (AREDS2).  Protect your eyes outdoors from ultraviolet rays with sunglasses and/or brimmed hat.  Have spinach (cooked or raw), colorful fruits, walnuts, hazelnuts, almonds in your diet.  Monitor the vision in each eye weekly - call if any sudden persistent changes.    Possible clouding of posterior capsule right eye discussed.     Glasses prescription given - optional    Return visit 6 months for an intraocular pressure check, glaucoma OCT, retinal OCT, and Galvan Visual Field.     Avtar Mccullough M.D.  [phone]       "

## 2024-01-12 NOTE — LETTER
"    1/12/2024         RE: Zack Demarco  [address] [facility] [address]        Dear Colleague,    Thank you for referring your patient, Zack Demarco, to the [facility]. Please see a copy of my visit note below.     Current Eye Medications:    Latanoprost (green top) every evening both eyes.  Timolol (yellow top) every morning both eyes.  AREDS2     Subjective: Here for complete eye exam. Vision is stable at distance and near in both eyes. No eye pain. Using glaucoma drops as directed.      Objective:  See Ophthalmology Exam.       Assessment:  Stable intraocular pressures and discs both eyes in patient with treated ocular hypertension; otherwise stable eye exam.      ICD-10-CM    1. Pseudophakia, ou; Yag Caps, os  Z96.1       2. Borderline glaucoma with ocular hypertension, bilateral - treated  H40.053       3. Posterior vitreous detachment of both eyes  H43.813       4. Early dry stage nonexudative age-related macular degeneration of both eyes  H35.3131       5. Encounter for examination of eyes and vision with abnormal findings  Z01.01       6. Presbyopia  H52.4            Plan:  Continue:   Latanoprost (green top) every evening both eyes.  Timolol (yellow top) every morning both eyes.      May use artificial tears up to four times a day (like Refresh Optive, Systane Balance, or TheraTears. Avoid \"get the red out\" drops and generic artifical tears).     Wait at least 5 minutes between drops if using more than one at a time.     Continue to take a multiple vitamin or \"eye vitamin\" daily (AREDS2).  Protect your eyes outdoors from ultraviolet rays with sunglasses and/or brimmed hat.  Have spinach (cooked or raw), colorful fruits, walnuts, hazelnuts, almonds in your diet.  Monitor the vision in each eye weekly - call if any sudden persistent changes.    Possible clouding of posterior capsule right eye discussed.     Glasses prescription given - optional    Return visit 6 months for an " intraocular pressure check, glaucoma OCT, retinal OCT, and Galvan Visual Field.     Avtar Mccullough M.D.  [phone]         Again, thank you for allowing me to participate in the care of your patient.        Sincerely,        Avtar Mccullough MD

## 2024-01-13 PROBLEM — H35.3131 EARLY DRY STAGE NONEXUDATIVE AGE-RELATED MACULAR DEGENERATION OF BOTH EYES: Status: ACTIVE | Noted: 2024-01-13

## 2024-01-13 PROBLEM — H35.363 MACULAR DRUSEN, BILATERAL: Status: RESOLVED | Noted: 2017-01-18 | Resolved: 2024-01-13

## 2024-03-19 ENCOUNTER — OFFICE VISIT (OUTPATIENT)
Dept: FAMILY MEDICINE | Facility: CLINIC | Age: 84
End: 2024-03-19
Payer: MEDICARE

## 2024-03-19 VITALS
DIASTOLIC BLOOD PRESSURE: 61 MMHG | TEMPERATURE: 96.2 F | HEIGHT: 70 IN | SYSTOLIC BLOOD PRESSURE: 138 MMHG | HEART RATE: 53 BPM | BODY MASS INDEX: 30.95 KG/M2 | RESPIRATION RATE: 16 BRPM | WEIGHT: 216.2 LBS | OXYGEN SATURATION: 96 %

## 2024-03-19 DIAGNOSIS — E03.9 HYPOTHYROIDISM, UNSPECIFIED TYPE: ICD-10-CM

## 2024-03-19 DIAGNOSIS — R42 DIZZINESS: Primary | ICD-10-CM

## 2024-03-19 LAB
ALBUMIN SERPL BCG-MCNC: 4.4 G/DL (ref 3.5–5.2)
ALP SERPL-CCNC: 61 U/L (ref 40–150)
ALT SERPL W P-5'-P-CCNC: 32 U/L (ref 0–70)
ANION GAP SERPL CALCULATED.3IONS-SCNC: 8 MMOL/L (ref 7–15)
AST SERPL W P-5'-P-CCNC: 25 U/L (ref 0–45)
BASOPHILS # BLD AUTO: 0 10E3/UL (ref 0–0.2)
BASOPHILS NFR BLD AUTO: 0 %
BILIRUB SERPL-MCNC: 0.5 MG/DL
BUN SERPL-MCNC: 28.4 MG/DL (ref 8–23)
CALCIUM SERPL-MCNC: 9.8 MG/DL (ref 8.8–10.2)
CHLORIDE SERPL-SCNC: 108 MMOL/L (ref 98–107)
CREAT SERPL-MCNC: 1.06 MG/DL (ref 0.67–1.17)
DEPRECATED HCO3 PLAS-SCNC: 27 MMOL/L (ref 22–29)
EGFRCR SERPLBLD CKD-EPI 2021: 70 ML/MIN/1.73M2
EOSINOPHIL # BLD AUTO: 0.3 10E3/UL (ref 0–0.7)
EOSINOPHIL NFR BLD AUTO: 5 %
ERYTHROCYTE [DISTWIDTH] IN BLOOD BY AUTOMATED COUNT: 12.4 % (ref 10–15)
GLUCOSE SERPL-MCNC: 114 MG/DL (ref 70–99)
HCT VFR BLD AUTO: 43.6 % (ref 40–53)
HGB BLD-MCNC: 14 G/DL (ref 13.3–17.7)
IMM GRANULOCYTES # BLD: 0 10E3/UL
IMM GRANULOCYTES NFR BLD: 0 %
LYMPHOCYTES # BLD AUTO: 1.2 10E3/UL (ref 0.8–5.3)
LYMPHOCYTES NFR BLD AUTO: 18 %
MCH RBC QN AUTO: 31.3 PG (ref 26.5–33)
MCHC RBC AUTO-ENTMCNC: 32.1 G/DL (ref 31.5–36.5)
MCV RBC AUTO: 97 FL (ref 78–100)
MONOCYTES # BLD AUTO: 0.4 10E3/UL (ref 0–1.3)
MONOCYTES NFR BLD AUTO: 6 %
NEUTROPHILS # BLD AUTO: 4.8 10E3/UL (ref 1.6–8.3)
NEUTROPHILS NFR BLD AUTO: 71 %
PLATELET # BLD AUTO: 205 10E3/UL (ref 150–450)
POTASSIUM SERPL-SCNC: 4.9 MMOL/L (ref 3.4–5.3)
PROT SERPL-MCNC: 7.2 G/DL (ref 6.4–8.3)
RBC # BLD AUTO: 4.48 10E6/UL (ref 4.4–5.9)
SODIUM SERPL-SCNC: 143 MMOL/L (ref 135–145)
T4 FREE SERPL-MCNC: 1.36 NG/DL (ref 0.9–1.7)
TSH SERPL DL<=0.005 MIU/L-ACNC: 4.61 UIU/ML (ref 0.3–4.2)
WBC # BLD AUTO: 6.7 10E3/UL (ref 4–11)

## 2024-03-19 PROCEDURE — 84439 ASSAY OF FREE THYROXINE: CPT | Performed by: NURSE PRACTITIONER

## 2024-03-19 PROCEDURE — 36415 COLL VENOUS BLD VENIPUNCTURE: CPT | Performed by: NURSE PRACTITIONER

## 2024-03-19 PROCEDURE — 84443 ASSAY THYROID STIM HORMONE: CPT | Performed by: NURSE PRACTITIONER

## 2024-03-19 PROCEDURE — 80053 COMPREHEN METABOLIC PANEL: CPT | Performed by: NURSE PRACTITIONER

## 2024-03-19 PROCEDURE — 85025 COMPLETE CBC W/AUTO DIFF WBC: CPT | Performed by: NURSE PRACTITIONER

## 2024-03-19 PROCEDURE — 99214 OFFICE O/P EST MOD 30 MIN: CPT | Performed by: NURSE PRACTITIONER

## 2024-03-19 RX ORDER — RESPIRATORY SYNCYTIAL VIRUS VACCINE 120MCG/0.5
0.5 KIT INTRAMUSCULAR ONCE
Qty: 1 EACH | Refills: 0 | Status: CANCELLED | OUTPATIENT
Start: 2024-03-19 | End: 2024-03-19

## 2024-03-19 ASSESSMENT — PAIN SCALES - GENERAL: PAINLEVEL: NO PAIN (0)

## 2024-03-19 NOTE — PROGRESS NOTES
"  Assessment & Plan     Dizziness  -Most likely from dehydration from decreased oral intake. Discussed how low carb diets can increase risk of dehydration. Recommend adding back some carbohydrates to his diet and increasing water intake. Will check labs today to rule out other causes of dizziness such as kidney injury, anemia.   -Also discussed with patient that as his weight goes down his blood pressure medications may need to be adjusted. Recommend checking his blood pressure each morning and notify provider if he is having increased dizziness along with lower blood pressure readings.      Orthostatic Vitals from 03/17/24 1729 to 03/19/24 1729    Date and Time Orthostatic BP Orthostatic Pulse Patient Position BP   Location Cuff Size   03/19/24 1007 140/67 52 Standing Right arm Adult Large   03/19/24 1006 131/65 44 Sitting Right arm Adult Large   03/19/24 1005 119/60 43 Supine Right arm Adult Large          - Comprehensive metabolic panel  - CBC with Platelets & Differential    Hypothyroidism, unspecified type  -will recheck TSH given recent weight change  - TSH WITH FREE T4 REFLEX    Ordering of each unique test  Prescription drug management        BMI  Estimated body mass index is 31.02 kg/m  as calculated from the following:    Height as of this encounter: 1.778 m (5' 10\").    Weight as of this encounter: 98.1 kg (216 lb 3.2 oz).           Cruzito Dixon is a 83 year old, presenting for the following health issues:  RECHECK        3/19/2024     9:13 AM   Additional Questions   Roomed by Kalpana CONTRERAS   Accompanied by self     83 yr old male with history of angina, CABG, hypertension, lymphoma with complaint of \"I had a bout of being unsteady last week.\"     He has been on [place] Diet since February 5th. Has lost 16 pounds since starting this diet. He states he is eating 5 times a day:    Breakfast: Couple eggs, [place] duffy, glass of tomato juice    Snack: couple pieces of turkey or ham, almonds    Lunch: " "similar to supper    Supper: salmon, cottage cheese, brussels sprouts    No worsening chest pain or shortness of breath. No episodes of syncope. No ear symptoms.       History of Present Illness       Reason for visit:  Could not walk in a straight line  Symptom onset:  3-7 days ago  Symptom intensity:  Moderate  Symptom progression:  Improving  Had these symptoms before:  Yes  Has tried/received treatment for these symptoms:  Yes  Previous treatment was successful:  Yes  Prior treatment description:  Medication increase  What makes it worse:  No  What makes it better:  Yes,Food. I have been on a strict diet and believe I may have had a low glucose problem. I think I need my bloodwork checked.    He eats 2-3 servings of fruits and vegetables daily.He consumes 0 sweetened beverage(s) daily.He exercises with enough effort to increase his heart rate 9 or less minutes per day.  He exercises with enough effort to increase his heart rate 3 or less days per week.   He is taking medications regularly.             Objective    /61   Pulse 53   Temp (!) 96.2  F (35.7  C) (Tympanic)   Resp 16   Ht 1.778 m (5' 10\")   Wt 98.1 kg (216 lb 3.2 oz)   SpO2 96%   BMI 31.02 kg/m    Body mass index is 31.02 kg/m .  Physical Exam  Constitutional:       Appearance: Normal appearance. He is not ill-appearing.   HENT:      Right Ear: Tympanic membrane, ear canal and external ear normal.      Left Ear: Tympanic membrane, ear canal and external ear normal.      Nose: Nose normal.      Mouth/Throat:      Mouth: Mucous membranes are moist.      Pharynx: Oropharynx is clear. No oropharyngeal exudate.   Eyes:      Extraocular Movements: Extraocular movements intact.      Pupils: Pupils are equal, round, and reactive to light.   Cardiovascular:      Rate and Rhythm: Normal rate and regular rhythm.      Pulses: Normal pulses.      Heart sounds: Normal heart sounds. No murmur heard.     No friction rub. No gallop.   Pulmonary:      " Effort: Pulmonary effort is normal.      Breath sounds: Normal breath sounds. No wheezing, rhonchi or rales.   Musculoskeletal:         General: Normal range of motion.      Cervical back: Normal range of motion and neck supple.   Lymphadenopathy:      Cervical: No cervical adenopathy.   Skin:     General: Skin is warm and dry.   Neurological:      General: No focal deficit present.      Mental Status: He is alert and oriented to person, place, and time.   Psychiatric:         Mood and Affect: Mood normal.         Behavior: Behavior normal.         Thought Content: Thought content normal.         Judgment: Judgment normal.                    Signed Electronically by: HOLLY Jansen CNP

## 2024-04-13 ENCOUNTER — MYC MEDICAL ADVICE (OUTPATIENT)
Dept: ONCOLOGY | Facility: CLINIC | Age: 84
End: 2024-04-13
Payer: MEDICARE

## 2024-04-23 ENCOUNTER — PATIENT OUTREACH (OUTPATIENT)
Dept: ONCOLOGY | Facility: CLINIC | Age: 84
End: 2024-04-23
Payer: MEDICARE

## 2024-04-23 NOTE — LETTER
4/23/2024         RE: Zack Demarco  [address]  [facility] [address]      To Whom lt May Concern,    My name is Dr. Yuri Man and I am a board-certified Hematologist. I have been treating Zack Demarco since May 15, 2022. My credentials are included at the end of this letter.    In December 2017, Zack Demarco was diagnosed with a chronic medical condition known as follicular lymphoma.  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 While asbestos has not been directly linked to follicular lymphoma, it could contribute to his current medical condition.     In assessing the criteria established by the Department of [employer], current medical evidence associated with exposure to asbestos  and the service history of Zack Demarco, it is my professional opinion that the follicular lymphoma is at least likely as not related to Zack Demarco's  service.    If you have any questions or concerns, please reach out to me at [phone].     Sincerely,     Yuri Man MD  Hematology and Medical Oncology  (Doctor's Signature)  (Date)

## 2024-04-23 NOTE — LETTER
April 24, 2024      RE: Zack Demarco  [address]  [facility] [address]         To Whom lt May Concern,    My name is Dr. Yuri Man and I am a board-certified Hematologist. I have been treating Zack Demarco since May 15, 2022. My credentials are included at the end of this letter.    In December 2017, Zack Demarco was diagnosed with a chronic medical condition known as follicular lymphoma.  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 While asbestos has not been directly linked to follicular lymphoma, it could contribute to his current medical condition.     In assessing the criteria established by the Department of [employer], current medical evidence associated with exposure to asbestos  and the service history of Zack Demarco, it is my professional opinion that the follicular lymphoma is at least likely as not related to Zack Demarco's  service.    If you have any questions or concerns, please reach out to me at [phone].     Sincerely,     Yuri Man MD  Hematology and Medical Oncology

## 2024-04-23 NOTE — PROGRESS NOTES
[facility]: Cancer Care Short Note                                                                                          Letter requested from patient completed by MD.     Tiffanie Mcbride, RN, MSN, OCN   RN Care Coordinator   [facility]   [address] [place] [id number] [phone]   To Whom lt May Concern,

## 2024-06-17 PROBLEM — Z76.89 HEALTH CARE HOME: Status: RESOLVED | Noted: 2017-11-16 | Resolved: 2024-06-17

## 2024-06-30 ENCOUNTER — HEALTH MAINTENANCE LETTER (OUTPATIENT)
Age: 84
End: 2024-06-30

## 2024-07-16 ENCOUNTER — DOCUMENTATION ONLY (OUTPATIENT)
Dept: FAMILY MEDICINE | Facility: CLINIC | Age: 84
End: 2024-07-16
Payer: MEDICARE

## 2024-07-16 DIAGNOSIS — E03.9 HYPOTHYROIDISM, UNSPECIFIED TYPE: Primary | ICD-10-CM

## 2024-07-16 NOTE — PROGRESS NOTES
Patient has upcoming lab appointment requesting TSH testing. The order  in , please place a new order as needed thank you!   Vani Salazar

## 2024-07-17 ENCOUNTER — OFFICE VISIT (OUTPATIENT)
Dept: OPHTHALMOLOGY | Facility: CLINIC | Age: 84
End: 2024-07-17
Payer: MEDICARE

## 2024-07-17 DIAGNOSIS — H40.053 BORDERLINE GLAUCOMA WITH OCULAR HYPERTENSION, BILATERAL: Primary | ICD-10-CM

## 2024-07-17 DIAGNOSIS — H35.3131 EARLY DRY STAGE NONEXUDATIVE AGE-RELATED MACULAR DEGENERATION OF BOTH EYES: ICD-10-CM

## 2024-07-17 PROCEDURE — 92083 EXTENDED VISUAL FIELD XM: CPT | Performed by: OPHTHALMOLOGY

## 2024-07-17 PROCEDURE — 92134 CPTRZ OPH DX IMG PST SGM RTA: CPT | Performed by: OPHTHALMOLOGY

## 2024-07-17 PROCEDURE — 92012 INTRM OPH EXAM EST PATIENT: CPT | Performed by: OPHTHALMOLOGY

## 2024-07-17 ASSESSMENT — TONOMETRY
OD_IOP_MMHG: 14
IOP_METHOD: APPLANATION
OS_IOP_MMHG: 15

## 2024-07-17 ASSESSMENT — VISUAL ACUITY
OS_CC: 20/25
OD_CC+: -2
CORRECTION_TYPE: GLASSES
OD_CC: 20/30
OS_CC+: -1
METHOD: SNELLEN - LINEAR

## 2024-07-17 ASSESSMENT — EXTERNAL EXAM - LEFT EYE: OS_EXAM: 3+ BROW PTOSIS

## 2024-07-17 ASSESSMENT — EXTERNAL EXAM - RIGHT EYE: OD_EXAM: 3+ BROW PTOSIS

## 2024-07-17 NOTE — PROGRESS NOTES
Current Eye Medications:  Latanoprost both eyes every evening, timolol both eyes each morning.  Last drops:  6am.  AREDS     Subjective:  Follow up Borderline glaucoma with Ocular Hypertension.  The patient is here for a pressure check, OCT, and visual field.  He feels his vision may be slightly clearer than in the past.       Objective:  See Ophthalmology Exam.       Assessment:  Stable intraocular pressures, glaucoma OCT, and retinal OCT.  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      Plan:  Continue:   Latanoprost (green top) every evening both eyes.  Timolol (yellow top) every morning both eyes.    Wait at least 5 minutes between drops if using more than one at a time.     Return visit in 6 months for a complete exam.     Avtar Mccullough M.D.  [phone]

## 2024-07-17 NOTE — LETTER
7/17/2024      Zack Demarco  [address] [facility] [address]      Dear Colleague,    Thank you for referring your patient, Zack Demarco, to the [facility]. Please see a copy of my visit note below.     Current Eye Medications:  Latanoprost both eyes every evening, timolol both eyes each morning.  Last drops:  6am.  AREDS     Subjective:  Follow up Borderline glaucoma with Ocular Hypertension.  The patient is here for a pressure check, OCT, and visual field.  He feels his vision may be slightly clearer than in the past.       Objective:  See Ophthalmology Exam.       Assessment:  Stable intraocular pressures, glaucoma OCT, and retinal OCT.  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      Plan:  Continue:   Latanoprost (green top) every evening both eyes.  Timolol (yellow top) every morning both eyes.    Wait at least 5 minutes between drops if using more than one at a time.     Return visit in 6 months for a complete exam.     Avtar Mccullough M.D.  [phone]         Again, thank you for allowing me to participate in the care of your patient.        Sincerely,        Avtar Mccullough MD

## 2024-07-17 NOTE — PATIENT INSTRUCTIONS
Continue:   Latanoprost (green top) every evening both eyes.  Timolol (yellow top) every morning both eyes.    Wait at least 5 minutes between drops if using more than one at a time.     Return visit in 6 months for a complete exam.     Avtar Mccullough M.D.  [phone]

## 2024-07-20 ENCOUNTER — LAB (OUTPATIENT)
Dept: LAB | Facility: CLINIC | Age: 84
End: 2024-07-20
Payer: MEDICARE

## 2024-07-20 DIAGNOSIS — E03.9 HYPOTHYROIDISM, UNSPECIFIED TYPE: ICD-10-CM

## 2024-07-20 LAB
T4 FREE SERPL-MCNC: 0.91 NG/DL (ref 0.9–1.7)
TSH SERPL DL<=0.005 MIU/L-ACNC: 6.36 UIU/ML (ref 0.3–4.2)

## 2024-07-20 PROCEDURE — 84443 ASSAY THYROID STIM HORMONE: CPT

## 2024-07-20 PROCEDURE — 84439 ASSAY OF FREE THYROXINE: CPT

## 2024-07-20 PROCEDURE — 36415 COLL VENOUS BLD VENIPUNCTURE: CPT

## 2024-07-20 ASSESSMENT — PACHYMETRY
OD_CT(UM): 552
OS_CT(UM): 548

## 2024-10-16 NOTE — PROGRESS NOTES
[facility] Cardiology   Cardiology Clinic Note      HPI:   Mr. Zack Demarco is a pleasant 83 year old male with medical history pertinent for CAD s/p CABGx3 in 2017 (LIMA to LAD, vein graft to PDA, vein graft to D1), HTN, HLD, follicular lymphoma with retroperitoneal mass, and hypothyroidism. He presents to cardiology clinic for follow up.     He originally presented with centrally located chest tightness with shortness of breath that completely resolved after revascularization. In 2020, he had occasional chest tightness when deer hunting and was started on low-dose Imdur along with an echo and a pharmacologic nuclear stress test.  The tests were unremarkable and after starting Imdur, his symptoms resolved.     In February of this year, he was experiencing some chest discomfort with heavy exertion so his Imdur was increased and a pharmacologic nuclear stress test was ordered which was unremarkable for any indications of myocardial ischemia. Since patient's last visit, he reports he continues to feel like he has less energy and he also continues to have the same chest tightness when he does yard work. He reports he is currently taken 1 tablet of Imdur daily and is unsure if they are 30 mg tablets or 60 mg tablets.      Patient reports he used to exercise at the gym 4-5 times per week, but since COVID, he has not returned to the gym more than a few times per year. He reports he still has his gym membership and hopes to resume regular exercise soon.      Interval History (10/22/24)  After last visit, we increased his Imdur to 120mg daily. Today he is reporting an overall improvement in his chest pain. He feels like he is able to do more overall without experiencing chest discomfort. He reports that he still occasionally experiences slight chest pain, but it is less than once a week and goes away spontaneously without resting breaks. He denies any dizziness or significant shortness of breath with exertion.     He reports  that he has not been checking his BP at home regularly, but feels like previously it had been running in the 120s.     Today in clinic, he denies chest pain, palpitations, dizziness, syncope, or lower extremity edema.     PAST MEDICAL HISTORY:  Past Medical History:   Diagnosis Date    Coronary artery disease 2017    DJD (degenerative joint disease) of hip     right    Glaucoma     Hernia umbilical     Hypercholesterolemia     Hypertension     Hypothyroidism     Lipoma     Low back pain     Lymphoma, unspecified body region, unspecified lymphoma type (H) 12/15/2017    Nonsenile cataract     Obesity     Unstable angina (H) 2017       FAMILY HISTORY:  Family History   Problem Relation Age of Onset    Cancer Mother         pancreatic    Cancer Father         lung    Glaucoma Brother     Respiratory Brother         COPD    Eye Disorder Brother     Macular Degeneration Brother 60    Cancer Sister         liver    Glaucoma Sister     Cancer Sister     Musculoskeletal Disorder Sister         back    Glaucoma Sister     Macular Degeneration Sister 60       SOCIAL HISTORY:  Social History     Socioeconomic History    Marital status:    Tobacco Use    Smoking status: Former     Current packs/day: 0.00     Average packs/day: 1 pack/day for 40.0 years (40.0 ttl pk-yrs)     Types: Cigarettes     Start date: 1959     Quit date: 1979     Years since quittin.2    Smokeless tobacco: Former   Vaping Use    Vaping status: Never Used   Substance and Sexual Activity    Alcohol use: Yes     Comment: rarely    Drug use: No    Sexual activity: Not Currently     Partners: Female   Other Topics Concern    Parent/sibling w/ CABG, MI or angioplasty before 65F 55M? No     Social Determinants of Health     Interpersonal Safety: Low Risk  (3/19/2024)    Interpersonal Safety     Do you feel physically and emotionally safe where you currently live?: Yes     Within the past 12 months, have you been hit, slapped,  "kicked or otherwise physically hurt by someone?: No     Within the past 12 months, have you been humiliated or emotionally abused in other ways by your partner or ex-partner?: No       CURRENT MEDICATIONS:  Current Outpatient Medications   Medication Sig Dispense Refill    aspirin 325 MG EC tablet 1/2 tab daily      atorvastatin (LIPITOR) 40 MG tablet Take 1.5 tablets (60 mg) by mouth daily 135 tablet 3    Cholecalciferol (VITAMIN D3 PO) Take by mouth daily      isosorbide mononitrate (IMDUR) 120 MG 24 HR ER tablet Take 1 tablet (120 mg) by mouth daily      latanoprost (XALATAN) 0.005 % ophthalmic solution Place 1 drop into both eyes At Bedtime 3 Bottle 4    levothyroxine (SYNTHROID/LEVOTHROID) 88 MCG tablet 0.088 mg      losartan (COZAAR) 100 MG tablet Take 0.5 tablets (50 mg) by mouth 2 times daily New dose 90 tablet 3    metoprolol tartrate (LOPRESSOR) 25 MG tablet Take 1 tablet (25 mg) by mouth 2 times daily 180 tablet 3    timolol maleate (TIMOPTIC) 0.5 % ophthalmic solution Place 1 drop into both eyes every morning 10 mL 4     No current facility-administered medications for this visit.       ROS:   Refer to HPI    EXAM:  /56   Pulse (!) 45   Ht 1.778 m (5' 10\")   Wt 99.9 kg (220 lb 3.2 oz)   SpO2 97%   BMI 31.60 kg/m      GENERAL: Appears comfortable, in no acute distress.   HEENT: Eye symmetrical, no discharge or icterus bilaterally.   CV: RRR- bradycardic, +S1S2, no murmur, rub, or gallop.   RESPIRATORY: Respirations regular, even, and unlabored. Lungs CTA throughout.   GI: Soft and non distended  EXTREMITIES: no peripheral edema. .   NEUROLOGIC: Alert and oriented x 3. No focal deficits.   MUSCULOSKELETAL: No joint swelling or tenderness.   SKIN: No jaundice. No rashes or lesions.     Labs, reviewed with patient in clinic today:  CBC RESULTS:  Lab Results   Component Value Date    WBC 6.4 10/17/2024    WBC 7.0 04/16/2021    RBC 4.22 (L) 10/17/2024    RBC 4.49 04/16/2021    HGB 13.1 (L) " "10/17/2024    HGB 14.1 04/16/2021    HCT 40.0 10/17/2024    HCT 44.0 04/16/2021    MCV 95 10/17/2024    MCV 98 04/16/2021    MCH 31.0 10/17/2024    MCH 31.4 04/16/2021    MCHC 32.8 10/17/2024    MCHC 32.0 04/16/2021    RDW 13.1 10/17/2024    RDW 12.7 04/16/2021     10/17/2024     04/16/2021       CMP RESULTS:  Lab Results   Component Value Date     10/17/2024     04/16/2021    POTASSIUM 4.8 10/17/2024    POTASSIUM 4.3 02/01/2023    POTASSIUM 4.6 04/16/2021    CHLORIDE 107 10/17/2024    CHLORIDE 107 02/01/2023    CHLORIDE 106 04/16/2021    CO2 24 10/17/2024    CO2 28 02/01/2023    CO2 27 04/16/2021    ANIONGAP 9 10/17/2024    ANIONGAP 5 02/01/2023    ANIONGAP 6 04/16/2021     (H) 10/17/2024     (H) 02/01/2023    GLC 96 04/16/2021    BUN 29.4 (H) 10/17/2024    BUN 33 (H) 02/01/2023    BUN 24 04/16/2021    CR 1.02 10/17/2024    CR 0.98 04/16/2021    GFRESTIMATED 73 10/17/2024    GFRESTIMATED >60 11/16/2023    GFRESTIMATED 73 04/16/2021    GFRESTBLACK 84 04/16/2021    ELVIRA 9.2 10/17/2024    ELVIRA 9.4 04/16/2021    BILITOTAL 0.7 10/17/2024    BILITOTAL 0.5 04/16/2021    ALBUMIN 4.0 10/17/2024    ALBUMIN 3.7 05/16/2022    ALBUMIN 3.7 04/16/2021    ALKPHOS 64 10/17/2024    ALKPHOS 66 04/16/2021    ALT 21 10/17/2024    ALT 36 04/16/2021    AST 23 10/17/2024    AST 18 04/16/2021        INR RESULTS:  Lab Results   Component Value Date    INR 1.2 (H) 12/12/2017    INR 1.43 (H) 11/24/2017       Lab Results   Component Value Date    MAG 2.2 11/27/2017     No results found for: \"NTBNPI\"  No results found for: \"NTBNP\"    LIPIDS:  Lab Results   Component Value Date    CHOL 111 11/16/2023    CHOL 120 10/15/2020     Lab Results   Component Value Date    HDL 32 11/16/2023    HDL 35 10/15/2020     Lab Results   Component Value Date    LDL 46 11/16/2023    LDL 58 10/15/2020     Lab Results   Component Value Date    TRIG 163 11/16/2023    TRIG 135 10/15/2020     Lab Results   Component Value Date    " CHOLHDLRATIO 3.6 01/22/2015           Assessment and Plan:   Mr. Demarco is a 83 year old male with a PMH of CAD s/p CABGx3 in 2017 (LIMA to LAD, vein graft to PDA, vein graft to D1), HTN, HLD, follicular lymphoma with retroperitoneal mass, and hypothyroidism.     # CAD s/p CABGx3 in 2017 (LIMA to LAD, vein graft to PDA, vein graft to D1)  # HLD  He has known multivessel coronary artery disease and normal LV systolic function.  He underwent 3-vessel coronary bypass surgery (LIMA to LAD, vein graft to PDA, vein graft to first diagonal) on 11/24/2017.  Since revascularization, he had recurrent chest tightness and shortness of breath that resolved quickly with rest. He reports the pain has improved after increasing Imdur last visit.  - Continue Lipitor 60mg daily  - Continue ASA 81 mg daily  - Decrease Lopressor to 12.5 mg BID due to significant bradycardia  - RN to reach out to assess symptoms/BP in coming weeks  - Continue Imdur 120mg daily; discussed possibly adding an additional 60mg in PM, patient declines at this time and does not feel that he experiences chest pain often or significantly at this point.      # HTN  Currently well controlled.  Blood pressures running 115-120/56-58 at home. Rarely gets to ; reports some white coat HTN in office.  - Continue Losartan 50mg BID        Follow up:  1 year   Chart review time today: 10 minutes  Visit time today: 20 minutes  Total time spent today: 30 minutes        Catherine Ross PA-C  General Cardiology   10/22/24

## 2024-10-17 ENCOUNTER — ONCOLOGY VISIT (OUTPATIENT)
Dept: ONCOLOGY | Facility: CLINIC | Age: 84
End: 2024-10-17
Attending: STUDENT IN AN ORGANIZED HEALTH CARE EDUCATION/TRAINING PROGRAM
Payer: MEDICARE

## 2024-10-17 ENCOUNTER — APPOINTMENT (OUTPATIENT)
Dept: LAB | Facility: CLINIC | Age: 84
End: 2024-10-17
Attending: STUDENT IN AN ORGANIZED HEALTH CARE EDUCATION/TRAINING PROGRAM
Payer: MEDICARE

## 2024-10-17 ENCOUNTER — PATIENT OUTREACH (OUTPATIENT)
Dept: ONCOLOGY | Facility: CLINIC | Age: 84
End: 2024-10-17

## 2024-10-17 VITALS
RESPIRATION RATE: 18 BRPM | SYSTOLIC BLOOD PRESSURE: 146 MMHG | WEIGHT: 221.2 LBS | BODY MASS INDEX: 31.74 KG/M2 | DIASTOLIC BLOOD PRESSURE: 63 MMHG | OXYGEN SATURATION: 100 % | HEART RATE: 43 BPM | TEMPERATURE: 97.6 F

## 2024-10-17 DIAGNOSIS — C82.90 FOLLICULAR LYMPHOMA, UNSPECIFIED FOLLICULAR LYMPHOMA TYPE, UNSPECIFIED BODY REGION (H): ICD-10-CM

## 2024-10-17 LAB
ALBUMIN SERPL BCG-MCNC: 4 G/DL (ref 3.5–5.2)
ALP SERPL-CCNC: 64 U/L (ref 40–150)
ALT SERPL W P-5'-P-CCNC: 21 U/L (ref 0–70)
ANION GAP SERPL CALCULATED.3IONS-SCNC: 9 MMOL/L (ref 7–15)
AST SERPL W P-5'-P-CCNC: 23 U/L (ref 0–45)
BASOPHILS # BLD AUTO: 0 10E3/UL (ref 0–0.2)
BASOPHILS NFR BLD AUTO: 1 %
BILIRUB SERPL-MCNC: 0.7 MG/DL
BUN SERPL-MCNC: 29.4 MG/DL (ref 8–23)
CALCIUM SERPL-MCNC: 9.2 MG/DL (ref 8.8–10.4)
CHLORIDE SERPL-SCNC: 107 MMOL/L (ref 98–107)
CREAT SERPL-MCNC: 1.02 MG/DL (ref 0.67–1.17)
EGFRCR SERPLBLD CKD-EPI 2021: 73 ML/MIN/1.73M2
EOSINOPHIL # BLD AUTO: 0.2 10E3/UL (ref 0–0.7)
EOSINOPHIL NFR BLD AUTO: 4 %
ERYTHROCYTE [DISTWIDTH] IN BLOOD BY AUTOMATED COUNT: 13.1 % (ref 10–15)
GLUCOSE SERPL-MCNC: 102 MG/DL (ref 70–99)
HCO3 SERPL-SCNC: 24 MMOL/L (ref 22–29)
HCT VFR BLD AUTO: 40 % (ref 40–53)
HGB BLD-MCNC: 13.1 G/DL (ref 13.3–17.7)
IMM GRANULOCYTES # BLD: 0 10E3/UL
IMM GRANULOCYTES NFR BLD: 0 %
LYMPHOCYTES # BLD AUTO: 1.3 10E3/UL (ref 0.8–5.3)
LYMPHOCYTES NFR BLD AUTO: 20 %
MCH RBC QN AUTO: 31 PG (ref 26.5–33)
MCHC RBC AUTO-ENTMCNC: 32.8 G/DL (ref 31.5–36.5)
MCV RBC AUTO: 95 FL (ref 78–100)
MONOCYTES # BLD AUTO: 0.5 10E3/UL (ref 0–1.3)
MONOCYTES NFR BLD AUTO: 8 %
NEUTROPHILS # BLD AUTO: 4.4 10E3/UL (ref 1.6–8.3)
NEUTROPHILS NFR BLD AUTO: 68 %
NRBC # BLD AUTO: 0 10E3/UL
NRBC BLD AUTO-RTO: 0 /100
PLATELET # BLD AUTO: 178 10E3/UL (ref 150–450)
POTASSIUM SERPL-SCNC: 4.8 MMOL/L (ref 3.4–5.3)
PROT SERPL-MCNC: 6.8 G/DL (ref 6.4–8.3)
RBC # BLD AUTO: 4.22 10E6/UL (ref 4.4–5.9)
SODIUM SERPL-SCNC: 140 MMOL/L (ref 135–145)
WBC # BLD AUTO: 6.4 10E3/UL (ref 4–11)

## 2024-10-17 PROCEDURE — 82040 ASSAY OF SERUM ALBUMIN: CPT | Performed by: STUDENT IN AN ORGANIZED HEALTH CARE EDUCATION/TRAINING PROGRAM

## 2024-10-17 PROCEDURE — G0463 HOSPITAL OUTPT CLINIC VISIT: HCPCS | Performed by: STUDENT IN AN ORGANIZED HEALTH CARE EDUCATION/TRAINING PROGRAM

## 2024-10-17 PROCEDURE — 36415 COLL VENOUS BLD VENIPUNCTURE: CPT | Performed by: STUDENT IN AN ORGANIZED HEALTH CARE EDUCATION/TRAINING PROGRAM

## 2024-10-17 PROCEDURE — 99214 OFFICE O/P EST MOD 30 MIN: CPT | Performed by: STUDENT IN AN ORGANIZED HEALTH CARE EDUCATION/TRAINING PROGRAM

## 2024-10-17 PROCEDURE — 85004 AUTOMATED DIFF WBC COUNT: CPT | Performed by: STUDENT IN AN ORGANIZED HEALTH CARE EDUCATION/TRAINING PROGRAM

## 2024-10-17 ASSESSMENT — PAIN SCALES - GENERAL: PAINLEVEL: NO PAIN (0)

## 2024-10-17 NOTE — NURSING NOTE
"Oncology Rooming Note    October 17, 2024 11:51 AM   Zack Demarco is a 83 year old male who presents for:    Chief Complaint   Patient presents with    Oncology Clinic Visit     Follicular lymphoma     Initial Vitals: BP (!) 146/63 (BP Location: Right arm, Patient Position: Sitting, Cuff Size: Adult Regular)   Pulse (!) 43   Temp 97.6  F (36.4  C) (Oral)   Resp 18   Wt 100.3 kg (221 lb 3.2 oz)   SpO2 100%   BMI 31.74 kg/m   Estimated body mass index is 31.74 kg/m  as calculated from the following:    Height as of 3/19/24: 1.778 m (5' 10\").    Weight as of this encounter: 100.3 kg (221 lb 3.2 oz). Body surface area is 2.23 meters squared.  No Pain (0) Comment: Data Unavailable   No LMP for male patient.  Allergies reviewed: Yes  Medications reviewed: Yes    Medications: Medication refills not needed today.  Pharmacy name entered into [facility]:    [id number] - [place], [place] - [address] [facility] - [place], [place] - [facility]    Frailty Screening:   Is the patient here for a new oncology consult visit in cancer care? 2. No      Clinical concerns: Pt reports no new concerns today. \      Jadyn Rubio, EMT     "

## 2024-10-17 NOTE — NURSING NOTE
Chief Complaint   Patient presents with    Oncology Clinic Visit     Follicular lymphoma    Blood Draw     Labs drawn via  by RN in lab. VS taken.      Labs collected from venipuncture by RN. Vitals taken. Checked in for appointment(s).    Jesi Craven RN

## 2024-10-17 NOTE — LETTER
10/17/2024      Zack Demarco  [address] [facility] [address]      Dear Colleague,    Thank you for referring your patient, Zack Demarco, to the [facility]. Please see a copy of my visit note below.      Cruzito Dixon is a 83 year old, presenting for the following health issues:  Oncology Clinic Visit (Follicular lymphoma) and Blood Draw (Labs drawn via  by RN in lab. VS taken. )    HPI     Oncology History Overview Note   Zack Demarco is a 79 year old male first seen in consultation on 12/27/2017 for a new diagnosis of follicular lymphoma. He was diagnosed incidental to an evaluation for cardiac bypass surgery in 11/2017. A chest CT scan on 11/14 showed an unexpected retroperitoneal mass with surrounding lymphadenopathy suspicious for malignancy.  A further CT scan done on the same day showed a 2.3 x 7.2 x 6.1 retroperitoneal soft tissue mass with adjacent lymphadenopathy that mildly narrowed the left renal vein. There also was nodularity within the mesentery and an enlarged hilar lymph node. CT-guided biopsy of the retroperitoneal mass on 12/12/2017 established the diagnosis, but the sample was too small for adequate grading. There was no disease identified outside of the abdomen; a bone marrow biopsy was not obtained as part of staging.      Relative to cardiac issues, he underwent an uncomplicated 3-vessel coronary artery bypass graft on 11/22/2017 and, subsequently, has been symptom-free. He follows       With the renal and gonadal vein compression it was decided to start treatment with rituximab, alone. Mr. Demarco completed 4 weekly doses from 01/26 - 02/16/2018. He had no difficulty with the treatment and was able to receive rapid infusion (90 minutes) for the last 3 doses. Interval evaluation on 03/05/2018 with an US, suggested improvement. A CT scan on 04/09/2018 showed substantial regression. Repeated CT on 07/09/18 showed a good response but residual lymphoma around  the renal veins. Decided to proceed weekly rituximab x4 (7/26-8/22/2018).  He started rituximab maintenance in 10/2018.     He completed maintenance rituximab in October 2020. Ct imaging in Sept 2020 showed stable findings.       Patient comes today for follow up. No fevers, chills, night sweats or weight loss, no lymphadenopathy. No pain.      Objective   BP (!) 146/63 (BP Location: Right arm, Patient Position: Sitting, Cuff Size: Adult Regular)   Pulse (!) 43   Temp 97.6  F (36.4  C) (Oral)   Resp 18   Wt 100.3 kg (221 lb 3.2 oz)   SpO2 100%   BMI 31.74 kg/m    Body mass index is 31.74 kg/m .  Physical Exam   GENERAL:  Alert oriented well nourished  HEAD: normocephalic atraumatic  SKIN:  no rash, hives, other lesions.  LYMPHATIC: no abnormal lymph nodes palable in cervical, axillary, supraclavicular or inguinal area.  RESP:  No rales or rhonchi, breath sounds bilaterally equal and vesicular  CV:  No tachycardia, S1 S2 normal No murmur.  GI:  Abdomen soft nontender no hepato or splenomegaly  MUSCULOSKELETAL:  No visible joint redness or swelling.  NEURO:  No gross weakness gait normal  PSYCH: pleasant affect    Labs: I personally reviewed complete blood count, differential, renal function test, liver function tests LDH.  No cytopenias, LFTs within normal limits, renal function test within normal limits and LDH is normal as well.    Assessment and Plan:    1) Follicular lymphoma:  - He does not have any clinical or laboratory evidence of lymphoma recurrence or progression.  - He is now more than 5 years out in remission. I will see him back annually for history and physical exam as well as labs. CT scans will be deferred unless warranted for specific symptoms.    Total time spent on date of service in review of medical records, review of labs, history taking, physical exam, discussion of assessment and plan, counseling and patient education is 30 minutes.    Yuri Man MD  Attending Physician  Pager  [phone]                  Signed Electronically by: Yuri Man MD      Again, thank you for allowing me to participate in the care of your patient.        Sincerely,        Yuri Man MD

## 2024-10-17 NOTE — PROGRESS NOTES
[facility]: Cancer Care Chart Review                                                                                        Situation: Patient chart reviewed by Oncology RN Care Coordinator.     Background: Pt seen by Dr. Man today, 10/17/24.     Assessment: No coordination needed at this time by RNCC. Status set as Maintenance for enrollment.     Plan/Recommendations: Follow-up with provider in 1 year.     ARGENTINA LeoneN, RN  RN Care Coordinator   [phone]

## 2024-10-17 NOTE — PROGRESS NOTES
Cruzito Dixon is a 83 year old, presenting for the following health issues:  Oncology Clinic Visit (Follicular lymphoma) and Blood Draw (Labs drawn via  by RN in lab. VS taken. )    HPI     Oncology History Overview Note   Zack Demarco is a 79 year old male first seen in consultation on 12/27/2017 for a new diagnosis of follicular lymphoma. He was diagnosed incidental to an evaluation for cardiac bypass surgery in 11/2017. A chest CT scan on 11/14 showed an unexpected retroperitoneal mass with surrounding lymphadenopathy suspicious for malignancy.  A further CT scan done on the same day showed a 2.3 x 7.2 x 6.1 retroperitoneal soft tissue mass with adjacent lymphadenopathy that mildly narrowed the left renal vein. There also was nodularity within the mesentery and an enlarged hilar lymph node. CT-guided biopsy of the retroperitoneal mass on 12/12/2017 established the diagnosis, but the sample was too small for adequate grading. There was no disease identified outside of the abdomen; a bone marrow biopsy was not obtained as part of staging.      Relative to cardiac issues, he underwent an uncomplicated 3-vessel coronary artery bypass graft on 11/22/2017 and, subsequently, has been symptom-free. He follows       With the renal and gonadal vein compression it was decided to start treatment with rituximab, alone. Mr. Demarco completed 4 weekly doses from 01/26 - 02/16/2018. He had no difficulty with the treatment and was able to receive rapid infusion (90 minutes) for the last 3 doses. Interval evaluation on 03/05/2018 with an US, suggested improvement. A CT scan on 04/09/2018 showed substantial regression. Repeated CT on 07/09/18 showed a good response but residual lymphoma around the renal veins. Decided to proceed weekly rituximab x4 (7/26-8/22/2018).  He started rituximab maintenance in 10/2018.     He completed maintenance rituximab in October 2020. Ct imaging in Sept 2020 showed stable findings.       Patient  comes today for follow up. No fevers, chills, night sweats or weight loss, no lymphadenopathy. No pain.      Objective    BP (!) 146/63 (BP Location: Right arm, Patient Position: Sitting, Cuff Size: Adult Regular)   Pulse (!) 43   Temp 97.6  F (36.4  C) (Oral)   Resp 18   Wt 100.3 kg (221 lb 3.2 oz)   SpO2 100%   BMI 31.74 kg/m    Body mass index is 31.74 kg/m .  Physical Exam   GENERAL:  Alert oriented well nourished  HEAD: normocephalic atraumatic  SKIN:  no rash, hives, other lesions.  LYMPHATIC: no abnormal lymph nodes palable in cervical, axillary, supraclavicular or inguinal area.  RESP:  No rales or rhonchi, breath sounds bilaterally equal and vesicular  CV:  No tachycardia, S1 S2 normal No murmur.  GI:  Abdomen soft nontender no hepato or splenomegaly  MUSCULOSKELETAL:  No visible joint redness or swelling.  NEURO:  No gross weakness gait normal  PSYCH: pleasant affect    Labs: I personally reviewed complete blood count, differential, renal function test, liver function tests LDH.  No cytopenias, LFTs within normal limits, renal function test within normal limits and LDH is normal as well.    Assessment and Plan:    1) Follicular lymphoma:  - He does not have any clinical or laboratory evidence of lymphoma recurrence or progression.  - He is now more than 5 years out in remission. I will see him back annually for history and physical exam as well as labs. CT scans will be deferred unless warranted for specific symptoms.    Total time spent on date of service in review of medical records, review of labs, history taking, physical exam, discussion of assessment and plan, counseling and patient education is 30 minutes.    Yuri Man MD  Attending Physician  Pager [phone]                  Signed Electronically by: Yuri Man MD

## 2024-10-22 ENCOUNTER — OFFICE VISIT (OUTPATIENT)
Dept: CARDIOLOGY | Facility: CLINIC | Age: 84
End: 2024-10-22
Payer: MEDICARE

## 2024-10-22 ENCOUNTER — MYC MEDICAL ADVICE (OUTPATIENT)
Dept: CARDIOLOGY | Facility: CLINIC | Age: 84
End: 2024-10-22

## 2024-10-22 VITALS
HEIGHT: 70 IN | BODY MASS INDEX: 31.52 KG/M2 | OXYGEN SATURATION: 97 % | SYSTOLIC BLOOD PRESSURE: 120 MMHG | HEART RATE: 45 BPM | DIASTOLIC BLOOD PRESSURE: 56 MMHG | WEIGHT: 220.2 LBS

## 2024-10-22 DIAGNOSIS — I10 HYPERTENSION GOAL BP (BLOOD PRESSURE) < 140/90: ICD-10-CM

## 2024-10-22 DIAGNOSIS — E78.5 HYPERLIPIDEMIA LDL GOAL <130: ICD-10-CM

## 2024-10-22 DIAGNOSIS — I25.810 CORONARY ARTERY DISEASE INVOLVING AUTOLOGOUS VEIN CORONARY BYPASS GRAFT WITHOUT ANGINA PECTORIS: Primary | ICD-10-CM

## 2024-10-22 PROCEDURE — 99214 OFFICE O/P EST MOD 30 MIN: CPT

## 2024-10-22 RX ORDER — METOPROLOL TARTRATE 25 MG/1
TABLET, FILM COATED ORAL
Qty: 30 TABLET | Refills: 3 | Status: SHIPPED | OUTPATIENT
Start: 2024-10-22

## 2024-10-22 RX ORDER — METOPROLOL TARTRATE 25 MG/1
TABLET, FILM COATED ORAL
COMMUNITY
Start: 2024-10-22 | End: 2024-10-22

## 2024-10-22 RX ORDER — LOSARTAN POTASSIUM 50 MG/1
50 TABLET ORAL 2 TIMES DAILY
COMMUNITY

## 2024-10-22 ASSESSMENT — PAIN SCALES - GENERAL: PAINLEVEL: NO PAIN (0)

## 2024-10-22 NOTE — NURSING NOTE
Med Reconcile: Reviewed and verified all current medications with the patient. The updated medication list was printed and given to the patient./ Reduce Lopressor to 12.5 mg BID. BP update via [employer] in 2 weeks.       Return Appointment:   -Follow-up in 1 year     Patient stated he understood all health information given and agreed to call with further questions or concerns.

## 2024-10-22 NOTE — TELEPHONE ENCOUNTER
[facility] hence [facility] written [facility]. RX mailed to pt's home address per demographics.

## 2024-10-22 NOTE — NURSING NOTE
"Chief Complaint   Patient presents with    Follow Up     Return General Cardiology for annual       Initial BP (!) 146/77 (BP Location: Right arm, Patient Position: Sitting, Cuff Size: Adult Large)   Pulse (!) 45   Ht 1.778 m (5' 10\")   Wt 99.9 kg (220 lb 3.2 oz)   SpO2 97%   BMI 31.60 kg/m   Estimated body mass index is 31.6 kg/m  as calculated from the following:    Height as of this encounter: 1.778 m (5' 10\").    Weight as of this encounter: 99.9 kg (220 lb 3.2 oz).  BP completed using cuff size: large    Medication refills needed?: No      Deborah Newsome, EMT  "

## 2024-11-25 ENCOUNTER — MYC MEDICAL ADVICE (OUTPATIENT)
Dept: CARDIOLOGY | Facility: CLINIC | Age: 84
End: 2024-11-25
Payer: MEDICARE

## 2024-11-26 NOTE — TELEPHONE ENCOUNTER
Catherine Ross PA-C  You40 minutes ago (7:43 AM)       Agree with stopping Lopressor  Thanks!       The above noted and patient has been notified. Medication list updated in Epic.

## 2024-12-11 NOTE — DISCHARGE SUMMARY
[address] [address]  p: [phone]    Discharge Summary: Cardiology Service    Zack Demarco MRN# [id number]   YOB: 1940 Age: 77 year old       Admission Date: 11/13/2017  Discharge Date: 11/21/17      Discharge Diagnoses:    #CAD  #HTN  #Dyslipidemia  #Hypothyroidism    Pertinent Procedures:  1. Coronary angiogram    Consults:  CVTS    Imaging with results:    Coronary Angiogram   CORONARY ANGIOGRAM:   1. Both coronary arteries arise from their respective cusps.  2. Dominance: Right  3. The LM is without angiographic evidence of disease.   4. LAD: Type 3 [LAD supplies the entire apex].. The LAD gives rise to septal perforators, D1 and D2.  The pLAD has a 70-80% stenosis at the ostium.  The mid LAD has an 80-90% stenosis before and after the diagonal branch with 80-90% of the first diagonal.  Distally there is diffuse disease at less than 25%.  5. LCX gives rise to OM1/2 branches of medium caliber with diffuse luminal disease throughout without angiographic stenosis.  6. RCA gives rise to PL branches and supplies PDA. The proximal RCA has luminal disease, mid RCA has a 70% stenosis, distally there is less than 25% stenosis.  The ostial RPL and PDA each have disease of approximately 30-40% and 50% stenosis respectively.     Sheath Removal:  The right radial artery sheath was manually removed in the cardiac catheterization laboratory.     Contrast: Isovue, 50 ml      Fluoroscopy Time: 7.3 min     COMPLICATIONS:  1. None     SUMMARY:   Three vessel CAD involving the RCA, proximal and mid LAD and diagonal branch.     PLAN:   - Admit to inpatient CSI  - CVTS consultation for CABG  - Risk factor modification  - Management of TR band per floor protocol    Brief HPI:  Assessment & Plan:  Mr. Zack Demarco is a 78 yo male patient with a PMH notable for hypothyroid, dyslipidemia, HTN, and tobacco use who presented for routine coronary  OB follow up     Camille Alford is a 31 y.o.  38w2d being seen today for her obstetrical visit.  Patient reports no complaints. Fetal movement: normal.  She was observed on 2024 for early labor and was found to have rhinovirus.    Her prenatal care is complicated by (and status): Nothing    Review of Systems  Cramping/contractions : Are present  Vaginal bleeding: Negative  Fetal movement normal    /81   Wt 74.9 kg (165 lb 3.2 oz)   LMP 2024 (Approximate)   BMI 25.87 kg/m²     FHT: . BPM   Uterine Size: size equals dates       Assessment    Diagnoses and all orders for this visit:    1. 38 weeks gestation of pregnancy (Primary)  -     POC Urinalysis Dipstick    2. Prenatal care, subsequent pregnancy in third trimester    3. Maternal viral disease affecting pregnancy in third trimester        1) pregnancy at 38w2d         Plan    Reviewed this stage of pregnancy  Problem list updated   Follow up in 1 week.  Will plan to recheck her cervix on her next visit and possibly plan induction for later in that same week.    Song Norwood MD   2024  11:51 EST   angiogram due to exertional chest pain, and was found to have severe 2 vessel disease (RCA and LAD) for which CV surgery consulted for CAB eval.      Hospital Course by Diagnosis:    #CAD: Cor angio reveals 2V disease-Mid RCA 70% stenosis, Proximal LAD 70-80%, Mid LAD 80-90% .Normal resting EF but decreased with stress, with associated anteroapical wall akinesis. Currently patient without chest pain, hemodynamically stable on Heparin gtt. Admitted for CABG workup. Noncontrast CT Chest; abdominal ultrasound; carotid us, and vein mapping completed. However, incidental finding of mass on chest CT requires further evaluation. Per Dr. 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 Additional consults per CV surgery completed. Patient and family informed and in agreement with below plan. Patient and family given contact information and advised to return to the ED if chest pain returns prior to CV surgery evaluation next week. Chest/Abd/Pelvis CT performed  to characterize mass on chest CT.   -- CVTS consulted and following , with plans to undergo CAB next week  -- Endocrine consulted d/t possible paraganglioma and induction risks with anaesthesia, though they find no additional risks at this time.  -- Surg Onc consulted, per Dr. Vila, to further evaluate possible malignancy prior to CAB, who would like to biopsy chest tumor after CAB completed.  -- Stop Heparin gtt, ASA- 81mg QD, Metop, small dose as baseline bradycardia, PTA losartan/hctz (50/12.5)mg QD, Atorvastatin 40mg QD and  PRN nitro with low threshold for gtt       #HTN - Continue PTA losartan/hctz, added BB.  #Dyslipidemia - Starting high intensity statin as above. Lipids last done 7/20/2017 , , HDL 32, LDL 76.   #Hypothyroidism -  Last TSH was elevated in July, but provider increased synthroid at that time. No symptoms of hypothroid at present. Continue PTA levothyroxine.        Condition on discharge  Temp:  [98.1  F (36.7  C)] 98.1  F (36.7  C)  Pulse:  [65] 65  Resp:  [18] 18  BP: (128)/(73) 128/73  SpO2:  [95 %-99 %] 99 %  General: Alert, interactive, NAD  Eyes: sclera anicteric, EOMI  Neck: carotid 2+ bilaterally  Cardiovascular: regular rate and rhythm, normal S1 and S2, no murmurs, gallops, or rubs  Resp: clear to auscultation bilaterally, no rales, wheezes, or rhonchi  GI: Soft, nontender, nondistended. +BS.  No HSM or masses, no rebound or guarding.  Extremities: No edema, no cyanosis or clubbing, dorsalis pedis and posterior tibialis pulses 2+ bilaterally  Skin: Warm and dry, no jaundice or rash  Neuro: CN 2-12 intact, moves all extremities equally  Psych: Alert & oriented x 3      Medication Changes:  Begin BB    Discharge medications:   Discharge Medication List as of 11/15/2017 12:34 PM      START taking these medications    Details   metoprolol (LOPRESSOR) 25 MG tablet Take 0.25 tablets (6.25 mg) by mouth 2 times daily, Disp-60 tablet, R-0, E-Prescribe         CONTINUE these medications which have NOT CHANGED    Details   atorvastatin (LIPITOR) 40 MG tablet Take 1 tablet (40 mg) by mouth daily, Disp-60 tablet, R-11, E-Prescribe      losartan-hydrochlorothiazide (HYZAAR) 50-12.5 MG per tablet Take 1 tablet by mouth daily, Disp-90 tablet, R-3, E-Prescribe      levothyroxine (SYNTHROID/LEVOTHROID) 75 MCG tablet Take 1 tablet (75 mcg) by mouth daily, Disp-90 tablet, R-3, E-Prescribe      Cholecalciferol (VITAMIN D3 PO) Take by mouth daily, Historical      multivitamin (OCUVITE) TABS Take 1 tablet by mouth daily, Historical      latanoprost (XALATAN) 0.005 % ophthalmic solution Place 1 drop into both eyes At Bedtime, Disp-3 Bottle, R-4, Local Print      aspirin 325 MG EC tablet 1/2 tab daily, Historical      nitroGLYcerin (NITROSTAT) 0.4 MG sublingual tablet For chest pain place 1 tablet under the tongue every 5 minutes for 3 doses. If symptoms persist 5 minutes after 1st  dose call 911., Disp-25 tablet, R-3, E-Prescribe             Labs or imaging requiring follow-up after discharge:  Per CVTS and PCP      Follow-up:  CVTS next week  Cardiology as needed  ED if chest pain occurs  PCP within a week    Code status:  Full    HOLLY Sinclair, CNP  [facility]  Interventional Cardiology-CSI Service  Pager [phone]       Patient Care Team:  Anastacio Love MD as PCP - General (Family Practice)  Anastacio Love MD as MD (Family Practice)  Rolando Toro MD as MD (Transplant)  Sriram Renteria, RN as Clinic Care Coordinator

## 2025-03-18 ENCOUNTER — TELEPHONE (OUTPATIENT)
Dept: CARDIOLOGY | Facility: CLINIC | Age: 85
End: 2025-03-18
Payer: MEDICARE

## 2025-03-18 DIAGNOSIS — I10 HYPERTENSION GOAL BP (BLOOD PRESSURE) < 140/90: ICD-10-CM

## 2025-03-18 RX ORDER — ISOSORBIDE MONONITRATE 120 MG/1
120 TABLET, EXTENDED RELEASE ORAL DAILY
Qty: 90 TABLET | Refills: 1 | Status: SHIPPED | OUTPATIENT
Start: 2025-03-18

## 2025-03-18 NOTE — TELEPHONE ENCOUNTER
Fax received from [facility] requesting recent progress notes that supports the dose change in Isosorbide mononitrate to 120 mg daily. [facility] 10/[facility] at [phone].

## 2025-04-23 ENCOUNTER — PATIENT OUTREACH (OUTPATIENT)
Dept: FAMILY MEDICINE | Facility: CLINIC | Age: 85
End: 2025-04-23
Payer: MEDICARE

## 2025-04-23 NOTE — TELEPHONE ENCOUNTER
Patient Quality Outreach    Patient is due for the following:   Physical Annual Wellness Visit      Topic Date Due    COVID-19 Vaccine (8 - Pfizer risk 2024-25 season) 03/07/2025       Action(s) Taken:   Schedule a Annual Wellness Visit    Type of outreach:    Sent [employer] message.    Questions for provider review:    None         DIEGO FREITAS MA  Chart routed to None.

## 2025-04-24 ENCOUNTER — TELEPHONE (OUTPATIENT)
Dept: CARDIOLOGY | Facility: CLINIC | Age: 85
End: 2025-04-24
Payer: MEDICARE

## 2025-04-24 NOTE — TELEPHONE ENCOUNTER
Faxed received from [facility] requesting recent progress notes that supports the dose change in Isosorbide mononitrate to 120 mg daily. Clinic notes from 10/22/2024 manually faxed at [phone] as requested.

## 2025-05-20 ENCOUNTER — TELEPHONE (OUTPATIENT)
Dept: CARDIOLOGY | Facility: CLINIC | Age: 85
End: 2025-05-20
Payer: MEDICARE

## 2025-05-20 NOTE — TELEPHONE ENCOUNTER
Called pt he picked up and was silent sent him mychart msg to [facility] appt with Catherine

## 2025-07-13 ENCOUNTER — HEALTH MAINTENANCE LETTER (OUTPATIENT)
Age: 85
End: 2025-07-13

## 2025-07-21 ENCOUNTER — OFFICE VISIT (OUTPATIENT)
Dept: OPHTHALMOLOGY | Facility: CLINIC | Age: 85
End: 2025-07-21
Payer: MEDICARE

## 2025-07-21 DIAGNOSIS — H40.053 BORDERLINE GLAUCOMA WITH OCULAR HYPERTENSION, BILATERAL: Primary | ICD-10-CM

## 2025-07-21 DIAGNOSIS — H35.3131 EARLY DRY STAGE NONEXUDATIVE AGE-RELATED MACULAR DEGENERATION OF BOTH EYES: ICD-10-CM

## 2025-07-21 PROCEDURE — 92012 INTRM OPH EXAM EST PATIENT: CPT | Performed by: OPHTHALMOLOGY

## 2025-07-21 PROCEDURE — 92133 CPTRZD OPH DX IMG PST SGM ON: CPT | Performed by: OPHTHALMOLOGY

## 2025-07-21 PROCEDURE — 92083 EXTENDED VISUAL FIELD XM: CPT | Performed by: OPHTHALMOLOGY

## 2025-07-21 ASSESSMENT — VISUAL ACUITY
OS_CC+: -1
METHOD: SNELLEN - LINEAR
CORRECTION_TYPE: GLASSES
OD_CC: 20/30
OS_CC: 20/30

## 2025-07-21 ASSESSMENT — EXTERNAL EXAM - LEFT EYE: OS_EXAM: 3+ BROW PTOSIS

## 2025-07-21 ASSESSMENT — TONOMETRY
IOP_METHOD: APPLANATION
OS_IOP_MMHG: 15
OD_IOP_MMHG: 14

## 2025-07-21 ASSESSMENT — EXTERNAL EXAM - RIGHT EYE: OD_EXAM: 3+ BROW PTOSIS

## 2025-07-21 NOTE — PROGRESS NOTES
" Current Eye Medications:  Latanoprost both eyes every evening, Timolol both eyes each morning.   Last drops:  6am.    AREDS, Thera Tears both eyes four times a day.       Subjective:  Follow up borderline glaucoma with Ocular Hypertension.  The patient is here for a pressure check, OCT, and visual field.  He feels his distance vision may be decreasing slightly, especially when driving.    He prefers to wait until his next appointment to proceed with the Yag Capsulotomy.  He has another appointment today     Objective:  See Ophthalmology Exam.       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      Plan:  Continue:   Latanoprost (green top) every evening both eyes.  Timolol (yellow top) every morning both eyes.    May use artificial tears up to four times a day (like Refresh Optive, Systane Balance, or TheraTears. Avoid \"get the red out\" drops and generic artifical tears).     Wait at least 5 minutes between drops if using more than one at a time.     Continue to take a multiple vitamin or \"eye vitamin\" daily (AREDS2).  Protect your eyes outdoors from ultraviolet rays with sunglasses and/or brimmed hat.  Have spinach (cooked or raw), colorful fruits, walnuts, hazelnuts, almonds in your diet.  Monitor the vision in each eye weekly - call if any sudden persistent changes.     Possible clouding of posterior capsule right eye discussed.     Return visit in 6 months for a complete exam.     Avtar Mccullough M.D.  [phone]       "

## 2025-07-21 NOTE — LETTER
"7/21/2025      Zack Demarco  [address] [facility] [address]      Dear Colleague,    Thank you for referring your patient, Zack Demarco, to the [facility]. Please see a copy of my visit note below.     Current Eye Medications:  Latanoprost both eyes every evening, Timolol both eyes each morning.   Last drops:  6am.    AREDS, Thera Tears both eyes four times a day.       Subjective:  Follow up borderline glaucoma with Ocular Hypertension.  The patient is here for a pressure check, OCT, and visual field.  He feels his distance vision may be decreasing slightly, especially when driving.    He prefers to wait until his next appointment to proceed with the Yag Capsulotomy.  He has another appointment today     Objective:  See Ophthalmology Exam.       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      Plan:  Continue:   Latanoprost (green top) every evening both eyes.  Timolol (yellow top) every morning both eyes.    May use artificial tears up to four times a day (like Refresh Optive, Systane Balance, or TheraTears. Avoid \"get the red out\" drops and generic artifical tears).     Wait at least 5 minutes between drops if using more than one at a time.     Continue to take a multiple vitamin or \"eye vitamin\" daily (AREDS2).  Protect your eyes outdoors from ultraviolet rays with sunglasses and/or brimmed hat.  Have spinach (cooked or raw), colorful fruits, walnuts, hazelnuts, almonds in your diet.  Monitor the vision in each eye weekly - call if any sudden persistent changes.     Possible clouding of posterior capsule right eye discussed.     Return visit in 6 months for a complete exam.     Avtar Mccullough M.D.  [phone]         Again, thank you for allowing me to participate in the care of your patient.  "       Sincerely,        Avtar Mccullough MD    Electronically signed

## 2025-07-21 NOTE — PATIENT INSTRUCTIONS
"Continue:   Latanoprost (green top) every evening both eyes.  Timolol (yellow top) every morning both eyes.    May use artificial tears up to four times a day (like Refresh Optive, Systane Balance, or TheraTears. Avoid \"get the red out\" drops and generic artifical tears).     Wait at least 5 minutes between drops if using more than one at a time.     Continue to take a multiple vitamin or \"eye vitamin\" daily (AREDS2).  Protect your eyes outdoors from ultraviolet rays with sunglasses and/or brimmed hat.  Have spinach (cooked or raw), colorful fruits, walnuts, hazelnuts, almonds in your diet.  Monitor the vision in each eye weekly - call if any sudden persistent changes.     Possible clouding of posterior capsule right eye discussed.     Return visit in 6 months for a complete exam.     Avtar Mccullough M.D.  [phone]    "

## 2025-07-27 ASSESSMENT — PACHYMETRY
OD_CT(UM): 552
OS_CT(UM): 548

## (undated) DEVICE — TUBING INSUFFLATION W/FILTER CPC TO LUER 620-030-301

## (undated) DEVICE — SU PROLENE 7-0 BV-1DA 4X24" M8702

## (undated) DEVICE — DRSG TELFA 3X8" 1238

## (undated) DEVICE — CONNECTOR DRAIN CHEST Y EXTENSION SET 19909

## (undated) DEVICE — SU PLEDGET SOFT TFE 3/8"X3/26"X1/16" PCP40

## (undated) DEVICE — CANNULA VESSEL DLP  30001

## (undated) DEVICE — SPONGE LAP 12X12" X8425

## (undated) DEVICE — DRSG DRAIN 4X4" 7086

## (undated) DEVICE — SU STEEL 6 CCS 4X18" M654G

## (undated) DEVICE — PACK ADULT HEART UMMC PV15CG92D

## (undated) DEVICE — LINEN TOWEL PACK X6 WHITE 5487

## (undated) DEVICE — SU PROLENE 7-0 BV-1DA 24" 8702H

## (undated) DEVICE — BLADE KNIFE BEAVER MICROSHARP GREEN 377515

## (undated) DEVICE — Device

## (undated) DEVICE — DRAIN CHEST TUBE 28FR STR 8028

## (undated) DEVICE — SU ETHIBOND 2-0 SHDA 30" X563H

## (undated) DEVICE — DRAPE IOBAN INCISE 23X17" 6650EZ

## (undated) DEVICE — BONE WAX 2.5GM W31G

## (undated) DEVICE — SPECIMEN CONTAINER 5OZ STERILE 2600SA

## (undated) DEVICE — SOL NACL 0.9% IRRIG 3000ML BAG 2B7477

## (undated) DEVICE — ESU PENCIL W/COATED BLADE E2450H

## (undated) DEVICE — SU VICRYL 0 CTX 36" J370H

## (undated) DEVICE — LEAD ELEC MYOCARDIO PACING TEMPORARY MEDTRONIC

## (undated) DEVICE — DRAPE SLUSH/WARMER 66X44" ORS-320

## (undated) DEVICE — BLADE SAW STERNAL 20X30MM KM-32

## (undated) DEVICE — SU PROLENE 5-0 RB-2DA 30" 8710H

## (undated) DEVICE — GLOVE PROTEXIS POWDER FREE 7.0 ORTHOPEDIC 2D73ET70

## (undated) DEVICE — SU RETRACT-O-TAPE 1041

## (undated) DEVICE — NDL BLUNT 18GA 1" W/O FILTER 305181

## (undated) DEVICE — PUNCH AORTIC 4.0MM LONG AP-540

## (undated) DEVICE — PREP CHLORAPREP 26ML TINTED ORANGE  260815

## (undated) DEVICE — SYR 01ML 27GA 0.5" NDL TBC 309623

## (undated) DEVICE — BNDG ELASTIC 6"X5YDS STERILE 6611-6S

## (undated) DEVICE — CLIP HORIZON MED BLUE 002200

## (undated) DEVICE — LINEN TOWEL PACK X30 5481

## (undated) DEVICE — SU DERMABOND ADVANCED .7ML DNX12

## (undated) DEVICE — TIES BANDING T50R

## (undated) DEVICE — ESU ELEC BLADE 2.75" COATED/INSULATED E1455

## (undated) DEVICE — CLIP HORIZON LG ORANGE 004200

## (undated) DEVICE — SU PROLENE 6-0 C-1DA 30" 8706H

## (undated) DEVICE — SU PROLENE 4-0 SHDA 36" 8521H

## (undated) DEVICE — TAPE MEDIPORE 4"X2YD 2864

## (undated) DEVICE — SYR 10ML LL W/O NDL 302995

## (undated) DEVICE — SOL NACL 0.9% IRRIG 1000ML BOTTLE 2F7124

## (undated) DEVICE — SU PROLENE 4-0 RB-1DA 36" 8557H

## (undated) DEVICE — BLADE CLIPPER SGL USE 9680

## (undated) DEVICE — DRSG TEGADERM 4X4 3/4" 1626W

## (undated) DEVICE — SUCTION MANIFOLD DORNOCH ULTRA CART UL-CL500

## (undated) DEVICE — SU VICRYL 2-0 CT-1 27" UND J259H

## (undated) DEVICE — CLIP SPRING FOGARTY SOFTJAW CSOFT6

## (undated) DEVICE — PROTECTOR ARM ONE-STEP TRENDELENBURG 40418

## (undated) DEVICE — CLIP HORIZON SM RED WIDE SLOT 001201

## (undated) DEVICE — SOL NACL 0.9% 10ML VIAL 0409-4888-02

## (undated) DEVICE — KIT ENDO VASOVIEW HEMOPRO 2 VH-4000

## (undated) DEVICE — WIPES FOLEY CARE SURESTEP PROVON DFC100

## (undated) DEVICE — SU ETHIBOND 3-0 BBDA 36" X588H

## (undated) DEVICE — SU ETHIBOND 0 TIE 6X30" X306H

## (undated) DEVICE — COVER ULTRASOUND PROBE W/GEL FLEXI-FEEL 6"X58" LF  25-FF658

## (undated) DEVICE — ANTIFOG SOLUTION W/FOAM PAD 31142527

## (undated) DEVICE — BLADE KNIFE SURG 15C 371716

## (undated) DEVICE — SU ETHIBOND 0 CT-1 CR 8X18" CX21D

## (undated) DEVICE — BLADE KNIFE BEAVER MINI STR BEAVER6900

## (undated) DEVICE — SU PROLENE 6-0 C-1DA 4X24" M8726

## (undated) DEVICE — SU VICRYL 3-0 FS-1 27" J442H

## (undated) DEVICE — SU PROLENE 3-0 SHDA 36" 8522H

## (undated) DEVICE — SUCTION DRY CHEST DRAIN OASIS 3600-100

## (undated) DEVICE — SUCTION CATH AIRLIFE TRI-FLO W/CONTROL PORT 14FR  T60C

## (undated) DEVICE — SU STEEL MYO/WIRE II STERNOTOMY 8 BE-1 3X14" 048-217

## (undated) RX ORDER — FENTANYL CITRATE 50 UG/ML
INJECTION, SOLUTION INTRAMUSCULAR; INTRAVENOUS
Status: DISPENSED
Start: 2017-11-22

## (undated) RX ORDER — PAPAVERINE HYDROCHLORIDE 30 MG/ML
INJECTION INTRAMUSCULAR; INTRAVENOUS
Status: DISPENSED
Start: 2017-11-22

## (undated) RX ORDER — FENTANYL CITRATE 50 UG/ML
INJECTION, SOLUTION INTRAMUSCULAR; INTRAVENOUS
Status: DISPENSED
Start: 2017-11-14

## (undated) RX ORDER — HEPARIN SODIUM 1000 [USP'U]/ML
INJECTION, SOLUTION INTRAVENOUS; SUBCUTANEOUS
Status: DISPENSED
Start: 2017-11-22

## (undated) RX ORDER — EPHEDRINE SULFATE 50 MG/ML
INJECTION, SOLUTION INTRAMUSCULAR; INTRAVENOUS; SUBCUTANEOUS
Status: DISPENSED
Start: 2017-11-14

## (undated) RX ORDER — ROCURONIUM BROMIDE 50 MG/5 ML
SYRINGE (ML) INTRAVENOUS
Status: DISPENSED
Start: 2017-11-14

## (undated) RX ORDER — FENTANYL CITRATE 50 UG/ML
INJECTION, SOLUTION INTRAMUSCULAR; INTRAVENOUS
Status: DISPENSED
Start: 2017-12-12

## (undated) RX ORDER — LIDOCAINE HYDROCHLORIDE 10 MG/ML
INJECTION, SOLUTION EPIDURAL; INFILTRATION; INTRACAUDAL; PERINEURAL
Status: DISPENSED
Start: 2017-12-12

## (undated) RX ORDER — ROCURONIUM BROMIDE 50 MG/5 ML
SYRINGE (ML) INTRAVENOUS
Status: DISPENSED
Start: 2017-11-22

## (undated) RX ORDER — MUPIROCIN 20 MG/G
OINTMENT TOPICAL
Status: DISPENSED
Start: 2017-11-22

## (undated) RX ORDER — PHENYLEPHRINE HCL IN 0.9% NACL 1 MG/10 ML
SYRINGE (ML) INTRAVENOUS
Status: DISPENSED
Start: 2017-11-14

## (undated) RX ORDER — SODIUM CHLORIDE 9 MG/ML
INJECTION, SOLUTION INTRAVENOUS
Status: DISPENSED
Start: 2017-11-13

## (undated) RX ORDER — LIDOCAINE HYDROCHLORIDE 20 MG/ML
INJECTION, SOLUTION EPIDURAL; INFILTRATION; INTRACAUDAL; PERINEURAL
Status: DISPENSED
Start: 2017-11-14

## (undated) RX ORDER — NITROGLYCERIN 20 MG/100ML
INJECTION INTRAVENOUS
Status: DISPENSED
Start: 2017-11-22

## (undated) RX ORDER — PROPOFOL 10 MG/ML
INJECTION, EMULSION INTRAVENOUS
Status: DISPENSED
Start: 2017-11-14

## (undated) RX ORDER — NICARDIPINE HYDROCHLORIDE 2.5 MG/ML
INJECTION INTRAVENOUS
Status: DISPENSED
Start: 2017-11-13

## (undated) RX ORDER — CEFAZOLIN SODIUM 1 G/3ML
INJECTION, POWDER, FOR SOLUTION INTRAMUSCULAR; INTRAVENOUS
Status: DISPENSED
Start: 2017-11-22

## (undated) RX ORDER — DIPHENHYDRAMINE HYDROCHLORIDE 50 MG/ML
INJECTION INTRAMUSCULAR; INTRAVENOUS
Status: DISPENSED
Start: 2017-11-13

## (undated) RX ORDER — FENTANYL CITRATE 50 UG/ML
INJECTION, SOLUTION INTRAMUSCULAR; INTRAVENOUS
Status: DISPENSED
Start: 2021-08-27

## (undated) RX ORDER — HEPARIN SODIUM 1000 [USP'U]/ML
INJECTION, SOLUTION INTRAVENOUS; SUBCUTANEOUS
Status: DISPENSED
Start: 2017-11-13

## (undated) RX ORDER — FENTANYL CITRATE 50 UG/ML
INJECTION, SOLUTION INTRAMUSCULAR; INTRAVENOUS
Status: DISPENSED
Start: 2017-11-13

## (undated) RX ORDER — SODIUM CHLORIDE 9 MG/ML
INJECTION, SOLUTION INTRAVENOUS
Status: DISPENSED
Start: 2017-12-12